# Patient Record
Sex: FEMALE | Race: BLACK OR AFRICAN AMERICAN | NOT HISPANIC OR LATINO | Employment: OTHER | URBAN - METROPOLITAN AREA
[De-identification: names, ages, dates, MRNs, and addresses within clinical notes are randomized per-mention and may not be internally consistent; named-entity substitution may affect disease eponyms.]

---

## 2017-02-13 DIAGNOSIS — N18.4 CKD (CHRONIC KIDNEY DISEASE), STAGE IV: Primary | ICD-10-CM

## 2017-02-14 ENCOUNTER — TELEPHONE (OUTPATIENT)
Dept: NEPHROLOGY | Facility: CLINIC | Age: 37
End: 2017-02-14

## 2017-02-14 ENCOUNTER — LAB VISIT (OUTPATIENT)
Dept: LAB | Facility: HOSPITAL | Age: 37
End: 2017-02-14
Attending: INTERNAL MEDICINE
Payer: MEDICAID

## 2017-02-14 DIAGNOSIS — N18.4 CKD (CHRONIC KIDNEY DISEASE), STAGE IV: ICD-10-CM

## 2017-02-14 LAB
ALBUMIN SERPL BCP-MCNC: 3.3 G/DL
ANION GAP SERPL CALC-SCNC: 7 MMOL/L
BASOPHILS # BLD AUTO: 0.02 K/UL
BASOPHILS NFR BLD: 0.3 %
BUN SERPL-MCNC: 34 MG/DL
CALCIUM SERPL-MCNC: 9.2 MG/DL
CHLORIDE SERPL-SCNC: 108 MMOL/L
CO2 SERPL-SCNC: 21 MMOL/L
CREAT SERPL-MCNC: 2.8 MG/DL
DIFFERENTIAL METHOD: ABNORMAL
EOSINOPHIL # BLD AUTO: 0.1 K/UL
EOSINOPHIL NFR BLD: 1.9 %
ERYTHROCYTE [DISTWIDTH] IN BLOOD BY AUTOMATED COUNT: 15.5 %
EST. GFR  (AFRICAN AMERICAN): 23.8 ML/MIN/1.73 M^2
EST. GFR  (NON AFRICAN AMERICAN): 20.6 ML/MIN/1.73 M^2
FERRITIN SERPL-MCNC: 21 NG/ML
GLUCOSE SERPL-MCNC: 145 MG/DL
HCT VFR BLD AUTO: 29.1 %
HGB BLD-MCNC: 9.1 G/DL
IRON SERPL-MCNC: 37 UG/DL
LYMPHOCYTES # BLD AUTO: 1.7 K/UL
LYMPHOCYTES NFR BLD: 27.5 %
MCH RBC QN AUTO: 25.8 PG
MCHC RBC AUTO-ENTMCNC: 31.3 %
MCV RBC AUTO: 82 FL
MONOCYTES # BLD AUTO: 0.4 K/UL
MONOCYTES NFR BLD: 5.7 %
NEUTROPHILS # BLD AUTO: 4.1 K/UL
NEUTROPHILS NFR BLD: 64.1 %
PHOSPHATE SERPL-MCNC: 3.9 MG/DL
PLATELET # BLD AUTO: 295 K/UL
PMV BLD AUTO: 11.1 FL
POTASSIUM SERPL-SCNC: 5.8 MMOL/L
PTH-INTACT SERPL-MCNC: 296 PG/ML
RBC # BLD AUTO: 3.53 M/UL
SATURATED IRON: 11 %
SODIUM SERPL-SCNC: 136 MMOL/L
TOTAL IRON BINDING CAPACITY: 336 UG/DL
TRANSFERRIN SERPL-MCNC: 227 MG/DL
WBC # BLD AUTO: 6.33 K/UL

## 2017-02-14 PROCEDURE — 83970 ASSAY OF PARATHORMONE: CPT

## 2017-02-14 PROCEDURE — 36415 COLL VENOUS BLD VENIPUNCTURE: CPT

## 2017-02-14 PROCEDURE — 80069 RENAL FUNCTION PANEL: CPT

## 2017-02-14 PROCEDURE — 83540 ASSAY OF IRON: CPT

## 2017-02-14 PROCEDURE — 85025 COMPLETE CBC W/AUTO DIFF WBC: CPT

## 2017-02-14 PROCEDURE — 82728 ASSAY OF FERRITIN: CPT

## 2017-02-14 NOTE — TELEPHONE ENCOUNTER
Severe hyperkalemia and kidney function has worsened. Please inform patient to go to the ER for prompt evaluation of this. Thanks.

## 2017-02-15 ENCOUNTER — HOSPITAL ENCOUNTER (EMERGENCY)
Facility: HOSPITAL | Age: 37
Discharge: HOME | End: 2017-02-15
Attending: FAMILY MEDICINE | Admitting: FAMILY MEDICINE
Payer: MEDICAID

## 2017-02-15 ENCOUNTER — OFFICE VISIT (OUTPATIENT)
Dept: NEPHROLOGY | Facility: CLINIC | Age: 37
End: 2017-02-15
Payer: MEDICAID

## 2017-02-15 VITALS
WEIGHT: 255 LBS | HEIGHT: 68 IN | RESPIRATION RATE: 18 BRPM | BODY MASS INDEX: 38.65 KG/M2 | HEART RATE: 92 BPM | SYSTOLIC BLOOD PRESSURE: 172 MMHG | OXYGEN SATURATION: 100 % | DIASTOLIC BLOOD PRESSURE: 96 MMHG | TEMPERATURE: 98 F

## 2017-02-15 VITALS
WEIGHT: 255.5 LBS | SYSTOLIC BLOOD PRESSURE: 166 MMHG | OXYGEN SATURATION: 98 % | HEART RATE: 86 BPM | BODY MASS INDEX: 38.72 KG/M2 | DIASTOLIC BLOOD PRESSURE: 110 MMHG | HEIGHT: 68 IN

## 2017-02-15 DIAGNOSIS — D63.1 ANEMIA OF RENAL DISEASE: Chronic | ICD-10-CM

## 2017-02-15 DIAGNOSIS — N18.4 CKD (CHRONIC KIDNEY DISEASE), STAGE IV: Primary | ICD-10-CM

## 2017-02-15 DIAGNOSIS — N25.81 SECONDARY HYPERPARATHYROIDISM: ICD-10-CM

## 2017-02-15 DIAGNOSIS — R80.9 PROTEINURIA, UNSPECIFIED TYPE: ICD-10-CM

## 2017-02-15 DIAGNOSIS — E87.5 HYPERKALEMIA: ICD-10-CM

## 2017-02-15 DIAGNOSIS — E87.5 HYPERKALEMIA: Primary | ICD-10-CM

## 2017-02-15 DIAGNOSIS — N18.9 ANEMIA OF RENAL DISEASE: Chronic | ICD-10-CM

## 2017-02-15 DIAGNOSIS — I10 ESSENTIAL HYPERTENSION: Chronic | ICD-10-CM

## 2017-02-15 LAB
ANION GAP SERPL CALC-SCNC: 5 MMOL/L
BASOPHILS # BLD AUTO: 0.02 K/UL
BASOPHILS NFR BLD: 0.3 %
BUN SERPL-MCNC: 38 MG/DL
CALCIUM SERPL-MCNC: 9.5 MG/DL
CHLORIDE SERPL-SCNC: 109 MMOL/L
CO2 SERPL-SCNC: 23 MMOL/L
CREAT SERPL-MCNC: 2.6 MG/DL
DIFFERENTIAL METHOD: ABNORMAL
EOSINOPHIL # BLD AUTO: 0.1 K/UL
EOSINOPHIL NFR BLD: 1.9 %
ERYTHROCYTE [DISTWIDTH] IN BLOOD BY AUTOMATED COUNT: 15.2 %
EST. GFR  (AFRICAN AMERICAN): 26 ML/MIN/1.73 M^2
EST. GFR  (NON AFRICAN AMERICAN): 22.6 ML/MIN/1.73 M^2
GLUCOSE SERPL-MCNC: 138 MG/DL
HCT VFR BLD AUTO: 29 %
HGB BLD-MCNC: 9.1 G/DL
LYMPHOCYTES # BLD AUTO: 1.8 K/UL
LYMPHOCYTES NFR BLD: 27.7 %
MCH RBC QN AUTO: 25.6 PG
MCHC RBC AUTO-ENTMCNC: 31.4 %
MCV RBC AUTO: 82 FL
MONOCYTES # BLD AUTO: 0.3 K/UL
MONOCYTES NFR BLD: 4.7 %
NEUTROPHILS # BLD AUTO: 4.1 K/UL
NEUTROPHILS NFR BLD: 65.1 %
PLATELET # BLD AUTO: 285 K/UL
PMV BLD AUTO: 11.1 FL
POTASSIUM SERPL-SCNC: 5.3 MMOL/L
RBC # BLD AUTO: 3.56 M/UL
SODIUM SERPL-SCNC: 137 MMOL/L
WBC # BLD AUTO: 6.36 K/UL

## 2017-02-15 PROCEDURE — 99284 EMERGENCY DEPT VISIT MOD MDM: CPT | Mod: 25,27

## 2017-02-15 PROCEDURE — 25000003 PHARM REV CODE 250: Performed by: PHYSICIAN ASSISTANT

## 2017-02-15 PROCEDURE — 80048 BASIC METABOLIC PNL TOTAL CA: CPT

## 2017-02-15 PROCEDURE — 99285 EMERGENCY DEPT VISIT HI MDM: CPT | Mod: ,,, | Performed by: PHYSICIAN ASSISTANT

## 2017-02-15 PROCEDURE — 93010 ELECTROCARDIOGRAM REPORT: CPT | Mod: ,,, | Performed by: INTERNAL MEDICINE

## 2017-02-15 PROCEDURE — 85025 COMPLETE CBC W/AUTO DIFF WBC: CPT

## 2017-02-15 PROCEDURE — 99215 OFFICE O/P EST HI 40 MIN: CPT | Mod: S$PBB,,, | Performed by: INTERNAL MEDICINE

## 2017-02-15 PROCEDURE — 93005 ELECTROCARDIOGRAM TRACING: CPT

## 2017-02-15 PROCEDURE — 99999 PR PBB SHADOW E&M-EST. PATIENT-LVL III: CPT | Mod: PBBFAC,,, | Performed by: INTERNAL MEDICINE

## 2017-02-15 RX ORDER — INSULIN ASPART 100 [IU]/ML
INJECTION, SOLUTION INTRAVENOUS; SUBCUTANEOUS
Refills: 5 | COMMUNITY
Start: 2016-11-10 | End: 2019-04-01

## 2017-02-15 RX ORDER — AMLODIPINE BESYLATE 10 MG/1
10 TABLET ORAL DAILY
Qty: 30 TABLET | Refills: 5 | Status: SHIPPED | OUTPATIENT
Start: 2017-02-15 | End: 2019-02-13

## 2017-02-15 RX ORDER — CLONAZEPAM 1 MG/1
TABLET ORAL
COMMUNITY
Start: 2016-12-08 | End: 2017-02-15

## 2017-02-15 RX ORDER — FERROUS SULFATE 325(65) MG
TABLET ORAL
Refills: 5 | COMMUNITY
Start: 2017-02-01 | End: 2019-04-01

## 2017-02-15 RX ORDER — HYDRALAZINE HYDROCHLORIDE 25 MG/1
25 TABLET, FILM COATED ORAL EVERY 8 HOURS
Qty: 90 TABLET | Refills: 5 | Status: SHIPPED | OUTPATIENT
Start: 2017-02-15 | End: 2019-04-01

## 2017-02-15 RX ORDER — ALPRAZOLAM 0.5 MG/1
0.5 TABLET ORAL 3 TIMES DAILY PRN
Refills: 2 | COMMUNITY
Start: 2017-01-23 | End: 2019-02-13

## 2017-02-15 RX ORDER — CILOSTAZOL 100 MG/1
TABLET ORAL
Refills: 5 | COMMUNITY
Start: 2017-02-01 | End: 2019-04-01

## 2017-02-15 RX ORDER — NAPROXEN SODIUM 220 MG
TABLET ORAL
Refills: 5 | COMMUNITY
Start: 2016-11-28 | End: 2019-04-01

## 2017-02-15 RX ORDER — SAXAGLIPTIN 5 MG/1
TABLET, FILM COATED ORAL
Refills: 0 | COMMUNITY
Start: 2016-11-10 | End: 2019-04-01

## 2017-02-15 RX ORDER — ZOLPIDEM TARTRATE 10 MG/1
10 TABLET ORAL NIGHTLY
Refills: 2 | COMMUNITY
Start: 2017-01-23 | End: 2019-02-13

## 2017-02-15 RX ORDER — GABAPENTIN 600 MG/1
600 TABLET ORAL 3 TIMES DAILY
Refills: 2 | COMMUNITY
Start: 2017-01-23 | End: 2019-04-01

## 2017-02-15 RX ORDER — SPIRONOLACTONE 50 MG/1
TABLET, FILM COATED ORAL
Refills: 5 | COMMUNITY
Start: 2017-02-01 | End: 2017-02-15

## 2017-02-15 RX ADMIN — SODIUM POLYSTYRENE SULFONATE 30 G: 15 SUSPENSION ORAL; RECTAL at 12:02

## 2017-02-15 NOTE — ED PROVIDER NOTES
Encounter Date: 2/15/2017       History     Chief Complaint   Patient presents with    Abnormal Lab     hyperkalemia 5.8 and kidney function elevated with labs drawn yesterday     Review of patient's allergies indicates:   Allergen Reactions    Penicillins Other (See Comments)    Vicodin [hydrocodone-acetaminophen] Hives    Codeine Hives     HPI Comments: 36-year-old -American female with past medical history of diabetes and hypertension presents to the ED complaining of an abnormal lab.  She had lab work drawn yesterday for an appointment with her nephrologist today.  She was called yesterday afternoon and told to present to the ED.  She does  not have any complaints.  She denies fever, chills, chest pain, shortness breath, palpitations, abdominal pain, nausea, vomiting, headache, dizziness, lightheadedness, numbness, weakness, seizures, lower extremities edema.  She smokes marijuana, smokes cigarettes, denies alcohol use.    The history is provided by the patient.     Past Medical History   Diagnosis Date    Diabetes mellitus     Hypertension      No past medical history pertinent negatives.  Past Surgical History   Procedure Laterality Date     section, classic      Tubal ligation       History reviewed. No pertinent family history.  Social History   Substance Use Topics    Smoking status: Current Some Day Smoker     Types: Cigarettes    Smokeless tobacco: None    Alcohol use No     Review of Systems   Constitutional: Negative for chills and fever.   HENT: Negative for congestion, rhinorrhea and sore throat.    Eyes: Negative for photophobia and visual disturbance.   Respiratory: Negative for cough and shortness of breath.    Cardiovascular: Negative for chest pain, palpitations and leg swelling.   Gastrointestinal: Negative for abdominal pain, constipation, diarrhea, nausea and vomiting.   Genitourinary: Negative for dysuria and hematuria.   Musculoskeletal: Positive for back pain (1/10  L lower mac). Negative for gait problem, neck pain and neck stiffness.   Skin: Negative for rash and wound.   Neurological: Negative for dizziness, tremors, seizures, syncope, facial asymmetry, speech difficulty, weakness, light-headedness, numbness and headaches.   Psychiatric/Behavioral: Negative for confusion.       Physical Exam   Initial Vitals   BP Pulse Resp Temp SpO2   02/15/17 1037 02/15/17 1037 02/15/17 1037 02/15/17 1037 02/15/17 1037   172/96 92 18 98 °F (36.7 °C) 100 %     Physical Exam    Nursing note and vitals reviewed.  Constitutional: She appears well-developed and well-nourished. She is not diaphoretic. No distress.   HENT:   Head: Normocephalic and atraumatic.   Eyes: EOM are normal. Pupils are equal, round, and reactive to light.   Neck: Normal range of motion. Neck supple.   Cardiovascular: Normal rate, regular rhythm and normal heart sounds. Exam reveals no gallop and no friction rub.    No murmur heard.  No LE edema   Pulmonary/Chest: Breath sounds normal. She has no wheezes. She has no rhonchi. She has no rales.   Abdominal: Soft. Bowel sounds are normal. There is no tenderness. There is no rebound and no guarding.   Musculoskeletal: Normal range of motion.   Neurological: She is alert and oriented to person, place, and time. She has normal strength. No cranial nerve deficit or sensory deficit. Gait normal. GCS eye subscore is 4. GCS verbal subscore is 5. GCS motor subscore is 6.   Skin: Skin is warm and dry. No rash noted. No erythema.   Psychiatric: She has a normal mood and affect.         ED Course   Procedures  Labs Reviewed   BASIC METABOLIC PANEL - Abnormal; Notable for the following:        Result Value    Potassium 5.3 (*)     Glucose 138 (*)     BUN, Bld 38 (*)     Creatinine 2.6 (*)     Anion Gap 5 (*)     eGFR if  26.0 (*)     eGFR if non  22.6 (*)     All other components within normal limits   CBC W/ AUTO DIFFERENTIAL - Abnormal; Notable for the  following:     RBC 3.56 (*)     Hemoglobin 9.1 (*)     Hematocrit 29.0 (*)     MCH 25.6 (*)     MCHC 31.4 (*)     RDW 15.2 (*)     All other components within normal limits             Medical Decision Making:   History:   Old Medical Records: I decided to obtain old medical records.  Old Records Summarized: records from clinic visits.       <> Summary of Records: Patient has lab work done yesterday.  Potassium elevated at 5.8.  Creatinine 2.8.  She was called from nephrology clinic and instructed present to the ED.  Clinical Tests:   Lab Tests: Reviewed and Ordered  Medical Tests: Reviewed and Ordered       APC / Resident Notes:   36-year-old -American female with past medical history of diabetes and hypertension presents to the ED complaining of an abnormal lab.  Vital signs stable.  Regular rate and rhythm without murmurs.  Lungs are clear to auscultation bilaterally without wheezes.  Abdomen is soft and nontender to palpation.  No lower extremity edema.  Neurologically intact without any focal neuro deficits.  Will repeat labs.    EKG shows NSR without STEMI or any signs of ischemia. No peaked T waves.    CBC with no leukocytosis with WBC 6.36.  CMP show hyperkalemia with potassium of 5.3 (improved from 5.8 yesterday). Creatinine 2.6.    She was given 30g kayexalate in the ED. I do not feel that she needs any further labs or imaging at this time. Stable for discharge.    She was discharged without any new prescriptions.  She will follow up with her PCP and nephrologist today as scheduled.  All of the patient's questions were answered.  I reviewed the patient's chart and labs and discussed the case with my supervising physician.                 ED Course     Clinical Impression:   The encounter diagnosis was Hyperkalemia.    Disposition:   Disposition: Discharged  Condition: Stable       Ramya Franklin PA-C  02/15/17 1228

## 2017-02-15 NOTE — PROGRESS NOTES
Subjective:       Patient ID: Xuan Ricardo is a 36 y.o. Black or  female who presents for follow up of Chronic Kidney Disease and Hyperkalemia    HPI     Ms. Ricardo was seen in follow up evaluation for CKD. She was seen in new patient evaluation on 5/24/16 and since then she was lost for follow up. She re-established care on 12/14/16.     She has been having difficult family situationsince her mother passed away in July of 2016. She reports in early January her  was murdered. She admits she is not watching her diet. She admits to eating very salty foods. She denies any NSAID use recently. She has stopped taking metformin. She admits she ran out of hydralazine for a few weeks.     Interim her pre clinic labs showed K of 5.8 along with further decline in renal function, hence she was sent to ER. She had a repeat lab from ER and her K is now 5.3. She has been prescribed kayexalate from ER today. She has been on losartan, HCTZ, aldactone based regimen.     She has uncontrolled diabetes for 20 years. She has hypertension for more than 4 years. She had prior occasional use of Aleve, naproxen. She was aware of possible CKD in 2012 at the time of her pregnancy. Prior labs reviewed and noted for eGFR of 57 from 06/12, then in 02/15 it was 47. Also it is noted that she has had 2 to 3+ proteinuria since 2012. Labs prior to that not available. Her sister has diabetes too. Her mother had h/o lupus. Her mother had CKD which was very advanced and was told of possible eGFR of 13. She did not go on dialysis prior to her death.     She had admission to [facility] in 4/16 when she was noted to have worsening of her renal function. She was admitted with uncontrolled hypertension and uncontrolled diabetes. Per notes from there she was found to have malignant hypertension with end organ damage. There was some concern about medication and diet non compliance per notes by hospitalist Dr. Elizabeth. She admits to prior  use of marijuana. During 4/16 hospitalization she was followed by community neph[facility] group.     She denies any symptoms to suggest uremia. However she reports she has leg swelling on off. She denies dysuria/ decreased urine output. She feels tired. She denies shortness of breath/ chest pain/ abdominal distention/ loss of appetite.    Labs from 2/14/17 showed Na 136, K 5.8, bicarbonate 21, BUN 34, creatinine 2.8, eGFR 23.8, Ca 9.2, phos 3.9, albumin 3.3, . Labs from ER from today noted for K 5.3, bicarbonate 23, Ca 9.5, creatinine 2.6. Hb is 9.1 on most recent labs. Urine PC ratio is 1.48.     Review of Systems   Constitutional: Positive for fatigue and unexpected weight change. Negative for activity change, appetite change, chills and fever.        Slowly losing weight   HENT: Negative for ear discharge and ear pain.    Eyes: Negative for discharge and redness.   Respiratory: Negative for cough, shortness of breath and wheezing.    Cardiovascular: Positive for leg swelling. Negative for chest pain.   Gastrointestinal: Negative for abdominal distention, abdominal pain, blood in stool, diarrhea, nausea and vomiting.   Endocrine: Negative for polyuria.   Genitourinary: Negative for decreased urine volume, difficulty urinating, dysuria, flank pain, frequency and hematuria.   Musculoskeletal: Negative for arthralgias and back pain.   Skin: Negative for pallor and rash.   Allergic/Immunologic: Negative for food allergies.   Neurological: Negative for dizziness, light-headedness and headaches.   Psychiatric/Behavioral: Negative for behavioral problems. The patient is not nervous/anxious.        Objective:      Physical Exam   Constitutional: She is oriented to person, place, and time. She appears well-developed and well-nourished. No distress.   HENT:   Head: Normocephalic and atraumatic.   Eyes: Right eye exhibits no discharge. Left eye exhibits no discharge.   Neck: Normal range of motion. Neck supple.    Cardiovascular: Normal rate, regular rhythm and normal heart sounds.    No murmur heard.  Pulmonary/Chest: Effort normal and breath sounds normal. No respiratory distress. She has no wheezes. She has no rales.   Abdominal: Soft.   Abdominal obesity   Musculoskeletal: She exhibits no edema.   Neurological: She is alert and oriented to person, place, and time.   Skin: Skin is warm and dry.   Psychiatric: She has a normal mood and affect. Her behavior is normal.   Nursing note and vitals reviewed.      Assessment:     1. CKD IV  2. Diabetic nephropathy  3. Uncontrolled hypertension  4. Proteinuria  5. Secondary hyperparathyroidism  6. Morbid obesity  7. Anemia, unspecified type  8. Hyperkalemia    Plan:     - kayexalate as has been prescribed for her, advised to take dose now, follow strict low K diet, pt counseled about low K diet, will repeat BMP next week  - nutritionist consult for low K, low salt, diabetic diet  - stop losartan, aldactone in light of advanced CKD and recurrent hyperkalemia  - will start amlodipine and hydralazine for BP control. If she develops any edema, would prefer to use lasix than HCTZ  - I stressed importance of following BP at home and to inform our office if SBP more than 160 or less than 100   - she will come for nurse visit next week for BP check   - she appears to have CKD III at baseline which has progressed to CKD IV due to uncontrolled diabetes and hypertension  - CKD staging and risk of progression to ESRD was explained to her in detail  - stressed importance of keeping MD visits and follow up labs, medications, following dietary restrictions   - she has had proteinuria for past 4 years at least  - avoid NSAID use ever   - continue calcitriol for sec hyperparathyroidism for now, follow PTH, phos, corrected Ca   - US read reviewed, changes c/w CKD noted   - I have advised her to discuss with her other physician to consider decreasing the dose of gabapentin given reduced renal  function  - strict glycemic control per her PCP   - iron studies noted, defer to PCP about anemia work up, continue oral iron for now       RTC 6 weeks    Plan, labs, recommendations were discussed with patient, her questions were answered to her satisfaction. She expressed understanding the gravity of her declining renal function and agreed to keep a close follow up.

## 2017-02-15 NOTE — DISCHARGE INSTRUCTIONS
Follow up with your nephrologist and PCP. Continue all at home medications. Return to the ED for any new or worsening symptoms, including those listed below.        Hyperkalemia  Hyperkalemia is too much potassium in the blood. This most often occurs in people who take certain medicines or people with kidney disease.  This condition often has no symptoms until levels of potassium become high. If symptoms do occur, they include muscle weakness and changes in the heartbeat. A blood test is done to diagnose the problem. An ECG (electrocardiogram) may also be done to test the heartbeat.  If hyperkalemia is caused by a medicine, the healthcare provider may lower the dose or switch to a different medicine. A low-potassium diet may also be prescribed.   Home care  · Be sure your healthcare provider knows about all of the medicines you take. Follow your healthcare provider's advice about making changes to your medicines.  · If a low-potassium diet has been prescribed, follow this closely. If you need help, ask to be referred to a dietitian for advice on how to follow this diet.  Follow-up care  Follow up with your healthcare provider. You may need a repeat blood test within the next 7 days. Schedule this as advised.  When to seek medical advice  Call your healthcare provider if any of the following occur:  · Weakness, dizziness, or lightheadedness  · Nausea, vomiting, or diarrhea  · Urinating only in small amounts or not urinating  · Symptoms don't go away or get worse  Call 911  Call 911 or emergency services right away if any of the following occur:  · Fast or irregular heartbeat  · Fainting  · Severe shortness of breath  · Chest, arm, shoulder, neck or upper back pain  · Trouble controlling your muscles  Date Last Reviewed: 6/26/2015  © 6824-8038 [employer] [address] [place] [address], [place] [address]. All rights reserved. This information is not intended as a substitute for professional medical care. Always  "follow your healthcare professional's instructions.          Discharge Instructions for Hyperkalemia  You have been diagnosed with hyperkalemia (a high level of potassium in the blood). Potassium is important to the function of the nerve and muscle cells, including the cells of the heart. But a high level of potassium in the blood can cause  serious problems such as abnormal heart rhythms and even heart attack.  Diet changes  · Eat less of these potassium-rich foods:  ¨ Bananas (avoid bananas completely)  ¨ Apricots, fresh or dried  ¨ Oranges and orange juice  ¨ Grapefruit juice  ¨ Tomatoes, tomato sauce, and tomato juice  ¨ Spinach  ¨ Green, leafy vegetables, including salad greens, kale, broccoli, chard, and collards  ¨ Melons (all kinds)  ¨ Peas  ¨ Beans  ¨ Potatoes  ¨ Sweet potatoes  ¨ Avocados and guacamole  ¨ Vegetable juice (homemade or store-bought) and vegetable juice cocktail  ¨ Fruit juices  ¨ Nuts, including pistachios, almonds, peanuts, hazelnuts, [place], cashew, mixed  ¨ "Lite" or reduced sodium salt  Other home care  · Tell your healthcare provider about all prescription and over-the-counter medicines you are taking. Certain medicines can increase potassium levels.  · Take all medicines exactly as directed.  · Have your potassium levels checked regularly.  · Keep all follow-up appointments. Your healthcare provider needs to monitor your condition closely.  · Learn to take your own pulse. If your pulse is less than 60 beats per minute or irregular, call your provider.  Follow-up  Make a follow-up appointment as directed by our staff.     When to Call Your healthcare provider  Call your provider right away if you have any of the following:  · Chest pain (call 911)  · Fainting (call 911)  · Shortness of breath (call 911 if severe)  · Slow, irregular heartbeat  · Fatigue  · Dizziness  · Lightheadedness  · Confusion   Date Last Reviewed: 6/19/2015  © [id number] [employer]. [address], [place] [address]. All rights reserved. This information is not intended as a substitute for professional medical care. Always follow your healthcare professional's instructions.

## 2017-02-15 NOTE — ED TRIAGE NOTES
Sent for abnormal lab values. Had lab work performed yesterday. Reports unknown exact lab values that are abnormal.

## 2017-02-15 NOTE — ED AVS SNAPSHOT
[facility]  [address]  [facility] [address]               Xuan Ricardo   2/15/2017 10:45 AM   ED    Description:  Female : 1980   Department:  [facility]           Your Care was Coordinated By:     Provider Role From To    Emerson Johnson MD Attending Provider 02/15/17 1053 --    Ramya Franklin PA-C Physician Assistant 02/15/17 1051 --      Reason for Visit     Abnormal Lab           Diagnoses this Visit        Comments    Hyperkalemia    -  Primary       ED Disposition     ED Disposition Condition Comment    Discharge             To Do List           Follow-up Information     Follow up with Estrellita Vyas MD.    Specialty:  Nephrology    Why:  as scheduled    Contact information:    [address]  [facility] [address]  [phone]          Schedule an appointment as soon as possible for a visit with Ramsey Segovia Jr, MD.    Specialty:  Family Medicine    Contact information:    [address] [facility] [address]  [phone]        [facility] [place] On [facility]     [facility] -  Assistance  Registered nurses in the [facility] provide clinical advisement, health education, appointment booking, and other advisory services.  Call for this free service at [phone].             Medications           Message regarding Medications     Verify the changes and/or additions to your medication regime listed below are the same as discussed with your clinician today.  If any of these changes or additions are incorrect, please notify your healthcare provider.        These medications were administered today        Dose Freq    sodium polystyrene 15 gram/60 mL suspension 30 g 30 g ED 1 Time    Sig: Take 120 mLs (30 g total) by mouth ED 1 Time.    Class: Normal    Route: Oral    Cosign for Ordering: Required by Emerson Johnson MD           Verify that the below list of medications is an  "accurate representation of the medications you are currently taking.  If none reported, the list may be blank. If incorrect, please contact your healthcare provider. Carry this list with you in case of emergency.           Current Medications     ACCU-CHEK FASTCLIX Misc USE UTD TID    amlodipine (NORVASC) 10 MG tablet TK 1 T PO QD    calcitRIOL (ROCALTROL) 0.25 MCG Cap Take 1 capsule (0.25 mcg total) by mouth once daily.    escitalopram oxalate (LEXAPRO) 10 MG tablet TK 1 T PO QD    glimepiride (AMARYL) 2 MG tablet TK 1 T PO  D    insulin syringe-needle U-100 1 mL 31 x 5/16" Syrg INJECT UTD TID    losartan-hydrochlorothiazide 100-25 mg (HYZAAR) 100-25 mg per tablet TK 1 T PO  D    metformin (GLUCOPHAGE) 1000 MG tablet     ONETOUCH VERIO Strp     ONETOUCH VERIO SYSTEM Misc USE AS  DIRECTED    carvedilol (COREG) 3.125 MG tablet Take 1 tablet (3.125 mg total) by mouth 2 (two) times daily.    hydrALAZINE (APRESOLINE) 25 MG tablet Take 1 tablet (25 mg total) by mouth every 8 (eight) hours.    insulin NPH (NOVOLIN N) 100 unit/mL injection Inject 12 Units into the skin 2 (two) times daily before meals.    losartan (COZAAR) 50 MG tablet Take 1 tablet (50 mg total) by mouth once daily.    sodium polystyrene 15 gram/60 mL suspension 30 g Take 120 mLs (30 g total) by mouth ED 1 Time.           Clinical Reference Information           Your Vitals Were     BP Pulse Temp Resp Height Weight    172/96 (BP Location: Right arm, Patient Position: Sitting) 92 98 °F (36.7 °C) (Oral) 18 5' 8" (1.727 m) 115.7 kg (255 lb)    Last Period SpO2 BMI          02/11/2017 (Exact Date) 100% 38.77 kg/m2        Allergies as of 2/15/2017        Reactions    Penicillins Other (See Comments)    Vicodin [Hydrocodone-acetaminophen] Hives    Codeine Hives      Immunizations Administered on Date of Encounter - 2/15/2017     None      ED Micro, Lab, POCT     Start Ordered       Status Ordering Provider    02/15/17 1101 02/15/17 1100  Basic metabolic panel  " STAT      Final result     02/15/17 1101 02/15/17 1100  CBC auto differential  STAT      Final result       ED Imaging Orders     None        Discharge Instructions       Follow up with your nephrologist and PCP. Continue all at home medications. Return to the ED for any new or worsening symptoms, including those listed below.        Hyperkalemia  Hyperkalemia is too much potassium in the blood. This most often occurs in people who take certain medicines or people with kidney disease.  This condition often has no symptoms until levels of potassium become high. If symptoms do occur, they include muscle weakness and changes in the heartbeat. A blood test is done to diagnose the problem. An ECG (electrocardiogram) may also be done to test the heartbeat.  If hyperkalemia is caused by a medicine, the healthcare provider may lower the dose or switch to a different medicine. A low-potassium diet may also be prescribed.   Home care  · Be sure your healthcare provider knows about all of the medicines you take. Follow your healthcare provider's advice about making changes to your medicines.  · If a low-potassium diet has been prescribed, follow this closely. If you need help, ask to be referred to a dietitian for advice on how to follow this diet.  Follow-up care  Follow up with your healthcare provider. You may need a repeat blood test within the next 7 days. Schedule this as advised.  When to seek medical advice  Call your healthcare provider if any of the following occur:  · Weakness, dizziness, or lightheadedness  · Nausea, vomiting, or diarrhea  · Urinating only in small amounts or not urinating  · Symptoms don't go away or get worse  Call 911  Call 911 or emergency services right away if any of the following occur:  · Fast or irregular heartbeat  · Fainting  · Severe shortness of breath  · Chest, arm, shoulder, neck or upper back pain  · Trouble controlling your muscles  Date Last Reviewed: 6/26/2015  © 4815-2690 The  "[employer]. [address] [place] [address], [place] [address]. All rights reserved. This information is not intended as a substitute for professional medical care. Always follow your healthcare professional's instructions.          Discharge Instructions for Hyperkalemia  You have been diagnosed with hyperkalemia (a high level of potassium in the blood). Potassium is important to the function of the nerve and muscle cells, including the cells of the heart. But a high level of potassium in the blood can cause  serious problems such as abnormal heart rhythms and even heart attack.  Diet changes  · Eat less of these potassium-rich foods:  ¨ Bananas (avoid bananas completely)  ¨ Apricots, fresh or dried  ¨ Oranges and orange juice  ¨ Grapefruit juice  ¨ Tomatoes, tomato sauce, and tomato juice  ¨ Spinach  ¨ Green, leafy vegetables, including salad greens, kale, broccoli, chard, and collards  ¨ Melons (all kinds)  ¨ Peas  ¨ Beans  ¨ Potatoes  ¨ Sweet potatoes  ¨ Avocados and guacamole  ¨ Vegetable juice (homemade or store-bought) and vegetable juice cocktail  ¨ Fruit juices  ¨ Nuts, including pistachios, almonds, peanuts, hazelnuts, [place], cashew, mixed  ¨ "Lite" or reduced sodium salt  Other home care  · Tell your healthcare provider about all prescription and over-the-counter medicines you are taking. Certain medicines can increase potassium levels.  · Take all medicines exactly as directed.  · Have your potassium levels checked regularly.  · Keep all follow-up appointments. Your healthcare provider needs to monitor your condition closely.  · Learn to take your own pulse. If your pulse is less than 60 beats per minute or irregular, call your provider.  Follow-up  Make a follow-up appointment as directed by our staff.     When to Call Your healthcare provider  Call your provider right away if you have any of the following:  · Chest pain (call 911)  · Fainting (call 911)  · Shortness of breath (call 911 if " severe)  · Slow, irregular heartbeat  · Fatigue  · Dizziness  · Lightheadedness  · Confusion   Date Last Reviewed: 6/19/2015  © 3651-8009 [employer]. [address] [place] [address], [place] [address]. All rights reserved. This information is not intended as a substitute for professional medical care. Always follow your healthcare professional's instructions.          Your Scheduled Appointments     Feb 15, 2017  4:00 PM CST   Established Patient Visit with MD Wilfredo Gross [facility]reen - Nephrology ([facility] )    [address]  [place] [address]   [phone]            Apr 05, 2017  8:00 AM CDT   Established Patient Visit with MD Wil[facility]o Gross - Nephrology ([facility] )    [address]  [place] [address]   [phone]              [facility] Sign-Up     Activating your [facility] account is as easy as 1-2-3!     1) Visit [email or web address], select Sign Up Now, enter this activation code and your date of birth, then select Next.  [email or web address]  Expires: 4/1/2017 12:11 PM      2) Create a username and password to use when you visit [facility] in the future and select a security question in case you lose your password and select Next.    3) Enter your e-mail address and click Sign Up!    Additional Information  If you have questions, please e-mail [email or web address] or call [phone] to talk to our [facility] staff. Remember, [facility] is NOT to be used for urgent needs. For medical emergencies, dial 911.         Smoking Cessation     If you would like to quit smoking:   You may be eligible for free services if you are a [place] resident and started smoking cigarettes before September 1, 1988.  Call the Smoking Cessation Trust (SCT) toll free at (666) 519-0662 or (809) 336-4348.   Call 9-949-QUIT-NOW if you do not meet the above criteria.             [facility] complies with applicable Federal civil rights laws and does not  discriminate on the basis of race, color, national origin, age, disability, or sex.        Language Assistance Services     ATTENTION: Language assistance services are available, free of charge. Please call [phone].      ATENCIÓN: Si mini dalton, tiene a lazo disposición servicios gratuitos de asistencia lingüística. Llame al [phone].     CHÚ Ý: N?u b?n nói Ti?ng Vi?t, có các d?ch v? h? tr? ngôn ng? mi?n phí dành cho b?n. G?i s? [phone].

## 2017-02-15 NOTE — PATIENT INSTRUCTIONS
Stop losartan, hydrochlorothiazide, aldactone.    Start taking amlodipine and hydralazine.    Monitor Blood pressure at home, call office if systolic or upper number is more than 160 or less than 100    See nutritionist to discuss your diet for low potassium, low salt and diabetic diet.    Come for nurse visit next week for blood pressure check.     Avoid NSAID like medicines like Ibuprofen, Advil, Motrin, Meloxicam/ Mobic, Naprosyn, Toradol/ Ketorolac, BC powder, Goody powder.    Tylenol upto 2 grams per day is ok for pain relief.

## 2017-02-15 NOTE — MR AVS SNAPSHOT
[facility]  [address]  [facility] [address]  Phone: [phone]  Fax: [phone]                  Xuan Ricardo   2/15/2017 4:00 PM   Office Visit    Description:  Female : 1980   Provider:  Estrellita Vyas MD   Department:  [facility]           Reason for Visit     Chronic Kidney Disease     Hyperkalemia           Diagnoses this Visit        Comments    CKD (chronic kidney disease), stage IV    -  Primary     Hyperkalemia         Proteinuria, unspecified type         Secondary hyperparathyroidism         Anemia of renal disease         Essential hypertension                To Do List           Future Appointments        Provider Department Dept Phone    2/15/2017 4:00 PM MD [facility] [phone]    2017 8:00 AM MD Wilfredo Gross [facility] [phone]      Goals (5 Years of Data)     None      Follow-Up and Disposition     Return in about 6 weeks (around 3/29/2017).       These Medications        Disp Refills Start End    hydrALAZINE (APRESOLINE) 25 MG tablet 90 tablet 5 2/15/2017 3/17/2017    Take 1 tablet (25 mg total) by mouth every 8 (eight) hours. - Oral    Pharmacy: [facility] [address] AT [facility] Ph #: [phone]       amlodipine (NORVASC) 10 MG tablet 30 tablet 5 2/15/2017     Take 1 tablet (10 mg total) by mouth once daily. - Oral    Pharmacy: [facility] [address] AT [facility] Ph #: [phone]         [facility]     [facility] Care [facility] -  Assistance  Registered nurses in the [facility] On Call Center provide clinical advisement, health education, appointment booking, and other advisory services.  Call for this free service at [phone].             Medications           Message regarding Medications     Verify the changes and/or additions to your medication  "regime listed below are the same as discussed with your clinician today.  If any of these changes or additions are incorrect, please notify your healthcare provider.        CHANGE how you are taking these medications     Start Taking Instead of    amlodipine (NORVASC) 10 MG tablet amlodipine (NORVASC) 10 MG tablet    Dosage:  Take 1 tablet (10 mg total) by mouth once daily. Dosage:  TK 1 T PO QD    Reason for Change:  Reorder       STOP taking these medications     clonazePAM (KLONOPIN) 1 MG tablet     losartan (COZAAR) 50 MG tablet Take 1 tablet (50 mg total) by mouth once daily.    losartan-hydrochlorothiazide 100-25 mg (HYZAAR) 100-25 mg per tablet TK 1 T PO  D    metformin (GLUCOPHAGE) 1000 MG tablet     carvedilol (COREG) 3.125 MG tablet Take 1 tablet (3.125 mg total) by mouth 2 (two) times daily.    spironolactone (ALDACTONE) 50 MG tablet TK 1 T PO BID           Verify that the below list of medications is an accurate representation of the medications you are currently taking.  If none reported, the list may be blank. If incorrect, please contact your healthcare provider. Carry this list with you in case of emergency.           Current Medications     ACCU-CHEK FASTCLIX Misc USE UTD TID    alprazolam (XANAX) 0.5 MG tablet Take 0.5 mg by mouth 3 (three) times daily as needed.    amlodipine (NORVASC) 10 MG tablet Take 1 tablet (10 mg total) by mouth once daily.    calcitRIOL (ROCALTROL) 0.25 MCG Cap Take 1 capsule (0.25 mcg total) by mouth once daily.    cilostazol (PLETAL) 100 MG Tab TK 1 T PO  BID    escitalopram oxalate (LEXAPRO) 10 MG tablet TK 1 T PO QD    ferrous sulfate 325 mg (65 mg iron) Tab tablet TK 1 T PO  D FOR 30 DAYS    gabapentin (NEURONTIN) 600 MG tablet Take 600 mg by mouth 3 (three) times daily.    glimepiride (AMARYL) 2 MG tablet TK 1 T PO  D    insulin syringe-needle U-100 0.5 mL 31 gauge x 5/16 Syrg USE AS DIRECTED    insulin syringe-needle U-100 1 mL 31 x 5/16" Syrg INJECT UTD TID    " "NOVOLOG 100 unit/mL injection INJECT 12 UNITS INTO THE SKIN BID WITH MEALS.    ONETOUCH VERIO Strp     ONETOUCH VERIO SYSTEM [facility] USE AS  DIRECTED    ONGLYZA 5 mg Tab tablet TK 1 T PO  ONCE D.    zolpidem (AMBIEN) 10 mg Tab Take 10 mg by mouth nightly.    hydrALAZINE (APRESOLINE) 25 MG tablet Take 1 tablet (25 mg total) by mouth every 8 (eight) hours.    insulin NPH (NOVOLIN N) 100 unit/mL injection Inject 12 Units into the skin 2 (two) times daily before meals.           Clinical Reference Information           Your Vitals Were     BP Pulse Height Weight Last Period SpO2    166/110 (BP Location: Right arm, Patient Position: Sitting) 86 5' 8" (1.727 m) 115.9 kg (255 lb 8.2 oz) 02/11/2017 (Exact Date) 98%    BMI                38.85 kg/m2          Blood Pressure          Most Recent Value    BP  (!)  166/110      Allergies as of 2/15/2017     Penicillins    Vicodin [Hydrocodone-acetaminophen]    Codeine      Immunizations Administered on Date of Encounter - 2/15/2017     None      Orders Placed During Today's Visit     Future Labs/Procedures Expected by Expires    Basic metabolic panel  2/15/2017 4/16/2018      [facility] Sign-Up     Activating your [facility] account is as easy as 1-2-3!     1) Visit [email or web address], select Sign Up Now, enter this activation code and your date of birth, then select Next.  [email or web address]  Expires: 4/1/2017 12:11 PM      2) Create a username and password to use when you visit [facility] in the future and select a security question in case you lose your password and select Next.    3) Enter your e-mail address and click Sign Up!    Additional Information  If you have questions, please e-mail [email or web address] or call [phone] to talk to our [facility] staff. Remember, [facility] is NOT to be used for urgent needs. For medical emergencies, dial 911.         Instructions    Stop losartan, hydrochlorothiazide, aldactone.    Start taking amlodipine and hydralazine.    Monitor Blood " pressure at home, call office if systolic or upper number is more than 160 or less than 100    See nutritionist to discuss your diet for low potassium, low salt and diabetic diet.    Come for nurse visit next week for blood pressure check.     Avoid NSAID like medicines like Ibuprofen, Advil, Motrin, Meloxicam/ Mobic, Naprosyn, Toradol/ Ketorolac, BC powder, Goody powder.    Tylenol upto 2 grams per day is ok for pain relief.                 Smoking Cessation     If you would like to quit smoking:   You may be eligible for free services if you are a [place] resident and started smoking cigarettes before September 1, 1988.  Call the Smoking Cessation Trust ([facility]) toll free at (011) 431-3309 or (002) 645-3339.   Call 1-800-QUIT-NOW if you do not meet the above criteria.            Language Assistance Services     ATTENTION: Language assistance services are available, free of charge. Please call [phone].      ATENCIÓN: Si habla español, tiene a lazo disposición servicios gratuitos de asistencia lingüística. Llame al [phone].     CHÚ Ý: N?u b?n nói Ti?ng Vi?t, có các d?ch v? h? tr? ngôn ng? mi?n phí dành cho b?n. G?i s? [phone].         [facility] complies with applicable Federal civil rights laws and does not discriminate on the basis of race, color, national origin, age, disability, or sex.

## 2017-02-15 NOTE — PROVIDER PROGRESS NOTES - EMERGENCY DEPT.
Encounter Date: 2/15/2017    ED Physician Progress Notes        Physician Note:   Normal EKG    EKG - STEMI Decision  Initial Reading: No STEMI present.  Response: No Action Needed.

## 2017-02-22 ENCOUNTER — LAB VISIT (OUTPATIENT)
Dept: LAB | Facility: HOSPITAL | Age: 37
End: 2017-02-22
Attending: INTERNAL MEDICINE
Payer: MEDICAID

## 2017-02-22 ENCOUNTER — TELEPHONE (OUTPATIENT)
Dept: NEPHROLOGY | Facility: CLINIC | Age: 37
End: 2017-02-22

## 2017-02-22 ENCOUNTER — CLINICAL SUPPORT (OUTPATIENT)
Dept: NEPHROLOGY | Facility: CLINIC | Age: 37
End: 2017-02-22
Payer: MEDICAID

## 2017-02-22 DIAGNOSIS — E87.5 HYPERKALEMIA: Primary | ICD-10-CM

## 2017-02-22 DIAGNOSIS — N18.4 CKD (CHRONIC KIDNEY DISEASE), STAGE IV: ICD-10-CM

## 2017-02-22 LAB
ANION GAP SERPL CALC-SCNC: 6 MMOL/L
BUN SERPL-MCNC: 34 MG/DL
CALCIUM SERPL-MCNC: 9.5 MG/DL
CHLORIDE SERPL-SCNC: 106 MMOL/L
CO2 SERPL-SCNC: 25 MMOL/L
CREAT SERPL-MCNC: 2.2 MG/DL
EST. GFR  (AFRICAN AMERICAN): 31.8 ML/MIN/1.73 M^2
EST. GFR  (NON AFRICAN AMERICAN): 27.6 ML/MIN/1.73 M^2
GLUCOSE SERPL-MCNC: 161 MG/DL
POTASSIUM SERPL-SCNC: 5.5 MMOL/L
SODIUM SERPL-SCNC: 137 MMOL/L

## 2017-03-27 ENCOUNTER — LAB VISIT (OUTPATIENT)
Dept: LAB | Facility: HOSPITAL | Age: 37
End: 2017-03-27
Attending: INTERNAL MEDICINE
Payer: MEDICAID

## 2017-03-27 DIAGNOSIS — N18.4 CKD (CHRONIC KIDNEY DISEASE), STAGE IV: ICD-10-CM

## 2017-03-27 LAB
25(OH)D3+25(OH)D2 SERPL-MCNC: 11 NG/ML
ALBUMIN SERPL BCP-MCNC: 3.2 G/DL
ANION GAP SERPL CALC-SCNC: 7 MMOL/L
BASOPHILS # BLD AUTO: 0.03 K/UL
BASOPHILS NFR BLD: 0.4 %
BUN SERPL-MCNC: 28 MG/DL
CALCIUM SERPL-MCNC: 9.2 MG/DL
CHLORIDE SERPL-SCNC: 109 MMOL/L
CO2 SERPL-SCNC: 23 MMOL/L
CREAT SERPL-MCNC: 2.4 MG/DL
DIFFERENTIAL METHOD: ABNORMAL
EOSINOPHIL # BLD AUTO: 0.1 K/UL
EOSINOPHIL NFR BLD: 1.5 %
ERYTHROCYTE [DISTWIDTH] IN BLOOD BY AUTOMATED COUNT: 15.5 %
EST. GFR  (AFRICAN AMERICAN): 28.7 ML/MIN/1.73 M^2
EST. GFR  (NON AFRICAN AMERICAN): 24.9 ML/MIN/1.73 M^2
FERRITIN SERPL-MCNC: 18 NG/ML
GLUCOSE SERPL-MCNC: 141 MG/DL
HCT VFR BLD AUTO: 28.5 %
HGB BLD-MCNC: 8.9 G/DL
IRON SERPL-MCNC: 22 UG/DL
LYMPHOCYTES # BLD AUTO: 1.9 K/UL
LYMPHOCYTES NFR BLD: 26 %
MCH RBC QN AUTO: 25.4 PG
MCHC RBC AUTO-ENTMCNC: 31.2 %
MCV RBC AUTO: 81 FL
MONOCYTES # BLD AUTO: 0.4 K/UL
MONOCYTES NFR BLD: 5.1 %
NEUTROPHILS # BLD AUTO: 4.8 K/UL
NEUTROPHILS NFR BLD: 66.7 %
PHOSPHATE SERPL-MCNC: 3.3 MG/DL
PLATELET # BLD AUTO: 259 K/UL
PMV BLD AUTO: 10.7 FL
POTASSIUM SERPL-SCNC: 5 MMOL/L
PTH-INTACT SERPL-MCNC: 317 PG/ML
RBC # BLD AUTO: 3.5 M/UL
SATURATED IRON: 6 %
SODIUM SERPL-SCNC: 139 MMOL/L
TOTAL IRON BINDING CAPACITY: 345 UG/DL
TRANSFERRIN SERPL-MCNC: 233 MG/DL
URATE SERPL-MCNC: 8.4 MG/DL
WBC # BLD AUTO: 7.22 K/UL

## 2017-03-27 PROCEDURE — 84165 PROTEIN E-PHORESIS SERUM: CPT

## 2017-03-27 PROCEDURE — 84165 PROTEIN E-PHORESIS SERUM: CPT | Mod: 26,,, | Performed by: PATHOLOGY

## 2017-03-27 PROCEDURE — 86334 IMMUNOFIX E-PHORESIS SERUM: CPT | Mod: 26,,, | Performed by: PATHOLOGY

## 2017-03-27 PROCEDURE — 86334 IMMUNOFIX E-PHORESIS SERUM: CPT

## 2017-03-27 PROCEDURE — 82306 VITAMIN D 25 HYDROXY: CPT

## 2017-03-27 PROCEDURE — 83970 ASSAY OF PARATHORMONE: CPT

## 2017-03-27 PROCEDURE — 84550 ASSAY OF BLOOD/URIC ACID: CPT

## 2017-03-27 PROCEDURE — 80069 RENAL FUNCTION PANEL: CPT

## 2017-03-27 PROCEDURE — 36415 COLL VENOUS BLD VENIPUNCTURE: CPT

## 2017-03-27 PROCEDURE — 85025 COMPLETE CBC W/AUTO DIFF WBC: CPT

## 2017-03-27 PROCEDURE — 83540 ASSAY OF IRON: CPT

## 2017-03-27 PROCEDURE — 82728 ASSAY OF FERRITIN: CPT

## 2017-03-28 LAB
ALBUMIN SERPL ELPH-MCNC: 3.64 G/DL
ALPHA1 GLOB SERPL ELPH-MCNC: 0.34 G/DL
ALPHA2 GLOB SERPL ELPH-MCNC: 0.79 G/DL
B-GLOBULIN SERPL ELPH-MCNC: 0.93 G/DL
GAMMA GLOB SERPL ELPH-MCNC: 1.3 G/DL
INTERPRETATION SERPL IFE-IMP: NORMAL
PATHOLOGIST INTERPRETATION IFE: NORMAL
PATHOLOGIST INTERPRETATION SPE: NORMAL
PROT SERPL-MCNC: 7 G/DL

## 2017-04-05 ENCOUNTER — OFFICE VISIT (OUTPATIENT)
Dept: NEPHROLOGY | Facility: CLINIC | Age: 37
End: 2017-04-05
Payer: MEDICAID

## 2017-04-05 ENCOUNTER — TELEPHONE (OUTPATIENT)
Dept: HEMATOLOGY/ONCOLOGY | Facility: CLINIC | Age: 37
End: 2017-04-05

## 2017-04-05 ENCOUNTER — INITIAL CONSULT (OUTPATIENT)
Dept: HEMATOLOGY/ONCOLOGY | Facility: CLINIC | Age: 37
End: 2017-04-05
Attending: INTERNAL MEDICINE
Payer: MEDICAID

## 2017-04-05 VITALS
TEMPERATURE: 98 F | DIASTOLIC BLOOD PRESSURE: 87 MMHG | HEART RATE: 86 BPM | OXYGEN SATURATION: 98 % | WEIGHT: 263.25 LBS | RESPIRATION RATE: 20 BRPM | SYSTOLIC BLOOD PRESSURE: 153 MMHG | HEIGHT: 68 IN | BODY MASS INDEX: 39.9 KG/M2

## 2017-04-05 VITALS
SYSTOLIC BLOOD PRESSURE: 140 MMHG | HEART RATE: 93 BPM | DIASTOLIC BLOOD PRESSURE: 80 MMHG | HEIGHT: 68 IN | WEIGHT: 251.31 LBS | OXYGEN SATURATION: 99 % | BODY MASS INDEX: 38.09 KG/M2

## 2017-04-05 DIAGNOSIS — I10 ESSENTIAL HYPERTENSION: Chronic | ICD-10-CM

## 2017-04-05 DIAGNOSIS — N25.81 SECONDARY HYPERPARATHYROIDISM: ICD-10-CM

## 2017-04-05 DIAGNOSIS — Z79.4 CONTROLLED TYPE 2 DIABETES MELLITUS WITH CHRONIC KIDNEY DISEASE, WITH LONG-TERM CURRENT USE OF INSULIN, UNSPECIFIED CKD STAGE: Chronic | ICD-10-CM

## 2017-04-05 DIAGNOSIS — D63.8 ANEMIA IN OTHER CHRONIC DISEASES CLASSIFIED ELSEWHERE: ICD-10-CM

## 2017-04-05 DIAGNOSIS — R80.9 PROTEINURIA, UNSPECIFIED TYPE: ICD-10-CM

## 2017-04-05 DIAGNOSIS — I12.9 HYPERTENSIVE CKD (CHRONIC KIDNEY DISEASE): ICD-10-CM

## 2017-04-05 DIAGNOSIS — N18.4 CONTROLLED TYPE 2 DIABETES MELLITUS WITH STAGE 4 CHRONIC KIDNEY DISEASE, WITHOUT LONG-TERM CURRENT USE OF INSULIN: Primary | Chronic | ICD-10-CM

## 2017-04-05 DIAGNOSIS — E11.22 CONTROLLED TYPE 2 DIABETES MELLITUS WITH STAGE 4 CHRONIC KIDNEY DISEASE, WITHOUT LONG-TERM CURRENT USE OF INSULIN: Primary | Chronic | ICD-10-CM

## 2017-04-05 DIAGNOSIS — D50.0 IRON DEFICIENCY ANEMIA DUE TO CHRONIC BLOOD LOSS: ICD-10-CM

## 2017-04-05 DIAGNOSIS — E11.22 CONTROLLED TYPE 2 DIABETES MELLITUS WITH CHRONIC KIDNEY DISEASE, WITH LONG-TERM CURRENT USE OF INSULIN, UNSPECIFIED CKD STAGE: Chronic | ICD-10-CM

## 2017-04-05 DIAGNOSIS — N18.4 CKD (CHRONIC KIDNEY DISEASE), STAGE IV: Primary | ICD-10-CM

## 2017-04-05 PROBLEM — D50.9 FE DEFICIENCY ANEMIA: Status: ACTIVE | Noted: 2017-04-05

## 2017-04-05 PROCEDURE — 99999 PR PBB SHADOW E&M-EST. PATIENT-LVL III: CPT | Mod: PBBFAC,,, | Performed by: INTERNAL MEDICINE

## 2017-04-05 PROCEDURE — 99214 OFFICE O/P EST MOD 30 MIN: CPT | Mod: S$PBB,,, | Performed by: INTERNAL MEDICINE

## 2017-04-05 PROCEDURE — 99205 OFFICE O/P NEW HI 60 MIN: CPT | Mod: S$PBB,,, | Performed by: INTERNAL MEDICINE

## 2017-04-05 PROCEDURE — 99213 OFFICE O/P EST LOW 20 MIN: CPT | Mod: PBBFAC,27 | Performed by: INTERNAL MEDICINE

## 2017-04-05 RX ORDER — FERROUS SULFATE 325(65) MG
TABLET ORAL
Qty: 90 TABLET | Refills: 2 | Status: SHIPPED | OUTPATIENT
Start: 2017-04-05 | End: 2018-05-17 | Stop reason: SDUPTHER

## 2017-04-05 NOTE — PROGRESS NOTES
Subjective:       Patient ID: Xuan Cousin is a 36 y.o. female.    Chief Complaint: No chief complaint on file.    HPI   REFERRING PHYSICIAN:  Estrellita Vyas M.D.    REASON FOR REFERRAL:  Iron deficiency anemia.    HISTORY OF PRESENT ILLNESS:  Mrs. Ricardo is a 36-year-old -American   female with several medical problems including hypertension and diabetes and   also CKD IV, who is referred for evaluation for iron deficiency anemia.    Specifically, her most recent CBC from last week shows a white count of 7.2   thousand per cubic millimeter, hemoglobin 8.9 g/dL, hematocrit 28.5%, MCV 81 and   platelets 289,000 per cubic millimeter.  His ferritin is 18 ng/mL.  Of note, on   02/14/2017, her ferritin was 21.  The patient has a history of diabetes for 20   years, insulin-dependent, and also history of hypertension.  She was noted to   have CKD several years ago and she is under the care of a neurologist.    Recent workup included protein electrophoresis that was normal, a PTH that was   very elevated, suggestive of secondary hyperparathyroidism and low vitamin D.    PAST MEDICAL HISTORY:  As above.  She also has a history of tubal ligation.    FAMILY HISTORY:  Negative for malignancy.    SOCIAL HISTORY:  She is on disability.  She is single and she has two young   children.  She admits to smoking marijuana for approximately 20 years.  She   denies excessive ETOH abuse.    Review of Systems      Overall she feels well.   When asked she admits to craving for ice.  She states that her periods usually last no more than 3 days per cycle, but she does see clots occasionally.  Her ECOG PS is 1.   She denies any anxiety, depression, easy bruising, fevers, chills, night  sweats, weight loss, nausea, vomiting, diarrhea, constipation, diplopia, blurred vision, headache, chest pain, palpitations, shortness of breath, breast pain, abdominal pain, extremity pain, or difficulty ambulating.  The remainder of the ten-point ROS,  including general, skin, lymph, H/N, cardiorespiratory, GI, , Neuro, Endocrine, and psychiatric is negative.     Objective:      Physical Exam      She is alert, oriented to time, place, person, pleasant, well      nourished, in no acute physical distress.                                    VITAL SIGNS:  Reviewed                                      HEENT:  Maxillary teeth are missing, while her mandibular dentition is rather poor.  There are no nasal, oral, lip, gingival, auricular, lid,    or conjunctival lesions.  Mucosae are moist and pink, and there is no        thrush.  Pupils are equal, reactive to light and accommodation.              Extraocular muscle movements are intact.  Dentition is good.  There is no frontal or maxillary tenderness.                                     NECK:  Supple without JVD, adenopathy, or thyromegaly.                       LUNGS:  Clear to auscultation without wheezing, rales, or rhonchi.           CARDIOVASCULAR:  Reveals an S1, S2, no murmurs, no rubs, no gallops.         ABDOMEN:  Obese, soft, nontender, without organomegaly.  Bowel sounds are present.                                                                     EXTREMITIES:  No cyanosis, clubbing, or edema.                               BREASTS:  Deferred                                       LYMPHATIC:  There is no cervical, axillary, or supraclavicular adenopathy.   SKIN:  Warm and moist, without petechiae, rashes, induration, or ecchymoses.  Multiple tattoos are noted.          NEUROLOGIC:  DTRs are 0-1+ bilaterally, symmetrical, motor function is 5/5,  and cranial nerves are  within normal limits.    Assessment:       1. Controlled type 2 diabetes mellitus with stage 4 chronic kidney disease, without long-term current use of insulin    2. Iron deficiency anemia due to chronic blood loss    3. Secondary hyperparathyroidism        Plan:       I ahd a long discussion with her; she certainly has Fe deficiency that  needs to be investigated and corrected.  I suspect that this is from her periods, however, I have asked her to submit three stool samples.  She will take Fe supplements TID one hour before breakfast, lunch, and dinner, and see me in a month.  If her ferritin has not inceased, it will be indicative of a malabsorption problem or noncompliance, and Fe will need to be supplemented intravenously.  She will also need EPO once we know that her nutritional deficiencies have been corrected.  I do not think that a bone marrow biopsy is indicated at this point.  Her questions were answered to her satisfaction.

## 2017-04-05 NOTE — TELEPHONE ENCOUNTER
----- Message from Morris Reyes sent at 4/5/2017  9:16 AM CDT -----  Contact: Pat- Nephrology  Would like to schedule appt for severe anemia. Will be a NP with Medicaid insurance. She is being referred by Dr. Vyas, with referral in system.       Call: [phone]

## 2017-04-05 NOTE — PROGRESS NOTES
Subjective:       Patient ID: Xuan Wrightsin is a 36 y.o. Black or  female who presents for follow up of Chronic Kidney Disease    HPI     Ms. Ricardo was seen in follow up evaluation for CKD. She was seen in new patient evaluation on 5/24/16 and since then she was lost for follow up. She re-established care on 12/14/16. She was last seen on 2/15/17.    She has been having difficult family situationsince her mother passed away in July of 2016. She reports in early January her  was murdered. She admits she was not watching her diet. She admits to eating very salty foods. She denies any NSAID use recently. She has stopped taking metformin.      Due to severe hyperkalemia she had to be taken off losartan and spironolactone and had to be treated in ER for severe recurrent hyperkalemia.    She has uncontrolled diabetes for 20 years. She has hypertension for more than 4 years. She had prior occasional use of Aleve, naproxen. She was aware of possible CKD in 2012 at the time of her pregnancy. Prior labs reviewed and noted for eGFR of 57 from 06/12, then in 02/15 it was 47. Also it is noted that she has had 2 to 3+ proteinuria since 2012. Labs prior to that not available. Her sister has diabetes too. Her mother had h/o lupus. Her mother had CKD which was very advanced and was told of possible eGFR of 13. She did not go on dialysis prior to her death.     She had admission to [facility] in 4/16 when she was noted to have worsening of her renal function. She was admitted with uncontrolled hypertension and uncontrolled diabetes. Per notes from there she was found to have malignant hypertension with end organ damage. There was some concern about medication and diet non compliance per notes by hospitalist Dr. Elizabeth. She admits to prior use of marijuana. During 4/16 hospitalization she was followed by community nephrology group.     She denies any symptoms to suggest uremia. She denies dysuria/ decreased urine  output. She feels tired. She denies shortness of breath/ chest pain/ abdominal distention/ loss of appetite. She reports she has diabetic foot ulcer and is being followed by foot specialist at [facility].    Labs from 3/27/17 showed Na 139, K 5, bicarbonate 23, BUN 28, creatinine 2.4, eGFR 28.7, Ca 9.2, phos 3.3, albumin 3.2, , she has chronic anemia and her Hb is now 8.9, she continues to have severe iron deficiency, Urine PC ratio is 2.97. Repeat SPEP, JEANCARLOS noted, no paraprotein. Previously she did have elevated kappa light chains. US kidneys from 12/16 showed 10.3 and 10.7 cm kidneys and simple cysts.    Review of Systems   Constitutional: Positive for fatigue and unexpected weight change. Negative for activity change, appetite change, chills and fever.        Slowly losing weight   HENT: Negative for ear discharge and ear pain.    Eyes: Negative for discharge and redness.   Respiratory: Negative for cough, shortness of breath and wheezing.    Cardiovascular: Positive for leg swelling. Negative for chest pain.   Gastrointestinal: Negative for abdominal distention, abdominal pain, blood in stool, diarrhea, nausea and vomiting.   Endocrine: Negative for polyuria.   Genitourinary: Negative for decreased urine volume, difficulty urinating, dysuria, flank pain, frequency and hematuria.   Musculoskeletal: Negative for arthralgias and back pain.   Skin: Negative for pallor and rash.   Allergic/Immunologic: Negative for food allergies.   Neurological: Negative for dizziness, light-headedness and headaches.   Psychiatric/Behavioral: Negative for behavioral problems. The patient is not nervous/anxious.        Objective:      Physical Exam   Constitutional: She is oriented to person, place, and time. She appears well-developed and well-nourished. No distress.   HENT:   Head: Normocephalic and atraumatic.   Eyes: Right eye exhibits no discharge. Left eye exhibits no discharge.   Neck: Normal range of motion. Neck supple.    Cardiovascular: Normal rate, regular rhythm and normal heart sounds.    No murmur heard.  Pulmonary/Chest: Effort normal and breath sounds normal. No respiratory distress. She has no wheezes. She has no rales.   Abdominal: Soft.   Abdominal obesity   Musculoskeletal: She exhibits no edema.   Neurological: She is alert and oriented to person, place, and time.   Skin: Skin is warm and dry.   Psychiatric: She has a normal mood and affect. Her behavior is normal.   Nursing note and vitals reviewed.      Assessment:     1. CKD IV  2. Diabetic nephropathy  3. Uncontrolled hypertension  4. Proteinuria  5. Secondary hyperparathyroidism  6. Morbid obesity  7. Anemia, unspecified type    Plan:   She has diabetic and hypertensive CKD IV and also NSAID induced kidney damage causing CKD. She had to be taken off ARB, spironolactone due to severe and recurrent hyperkalemia. Even without those medicines her most recent K is 5. She claims she followed her diet more closely now. She has severe anemia which is chronic and seems out of proportion for her eGFR. Plan to consult hematology for evaluation. She also has profound iron deficiency. She denies excessive menstrual blood loss.     - nutritionist consult for low K, low salt, diabetic diet  - continue to hold losartan, aldactone in light of advanced CKD and recurrent hyperkalemia  - continue start amlodipine and hydralazine for BP control. If she develops any edema, would prefer to use lasix than HCTZ  - I stressed importance of following BP at home and to inform our office if SBP more than 160 or less than 100   - she appears to have CKD III at baseline which has progressed to CKD IV due to uncontrolled diabetes and hypertension  - CKD staging and risk of progression to ESRD was explained to her in detail  - stressed importance of keeping MD visits and follow up labs, medications, following dietary restrictions   - she has had proteinuria for past 4 years at least  - avoid NSAID  use ever   - continue calcitriol for sec hyperparathyroidism for now, follow PTH, phos, corrected Ca   - heme referral for evaluation of severe anemia, prior elevated free kappa chains  - I have advised her to discuss with her other physician to consider decreasing the dose of gabapentin given reduced renal function  - strict glycemic control per her PCP, close follow up with podiatrist for diabetic foot ulcer     RTC 10 weeks    Plan, labs, recommendations were discussed with patient, her questions were answered to her satisfaction. She expressed understanding the gravity of her declining renal function and agreed to keep a close follow up.

## 2017-06-07 ENCOUNTER — TELEPHONE (OUTPATIENT)
Dept: HEMATOLOGY/ONCOLOGY | Facility: CLINIC | Age: 37
End: 2017-06-07

## 2017-06-07 ENCOUNTER — OFFICE VISIT (OUTPATIENT)
Dept: HEMATOLOGY/ONCOLOGY | Facility: CLINIC | Age: 37
End: 2017-06-07
Attending: INTERNAL MEDICINE
Payer: MEDICAID

## 2017-06-07 ENCOUNTER — LAB VISIT (OUTPATIENT)
Dept: LAB | Facility: OTHER | Age: 37
End: 2017-06-07
Attending: INTERNAL MEDICINE
Payer: MEDICAID

## 2017-06-07 DIAGNOSIS — D50.0 IRON DEFICIENCY ANEMIA DUE TO CHRONIC BLOOD LOSS: Primary | ICD-10-CM

## 2017-06-07 DIAGNOSIS — Z78.9: Primary | ICD-10-CM

## 2017-06-07 DIAGNOSIS — N18.30 CKD (CHRONIC KIDNEY DISEASE), STAGE III: Chronic | ICD-10-CM

## 2017-06-07 DIAGNOSIS — N25.81 SECONDARY HYPERPARATHYROIDISM: ICD-10-CM

## 2017-06-07 DIAGNOSIS — E11.22 CONTROLLED TYPE 2 DIABETES MELLITUS WITH STAGE 4 CHRONIC KIDNEY DISEASE, WITHOUT LONG-TERM CURRENT USE OF INSULIN: Chronic | ICD-10-CM

## 2017-06-07 DIAGNOSIS — D50.0 IRON DEFICIENCY ANEMIA DUE TO CHRONIC BLOOD LOSS: ICD-10-CM

## 2017-06-07 DIAGNOSIS — N18.4 CONTROLLED TYPE 2 DIABETES MELLITUS WITH STAGE 4 CHRONIC KIDNEY DISEASE, WITHOUT LONG-TERM CURRENT USE OF INSULIN: Chronic | ICD-10-CM

## 2017-06-07 LAB
ERYTHROCYTE [DISTWIDTH] IN BLOOD BY AUTOMATED COUNT: 15.6 %
FERRITIN SERPL-MCNC: 16 NG/ML
HCT VFR BLD AUTO: 29.3 %
HGB BLD-MCNC: 9.1 G/DL
MCH RBC QN AUTO: 25.6 PG
MCHC RBC AUTO-ENTMCNC: 31.1 %
MCV RBC AUTO: 83 FL
NEUTROPHILS # BLD AUTO: 4.4 K/UL
PLATELET # BLD AUTO: 234 K/UL
PMV BLD AUTO: 11.1 FL
RBC # BLD AUTO: 3.55 M/UL
WBC # BLD AUTO: 6.66 K/UL

## 2017-06-07 PROCEDURE — 82728 ASSAY OF FERRITIN: CPT

## 2017-06-07 PROCEDURE — 99999 PR PBB SHADOW E&M-EST. PATIENT-LVL I: CPT | Mod: PBBFAC,,, | Performed by: INTERNAL MEDICINE

## 2017-06-07 PROCEDURE — 99214 OFFICE O/P EST MOD 30 MIN: CPT | Mod: S$PBB,,, | Performed by: INTERNAL MEDICINE

## 2017-06-07 PROCEDURE — 36415 COLL VENOUS BLD VENIPUNCTURE: CPT

## 2017-06-07 PROCEDURE — 85027 COMPLETE CBC AUTOMATED: CPT

## 2017-06-07 NOTE — LETTER
June 7, 2017      Estrellita Vyas MD  [address]  [place] [address]           [facility]  [address], [address]  [place] [address]  Phone: [phone]          Patient: Xuan Ricardo   MR Number: [id number]   YOB: 1980   Date of Visit: 6/7/2017       Dear Dr. Estrellita Vyas:    Thank you for referring Xuan Ricardo to me for evaluation. Attached you will find relevant portions of my assessment and plan of care.    If you have questions, please do not hesitate to call me. I look forward to following Xuan Ricardo along with you.    Sincerely,    Xander Kim MD    Enclosure  CC:  No Recipients    If you would like to receive this communication electronically, please contact [email or web address] or [phone] to request more information on [facility] access.    For providers and/or their staff who would like to refer a patient to [facility], please contact us through our one-stop-shop provider referral line, [facility], at [phone].    If you feel you have received this communication in error or would no longer like to receive these types of communications, please e-mail [email or web address]

## 2017-06-07 NOTE — Clinical Note
Labs today.  See me in 4-54 weeks with same labs.  Needs to bring stool samples.  Please give her the cards.

## 2017-06-07 NOTE — PROGRESS NOTES
Subjective:       Patient ID: Xuan Cousin is a 36 y.o. female.    Chief Complaint: No chief complaint on file.    HPI    Coumarlon returns today for follow up.  She has been on oral Fe supplements and she states that she has been compliant with it.  Briefly, she is a 36-year-old -American female with several medical problems including hypertension and diabetes and   also CKD IV, who was referred for evaluation for iron deficiency anemia.    His most recent ferritin was 18 ng/mL.    Of note, she has Dm and CKD and is under the care of a nephrologist.    She was supposed to submit three stool samples today, however, she misplaced the stool cards so there is none to be submitted today.    Review of Systems      Overall she feels well.   When asked she admits to no longer craving for ice.  She states that her periods have not been heavy lately.  Her ECOG PS is 1.   She denies any anxiety, depression, easy bruising, fevers, chills, night  sweats, weight loss, nausea, vomiting, diarrhea, constipation, diplopia, blurred vision, headache, chest pain, palpitations, shortness of breath, breast pain, abdominal pain, extremity pain, or difficulty ambulating.  The remainder of the ten-point ROS, including general, skin, lymph, H/N, cardiorespiratory, GI, , Neuro, Endocrine, and psychiatric is negative.     Objective:      Physical Exam      She is alert, oriented to time, place, person, pleasant, well      nourished, in no acute physical distress.                                    VITAL SIGNS:  Reviewed                                      HEENT:  Maxillary teeth are missing, while her mandibular dentition is rather poor.  There are no nasal, oral, lip, gingival, auricular, lid,    or conjunctival lesions.  Mucosae are moist and pink, and there is no        thrush.  Pupils are equal, reactive to light and accommodation.              Extraocular muscle movements are intact.  Dentition is good.  There is no frontal or  maxillary tenderness.                                     NECK:  Supple without JVD, adenopathy, or thyromegaly.                       LUNGS:  Clear to auscultation without wheezing, rales, or rhonchi.           CARDIOVASCULAR:  Reveals an S1, S2, no murmurs, no rubs, no gallops.         ABDOMEN:  Obese, soft, nontender, without organomegaly.  Bowel sounds are present.                                                                     EXTREMITIES:  No cyanosis, clubbing, or edema.                               BREASTS:  Deferred                                       LYMPHATIC:  There is no cervical, axillary, or supraclavicular adenopathy.   SKIN:  Warm and moist, without petechiae, rashes, induration, or ecchymoses.  Multiple tattoos are noted.          NEUROLOGIC:  DTRs are 0-1+ bilaterally, symmetrical, motor function is 5/5,  and cranial nerves are  within normal limits.    Assessment:       1. Iron deficiency anemia due to chronic blood loss    2. CKD Stage III        Plan:       I have asked her to have a repeat CBC and ferritin today, and I will call her with the results.  If her H/h improves, she will remain on FE supplements and see me in a month with repeat labs.  She was also given three more stool cards and was asked to submit three samples at the time of her next visit.    Her questions were answered to her satisfaction.         7:05 a.m. 6/8/2017  ADDENDUM    Her Hg is 9/1 gr/dl while her ferritin is 16 ng/ml.  She will remain on fe supplementation therapy and see me in 4 weeks with repeat labs.

## 2017-06-08 ENCOUNTER — TELEPHONE (OUTPATIENT)
Dept: SURGERY | Facility: CLINIC | Age: 37
End: 2017-06-08

## 2017-10-19 RX ORDER — CALCITRIOL 0.25 UG/1
CAPSULE ORAL
Qty: 30 CAPSULE | Refills: 0 | Status: SHIPPED | OUTPATIENT
Start: 2017-10-19 | End: 2019-04-01

## 2018-05-17 DIAGNOSIS — D50.0 IRON DEFICIENCY ANEMIA DUE TO CHRONIC BLOOD LOSS: ICD-10-CM

## 2018-05-18 RX ORDER — FERROUS SULFATE 325(65) MG
TABLET ORAL
Qty: 90 TABLET | Refills: 0 | Status: ON HOLD | OUTPATIENT
Start: 2018-05-18 | End: 2019-05-05 | Stop reason: HOSPADM

## 2018-10-05 DIAGNOSIS — D50.0 IRON DEFICIENCY ANEMIA DUE TO CHRONIC BLOOD LOSS: ICD-10-CM

## 2018-10-05 RX ORDER — FERROUS SULFATE 325(65) MG
TABLET ORAL
Qty: 90 TABLET | Refills: 0 | OUTPATIENT
Start: 2018-10-05

## 2018-10-09 RX ORDER — CALCITRIOL 0.25 UG/1
CAPSULE ORAL
Qty: 30 CAPSULE | Refills: 0 | OUTPATIENT
Start: 2018-10-09

## 2018-10-10 DIAGNOSIS — N18.30 CKD (CHRONIC KIDNEY DISEASE), STAGE III: Primary | ICD-10-CM

## 2018-10-10 RX ORDER — CALCITRIOL 0.25 UG/1
CAPSULE ORAL
Qty: 30 CAPSULE | Refills: 0 | OUTPATIENT
Start: 2018-10-10

## 2019-02-13 ENCOUNTER — INITIAL PRENATAL (OUTPATIENT)
Dept: OBSTETRICS AND GYNECOLOGY | Facility: CLINIC | Age: 39
End: 2019-02-13
Payer: MEDICARE

## 2019-02-13 ENCOUNTER — LAB VISIT (OUTPATIENT)
Dept: LAB | Facility: HOSPITAL | Age: 39
End: 2019-02-13
Attending: OBSTETRICS & GYNECOLOGY
Payer: MEDICARE

## 2019-02-13 DIAGNOSIS — Z32.00 POSSIBLE PREGNANCY, NOT YET CONFIRMED: ICD-10-CM

## 2019-02-13 DIAGNOSIS — R10.2 PELVIC AND PERINEAL PAIN: ICD-10-CM

## 2019-02-13 DIAGNOSIS — Z32.00 POSSIBLE PREGNANCY, NOT YET CONFIRMED: Primary | ICD-10-CM

## 2019-02-13 LAB — HCG INTACT+B SERPL-ACNC: <1.2 MIU/ML

## 2019-02-13 PROCEDURE — 99213 OFFICE O/P EST LOW 20 MIN: CPT | Mod: PBBFAC | Performed by: OBSTETRICS & GYNECOLOGY

## 2019-02-13 PROCEDURE — 36415 COLL VENOUS BLD VENIPUNCTURE: CPT

## 2019-02-13 PROCEDURE — 99999 PR PBB SHADOW E&M-EST. PATIENT-LVL III: CPT | Mod: PBBFAC,,, | Performed by: OBSTETRICS & GYNECOLOGY

## 2019-02-13 PROCEDURE — 99203 PR OFFICE/OUTPT VISIT, NEW, LEVL III, 30-44 MIN: ICD-10-PCS | Mod: S$PBB,,, | Performed by: OBSTETRICS & GYNECOLOGY

## 2019-02-13 PROCEDURE — 84702 CHORIONIC GONADOTROPIN TEST: CPT

## 2019-02-13 PROCEDURE — 99203 OFFICE O/P NEW LOW 30 MIN: CPT | Mod: S$PBB,,, | Performed by: OBSTETRICS & GYNECOLOGY

## 2019-02-13 PROCEDURE — 99999 PR PBB SHADOW E&M-EST. PATIENT-LVL III: ICD-10-PCS | Mod: PBBFAC,,, | Performed by: OBSTETRICS & GYNECOLOGY

## 2019-02-13 RX ORDER — OFLOXACIN 3 MG/ML
SOLUTION/ DROPS OPHTHALMIC
Refills: 0 | COMMUNITY
Start: 2018-11-12 | End: 2019-04-01

## 2019-02-13 RX ORDER — NIFEDIPINE 60 MG/1
TABLET, EXTENDED RELEASE ORAL
Refills: 5 | Status: ON HOLD | COMMUNITY
Start: 2019-01-24 | End: 2019-05-05 | Stop reason: HOSPADM

## 2019-02-13 RX ORDER — ERGOCALCIFEROL 1.25 MG/1
CAPSULE ORAL
Refills: 4 | COMMUNITY
Start: 2018-11-08 | End: 2019-04-01

## 2019-02-13 RX ORDER — PREDNISOLONE ACETATE 10 MG/ML
SUSPENSION/ DROPS OPHTHALMIC
Refills: 0 | COMMUNITY
Start: 2018-11-12 | End: 2019-04-01

## 2019-02-13 RX ORDER — KETOROLAC TROMETHAMINE 5 MG/ML
SOLUTION OPHTHALMIC
Refills: 0 | COMMUNITY
Start: 2018-11-12 | End: 2019-04-01

## 2019-03-19 ENCOUNTER — TELEPHONE (OUTPATIENT)
Dept: OBSTETRICS AND GYNECOLOGY | Facility: CLINIC | Age: 39
End: 2019-03-19

## 2019-03-19 DIAGNOSIS — N92.6 MISSED PERIOD: Primary | ICD-10-CM

## 2019-03-19 NOTE — TELEPHONE ENCOUNTER
----- Message from Bianca Mendez sent at 3/19/2019  3:24 PM CDT -----  Contact: Pt  Can the clinic reply in [facility]:    Who Called:LESTER CHAVEZ [[id number]]    Date of Positive Preg Test:12/05/2018    1st day of Last Menstrual Cycle:11/2018    List Any Difficulties:No    What Number to Call Back:[phone]

## 2019-04-01 ENCOUNTER — LAB VISIT (OUTPATIENT)
Dept: LAB | Facility: OTHER | Age: 39
End: 2019-04-01
Payer: MEDICARE

## 2019-04-01 ENCOUNTER — TELEPHONE (OUTPATIENT)
Dept: OBSTETRICS AND GYNECOLOGY | Facility: CLINIC | Age: 39
End: 2019-04-01

## 2019-04-01 ENCOUNTER — OFFICE VISIT (OUTPATIENT)
Dept: OBSTETRICS AND GYNECOLOGY | Facility: CLINIC | Age: 39
End: 2019-04-01
Payer: MEDICARE

## 2019-04-01 VITALS
WEIGHT: 261.69 LBS | DIASTOLIC BLOOD PRESSURE: 76 MMHG | BODY MASS INDEX: 39.66 KG/M2 | SYSTOLIC BLOOD PRESSURE: 118 MMHG | HEIGHT: 68 IN

## 2019-04-01 DIAGNOSIS — Z98.51 HISTORY OF BILATERAL TUBAL LIGATION: ICD-10-CM

## 2019-04-01 DIAGNOSIS — Z32.02 URINE PREGNANCY TEST NEGATIVE: Primary | ICD-10-CM

## 2019-04-01 LAB
B-HCG UR QL: NEGATIVE
CTP QC/QA: YES
HCG INTACT+B SERPL-ACNC: <1.2 MIU/ML

## 2019-04-01 PROCEDURE — 84702 CHORIONIC GONADOTROPIN TEST: CPT

## 2019-04-01 PROCEDURE — 36415 COLL VENOUS BLD VENIPUNCTURE: CPT

## 2019-04-01 PROCEDURE — 3008F BODY MASS INDEX DOCD: CPT | Mod: CPTII,S$GLB,, | Performed by: NURSE PRACTITIONER

## 2019-04-01 PROCEDURE — 3074F SYST BP LT 130 MM HG: CPT | Mod: CPTII,S$GLB,, | Performed by: NURSE PRACTITIONER

## 2019-04-01 PROCEDURE — 3078F DIAST BP <80 MM HG: CPT | Mod: CPTII,S$GLB,, | Performed by: NURSE PRACTITIONER

## 2019-04-01 PROCEDURE — 81025 POCT URINE PREGNANCY: ICD-10-PCS | Mod: S$GLB,,, | Performed by: NURSE PRACTITIONER

## 2019-04-01 PROCEDURE — 99204 PR OFFICE/OUTPT VISIT, NEW, LEVL IV, 45-59 MIN: ICD-10-PCS | Mod: S$GLB,,, | Performed by: NURSE PRACTITIONER

## 2019-04-01 PROCEDURE — 81025 URINE PREGNANCY TEST: CPT | Mod: S$GLB,,, | Performed by: NURSE PRACTITIONER

## 2019-04-01 PROCEDURE — 3074F PR MOST RECENT SYSTOLIC BLOOD PRESSURE < 130 MM HG: ICD-10-PCS | Mod: CPTII,S$GLB,, | Performed by: NURSE PRACTITIONER

## 2019-04-01 PROCEDURE — 99204 OFFICE O/P NEW MOD 45 MIN: CPT | Mod: S$GLB,,, | Performed by: NURSE PRACTITIONER

## 2019-04-01 PROCEDURE — 3008F PR BODY MASS INDEX (BMI) DOCUMENTED: ICD-10-PCS | Mod: CPTII,S$GLB,, | Performed by: NURSE PRACTITIONER

## 2019-04-01 PROCEDURE — 3078F PR MOST RECENT DIASTOLIC BLOOD PRESSURE < 80 MM HG: ICD-10-PCS | Mod: CPTII,S$GLB,, | Performed by: NURSE PRACTITIONER

## 2019-04-01 PROCEDURE — 99999 PR PBB SHADOW E&M-EST. PATIENT-LVL III: CPT | Mod: PBBFAC,,, | Performed by: NURSE PRACTITIONER

## 2019-04-01 PROCEDURE — 99999 PR PBB SHADOW E&M-EST. PATIENT-LVL III: ICD-10-PCS | Mod: PBBFAC,,, | Performed by: NURSE PRACTITIONER

## 2019-04-01 RX ORDER — FERROUS GLUCONATE 324(38)MG
324 TABLET ORAL
COMMUNITY
Start: 2018-10-02 | End: 2019-05-01

## 2019-04-01 RX ORDER — FLUTICASONE PROPIONATE 50 MCG
2 SPRAY, SUSPENSION (ML) NASAL
Status: ON HOLD | COMMUNITY
Start: 2018-08-28 | End: 2019-05-05 | Stop reason: HOSPADM

## 2019-04-01 NOTE — PROGRESS NOTES
"  CC: Positive pregnancy test    HPI: Pt is a 38 y.o.  female who presents for a positive UPT at home. Pt states she was seen by Dr. Kowalski at [facility] and Dr[facility] Zaidi with [facility] in [place]. States she had the pregnancy confirmed with a home UPT and beta level in clinic with both providers (no positive UPT or beta level in Epic). She does not have her records from [facility] with her today. Reports never getting an ultrasound with either provider, nor given an PAMELA. States Dr. Watters "was procrastinating" and maybe embarrassed the BTL he performed did not work. She thinks her LMP was in the beginning of November. This would be her third pregnancy, two prior cesareans that were . Only taking insulin, PNV, and procardia. Currently scheduled for her dating u/s later today. Pt lives in the [facility] area and would like to transfer her care here. Denies any vaginal bleeding or cramping with this pregnancy, does report fetal movement.     ROS:  GENERAL: Feeling well overall. Denies fever or chills.   SKIN: Denies rash or lesions.   HEAD: Denies head injury or headache.   NODES: Denies enlarged lymph nodes.   CHEST: Denies chest pain or shortness of breath.   CARDIOVASCULAR: Denies palpitations or left sided chest pain.   ABDOMEN: No abdominal pain, constipation, diarrhea, nausea, vomiting or rectal bleeding.   URINARY: No dysuria, hematuria, or burning on urination.  REPRODUCTIVE: See HPI.   BREASTS: Denies pain, lumps, or nipple discharge.   HEMATOLOGIC: No easy bruisability or excessive bleeding.   MUSCULOSKELETAL: Denies joint pain or swelling.   NEUROLOGIC: Denies syncope or weakness.   PSYCHIATRIC: Denies depression, anxiety or mood swings.    PE:   APPEARANCE: Well nourished, well developed, Black or  female in no acute distress.  PELVIC: deferred, talk only      Diagnosis:  1. Urine pregnancy test negative    2. History of bilateral tubal ligation        Plan:     Orders Placed This Encounter    " hCG, quantitative    POCT urine pregnancy     1. UPT NEGATIVE in clinic-pt did not give a urine sample until the end of the visit. I obtained her medical hx and reviewed plan of care before having negative UPT.  2. Discussed findings with pt and partner in clinic. Offered beta level confirmation today. Reviewed beta results with Dr. Zaidi on 2/13/19 was also negative. Reviewed possible chemical pregnancy earlier in the year? Need to obtain records from Dr. Kowalski.    PT REPORT AND RECORDS FROM [facility] TODAY DO NOT MATCH UP. LAST SEEN BY DR DUNBAR IN DECEMBER 2018 FOR AUB AND ELEVATED PROLACTIN LEVEL, NO MENTION OF PREGNANCY IN NOTES. SEEN BY DR. ISRA KEENE IN FEB 2019, NEGATIVE BETA IN Epic BUT NO NOTES FROM VISIT ARE AVAILABLE.    Follow-up pending beta result  Cancel dating u/s today  Spent 30 minutes face to face with pt

## 2019-04-01 NOTE — TELEPHONE ENCOUNTER
----- Message from Modesto Delong MA sent at 4/1/2019 12:18 PM CDT -----  Patient needs to be seen with you guys and have an appointment with M. She is currently to far along to see dating ultrasound.

## 2019-04-01 NOTE — TELEPHONE ENCOUNTER
"Called pt to discuss negative beta level. Pt verbalized understanding. Pt adamant that she is feeling a baby move and does not feel "right". Encouraged pt to f/u with her PCP to rule out any GI issues. Pt wishes to keep her GYN care here at [facility]. Will get f/u appt with Dr. Barba for her annual exam NOT pregnancy visit. Pt agrees. All f/u OB visits to be cancelled.  "

## 2019-05-01 ENCOUNTER — OFFICE VISIT (OUTPATIENT)
Dept: OBSTETRICS AND GYNECOLOGY | Facility: CLINIC | Age: 39
DRG: 683 | End: 2019-05-01
Payer: MEDICARE

## 2019-05-01 VITALS
WEIGHT: 254.44 LBS | SYSTOLIC BLOOD PRESSURE: 150 MMHG | BODY MASS INDEX: 38.56 KG/M2 | HEIGHT: 68 IN | DIASTOLIC BLOOD PRESSURE: 100 MMHG

## 2019-05-01 DIAGNOSIS — D50.0 IRON DEFICIENCY ANEMIA DUE TO CHRONIC BLOOD LOSS: ICD-10-CM

## 2019-05-01 DIAGNOSIS — N64.3 GALACTORRHEA: ICD-10-CM

## 2019-05-01 DIAGNOSIS — I16.0 HYPERTENSIVE URGENCY, MALIGNANT: ICD-10-CM

## 2019-05-01 DIAGNOSIS — N92.6 IRREGULAR PERIODS: Primary | ICD-10-CM

## 2019-05-01 DIAGNOSIS — I10 ESSENTIAL HYPERTENSION: Chronic | ICD-10-CM

## 2019-05-01 DIAGNOSIS — R10.2 PELVIC PAIN IN FEMALE: ICD-10-CM

## 2019-05-01 DIAGNOSIS — N18.4 CKD (CHRONIC KIDNEY DISEASE), STAGE IV: ICD-10-CM

## 2019-05-01 LAB
B-HCG UR QL: NEGATIVE
CTP QC/QA: YES

## 2019-05-01 PROCEDURE — 3077F SYST BP >= 140 MM HG: CPT | Mod: CPTII,S$GLB,, | Performed by: OBSTETRICS & GYNECOLOGY

## 2019-05-01 PROCEDURE — 99499 UNLISTED E&M SERVICE: CPT | Mod: S$GLB,,, | Performed by: OBSTETRICS & GYNECOLOGY

## 2019-05-01 PROCEDURE — 99999 PR PBB SHADOW E&M-EST. PATIENT-LVL III: ICD-10-PCS | Mod: PBBFAC,,, | Performed by: OBSTETRICS & GYNECOLOGY

## 2019-05-01 PROCEDURE — 99213 PR OFFICE/OUTPT VISIT, EST, LEVL III, 20-29 MIN: ICD-10-PCS | Mod: S$GLB,,, | Performed by: OBSTETRICS & GYNECOLOGY

## 2019-05-01 PROCEDURE — 3080F DIAST BP >= 90 MM HG: CPT | Mod: CPTII,S$GLB,, | Performed by: OBSTETRICS & GYNECOLOGY

## 2019-05-01 PROCEDURE — 87510 GARDNER VAG DNA DIR PROBE: CPT

## 2019-05-01 PROCEDURE — 81025 POCT URINE PREGNANCY: ICD-10-PCS | Mod: S$GLB,,, | Performed by: OBSTETRICS & GYNECOLOGY

## 2019-05-01 PROCEDURE — 3077F PR MOST RECENT SYSTOLIC BLOOD PRESSURE >= 140 MM HG: ICD-10-PCS | Mod: CPTII,S$GLB,, | Performed by: OBSTETRICS & GYNECOLOGY

## 2019-05-01 PROCEDURE — 99213 OFFICE O/P EST LOW 20 MIN: CPT | Mod: S$GLB,,, | Performed by: OBSTETRICS & GYNECOLOGY

## 2019-05-01 PROCEDURE — 3008F BODY MASS INDEX DOCD: CPT | Mod: CPTII,S$GLB,, | Performed by: OBSTETRICS & GYNECOLOGY

## 2019-05-01 PROCEDURE — 3008F PR BODY MASS INDEX (BMI) DOCUMENTED: ICD-10-PCS | Mod: CPTII,S$GLB,, | Performed by: OBSTETRICS & GYNECOLOGY

## 2019-05-01 PROCEDURE — 99499 RISK ADDL DX/OHS AUDIT: ICD-10-PCS | Mod: S$GLB,,, | Performed by: OBSTETRICS & GYNECOLOGY

## 2019-05-01 PROCEDURE — 87480 CANDIDA DNA DIR PROBE: CPT

## 2019-05-01 PROCEDURE — 99999 PR PBB SHADOW E&M-EST. PATIENT-LVL III: CPT | Mod: PBBFAC,,, | Performed by: OBSTETRICS & GYNECOLOGY

## 2019-05-01 PROCEDURE — 3080F PR MOST RECENT DIASTOLIC BLOOD PRESSURE >= 90 MM HG: ICD-10-PCS | Mod: CPTII,S$GLB,, | Performed by: OBSTETRICS & GYNECOLOGY

## 2019-05-01 PROCEDURE — 87491 CHLMYD TRACH DNA AMP PROBE: CPT

## 2019-05-01 PROCEDURE — 81025 URINE PREGNANCY TEST: CPT | Mod: S$GLB,,, | Performed by: OBSTETRICS & GYNECOLOGY

## 2019-05-02 ENCOUNTER — HOSPITAL ENCOUNTER (OUTPATIENT)
Dept: RADIOLOGY | Facility: OTHER | Age: 39
Discharge: HOME | DRG: 683 | End: 2019-05-02
Attending: OBSTETRICS & GYNECOLOGY
Payer: MEDICARE

## 2019-05-02 DIAGNOSIS — N92.6 IRREGULAR PERIODS: ICD-10-CM

## 2019-05-02 DIAGNOSIS — R10.2 PELVIC PAIN IN FEMALE: ICD-10-CM

## 2019-05-02 LAB
BACTERIAL VAGINOSIS DNA: POSITIVE
C TRACH DNA SPEC QL NAA+PROBE: NOT DETECTED
CANDIDA GLABRATA DNA: NEGATIVE
CANDIDA KRUSEI DNA: NEGATIVE
CANDIDA RRNA VAG QL PROBE: NEGATIVE
N GONORRHOEA DNA SPEC QL NAA+PROBE: NOT DETECTED
T VAGINALIS RRNA GENITAL QL PROBE: NEGATIVE

## 2019-05-02 PROCEDURE — 76830 TRANSVAGINAL US NON-OB: CPT | Mod: TC

## 2019-05-02 PROCEDURE — 76856 US PELVIS COMP WITH TRANSVAG NON-OB (XPD): ICD-10-PCS | Mod: 26,,, | Performed by: RADIOLOGY

## 2019-05-02 PROCEDURE — 76856 US EXAM PELVIC COMPLETE: CPT | Mod: 26,,, | Performed by: RADIOLOGY

## 2019-05-02 PROCEDURE — 76830 US PELVIS COMP WITH TRANSVAG NON-OB (XPD): ICD-10-PCS | Mod: 26,,, | Performed by: RADIOLOGY

## 2019-05-02 PROCEDURE — 76830 TRANSVAGINAL US NON-OB: CPT | Mod: 26,,, | Performed by: RADIOLOGY

## 2019-05-03 ENCOUNTER — HOSPITAL ENCOUNTER (INPATIENT)
Facility: OTHER | Age: 39
LOS: 2 days | Discharge: HOME | DRG: 683 | End: 2019-05-05
Attending: EMERGENCY MEDICINE | Admitting: INTERNAL MEDICINE
Payer: MEDICARE

## 2019-05-03 ENCOUNTER — TELEPHONE (OUTPATIENT)
Dept: OBSTETRICS AND GYNECOLOGY | Facility: CLINIC | Age: 39
End: 2019-05-03

## 2019-05-03 DIAGNOSIS — I10 UNCONTROLLED HYPERTENSION: ICD-10-CM

## 2019-05-03 DIAGNOSIS — E22.1 HYPERPROLACTINEMIA: Primary | ICD-10-CM

## 2019-05-03 DIAGNOSIS — N17.9 ACUTE RENAL FAILURE SUPERIMPOSED ON CHRONIC KIDNEY DISEASE, UNSPECIFIED CKD STAGE, UNSPECIFIED ACUTE RENAL FAILURE TYPE: ICD-10-CM

## 2019-05-03 DIAGNOSIS — R94.6 ABNORMAL THYROID FUNCTION TEST: ICD-10-CM

## 2019-05-03 DIAGNOSIS — R79.89 INCREASED PROLACTIN LEVEL: Primary | ICD-10-CM

## 2019-05-03 DIAGNOSIS — D64.9 ANEMIA, UNSPECIFIED TYPE: ICD-10-CM

## 2019-05-03 DIAGNOSIS — N76.0 ACUTE VAGINITIS: Primary | ICD-10-CM

## 2019-05-03 DIAGNOSIS — N18.9 ACUTE RENAL FAILURE SUPERIMPOSED ON CHRONIC KIDNEY DISEASE, UNSPECIFIED CKD STAGE, UNSPECIFIED ACUTE RENAL FAILURE TYPE: ICD-10-CM

## 2019-05-03 PROBLEM — E87.20 METABOLIC ACIDOSIS: Status: ACTIVE | Noted: 2019-05-03

## 2019-05-03 PROBLEM — B96.89 BACTERIAL VAGINOSIS: Status: ACTIVE | Noted: 2019-05-03

## 2019-05-03 LAB
ALBUMIN SERPL BCP-MCNC: 3.7 G/DL (ref 3.5–5.2)
ALP SERPL-CCNC: 102 U/L (ref 55–135)
ALT SERPL W/O P-5'-P-CCNC: 13 U/L (ref 10–44)
ANION GAP SERPL CALC-SCNC: 11 MMOL/L (ref 8–16)
AST SERPL-CCNC: 16 U/L (ref 10–40)
B-HCG UR QL: NEGATIVE
BACTERIA #/AREA URNS HPF: ABNORMAL /HPF
BASOPHILS # BLD AUTO: 0.01 K/UL (ref 0–0.2)
BASOPHILS NFR BLD: 0.1 % (ref 0–1.9)
BILIRUB SERPL-MCNC: 0.2 MG/DL (ref 0.1–1)
BILIRUB UR QL STRIP: NEGATIVE
BUN SERPL-MCNC: 74 MG/DL (ref 6–20)
CALCIUM SERPL-MCNC: 8.9 MG/DL (ref 8.7–10.5)
CHLORIDE SERPL-SCNC: 109 MMOL/L (ref 95–110)
CLARITY UR: CLEAR
CO2 SERPL-SCNC: 19 MMOL/L (ref 23–29)
COLOR UR: YELLOW
CREAT SERPL-MCNC: 7 MG/DL (ref 0.5–1.4)
CREAT UR-MCNC: 36.6 MG/DL (ref 15–325)
CTP QC/QA: YES
DIFFERENTIAL METHOD: ABNORMAL
EOSINOPHIL # BLD AUTO: 0.1 K/UL (ref 0–0.5)
EOSINOPHIL NFR BLD: 1 % (ref 0–8)
ERYTHROCYTE [DISTWIDTH] IN BLOOD BY AUTOMATED COUNT: 15.3 % (ref 11.5–14.5)
EST. GFR  (AFRICAN AMERICAN): 8 ML/MIN/1.73 M^2
EST. GFR  (NON AFRICAN AMERICAN): 7 ML/MIN/1.73 M^2
GLUCOSE SERPL-MCNC: 130 MG/DL (ref 70–110)
GLUCOSE UR QL STRIP: ABNORMAL
HCT VFR BLD AUTO: 22.1 % (ref 37–48.5)
HGB BLD-MCNC: 7.1 G/DL (ref 12–16)
HGB UR QL STRIP: ABNORMAL
HYALINE CASTS #/AREA URNS LPF: 0 /LPF
KETONES UR QL STRIP: NEGATIVE
LEUKOCYTE ESTERASE UR QL STRIP: ABNORMAL
LYMPHOCYTES # BLD AUTO: 1.4 K/UL (ref 1–4.8)
LYMPHOCYTES NFR BLD: 19 % (ref 18–48)
MCH RBC QN AUTO: 26.3 PG (ref 27–31)
MCHC RBC AUTO-ENTMCNC: 32.1 G/DL (ref 32–36)
MCV RBC AUTO: 82 FL (ref 82–98)
MICROSCOPIC COMMENT: ABNORMAL
MONOCYTES # BLD AUTO: 0.4 K/UL (ref 0.3–1)
MONOCYTES NFR BLD: 5.1 % (ref 4–15)
NEUTROPHILS # BLD AUTO: 5.4 K/UL (ref 1.8–7.7)
NEUTROPHILS NFR BLD: 74.4 % (ref 38–73)
NITRITE UR QL STRIP: NEGATIVE
NON-SQ EPI CELLS #/AREA URNS HPF: 0 /HPF
PH UR STRIP: 6 [PH] (ref 5–8)
PLATELET # BLD AUTO: 210 K/UL (ref 150–350)
PMV BLD AUTO: 10.3 FL (ref 9.2–12.9)
POCT GLUCOSE: 238 MG/DL (ref 70–110)
POTASSIUM SERPL-SCNC: 4.5 MMOL/L (ref 3.5–5.1)
PROT SERPL-MCNC: 7.6 G/DL (ref 6–8.4)
PROT UR QL STRIP: ABNORMAL
PROT UR-MCNC: 163 MG/DL (ref 0–15)
PROT UR-MCNC: 163 MG/DL (ref 0–15)
PROT/CREAT UR: 4.45 MG/G{CREAT} (ref 0–0.2)
PROT/CREAT UR: 4.45 MG/G{CREAT} (ref 0–0.2)
PTH-INTACT SERPL-MCNC: 836.9 PG/ML (ref 9–77)
RBC # BLD AUTO: 2.7 M/UL (ref 4–5.4)
RBC #/AREA URNS HPF: 2 /HPF (ref 0–4)
SODIUM SERPL-SCNC: 139 MMOL/L (ref 136–145)
SODIUM UR-SCNC: 83 MMOL/L (ref 20–250)
SODIUM UR-SCNC: 83 MMOL/L (ref 20–250)
SP GR UR STRIP: 1.02 (ref 1–1.03)
SQUAMOUS #/AREA URNS HPF: 4 /HPF
URATE SERPL-MCNC: 8.3 MG/DL (ref 2.4–5.7)
URATE UR-MCNC: 9.8 MG/DL
URN SPEC COLLECT METH UR: ABNORMAL
UROBILINOGEN UR STRIP-ACNC: NEGATIVE EU/DL
WBC # BLD AUTO: 7.27 K/UL (ref 3.9–12.7)
WBC #/AREA URNS HPF: 10 /HPF (ref 0–5)
WBC CLUMPS URNS QL MICRO: ABNORMAL
YEAST URNS QL MICRO: ABNORMAL

## 2019-05-03 PROCEDURE — 82570 ASSAY OF URINE CREATININE: CPT

## 2019-05-03 PROCEDURE — 87205 SMEAR GRAM STAIN: CPT

## 2019-05-03 PROCEDURE — 83540 ASSAY OF IRON: CPT

## 2019-05-03 PROCEDURE — 83970 ASSAY OF PARATHORMONE: CPT

## 2019-05-03 PROCEDURE — 25000003 PHARM REV CODE 250: Performed by: INTERNAL MEDICINE

## 2019-05-03 PROCEDURE — 86060 ANTISTREPTOLYSIN O TITER: CPT

## 2019-05-03 PROCEDURE — 99223 1ST HOSP IP/OBS HIGH 75: CPT | Mod: AI,,, | Performed by: INTERNAL MEDICINE

## 2019-05-03 PROCEDURE — 86160 COMPLEMENT ANTIGEN: CPT | Mod: 59

## 2019-05-03 PROCEDURE — 11000001 HC ACUTE MED/SURG PRIVATE ROOM

## 2019-05-03 PROCEDURE — 85025 COMPLETE CBC W/AUTO DIFF WBC: CPT

## 2019-05-03 PROCEDURE — 82746 ASSAY OF FOLIC ACID SERUM: CPT

## 2019-05-03 PROCEDURE — 86255 FLUORESCENT ANTIBODY SCREEN: CPT | Mod: 91

## 2019-05-03 PROCEDURE — 36415 COLL VENOUS BLD VENIPUNCTURE: CPT

## 2019-05-03 PROCEDURE — 80053 COMPREHEN METABOLIC PANEL: CPT

## 2019-05-03 PROCEDURE — 63600175 PHARM REV CODE 636 W HCPCS: Performed by: EMERGENCY MEDICINE

## 2019-05-03 PROCEDURE — 86160 COMPLEMENT ANTIGEN: CPT

## 2019-05-03 PROCEDURE — 99285 EMERGENCY DEPT VISIT HI MDM: CPT | Mod: 25

## 2019-05-03 PROCEDURE — 96374 THER/PROPH/DIAG INJ IV PUSH: CPT

## 2019-05-03 PROCEDURE — 86592 SYPHILIS TEST NON-TREP QUAL: CPT

## 2019-05-03 PROCEDURE — 84300 ASSAY OF URINE SODIUM: CPT

## 2019-05-03 PROCEDURE — 84165 PATHOLOGIST INTERPRETATION SPE: ICD-10-PCS | Mod: 26,,, | Performed by: PATHOLOGY

## 2019-05-03 PROCEDURE — 96361 HYDRATE IV INFUSION ADD-ON: CPT

## 2019-05-03 PROCEDURE — 84165 PROTEIN E-PHORESIS SERUM: CPT | Mod: 26,,, | Performed by: PATHOLOGY

## 2019-05-03 PROCEDURE — 86703 HIV-1/HIV-2 1 RESULT ANTBDY: CPT

## 2019-05-03 PROCEDURE — 81025 URINE PREGNANCY TEST: CPT | Performed by: EMERGENCY MEDICINE

## 2019-05-03 PROCEDURE — 81000 URINALYSIS NONAUTO W/SCOPE: CPT

## 2019-05-03 PROCEDURE — 99223 PR INITIAL HOSPITAL CARE,LEVL III: ICD-10-PCS | Mod: AI,,, | Performed by: INTERNAL MEDICINE

## 2019-05-03 PROCEDURE — 84550 ASSAY OF BLOOD/URIC ACID: CPT

## 2019-05-03 PROCEDURE — 80074 ACUTE HEPATITIS PANEL: CPT

## 2019-05-03 PROCEDURE — 84560 ASSAY OF URINE/URIC ACID: CPT

## 2019-05-03 PROCEDURE — 82607 VITAMIN B-12: CPT

## 2019-05-03 PROCEDURE — 82306 VITAMIN D 25 HYDROXY: CPT

## 2019-05-03 PROCEDURE — 82728 ASSAY OF FERRITIN: CPT

## 2019-05-03 PROCEDURE — 63600175 PHARM REV CODE 636 W HCPCS: Performed by: PHYSICIAN ASSISTANT

## 2019-05-03 PROCEDURE — 63600175 PHARM REV CODE 636 W HCPCS: Performed by: INTERNAL MEDICINE

## 2019-05-03 PROCEDURE — 84165 PROTEIN E-PHORESIS SERUM: CPT

## 2019-05-03 PROCEDURE — 86038 ANTINUCLEAR ANTIBODIES: CPT

## 2019-05-03 RX ORDER — SODIUM BICARBONATE 650 MG/1
650 TABLET ORAL 2 TIMES DAILY
Status: DISCONTINUED | OUTPATIENT
Start: 2019-05-03 | End: 2019-05-05 | Stop reason: HOSPADM

## 2019-05-03 RX ORDER — NIFEDIPINE 30 MG/1
90 TABLET, EXTENDED RELEASE ORAL DAILY
Status: DISCONTINUED | OUTPATIENT
Start: 2019-05-04 | End: 2019-05-05 | Stop reason: HOSPADM

## 2019-05-03 RX ORDER — FERROUS SULFATE 325(65) MG
325 TABLET, DELAYED RELEASE (ENTERIC COATED) ORAL DAILY
Status: DISCONTINUED | OUTPATIENT
Start: 2019-05-04 | End: 2019-05-05 | Stop reason: HOSPADM

## 2019-05-03 RX ORDER — GLUCAGON 1 MG
1 KIT INJECTION
Status: DISCONTINUED | OUTPATIENT
Start: 2019-05-03 | End: 2019-05-05 | Stop reason: HOSPADM

## 2019-05-03 RX ORDER — SODIUM CHLORIDE, SODIUM LACTATE, POTASSIUM CHLORIDE, CALCIUM CHLORIDE 600; 310; 30; 20 MG/100ML; MG/100ML; MG/100ML; MG/100ML
INJECTION, SOLUTION INTRAVENOUS CONTINUOUS
Status: DISCONTINUED | OUTPATIENT
Start: 2019-05-03 | End: 2019-05-04

## 2019-05-03 RX ORDER — NIFEDIPINE 30 MG/1
60 TABLET, EXTENDED RELEASE ORAL DAILY
Status: DISCONTINUED | OUTPATIENT
Start: 2019-05-03 | End: 2019-05-03

## 2019-05-03 RX ORDER — IBUPROFEN 200 MG
24 TABLET ORAL
Status: DISCONTINUED | OUTPATIENT
Start: 2019-05-03 | End: 2019-05-05 | Stop reason: HOSPADM

## 2019-05-03 RX ORDER — IBUPROFEN 200 MG
16 TABLET ORAL
Status: DISCONTINUED | OUTPATIENT
Start: 2019-05-03 | End: 2019-05-05 | Stop reason: HOSPADM

## 2019-05-03 RX ORDER — METRONIDAZOLE 7.5 MG/G
1 GEL VAGINAL NIGHTLY
Status: DISCONTINUED | OUTPATIENT
Start: 2019-05-03 | End: 2019-05-05 | Stop reason: HOSPADM

## 2019-05-03 RX ORDER — METRONIDAZOLE 7.5 MG/G
1 GEL VAGINAL DAILY
Qty: 1 G | Refills: 0 | Status: SHIPPED | OUTPATIENT
Start: 2019-05-03 | End: 2019-05-10

## 2019-05-03 RX ORDER — HYDRALAZINE HYDROCHLORIDE 20 MG/ML
5 INJECTION INTRAMUSCULAR; INTRAVENOUS ONCE
Status: COMPLETED | OUTPATIENT
Start: 2019-05-03 | End: 2019-05-03

## 2019-05-03 RX ORDER — INSULIN ASPART 100 [IU]/ML
0-5 INJECTION, SOLUTION INTRAVENOUS; SUBCUTANEOUS
Status: DISCONTINUED | OUTPATIENT
Start: 2019-05-03 | End: 2019-05-05 | Stop reason: HOSPADM

## 2019-05-03 RX ORDER — HYDRALAZINE HYDROCHLORIDE 20 MG/ML
10 INJECTION INTRAMUSCULAR; INTRAVENOUS
Status: COMPLETED | OUTPATIENT
Start: 2019-05-03 | End: 2019-05-03

## 2019-05-03 RX ADMIN — SODIUM BICARBONATE 650 MG TABLET 650 MG: at 09:05

## 2019-05-03 RX ADMIN — METRONIDAZOLE 1 APPLICATOR: 7.5 GEL VAGINAL at 09:05

## 2019-05-03 RX ADMIN — NIFEDIPINE 60 MG: 30 TABLET, FILM COATED, EXTENDED RELEASE ORAL at 05:05

## 2019-05-03 RX ADMIN — INSULIN ASPART 1 UNITS: 100 INJECTION, SOLUTION INTRAVENOUS; SUBCUTANEOUS at 09:05

## 2019-05-03 RX ADMIN — HYDRALAZINE HYDROCHLORIDE 5 MG: 20 INJECTION INTRAMUSCULAR; INTRAVENOUS at 10:05

## 2019-05-03 RX ADMIN — SODIUM CHLORIDE, SODIUM LACTATE, POTASSIUM CHLORIDE, AND CALCIUM CHLORIDE: .6; .31; .03; .02 INJECTION, SOLUTION INTRAVENOUS at 05:05

## 2019-05-03 RX ADMIN — HYDRALAZINE HYDROCHLORIDE 10 MG: 20 INJECTION INTRAMUSCULAR; INTRAVENOUS at 04:05

## 2019-05-03 NOTE — ASSESSMENT & PLAN NOTE
High prolactin level with symptoms of galactorrhea  Check pitutary mri without contrast to r/o any pitutary lesion with h/o blurred vision  Could be high secondary to ckd also   tsh mildly elevated, t4 normal

## 2019-05-03 NOTE — ED TRIAGE NOTES
"Pt reports to ED instructed to come for further eval by dr. Barba (OBGYN) after US revealed uterine fibroid. Pt states "I feel like i'm pregnant because I feel movement". LMP 4/19. +nausea and fatigue    Patient identifiers for Xuan Cousin checked and correct.  LOC: Patient is awake, alert, and aware of environment with an appropriate affect. Patient is oriented x 3 and speaking appropriately.  APPEARANCE: Patient resting comfortably and in no acute distress. Patient is clean and well groomed, patient's clothing is properly fastened.  SKIN: The skin is warm and dry. Patient has normal skin turgor and moist mucus membrances. Skin is intact; no bruising or breakdown noted.  MUSKULOSKELETAL: Patient is moving all extremities well, no obvious swelling or deformities noted. Pulses intact.   RESPIRATORY: Airway is open and patent. Respirations are spontaneous, even and unlabored. Normal effort and rate noted.  CARDIAC: Patient has a normal rate and rhythm. No peripheral edema noted. Capillary refill < 3 seconds.  ABDOMEN: Obese abd noted.  NEUROLOGICAL: PERRL. Facial expression is symmetrical. Hand grasps are equal bilaterally. Normal sensation in all extremities when touched with finger. Following commands appropriately.   Allergies reported:   Review of patient's allergies indicates:   Allergen Reactions    Penicillins Other (See Comments)    Vicodin [hydrocodone-acetaminophen] Hives    Ace inhibitors Swelling    Codeine Hives     "

## 2019-05-03 NOTE — TELEPHONE ENCOUNTER
Called patient to discuss abnormal labs results and worsening kidny function.   Patient states she has been taken her BP meds, and denies elevated Bp at home.   Patient seen on 5/1, repeat BP was 150/100, patient was asymptomatic, no CP or SOB and strict precautions were given.   Patient denies any of these symptoms when I spoke with her today; she also denies N&V, weakenss or dizziness or fatigue.   Case also discussed with Internal Medicine and we recommend for patient to be evalauted in the ED today.   Above was explained to patient in detail and she voiced understanding. Patient lives close to [facility] and states that she will have someone  her kids form school and she will proceed to the ED now.

## 2019-05-03 NOTE — ASSESSMENT & PLAN NOTE
No active bleeding  On daily iron at home  Check iron profile, vitamin b12, folate  Monitor labs

## 2019-05-03 NOTE — H&P
[facility] Medicine  History & Physical    Patient Name: Xuan Ricardo  MRN: [id number]  Admission Date: 5/3/2019  Attending Physician: Maria Elena Peterson MD   Primary Care Provider: Ramsey Segovia Jr, MD         Patient information was obtained from patient, past medical records and ER records.     Subjective:     Principal Problem:Acute renal failure superimposed on chronic kidney disease    Chief Complaint:   Chief Complaint   Patient presents with    Abnormal Lab     Pt received a call from Dr. Barba to report to the ED for abdnormal lab work. Pt reports fatigue, blurred vision and breast tenderness since November.         HPI: 38 year old female with past medical history of HTN, Type 2 Dm, on insulin daily, ckd was called in due to abnormal labs.Patient used to follow a physician at [facility] and her last labs were in 10/18 according to her.She is in process to change physicians due to insurance.She was seen by ob gyn for regular appointment and labs showed bun/creatinine of 74/7.0.She has been urinating well, no shortness of breath, chest pain, bleeding, fatigue.She does c/o lactation since her last child who is 5 years old now and she stopped nursing when she was 2 months old.Recently more lactation than before , associated with breast tenderness.Has irregular menstual period without excessive bleeding.Does say her sugars and bp at home are in good range and bp elevated when she comes to hospital.Her last creatinine in 2017 was 2.4.She used to follow nephrologist at [facility] in 2017.    Past Medical History:   Diagnosis Date    Diabetes mellitus     Hypertension        Past Surgical History:   Procedure Laterality Date     SECTION, CLASSIC      TUBAL LIGATION         Review of patient's allergies indicates:   Allergen Reactions    Penicillins Other (See Comments)    Vicodin [hydrocodone-acetaminophen] Hives    Ace inhibitors Swelling    Codeine Hives       No current  facility-administered medications on file prior to encounter.      Current Outpatient Medications on File Prior to Encounter   Medication Sig    ferrous sulfate 325 mg (65 mg iron) Tab tablet TAKE 1 TABLET BY MOUTH THREE TIMES DAILY AT LEAST ONE HOUR BEFORE MEALS    fluticasone (FLONASE) 50 mcg/actuation nasal spray 2 sprays by Nasal route.    metroNIDAZOLE (METROGEL VAGINAL) 0.75 % vaginal gel Place 1 applicator vaginally once daily. Use at bedtime for 7 days    NIFEdipine (PROCARDIA-XL) 60 MG (OSM) 24 hr tablet TK 1 T PO QD     Family History     None        Tobacco Use    Smoking status: Former Smoker     Types: Cigarettes   Substance and Sexual Activity    Alcohol use: No    Drug use: Not Currently     Frequency: 2.0 times per week    Sexual activity: Not Currently     Review of Systems   Constitutional: Negative for chills and fever.   HENT: Negative for trouble swallowing.    Eyes: Positive for visual disturbance.   Respiratory: Negative for cough and shortness of breath.    Cardiovascular: Negative for chest pain and leg swelling.   Gastrointestinal: Negative for abdominal pain, blood in stool, constipation, diarrhea, nausea and vomiting.   Endocrine:        Galactorrhea   Genitourinary: Negative for dysuria and hematuria.   Musculoskeletal: Negative for gait problem.   Skin: Negative for rash.   Neurological: Positive for headaches. Negative for numbness.   Psychiatric/Behavioral: Negative for confusion.     Objective:     Vital Signs (Most Recent):  Temp: 99 °F (37.2 °C) (05/03/19 1458)  Pulse: 104 (05/03/19 1630)  Resp: 16 (05/03/19 1630)  BP: (!) 179/91 (05/03/19 1630)  SpO2: 100 % (05/03/19 1458) Vital Signs (24h Range):  Temp:  [98.2 °F (36.8 °C)-99 °F (37.2 °C)] 99 °F (37.2 °C)  Pulse:  [101-104] 104  Resp:  [16-20] 16  SpO2:  [99 %-100 %] 100 %  BP: (176-194)/() 179/91     Weight: 114.8 kg (253 lb)  Body mass index is 38.47 kg/m².    Physical Exam   Constitutional: She is oriented to  person, place, and time. No distress.   HENT:   Head: Normocephalic and atraumatic.   Eyes: Pupils are equal, round, and reactive to light. EOM are normal.   Neck: Normal range of motion. Neck supple.   Cardiovascular: Normal rate and regular rhythm.   Pulmonary/Chest: Effort normal and breath sounds normal. No respiratory distress.   Abdominal: Soft. Bowel sounds are normal. She exhibits no distension. There is no tenderness.   Musculoskeletal: Normal range of motion. She exhibits no edema.   Neurological: She is alert and oriented to person, place, and time. No cranial nerve deficit.   Skin: Skin is warm.   Psychiatric: She has a normal mood and affect.   Vitals reviewed.        CRANIAL NERVES     CN III, IV, VI   Pupils are equal, round, and reactive to light.  Extraocular motions are normal.        Significant Labs:   CBC:   Recent Labs   Lab 05/03/19  1432   WBC 7.27   HGB 7.1*   HCT 22.1*        CMP:   Recent Labs   Lab 05/03/19  1432      K 4.5      CO2 19*   *   BUN 74*   CREATININE 7.0*   CALCIUM 8.9   PROT 7.6   ALBUMIN 3.7   BILITOT 0.2   ALKPHOS 102   AST 16   ALT 13   ANIONGAP 11   EGFRNONAA 7*       Significant Imaging: I have reviewed all pertinent imaging results/findings within the past 24 hours.    Assessment/Plan:     * Acute renal failure superimposed on chronic kidney disease  Worsening creatinine with h/o ckd with proteinuria, likely advancement of ckd  Will start lr at 100 cc/hr and monitor labs  Check urine protein/creatinine ratio, renal us, nephrology recs  Monitor input/output      Increased prolactin level  High prolactin level with symptoms of galactorrhea  Check pitutary mri without contrast to r/o any pitutary lesion with h/o blurred vision  Could be high secondary to ckd also   tsh mildly elevated, t4 normal       Bacterial vaginosis  From recent on gyn visit  Metronidazole vaginal cream at bedtime for 7 days      Metabolic acidosis  Likely due to ckd  Start  sodium bicarb po      Hypertensive CKD (chronic kidney disease)  High on admit  S/p hydralazine  Restart home nifedipine and monitor      Anemia of renal disease  No active bleeding  On daily iron at home  Check iron profile, vitamin b12, folate  Monitor labs      Type 2 diabetes mellitus with renal manifestations, controlled  On humulin 15 units daily  Diabetic diet, iss  Check hemoglobin a1c         VTE Risk Mitigation (From admission, onward)    None             Maria Elena Peterson MD  Department of Hospital Medicine   [facility]

## 2019-05-03 NOTE — PROGRESS NOTES
"HISTORY OF PRESENT ILLNESS:    Xuan Cousin is a 38 y.o. female, , No LMP recorded.,  presents for a routine exam and has no complaints.  Menstrual cycle since BTL, 3 days using 1-2 pads per day, not heavy.   Patient also reports occasional pelvic pain and vaginal discomfort.   Patient also reports galactorrhea for months, with expression and spontaneous.       2019   Patient also seen by Dr. Newton:  HPI: Pt is a 38 y.o.  female who presents for a positive UPT at home. Pt states she was seen by Dr. Kowalski at [facility] and Dr. Zaidi with [facility] in [place]. States she had the pregnancy confirmed with a home UPT and beta level in clinic with both providers (no positive UPT or beta level in Epic). She does not have her records from [facility] with her today. Reports never getting an ultrasound with either provider, nor given an PAMELA. States Dr. Dunbar "was procrastinating" and maybe embarrassed the BTL he performed did not work. She thinks her LMP was in the beginning of November. This would be her third pregnancy, two prior cesareans that were . Only taking insulin, PNV, and procardia. Currently scheduled for her dating u/s later today. Pt lives in the [facility] area and would like to transfer her care here. Denies any vaginal bleeding or cramping with this pregnancy, does report fetal movement.   1. UPT NEGATIVE in clinic-pt did not give a urine sample until the end of the visit. I obtained her medical hx and reviewed plan of care before having negative UPT.  2. Discussed findings with pt and partner in clinic. Offered beta level confirmation today. Reviewed beta results with Dr. Zaidi on 19 was also negative. Reviewed possible chemical pregnancy earlier in the year? Need to obtain records from Dr. Kowalski.     PT REPORT AND RECORDS FROM [facility] IN [facility] TODAY DO NOT MATCH UP. LAST SEEN BY DR DUNBAR IN 2018 FOR AUB AND ELEVATED PROLACTIN LEVEL, NO MENTION OF PREGNANCY IN NOTES. SEEN BY DR." ISRA KEENE IN 2019, NEGATIVE BETA IN Epic BUT NO NOTES FROM VISIT ARE AVAILABLE.    .     Past Medical History:   Diagnosis Date    Diabetes mellitus     Hypertension        Past Surgical History:   Procedure Laterality Date     SECTION, CLASSIC      TUBAL LIGATION         MEDICATIONS AND ALLERGIES:      Current Outpatient Medications:     ferrous sulfate 325 mg (65 mg iron) Tab tablet, TAKE 1 TABLET BY MOUTH THREE TIMES DAILY AT LEAST ONE HOUR BEFORE MEALS, Disp: 90 tablet, Rfl: 0    fluticasone (FLONASE) 50 mcg/actuation nasal spray, 2 sprays by Nasal route., Disp: , Rfl:     NIFEdipine (PROCARDIA-XL) 60 MG (OSM) 24 hr tablet, TK 1 T PO QD, Disp: , Rfl: 5    metroNIDAZOLE (METROGEL VAGINAL) 0.75 % vaginal gel, Place 1 applicator vaginally once daily. Use at bedtime for 7 days, Disp: 1 g, Rfl: 0    Review of patient's allergies indicates:   Allergen Reactions    Penicillins Other (See Comments)    Vicodin [hydrocodone-acetaminophen] Hives    Ace inhibitors Swelling    Codeine Hives       Family History   Problem Relation Age of Onset    Breast cancer Neg Hx     Colon cancer Neg Hx     Ovarian cancer Neg Hx        Social History     Socioeconomic History    Marital status: Single     Spouse name: Not on file    Number of children: Not on file    Years of education: Not on file    Highest education level: Not on file   Occupational History    Not on file   Social Needs    Financial resource strain: Not on file    Food insecurity:     Worry: Not on file     Inability: Not on file    Transportation needs:     Medical: Not on file     Non-medical: Not on file   Tobacco Use    Smoking status: Former Smoker     Types: Cigarettes   Substance and Sexual Activity    Alcohol use: No    Drug use: Not Currently     Frequency: 2.0 times per week    Sexual activity: Not Currently   Lifestyle    Physical activity:     Days per week: Not on file     Minutes per session: Not on  "file    Stress: Not on file   Relationships    Social connections:     Talks on phone: Not on file     Gets together: Not on file     Attends [employer] service: Not on file     Active member of club or organization: Not on file     Attends meetings of clubs or organizations: Not on file     Relationship status: Not on file   Other Topics Concern    Not on file   Social History Narrative    Not on file       COMPREHENSIVE GYN HISTORY:  PAP History: Denies abnormal Paps.  Infection History: Denies STDs. Denies PID.  Benign History: Denies uterine fibroids. Denies ovarian cysts. Denies endometriosis. Denies other conditions.  Cancer History: Denies cervical cancer. Denies uterine cancer or hyperplasia. Denies ovarian cancer. Denies vulvar cancer or pre-cancer. Denies vaginal cancer or pre-cancer. Denies breast cancer. Denies colon cancer.  Sexual Activity History: Reports currently being sexually active  Menstrual History: Monthly. Mod then light flow.   Dysmenorrhea History: Reports mild dysmenorrhea.       ROS:  GENERAL: No weight changes. No swelling. No fatigue. No fever.  CARDIOVASCULAR: No chest pain. No shortness of breath. No leg cramps.   NEUROLOGICAL: No headaches. No vision changes.  BREASTS: No pain. No lumps. No discharge.  ABDOMEN: No pain. No nausea. No vomiting. No diarrhea. No constipation.  REPRODUCTIVE: No abnormal bleeding.   VULVA: No pain. No lesions. No itching.  VAGINA: No relaxation. No itching. No odor. No discharge. No lesions.  URINARY: No incontinence. No nocturia. No frequency. No dysuria.    BP (!) 150/100   Ht 5' 8" (1.727 m)   Wt 115.4 kg (254 lb 6.6 oz)   BMI 38.68 kg/m²     PE:  APPEARANCE: Well nourished, well developed, in no acute distress.  AFFECT: WNL, alert and oriented x 3.  SKIN: No acne or hirsutism.  NECK: Neck symmetric, without masses or thyromegaly.  NODES: No inguinal, cervical, axillary or femoral lymph node enlargement.  CHEST: Good respiratory effort. "   ABDOMEN: Soft. No tenderness or masses. No hepatosplenomegaly. No hernias.  BREASTS: Symmetrical, no skin changes, visible lesions, palpable masses or nipple discharge bilaterally. Galactorrhea on the left breast.   PELVIC: External female genitalia without lesions.  Female hair distribution. Adequate perineal body, Normal urethral meatus. Vagina moist and well rugated without lesions or discharge.  No significant cystocele or rectocele present. Cervix pink without lesions, discharge or tenderness. Uterus is 6-8 week size, regular, mobile and nontender. Adnexa without masses or tenderness.  EXTREMITIES: No edema    DIAGNOSIS:  1. Irregular periods    2. Pelvic pain in female    3. Hypertensive urgency, malignant    4. Essential hypertension    5. CKD (chronic kidney disease), stage IV    6. Iron deficiency anemia due to chronic blood loss    7. Uncontrolled type 2 diabetes mellitus with stage 3 chronic kidney disease, with long-term current use of insulin    8. Galactorrhea      PLAN:    Orders Placed This Encounter    Vaginosis Screen by DNA Probe    C. trachomatis/N. gonorrhoeae by AMP DNA    US Pelvis Comp with Transvag NON-OB (xpd    Prolactin    TSH    T4, free    CBC auto differential    Comprehensive metabolic panel    POCT urine pregnancy     COUNSELING:  The patient was counseled today on:  -A.C.S. Pap and pelvic exam guidelines (pap every 3 years), recomendations for yearly mammogram;  -to follow up with her PCP for other health maintenance.    FOLLOW-UP with me in 4 weeks

## 2019-05-03 NOTE — SUBJECTIVE & OBJECTIVE
Past Medical History:   Diagnosis Date    Diabetes mellitus     Hypertension        Past Surgical History:   Procedure Laterality Date     SECTION, CLASSIC      TUBAL LIGATION         Review of patient's allergies indicates:   Allergen Reactions    Penicillins Other (See Comments)    Vicodin [hydrocodone-acetaminophen] Hives    Ace inhibitors Swelling    Codeine Hives       No current facility-administered medications on file prior to encounter.      Current Outpatient Medications on File Prior to Encounter   Medication Sig    ferrous sulfate 325 mg (65 mg iron) Tab tablet TAKE 1 TABLET BY MOUTH THREE TIMES DAILY AT LEAST ONE HOUR BEFORE MEALS    fluticasone (FLONASE) 50 mcg/actuation nasal spray 2 sprays by Nasal route.    metroNIDAZOLE (METROGEL VAGINAL) 0.75 % vaginal gel Place 1 applicator vaginally once daily. Use at bedtime for 7 days    NIFEdipine (PROCARDIA-XL) 60 MG (OSM) 24 hr tablet TK 1 T PO QD     Family History     None        Tobacco Use    Smoking status: Former Smoker     Types: Cigarettes   Substance and Sexual Activity    Alcohol use: No    Drug use: Not Currently     Frequency: 2.0 times per week    Sexual activity: Not Currently     Review of Systems   Constitutional: Negative for chills and fever.   HENT: Negative for trouble swallowing.    Eyes: Positive for visual disturbance.   Respiratory: Negative for cough and shortness of breath.    Cardiovascular: Negative for chest pain and leg swelling.   Gastrointestinal: Negative for abdominal pain, blood in stool, constipation, diarrhea, nausea and vomiting.   Endocrine:        Galactorrhea   Genitourinary: Negative for dysuria and hematuria.   Musculoskeletal: Negative for gait problem.   Skin: Negative for rash.   Neurological: Positive for headaches. Negative for numbness.   Psychiatric/Behavioral: Negative for confusion.     Objective:     Vital Signs (Most Recent):  Temp: 99 °F (37.2 °C) (19 1458)  Pulse: 104  (05/03/19 1630)  Resp: 16 (05/03/19 1630)  BP: (!) 179/91 (05/03/19 1630)  SpO2: 100 % (05/03/19 1458) Vital Signs (24h Range):  Temp:  [98.2 °F (36.8 °C)-99 °F (37.2 °C)] 99 °F (37.2 °C)  Pulse:  [101-104] 104  Resp:  [16-20] 16  SpO2:  [99 %-100 %] 100 %  BP: (176-194)/() 179/91     Weight: 114.8 kg (253 lb)  Body mass index is 38.47 kg/m².    Physical Exam   Constitutional: She is oriented to person, place, and time. No distress.   HENT:   Head: Normocephalic and atraumatic.   Eyes: Pupils are equal, round, and reactive to light. EOM are normal.   Neck: Normal range of motion. Neck supple.   Cardiovascular: Normal rate and regular rhythm.   Pulmonary/Chest: Effort normal and breath sounds normal. No respiratory distress.   Abdominal: Soft. Bowel sounds are normal. She exhibits no distension. There is no tenderness.   Musculoskeletal: Normal range of motion. She exhibits no edema.   Neurological: She is alert and oriented to person, place, and time. No cranial nerve deficit.   Skin: Skin is warm.   Psychiatric: She has a normal mood and affect.   Vitals reviewed.        CRANIAL NERVES     CN III, IV, VI   Pupils are equal, round, and reactive to light.  Extraocular motions are normal.        Significant Labs:   CBC:   Recent Labs   Lab 05/03/19  1432   WBC 7.27   HGB 7.1*   HCT 22.1*        CMP:   Recent Labs   Lab 05/03/19  1432      K 4.5      CO2 19*   *   BUN 74*   CREATININE 7.0*   CALCIUM 8.9   PROT 7.6   ALBUMIN 3.7   BILITOT 0.2   ALKPHOS 102   AST 16   ALT 13   ANIONGAP 11   EGFRNONAA 7*       Significant Imaging: I have reviewed all pertinent imaging results/findings within the past 24 hours.

## 2019-05-03 NOTE — HPI
38 year old female with past medical history of HTN, Type 2 Dm, on insulin daily, ckd was called in due to abnormal labs.Patient used to follow a physician at [facility] and her last labs were in 10/18 according to her.She is in process to change physicians due to insurance.She was seen by ob gyn for regular appointment and labs showed bun/creatinine of 74/7.0.She has been urinating well, no shortness of breath, chest pain, bleeding, fatigue.She does c/o lactation since her last child who is 5 years old now and she stopped nursing when she was 2 months old.Recently more lactation than before , associated with breast tenderness.Has irregular menstual period without excessive bleeding.Does say her sugars and bp at home are in good range and bp elevated when she comes to hospital.Her last creatinine in 2017 was 2.4.She used to follow nephrologist at [facility] in 2017.

## 2019-05-03 NOTE — ASSESSMENT & PLAN NOTE
Worsening creatinine with h/o ckd with proteinuria, likely advancement of ckd  Will start lr at 100 cc/hr and monitor labs  Check urine protein/creatinine ratio, renal us, nephrology recs  Monitor input/output

## 2019-05-03 NOTE — CONSULTS
REASON FOR CONSULTATION: BRITNEY on CKD4/5     HPI:38 y.o. female with documented diabetic nephropathy and proteinuria presented to ER after her PCP sent her for abnormal labs.  She has last seen by Dr. Vyas 2 years ago but has been lost to follow up.  At that time she had a creatinine of 2.67.  By 2018 creatinine was 4.41 with 1.7 grams prot/creat ratio.  She has a history of very poorly controlled DM, on relatively low dose of NPH insulin, presumably due to her advanced renal failure.  She complains of chronic heavy vaginal bleeding for which she follows with GYN.  +fatigue, blurred vision, breast tenderness (being worked up by GYN).  /93 in ER, H/H 7.5/23.5, creatinine 7.0 with CO2 19, lytes WNL.  She is being admitted for complete workup of her BRITNEY on CKD 5 versus ESRD.      REVIEW OF SYSTEMS:  See HPI for all pertinent ROS.    Past Medical History:   Diagnosis Date    Diabetes mellitus     Hypertension        Past Surgical History:   Procedure Laterality Date     SECTION, CLASSIC      TUBAL LIGATION         Review of patient's allergies indicates:   Allergen Reactions    Penicillins Other (See Comments)    Vicodin [hydrocodone-acetaminophen] Hives    Ace inhibitors Swelling    Codeine Hives       Medications Prior to Admission   Medication Sig Dispense Refill Last Dose    ferrous sulfate 325 mg (65 mg iron) Tab tablet TAKE 1 TABLET BY MOUTH THREE TIMES DAILY AT LEAST ONE HOUR BEFORE MEALS 90 tablet 0 Taking    fluticasone (FLONASE) 50 mcg/actuation nasal spray 2 sprays by Nasal route.   Taking    metroNIDAZOLE (METROGEL VAGINAL) 0.75 % vaginal gel Place 1 applicator vaginally once daily. Use at bedtime for 7 days 1 g 0     NIFEdipine (PROCARDIA-XL) 60 MG (OSM) 24 hr tablet TK 1 T PO QD  5 Taking       Current Facility-Administered Medications   Medication Dose Route Frequency Provider Last Rate Last Dose    dextrose 50% injection 12.5 g  12.5 g Intravenous PRN Maria Elena Peterson MD         dextrose 50% injection 25 g  25 g Intravenous PRN Maria Elena Peterson MD        ferrous sulfate EC tablet 325 mg  325 mg Oral Daily Maria Elena Peterson MD        glucagon (human recombinant) injection 1 mg  1 mg Intramuscular PRN Maria Elena Peterson MD        glucose chewable tablet 16 g  16 g Oral PRN Maria Elena Peterson MD        glucose chewable tablet 24 g  24 g Oral PRONEIL Peterson MD        insulin aspart U-100 pen 0-5 Units  0-5 Units Subcutaneous QID (AC + HS) PRN Maria Elena Peterson MD   1 Units at 05/03/19 2150    lactated ringers infusion   Intravenous Continuous Maria Elena Peterson  mL/hr at 05/04/19 0615      metroNIDAZOLE 0.75 % vaginal gel 1 applicator  1 applicator Vaginal QHS Maria Elena Peterson MD   1 applicator at 05/03/19 2150    NIFEdipine 24 hr tablet 90 mg  90 mg Oral Daily April SHIVA Rivera MD        sodium bicarbonate tablet 650 mg  650 mg Oral BID Maria Elena Peterson MD   650 mg at 05/03/19 2150       Social History     Socioeconomic History    Marital status: Single     Spouse name: Not on file    Number of children: Not on file    Years of education: Not on file    Highest education level: Not on file   Occupational History    Not on file   Social Needs    Financial resource strain: Not on file    Food insecurity:     Worry: Not on file     Inability: Not on file    Transportation needs:     Medical: Not on file     Non-medical: Not on file   Tobacco Use    Smoking status: Former Smoker     Types: Cigarettes   Substance and Sexual Activity    Alcohol use: No    Drug use: Not Currently     Frequency: 2.0 times per week    Sexual activity: Not Currently   Lifestyle    Physical activity:     Days per week: Not on file     Minutes per session: Not on file    Stress: Not on file   Relationships    Social connections:     Talks on phone: Not on file     Gets together: Not on file     Attends Protestant service: Not on file     Active member of club or organization: Not on file     Attends meetings of  clubs or organizations: Not on file     Relationship status: Not on file   Other Topics Concern    Not on file   Social History Narrative    Not on file       Family History   Problem Relation Age of Onset    Breast cancer Neg Hx     Colon cancer Neg Hx     Ovarian cancer Neg Hx        Temp:  [96.1 °F (35.6 °C)-99 °F (37.2 °C)] 96.1 °F (35.6 °C)  Pulse:  [100-119] 101  Resp:  [16-20] 20  SpO2:  [95 %-100 %] 99 %  BP: (149-194)/() 149/70      PHYSICAL EXAM:    GEN:  Alert and Oriented x4, NAD, appropriate affect  HEENT:  NCAT, MAVIS, No conjunctivitis, normal sclera, O/P clear, no oropharyngial lesions, no thrush, neck supple, No LAD, Nml JVD, no carotid bruits +conj pallor  CV:  RRR no MRG  Lungs:  CTAb, no rhonchi, wheeze, crackles, normal excursion  Abdomen: Obese, soft, NTND, no fluid wave, no HSM, NABS  Ext:  No CCE, normal DP pulses bilat  Neuro:  Non-Focal, EOMI, gait not assessed  Skin:  No rash, excoriation, petchiae      Labs: Reviewed in chart      ASSESSMENT AND PLAN:    BRITNEY on CKD 4/5 versus ESRD  -Based on history suspect she has ESRD and HD should be initiated  -Last US 9/18 showed renal findings consistent with CKD  -Last documented prot/creat 1.7 grams  -This likely due to DM nephropathy but will do complete proteinuria workup  -Per record she is ACE/ARB intolerant because of hyperkalemia, but NO ALLERGY    Anemia of chronic blood loss (vaginal bleeding) and likely of CKD  -Approp workup sent  -Will check iron, B12 and folate studies  -Wll no doubt need Procrit    HTN  -Continue current meds for now  -Will be able to initiate ACE or ARB once she starts HD    Vit D Defic/Secondary HPT/Hyperphos  -Check these levels and start approp binders and supplementation     IDDM  -HgA1c last check was 6.2  -Defer to Primary team    Abnormal Uterine bleeding and gallactorrea  -MRI pending  -Has high prolactin level so ruling out pit lesion  -Defer uterine bleeding to GYN    Thanks for consult will  follow closely.

## 2019-05-03 NOTE — ED PROVIDER NOTES
Encounter Date: 5/3/2019    SCRIBE #1 NOTE: I, Edgar Ellison, am scribing for, and in the presence of, Dr. Villa .       History     Chief Complaint   Patient presents with    Abnormal Lab     Pt received a call from Dr. Barba to report to the ED for abdnormal lab work. Pt reports fatigue, blurred vision and breast tenderness since November.      Time seen by provider: 2:11 PM    This is a 38 y.o. female, with history of HTN and DM, who presents with concern for abnormal labs today. Patient states completed labs two days ago and she received a phone call from Dr. Barba (OB-GYN) about anemia and abnormal kidney function. She reports that she was advised to present to the ED for further evaluation. She currently denies fevers, chills, dizziness, headaches, blurry vision, bruising, SOB, chest pain, abdominal pain, nausea, vomiting, bloody stools, dysuria, or hematuria. Patient states she had a nephrologist at [facility], but she was recommended by Medicare to look for a nephrologist in the [facility] network. Her PCP is Dr. Marcus Richardson at [facility], but patient states she is looking for a new PCP. She admits use of marijuana, but denies use of tobacco or illicit drugs.       The history is provided by the patient.     Review of patient's allergies indicates:   Allergen Reactions    Penicillins Other (See Comments)    Vicodin [hydrocodone-acetaminophen] Hives    Ace inhibitors Swelling    Codeine Hives     Past Medical History:   Diagnosis Date    Diabetes mellitus     Hypertension      Past Surgical History:   Procedure Laterality Date     SECTION, CLASSIC      TUBAL LIGATION       Family History   Problem Relation Age of Onset    Breast cancer Neg Hx     Colon cancer Neg Hx     Ovarian cancer Neg Hx      Social History     Tobacco Use    Smoking status: Former Smoker     Types: Cigarettes   Substance Use Topics    Alcohol use: No    Drug use: Not Currently     Frequency: 2.0 times per week      Review of Systems   Constitutional: Negative for chills and fever.   Eyes: Negative for visual disturbance.   Respiratory: Negative for shortness of breath.    Cardiovascular: Negative for chest pain.   Gastrointestinal: Negative for abdominal pain, blood in stool, nausea and vomiting.   Genitourinary: Negative for dysuria and hematuria.   Skin: Negative for color change (bruising).   Neurological: Negative for dizziness and headaches.   Hematological: Does not bruise/bleed easily.       Physical Exam     Initial Vitals [05/03/19 1346]   BP Pulse Resp Temp SpO2   (!) 176/93 101 18 98.2 °F (36.8 °C) 99 %      MAP       --         Physical Exam    Nursing note and vitals reviewed.  Constitutional: She appears well-developed and well-nourished. No distress.   HENT:   Head: Normocephalic and atraumatic.   Mouth/Throat: Oropharynx is clear and moist.   Eyes: Conjunctivae and EOM are normal. Pupils are equal, round, and reactive to light.   Neck: Normal range of motion. Neck supple.   Cardiovascular: Normal rate, regular rhythm, normal heart sounds and intact distal pulses.   Pulmonary/Chest: Breath sounds normal. No respiratory distress. She has no wheezes. She has no rhonchi. She has no rales.   Musculoskeletal: Normal range of motion. She exhibits no edema.   Neurological: She is alert and oriented to person, place, and time. She has normal strength.   Skin: Skin is warm and dry.   Psychiatric: She has a normal mood and affect. Her behavior is normal. Judgment and thought content normal.         ED Course   Procedures  Labs Reviewed   URINALYSIS, REFLEX TO URINE CULTURE - Abnormal; Notable for the following components:       Result Value    Protein, UA 3+ (*)     Glucose, UA Trace (*)     Occult Blood UA 1+ (*)     Leukocytes, UA Trace (*)     All other components within normal limits    Narrative:     Preferred Collection Type->Urine, Clean Catch   URINALYSIS MICROSCOPIC - Abnormal; Notable for the following  components:    WBC, UA 10 (*)     All other components within normal limits    Narrative:     Preferred Collection Type->Urine, Clean Catch   CBC W/ AUTO DIFFERENTIAL - Abnormal; Notable for the following components:    RBC 2.70 (*)     Hemoglobin 7.1 (*)     Hematocrit 22.1 (*)     Mean Corpuscular Hemoglobin 26.3 (*)     RDW 15.3 (*)     Gran% 74.4 (*)     All other components within normal limits   COMPREHENSIVE METABOLIC PANEL - Abnormal; Notable for the following components:    CO2 19 (*)     Glucose 130 (*)     BUN, Bld 74 (*)     Creatinine 7.0 (*)     eGFR if  8 (*)     eGFR if non  7 (*)     All other components within normal limits   SODIUM, URINE, RANDOM   RENTERIA'S STAIN, URINE RANDOM   CREATININE, URINE, RANDOM   PROTEIN / CREATININE RATIO, URINE   URIC ACID, URINE RANDOM   URIC ACID   PTH, INTACT   VITAMIN D   IRON AND TIBC   FERRITIN   VITAMIN B12   FOLATE   URINALYSIS, REFLEX TO URINE CULTURE   HEPATITIS PANEL, ACUTE   LUIS PROFILE I (SCREEN)   C4 COMPLEMENT   C3 COMPLEMENT   HIV 1 / 2 ANTIBODY   PROTEIN ELECTROPHORESIS, TIMED URINE   PROTEIN ELECTROPHORESIS, SERUM   ANTI-NEUTROPHILIC CYTOPLASMIC ANTIBODY   RPR   STREPTOCOCCAL ANTIBODIES (ASO)   ANTI-NEUTROPHILIC CYTOPLASMIC ANTIBODY   POCT URINE PREGNANCY          Imaging Results    None          Medical Decision Making:   Clinical Tests:   Lab Tests: Ordered and Reviewed            Scribe Attestation:   Scribe #1: I performed the above scribed service and the documentation accurately describes the services I performed. I attest to the accuracy of the note.    Attending Attestation:           Physician Attestation for Scribe:  Physician Attestation Statement for Scribe #1: I, Dr. Villa, reviewed documentation, as scribed by Edgar Ellison  in my presence, and it is both accurate and complete.                 ED Course as of May 03 1942   Fri May 03, 2019   1552 Case discussed with Dr. Rivera (Nephrology). Recommends  admission. Will likely need ultrasound, biopsy, and further laboratory workup. Recommends initiate  cc an hour.     [CL]   8637 Case discussed with Dr. Peterson (Hospitalist). Will admit the patient to her service.     [CL]      ED Course User Index  [CL] Edgar Ellison       Patient with a history of hypertension, diabetes, reports that her insurance changed and they are switching her from [facility] to [facility], and is awaiting a new appointments with Internal Medicine, Nephrology.  She saw her gynecologist who did perform routine laboratory studies a few days ago contacted her today due to abnormalities.  These showed anemia as well as worsening renal function.  Laboratory studies were repeated and confirmed these results.  The show hemoglobin of 7.1, worsened from 9.1 in 2017, and creatinine of 7 worsened from 2.4 in 2017.  The patient is asymptomatic.  Well-appearing on exam and other than hypertension, vital signs are stable I discussed the case with Nephrology who agrees the patient needs to stay in for further workup.  Recommends initiation of IV fluid therapy, will evaluate later today for further recommendations on workup.  Case discussed with the hospitalist service to whom she will be admitted    Clinical Impression:     1. Acute renal failure superimposed on chronic kidney disease, unspecified CKD stage, unspecified acute renal failure type    2. Uncontrolled hypertension                                   Mendez Villa II, MD  05/03/19 1945

## 2019-05-03 NOTE — TELEPHONE ENCOUNTER
I spoke to the pt and asked her if she was checked into the hospital and to give her the results of her cultures. Pt was aware and will  the medicine once discharged from the hospital.

## 2019-05-04 PROBLEM — R53.82 CHRONIC FATIGUE: Status: ACTIVE | Noted: 2018-10-31

## 2019-05-04 PROBLEM — D25.9 UTERINE LEIOMYOMA: Status: ACTIVE | Noted: 2018-08-31

## 2019-05-04 PROBLEM — E83.42 HYPOMAGNESEMIA: Status: ACTIVE | Noted: 2019-05-04

## 2019-05-04 PROBLEM — N92.6 MISSED MENSES: Status: ACTIVE | Noted: 2018-11-30

## 2019-05-04 PROBLEM — N93.9 ABNORMAL UTERINE BLEEDING (AUB): Status: ACTIVE | Noted: 2018-08-31

## 2019-05-04 LAB
25(OH)D3+25(OH)D2 SERPL-MCNC: 16 NG/ML (ref 30–96)
ANION GAP SERPL CALC-SCNC: 10 MMOL/L (ref 8–16)
BASOPHILS # BLD AUTO: 0.01 K/UL (ref 0–0.2)
BASOPHILS NFR BLD: 0.1 % (ref 0–1.9)
BUN SERPL-MCNC: 64 MG/DL (ref 6–20)
C3 SERPL-MCNC: 123 MG/DL (ref 50–180)
C4 SERPL-MCNC: 40 MG/DL (ref 11–44)
CALCIUM SERPL-MCNC: 9 MG/DL (ref 8.7–10.5)
CHLORIDE SERPL-SCNC: 110 MMOL/L (ref 95–110)
CO2 SERPL-SCNC: 19 MMOL/L (ref 23–29)
CREAT SERPL-MCNC: 6.4 MG/DL (ref 0.5–1.4)
DIFFERENTIAL METHOD: ABNORMAL
EOSINOPHIL # BLD AUTO: 0.1 K/UL (ref 0–0.5)
EOSINOPHIL NFR BLD: 2 % (ref 0–8)
EOSINOPHIL URNS QL WRIGHT STN: NORMAL
ERYTHROCYTE [DISTWIDTH] IN BLOOD BY AUTOMATED COUNT: 15.2 % (ref 11.5–14.5)
EST. GFR  (AFRICAN AMERICAN): 9 ML/MIN/1.73 M^2
EST. GFR  (NON AFRICAN AMERICAN): 7 ML/MIN/1.73 M^2
ESTIMATED AVG GLUCOSE: 120 MG/DL (ref 68–131)
FERRITIN SERPL-MCNC: 41 NG/ML (ref 20–300)
FOLATE SERPL-MCNC: 16.5 NG/ML (ref 4–24)
GLUCOSE SERPL-MCNC: 118 MG/DL (ref 70–110)
HBA1C MFR BLD HPLC: 5.8 % (ref 4–5.6)
HCT VFR BLD AUTO: 23.1 % (ref 37–48.5)
HGB BLD-MCNC: 7.4 G/DL (ref 12–16)
IRON SERPL-MCNC: 19 UG/DL (ref 30–160)
LYMPHOCYTES # BLD AUTO: 1.6 K/UL (ref 1–4.8)
LYMPHOCYTES NFR BLD: 22.4 % (ref 18–48)
MAGNESIUM SERPL-MCNC: 1.4 MG/DL (ref 1.6–2.6)
MCH RBC QN AUTO: 26.2 PG (ref 27–31)
MCHC RBC AUTO-ENTMCNC: 32 G/DL (ref 32–36)
MCV RBC AUTO: 82 FL (ref 82–98)
MONOCYTES # BLD AUTO: 0.3 K/UL (ref 0.3–1)
MONOCYTES NFR BLD: 3.8 % (ref 4–15)
NEUTROPHILS # BLD AUTO: 5.1 K/UL (ref 1.8–7.7)
NEUTROPHILS NFR BLD: 71.6 % (ref 38–73)
PHOSPHATE SERPL-MCNC: 4.4 MG/DL (ref 2.7–4.5)
PLATELET # BLD AUTO: 196 K/UL (ref 150–350)
PMV BLD AUTO: 10.3 FL (ref 9.2–12.9)
POCT GLUCOSE: 101 MG/DL (ref 70–110)
POCT GLUCOSE: 116 MG/DL (ref 70–110)
POCT GLUCOSE: 140 MG/DL (ref 70–110)
POCT GLUCOSE: 156 MG/DL (ref 70–110)
POTASSIUM SERPL-SCNC: 4.7 MMOL/L (ref 3.5–5.1)
RBC # BLD AUTO: 2.82 M/UL (ref 4–5.4)
SATURATED IRON: 7 % (ref 20–50)
SODIUM SERPL-SCNC: 139 MMOL/L (ref 136–145)
TOTAL IRON BINDING CAPACITY: 265 UG/DL (ref 250–450)
TRANSFERRIN SERPL-MCNC: 179 MG/DL (ref 200–375)
VIT B12 SERPL-MCNC: 948 PG/ML (ref 210–950)
WBC # BLD AUTO: 7.09 K/UL (ref 3.9–12.7)

## 2019-05-04 PROCEDURE — 11000001 HC ACUTE MED/SURG PRIVATE ROOM

## 2019-05-04 PROCEDURE — 84156 ASSAY OF PROTEIN URINE: CPT

## 2019-05-04 PROCEDURE — 63600175 PHARM REV CODE 636 W HCPCS: Performed by: INTERNAL MEDICINE

## 2019-05-04 PROCEDURE — 25000003 PHARM REV CODE 250: Performed by: INTERNAL MEDICINE

## 2019-05-04 PROCEDURE — 80048 BASIC METABOLIC PNL TOTAL CA: CPT

## 2019-05-04 PROCEDURE — 84166 PROTEIN E-PHORESIS/URINE/CSF: CPT | Mod: 26,,, | Performed by: PATHOLOGY

## 2019-05-04 PROCEDURE — 83735 ASSAY OF MAGNESIUM: CPT

## 2019-05-04 PROCEDURE — 84100 ASSAY OF PHOSPHORUS: CPT

## 2019-05-04 PROCEDURE — 85025 COMPLETE CBC W/AUTO DIFF WBC: CPT

## 2019-05-04 PROCEDURE — 84166 PROTEIN E-PHORESIS/URINE/CSF: CPT

## 2019-05-04 PROCEDURE — 99233 SBSQ HOSP IP/OBS HIGH 50: CPT | Mod: ,,, | Performed by: INTERNAL MEDICINE

## 2019-05-04 PROCEDURE — 36415 COLL VENOUS BLD VENIPUNCTURE: CPT

## 2019-05-04 PROCEDURE — 84166 PATHOLOGIST INTERPRETATION UPE: ICD-10-PCS | Mod: 26,,, | Performed by: PATHOLOGY

## 2019-05-04 PROCEDURE — 99233 PR SUBSEQUENT HOSPITAL CARE,LEVL III: ICD-10-PCS | Mod: ,,, | Performed by: INTERNAL MEDICINE

## 2019-05-04 PROCEDURE — 83036 HEMOGLOBIN GLYCOSYLATED A1C: CPT

## 2019-05-04 RX ORDER — SODIUM CHLORIDE, SODIUM LACTATE, POTASSIUM CHLORIDE, CALCIUM CHLORIDE 600; 310; 30; 20 MG/100ML; MG/100ML; MG/100ML; MG/100ML
INJECTION, SOLUTION INTRAVENOUS CONTINUOUS
Status: DISCONTINUED | OUTPATIENT
Start: 2019-05-04 | End: 2019-05-04

## 2019-05-04 RX ORDER — LANOLIN ALCOHOL/MO/W.PET/CERES
400 CREAM (GRAM) TOPICAL 2 TIMES DAILY
Status: DISCONTINUED | OUTPATIENT
Start: 2019-05-04 | End: 2019-05-05 | Stop reason: HOSPADM

## 2019-05-04 RX ORDER — CALCITRIOL 0.25 UG/1
0.25 CAPSULE ORAL DAILY
Status: DISCONTINUED | OUTPATIENT
Start: 2019-05-04 | End: 2019-05-05 | Stop reason: HOSPADM

## 2019-05-04 RX ORDER — CHOLECALCIFEROL (VITAMIN D3) 25 MCG
1000 TABLET ORAL DAILY
Status: DISCONTINUED | OUTPATIENT
Start: 2019-05-04 | End: 2019-05-05

## 2019-05-04 RX ORDER — SEVELAMER CARBONATE 800 MG/1
800 TABLET, FILM COATED ORAL
Status: DISCONTINUED | OUTPATIENT
Start: 2019-05-04 | End: 2019-05-05

## 2019-05-04 RX ADMIN — METRONIDAZOLE 1 APPLICATOR: 7.5 GEL VAGINAL at 08:05

## 2019-05-04 RX ADMIN — SODIUM CHLORIDE, SODIUM LACTATE, POTASSIUM CHLORIDE, AND CALCIUM CHLORIDE: .6; .31; .03; .02 INJECTION, SOLUTION INTRAVENOUS at 06:05

## 2019-05-04 RX ADMIN — NIFEDIPINE 90 MG: 30 TABLET, FILM COATED, EXTENDED RELEASE ORAL at 09:05

## 2019-05-04 RX ADMIN — SODIUM BICARBONATE 650 MG TABLET 650 MG: at 08:05

## 2019-05-04 RX ADMIN — ERYTHROPOIETIN 10000 UNITS: 10000 INJECTION, SOLUTION INTRAVENOUS; SUBCUTANEOUS at 10:05

## 2019-05-04 RX ADMIN — HUMAN INSULIN 15 UNITS: 100 INJECTION, SUSPENSION SUBCUTANEOUS at 12:05

## 2019-05-04 RX ADMIN — SEVELAMER CARBONATE 800 MG: 800 TABLET, FILM COATED ORAL at 01:05

## 2019-05-04 RX ADMIN — SODIUM BICARBONATE 650 MG TABLET 650 MG: at 09:05

## 2019-05-04 RX ADMIN — Medication 400 MG: at 11:05

## 2019-05-04 RX ADMIN — CALCITRIOL 0.25 MCG: 0.25 CAPSULE ORAL at 10:05

## 2019-05-04 RX ADMIN — IRON SUCROSE 100 MG: 20 INJECTION, SOLUTION INTRAVENOUS at 10:05

## 2019-05-04 RX ADMIN — FERROUS SULFATE TAB EC 325 MG (65 MG FE EQUIVALENT) 325 MG: 325 (65 FE) TABLET DELAYED RESPONSE at 09:05

## 2019-05-04 RX ADMIN — VITAMIN D, TAB 1000IU (100/BT) 1000 UNITS: 25 TAB at 10:05

## 2019-05-04 RX ADMIN — SEVELAMER CARBONATE 800 MG: 800 TABLET, FILM COATED ORAL at 05:05

## 2019-05-04 RX ADMIN — Medication 400 MG: at 08:05

## 2019-05-04 NOTE — NURSING
"Pt refused am labs. Pt states "they just took 11 tubes from me and I cant have pain medication, so no".   "

## 2019-05-04 NOTE — PROGRESS NOTES
Renal Progress Note    Admit Date: 5/3/2019   LOS: 1 day     SUBJECTIVE:     Patient complaining about multiple blood draws.  Explained rationale.  Uneventful night.  Discussed impending need for HD or PD given her ESRD.  She has two young children that she insists she must be discharged to care for by Monday afternoon.  I assured her I would do my best to have all arrangements for treatment done by then.    Scheduled Meds:   ferrous sulfate  325 mg Oral Daily    metroNIDAZOLE  1 applicator Vaginal QHS    NIFEdipine  90 mg Oral Daily    sodium bicarbonate  650 mg Oral BID       OBJECTIVE:     Vital Signs Range (Last 24H):  Temp:  [96.1 °F (35.6 °C)-99 °F (37.2 °C)]   Pulse:  [100-119]   Resp:  [16-20]   BP: (131-194)/()   SpO2:  [95 %-100 %]     I & O (Last 24H):No intake or output data in the 24 hours ending 05/04/19 0928    Physical Exam:  GEN:  Alert and Oriented x4, NAD, appropriate affect, obese  HEENT:  NCAT, MAVIS, No conjunctivitis, normal sclera, O/P clear, no oropharyngial lesions, no thrush, neck supple, No LAD, Nml JVD, no carotid bruits +conj pallor  CV:  RRR no MRG  Lungs:  CTAb, no rhonchi, wheeze, crackles, normal excursion  Abdomen: Obese, soft, NTND, no fluid wave, no HSM, NABS  Ext:  No CCE, normal DP pulses bilat  Neuro:  Non-Focal, EOMI, gait not assessed  Skin:  No rash, excoriation, petchiae      Laboratory:  CBC:   Recent Labs   Lab 05/03/19  1432   WBC 7.27   RBC 2.70*   HGB 7.1*   HCT 22.1*      MCV 82   MCH 26.3*   MCHC 32.1     BMP:   Recent Labs   Lab 05/03/19  1432   *      K 4.5      CO2 19*   BUN 74*   CREATININE 7.0*   CALCIUM 8.9       ASSESSMENT/PLAN:     BRITNEY on CKD 4/5 versus ESRD  -Based on history suspect she has ESRD and HD should be initiated  -Have discussed PD as option, and certainly referral to abdominal transplant to [facility] can be initiated   -Needs Oscar catheter and initiate HD Monday after placement (Cannot initiate today given water  issues)  -SW consult for HD unit placement preferable [facility] or [facility] or [facility]  -Last US 9/18 showed renal findings consistent with CKD and US 5/3/19 same  -Last documented prot/creat 1.7 grams now 4.5 grams  -This likely due to DM nephropathy but have ordered full serology to r/o other  -Most serology pending still but C3C4 normal  -Hep Panel sent and remains pending  -Continue Sodium bicarb for now given her acidosis but will stop once RRT initiated  -Per record she is ACE/ARB intolerant because of hyperkalemia, but NO ALLERGY.  I have corrected EMR     Anemia of chronic blood loss (vaginal bleeding) and likely of CKD  -Hgb 7.1 and very iron deficient  - B12 and folate studies pending  - Start Procrit and IV iron load 5 doses     HTN  -Continue current meds for now  -I increased her Nifedipine 4/3/19 and BP's better  -Will be able to initiate ACE or ARB once she starts HD     Vit D Defic/Secondary HPT/Hyperphos  -iPTH 837 start Calcitriol 0.25 mcg daily  -Restarted her home dose of Vit D3  -She's refusing labs now but still need phos and Vit D levels     IDDM  -Diagnosed age 15 but not told if Type 1 or 2  -HgA1c last check was 6.2  -On NPH 15 units q am  -Needs to be on ACE or ARB and Statin   -Check a C-Peptide next routine blood draw  -If she's clearly Type 1 may be candidate for Pancrease/Kidney transplant    Hyperuricemia  -Level 8.3  -No history of gout and uric acid levels will normalize upon initiation of RRT    Abnormal Uterine bleeding and gallactorrea  -MRI pending  -Has high prolactin level so ruling out pit lesion  -Defer to Primary  -Defer uterine bleeding to GYN    Disposition:  Await rest of serology, consult SW for HD unit placement hopefully at [facility]. Dr. Blevins hopefully places [facility] on Monday morning, initiate HD after line placed and discharge once outpatient arrangements in place.

## 2019-05-04 NOTE — NURSING
Pt. Was admitted and admission assesment was completed. All questions was answered no further concerns from patient.

## 2019-05-04 NOTE — ASSESSMENT & PLAN NOTE
Worsening creatinine with h/o ckd with proteinuria, likely advancement of ckd  IVF DCD  Nephrology recs appreciated  Plan to start HD ON Monday

## 2019-05-04 NOTE — HOSPITAL COURSE
Patient seen by nephrology with likely worsening kidney disease, creatinine in 08/18 was around 4.Renal us showed medical renal disease, Simple appearing right-sided renal cysts and difficult visualization of prior left-sided renal cysts.Complement levels were normal, urine protein creatinine ratio 4.45.Initially planned  to have catheter placed and start HD on Monday.But on Sunday , patient changed her mind.She did not want to start HD.She felt she did not feel bad and was urinating and wants a second opinion.She agreed to follow with nephrology outpatient and discuss about it.She was discharged with outpatient referral for  nephrology and endocrinology.

## 2019-05-04 NOTE — SUBJECTIVE & OBJECTIVE
Interval History: urinating well, ivf dcd, no acute issues, tearful with plan for HD.    Review of Systems   Constitutional: Negative for chills and fever.   HENT: Negative for trouble swallowing.    Eyes: Positive for visual disturbance.   Respiratory: Negative for cough and shortness of breath.    Cardiovascular: Negative for chest pain and leg swelling.   Gastrointestinal: Negative for abdominal pain, blood in stool, constipation, diarrhea, nausea and vomiting.   Endocrine:        Galactorrhea   Genitourinary: Negative for dysuria and hematuria.   Musculoskeletal: Negative for gait problem.   Skin: Negative for rash.   Neurological: Positive for headaches. Negative for numbness.   Psychiatric/Behavioral: Negative for confusion.     Objective:     Vital Signs (Most Recent):  Temp: 96.1 °F (35.6 °C) (05/04/19 0354)  Pulse: 95 (05/04/19 0800)  Resp: 20 (05/04/19 0354)  BP: 131/65 (05/04/19 0907)  SpO2: 99 % (05/04/19 0354) Vital Signs (24h Range):  Temp:  [96.1 °F (35.6 °C)-99 °F (37.2 °C)] 96.1 °F (35.6 °C)  Pulse:  [] 95  Resp:  [16-20] 20  SpO2:  [95 %-100 %] 99 %  BP: (131-194)/() 131/65     Weight: 114 kg (251 lb 5.2 oz)  Body mass index is 38.21 kg/m².  No intake or output data in the 24 hours ending 05/04/19 1119   Physical Exam   Constitutional: She is oriented to person, place, and time. No distress.   OBESE   HENT:   Head: Normocephalic and atraumatic.   Eyes: Pupils are equal, round, and reactive to light. EOM are normal.   Neck: Normal range of motion. Neck supple.   Cardiovascular: Normal rate and regular rhythm.   Pulmonary/Chest: Effort normal and breath sounds normal. No respiratory distress.   Abdominal: Soft. Bowel sounds are normal. She exhibits no distension. There is no tenderness.   Musculoskeletal: Normal range of motion. She exhibits no edema.   Neurological: She is alert and oriented to person, place, and time. No cranial nerve deficit.   Skin: Skin is warm.   Psychiatric: She  has a normal mood and affect.   Vitals reviewed.      Significant Labs:   BMP:   Recent Labs   Lab 05/04/19  0957   *      K 4.7      CO2 19*   BUN 64*   CREATININE 6.4*   CALCIUM 9.0   MG 1.4*     CBC:   Recent Labs   Lab 05/03/19  1432 05/04/19  0957   WBC 7.27 7.09   HGB 7.1* 7.4*   HCT 22.1* 23.1*    196       Significant Imaging: I have reviewed all pertinent imaging results/findings within the past 24 hours.

## 2019-05-04 NOTE — ASSESSMENT & PLAN NOTE
No active bleeding  On daily iron at home  Iron profile consistent with iron deficiency  Started on iv iron daily and epogen  Monitor labs

## 2019-05-04 NOTE — NURSING
Pt needed no coverage. Ran between 100-118 entire day. No complaints. DR. Carlee Rivera spoke with patient today and explained care and procedures. Safety measures intact

## 2019-05-04 NOTE — PROGRESS NOTES
[facility] Medicine  Progress Note    Patient Name: Xuan Ricardo  MRN: [id number]  Patient Class: IP- Inpatient   Admission Date: 5/3/2019  Length of Stay: 1 days  Attending Physician: Maria Elena Peterson MD  Primary Care Provider: Ramsey Segovia Jr, MD        Subjective:     Principal Problem:Acute renal failure superimposed on chronic kidney disease    HPI:  38 year old female with past medical history of HTN, Type 2 Dm, on insulin daily, ckd was called in due to abnormal labs.Patient used to follow a physician at [facility] and her last labs were in 10/18 according to her.She is in process to change physicians due to insurance.She was seen by ob gyn for regular appointment and labs showed bun/creatinine of 74/7.0.She has been urinating well, no shortness of breath, chest pain, bleeding, fatigue.She does c/o lactation since her last child who is 5 years old now and she stopped nursing when she was 2 months old.Recently more lactation than before , associated with breast tenderness.Has irregular menstual period without excessive bleeding.Does say her sugars and bp at home are in good range and bp elevated when she comes to hospital.Her last creatinine in 2017 was 2.4.She used to follow nephrologist at [facility] in 2017.    Hospital Course:  Patient seen by nephrology with likely worsening kidney disease, creatinine in 08/18 was around 4.Renal us showed medical renal disease, Simple appearing right-sided renal cysts and difficult visualization of prior left-sided renal cysts.Complement levels were normal, urine protein creatinine ratio 4.45.Plan to have catheter placed and start HD on Monday.    Interval History: urinating well, ivf dcd, no acute issues, tearful with plan for HD.    Review of Systems   Constitutional: Negative for chills and fever.   HENT: Negative for trouble swallowing.    Eyes: Positive for visual disturbance.   Respiratory: Negative for cough and shortness of breath.     Cardiovascular: Negative for chest pain and leg swelling.   Gastrointestinal: Negative for abdominal pain, blood in stool, constipation, diarrhea, nausea and vomiting.   Endocrine:        Galactorrhea   Genitourinary: Negative for dysuria and hematuria.   Musculoskeletal: Negative for gait problem.   Skin: Negative for rash.   Neurological: Positive for headaches. Negative for numbness.   Psychiatric/Behavioral: Negative for confusion.     Objective:     Vital Signs (Most Recent):  Temp: 96.1 °F (35.6 °C) (05/04/19 0354)  Pulse: 95 (05/04/19 0800)  Resp: 20 (05/04/19 0354)  BP: 131/65 (05/04/19 0907)  SpO2: 99 % (05/04/19 0354) Vital Signs (24h Range):  Temp:  [96.1 °F (35.6 °C)-99 °F (37.2 °C)] 96.1 °F (35.6 °C)  Pulse:  [] 95  Resp:  [16-20] 20  SpO2:  [95 %-100 %] 99 %  BP: (131-194)/() 131/65     Weight: 114 kg (251 lb 5.2 oz)  Body mass index is 38.21 kg/m².  No intake or output data in the 24 hours ending 05/04/19 1119   Physical Exam   Constitutional: She is oriented to person, place, and time. No distress.   OBESE   HENT:   Head: Normocephalic and atraumatic.   Eyes: Pupils are equal, round, and reactive to light. EOM are normal.   Neck: Normal range of motion. Neck supple.   Cardiovascular: Normal rate and regular rhythm.   Pulmonary/Chest: Effort normal and breath sounds normal. No respiratory distress.   Abdominal: Soft. Bowel sounds are normal. She exhibits no distension. There is no tenderness.   Musculoskeletal: Normal range of motion. She exhibits no edema.   Neurological: She is alert and oriented to person, place, and time. No cranial nerve deficit.   Skin: Skin is warm.   Psychiatric: She has a normal mood and affect.   Vitals reviewed.      Significant Labs:   BMP:   Recent Labs   Lab 05/04/19  0957   *      K 4.7      CO2 19*   BUN 64*   CREATININE 6.4*   CALCIUM 9.0   MG 1.4*     CBC:   Recent Labs   Lab 05/03/19  1432 05/04/19  0957   WBC 7.27 7.09   HGB 7.1*  7.4*   HCT 22.1* 23.1*    196       Significant Imaging: I have reviewed all pertinent imaging results/findings within the past 24 hours.    Assessment/Plan:      * Acute renal failure superimposed on chronic kidney disease  Worsening creatinine with h/o ckd with proteinuria, likely advancement of ckd  IVF DCD  Nephrology recs appreciated  Plan to start HD ON Monday       Hypomagnesemia  Replace mag oxide bid      Obesity  Advised to loose weight , diet and exercise      Insulin dependent diabetes mellitus with renal manifestation  Follow hba1c  Restarted nph 15 units daily, iss  Follow c peptide, lipid panel      Vitamin D deficiency  Started on vitamin d replacement      Hyperphosphatemia  Started renvela      Secondary hyperparathyroidism of renal origin  Started on calcitriol      Increased prolactin level  High prolactin level with symptoms of galactorrhea  Follow  pitutary mri without contrast to r/o any pitutary lesion with h/o blurred vision  Could be high secondary to ckd also   tsh mildly elevated, t4 normal   Will need further hormonal work up depending on mri results and endocrine follow up      Bacterial vaginosis  From recent on gyn visit  Metronidazole vaginal cream at bedtime for 7 days      Metabolic acidosis  Likely due to ckd  Started sodium bicarb po bid      Nephrotic range proteinuria  Secondary to advancing ckd  Plan to start ace/arb after starting hd      Hypertensive CKD (chronic kidney disease)  High on admit  S/p hydralazine  Restarted home nifedipine , better this am      Anemia of renal disease  No active bleeding  On daily iron at home  Iron profile consistent with iron deficiency  Started on iv iron daily and epogen  Monitor labs        VTE Risk Mitigation (From admission, onward)        Ordered     IP VTE HIGH RISK PATIENT  Once      05/04/19 0515     Place sequential compression device  Until discontinued      05/04/19 0515              Maria Elena Peterson MD  Department of  [facility] Medicine   [facility]

## 2019-05-04 NOTE — ASSESSMENT & PLAN NOTE
High prolactin level with symptoms of galactorrhea  Follow  pitutary mri without contrast to r/o any pitutary lesion with h/o blurred vision  Could be high secondary to ckd also   tsh mildly elevated, t4 normal   Will need further hormonal work up depending on mri results and endocrine follow up

## 2019-05-05 VITALS
DIASTOLIC BLOOD PRESSURE: 89 MMHG | TEMPERATURE: 98 F | HEIGHT: 68 IN | OXYGEN SATURATION: 100 % | RESPIRATION RATE: 18 BRPM | HEART RATE: 90 BPM | BODY MASS INDEX: 38.09 KG/M2 | WEIGHT: 251.31 LBS | SYSTOLIC BLOOD PRESSURE: 169 MMHG

## 2019-05-05 LAB
ALBUMIN SERPL BCP-MCNC: 2.8 G/DL (ref 3.5–5.2)
ALP SERPL-CCNC: 81 U/L (ref 55–135)
ALT SERPL W/O P-5'-P-CCNC: 9 U/L (ref 10–44)
ANION GAP SERPL CALC-SCNC: 10 MMOL/L (ref 8–16)
AST SERPL-CCNC: 12 U/L (ref 10–40)
BILIRUB SERPL-MCNC: 0.2 MG/DL (ref 0.1–1)
BUN SERPL-MCNC: 63 MG/DL (ref 6–20)
C PEPTIDE SERPL-MCNC: 3.4 NG/ML (ref 0.78–5.19)
CALCIUM SERPL-MCNC: 8.7 MG/DL (ref 8.7–10.5)
CHLORIDE SERPL-SCNC: 108 MMOL/L (ref 95–110)
CHOLEST SERPL-MCNC: 142 MG/DL (ref 120–199)
CHOLEST/HDLC SERPL: 3.4 {RATIO} (ref 2–5)
CO2 SERPL-SCNC: 20 MMOL/L (ref 23–29)
CREAT SERPL-MCNC: 6.6 MG/DL (ref 0.5–1.4)
EST. GFR  (AFRICAN AMERICAN): 8 ML/MIN/1.73 M^2
EST. GFR  (NON AFRICAN AMERICAN): 7 ML/MIN/1.73 M^2
GLUCOSE SERPL-MCNC: 174 MG/DL (ref 70–110)
HDLC SERPL-MCNC: 42 MG/DL (ref 40–75)
HDLC SERPL: 29.6 % (ref 20–50)
INR PPP: 0.9 (ref 0.8–1.2)
LDLC SERPL CALC-MCNC: 81.8 MG/DL (ref 63–159)
NONHDLC SERPL-MCNC: 100 MG/DL
POCT GLUCOSE: 126 MG/DL (ref 70–110)
POCT GLUCOSE: 171 MG/DL (ref 70–110)
POTASSIUM SERPL-SCNC: 4.4 MMOL/L (ref 3.5–5.1)
PROT 24H UR-MRATE: 5440 MG/SPEC (ref 0–100)
PROT SERPL-MCNC: 6.3 G/DL (ref 6–8.4)
PROT UR-MCNC: 170 MG/DL (ref 0–15)
PROTHROMBIN TIME: 9.8 SEC (ref 9–12.5)
SODIUM SERPL-SCNC: 138 MMOL/L (ref 136–145)
TRIGL SERPL-MCNC: 91 MG/DL (ref 30–150)
URINE COLLECTION DURATION: 24 HR
URINE VOLUME: 3200 ML

## 2019-05-05 PROCEDURE — 85610 PROTHROMBIN TIME: CPT

## 2019-05-05 PROCEDURE — 99239 HOSP IP/OBS DSCHRG MGMT >30: CPT | Mod: ,,, | Performed by: INTERNAL MEDICINE

## 2019-05-05 PROCEDURE — 84681 ASSAY OF C-PEPTIDE: CPT

## 2019-05-05 PROCEDURE — 63600175 PHARM REV CODE 636 W HCPCS: Performed by: INTERNAL MEDICINE

## 2019-05-05 PROCEDURE — 80061 LIPID PANEL: CPT

## 2019-05-05 PROCEDURE — 25000003 PHARM REV CODE 250: Performed by: INTERNAL MEDICINE

## 2019-05-05 PROCEDURE — 80053 COMPREHEN METABOLIC PANEL: CPT

## 2019-05-05 PROCEDURE — 36415 COLL VENOUS BLD VENIPUNCTURE: CPT

## 2019-05-05 PROCEDURE — 99239 PR HOSPITAL DISCHARGE DAY,>30 MIN: ICD-10-PCS | Mod: ,,, | Performed by: INTERNAL MEDICINE

## 2019-05-05 RX ORDER — SODIUM BICARBONATE 650 MG/1
650 TABLET ORAL 2 TIMES DAILY
Qty: 60 TABLET | Refills: 11 | COMMUNITY
Start: 2019-05-05 | End: 2021-12-11

## 2019-05-05 RX ORDER — CALCITRIOL 0.25 UG/1
0.25 CAPSULE ORAL DAILY
Qty: 30 CAPSULE | Refills: 1 | Status: SHIPPED | OUTPATIENT
Start: 2019-05-06 | End: 2019-05-05 | Stop reason: SDUPTHER

## 2019-05-05 RX ORDER — NIFEDIPINE 90 MG/1
90 TABLET, EXTENDED RELEASE ORAL DAILY
Qty: 30 TABLET | Refills: 1 | Status: SHIPPED | OUTPATIENT
Start: 2019-05-06 | End: 2019-05-05 | Stop reason: SDUPTHER

## 2019-05-05 RX ORDER — LANOLIN ALCOHOL/MO/W.PET/CERES
400 CREAM (GRAM) TOPICAL 2 TIMES DAILY
Refills: 0 | COMMUNITY
Start: 2019-05-05 | End: 2019-05-09

## 2019-05-05 RX ORDER — NIFEDIPINE 90 MG/1
TABLET, EXTENDED RELEASE ORAL
Qty: 90 TABLET | Refills: 1 | Status: SHIPPED | OUTPATIENT
Start: 2019-05-05 | End: 2021-12-11 | Stop reason: CLARIF

## 2019-05-05 RX ORDER — CALCITRIOL 0.25 UG/1
CAPSULE ORAL
Qty: 90 CAPSULE | Refills: 1 | Status: SHIPPED | OUTPATIENT
Start: 2019-05-05 | End: 2020-05-28

## 2019-05-05 RX ORDER — CHOLECALCIFEROL (VITAMIN D3) 25 MCG
2000 TABLET ORAL DAILY
Status: DISCONTINUED | OUTPATIENT
Start: 2019-05-06 | End: 2019-05-05 | Stop reason: HOSPADM

## 2019-05-05 RX ORDER — FERROUS SULFATE 325(65) MG
325 TABLET, DELAYED RELEASE (ENTERIC COATED) ORAL DAILY
Refills: 0 | COMMUNITY
Start: 2019-05-06 | End: 2020-02-06 | Stop reason: ALTCHOICE

## 2019-05-05 RX ORDER — CHOLECALCIFEROL (VITAMIN D3) 50 MCG
2000 TABLET ORAL DAILY
COMMUNITY
Start: 2019-05-06 | End: 2019-05-15

## 2019-05-05 RX ORDER — ATORVASTATIN CALCIUM 10 MG/1
10 TABLET, FILM COATED ORAL DAILY
Qty: 90 TABLET | Refills: 0 | Status: SHIPPED | OUTPATIENT
Start: 2019-05-05 | End: 2020-05-04 | Stop reason: CLARIF

## 2019-05-05 RX ADMIN — HUMAN INSULIN 15 UNITS: 100 INJECTION, SUSPENSION SUBCUTANEOUS at 09:05

## 2019-05-05 RX ADMIN — VITAMIN D, TAB 1000IU (100/BT) 1000 UNITS: 25 TAB at 09:05

## 2019-05-05 RX ADMIN — IRON SUCROSE 100 MG: 20 INJECTION, SOLUTION INTRAVENOUS at 11:05

## 2019-05-05 RX ADMIN — NIFEDIPINE 90 MG: 30 TABLET, FILM COATED, EXTENDED RELEASE ORAL at 09:05

## 2019-05-05 RX ADMIN — SEVELAMER CARBONATE 800 MG: 800 TABLET, FILM COATED ORAL at 09:05

## 2019-05-05 RX ADMIN — Medication 400 MG: at 11:05

## 2019-05-05 RX ADMIN — CALCITRIOL 0.25 MCG: 0.25 CAPSULE ORAL at 09:05

## 2019-05-05 RX ADMIN — FERROUS SULFATE TAB EC 325 MG (65 MG FE EQUIVALENT) 325 MG: 325 (65 FE) TABLET DELAYED RESPONSE at 09:05

## 2019-05-05 RX ADMIN — SODIUM BICARBONATE 650 MG TABLET 650 MG: at 09:05

## 2019-05-05 NOTE — ASSESSMENT & PLAN NOTE
Worsening creatinine with h/o ckd with proteinuria, likely advancement of ckd  IVF DCD  Nephrology recs appreciated  Patient denied HD and plans to see nephrology outpatient.

## 2019-05-05 NOTE — PROGRESS NOTES
"Renal Progress Note    Admit Date: 5/3/2019   LOS: 2 days     SUBJECTIVE:          Patient complaining about multiple blood draws.  Explained rationale.  Uneventful night.  Discussed impending need for HD or PD given her ESRD.  She has two young children that she insists she must be discharged to care for by Monday afternoon.  Yesterday, I assured her I would do my best to have all arrangements for treatment done by then.  By this morning she became quite adamant about getting a "second opinion" about the state of her kidneys and is refusing access placement and outpatient HD.       I spoke with her for 30 mins with the  and explained that she has been heading toward dialysis for many years.  She was evaluated by Dr. Vyas, Dr. Saucedo, and myself; we all have documented advanced diabetic nephropathy and now nephrotic-range proteinuria.  I told her I was fine with her obtaining a fourth opinion, but I am VERY concerned given her advanced CKD5 (GFR 8), elevated BUN (63), severe anemia, and the high risk of developing life-threatening electrolyte abnormalities which could stop her heart.  Still, she is refusing further plan to place Oscar tomorrow and initiate HD.  I told her he could have the Oscar placed and the SW could arrange HD placement at [facility] or [facility] or [facility] tomorrow.  We also talked about the fact she would be a great candidate for PD and a stat referral [place] [facility]      Scheduled Meds:   calcitRIOL  0.25 mcg Oral Daily    ferrous sulfate  325 mg Oral Daily    insulin NPH  15 Units Subcutaneous Before breakfast    iron sucrose  100 mg Intravenous Daily    magnesium oxide  400 mg Oral BID    metroNIDAZOLE  1 applicator Vaginal QHS    NIFEdipine  90 mg Oral Daily    sevelamer carbonate  800 mg Oral TID WM    sodium bicarbonate  650 mg Oral BID    vitamin D  1,000 Units Oral Daily       OBJECTIVE:     Vital Signs Range (Last 24H):  Temp:  " [97.9 °F (36.6 °C)-98.3 °F (36.8 °C)]   Pulse:  []   Resp:  [18-20]   BP: (141-162)/(75-90)   SpO2:  [96 %-100 %]     I & O (Last 24H):    Intake/Output Summary (Last 24 hours) at 5/5/2019 1134  Last data filed at 5/5/2019 0500  Gross per 24 hour   Intake --   Output 150 ml   Net -150 ml       Physical Exam:  GEN:  Alert and Oriented x4, NAD, appropriate affect, obese  HEENT:  NCAT, MAVIS, No conjunctivitis, normal sclera, O/P clear, no oropharyngial lesions, no thrush, neck supple, No LAD, Nml JVD, no carotid bruits +conj pallor  CV:  RRR no MRG  Lungs:  CTAb, no rhonchi, wheeze, crackles, normal excursion  Abdomen: Obese, soft, NTND, no fluid wave, no HSM, NABS  Ext:  No CCE, normal DP pulses bilat  Neuro:  Non-Focal, EOMI, gait not assessed  Skin:  No rash, excoriation, petchiae      Laboratory:  CBC:   Recent Labs   Lab 05/04/19  0957   WBC 7.09   RBC 2.82*   HGB 7.4*   HCT 23.1*      MCV 82   MCH 26.2*   MCHC 32.0     BMP:   Recent Labs   Lab 05/04/19  0957 05/05/19  0020   * 174*    138   K 4.7 4.4    108   CO2 19* 20*   BUN 64* 63*   CREATININE 6.4* 6.6*   CALCIUM 9.0 8.7   MG 1.4*  --        ASSESSMENT/PLAN:     CKD 5 now with ESRD/GFR 8, Nephrotic-Range proteinuria  -Based on history sushe has ESRD and HD should be initiated  -Have discussed PD as option, and certainly referral to abdominal transplant [place] [facility] can be initiated   -Needs Oscar catheter and I wanted initiate HD Monday after placement o  -SW consulted for HD unit placement preferably [facility]  -Documentation and labs, Hep panel will be faxed to [facility] tomorrow  -Last US 9/18 showed renal findings consistent with CKD and US 5/3/19 same  -Last documented prot/creat 1.7 grams now 4.5 grams  -This likely due to DM nephropathy but have ordered full serology to r/o other  -Most serology pending still but C3C4 normal  -Hep Panel sent and remains pending  -Per record she is ACE/ARB intolerant because of hyperkalemia, but NO  ALLERGY.  I have corrected EMR  -She was on Valsartan for some time but self-DC'd this because of the recall  -I told her NOT to restart an ACE or ARB until after she starts HD  -She agrees to call my office tomorrow morning (number given) to make appointment to see me or Dr. Saucedo since I am out of the office this coming week.     Anemia of chronic blood loss (vaginal bleeding) and likely of CKD  -Hgb 7.1 and very iron deficient  - B12 and folate normal  -Gave Procrit 20,000 5/4/19 and started IV iron load 5 doses  -She has only received 2 doses of IV iron given she plans to leave hospital today despite our best efforts to keep her here.     HTN  -Continue current meds for now  -I increased her Nifedipine to 90 mg daily 4/3/19 and BP's better  -Will be able to initiate ACE or ARB once she starts HD     Vit D Defic/Secondary HPT  -iPTH 837 started Calcitriol 0.25 mcg daily  -Restarted her home dose of Vit D3 as level is 16  -Phos 4.4 no need for binder yet     IDDM  -Diagnosed age 15 but not told if Type 1 or 2  -HgA1c last check was 6.2  -On NPH 15 units q am  -Needs to be on ACE or ARB (after RRT started)  -C-Peptide drawn this morning and is pending  -Have also drawn Lipid panel which is pending to determine dose of statin needed  -If she's clearly Type 1 may be candidate for Pancrease/Kidney transplant    Hyperuricemia  -Level 8.3  -No history of gout and uric acid levels will normalize upon initiation of RRT    Abnormal Uterine bleeding and gallactorrea  -MRI pending  -Has high prolactin level so ruling out pit lesion  -Defer to Primary  -Defer uterine bleeding to GYN    Disposition:  -See discussion above in HPI  -I will discuss case with Dr. Saucedo

## 2019-05-05 NOTE — ASSESSMENT & PLAN NOTE
Lab Results   Component Value Date    HGBA1C 5.8 (H) 05/04/2019       Restarted nph 15 units daily, iss  Follow c peptide  dcd on statin

## 2019-05-05 NOTE — NURSING
IV removed, discharge instructions explained and emphasized importance of making appointment tomorrow on 5/6/2019

## 2019-05-05 NOTE — PROGRESS NOTES
Renal Progress Note    Admit Date: 5/3/2019   LOS: 2 days     SUBJECTIVE:     Discussed impending need for HD or PD given her ESRD.  She has two young children that she insists she must be discharged to care for by Monday afternoon.  I assured her I would do my best to have all arrangements for treatment done by then.     Scheduled Meds:   calcitRIOL  0.25 mcg Oral Daily    ferrous sulfate  325 mg Oral Daily    insulin NPH  15 Units Subcutaneous Before breakfast    iron sucrose  100 mg Intravenous Daily    magnesium oxide  400 mg Oral BID    metroNIDAZOLE  1 applicator Vaginal QHS    NIFEdipine  90 mg Oral Daily    sevelamer carbonate  800 mg Oral TID WM    sodium bicarbonate  650 mg Oral BID    vitamin D  1,000 Units Oral Daily       OBJECTIVE:     Vital Signs Range (Last 24H):  Temp:  [97.9 °F (36.6 °C)-98.3 °F (36.8 °C)]   Pulse:  []   Resp:  [18-20]   BP: (141-162)/(75-90)   SpO2:  [96 %-100 %]     I & O (Last 24H):    Intake/Output Summary (Last 24 hours) at 5/5/2019 1116  Last data filed at 5/5/2019 0500  Gross per 24 hour   Intake --   Output 150 ml   Net -150 ml       Physical Exam:  GEN:  Alert and Oriented x4, NAD, appropriate affect, obese  HEENT:  NCAT, MAVIS, No conjunctivitis, normal sclera, O/P clear, no oropharyngial lesions, no thrush, neck supple, No LAD, Nml JVD, no carotid bruits +conj pallor  CV:  RRR no MRG  Lungs:  CTAb, no rhonchi, wheeze, crackles, normal excursion  Abdomen: Obese, soft, NTND, no fluid wave, no HSM, NABS  Ext:  No CCE, normal DP pulses bilat  Neuro:  Non-Focal, EOMI, gait not assessed  Skin:  No rash, excoriation, petchiae      Laboratory:  CBC:   Recent Labs   Lab 05/04/19  0957   WBC 7.09   RBC 2.82*   HGB 7.4*   HCT 23.1*      MCV 82   MCH 26.2*   MCHC 32.0     BMP:   Recent Labs   Lab 05/04/19  0957 05/05/19  0020   * 174*    138   K 4.7 4.4    108   CO2 19* 20*   BUN 64* 63*   CREATININE 6.4* 6.6*   CALCIUM 9.0 8.7   MG 1.4*  --         ASSESSMENT/PLAN:     BRITNEY on CKD 4/5 versus ESRD  -Based on history suspect she has ESRD and HD should be initiated  -Have discussed PD as option, and certainly referral to abdominal transplant to [facility] can be initiated   -Needs Oscar catheter and initiate HD Monday after placement (Cannot initiate today given water issues)  - consult for HD unit placement preferable [facility] or [facility] or [facility]  -Last US 9/18 showed renal findings consistent with CKD and US 5/3/19 same  -Last documented prot/creat 1.7 grams now 4.5 grams  -This likely due to DM nephropathy but have ordered full serology to r/o other  -Most serology pending still but C3C4 normal  -Hep Panel sent and remains pending  -Continue Sodium bicarb for now given her acidosis but will stop once RRT initiated  -Per record she is ACE/ARB intolerant because of hyperkalemia, but NO ALLERGY.  I have corrected EMR     Anemia of chronic blood loss (vaginal bleeding) and likely of CKD  -Hgb 7.1 and very iron deficient  - B12 and folate studies pending  - Start Procrit and IV iron load 5 doses     HTN  -Continue current meds for now  -I increased her Nifedipine 4/3/19 and BP's better  -Will be able to initiate ACE or ARB once she starts HD     Vit D Defic/Secondary HPT/Hyperphos  -iPTH 837 start Calcitriol 0.25 mcg daily  -Restarted her home dose of Vit D3  -She's refusing labs now but still need phos and Vit D levels     IDDM  -Diagnosed age 15 but not told if Type 1 or 2  -HgA1c last check was 6.2  -On NPH 15 units q am  -Needs to be on ACE or ARB and Statin   -Check a C-Peptide next routine blood draw  -If she's clearly Type 1 may be candidate for Pancrease/Kidney transplant    Hyperuricemia  -Level 8.3  -No history of gout and uric acid levels will normalize upon initiation of RRT    Abnormal Uterine bleeding and gallactorrea  -MRI pending  -Has high prolactin level so ruling out pit lesion  -Defer to Primary  -Defer uterine bleeding to  GYN    Disposition:  Await rest of serology, consult SW for HD unit placement hopefully at [facility]. Dr. Blevins hopefully places Oscar cath on Monday morning, initiate HD after line placed and discharge once outpatient arrangements in place.

## 2019-05-05 NOTE — PLAN OF CARE
" DC PLANNING :  CONSULT [facility] HDIAL[facility] - [facility] HD PACKET RFAXED -      PT UNSURE IF SHE WANTS TO GO THROUGH WITH HDIALYSIS  -  REQUESTED INFO FOR "SECOND  OPINION RENAL MD @ [facility]" -- SHE WANTED HER HDIALYSIS  PACKET SENT  TO [facility], WHILE SHE WAS FOR APPT FOR SECOND OPINION . SHE STATED "IF THE SECOND FOR  RENAL MD TEAM... STATES SHE NEEDS HDIALYSIS SHE WILL CONTINUE WITH HER APPT WITH [facility] HD[facility] --  HOWEVER SHE  INSISTED SHE   WANTED TO SEE SECOND RENAL MD @ [facility]  REGARDING  HER BEING A NEW HD PATIENT.       CURRENTLY @ [facility] - WANTS SOMETHING CLOSER TO HER HOME  - [facility] PCP @ [facility] WILL BE ON AVS SCHEDULING OFFICE CLOSED ON WEEKEND - PT WILL NEED TO CALL ON MONDAY IS  SHE IS AWARE      PT ALSO WANTS SECOND OPINION [facility] NEPHROLOGY MD  APPT - INFO WILL BE ON HER AVS  FOR HER TO CALL ON MONDAY - SCHEDULING OFFICE CLOSED ON WEEKEND            05/05/19 1306   Final Note   Assessment Type Final Discharge Note   Anticipated Discharge Disposition Home   Hospital Follow Up  Appt(s) scheduled? Yes   Discharge plans and expectations educations in teach back method with documentation complete? Yes   Right Care Referral Info   Post Acute Recommendation No Care     "

## 2019-05-05 NOTE — NURSING
Pt in bed. No significant events this shift. No pain or acute distress noted. Call light in reach, bed in lowest position and locked. Toileting offered. Safety rounds completed. Will continue to monitor.

## 2019-05-05 NOTE — NURSING
Pt states that she does not wish to have the tom cath placed nor does she wish to participate in HD.

## 2019-05-05 NOTE — DISCHARGE SUMMARY
[facility] Hospital Medicine  Discharge Summary      Patient Name: Xuan Ricardo  MRN: [id number]  Admission Date: 5/3/2019  Hospital Length of Stay: 2 days  Discharge Date and Time: 5/5/2019  3:01 PM  Attending Physician: No att. providers found   Discharging Provider: Maria Elena Peterson MD  Primary Care Provider: Ramsey Segovia Jr, MD      HPI:   38 year old female with past medical history of HTN, Type 2 Dm, on insulin daily, ckd was called in due to abnormal labs.Patient used to follow a physician at [facility] and her last labs were in 10/18 according to her.She is in process to change physicians due to insurance.She was seen by ob gyn for regular appointment and labs showed bun/creatinine of 74/7.0.She has been urinating well, no shortness of breath, chest pain, bleeding, fatigue.She does c/o lactation since her last child who is 5 years old now and she stopped nursing when she was 2 months old.Recently more lactation than before , associated with breast tenderness.Has irregular menstual period without excessive bleeding.Does say her sugars and bp at home are in good range and bp elevated when she comes to hospital.Her last creatinine in 2017 was 2.4.She used to follow nephrologist at [facility] in 2017.    * No surgery found *      Hospital Course:   Patient seen by nephrology with likely worsening kidney disease, creatinine in 08/18 was around 4.Renal us showed medical renal disease, Simple appearing right-sided renal cysts and difficult visualization of prior left-sided renal cysts.Complement levels were normal, urine protein creatinine ratio 4.45.Initially planned  to have catheter placed and start HD on Monday.But on Sunday , patient changed her mind.She did not want to start HD.She felt she did not feel bad and was urinating and wants a second opinion.She agreed to follow with nephrology outpatient and discuss about it.She was discharged with outpatient referral for  nephrology and endocrinology.      Consults:   Consults (From admission, onward)        Status Ordering Provider     Inpatient consult to General Surgery  Once     Provider:  Yao Blevins Jr., MD    Acknowledged CARLEE WHARTON     Inpatient consult to Nephrology  Once     Provider:  Carlee Wharton MD    Acknowledged VERNON JAMES II     Inpatient consult to Nephrology  Once     Provider:  Carlee Wharton MD    Completed TRACY GERMAN     Inpatient consult to Social Work  Once     Provider:  (Not yet assigned)    Completed CARLEE WHARTON          * Acute renal failure superimposed on chronic kidney disease  Worsening creatinine with h/o ckd with proteinuria, likely advancement of ckd  IVF DCD  Nephrology recs appreciated  Patient denied HD and plans to see nephrology outpatient.      Hypomagnesemia  On replacement  mag oxide bid oral       Obesity  Advised to loose weight , diet and exercise      Insulin dependent diabetes mellitus with renal manifestation  Lab Results   Component Value Date    HGBA1C 5.8 (H) 05/04/2019       Restarted nph 15 units daily, iss  Follow c peptide  dcd on statin      Vitamin D deficiency  Started on vitamin d replacement      Secondary hyperparathyroidism of renal origin  Started on calcitriol      Bacterial vaginosis  From recent on gyn visit  Metronidazole vaginal cream at bedtime for 7 days      Metabolic acidosis  Likely due to ckd  Started sodium bicarb po bid      Nephrotic range proteinuria  Secondary to advancing ckd  Plan to start ace/arb after starting hd      Hypertensive CKD (chronic kidney disease)  High on admit  S/p hydralazine  Restarted home nifedipine , increased to 90 mg on dc      Anemia of renal disease  No active bleeding  On daily iron at home  Iron profile consistent with iron deficiency  Started on iv iron daily and epogen  Continue oral iron on dc      Hyperprolactinoma  With symptom of galactorrhea  Could be secondary to worsening ckd  MRI pitutary without contrast ok was ok  Needs  outpatient endocrine follow up    Final Active Diagnoses:    Diagnosis Date Noted POA    PRINCIPAL PROBLEM:  Acute renal failure superimposed on chronic kidney disease [N17.9, N18.9] 05/03/2019 Yes    Hypomagnesemia [E83.42] 05/04/2019 Yes    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 [E11.22, N18.5]  Yes    Secondary hyperparathyroidism of renal origin [N25.81]  Yes    Iron deficiency anemia [D50.9]  Yes    Anemia of chronic kidney failure, unspecified stage [N18.9, D63.1]  Yes    Vitamin D deficiency [E55.9]  Yes    Galactorrhea [N64.3]  Yes    Cataract [H26.9]  Yes    Obesity [E66.9]  Yes    Hypertension [I10]  Yes    Metabolic acidosis [E87.2] 05/03/2019 Yes    Bacterial vaginosis [N76.0, B96.89] 05/03/2019 Yes    Increased prolactin level [E22.9] 05/03/2019 Yes    Hypertensive CKD (chronic kidney disease) [I12.9] 12/14/2016 Yes    Nephrotic range proteinuria [R80.9] 12/14/2016 Yes    Anemia of renal disease [D63.1] 04/09/2016 Yes     Chronic    Insulin dependent diabetes mellitus with renal manifestation [E11.29, Z79.4] 01/01/1996 Not Applicable    Other specified diabetes mellitus with diabetic nephropathy [E13.21] 01/01/1996 Yes      Problems Resolved During this Admission:    Diagnosis Date Noted Date Resolved POA    Mild protein malnutrition [E44.1] 04/09/2016 05/03/2019 Yes     Chronic       Discharged Condition: fair    Disposition: Home or Self Care    Follow Up:  Follow-up Information     Jerry Saucedo MD In 1 week.    Specialty:  Nephrology  Contact information:  [facility] [facility]  [address]  [facility] NEPHROLOGY  [facility] [address] [phone]             [facility].    Why:  # [phone] - PLEASE CALL FOR NEW PCP ( PRIMARY CARE MD )  AND ALSO FOR NEPHROLOGY( KIDNEY)  MD APPT  ( PER YOUR REQUEST )   Contact information:  [address]  [facility] [address]               Patient Instructions:      Ambulatory referral to Endocrinology   Referral  Priority: Routine Referral Type: Consultation   Requested Specialty: Endocrinology   Number of Visits Requested: 1     Diet diabetic     Diet renal     Activity as tolerated       Significant Diagnostic Studies:   Lab Results   Component Value Date    WBC 7.09 05/04/2019    HGB 7.4 (L) 05/04/2019    HCT 23.1 (L) 05/04/2019    MCV 82 05/04/2019     05/04/2019     BMP  Lab Results   Component Value Date     05/05/2019    K 4.4 05/05/2019     05/05/2019    CO2 20 (L) 05/05/2019    BUN 63 (H) 05/05/2019    CREATININE 6.6 (H) 05/05/2019    CALCIUM 8.7 05/05/2019    ANIONGAP 10 05/05/2019    ESTGFRAFRICA 8 (A) 05/05/2019    EGFRNONAA 7 (A) 05/05/2019     MRI Pituitary Without Contrast  Narrative: EXAMINATION:  MRI PITUITARY WITHOUT CONTRAST    CLINICAL HISTORY:  Infertility, galactorrhea on exam;    TECHNIQUE:  Multiplanar multisequence MRI of the brain was performed without intravenous contrast.  The examination was attempted for pituitary protocol.  However, no contrast was administered given the patient's current low GFR.    COMPARISON:  CT scan of the head dated 04/09/2016.    FINDINGS:  The craniocervical junction is within normal limits.  The midline structures are unremarkable.  Signal within the pituitary gland is within normal limits.  There is no evidence of expansion of the sella.  The infundibulum appears midline.  The optic chiasm is within normal limits.    No diffusion-weighted signal abnormality is present.  There is no focal parenchymal signal abnormality.  The ventricles and sulci are within normal limits.  There are no extra-axial fluid collections.  The hippocampal formations are within normal limits.  There is no evidence of volume loss within the hippocampal formations.  There is no evidence of intracranial hemorrhage.  There is no evidence of mass effect.    The orbits and intraorbital contents are within normal limits.  There is minimal mucosal thickening in the bilateral  maxillary sinuses.  The mastoid air cells are clear.  Impression: Normal signal of the pituitary gland without evidence of expansion of the sella or displacement of the infundibulum.  Follow-up with dedicated MRI of the pituitary gland after the patient's renal function improves is suggested.    Otherwise, unremarkable MRI of the brain.    Electronically signed by: Vincent Loyola MD  Date:    05/04/2019  Time:    15:44        Pending Diagnostic Studies:     Procedure Component Value Units Date/Time    LUIS Screen w/Reflex [[id number]] Collected:  05/03/19 2217    Order Status:  Sent Lab Status:  In process Updated:  05/04/19 0910    Specimen:  Blood     Anti-neutrophilic cytoplasmic antibody [[id number]] Collected:  05/03/19 2217    Order Status:  Sent Lab Status:  In process Updated:  05/04/19 0858    Specimen:  Blood     Anti-neutrophilic cytoplasmic antibody [[id number]] Collected:  05/03/19 2217    Order Status:  Sent Lab Status:  In process Updated:  05/04/19 0858    Specimen:  Blood     Antistreptolysin O titer [[id number]] Collected:  05/03/19 2217    Order Status:  Sent Lab Status:  In process Updated:  05/04/19 0910    Specimen:  Blood     HIV 1/2 Ag/Ab (4th Gen) [[id number]] Collected:  05/03/19 2217    Order Status:  Sent Lab Status:  In process Updated:  05/04/19 0910    Specimen:  Blood     Hepatitis panel, acute [[id number]] Collected:  05/03/19 2217    Order Status:  Sent Lab Status:  In process Updated:  05/04/19 0910    Specimen:  Blood     Protein electrophoresis, timed urine [[id number]] Collected:  05/04/19 2308    Order Status:  Sent Lab Status:  In process Updated:  05/05/19 0829    Specimen:  Urine, Clean Catch     RPR [[id number]] Collected:  05/03/19 2217    Order Status:  Sent Lab Status:  In process Updated:  05/03/19 2231    Specimen:  Blood     US Retroperitoneal Complete (Kidney and [[id number]]     Order Status:  Sent Lab Status:  No result          Medications:  Reconciled Home Medications:       Medication List      START taking these medications    atorvastatin 10 MG tablet  Commonly known as:  LIPITOR  Take 1 tablet (10 mg total) by mouth once daily.     cholecalciferol (vitamin D3) 2,000 unit Tab  Commonly known as:  VITAMIN D3  Take 1 tablet (2,000 Units total) by mouth once daily.  Start taking on:  5/6/2019     ferrous sulfate 325 (65 FE) MG EC tablet  Take 1 tablet (325 mg total) by mouth once daily.  Start taking on:  5/6/2019  Replaces:  ferrous sulfate 325 mg (65 mg iron) Tab tablet     insulin  unit/mL injection  Commonly known as:  NovoLIN N  Inject 15 Units into the skin before breakfast.  Start taking on:  5/6/2019     magnesium oxide 400 mg (241.3 mg magnesium) tablet  Commonly known as:  MAG-OX  Take 1 tablet (400 mg total) by mouth 2 (two) times daily. for 4 days     sodium bicarbonate 650 MG tablet  Take 1 tablet (650 mg total) by mouth 2 (two) times daily.        CONTINUE taking these medications    metroNIDAZOLE 0.75 % vaginal gel  Commonly known as:  METROGEL VAGINAL  Place 1 applicator vaginally once daily. Use at bedtime for 7 days        STOP taking these medications    ferrous sulfate 325 mg (65 mg iron) Tab tablet  Commonly known as:  FEOSOL  Replaced by:  ferrous sulfate 325 (65 FE) MG EC tablet     fluticasone propionate 50 mcg/actuation nasal spray  Commonly known as:  FLONASE        ASK your doctor about these medications    calcitRIOL 0.25 MCG Cap  Commonly known as:  ROCALTROL  TAKE 1 CAPSULE(0.25 MCG) BY MOUTH EVERY DAY     NIFEdipine 90 MG (OSM) 24 hr tablet  Commonly known as:  PROCARDIA-XL  TAKE 1 TABLET(90 MG) BY MOUTH EVERY DAY            Indwelling Lines/Drains at time of discharge:   Lines/Drains/Airways          None          Time spent on the discharge of patient: 60  minutes  Patient was seen and examined on the date of discharge and determined to be suitable for discharge.         Maria Elena Peterson MD  Department of Hospital Medicine  [facility]

## 2019-05-05 NOTE — PLAN OF CARE
05/05/19 1224   Discharge Assessment   Assessment Type Discharge Planning Assessment   Confirmed/corrected address and phone number on facesheet? Yes   Assessment information obtained from? Patient   Prior to hospitilization cognitive status: Alert/Oriented   Prior to hospitalization functional status: Independent   Current cognitive status: Alert/Oriented   Current Functional Status: Independent   Lives With child(rowan), dependent   Is patient able to care for self after discharge? Yes   Patient's perception of discharge disposition admitted as an inpatient   Patient currently being followed by outpatient case management? Yes   Patient currently receives any other outside agency services? Yes   Is it the patient/care giver preference to resume care with the current outside agency? Yes   Equipment Currently Used at Home none   Do you have any problems affording any of your prescribed medications? TBD   Is the patient taking medications as prescribed? yes   Does the patient have transportation home? Yes   Discharge Plan A Home with family   Discharge Plan B Home with family   DME Needed Upon Discharge  none   Patient/Family in Agreement with Plan yes        RNCM met with patient at the bedside.      Patient is alert and oriented with no communication barriers.      Prior to admission patient was independent with ADLs. Patient denies the use of HH or DME .         Patients PCP REQUEST PCP @ [facility] @ [facility] - WANTS SOMETHING CLOSER TO HER HOME  - [facility] @ [facility] WILL BE ON AVS SCHEDULING OFFICE CLOSED ON WEEKEND - PT WILL NEED TO CALL ON MONDAY IS  SHE IS AWARE     PT ALSO WANTS SECOND OPINION [facility] MD  APPT - INFO WILL BE ON HER AVS  FOR HER TO CALL ON MONDAY - SCHEDULING OFFICE CLOSED ON WEEKEND    .   Patient choice pharmacy      Patient denies a history of mental illness.         Patients family will transport him home at discharge.         No CM needs identified at  this time.       CM team will continue to follow.      05/05/19 1224   Discharge Assessment   Assessment Type Discharge Planning Assessment   Confirmed/corrected address and phone number on facesheet? Yes   Assessment information obtained from? Patient   Prior to hospitilization cognitive status: Alert/Oriented   Prior to hospitalization functional status: Independent   Current cognitive status: Alert/Oriented   Current Functional Status: Independent   Lives With child(rowan), dependent   Is patient able to care for self after discharge? Yes   Patient's perception of discharge disposition admitted as an inpatient   Patient currently being followed by outpatient case management? Yes   Patient currently receives any other outside agency services? Yes   Is it the patient/care giver preference to resume care with the current outside agency? Yes   Equipment Currently Used at Home none   Do you have any problems affording any of your prescribed medications? TBD   Is the patient taking medications as prescribed? yes   Does the patient have transportation home? Yes   Discharge Plan A Home with family   Discharge Plan B Home with family   DME Needed Upon Discharge  none   Patient/Family in Agreement with Plan yes

## 2019-05-05 NOTE — PROGRESS NOTES
"History & Physical  Gynecology      SUBJECTIVE:     Chief Complaint: Possible Pregnancy       History of Present Illness:  Ms. Cousin is a 37 y/o female who presents to clinic reporting a possible pregnancy. She reports that she was seen by Dr. Watters with [facility] at which time he did an exam at which time patient reports that Dr. Watters said "Oh My" but never told her what he saw. She reports that her last period was in November and she knows that she is pregnancy although she s/p tubal ligation. She reports that she has called Dr. Watters's office several times for an appointment or ultrasound but with no response. Patient reports that she feels that she is geting the run around from his office.      Patient denies TVUS since her missed period. She reports positive pregnancy test at home. 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     Diabetes mellitus     Insulin Dependent    Galactorrhea     Hyperphosphatemia     Hypertension     Iron deficiency anemia     Obesity     Secondary hyperparathyroidism of renal origin     Vitamin D deficiency      Past Surgical History:   Procedure Laterality Date     SECTION, CLASSIC      TUBAL LIGATION       OB History        2    Para   2    Term   0       2    AB   0    Living   2       SAB        TAB        Ectopic        Multiple        Live Births   2               Family History   Problem Relation Age of Onset    Seizures Mother 64    Heart failure Father 58    Asthma Sister     Breast cancer Neg Hx     Colon cancer Neg Hx     Ovarian cancer Neg Hx      Social History     Tobacco " Use    Smoking status: Former Smoker     Types: Cigarettes    Smokeless tobacco: Never Used   Substance Use Topics    Alcohol use: No    Drug use: Yes     Types: Marijuana     Comment: Occassional Recreational Use only       No current facility-administered medications for this visit.      Current Outpatient Medications   Medication Sig    atorvastatin (LIPITOR) 10 MG tablet Take 1 tablet (10 mg total) by mouth once daily.    [START ON 5/6/2019] calcitRIOL (ROCALTROL) 0.25 MCG Cap Take 1 capsule (0.25 mcg total) by mouth once daily.    [START ON 5/6/2019] ferrous sulfate 325 (65 FE) MG EC tablet Take 1 tablet (325 mg total) by mouth once daily.    [START ON 5/6/2019] insulin NPH (NOVOLIN N) 100 unit/mL injection Inject 15 Units into the skin before breakfast.    magnesium oxide (MAG-OX) 400 mg (241.3 mg magnesium) tablet Take 1 tablet (400 mg total) by mouth 2 (two) times daily. for 4 days    [START ON 5/6/2019] NIFEdipine (PROCARDIA-XL) 90 MG (OSM) 24 hr tablet Take 1 tablet (90 mg total) by mouth once daily.    sodium bicarbonate 650 MG tablet Take 1 tablet (650 mg total) by mouth 2 (two) times daily.    [START ON 5/6/2019] vitamin D (VITAMIN D3) 2,000 unit Tab Take 1 tablet (2,000 Units total) by mouth once daily.     Facility-Administered Medications Ordered in Other Visits   Medication    calcitRIOL capsule 0.25 mcg    dextrose 50% injection 12.5 g    dextrose 50% injection 25 g    ferrous sulfate EC tablet 325 mg    glucagon (human recombinant) injection 1 mg    glucose chewable tablet 16 g    glucose chewable tablet 24 g    insulin aspart U-100 pen 0-5 Units    insulin NPH injection 15 Units    magnesium oxide tablet 400 mg    metroNIDAZOLE 0.75 % vaginal gel 1 applicator    NIFEdipine 24 hr tablet 90 mg    sodium bicarbonate tablet 650 mg    [START ON 5/6/2019] vitamin D 1000 units tablet 2,000 Units         Review of Systems:  Review of Systems   Constitutional: Negative for chills  and fatigue.   Respiratory: Negative for shortness of breath.    Gastrointestinal: Negative for abdominal distention, nausea and vomiting.   Genitourinary: Positive for menstrual problem. Negative for dysuria, vaginal bleeding and vaginal discharge.        OBJECTIVE:     Physical Exam:  Physical Exam   Constitutional: She is oriented to person, place, and time. She appears well-developed and well-nourished.   Cardiovascular: Normal rate.   Pulmonary/Chest: Effort normal.   Abdominal: Soft. She exhibits no distension.   Neurological: She is alert and oriented to person, place, and time.   Skin: Skin is warm and dry.   Psychiatric: She has a normal mood and affect.   Nursing note and vitals reviewed.    ASSESSMENT:       ICD-10-CM ICD-9-CM    1. Possible pregnancy, not yet confirmed Z32.00 V72.40 hCG, quantitative        Plan:      Xuan was seen today for possible pregnancy.    Diagnoses and all orders for this visit:    Possible pregnancy, not yet confirmed  -     hCG, quantitative; Future  - Discussed with patient that UPT in office is negative so will get BHCG to help confirm pregnancy. Will also request records from Dr. Watters to see what has been done and what was found on exam  - Will hold off on TVUS for now until BHCG is resulted   - Patient agrees with plan.        Orders Placed This Encounter   Procedures    hCG, quantitative       Follow up in about 1 month (around 3/13/2019) for s/p BHCG for initial OB if elevated.

## 2019-05-06 LAB
ALBUMIN SERPL ELPH-MCNC: 3.47 G/DL (ref 3.35–5.55)
ALPHA1 GLOB SERPL ELPH-MCNC: 0.33 G/DL (ref 0.17–0.41)
ALPHA2 GLOB SERPL ELPH-MCNC: 0.73 G/DL (ref 0.43–0.99)
ANA SER QL IF: NORMAL
B-GLOBULIN SERPL ELPH-MCNC: 0.8 G/DL (ref 0.5–1.1)
GAMMA GLOB SERPL ELPH-MCNC: 1.17 G/DL (ref 0.67–1.58)
HAV IGM SERPL QL IA: NEGATIVE
HBV CORE IGM SERPL QL IA: NEGATIVE
HBV SURFACE AG SERPL QL IA: NEGATIVE
HCV AB SERPL QL IA: NEGATIVE
HIV 1+2 AB+HIV1 P24 AG SERPL QL IA: NEGATIVE
PROT PATTERN UR ELPH-IMP: NORMAL
PROT SERPL-MCNC: 6.5 G/DL (ref 6–8.4)
RPR SER QL: NORMAL

## 2019-05-07 LAB
ANCA AB TITR SER IF: NORMAL TITER
ANCA AB TITR SER IF: NORMAL TITER
ASO AB SERPL-ACNC: 80 IU/ML
P-ANCA TITR SER IF: NORMAL TITER
P-ANCA TITR SER IF: NORMAL TITER
PATHOLOGIST INTERPRETATION SPE: NORMAL
PATHOLOGIST INTERPRETATION UPE: NORMAL

## 2019-05-15 ENCOUNTER — OFFICE VISIT (OUTPATIENT)
Dept: INTERNAL MEDICINE | Facility: CLINIC | Age: 39
End: 2019-05-15
Payer: MEDICARE

## 2019-05-15 VITALS
DIASTOLIC BLOOD PRESSURE: 62 MMHG | SYSTOLIC BLOOD PRESSURE: 162 MMHG | HEART RATE: 72 BPM | BODY MASS INDEX: 39.33 KG/M2 | HEIGHT: 68 IN | OXYGEN SATURATION: 99 % | WEIGHT: 259.5 LBS

## 2019-05-15 DIAGNOSIS — E11.22 TYPE 2 DIABETES MELLITUS WITH STAGE 5 CHRONIC KIDNEY DISEASE NOT ON CHRONIC DIALYSIS, WITH LONG-TERM CURRENT USE OF INSULIN: Primary | ICD-10-CM

## 2019-05-15 DIAGNOSIS — D63.1 ANEMIA OF RENAL DISEASE: Chronic | ICD-10-CM

## 2019-05-15 DIAGNOSIS — E66.01 CLASS 2 SEVERE OBESITY DUE TO EXCESS CALORIES WITH SERIOUS COMORBIDITY AND BODY MASS INDEX (BMI) OF 39.0 TO 39.9 IN ADULT: ICD-10-CM

## 2019-05-15 DIAGNOSIS — N18.9 ANEMIA OF RENAL DISEASE: Chronic | ICD-10-CM

## 2019-05-15 DIAGNOSIS — Z79.4 TYPE 2 DIABETES MELLITUS WITH STAGE 5 CHRONIC KIDNEY DISEASE NOT ON CHRONIC DIALYSIS, WITH LONG-TERM CURRENT USE OF INSULIN: Primary | ICD-10-CM

## 2019-05-15 DIAGNOSIS — I10 HYPERTENSION, UNSPECIFIED TYPE: ICD-10-CM

## 2019-05-15 DIAGNOSIS — N18.5 CKD (CHRONIC KIDNEY DISEASE) STAGE 5, GFR LESS THAN 15 ML/MIN: ICD-10-CM

## 2019-05-15 DIAGNOSIS — R79.89 INCREASED PROLACTIN LEVEL: ICD-10-CM

## 2019-05-15 DIAGNOSIS — N25.81 SECONDARY HYPERPARATHYROIDISM OF RENAL ORIGIN: ICD-10-CM

## 2019-05-15 DIAGNOSIS — N18.5 TYPE 2 DIABETES MELLITUS WITH STAGE 5 CHRONIC KIDNEY DISEASE NOT ON CHRONIC DIALYSIS, WITH LONG-TERM CURRENT USE OF INSULIN: Primary | ICD-10-CM

## 2019-05-15 PROCEDURE — 3008F BODY MASS INDEX DOCD: CPT | Mod: CPTII,S$GLB,, | Performed by: FAMILY MEDICINE

## 2019-05-15 PROCEDURE — 99205 OFFICE O/P NEW HI 60 MIN: CPT | Mod: S$GLB,,, | Performed by: FAMILY MEDICINE

## 2019-05-15 PROCEDURE — 99205 PR OFFICE/OUTPT VISIT, NEW, LEVL V, 60-74 MIN: ICD-10-PCS | Mod: S$GLB,,, | Performed by: FAMILY MEDICINE

## 2019-05-15 PROCEDURE — 99999 PR PBB SHADOW E&M-EST. PATIENT-LVL III: ICD-10-PCS | Mod: PBBFAC,,, | Performed by: FAMILY MEDICINE

## 2019-05-15 PROCEDURE — 99499 UNLISTED E&M SERVICE: CPT | Mod: S$GLB,,, | Performed by: FAMILY MEDICINE

## 2019-05-15 PROCEDURE — 3077F SYST BP >= 140 MM HG: CPT | Mod: CPTII,S$GLB,, | Performed by: FAMILY MEDICINE

## 2019-05-15 PROCEDURE — 3077F PR MOST RECENT SYSTOLIC BLOOD PRESSURE >= 140 MM HG: ICD-10-PCS | Mod: CPTII,S$GLB,, | Performed by: FAMILY MEDICINE

## 2019-05-15 PROCEDURE — 3078F DIAST BP <80 MM HG: CPT | Mod: CPTII,S$GLB,, | Performed by: FAMILY MEDICINE

## 2019-05-15 PROCEDURE — 99999 PR PBB SHADOW E&M-EST. PATIENT-LVL III: CPT | Mod: PBBFAC,,, | Performed by: FAMILY MEDICINE

## 2019-05-15 PROCEDURE — 3044F HG A1C LEVEL LT 7.0%: CPT | Mod: CPTII,S$GLB,, | Performed by: FAMILY MEDICINE

## 2019-05-15 PROCEDURE — 99499 RISK ADDL DX/OHS AUDIT: ICD-10-PCS | Mod: S$GLB,,, | Performed by: FAMILY MEDICINE

## 2019-05-15 PROCEDURE — 3078F PR MOST RECENT DIASTOLIC BLOOD PRESSURE < 80 MM HG: ICD-10-PCS | Mod: CPTII,S$GLB,, | Performed by: FAMILY MEDICINE

## 2019-05-15 PROCEDURE — 3008F PR BODY MASS INDEX (BMI) DOCUMENTED: ICD-10-PCS | Mod: CPTII,S$GLB,, | Performed by: FAMILY MEDICINE

## 2019-05-15 PROCEDURE — 3044F PR MOST RECENT HEMOGLOBIN A1C LEVEL <7.0%: ICD-10-PCS | Mod: CPTII,S$GLB,, | Performed by: FAMILY MEDICINE

## 2019-05-15 RX ORDER — PEN NEEDLE, DIABETIC 30 GX3/16"
NEEDLE, DISPOSABLE MISCELLANEOUS
Status: ON HOLD | COMMUNITY
Start: 2018-08-28 | End: 2022-01-28 | Stop reason: HOSPADM

## 2019-05-15 RX ORDER — BLOOD-GLUCOSE CONTROL, NORMAL
EACH MISCELLANEOUS
Status: ON HOLD | COMMUNITY
Start: 2018-08-28 | End: 2022-01-28 | Stop reason: HOSPADM

## 2019-05-15 NOTE — LETTER
May 19, 2019      Enid Barba MD  [address]  [address]  [facility] [address]           [facility] [address]  [facility] [address]  Phone: [phone]  Fax: [phone]          Patient: Xuan Ricardo   MR Number: [id number]   YOB: 1980   Date of Visit: 5/15/2019       Dear Dr. Enid Barba:    Thank you for referring Xuan Ricardo to me for evaluation. Attached you will find relevant portions of my assessment and plan of care.    If you have questions, please do not hesitate to call me. I look forward to following Xuan Ricardo along with you.    Sincerely,    Leigh Phan MD    Enclosure  CC:  No Recipients    If you would like to receive this communication electronically, please contact [email or web address] or [phone] to request more information on [facility] access.    For providers and/or their staff who would like to refer a patient to [facility], please contact us through our one-stop-shop provider referral line, [facility], at [phone].    If you feel you have received this communication in error or would no longer like to receive these types of communications, please e-mail [email or web address]

## 2019-05-15 NOTE — PROGRESS NOTES
Subjective:      Patient ID: Xuan Wrightsin is a 38 y.o. female.    Chief Complaint: establish care, Hospital Follow Up    HPI  This patient is new to me.   Xuan Wrightsin is a 38 y.o. year old female with CKD stage 5 (not on dialysis), HTN, DM type 2, vitamin D deficiency with secondary hyperparathyroidism, chronic metabolic acidosis, anemia of chronic disease, hyperuricemia who presents today to establish care and for hospital follow-up.     Patient had abnormal labs done by her OB GYN. She was advised to seek emergency care. Her creatinine was 7 and GFR 8, which was worse than baseline 2.4/28. She was admitted and HD was planned to start. However, patient refused dialysis.     She was found to have elevated prolactin and galactorrhea. MRI brain normal. Thought to be due to renal disease. She does have outpatient endo follow-up.     CKD stage 5 - follows with nephrology (Dr. Saucedo). Refuses dialysis at this time, although it has been recommended. Wants to try herbs such as elderberry to help with her kidney function. Says she is feeling fine.     Lab Results   Component Value Date     05/05/2019    K 4.4 05/05/2019     05/05/2019    CO2 20 (L) 05/05/2019    BUN 63 (H) 05/05/2019    CREATININE 6.6 (H) 05/05/2019    CALCIUM 8.7 05/05/2019    ANIONGAP 10 05/05/2019    ESTGFRAFRICA 8 (A) 05/05/2019    EGFRNONAA 7 (A) 05/05/2019     Vitamin D deficiency with secondary hyperparathyroidism - Vitamin D 16, iPTH 836.  - Tolerating Calcitriol 0.25mcg daily.    Chronic metabolic acidosis - tolerating sodium bicarb.     Anemia of renal disease - compliant with oral iron at home.     DM type 2 with CKD stage 5  Lab Results   Component Value Date    HGBA1C 5.8 (H) 05/04/2019     ~Current medications - NPH 15 units daily  ~Previously tried medications - metformin, Humalin N  ~Blood glucose monitoring: Fasting BG , Had last hypoglycemic episode 4 years ago  ~Diet -   ~Exercise -   ~ Seen diabetes education?  -    --- Complications:  ~Retinopathy - no per patient   Last optho visit - 1 year ago   ~Neuropathy - no  Sees podiatry? -  ~ Nephropathy - yes    BMP  Lab Results   Component Value Date     2019    K 4.4 2019     2019    CO2 20 (L) 2019    BUN 63 (H) 2019    CREATININE 6.6 (H) 2019    CALCIUM 8.7 2019    ANIONGAP 10 2019    ESTGFRAFRICA 8 (A) 2019    EGFRNONAA 7 (A) 2019       The 10-year ASCVD risk score (Vandana CONNELLY Jr., et al., 2013) is: 16.8%    Values used to calculate the score:      Age: 38 years      Sex: Female      Is Non- : Yes      Diabetic: Yes      Tobacco smoker: No      Systolic Blood Pressure: 162 mmHg      Is BP treated: Yes      HDL Cholesterol: 42 mg/dL      Total Cholesterol: 142 mg/dL    ~ Statin? - yes  ~ ASA? -   ~ PNA vaccine? -     HTN - compliant with nifedipine 90 mg daily. At home /70. Didn't take nifedipine yet today. Says her BP is higher with the increased nifedipine dose to 90 (increased in the hospital at recent admit)    OB/GYN History     LMP:   Sexually active:  Contraception:     Health Maintenance  Pap smear:   Mammogram:   Colon Cancer Screening: n/a  DEXA: n/a  Hepatitis C screening: n/a  Flu vaccine:   Tetanus vaccine:  PNA vaccine: n/a  Shingles vaccine: n/a    I personally reviewed Past Medical History, Past Surgical History, Social History, and Family History    Review of Systems   Constitutional: Negative for chills, fatigue, fever and unexpected weight change.   HENT: Negative for congestion, hearing loss, rhinorrhea and sore throat.    Eyes: Negative for visual disturbance.   Respiratory: Negative for cough, shortness of breath and wheezing.    Cardiovascular: Negative for chest pain, palpitations and leg swelling.   Gastrointestinal: Negative for abdominal pain, constipation, diarrhea, nausea and vomiting.   Genitourinary: Negative for dysuria, frequency, menstrual  "problem and urgency.   Musculoskeletal: Negative for arthralgias and myalgias.   Skin: Negative for rash.   Neurological: Negative for dizziness, syncope and headaches.   Psychiatric/Behavioral: Negative for dysphoric mood and sleep disturbance. The patient is not nervous/anxious.        Objective:      Vitals:    05/15/19 0920   BP: (!) 162/62   Pulse: 72   SpO2: 99%   Weight: 117.7 kg (259 lb 7.7 oz)   Height: 5' 8" (1.727 m)     Physical Exam   Constitutional: She is oriented to person, place, and time. She appears well-developed and well-nourished. No distress.   HENT:   Head: Normocephalic and atraumatic.   Right Ear: Hearing normal.   Left Ear: Hearing normal.   Nose: Nose normal.   Mouth/Throat: Oropharynx is clear and moist and mucous membranes are normal. No oropharyngeal exudate.   Eyes: Pupils are equal, round, and reactive to light. Conjunctivae and lids are normal.   Neck: Normal range of motion. No thyroid mass and no thyromegaly present.   Cardiovascular: Normal rate, regular rhythm, S1 normal, S2 normal and intact distal pulses.   No murmur heard.  No lower extremity edema.    Pulmonary/Chest: Effort normal and breath sounds normal. No respiratory distress.   Abdominal: Soft. Normal appearance and bowel sounds are normal. There is no tenderness.   Lymphadenopathy:     She has no cervical adenopathy.        Right: No supraclavicular adenopathy present.        Left: No supraclavicular adenopathy present.   Neurological: She is alert and oriented to person, place, and time.   Skin: Skin is warm and dry. No rash noted.   Psychiatric: She has a normal mood and affect. Her behavior is normal. Thought content normal.   Nursing note and vitals reviewed.      Assessment:       1. Type 2 diabetes mellitus with stage 5 chronic kidney disease not on chronic dialysis, with long-term current use of insulin    2. CKD (chronic kidney disease) stage 5, GFR less than 15 ml/min    3. Hypertension, unspecified type  "   4. Anemia of renal disease    5. Secondary hyperparathyroidism of renal origin    6. Increased prolactin level    7. Class 2 severe obesity due to excess calories with serious comorbidity and body mass index (BMI) of 39.0 to 39.9 in adult        Plan:   Diagnoses and all orders for this visit:    Type 2 diabetes mellitus with stage 5 chronic kidney disease not on chronic dialysis, with long-term current use of insulin  - A1c below goal. May need to cut back on insulin regimen. Patient denies hypoglycemic episodes. Continue monitoring BG at home. She does have endo follow-up.   -     Ambulatory Referral to Optometry  -     insulin NPH (NOVOLIN N) 100 unit/mL injection; Inject 15 Units into the skin before breakfast.  -     blood sugar diagnostic Strp; Check blood glucose 3 times daily    CKD stage 5, GFR less than 15 ml/min  - Discussed with patient that at her stage of renal disease, dialysis is strong recommended. Her nephrologist discussed this with her at a recent visit, but patient refused. Advised patient to follow closely with her nephrologist. If she begins to feel unwell or has new concerns, she was advised to seek emergency care.     Hypertension, unspecified type  - Not at goal, but patient reports not taking medication today. Continue nifedipine.     Anemia of renal disease  - Continue iron supplement.     Secondary hyperparathyroidism of renal origin  - Continue calcitriol    Increased prolactin level  - Had work-up in the hospital. Likely etiology is renal disease. Follow-up with endocrinology.     Class 2 severe obesity due to excess calories with serious comorbidity and body mass index (BMI) of 39.0 to 39.9 in adult  - Noted.

## 2019-05-17 DIAGNOSIS — Z12.39 BREAST CANCER SCREENING: ICD-10-CM

## 2019-06-14 ENCOUNTER — TELEPHONE (OUTPATIENT)
Dept: OBSTETRICS AND GYNECOLOGY | Facility: CLINIC | Age: 39
End: 2019-06-14

## 2019-06-14 NOTE — TELEPHONE ENCOUNTER
----- Message from Randy Koo sent at 6/14/2019  9:54 AM CDT -----  Contact: KATHYLESTER [[id number]]  Name of Who is Calling: LESTER CHAVEZ [[id number]]      What is the request in detail: Would like to speak with staff in regards to rescheduling her appointment. Next available is not until August. Patient wants to be seen sooner.       Can the clinic reply by [facility]: no      What Number to Call Back if not in [facility]: [phone]

## 2019-10-23 ENCOUNTER — TELEPHONE (OUTPATIENT)
Dept: OBSTETRICS AND GYNECOLOGY | Facility: CLINIC | Age: 39
End: 2019-10-23

## 2019-10-23 NOTE — TELEPHONE ENCOUNTER
----- Message from Karlene Dubon sent at 10/22/2019 11:22 AM CDT -----  Contact: [facility] (Autumn)   Autumn called on behalf of patient that is currently in their care regarding any possible restrictions due the patient being 8 months pregnant and she's having scaling done. The dentist can be reached at [phone].

## 2019-12-02 ENCOUNTER — TELEPHONE (OUTPATIENT)
Dept: INTERNAL MEDICINE | Facility: CLINIC | Age: 39
End: 2019-12-02

## 2019-12-02 DIAGNOSIS — E11.9 TYPE 2 DIABETES MELLITUS WITHOUT COMPLICATION: ICD-10-CM

## 2019-12-03 NOTE — TELEPHONE ENCOUNTER
Please contact patient and inform that she is due to return to the clinic to see me for a follow-up visit. Please assist her with making an appointment.    Thanks!

## 2020-01-18 ENCOUNTER — HOSPITAL ENCOUNTER (INPATIENT)
Facility: HOSPITAL | Age: 40
LOS: 2 days | Discharge: LEFT BEFORE DISCHARGE | DRG: 682 | End: 2020-01-20
Attending: EMERGENCY MEDICINE | Admitting: EMERGENCY MEDICINE
Payer: MEDICARE

## 2020-01-18 DIAGNOSIS — N18.5 STAGE 5 CHRONIC KIDNEY DISEASE NOT ON CHRONIC DIALYSIS: ICD-10-CM

## 2020-01-18 DIAGNOSIS — D64.9 ANEMIA, UNSPECIFIED TYPE: ICD-10-CM

## 2020-01-18 DIAGNOSIS — I10 HTN (HYPERTENSION): Primary | ICD-10-CM

## 2020-01-18 LAB
ABO + RH BLD: NORMAL
ALBUMIN SERPL BCP-MCNC: 3.6 G/DL (ref 3.5–5.2)
ALP SERPL-CCNC: 102 U/L (ref 55–135)
ALT SERPL W/O P-5'-P-CCNC: 9 U/L (ref 10–44)
ANION GAP SERPL CALC-SCNC: 14 MMOL/L (ref 8–16)
APTT BLDCRRT: 28.2 SEC (ref 21–32)
AST SERPL-CCNC: 12 U/L (ref 10–40)
B-HCG UR QL: NEGATIVE
BACTERIA #/AREA URNS HPF: NORMAL /HPF
BASOPHILS # BLD AUTO: 0.02 K/UL (ref 0–0.2)
BASOPHILS NFR BLD: 0.3 % (ref 0–1.9)
BILIRUB SERPL-MCNC: 0.3 MG/DL (ref 0.1–1)
BILIRUB UR QL STRIP: NEGATIVE
BLD GP AB SCN CELLS X3 SERPL QL: NORMAL
BUN SERPL-MCNC: 75 MG/DL (ref 6–20)
CALCIUM SERPL-MCNC: 8 MG/DL (ref 8.7–10.5)
CHLORIDE SERPL-SCNC: 108 MMOL/L (ref 95–110)
CLARITY UR: CLEAR
CO2 SERPL-SCNC: 15 MMOL/L (ref 23–29)
COLOR UR: ABNORMAL
CREAT SERPL-MCNC: 10 MG/DL (ref 0.5–1.4)
CTP QC/QA: YES
DIFFERENTIAL METHOD: ABNORMAL
EOSINOPHIL # BLD AUTO: 0.1 K/UL (ref 0–0.5)
EOSINOPHIL NFR BLD: 2.1 % (ref 0–8)
ERYTHROCYTE [DISTWIDTH] IN BLOOD BY AUTOMATED COUNT: 15.5 % (ref 11.5–14.5)
EST. GFR  (AFRICAN AMERICAN): 5 ML/MIN/1.73 M^2
EST. GFR  (NON AFRICAN AMERICAN): 4 ML/MIN/1.73 M^2
GLUCOSE SERPL-MCNC: 94 MG/DL (ref 70–110)
GLUCOSE UR QL STRIP: ABNORMAL
HCT VFR BLD AUTO: 19.4 % (ref 37–48.5)
HGB BLD-MCNC: 5.8 G/DL (ref 12–16)
HGB UR QL STRIP: ABNORMAL
HYALINE CASTS #/AREA URNS LPF: NORMAL /LPF
IMM GRANULOCYTES # BLD AUTO: 0.05 K/UL (ref 0–0.04)
IMM GRANULOCYTES NFR BLD AUTO: 0.7 % (ref 0–0.5)
INR PPP: 1 (ref 0.8–1.2)
KETONES UR QL STRIP: NEGATIVE
LEUKOCYTE ESTERASE UR QL STRIP: NEGATIVE
LYMPHOCYTES # BLD AUTO: 1.3 K/UL (ref 1–4.8)
LYMPHOCYTES NFR BLD: 19.3 % (ref 18–48)
MCH RBC QN AUTO: 26.1 PG (ref 27–31)
MCHC RBC AUTO-ENTMCNC: 29.9 G/DL (ref 32–36)
MCV RBC AUTO: 87 FL (ref 82–98)
MICROSCOPIC COMMENT: NORMAL
MONOCYTES # BLD AUTO: 0.5 K/UL (ref 0.3–1)
MONOCYTES NFR BLD: 6.7 % (ref 4–15)
NEUTROPHILS # BLD AUTO: 4.8 K/UL (ref 1.8–7.7)
NEUTROPHILS NFR BLD: 70.9 % (ref 38–73)
NITRITE UR QL STRIP: NEGATIVE
NRBC BLD-RTO: 0 /100 WBC
PH UR STRIP: 6 [PH] (ref 5–8)
PLATELET # BLD AUTO: 180 K/UL (ref 150–350)
PMV BLD AUTO: 11.1 FL (ref 9.2–12.9)
POCT GLUCOSE: 118 MG/DL (ref 70–110)
POTASSIUM SERPL-SCNC: 4.7 MMOL/L (ref 3.5–5.1)
PROT SERPL-MCNC: 7.1 G/DL (ref 6–8.4)
PROT UR QL STRIP: ABNORMAL
PROTHROMBIN TIME: 10.1 SEC (ref 9–12.5)
RBC # BLD AUTO: 2.22 M/UL (ref 4–5.4)
RBC #/AREA URNS HPF: 2 /HPF (ref 0–4)
SODIUM SERPL-SCNC: 137 MMOL/L (ref 136–145)
SP GR UR STRIP: 1.01 (ref 1–1.03)
SQUAMOUS #/AREA URNS HPF: NORMAL /HPF
URN SPEC COLLECT METH UR: ABNORMAL
UROBILINOGEN UR STRIP-ACNC: NEGATIVE EU/DL
WBC # BLD AUTO: 6.7 K/UL (ref 3.9–12.7)
WBC #/AREA URNS HPF: 2 /HPF (ref 0–5)

## 2020-01-18 PROCEDURE — 63600175 PHARM REV CODE 636 W HCPCS: Performed by: EMERGENCY MEDICINE

## 2020-01-18 PROCEDURE — P9021 RED BLOOD CELLS UNIT: HCPCS

## 2020-01-18 PROCEDURE — 85730 THROMBOPLASTIN TIME PARTIAL: CPT

## 2020-01-18 PROCEDURE — 93005 ELECTROCARDIOGRAM TRACING: CPT

## 2020-01-18 PROCEDURE — 82962 GLUCOSE BLOOD TEST: CPT

## 2020-01-18 PROCEDURE — 21400001 HC TELEMETRY ROOM

## 2020-01-18 PROCEDURE — 86850 RBC ANTIBODY SCREEN: CPT

## 2020-01-18 PROCEDURE — 93010 EKG 12-LEAD: ICD-10-PCS | Mod: ,,, | Performed by: INTERNAL MEDICINE

## 2020-01-18 PROCEDURE — 81000 URINALYSIS NONAUTO W/SCOPE: CPT

## 2020-01-18 PROCEDURE — 36415 COLL VENOUS BLD VENIPUNCTURE: CPT

## 2020-01-18 PROCEDURE — 83036 HEMOGLOBIN GLYCOSYLATED A1C: CPT

## 2020-01-18 PROCEDURE — 25000003 PHARM REV CODE 250: Performed by: EMERGENCY MEDICINE

## 2020-01-18 PROCEDURE — 86920 COMPATIBILITY TEST SPIN: CPT

## 2020-01-18 PROCEDURE — 81025 URINE PREGNANCY TEST: CPT | Performed by: EMERGENCY MEDICINE

## 2020-01-18 PROCEDURE — 85025 COMPLETE CBC W/AUTO DIFF WBC: CPT

## 2020-01-18 PROCEDURE — 85610 PROTHROMBIN TIME: CPT

## 2020-01-18 PROCEDURE — 99291 CRITICAL CARE FIRST HOUR: CPT | Mod: 25

## 2020-01-18 PROCEDURE — 93010 ELECTROCARDIOGRAM REPORT: CPT | Mod: ,,, | Performed by: INTERNAL MEDICINE

## 2020-01-18 PROCEDURE — 80053 COMPREHEN METABOLIC PANEL: CPT

## 2020-01-18 PROCEDURE — 36430 TRANSFUSION BLD/BLD COMPNT: CPT

## 2020-01-18 RX ORDER — HYDROCODONE BITARTRATE AND ACETAMINOPHEN 500; 5 MG/1; MG/1
TABLET ORAL
Status: DISCONTINUED | OUTPATIENT
Start: 2020-01-18 | End: 2020-01-20 | Stop reason: HOSPADM

## 2020-01-18 RX ORDER — HEPARIN SODIUM 5000 [USP'U]/ML
5000 INJECTION, SOLUTION INTRAVENOUS; SUBCUTANEOUS EVERY 8 HOURS
Status: DISCONTINUED | OUTPATIENT
Start: 2020-01-19 | End: 2020-01-20 | Stop reason: HOSPADM

## 2020-01-18 RX ORDER — INSULIN ASPART 100 [IU]/ML
0-5 INJECTION, SOLUTION INTRAVENOUS; SUBCUTANEOUS
Status: DISCONTINUED | OUTPATIENT
Start: 2020-01-18 | End: 2020-01-19

## 2020-01-18 RX ORDER — GLUCAGON 1 MG
1 KIT INJECTION
Status: DISCONTINUED | OUTPATIENT
Start: 2020-01-18 | End: 2020-01-19

## 2020-01-18 RX ORDER — HYDRALAZINE HYDROCHLORIDE 20 MG/ML
10 INJECTION INTRAMUSCULAR; INTRAVENOUS
Status: COMPLETED | OUTPATIENT
Start: 2020-01-18 | End: 2020-01-18

## 2020-01-18 RX ORDER — CALCITRIOL 0.25 UG/1
0.25 CAPSULE ORAL DAILY
COMMUNITY
Start: 2020-01-16 | End: 2020-01-24 | Stop reason: CLARIF

## 2020-01-18 RX ORDER — NIFEDIPINE 30 MG/1
90 TABLET, EXTENDED RELEASE ORAL
Status: COMPLETED | OUTPATIENT
Start: 2020-01-18 | End: 2020-01-18

## 2020-01-18 RX ORDER — SODIUM CHLORIDE 0.9 % (FLUSH) 0.9 %
10 SYRINGE (ML) INJECTION
Status: DISCONTINUED | OUTPATIENT
Start: 2020-01-18 | End: 2020-01-20 | Stop reason: HOSPADM

## 2020-01-18 RX ORDER — ATORVASTATIN CALCIUM 10 MG/1
10 TABLET, FILM COATED ORAL DAILY
Status: DISCONTINUED | OUTPATIENT
Start: 2020-01-19 | End: 2020-01-20 | Stop reason: HOSPADM

## 2020-01-18 RX ORDER — IBUPROFEN 200 MG
16 TABLET ORAL
Status: DISCONTINUED | OUTPATIENT
Start: 2020-01-18 | End: 2020-01-19

## 2020-01-18 RX ORDER — NIFEDIPINE 60 MG/1
60 TABLET, EXTENDED RELEASE ORAL DAILY
Status: ON HOLD | COMMUNITY
Start: 2020-01-16 | End: 2020-01-18 | Stop reason: CLARIF

## 2020-01-18 RX ORDER — IBUPROFEN 200 MG
24 TABLET ORAL
Status: DISCONTINUED | OUTPATIENT
Start: 2020-01-18 | End: 2020-01-19

## 2020-01-18 RX ADMIN — HYDRALAZINE HYDROCHLORIDE 10 MG: 20 INJECTION INTRAMUSCULAR; INTRAVENOUS at 10:01

## 2020-01-18 RX ADMIN — NIFEDIPINE 90 MG: 30 TABLET, FILM COATED, EXTENDED RELEASE ORAL at 08:01

## 2020-01-19 PROBLEM — D64.9 SYMPTOMATIC ANEMIA: Status: ACTIVE | Noted: 2020-01-19

## 2020-01-19 LAB
ALBUMIN SERPL BCP-MCNC: 3.2 G/DL (ref 3.5–5.2)
ALP SERPL-CCNC: 105 U/L (ref 55–135)
ALT SERPL W/O P-5'-P-CCNC: 9 U/L (ref 10–44)
ANION GAP SERPL CALC-SCNC: 12 MMOL/L (ref 8–16)
AST SERPL-CCNC: 11 U/L (ref 10–40)
BASOPHILS # BLD AUTO: 0.03 K/UL (ref 0–0.2)
BASOPHILS NFR BLD: 0.4 % (ref 0–1.9)
BILIRUB SERPL-MCNC: 0.4 MG/DL (ref 0.1–1)
BLD PROD TYP BPU: NORMAL
BLD PROD TYP BPU: NORMAL
BLOOD UNIT EXPIRATION DATE: NORMAL
BLOOD UNIT EXPIRATION DATE: NORMAL
BLOOD UNIT TYPE CODE: 5100
BLOOD UNIT TYPE CODE: 5100
BLOOD UNIT TYPE: NORMAL
BLOOD UNIT TYPE: NORMAL
BUN SERPL-MCNC: 77 MG/DL (ref 6–20)
CALCIUM SERPL-MCNC: 7.9 MG/DL (ref 8.7–10.5)
CHLORIDE SERPL-SCNC: 111 MMOL/L (ref 95–110)
CO2 SERPL-SCNC: 16 MMOL/L (ref 23–29)
CODING SYSTEM: NORMAL
CODING SYSTEM: NORMAL
CREAT SERPL-MCNC: 9.8 MG/DL (ref 0.5–1.4)
DIFFERENTIAL METHOD: ABNORMAL
DISPENSE STATUS: NORMAL
DISPENSE STATUS: NORMAL
EOSINOPHIL # BLD AUTO: 0.2 K/UL (ref 0–0.5)
EOSINOPHIL NFR BLD: 2 % (ref 0–8)
ERYTHROCYTE [DISTWIDTH] IN BLOOD BY AUTOMATED COUNT: 14.9 % (ref 11.5–14.5)
EST. GFR  (AFRICAN AMERICAN): 5 ML/MIN/1.73 M^2
EST. GFR  (NON AFRICAN AMERICAN): 4 ML/MIN/1.73 M^2
ESTIMATED AVG GLUCOSE: 108 MG/DL (ref 68–131)
GLUCOSE SERPL-MCNC: 120 MG/DL (ref 70–110)
HBA1C MFR BLD HPLC: 5.4 % (ref 4–5.6)
HCT VFR BLD AUTO: 26.4 % (ref 37–48.5)
HGB BLD-MCNC: 8.2 G/DL (ref 12–16)
IMM GRANULOCYTES # BLD AUTO: 0.04 K/UL (ref 0–0.04)
IMM GRANULOCYTES NFR BLD AUTO: 0.5 % (ref 0–0.5)
LYMPHOCYTES # BLD AUTO: 1.4 K/UL (ref 1–4.8)
LYMPHOCYTES NFR BLD: 18.3 % (ref 18–48)
MCH RBC QN AUTO: 27.4 PG (ref 27–31)
MCHC RBC AUTO-ENTMCNC: 31.1 G/DL (ref 32–36)
MCV RBC AUTO: 88 FL (ref 82–98)
MONOCYTES # BLD AUTO: 0.6 K/UL (ref 0.3–1)
MONOCYTES NFR BLD: 7.4 % (ref 4–15)
NEUTROPHILS # BLD AUTO: 5.6 K/UL (ref 1.8–7.7)
NEUTROPHILS NFR BLD: 71.4 % (ref 38–73)
NRBC BLD-RTO: 0 /100 WBC
PLATELET # BLD AUTO: 179 K/UL (ref 150–350)
PMV BLD AUTO: 10.7 FL (ref 9.2–12.9)
POCT GLUCOSE: 124 MG/DL (ref 70–110)
POCT GLUCOSE: 157 MG/DL (ref 70–110)
POTASSIUM SERPL-SCNC: 4.5 MMOL/L (ref 3.5–5.1)
PROT SERPL-MCNC: 6.6 G/DL (ref 6–8.4)
RBC # BLD AUTO: 2.99 M/UL (ref 4–5.4)
SODIUM SERPL-SCNC: 139 MMOL/L (ref 136–145)
TRANS ERYTHROCYTES VOL PATIENT: NORMAL ML
TRANS ERYTHROCYTES VOL PATIENT: NORMAL ML
WBC # BLD AUTO: 7.89 K/UL (ref 3.9–12.7)

## 2020-01-19 PROCEDURE — 63600175 PHARM REV CODE 636 W HCPCS: Performed by: HOSPITALIST

## 2020-01-19 PROCEDURE — P9021 RED BLOOD CELLS UNIT: HCPCS

## 2020-01-19 PROCEDURE — 36415 COLL VENOUS BLD VENIPUNCTURE: CPT

## 2020-01-19 PROCEDURE — 25000003 PHARM REV CODE 250: Performed by: HOSPITALIST

## 2020-01-19 PROCEDURE — 80053 COMPREHEN METABOLIC PANEL: CPT

## 2020-01-19 PROCEDURE — 21400001 HC TELEMETRY ROOM

## 2020-01-19 PROCEDURE — 63600175 PHARM REV CODE 636 W HCPCS: Performed by: EMERGENCY MEDICINE

## 2020-01-19 PROCEDURE — 80074 ACUTE HEPATITIS PANEL: CPT

## 2020-01-19 PROCEDURE — 63600175 PHARM REV CODE 636 W HCPCS: Performed by: INTERNAL MEDICINE

## 2020-01-19 PROCEDURE — 25000003 PHARM REV CODE 250: Performed by: INTERNAL MEDICINE

## 2020-01-19 PROCEDURE — 85025 COMPLETE CBC W/AUTO DIFF WBC: CPT

## 2020-01-19 RX ORDER — NIFEDIPINE 30 MG/1
90 TABLET, EXTENDED RELEASE ORAL DAILY
Status: DISCONTINUED | OUTPATIENT
Start: 2020-01-19 | End: 2020-01-20 | Stop reason: HOSPADM

## 2020-01-19 RX ORDER — SODIUM BICARBONATE 325 MG/1
650 TABLET ORAL 2 TIMES DAILY
Status: DISCONTINUED | OUTPATIENT
Start: 2020-01-19 | End: 2020-01-19

## 2020-01-19 RX ORDER — SODIUM BICARBONATE 325 MG/1
1300 TABLET ORAL 3 TIMES DAILY
Status: DISCONTINUED | OUTPATIENT
Start: 2020-01-19 | End: 2020-01-20 | Stop reason: HOSPADM

## 2020-01-19 RX ORDER — INSULIN ASPART 100 [IU]/ML
1-10 INJECTION, SOLUTION INTRAVENOUS; SUBCUTANEOUS
Status: DISCONTINUED | OUTPATIENT
Start: 2020-01-19 | End: 2020-01-20 | Stop reason: HOSPADM

## 2020-01-19 RX ADMIN — ATORVASTATIN CALCIUM 10 MG: 10 TABLET, FILM COATED ORAL at 09:01

## 2020-01-19 RX ADMIN — HEPARIN SODIUM 5000 UNITS: 5000 INJECTION, SOLUTION INTRAVENOUS; SUBCUTANEOUS at 05:01

## 2020-01-19 RX ADMIN — SODIUM BICARBONATE TAB 325 MG 650 MG: 325 TAB at 09:01

## 2020-01-19 RX ADMIN — NIFEDIPINE 90 MG: 30 TABLET, FILM COATED, EXTENDED RELEASE ORAL at 09:01

## 2020-01-19 RX ADMIN — SODIUM BICARBONATE 1300 MG: 325 TABLET ORAL at 09:01

## 2020-01-19 RX ADMIN — INSULIN ASPART 1 UNITS: 100 INJECTION, SOLUTION INTRAVENOUS; SUBCUTANEOUS at 09:01

## 2020-01-19 RX ADMIN — EPOETIN ALFA-EPBX 20000 UNITS: 10000 INJECTION, SOLUTION INTRAVENOUS; SUBCUTANEOUS at 03:01

## 2020-01-19 RX ADMIN — HEPARIN SODIUM 5000 UNITS: 5000 INJECTION, SOLUTION INTRAVENOUS; SUBCUTANEOUS at 02:01

## 2020-01-19 RX ADMIN — SODIUM BICARBONATE 1300 MG: 325 TABLET ORAL at 02:01

## 2020-01-19 RX ADMIN — HEPARIN SODIUM 5000 UNITS: 5000 INJECTION, SOLUTION INTRAVENOUS; SUBCUTANEOUS at 09:01

## 2020-01-19 NOTE — HPI
Xuan Cousin is a 39 y.o. 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  has a past surgical history that includes  section, classic and Tubal ligation. Presents to [facility] With report of anemia.  She was directed to come to the emergency department by her primary care physician who reviewed routine laboratory studies and found her to be severely anemic.  She complains of chronic fatigue but denies significant dyspnea with exertion, chest pain, dizziness, syncope, or palpitations.  No melena, hematochezia, hematuria, epistaxis, abnormal vaginal bleeding, or other obvious blood loss.  Her last menstrual period was 5 days ago.  She reports normal periods.  She has had worsening renal function over the last several years.  She was recommended to have dialysis beginning in May of last year but she did not show for the appointment and has not sought evaluation since that time.  However she is willing to start dialysis now as recommended by her primary care physician today.    In the Emergency department routine laboratory studies were obtained which confirmed the severe anemia.  2 units of packed red blood cells were ordered for transfusion.  Her creatinine was also 10.  This is higher than her baseline.  Nephrology was consulted.    Hospital medicine has been asked to admit to inpatient for further evaluation and treatment.

## 2020-01-19 NOTE — ED TRIAGE NOTES
Pt presents to ED for anemia dx by her pcp. Pt went to pcp on Wednesday & was told that her H&H was low yesterday & that she would need a blood transfusion. Pt a&ox4, pt reports c/o dizziness & sob with exertion but denies cp/n/v or any other symptoms at this time

## 2020-01-19 NOTE — PLAN OF CARE
01/19/20 1537   Discharge Assessment   Assessment Type Discharge Planning Assessment   Confirmed/corrected address and phone number on facesheet? Yes   Assessment information obtained from? Patient   Communicated expected length of stay with patient/caregiver no   Prior to hospitilization cognitive status: Alert/Oriented   Prior to hospitalization functional status: Independent   Current cognitive status: Alert/Oriented   Current Functional Status: Independent   Facility Arrived From: Home   Lives With child(rowan), dependent   Able to Return to Prior Arrangements yes   Is patient able to care for self after discharge? Yes   Who are your caregiver(s) and their phone number(s)? Perry Blas-relative: [phone]   Patient's perception of discharge disposition home or selfcare   Readmission Within the Last 30 Days no previous admission in last 30 days   Patient currently being followed by outpatient case management? No   Patient currently receives any other outside agency services? No   Equipment Currently Used at Home none   Do you have any problems affording any of your prescribed medications? No   Is the patient taking medications as prescribed? yes   Does the patient have transportation home? Yes   Transportation Anticipated car, drives self;family or friend will provide   Does the patient receive services at the Coumadin Clinic? No   Discharge Plan A Home with family  (Desires HH)   Discharge Plan B Other  (TBD)   DME Needed Upon Discharge  other (see comments)  (TBD)   Patient/Family in Agreement with Plan yes   SW Role explained to patient; two patient identifiers recognized; SW contact information placed on Communication board. Discussed patient managing health care at home; determined who would be helping patient at home with recovery: Shannon helps at home; usually independent; no DME; desires  at discharge    PCP: Katharine Franks MD  Prefers morning appointments    Extended Emergency Contact  Information  Primary Emergency Contact: Roopa Andrade   [facility]  Home Phone: [phone]  Relation: Relative     [id number] - [place], [place] - [address] AT [facility] [address]  [address] [id number]  Phone: [phone] Fax: [phone]    Payor: [employer] MANAGED MEDICARE / Plan: [employer] [facility] / Product Type: Medicare Advantage /

## 2020-01-19 NOTE — CONSULTS
[facility]  Nephrology  Consult Note    Patient Name: Xuan Ricardo  MRN: [id number]  Admission Date: 2020  Hospital Length of Stay: 1 days  Attending Provider: Harris Dc MD   Primary Care Physician: No primary care provider on file.  Principal Problem:Symptomatic anemia  Date of service 2020  Inpatient consult to Nephrology  Consult performed by: Belinda Dowd MD  Consult ordered by: Jay Zavala MD  Reason for consult: CKD        Subjective:     HPI: 40yo F with PMH CKD5 who presented to our facility with SOB that began 5 days ago, worse with exertion.  Also with intermittent diarrhea.  She has metallic taste but denies any difficulty urinating, dysuria, hematuria.  She has a good appetite and denies weight loss.  She was following Dr Saucedo but lost to f/u when they were trying to arrange for dialysis initiation last May.  She is concerned about the care of her children aged 6 and 7.  Seems to have poor insight into the severity of her illness.    Past Medical History:   Diagnosis Date    Cataract     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     Diabetes mellitus     Insulin Dependent    Galactorrhea     Hyperphosphatemia     Hypertension     Iron deficiency anemia     Obesity     Secondary hyperparathyroidism of renal origin     Vitamin D deficiency        Past Surgical History:   Procedure Laterality Date     SECTION, CLASSIC      TUBAL LIGATION         Review of patient's allergies indicates:   Allergen Reactions    Penicillins Other (See Comments)    Vicodin [hydrocodone-acetaminophen] Hives    Codeine Hives     Current Facility-Administered Medications   Medication Frequency    0.9%  NaCl infusion (for blood administration) Q24H PRN    atorvastatin tablet 10 mg Daily    heparin (porcine) injection 5,000 Units Q8H    insulin aspart U-100 pen 1-10 Units QID (AC + HS) PRN    NIFEdipine 24 hr tablet 90 mg Daily    sodium  bicarbonate tablet 650 mg BID    sodium chloride 0.9% flush 10 mL PRN     Family History     Problem Relation (Age of Onset)    Asthma Sister    Heart failure Father (58)    Seizures Mother (64)        Tobacco Use    Smoking status: Former Smoker     Types: Cigarettes    Smokeless tobacco: Never Used   Substance and Sexual Activity    Alcohol use: No    Drug use: Yes     Types: Marijuana     Comment: Occassional Recreational Use only    Sexual activity: Not Currently     Partners: Male     Review of Systems   All other systems reviewed and are negative.    Objective:     Vital Signs (Most Recent):  Temp: 97.8 °F (36.6 °C) (01/19/20 1129)  Pulse: 86 (01/19/20 1129)  Resp: 20 (01/19/20 1129)  BP: (!) 144/77 (01/19/20 1129)  SpO2: 100 % (01/19/20 1129)  O2 Device (Oxygen Therapy): room air (01/19/20 0745) Vital Signs (24h Range):  Temp:  [97.2 °F (36.2 °C)-98.6 °F (37 °C)] 97.8 °F (36.6 °C)  Pulse:  [] 86  Resp:  [17-20] 20  SpO2:  [99 %-100 %] 100 %  BP: (137-231)/() 144/77     Weight: 123.7 kg (272 lb 11.3 oz) (01/19/20 0023)  Body mass index is 41.47 kg/m².  Body surface area is 2.44 meters squared.    I/O last 3 completed shifts:  In: 905.1 [Blood:905.1]  Out: -     Physical Exam   Constitutional: She is oriented to person, place, and time. She appears well-developed and well-nourished. No distress.   HENT:   Head: Normocephalic and atraumatic.   Eyes: EOM are normal. No scleral icterus.   Cardiovascular: Normal rate and regular rhythm. Exam reveals no friction rub.   No murmur heard.  Pulmonary/Chest: Effort normal and breath sounds normal. No respiratory distress. She has no wheezes. She has no rales.   Abdominal: Soft. She exhibits no distension. There is no tenderness. There is no guarding.   Musculoskeletal: She exhibits no edema or tenderness.   Neurological: She is alert and oriented to person, place, and time.   No Asterixis   Skin: Skin is warm and dry. No rash noted. She is not  diaphoretic. No erythema.   Psychiatric: She has a normal mood and affect. Her behavior is normal. Thought content normal.   Nursing note and vitals reviewed.      Significant Labs:  CBC:   Recent Labs   Lab 01/19/20  0517   WBC 7.89   RBC 2.99*   HGB 8.2*   HCT 26.4*      MCV 88   MCH 27.4   MCHC 31.1*     CMP:   Recent Labs   Lab 01/19/20  0517   *   CALCIUM 7.9*   ALBUMIN 3.2*   PROT 6.6      K 4.5   CO2 16*   *   BUN 77*   CREATININE 9.8*   ALKPHOS 105   ALT 9*   AST 11   BILITOT 0.4     Recent Labs   Lab 01/18/20 1955   COLORU Straw   SPECGRAV 1.010   PHUR 6.0   PROTEINUA 2+*   BACTERIA None   NITRITE Negative   LEUKOCYTESUR Negative   UROBILINOGEN Negative   HYALINECASTS none     All labs within the past 24 hours have been reviewed.    Significant Imaging:  X-Ray: Reviewed    Assessment/Plan:     Active Diagnoses:    Diagnosis Date Noted POA    PRINCIPAL PROBLEM:  Symptomatic anemia [D64.9] 01/19/2020 Yes    Class 2 severe obesity due to excess calories with serious comorbidity and body mass index (BMI) of 39.0 to 39.9 in adult [E66.01, Z68.39]  Not Applicable    Hypertension [I10]  Yes    CKD (chronic kidney disease) stage 5, GFR less than 15 ml/min [N18.5]  Yes    Hypertensive CKD (chronic kidney disease) [I12.9] 12/14/2016 Yes    Anemia of renal disease [N18.9, D63.1] 04/09/2016 Yes     Chronic    Type 2 diabetes mellitus with stage 5 chronic kidney disease not on chronic dialysis, with long-term current use of insulin [E11.22, N18.5, Z79.4] 01/01/1996 Not Applicable      Problems Resolved During this Admission:       1. ESRD, new start - was following with Dr Saucedo outpatient, lost to f/u  - will consult surgery for PD catheter placement and consult CM for outpatient PD arrangements and training  - renally dose medications  - acidosis - increase PO bicarb to 1300 TID, monitor BMP  2. HTN - uncontrolled, continue current medications, can add bystolic or coreg if  needed, UF as tolerated on dialysis  3. Anemia of chronic kidney disease - Hgb below goal, s/p transfusion 1/18/20, start Epo 20,000 qweek, continue to monitor CBC, check iron studies  4. MBD/secondary hyperparathyroidism - check phos, Vit D, PTH    Thank you for allowing me to participate in the care of your patient.  Please call with any questions.    Date of service: 1/19/2020  Belinda Dowd MD   Nephrology  [facility]  Office [phone]   [facility]

## 2020-01-19 NOTE — H&P
[facility] Medicine  History & Physical    Patient Name: Xuan Ricardo  MRN: [id number]  Admission Date: 2020  Attending Physician: Jay Zavala MD, MPH      PCP:     Leigh Phan MD    CC:     Chief Complaint   Patient presents with    Anemia     Pt reports that she went for doctor appointment Dr Franks and was told to come and get a blood transfusion, pt blood press 231/109       HISTORY OF PRESENT ILLNESS:     Xuan Ricardo is a 39 y.o. 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  has a past surgical history that includes  section, classic and Tubal ligation. Presents to [facility] Emergency Department With report of anemia.  She was directed to come to the emergency department by her primary care physician who reviewed routine laboratory studies and found her to be severely anemic.  She complains of chronic fatigue but denies significant dyspnea with exertion, chest pain, dizziness, syncope, or palpitations.  No melena, hematochezia, hematuria, epistaxis, abnormal vaginal bleeding, or other obvious blood loss.  Her last menstrual period was 5 days ago.  She reports normal periods.  She has had worsening renal function over the last several years.  She was recommended to have dialysis beginning in May of last year but she did not show for the appointment and has not sought evaluation since that time.  However she is willing to start dialysis now as recommended by her primary care physician today.    In the Emergency department routine laboratory studies were obtained which confirmed the severe anemia.  2 units of packed red blood cells were ordered for transfusion.  Her creatinine was also 10.  This is  higher than her baseline.  Nephrology was consulted.    Hospital medicine has been asked to admit to inpatient for further evaluation and treatment.       REVIEW OF SYSTEMS:     -- Constitutional:  Generalized fatigue and weakness.  No fever or chills.  -- Eyes: No visual changes, diplopia, pain, tearing, blind spots, or discharge.   -- Ears, nose, mouth, throat, and face: No congestion, sore throat, epistaxis, d/c, bleeding gums, neck stiffness masses, or dental issues.  -- Respiratory:  Shortness of breath.  No cough, hemoptysis, stridor, wheezing, or night sweats.  -- Cardiovascular:  Shortness of breath and dyspnea with exertion.  Transient edema. No chest pain, syncope, PND, cyanosis, or palpitations.   -- Gastrointestinal: No vomiting, abdominal pain, hematemesis, melena, dyspepsia, or change in bowel habits.  -- Genitourinary: No hematuria, dysuria, frequency, urgency, nocturia, polyuria, stones, or incontinence.  -- Integument/breast: No rash, pruritis, pigmentation changes, dryness, or changes in hair  -- Hematologic/lymphatic: No easy bruising or lymphadenopathy.   -- Musculoskeletal: No acute arthralgias, acute myalgias, joint swelling, acute limitations of ROM, or acute muscular weakness.  -- Neurological: No seizures, headaches, incoordination, paraesthesias, ataxia, vertigo, or tremors.  -- Behavioral/Psych: No auditory or visual hallucinations, depression, or suicidal/homicidal ideations.  -- Endocrine: No heat or cold intolerance, polydipsia.  Slow unintentional weight gain  -- Allergy/Immunologic: No recurrent infections or adverse reaction to food, insects, or difficulty breathing.      PAST MEDICAL / SURGICAL HISTORY:     Past Medical History:   Diagnosis Date    Cataract     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     Diabetes mellitus 1996    Insulin Dependent    Galactorrhea     Hyperphosphatemia     Hypertension     Iron deficiency anemia     Obesity     Secondary  hyperparathyroidism of renal origin     Vitamin D deficiency      Past Surgical History:   Procedure Laterality Date     SECTION, CLASSIC      TUBAL LIGATION           FAMILY HISTORY:     Family History   Problem Relation Age of Onset    Seizures Mother 64    Heart failure Father 58    Asthma Sister     Breast cancer Neg Hx     Colon cancer Neg Hx     Ovarian cancer Neg Hx          SOCIAL HISTORY:     Social History     Socioeconomic History    Marital status: Single     Spouse name: Not on file    Number of children: 2    Years of education: Not on file    Highest education level: Not on file   Occupational History    Occupation: Unemployed   Social Needs    Financial resource strain: Hard    Food insecurity:     Worry: Not on file     Inability: Not on file    Transportation needs:     Medical: Not on file     Non-medical: Not on file   Tobacco Use    Smoking status: Former Smoker     Types: Cigarettes    Smokeless tobacco: Never Used   Substance and Sexual Activity    Alcohol use: No    Drug use: Yes     Types: Marijuana     Comment: Occassional Recreational Use only    Sexual activity: Not Currently     Partners: Male   Lifestyle    Physical activity:     Days per week: Patient refused     Minutes per session: Patient refused    Stress: Rather much   Relationships    Social connections:     Talks on phone: More than three times a week     Gets together: More than three times a week     Attends [employer] service: Patient refused     Active member of club or organization: Patient refused     Attends meetings of clubs or organizations: Patient refused     Relationship status: Never    Other Topics Concern    Not on file   Social History Narrative    Currently unemployed has two young children ages 5 and 7 and she is the sole caretaker         ALLERGIES:       Review of patient's allergies indicates:   Allergen Reactions    Penicillins Other (See Comments)    Vicodin  "[hydrocodone-acetaminophen] Hives    Codeine Hives         HEALTH SCREENING:     Influenza vaccine not up-to-date for this season.  Prevnar 13 pneumonia vaccine = no evidence of previous vaccination found in the medical record      HOME MEDICATIONS:     Prior to Admission medications    Medication Sig Start Date End Date Taking? Authorizing Provider   atorvastatin (LIPITOR) 10 MG tablet Take 1 tablet (10 mg total) by mouth once daily. 5/5/19  Yes Maria Elena Peterson MD   calcitRIOL (ROCALTROL) 0.25 MCG Cap TAKE 1 CAPSULE(0.25 MCG) BY MOUTH EVERY DAY 5/5/19  Yes Maria Elena Peterson MD   calcitRIOL (ROCALTROL) 0.25 MCG Cap Take 0.25 mcg by mouth once daily. 1/16/20  Yes Historical Provider, MD   ferrous sulfate 325 (65 FE) MG EC tablet Take 1 tablet (325 mg total) by mouth once daily. 5/6/19  Yes Maria Elena Peterson MD   insulin NPH (NOVOLIN N) 100 unit/mL injection Inject 15 Units into the skin before breakfast. 5/15/19  Yes Leigh Phan MD   NIFEdipine (PROCARDIA-XL) 90 MG (OSM) 24 hr tablet TAKE 1 TABLET(90 MG) BY MOUTH EVERY DAY 5/5/19  Yes Maria Elena Peterson MD   sodium bicarbonate 650 MG tablet Take 1 tablet (650 mg total) by mouth 2 (two) times daily. 5/5/19 5/4/20 Yes Maria Elena Peterson MD   blood sugar diagnostic Strp Check blood glucose 3 times daily 5/15/19   Leigh Phan MD   lancets (ONETOUCH DELICA LANCETS) 30 gauge Misc 3 (three) times daily 8/28/18   Historical Provider, MD   pen needle, diabetic (BD ULTRA-FINE SHORT PEN NEEDLE) 31 gauge x 5/16" Ndle daily 8/28/18   Historical Provider, MD          HOSPITAL MEDICATIONS:     Scheduled Meds:    atorvastatin  10 mg Oral Daily    heparin (porcine)  5,000 Units Subcutaneous Q8H     Continuous Infusions:   PRN Meds: sodium chloride, dextrose 50%, dextrose 50%, glucagon (human recombinant), glucose, glucose, insulin aspart U-100, sodium chloride 0.9%      PHYSICAL EXAM:     Wt Readings from Last 1 Encounters:   01/19/20 0023 123.7 kg (272 lb 11.3 oz)   01/18/20 " "2341 122.4 kg (269 lb 13.5 oz)   01/18/20 1926 118.8 kg (262 lb)     Body mass index is 41.47 kg/m².  Vitals:    01/18/20 2341 01/19/20 0023 01/19/20 0101 01/19/20 0117   BP: (!) 168/79 (!) 167/81 (!) 164/78 (!) 161/82   BP Location: Left arm Left arm     Patient Position: Lying Lying     Pulse: 103 101 97 92   Resp: 20 18 19 17   Temp: 98.6 °F (37 °C) 97.7 °F (36.5 °C) 97.9 °F (36.6 °C) 98.1 °F (36.7 °C)   TempSrc: Oral Oral Oral Oral   SpO2: 99% 100% 100% 99%   Weight: 122.4 kg (269 lb 13.5 oz) 123.7 kg (272 lb 11.3 oz)     Height: 5' 8" (1.727 m)             -- General appearance: well developed. appears stated age   -- Head: normocephalic, atraumatic   -- Eyes:  Pale conjunctiva. Extraocular muscles intact  -- Nose: Nares normal. Septum midline.   -- Mouth/Throat: lips, mucosa, and tongue normal. no throat erythema.   -- Neck: supple, symmetrical, trachea midline, no JVD and thyroid not grossly enlarged, appears symmetric  -- Lungs: clear to auscultation bilaterally. normal respiratory effort. No use of accessory muscles.   -- Chest wall: no tenderness. equal bilateral chest rise   -- Heart:  Rapid rate and regular rhythm. S1, S2 normal.  no click, rub or gallop   -- Abdomen:  Obese, soft, non-tender, non-distended, non-tympanic; bowel sounds normal; megaly exam limited by body habitus  -- Extremities: no cyanosis, clubbing.  Trace bilateral lower extremity pitting edema.  -- Pulses: 2+ and symmetric   -- Skin:  Turgor normal. Color normal. Texture normal. No rashes or lesions.   -- Neurologic: Normal strength and tone. No focal numbness or weakness. CNII-XII intact. Nita coma scale: eyes open spontaneously-4, oriented & converses-5, obeys commands-6.      LABORATORY STUDIES:     Recent Results (from the past 36 hour(s))   CBC auto differential    Collection Time: 01/18/20  7:54 PM   Result Value Ref Range    WBC 6.70 3.90 - 12.70 K/uL    RBC 2.22 (L) 4.00 - 5.40 M/uL    Hemoglobin 5.8 (LL) 12.0 - 16.0 g/dL "    Hematocrit 19.4 (LL) 37.0 - 48.5 %    Mean Corpuscular Volume 87 82 - 98 fL    Mean Corpuscular Hemoglobin 26.1 (L) 27.0 - 31.0 pg    Mean Corpuscular Hemoglobin Conc 29.9 (L) 32.0 - 36.0 g/dL    RDW 15.5 (H) 11.5 - 14.5 %    Platelets 180 150 - 350 K/uL    MPV 11.1 9.2 - 12.9 fL    Immature Granulocytes 0.7 (H) 0.0 - 0.5 %    Gran # (ANC) 4.8 1.8 - 7.7 K/uL    Immature Grans (Abs) 0.05 (H) 0.00 - 0.04 K/uL    Lymph # 1.3 1.0 - 4.8 K/uL    Mono # 0.5 0.3 - 1.0 K/uL    Eos # 0.1 0.0 - 0.5 K/uL    Baso # 0.02 0.00 - 0.20 K/uL    nRBC 0 0 /100 WBC    Gran% 70.9 38.0 - 73.0 %    Lymph% 19.3 18.0 - 48.0 %    Mono% 6.7 4.0 - 15.0 %    Eosinophil% 2.1 0.0 - 8.0 %    Basophil% 0.3 0.0 - 1.9 %    Differential Method Automated    Comprehensive metabolic panel    Collection Time: 01/18/20  7:54 PM   Result Value Ref Range    Sodium 137 136 - 145 mmol/L    Potassium 4.7 3.5 - 5.1 mmol/L    Chloride 108 95 - 110 mmol/L    CO2 15 (L) 23 - 29 mmol/L    Glucose 94 70 - 110 mg/dL    BUN, Bld 75 (H) 6 - 20 mg/dL    Creatinine 10.0 (H) 0.5 - 1.4 mg/dL    Calcium 8.0 (L) 8.7 - 10.5 mg/dL    Total Protein 7.1 6.0 - 8.4 g/dL    Albumin 3.6 3.5 - 5.2 g/dL    Total Bilirubin 0.3 0.1 - 1.0 mg/dL    Alkaline Phosphatase 102 55 - 135 U/L    AST 12 10 - 40 U/L    ALT 9 (L) 10 - 44 U/L    Anion Gap 14 8 - 16 mmol/L    eGFR if African American 5 (A) >60 mL/min/1.73 m^2    eGFR if non African American 4 (A) >60 mL/min/1.73 m^2   APTT    Collection Time: 01/18/20  7:54 PM   Result Value Ref Range    aPTT 28.2 21.0 - 32.0 sec   Protime-INR    Collection Time: 01/18/20  7:54 PM   Result Value Ref Range    Prothrombin Time 10.1 9.0 - 12.5 sec    INR 1.0 0.8 - 1.2   Urinalysis, Reflex to Urine Culture Urine, Clean Catch    Collection Time: 01/18/20  7:55 PM   Result Value Ref Range    Specimen UA Urine, Clean Catch     Color, UA Straw Yellow, Straw, Delphine    Appearance, UA Clear Clear    pH, UA 6.0 5.0 - 8.0    Specific Gravity, UA 1.010 1.005 -  1.030    Protein, UA 2+ (A) Negative    Glucose, UA 2+ (A) Negative    Ketones, UA Negative Negative    Bilirubin (UA) Negative Negative    Occult Blood UA 1+ (A) Negative    Nitrite, UA Negative Negative    Urobilinogen, UA Negative <2.0 EU/dL    Leukocytes, UA Negative Negative   Urinalysis Microscopic    Collection Time: 01/18/20  7:55 PM   Result Value Ref Range    RBC, UA 2 0 - 4 /hpf    WBC, UA 2 0 - 5 /hpf    Bacteria None None-Occ /hpf    Squam Epithel, UA rare /hpf    Hyaline Casts, UA none 0-1/lpf /lpf    Microscopic Comment SEE COMMENT    POCT glucose    Collection Time: 01/18/20  8:01 PM   Result Value Ref Range    POCT Glucose 118 (H) 70 - 110 mg/dL   Type & Screen    Collection Time: 01/18/20  8:18 PM   Result Value Ref Range    Group & Rh O POS     Indirect Kaylee NEG    Prepare RBC 2 Units; severe anemia    Collection Time: 01/18/20  8:18 PM   Result Value Ref Range    UNIT NUMBER [id number]     Product Code [id number]     DISPENSE STATUS TRANSFUSED     CODING SYSTEM YHUI013     Unit Blood Type Code 5100     Unit Blood Type O POS     Unit Expiration [id number]     UNIT NUMBER [id number]     Product Code [id number]     DISPENSE STATUS ISSUED     CODING SYSTEM DBOM198     Unit Blood Type Code 5100     Unit Blood Type O POS     Unit Expiration [id number]    POCT urine pregnancy    Collection Time: 01/18/20  8:36 PM   Result Value Ref Range    POC Preg Test, Ur Negative Negative     Acceptable Yes        Lab Results   Component Value Date    INR 1.0 01/18/2020    INR 0.9 05/05/2019     Lab Results   Component Value Date    HGBA1C 5.8 (H) 05/04/2019     Recent Labs     01/18/20 2001   POCTGLUCOSE 118*         IMAGING:     Imaging Results          X-Ray Chest AP Portable (Final result)  Result time 01/18/20 20:31:30    Final result by Shwetha Garrett MD (01/18/20 20:31:30)                 Impression:      No acute cardiopulmonary process identified.      Electronically signed  by: Shwetha Garrett MD  Date:    01/18/2020  Time:    20:31             Narrative:    EXAMINATION:  XR CHEST AP PORTABLE    CLINICAL HISTORY:  htn;    TECHNIQUE:  Single frontal view of the chest was performed.    COMPARISON:  None    FINDINGS:  Cardiac silhouette is prominent, noting magnification on this single AP portable view.  Lungs are symmetrically expanded.  No evidence of focal consolidative process, pneumothorax, or significant effusion.  No acute osseous abnormality identified.                                  CONSULTS:     IP CONSULT TO NEPHROLOGY       ASSESSMENT & PLAN:     Primary Diagnosis:  Symptomatic anemia    Active Hospital Problems    Diagnosis  POA    *Symptomatic anemia [D64.9]  Yes     Priority: 1 - High    Class 2 severe obesity due to excess calories with serious comorbidity and body mass index (BMI) of 39.0 to 39.9 in adult [E66.01, Z68.39]  Not Applicable    Hypertension [I10]  Yes    CKD (chronic kidney disease) stage 5, GFR less than 15 ml/min [N18.5]  Yes    Hypertensive CKD (chronic kidney disease) [I12.9]  Yes    Anemia of renal disease [N18.9, D63.1]  Yes     Chronic    Type 2 diabetes mellitus with stage 5 chronic kidney disease not on chronic dialysis, with long-term current use of insulin [E11.22, N18.5, Z79.4]  Not Applicable     Insulin Dependent        Resolved Hospital Problems   No resolved problems to display.         Anemia of chronic disease  · Likely related to ESRD stage 5  · The patient's H/H is significantly lower compared with previous laboratory measurements.  · The patient exhibits signs and symptoms of acute bleeding.  · There is indication for transfusion.  · Giving 2 units PRBCs  · Patient refused rectal exam.  Obtaining Hemoccult stool  · Vaginal exam negative for acute bleeding  · Will continue to monitor.      Acute on chronic stage 5 Chronic Kidney Disease  · Creatinine has significantly worsened compared to baseline.  Will consult Nephrology to  initiate dialysis.  Patient is now agreeable.  · Renal dose medications  · Avoid nephrotoxic agents  · Maintain euvolemic state    Diabetes mellitus type 2  · BG in acceptable range at this time  · Maintain w/ subcutaneous insulin management order set  · Hold oral diabetic meds  · ADA 1800 kcal diet  · BG goal while in patient is <180mg/dL  · HgA1c = Pending     Hypertension related to chronic kidney disease  · Goal while inpatient is a systolic blood pressure less than 160mmHg  · BP in acceptable range at this time  · Continue current home regimen with hold parameters  · PRN antihypertensives available      Morbid obesity  · Body mass index is 41.47 kg/m².  · Order a cardiac diet  · Counseling given  · Nutrition consult as an outpatient            VTE Risk Mitigation (From admission, onward)         Ordered     heparin (porcine) injection 5,000 Units  Every 8 hours      01/18/20 2340     IP VTE HIGH RISK PATIENT  Once      01/18/20 2340     Place sequential compression device  Until discontinued      01/18/20 2340     Place HENRI hose  Until discontinued      01/18/20 2340                  Adult PRN medications available   DVT prophylaxis given       DISPOSITION:     Will admit to the Hospital Medicine service for further evaluation and treatment.    Chart reviewed and updated where applicable.    High Risk Conditions:  Patient has a condition that poses threat to life and bodily function:  Symptomatic anemia and acute on chronic renal failure      ===============================================================    Jay Zavala MD, MPH  Department of Hospital Medicine   [facility]  [phone] pg  (7pm - 6am)          This note is dictated using Little1 voice recognition software.  There are word recognition mistakes that are occasionally missed on review.

## 2020-01-19 NOTE — CARE UPDATE
Reviewed H and P by my colleague and agree with A and P. Patient presenting with anemia -Hgb of 5.8 and transfused to 8.2.  Likely from ESRD. Patient now willing to undergo dialysis.  Nephrology consulted.  will be consulted for arrangement of out patient  Dialysis.

## 2020-01-19 NOTE — NURSING
Pt arrived from ED via stretcher with blood infusing and moved into bed with no assist. Tele box was applied and admit and assessment completed. Pt appears in NAD at this time, will continue with current plan of care.

## 2020-01-19 NOTE — ED PROVIDER NOTES
Encounter Date: 1/18/2020    SCRIBE #1 NOTE: I, Bianca Mix , am scribing for, and in the presence of,  Willian Bonner MD. I have scribed the following portions of the note - Other sections scribed: HPI, ROS, PE, EKG, ED Management.       History     Chief Complaint   Patient presents with    Anemia     Pt reports that she went for doctor appointment Dr Franks and was told to come and get a blood transfusion, pt blood press 231/109     CC: Abnormal lab    Patient is a 39 y.o female with a PMHx of Iron deficiency anemia, DM, CKD, and HTN who presents to the ED complaining of abnormal that began PTA. Patient states that she was instructed to visit the ED by her PCP for anemia and assessment of renal function following a clinic visit. She states that she was also recommended for dialysis at this visit. Patient reports that she was recommended for dialysis in May 2019. However, she did not follow up with a provider at that time. She reports of Hx of blood transfusion in 2012. Patient reports of bilateral leg swelling that began 3 days ago that has since improved. She denies rectal bleeding. Patient denies SOB, CP, and abdominal pain. Her LMP finished on 5 days ago. Patient states that she takes Insulin for management of DM. Patient takes Nifedipine 90 mg daily for HTN management. She did not take her HTN medication today.     The history is provided by the patient. No  was used.     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     Diabetes mellitus 1996    Insulin Dependent    Galactorrhea     Hyperphosphatemia     Hypertension     Iron deficiency anemia     Obesity     Secondary hyperparathyroidism of renal origin     Vitamin D deficiency      Past Surgical History:    Procedure Laterality Date     SECTION, CLASSIC      TUBAL LIGATION       Family History   Problem Relation Age of Onset    Seizures Mother 64    Heart failure Father 58    Asthma Sister     Breast cancer Neg Hx     Colon cancer Neg Hx     Ovarian cancer Neg Hx      Social History     Tobacco Use    Smoking status: Former Smoker     Types: Cigarettes    Smokeless tobacco: Never Used   Substance Use Topics    Alcohol use: No    Drug use: Yes     Types: Marijuana     Comment: Occassional Recreational Use only     Review of Systems   Constitutional: Negative for fever.   HENT: Negative for congestion.    Respiratory: Negative for cough and shortness of breath.    Cardiovascular: Positive for leg swelling (Bilateral ). Negative for chest pain.   Gastrointestinal: Negative for abdominal pain, anal bleeding, constipation, diarrhea, nausea and vomiting.   Genitourinary: Negative for difficulty urinating and dysuria.   Musculoskeletal: Negative for back pain.   Skin: Negative for rash and wound.   Neurological: Negative for headaches.   Psychiatric/Behavioral: Negative for confusion.       Physical Exam     Initial Vitals [20]   BP Pulse Resp Temp SpO2   (!) 231/109 90 20 98 °F (36.7 °C) 100 %      MAP       --         Physical Exam    Nursing note and vitals reviewed.  Constitutional: She appears well-developed and well-nourished. She is not diaphoretic. No distress.   HENT:   Head: Normocephalic and atraumatic.   Mouth/Throat: Oropharynx is clear and moist.   Eyes: Conjunctivae are normal. Right eye exhibits no discharge. Left eye exhibits no discharge.   Neck: Normal range of motion.   Cardiovascular: Normal rate, regular rhythm and normal heart sounds. Exam reveals no gallop and no friction rub.    No murmur heard.  Pulmonary/Chest: Breath sounds normal. No respiratory distress. She has no wheezes. She has no rhonchi. She has no rales. She exhibits no tenderness.   Abdominal: Soft. She  exhibits no distension and no mass. There is no tenderness. There is no rebound and no guarding.   Genitourinary:   Genitourinary Comments: Female nurse was present during pelvic exam. There was no bleeding.    Musculoskeletal: Normal range of motion. She exhibits edema. She exhibits no tenderness.   1+ pitting edema to bilateral lower extremities.    Neurological: She is alert and oriented to person, place, and time. She has normal strength. No cranial nerve deficit. GCS score is 15. GCS eye subscore is 4. GCS verbal subscore is 5. GCS motor subscore is 6.   Skin: Skin is warm and dry. No rash noted. No erythema.   Psychiatric: She has a normal mood and affect. Her behavior is normal. Judgment and thought content normal.         ED Course   Procedures  Labs Reviewed   CBC W/ AUTO DIFFERENTIAL - Abnormal; Notable for the following components:       Result Value    RBC 2.22 (*)     Hemoglobin 5.8 (*)     Hematocrit 19.4 (*)     Mean Corpuscular Hemoglobin 26.1 (*)     Mean Corpuscular Hemoglobin Conc 29.9 (*)     RDW 15.5 (*)     Immature Granulocytes 0.7 (*)     Immature Grans (Abs) 0.05 (*)     All other components within normal limits    Narrative:      HGB,HCT  critical result(s) called and verbal readback obtained from   Brenton Soto @3868 by KSL 01/18/2020 21:15   COMPREHENSIVE METABOLIC PANEL - Abnormal; Notable for the following components:    CO2 15 (*)     BUN, Bld 75 (*)     Creatinine 10.0 (*)     Calcium 8.0 (*)     ALT 9 (*)     eGFR if  5 (*)     eGFR if non  4 (*)     All other components within normal limits   URINALYSIS, REFLEX TO URINE CULTURE - Abnormal; Notable for the following components:    Protein, UA 2+ (*)     Glucose, UA 2+ (*)     Occult Blood UA 1+ (*)     All other components within normal limits    Narrative:     Preferred Collection Type->Urine, Clean Catch   POCT GLUCOSE - Abnormal; Notable for the following components:    POCT Glucose 118 (*)     All  other components within normal limits   APTT   PROTIME-INR   URINALYSIS MICROSCOPIC    Narrative:     Preferred Collection Type->Urine, Clean Catch   POCT URINE PREGNANCY   TYPE & SCREEN   PREPARE RBC SOFT     EKG Readings: (Independently Interpreted)   Initial Reading: No STEMI. Previous EKG: Compared with most recent EKG Previous EKG Date: 02/15/2017. Rhythm: Normal Sinus Rhythm. Heart Rate: 92 bpm. Ectopy: No Ectopy. Conduction: Normal. ST Segments: Normal ST Segments. T Waves: Normal. Clinical Impression: Normal Sinus Rhythm       Imaging Results          X-Ray Chest AP Portable (Final result)  Result time 01/18/20 20:31:30    Final result by Shwetha Garrett MD (01/18/20 20:31:30)                 Impression:      No acute cardiopulmonary process identified.      Electronically signed by: Shwetha Garrett MD  Date:    01/18/2020  Time:    20:31             Narrative:    EXAMINATION:  XR CHEST AP PORTABLE    CLINICAL HISTORY:  htn;    TECHNIQUE:  Single frontal view of the chest was performed.    COMPARISON:  None    FINDINGS:  Cardiac silhouette is prominent, noting magnification on this single AP portable view.  Lungs are symmetrically expanded.  No evidence of focal consolidative process, pneumothorax, or significant effusion.  No acute osseous abnormality identified.                                 Medical Decision Making:   Initial Assessment:   39-year-old female presenting secondary to being told to come in due to having abnormal labs.  Patient was found to be severely anemic at 5.8.  Patient is given 2 units of blood.  Patient also has worsening creatinine which is now 10.  This is acute on chronic renal failure.  Patient is now willing to get on dialysis.  Will have Nephrology see her.  Potassium is reassuring.  Her BUN is elevated but patient is not confused or altered.  Patient is hypertensive but has not taken her blood pressure medications.  Given her home blood pressure medications and p.r.n.  hydralazine as needed for blood pressure control.  Patient denies any bleeding.  I recommended that I do a rectal exam but she refused.  Pelvic exam showed no bleeding or discharge. Patient was admitted for further workup and management.  Agreeable to get blood and also to discuss getting on dialysis.    Please put in 35 minutes of critical care due to patient having a high risk of hematological/renal failure.   Separate from teaching and exclusive of procedure and ekg time  Includes:  Time at bedside  Time reviewing test results  Time discussing case with staff  Time documenting the medical record  Time spent with family members  Time spent with consults  Management    Independently Interpreted Test(s):   I have ordered and independently interpreted X-rays - see prior notes.  I have ordered and independently interpreted EKG Reading(s) - see prior notes  Clinical Tests:   Lab Tests: Ordered and Reviewed  Radiological Study: Ordered and Reviewed  Medical Tests: Ordered and Reviewed  ED Management:  2047: Performed pelvic exam. Patient did not consent to rectal exam.    2135: Obtained informed consent for blood transfusion.    2158: Discussed patient's case with Dr. Zavala, hospitalist.             Scribe Attestation:   Scribe #1: I performed the above scribed service and the documentation accurately describes the services I performed. I attest to the accuracy of the note.                          Clinical Impression:       ICD-10-CM ICD-9-CM   1. HTN (hypertension) I10 401.9   2. Anemia, unspecified type D64.9 285.9   3. Stage 5 chronic kidney disease not on chronic dialysis N18.5 585.5          I, Willian Bonner, personally performed the services described in this documentation. All medical record entries made by the scribe were at my direction and in my presence.  I have reviewed the chart and agree that the record reflects my personal performance and is accurate and complete.               Willian Bonner,  MD  01/18/20 0942

## 2020-01-20 ENCOUNTER — TELEPHONE (OUTPATIENT)
Dept: SURGERY | Facility: CLINIC | Age: 40
End: 2020-01-20

## 2020-01-20 VITALS
HEIGHT: 68 IN | WEIGHT: 280.44 LBS | DIASTOLIC BLOOD PRESSURE: 81 MMHG | SYSTOLIC BLOOD PRESSURE: 169 MMHG | RESPIRATION RATE: 18 BRPM | HEART RATE: 96 BPM | BODY MASS INDEX: 42.5 KG/M2 | OXYGEN SATURATION: 100 % | TEMPERATURE: 98 F

## 2020-01-20 LAB
25(OH)D3+25(OH)D2 SERPL-MCNC: 11 NG/ML (ref 30–96)
ALBUMIN SERPL BCP-MCNC: 3.2 G/DL (ref 3.5–5.2)
ALP SERPL-CCNC: 101 U/L (ref 55–135)
ALT SERPL W/O P-5'-P-CCNC: 9 U/L (ref 10–44)
ANION GAP SERPL CALC-SCNC: 12 MMOL/L (ref 8–16)
AST SERPL-CCNC: 10 U/L (ref 10–40)
BASOPHILS # BLD AUTO: 0.03 K/UL (ref 0–0.2)
BASOPHILS NFR BLD: 0.4 % (ref 0–1.9)
BILIRUB SERPL-MCNC: 0.4 MG/DL (ref 0.1–1)
BUN SERPL-MCNC: 79 MG/DL (ref 6–20)
CALCIUM SERPL-MCNC: 7.9 MG/DL (ref 8.7–10.5)
CHLORIDE SERPL-SCNC: 111 MMOL/L (ref 95–110)
CO2 SERPL-SCNC: 17 MMOL/L (ref 23–29)
CREAT SERPL-MCNC: 9.7 MG/DL (ref 0.5–1.4)
DIFFERENTIAL METHOD: ABNORMAL
EOSINOPHIL # BLD AUTO: 0.2 K/UL (ref 0–0.5)
EOSINOPHIL NFR BLD: 2.2 % (ref 0–8)
ERYTHROCYTE [DISTWIDTH] IN BLOOD BY AUTOMATED COUNT: 14.8 % (ref 11.5–14.5)
EST. GFR  (AFRICAN AMERICAN): 5 ML/MIN/1.73 M^2
EST. GFR  (NON AFRICAN AMERICAN): 4 ML/MIN/1.73 M^2
FERRITIN SERPL-MCNC: 63 NG/ML (ref 20–300)
GLUCOSE SERPL-MCNC: 101 MG/DL (ref 70–110)
HAV IGM SERPL QL IA: NEGATIVE
HBV CORE IGM SERPL QL IA: NEGATIVE
HBV SURFACE AG SERPL QL IA: NEGATIVE
HCT VFR BLD AUTO: 26.7 % (ref 37–48.5)
HCV AB SERPL QL IA: NEGATIVE
HGB BLD-MCNC: 8.1 G/DL (ref 12–16)
IMM GRANULOCYTES # BLD AUTO: 0.03 K/UL (ref 0–0.04)
IMM GRANULOCYTES NFR BLD AUTO: 0.4 % (ref 0–0.5)
IRON SERPL-MCNC: 33 UG/DL (ref 30–160)
LYMPHOCYTES # BLD AUTO: 0.7 K/UL (ref 1–4.8)
LYMPHOCYTES NFR BLD: 10.1 % (ref 18–48)
MAGNESIUM SERPL-MCNC: 1.5 MG/DL (ref 1.6–2.6)
MCH RBC QN AUTO: 26.6 PG (ref 27–31)
MCHC RBC AUTO-ENTMCNC: 30.3 G/DL (ref 32–36)
MCV RBC AUTO: 88 FL (ref 82–98)
MONOCYTES # BLD AUTO: 0.4 K/UL (ref 0.3–1)
MONOCYTES NFR BLD: 5.8 % (ref 4–15)
NEUTROPHILS # BLD AUTO: 5.6 K/UL (ref 1.8–7.7)
NEUTROPHILS NFR BLD: 81.1 % (ref 38–73)
NRBC BLD-RTO: 0 /100 WBC
PHOSPHATE SERPL-MCNC: 6.8 MG/DL (ref 2.7–4.5)
PLATELET # BLD AUTO: 175 K/UL (ref 150–350)
PMV BLD AUTO: 10.4 FL (ref 9.2–12.9)
POCT GLUCOSE: 117 MG/DL (ref 70–110)
POCT GLUCOSE: 128 MG/DL (ref 70–110)
POTASSIUM SERPL-SCNC: 4.6 MMOL/L (ref 3.5–5.1)
PROT SERPL-MCNC: 6.7 G/DL (ref 6–8.4)
PTH-INTACT SERPL-MCNC: 1086.7 PG/ML (ref 9–77)
RBC # BLD AUTO: 3.05 M/UL (ref 4–5.4)
SATURATED IRON: 12 % (ref 20–50)
SODIUM SERPL-SCNC: 140 MMOL/L (ref 136–145)
TOTAL IRON BINDING CAPACITY: 265 UG/DL (ref 250–450)
TRANSFERRIN SERPL-MCNC: 179 MG/DL (ref 200–375)
WBC # BLD AUTO: 6.86 K/UL (ref 3.9–12.7)

## 2020-01-20 PROCEDURE — 83540 ASSAY OF IRON: CPT

## 2020-01-20 PROCEDURE — 63600175 PHARM REV CODE 636 W HCPCS: Performed by: INTERNAL MEDICINE

## 2020-01-20 PROCEDURE — 86704 HEP B CORE ANTIBODY TOTAL: CPT

## 2020-01-20 PROCEDURE — 83735 ASSAY OF MAGNESIUM: CPT

## 2020-01-20 PROCEDURE — 25000003 PHARM REV CODE 250: Performed by: HOSPITALIST

## 2020-01-20 PROCEDURE — 86706 HEP B SURFACE ANTIBODY: CPT

## 2020-01-20 PROCEDURE — 84100 ASSAY OF PHOSPHORUS: CPT

## 2020-01-20 PROCEDURE — 36415 COLL VENOUS BLD VENIPUNCTURE: CPT

## 2020-01-20 PROCEDURE — 80053 COMPREHEN METABOLIC PANEL: CPT

## 2020-01-20 PROCEDURE — 85025 COMPLETE CBC W/AUTO DIFF WBC: CPT

## 2020-01-20 PROCEDURE — 63600175 PHARM REV CODE 636 W HCPCS: Performed by: HOSPITALIST

## 2020-01-20 PROCEDURE — 83970 ASSAY OF PARATHORMONE: CPT

## 2020-01-20 PROCEDURE — 25000003 PHARM REV CODE 250: Performed by: INTERNAL MEDICINE

## 2020-01-20 PROCEDURE — 82306 VITAMIN D 25 HYDROXY: CPT

## 2020-01-20 PROCEDURE — 82728 ASSAY OF FERRITIN: CPT

## 2020-01-20 RX ORDER — MAGNESIUM SULFATE HEPTAHYDRATE 40 MG/ML
2 INJECTION, SOLUTION INTRAVENOUS ONCE
Status: COMPLETED | OUTPATIENT
Start: 2020-01-20 | End: 2020-01-20

## 2020-01-20 RX ADMIN — SODIUM BICARBONATE 1300 MG: 325 TABLET ORAL at 09:01

## 2020-01-20 RX ADMIN — NIFEDIPINE 90 MG: 30 TABLET, FILM COATED, EXTENDED RELEASE ORAL at 09:01

## 2020-01-20 RX ADMIN — MAGNESIUM SULFATE 2 G: 2 INJECTION INTRAVENOUS at 01:01

## 2020-01-20 RX ADMIN — HEPARIN SODIUM 5000 UNITS: 5000 INJECTION, SOLUTION INTRAVENOUS; SUBCUTANEOUS at 05:01

## 2020-01-20 RX ADMIN — ATORVASTATIN CALCIUM 10 MG: 10 TABLET, FILM COATED ORAL at 09:01

## 2020-01-20 NOTE — PROGRESS NOTES
[facility] Hospital Medicine  Progress Note    Patient Name: Xuan Ricardo  MRN: [id number]  Patient Class: IP- Inpatient   Admission Date: 2020  Length of Stay: 2 days  Attending Physician: Harris Dc MD  Primary Care Provider: Katharine Franks MD        Subjective:     Principal Problem:Symptomatic anemia        HPI:  Xuan Ricardo is a 39 y.o. 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  has a past surgical history that includes  section, classic and Tubal ligation. Presents to [facility] Emergency Department With report of anemia.  She was directed to come to the emergency department by her primary care physician who reviewed routine laboratory studies and found her to be severely anemic.  She complains of chronic fatigue but denies significant dyspnea with exertion, chest pain, dizziness, syncope, or palpitations.  No melena, hematochezia, hematuria, epistaxis, abnormal vaginal bleeding, or other obvious blood loss.  Her last menstrual period was 5 days ago.  She reports normal periods.  She has had worsening renal function over the last several years.  She was recommended to have dialysis beginning in May of last year but she did not show for the appointment and has not sought evaluation since that time.  However she is willing to start dialysis now as recommended by her primary care physician today.    In the Emergency department routine laboratory studies were obtained which confirmed the severe anemia.  2 units of packed red blood cells were ordered for transfusion.  Her creatinine was also 10.  This is higher than her baseline.  Nephrology was consulted.    Hospital medicine has been asked to admit to  inpatient for further evaluation and treatment.       Overview/Hospital Course:  Xuan Cousin is a 39 y.o. 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  has a past surgical history that includes  section, classic and Tubal ligation. Presents to [facility] Emergency Department With report of anemia.  She was directed to come to the emergency department by her primary care physician who reviewed routine laboratory studies and found her to be severely anemic. In the Emergency department routine laboratory studies were obtained which confirmed the severe anemia.  2 units of packed red blood cells were ordered for transfusion.  Her creatinine was also 10.  This is higher than her baseline.  Nephrology was consulted. (patient has had a known history of ESRD but has not been compliant with follow up). Her Hgb was 5.8 on admission and she received blood.  Anemia likely secondary to ESRD.      Interval History: Patient not interested in talking.      Review of Systems   Constitutional: Negative for activity change.   HENT: Negative for congestion.    Respiratory: Negative for chest tightness, shortness of breath and stridor.    Cardiovascular: Negative for chest pain and palpitations.   Gastrointestinal: Negative for abdominal distention.   Genitourinary: Negative for difficulty urinating.   Musculoskeletal: Negative for arthralgias.   Neurological: Negative for dizziness.   Psychiatric/Behavioral: Negative for agitation and behavioral problems.     Objective:     Vital Signs (Most Recent):  Temp: 97.5 °F (36.4 °C) (20 1200)  Pulse: 96 (20 1200)  Resp: 18 (20 1200)  BP: (!) 169/81 (20 1200)  SpO2: 100 % (20 1200) Vital Signs (24h  Range):  Temp:  [97.5 °F (36.4 °C)-98 °F (36.7 °C)] 97.5 °F (36.4 °C)  Pulse:  [89-96] 96  Resp:  [18-20] 18  SpO2:  [98 %-100 %] 100 %  BP: (141-169)/(69-81) 169/81     Weight: 127.2 kg (280 lb 6.8 oz)  Body mass index is 42.64 kg/m².    Intake/Output Summary (Last 24 hours) at 1/20/2020 1242  Last data filed at 1/20/2020 0900  Gross per 24 hour   Intake 840 ml   Output --   Net 840 ml      Physical Exam   Constitutional: She is oriented to person, place, and time. She appears well-developed and well-nourished.   HENT:   Head: Normocephalic and atraumatic.   Neurological: She is alert and oriented to person, place, and time.   Skin: Skin is warm and dry.   Psychiatric: She has a normal mood and affect. Her behavior is normal.   Nursing note and vitals reviewed.      Significant Labs:   BMP:   Recent Labs   Lab 01/20/20  0453         K 4.6   *   CO2 17*   BUN 79*   CREATININE 9.7*   CALCIUM 7.9*   MG 1.5*     CBC:   Recent Labs   Lab 01/18/20  1954 01/19/20  0517 01/20/20  0453   WBC 6.70 7.89 6.86   HGB 5.8* 8.2* 8.1*   HCT 19.4* 26.4* 26.7*    179 175       Significant Imaging:      Assessment/Plan:      * Symptomatic anemia  Transfused blood.   No longer issue.  Likely from ESRD.  Anemia was chronic blood loss anemia       Hypertension  As above       Type 2 diabetes mellitus with stage 5 chronic kidney disease not on chronic dialysis, with long-term current use of insulin  Will check an A1c.         Class 2 severe obesity due to excess calories with serious comorbidity and body mass index (BMI) of 39.0 to 39.9 in adult  Body mass index is 42.64 kg/m².  Morbid.  Weight loss as out patient       CKD (chronic kidney disease) stage 5, GFR less than 15 ml/min  As above.       Hypertensive CKD (chronic kidney disease)  No acute issues     Anemia of renal disease  As noted .      hypomagnesemia will replace.    VTE Risk Mitigation (From admission, onward)         Ordered     heparin (porcine)  injection 5,000 Units  Every 8 hours      01/18/20 2340     IP VTE HIGH RISK PATIENT  Once      01/18/20 2340     Place sequential compression device  Until discontinued      01/18/20 2340     Place HENRI hose  Until discontinued      01/18/20 2340              Surgery said line Wed??? Too long. Patient may leave.          Harris Omalley MD  Department of Hospital Medicine   [facility]

## 2020-01-20 NOTE — PLAN OF CARE
Problem: Adult Inpatient Plan of Care  Goal: Plan of Care Review  Outcome: Ongoing, Progressing  Goal: Patient-Specific Goal (Individualization)  Outcome: Ongoing, Progressing  Goal: Absence of Hospital-Acquired Illness or Injury  Outcome: Ongoing, Progressing  Goal: Optimal Comfort and Wellbeing  Outcome: Ongoing, Progressing  Goal: Readiness for Transition of Care  Outcome: Ongoing, Progressing  Goal: Rounds/Family Conference  Outcome: Ongoing, Progressing     Problem: Fall Injury Risk  Goal: Absence of Fall and Fall-Related Injury  Outcome: Ongoing, Progressing     Problem: Diabetes Comorbidity  Goal: Blood Glucose Level Within Desired Range  Outcome: Ongoing, Progressing

## 2020-01-20 NOTE — NURSING
Spoke to Dr Vicente in regards to patient not willing to stay here until Wednesday to receive  dialysis catheter. He stated they can not do it until Wednesday. Said I should inform the hospitalist. Will secure chat Dr Dc.

## 2020-01-20 NOTE — CONSULTS
[facility]    General Surgery Consult Note      Patient Name: Xuan Ricardo  MRN: [id number]  Admission Date: 2020  Attending Physician: Harris Dc MD   Primary Care Provider: Katharine Franks MD         Patient information was obtained from patient and ER records.     Subjective:     Principal Problem:Symptomatic anemia    Chief Complaint:   Chief Complaint   Patient presents with    Anemia     Pt reports that she went for doctor appointment Dr Franks and was told to come and get a blood transfusion, pt blood press 231/109        HPI: 38yo F with PMH CKD5 who presented to our facility with SOB that began 5 days ago, worse with exertion.  General Surgery is consulted for PD catheter placement.      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      Current Outpatient Medications on File Prior to Encounter   Medication Sig    atorvastatin (LIPITOR) 10 MG tablet Take 1 tablet (10 mg total) by mouth once daily.    calcitRIOL (ROCALTROL) 0.25 MCG Cap TAKE 1 CAPSULE(0.25 MCG) BY MOUTH EVERY DAY    calcitRIOL (ROCALTROL) 0.25 MCG Cap Take 0.25 mcg by mouth once daily.    ferrous sulfate 325 (65 FE) MG EC tablet Take 1 tablet (325 mg total) by mouth once daily.    insulin NPH (NOVOLIN N) 100 unit/mL injection Inject 15 Units into the skin before breakfast.  "   NIFEdipine (PROCARDIA-XL) 90 MG (OSM) 24 hr tablet TAKE 1 TABLET(90 MG) BY MOUTH EVERY DAY    sodium bicarbonate 650 MG tablet Take 1 tablet (650 mg total) by mouth 2 (two) times daily.    blood sugar diagnostic Strp Check blood glucose 3 times daily    lancets (ONETOUCH DELICA LANCETS) 30 gauge Misc 3 (three) times daily    pen needle, diabetic (BD ULTRA-FINE SHORT PEN NEEDLE) 31 gauge x 5/16" Ndle daily     Family History     Problem Relation (Age of Onset)    Asthma Sister    Heart failure Father (58)    Seizures Mother (64)        Tobacco Use    Smoking status: Former Smoker     Types: Cigarettes    Smokeless tobacco: Never Used   Substance and Sexual Activity    Alcohol use: No    Drug use: Yes     Types: Marijuana     Comment: Occassional Recreational Use only    Sexual activity: Not Currently     Partners: Male     Review of Systems   Constitutional: Negative for fever.   HENT: Negative for congestion.    Respiratory: Negative for cough and shortness of breath.    Cardiovascular: Positive for leg swelling (Bilateral ). Negative for chest pain.   Gastrointestinal: Negative for abdominal pain, anal bleeding, constipation, diarrhea, nausea and vomiting.   Genitourinary: Negative for difficulty urinating and dysuria.   Musculoskeletal: Negative for back pain.   Skin: Negative for rash and wound.   Neurological: Negative for headaches.   Psychiatric/Behavioral: Negative for confusion.   Objective:     Vital Signs (Most Recent):  Temp: 97.5 °F (36.4 °C) (01/20/20 1200)  Pulse: 96 (01/20/20 1200)  Resp: 18 (01/20/20 1200)  BP: (!) 169/81 (01/20/20 1200)  SpO2: 100 % (01/20/20 1200) Vital Signs (24h Range):  Temp:  [97.5 °F (36.4 °C)-98 °F (36.7 °C)] 97.5 °F (36.4 °C)  Pulse:  [89-96] 96  Resp:  [18-20] 18  SpO2:  [98 %-100 %] 100 %  BP: (141-169)/(69-81) 169/81     Weight: 127.2 kg (280 lb 6.8 oz)  Body mass index is 42.64 kg/m².    Physical Exam     Constitutional: She is oriented to person, place, and " time. She appears well-developed and well-nourished. No distress.   HENT:   Head: Normocephalic and atraumatic.   Eyes: EOM are normal. No scleral icterus.   Cardiovascular: Normal rate and regular rhythm. Exam reveals no friction rub.   No murmur heard.  Pulmonary/Chest: Effort normal and breath sounds normal. No respiratory distress. She has no wheezes. She has no rales.   Abdominal: Soft. She exhibits no distension. There is no tenderness. There is no guarding. Lower midline pfannenstiel scar, abdomen is very obese.    Musculoskeletal: She exhibits no edema or tenderness.   Neurological: She is alert and oriented to person, place, and time.   No Asterixis   Skin: Skin is warm and dry. No rash noted. She is not diaphoretic. No erythema.   Psychiatric: She has a normal mood and affect. Her behavior is normal. Thought content normal.   Nursing note and vitals reviewed.         Assessment/Plan:     38yo F with ESRD pre-dialysis, general surgery is consulted for PD catheter.      -Plan for laparoscopic catheter placement on Wednesday  -NPO midnight tmrw      Eddi Vicente MD  [facility] Surgery Resident PGY VI  C: [phone]

## 2020-01-20 NOTE — NURSING
Bedside report given to NORMAN Aguirre. Pt resting in bed playing on smartphone. NADN. Respirations even and unlabored. Denies pain/discomfort. Call light in reach.

## 2020-01-20 NOTE — NURSING
Spoke with Dr Dowd and informed her of pt wanting to leave today. She said she would be going AMA and tell her to follow up with Dr Cole.  Secure chat message sent to Dr Bee

## 2020-01-20 NOTE — NURSING
Bedside report received. Pt resting in bed with eyes closed. Awakens easily to verbal stimulus.  Respirations even and unlabored. NADN. Bed alarm activated. Call light in reach.

## 2020-01-20 NOTE — NURSING
Reported off to oncoming nurse, patient resting in bed, aaox4. Patient can make needs known to staff, minimal assist required for adls and transfers, no acute distress noted, safety precautions maintained.     Chart check completed.

## 2020-01-20 NOTE — PLAN OF CARE
Problem: Adult Inpatient Plan of Care  Goal: Plan of Care Review  Flowsheets (Taken 1/19/2020 1803)  Plan of Care Reviewed With: patient     Problem: Fall Injury Risk  Goal: Absence of Fall and Fall-Related Injury  Intervention: Identify and Manage Contributors to Fall Injury Risk  Flowsheets (Taken 1/19/2020 1803)  Self-Care Promotion: independence encouraged  Medication Review/Management: medications reviewed  Intervention: Promote Injury-Free Environment  Flowsheets (Taken 1/19/2020 1803)  Safety Promotion/Fall Prevention: assistive device/personal item within reach; bed alarm set; nonskid shoes/socks when out of bed; side rails raised x 2; instructed to call staff for mobility     Problem: Diabetes Comorbidity  Goal: Blood Glucose Level Within Desired Range  Intervention: Maintain Glycemic Control  Flowsheets (Taken 1/19/2020 1803)  Glycemic Management: blood glucose monitoring

## 2020-01-20 NOTE — SUBJECTIVE & OBJECTIVE
Interval History: Patient not interested in talking.      Review of Systems   Constitutional: Negative for activity change.   HENT: Negative for congestion.    Respiratory: Negative for chest tightness, shortness of breath and stridor.    Cardiovascular: Negative for chest pain and palpitations.   Gastrointestinal: Negative for abdominal distention.   Genitourinary: Negative for difficulty urinating.   Musculoskeletal: Negative for arthralgias.   Neurological: Negative for dizziness.   Psychiatric/Behavioral: Negative for agitation and behavioral problems.     Objective:     Vital Signs (Most Recent):  Temp: 97.5 °F (36.4 °C) (01/20/20 1200)  Pulse: 96 (01/20/20 1200)  Resp: 18 (01/20/20 1200)  BP: (!) 169/81 (01/20/20 1200)  SpO2: 100 % (01/20/20 1200) Vital Signs (24h Range):  Temp:  [97.5 °F (36.4 °C)-98 °F (36.7 °C)] 97.5 °F (36.4 °C)  Pulse:  [89-96] 96  Resp:  [18-20] 18  SpO2:  [98 %-100 %] 100 %  BP: (141-169)/(69-81) 169/81     Weight: 127.2 kg (280 lb 6.8 oz)  Body mass index is 42.64 kg/m².    Intake/Output Summary (Last 24 hours) at 1/20/2020 1242  Last data filed at 1/20/2020 0900  Gross per 24 hour   Intake 840 ml   Output --   Net 840 ml      Physical Exam   Constitutional: She is oriented to person, place, and time. She appears well-developed and well-nourished.   HENT:   Head: Normocephalic and atraumatic.   Neurological: She is alert and oriented to person, place, and time.   Skin: Skin is warm and dry.   Psychiatric: She has a normal mood and affect. Her behavior is normal.   Nursing note and vitals reviewed.      Significant Labs:   BMP:   Recent Labs   Lab 01/20/20  0453         K 4.6   *   CO2 17*   BUN 79*   CREATININE 9.7*   CALCIUM 7.9*   MG 1.5*     CBC:   Recent Labs   Lab 01/18/20  1954 01/19/20  0517 01/20/20  0453   WBC 6.70 7.89 6.86   HGB 5.8* 8.2* 8.1*   HCT 19.4* 26.4* 26.7*    179 175       Significant Imaging:

## 2020-01-20 NOTE — DISCHARGE SUMMARY
[facility] Hospital Medicine  Discharge Summary      Patient Name: Xuan Ricardo  MRN: [id number]  Admission Date: 2020  Hospital Length of Stay: 2 days  Discharge Date and Time:  2020 1:59 PM  Attending Physician: Harris Dc MD   Discharging Provider: Harris Dc MD  Primary Care Provider: Katharine Franks MD      HPI:   Xuan Ricardo is a 39 y.o. 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  has a past surgical history that includes  section, classic and Tubal ligation. Presents to [facility] Emergency Department With report of anemia.  She was directed to come to the emergency department by her primary care physician who reviewed routine laboratory studies and found her to be severely anemic.  She complains of chronic fatigue but denies significant dyspnea with exertion, chest pain, dizziness, syncope, or palpitations.  No melena, hematochezia, hematuria, epistaxis, abnormal vaginal bleeding, or other obvious blood loss.  Her last menstrual period was 5 days ago.  She reports normal periods.  She has had worsening renal function over the last several years.  She was recommended to have dialysis beginning in May of last year but she did not show for the appointment and has not sought evaluation since that time.  However she is willing to start dialysis now as recommended by her primary care physician today.    In the Emergency department routine laboratory studies were obtained which confirmed the severe anemia.  2 units of packed red blood cells were ordered for transfusion.  Her creatinine was also 10.  This is higher than her baseline.  Nephrology was consulted.    Hospital medicine has been asked to  admit to inpatient for further evaluation and treatment.       Procedure(s) (LRB):  INSERTION, CATHETER, DIALYSIS, PERITONEAL, LAPAROSCOPIC (N/A)      Hospital Course:   Xuan Ricardo is a 39 y.o. 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  has a past surgical history that includes  section, classic and Tubal ligation. Presents to [facility] Emergency Department With report of anemia.  She was directed to come to the emergency department by her primary care physician who reviewed routine laboratory studies and found her to be severely anemic. In the Emergency department routine laboratory studies were obtained which confirmed the severe anemia.  2 units of packed red blood cells were ordered for transfusion.  Her creatinine was also 10.  This is higher than her baseline.  Nephrology was consulted. (patient has had a known history of ESRD but has not been compliant with follow up). Her Hgb was 5.8 on admission and she received blood.  Anemia likely secondary to ESRD.  Patient left AMA on 20     Consults:   Consults (From admission, onward)        Status Ordering Provider     Inpatient consult to General Surgery  Once     Provider:  Simón Blanco MD    Completed TG DOWD     Inpatient consult to Nephrology  Once     Provider:  Tg Dowd MD    Completed KAREN LEDEZMA     Inpatient consult to Social Work/Case Management  Once     Provider:  (Not yet assigned)    Completed TG DOWD     Inpatient consult to Social Work/Case Management  Once     Provider:  (Not yet assigned)    Acknowledged TG DOWD          No new Assessment & Plan notes have been filed under this hospital service since the last note  "was generated.  Service: Hospital Medicine    Final Active Diagnoses:    Diagnosis Date Noted POA    PRINCIPAL PROBLEM:  Symptomatic anemia [D64.9] 01/19/2020 Yes    Class 2 severe obesity due to excess calories with serious comorbidity and body mass index (BMI) of 39.0 to 39.9 in adult [E66.01, Z68.39]  Not Applicable    Hypertension [I10]  Yes    CKD (chronic kidney disease) stage 5, GFR less than 15 ml/min [N18.5]  Yes    Hypertensive CKD (chronic kidney disease) [I12.9] 12/14/2016 Yes    Anemia of renal disease [N18.9, D63.1] 04/09/2016 Yes     Chronic    Type 2 diabetes mellitus with stage 5 chronic kidney disease not on chronic dialysis, with long-term current use of insulin [E11.22, N18.5, Z79.4] 01/01/1996 Not Applicable      Problems Resolved During this Admission:       Discharged Condition: against medical advice    Disposition:     Follow Up:    Patient Instructions:   No discharge procedures on file.    Significant Diagnostic Studies:    Pending Diagnostic Studies:     Procedure Component Value Units Date/Time    Hepatitis B core antibody, total [[id number]] Collected:  01/20/20 0453    Order Status:  Sent Lab Status:  In process Updated:  01/20/20 1101    Specimen:  Blood     Hepatitis B surface antibody [[id number]] Collected:  01/20/20 0453    Order Status:  Sent Lab Status:  In process Updated:  01/20/20 1101    Specimen:  Blood          Medications:  Reconciled Home Medications:      Medication List      ASK your doctor about these medications    atorvastatin 10 MG tablet  Commonly known as:  LIPITOR  Take 1 tablet (10 mg total) by mouth once daily.     BD Ultra-Fine Short Pen Needle 31 gauge x 5/16" Ndle  Generic drug:  pen needle, diabetic  daily     blood sugar diagnostic Strp  Check blood glucose 3 times daily     * calcitRIOL 0.25 MCG Cap  Commonly known as:  ROCALTROL  TAKE 1 CAPSULE(0.25 MCG) BY MOUTH EVERY DAY     * calcitRIOL 0.25 MCG Cap  Commonly known as:  ROCALTROL  Take 0.25 mcg " by mouth once daily.     ferrous sulfate 325 (65 FE) MG EC tablet  Take 1 tablet (325 mg total) by mouth once daily.     insulin  unit/mL injection  Inject 15 Units into the skin before breakfast.     NIFEdipine 90 MG (OSM) 24 hr tablet  Commonly known as:  PROCARDIA-XL  TAKE 1 TABLET(90 MG) BY MOUTH EVERY DAY  Ask about: Which instructions should I use?     OneTouch Delica Lancets 30 gauge Misc  Generic drug:  lancets  3 (three) times daily     sodium bicarbonate 650 MG tablet  Take 1 tablet (650 mg total) by mouth 2 (two) times daily.         * This list has 2 medication(s) that are the same as other medications prescribed for you. Read the directions carefully, and ask your doctor or other care provider to review them with you.                Indwelling Lines/Drains at time of discharge:   Lines/Drains/Airways     None                 Time spent on the discharge of patient: > 30 minutes  Patient was seen and examined on the date of discharge and determined to be suitable for discharge.         Harris Omalley MD  Department of Hospital Medicine  [facility]

## 2020-01-20 NOTE — CONSULTS
CHUCKIE reached out to Shania [phone] at [facility]. Shania informed CHUCKIE that she received an email about patient. Shania instructed CHUCKIE to fax facesheet, bloodwork/labs, ESRD labs, Hep panel, Surface Antigen, H&P, and nephrology consult to [phone].

## 2020-01-20 NOTE — HOSPITAL COURSE
Xuan Cousin is a 39 y.o. 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  has a past surgical history that includes  section, classic and Tubal ligation. Presents to [facility] Emergency Department With report of anemia.  She was directed to come to the emergency department by her primary care physician who reviewed routine laboratory studies and found her to be severely anemic. In the Emergency department routine laboratory studies were obtained which confirmed the severe anemia.  2 units of packed red blood cells were ordered for transfusion.  Her creatinine was also 10.  This is higher than her baseline.  Nephrology was consulted. (patient has had a known history of ESRD but has not been compliant with follow up). Her Hgb was 5.8 on admission and she received blood.  Anemia likely secondary to ESRD.  Patient left AMA on 20

## 2020-01-20 NOTE — NURSING
Left message at [facility] Dr Belinda Dowd in regards to pt refusing to stay until Wednesday to get peritoneal dialysis catheter. Surgery unable to do until then.

## 2020-01-21 ENCOUNTER — TELEPHONE (OUTPATIENT)
Dept: SURGERY | Facility: CLINIC | Age: 40
End: 2020-01-21

## 2020-01-21 LAB
HBV CORE AB SERPL QL IA: NEGATIVE
HBV SURFACE AB SER-ACNC: NEGATIVE M[IU]/ML

## 2020-01-27 ENCOUNTER — HOSPITAL ENCOUNTER (OUTPATIENT)
Facility: HOSPITAL | Age: 40
Discharge: HOME | End: 2020-01-27
Attending: RADIOLOGY | Admitting: RADIOLOGY
Payer: MEDICARE

## 2020-01-27 VITALS
OXYGEN SATURATION: 100 % | TEMPERATURE: 98 F | RESPIRATION RATE: 16 BRPM | DIASTOLIC BLOOD PRESSURE: 80 MMHG | SYSTOLIC BLOOD PRESSURE: 165 MMHG | HEART RATE: 85 BPM

## 2020-01-27 DIAGNOSIS — Z01.818 PREOP TESTING: ICD-10-CM

## 2020-01-27 DIAGNOSIS — N18.6 END STAGE RENAL DISEASE: ICD-10-CM

## 2020-01-27 LAB — HCG INTACT+B SERPL-ACNC: <1.2 MIU/ML

## 2020-01-27 PROCEDURE — 63600175 PHARM REV CODE 636 W HCPCS: Performed by: RADIOLOGY

## 2020-01-27 PROCEDURE — 82962 GLUCOSE BLOOD TEST: CPT

## 2020-01-27 PROCEDURE — 36415 COLL VENOUS BLD VENIPUNCTURE: CPT

## 2020-01-27 PROCEDURE — 84702 CHORIONIC GONADOTROPIN TEST: CPT

## 2020-01-27 RX ORDER — HEPARIN SODIUM 5000 [USP'U]/ML
INJECTION, SOLUTION INTRAVENOUS; SUBCUTANEOUS CODE/TRAUMA/SEDATION MEDICATION
Status: COMPLETED | OUTPATIENT
Start: 2020-01-27 | End: 2020-01-27

## 2020-01-27 RX ORDER — FENTANYL CITRATE 50 UG/ML
INJECTION, SOLUTION INTRAMUSCULAR; INTRAVENOUS CODE/TRAUMA/SEDATION MEDICATION
Status: COMPLETED | OUTPATIENT
Start: 2020-01-27 | End: 2020-01-27

## 2020-01-27 RX ORDER — MIDAZOLAM HYDROCHLORIDE 1 MG/ML
INJECTION INTRAMUSCULAR; INTRAVENOUS CODE/TRAUMA/SEDATION MEDICATION
Status: COMPLETED | OUTPATIENT
Start: 2020-01-27 | End: 2020-01-27

## 2020-01-27 RX ORDER — CEFAZOLIN SODIUM 1 G/50ML
SOLUTION INTRAVENOUS
Status: COMPLETED | OUTPATIENT
Start: 2020-01-27 | End: 2020-01-27

## 2020-01-27 RX ADMIN — MIDAZOLAM HYDROCHLORIDE 0.5 MG: 1 INJECTION, SOLUTION INTRAMUSCULAR; INTRAVENOUS at 12:01

## 2020-01-27 RX ADMIN — FENTANYL CITRATE 25 MCG: 50 INJECTION INTRAMUSCULAR; INTRAVENOUS at 12:01

## 2020-01-27 RX ADMIN — FENTANYL CITRATE 50 MCG: 50 INJECTION INTRAMUSCULAR; INTRAVENOUS at 11:01

## 2020-01-27 RX ADMIN — HEPARIN SODIUM 10000 UNITS: 5000 INJECTION INTRAVENOUS; SUBCUTANEOUS at 12:01

## 2020-01-27 RX ADMIN — MIDAZOLAM HYDROCHLORIDE 1 MG: 1 INJECTION, SOLUTION INTRAMUSCULAR; INTRAVENOUS at 11:01

## 2020-01-27 RX ADMIN — CEFAZOLIN SODIUM 1 G: 1 SOLUTION INTRAVENOUS at 11:01

## 2020-01-27 NOTE — H&P
"Radiology History & Physical      SUBJECTIVE:     Chief Complaint: CKD, anemia, DM, HTN    History of Present Illness:  Xuan Cousin is a 39 y.o. female who presents for tunneled catheter placement for dialysis  Past Medical History:   Diagnosis Date    Cataract     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     Diabetes mellitus     Insulin Dependent    Galactorrhea     Hyperphosphatemia     Hypertension     Iron deficiency anemia     Obesity     Secondary hyperparathyroidism of renal origin     Vitamin D deficiency      Past Surgical History:   Procedure Laterality Date     SECTION, CLASSIC      TUBAL LIGATION         Home Meds:   Prior to Admission medications    Medication Sig Start Date End Date Taking? Authorizing Provider   calcitRIOL (ROCALTROL) 0.25 MCG Cap TAKE 1 CAPSULE(0.25 MCG) BY MOUTH EVERY DAY 19  Yes Maria Elena Peterson MD   insulin NPH (NOVOLIN N) 100 unit/mL injection Inject 15 Units into the skin before breakfast. 5/15/19  Yes Leigh Phan MD   NIFEdipine (PROCARDIA-XL) 90 MG (OSM) 24 hr tablet TAKE 1 TABLET(90 MG) BY MOUTH EVERY DAY 19  Yes Maria Elena Peterson MD   sodium bicarbonate 650 MG tablet Take 1 tablet (650 mg total) by mouth 2 (two) times daily. 19 Yes Maria Elena Peterson MD   atorvastatin (LIPITOR) 10 MG tablet Take 1 tablet (10 mg total) by mouth once daily. 19   Maria Elena Peterson MD   blood sugar diagnostic Strp Check blood glucose 3 times daily 5/15/19   Leigh Phan MD   ferrous sulfate 325 (65 FE) MG EC tablet Take 1 tablet (325 mg total) by mouth once daily. 19   Maria Elena Peterson MD   lancets (ONETOUCH DELICA LANCETS) 30 gauge Misc 3 (three) times daily 18   Historical Provider, MD   pen needle, diabetic (BD ULTRA-FINE SHORT PEN NEEDLE) 31 gauge x 5/16" Ndle daily 18   Historical Provider, MD     Anticoagulants/Antiplatelets: no anticoagulation    Allergies:   Review of patient's allergies indicates:   Allergen " Reactions    Vicodin [hydrocodone-acetaminophen] Hives    Codeine Hives     Sedation History:  no adverse reactions    Review of Systems:   Hematological: anemia, no known coagulopathies  Respiratory: no shortness of breath  Cardiovascular: no chest pain  Gastrointestinal: no abdominal pain  Genito-Urinary: no dysuria  Musculoskeletal: negative  Neurological: no TIA or stroke symptoms         OBJECTIVE:     Vital Signs (Most Recent)  Temp: 98 °F (36.7 °C) (01/27/20 1046)  Pulse: 86 (01/27/20 1046)  Resp: 16 (01/27/20 1046)  BP: (!) 175/87 (01/27/20 1046)  SpO2: 100 % (01/27/20 1046)    Physical Exam:  ASA: 2  Mallampati: 2    General: no acute distress  Mental Status: alert and oriented to person, place and time  HEENT: normocephalic, atraumatic  Chest: unlabored breathing  Heart: regular heart rate  Abdomen: nondistended  Extremity: moves all extremities    Laboratory  Lab Results   Component Value Date    INR 1.0 01/18/2020       Lab Results   Component Value Date    WBC 6.86 01/20/2020    HGB 8.1 (L) 01/20/2020    HCT 26.7 (L) 01/20/2020    MCV 88 01/20/2020     01/20/2020      Lab Results   Component Value Date     01/20/2020     01/20/2020    K 4.6 01/20/2020     (H) 01/20/2020    CO2 17 (L) 01/20/2020    BUN 79 (H) 01/20/2020    CREATININE 9.7 (H) 01/20/2020    CALCIUM 7.9 (L) 01/20/2020    MG 1.5 (L) 01/20/2020    ALT 9 (L) 01/20/2020    AST 10 01/20/2020    ALBUMIN 3.2 (L) 01/20/2020    BILITOT 0.4 01/20/2020       ASSESSMENT/PLAN:     Sedation Plan: moderate sedation  Patient will undergo tunneled catheter palcement.    Nacho Gutierrez MD  Staff Radiologist  Department of Radiology  Pager: [phone]

## 2020-01-27 NOTE — DISCHARGE SUMMARY
Radiology Discharge Summary      Hospital Course: No complications    Admit Date: 1/27/2020  Discharge Date: 01/27/2020     Instructions Given to Patient: Yes  Diet: Resume prior diet  Activity: activity as tolerated and no driving for today    Description of Condition on Discharge: Stable  Vital Signs (Most Recent): Temp: 97.6 °F (36.4 °C) (01/27/20 1228)  Pulse: 86 (01/27/20 1330)  Resp: 16 (01/27/20 1330)  BP: (!) 166/81 (01/27/20 1330)  SpO2: 100 % (01/27/20 1330)    Discharge Disposition: Home    Discharge Diagnosis: CKD s/p tunneled dialysis catheter placement     Follow-up: with primary MD Nacho Gutierrez MD  Staff Radiologist  Department of Radiology  Pager: [phone]

## 2020-01-27 NOTE — DISCHARGE INSTRUCTIONS
ACTIVITY LEVEL: If you have received sedation or an anesthetic, you may feel sleepy for several hours. Rest until you are more awake. Gradually resume your normal activities    Do not drive, drink alcohol, or sign legal documents for 24 hours, or if taking narcotic pain medication.      DIET: You may resume your home diet. If nausea is present, increase your diet gradually with fluids and bland foods.    Medications: Pain medication should be taken only if needed and as directed. If antibiotics are prescribed, the medication should be taken until completed. You will be given an updated list of you medications.  ? No driving, alcoholic beverages or signing legal documents for next 24 hours if you have had sedation, or while taking pain medication    CALL THE DOCTOR:   For any obvious bleeding (some dried blood over the incision is normal).     Redness, swelling, foul smell around incision or fever over 101.  Shortness of breath.  Persistent pain or nausea not relieved by medication.  Call  [phone]     to speak with an Interventional Radiologist    If any unusual problems or difficulties occur contact your doctor. If you cannot contact your doctor but feel your signs and symptoms warrant a physicians attention return to the emergency room.       ]      Fall Prevention  Millions of people fall every year and injure themselves. You may have had anesthesia or sedation which may increase your risk of falling. You may have health issues that put you at an increased risk of falling.     Here are ways to reduce your risk of falling.  ·   · Make your home safe by keeping walkways clear of objects you may trip over.  · Use non-slip pads under rugs. Do not use area rugs or small throw rugs.  · Use non-slip mats in bathtubs and showers.  · Install handrails and lights on staircases.  · Do not walk in poorly lit areas.  · Do not stand on chairs or wobbly ladders.  · Use caution when reaching overhead or looking upward. This  position can cause a loss of balance.  · Be sure your shoes fit properly, have non-slip bottoms and are in good condition.   · Wear shoes both inside and out. Avoid going barefoot or wearing slippers.  · Be cautious when going up and down stairs, curbs, and when walking on uneven sidewalks.  · If your balance is poor, consider using a cane or walker.  · If your fall was related to alcohol use, stop or limit alcohol intake.   · If your fall was related to use of sleeping medicines, talk to your doctor about this. You may need to reduce your dosage at bedtime if you awaken during the night to go to the bathroom.    · To reduce the need for nighttime bathroom trips:  ¨ Avoid drinking fluids for several hours before going to bed  ¨ Empty your bladder before going to bed  ¨ Men can keep a urinal at the bedside  · Stay as active as you can. Balance, flexibility, strength, and endurance all come from exercise. They all play a role in preventing falls. Ask your healthcare provider which types of activity are right for you.  · Get your vision checked on a regular basis.  · If you have pets, know where they are before you stand up or walk so you don't trip over them.  · Use night lights.    Dermabond / Prineotape    or a material like it was used on your incision.   It is like a liquid glue.   Do not peel or try to remove it it will start to fall off in 7-10 days on its' own.   It is OK to shower, pat dry, do not apply any creams or lotions.    No tub baths, swimming pools, hot tubs or submersion of the incision until your surgeon says it's ok.

## 2020-01-27 NOTE — SEDATION DOCUMENTATION
Report called to NORMAN Spangler in hospitals. Tunnel cath placed to R IJ. Tolerated well. Site with biopatch, gauze and tegaderm, CDI, no redness, swelling or hematoma noted. ALMAZ WOLF. Transported to hospitals per RN.

## 2020-01-27 NOTE — PROCEDURES
[facility]  Interventional Radiology  Procedure -     Date: 01/27/2020 Time: 12:15 PM    Pre-Op Diagnosis: CKD stage 5, DM, HTN, anemia    Post-Op Diagnosis: same    Procedure Performed by: Brenda    Assistant: N/A    Procedure: Tunneled dialysis catheter placement    Specimen/Tissue Removed: N/A    Estimated Blood Loss: Less than 50 mL    Procedure Note/Findings: 14.5 F 19 cm long glidepath dialysis catheter placed. Catheter tip in RA.  Ready to use.  Patient tolerated procedure well.        Please refer to dictated report for additional details.

## 2020-02-13 DIAGNOSIS — N18.6 ESRD (END STAGE RENAL DISEASE) ON DIALYSIS: ICD-10-CM

## 2020-02-13 DIAGNOSIS — Z01.818 PREOP TESTING: Primary | ICD-10-CM

## 2020-02-13 DIAGNOSIS — Z99.2 ESRD (END STAGE RENAL DISEASE) ON DIALYSIS: ICD-10-CM

## 2020-03-02 ENCOUNTER — OFFICE VISIT (OUTPATIENT)
Dept: VASCULAR SURGERY | Facility: CLINIC | Age: 40
End: 2020-03-02
Payer: MEDICARE

## 2020-03-02 ENCOUNTER — HOSPITAL ENCOUNTER (OUTPATIENT)
Dept: RADIOLOGY | Facility: HOSPITAL | Age: 40
Discharge: HOME | End: 2020-03-02
Attending: SURGERY
Payer: MEDICARE

## 2020-03-02 VITALS
HEIGHT: 66 IN | BODY MASS INDEX: 42.36 KG/M2 | DIASTOLIC BLOOD PRESSURE: 84 MMHG | WEIGHT: 263.56 LBS | SYSTOLIC BLOOD PRESSURE: 148 MMHG

## 2020-03-02 DIAGNOSIS — Z99.2 ENCOUNTER REGARDING VASCULAR ACCESS FOR DIALYSIS FOR END-STAGE RENAL DISEASE: Primary | ICD-10-CM

## 2020-03-02 DIAGNOSIS — Z01.818 PREOP TESTING: ICD-10-CM

## 2020-03-02 DIAGNOSIS — Z99.2 ESRD (END STAGE RENAL DISEASE) ON DIALYSIS: ICD-10-CM

## 2020-03-02 DIAGNOSIS — N18.6 ESRD (END STAGE RENAL DISEASE) ON DIALYSIS: ICD-10-CM

## 2020-03-02 DIAGNOSIS — N18.6 ENCOUNTER REGARDING VASCULAR ACCESS FOR DIALYSIS FOR END-STAGE RENAL DISEASE: Primary | ICD-10-CM

## 2020-03-02 PROCEDURE — 93985 US VEIN MAPPING: ICD-10-PCS | Mod: 26,,, | Performed by: RADIOLOGY

## 2020-03-02 PROCEDURE — 99204 OFFICE O/P NEW MOD 45 MIN: CPT | Mod: S$GLB,,, | Performed by: SURGERY

## 2020-03-02 PROCEDURE — 99204 PR OFFICE/OUTPT VISIT, NEW, LEVL IV, 45-59 MIN: ICD-10-PCS | Mod: S$GLB,,, | Performed by: SURGERY

## 2020-03-02 PROCEDURE — 99999 PR PBB SHADOW E&M-EST. PATIENT-LVL III: CPT | Mod: PBBFAC,,, | Performed by: SURGERY

## 2020-03-02 PROCEDURE — 93985 DUP-SCAN HEMO COMPL BI STD: CPT | Mod: 26,,, | Performed by: RADIOLOGY

## 2020-03-02 PROCEDURE — 3008F BODY MASS INDEX DOCD: CPT | Mod: CPTII,S$GLB,, | Performed by: SURGERY

## 2020-03-02 PROCEDURE — 3008F PR BODY MASS INDEX (BMI) DOCUMENTED: ICD-10-PCS | Mod: CPTII,S$GLB,, | Performed by: SURGERY

## 2020-03-02 PROCEDURE — 99999 PR PBB SHADOW E&M-EST. PATIENT-LVL III: ICD-10-PCS | Mod: PBBFAC,,, | Performed by: SURGERY

## 2020-03-02 NOTE — LETTER
March 8, 2020      Sherie Teixeira, FNP-C  [address] [facility]  [address]  [place] [address]           [facility]  [address]  [place] [address]  Phone: [phone]  Fax: [phone]          Patient: Xuan Ricardo   MR Number: [id number]   YOB: 1980   Date of Visit: 3/2/2020       Dear Sherie Teixeira:    Thank you for referring Xuan Ricardo to me for evaluation. Attached you will find relevant portions of my assessment and plan of care.    If you have questions, please do not hesitate to call me. I look forward to following Xuan Ricardo along with you.    Sincerely,    Rakesh Leon MD    Enclosure  CC:  No Recipients    If you would like to receive this communication electronically, please contact [email or web address] or [phone] to request more information on [employer] access.    For providers and/or their staff who would like to refer a patient to [facility], please contact us through our one-stop-shop provider referral line, [facility], at [phone].    If you feel you have received this communication in error or would no longer like to receive these types of communications, please e-mail [email or web address]

## 2020-03-02 NOTE — PATIENT INSTRUCTIONS
Creating a Hemodialysis Access    Before hemodialysis can be done, a way for blood to leave and return to your body (an access) is needed. During hemodialysis, needles placed into the access carry blood to and from the dialyzer. A hemodialysis access is usually created in your arm. The 2 main types of accesses are an arteriovenous fistula (AV fistula) and an arteriovenous graft (AV graft).  Creating your access  The hemodialysis access provides a large volume of rapidly flowing blood. It involves a surgical operation under anesthesia. You may be able to go home the same day. It is made during a short procedure using one of these methods:  · A fistula is made by connecting an artery to a nearby vein. The high pressure and blood flow in the artery are transferred into the vein and help it grow in size and thickness. This enlarged vein (fistula) eventually has high blood flow and is thick enough for needles to be placed safely several times each week during hemodialysis. It may need weeks or months to develop before it's ready to be used. A fistula is more efficient than the graft and has fewer long-term problems. Therefore, it is the preferred form of access.  · A graft (piece of synthetic tube) may be sewn between an artery and a vein if a fistula is not possible because of the small size of your veins. Blood flows rapidly through the graft from the artery to the vein. A graft is usually ready to use in a few weeks. Needles can be placed into the plastic tube to obtain blood during dialysis.  Both types of access may take weeks to months before they can be used. If dialysis is necessary immediately, a temporary venous catheter is used. A catheter that allows two way blood flow is placed into a large vein and the dialysis tubing is connected to the catheter. If both the AV fistula and graft are unsuccessful, a more permanent venous catheter is used.  The most common complications for hemodialysis access are  infection, clotting and decreased blood flow from clotting or other narrowing.  Date Last Reviewed: 1/1/2017  © 8827-5389 [employer], [employer]. [address] [place] [address], [place] [address]. All rights reserved. This information is not intended as a substitute for professional medical care. Always follow your healthcare professional's instructions.

## 2020-03-03 ENCOUNTER — TELEPHONE (OUTPATIENT)
Dept: VASCULAR SURGERY | Facility: CLINIC | Age: 40
End: 2020-03-03

## 2020-03-03 DIAGNOSIS — N18.6 ESRD (END STAGE RENAL DISEASE) ON DIALYSIS: Primary | ICD-10-CM

## 2020-03-03 DIAGNOSIS — Z99.2 ESRD (END STAGE RENAL DISEASE) ON DIALYSIS: Primary | ICD-10-CM

## 2020-03-03 NOTE — TELEPHONE ENCOUNTER
Called pt to give her, her pro op date. Also to give her the date of her surgery. Pt confirmed she understand and is okay with it.

## 2020-03-24 ENCOUNTER — TELEPHONE (OUTPATIENT)
Dept: VASCULAR SURGERY | Facility: CLINIC | Age: 40
End: 2020-03-24

## 2020-03-24 NOTE — TELEPHONE ENCOUNTER
Call to patient to explain that all surgeries that are non emergent are being moved out due to the COVID 19 virus. Explained to patient that when the hospital was doing surgery again she would be contacted with a new date. She stated understanding.

## 2020-04-22 ENCOUNTER — TELEPHONE (OUTPATIENT)
Dept: VASCULAR SURGERY | Facility: CLINIC | Age: 40
End: 2020-04-22

## 2020-04-22 NOTE — TELEPHONE ENCOUNTER
Spoke to patient about scheduling her surgery. Explained to patient that May 5th she could be scheduled. She stated understanding. Explained she will have a preop appointment and will need a COVID 19 test 48 hr prior to surgery. She asked if this test was needed and explained it was required for surgery. She stated understanding.

## 2020-04-23 ENCOUNTER — TELEPHONE (OUTPATIENT)
Dept: VASCULAR SURGERY | Facility: CLINIC | Age: 40
End: 2020-04-23

## 2020-04-23 DIAGNOSIS — N18.5 STAGE 5 CHRONIC KIDNEY DISEASE NOT ON CHRONIC DIALYSIS: Primary | ICD-10-CM

## 2020-04-23 NOTE — TELEPHONE ENCOUNTER
Call and spoke to patient about date and time of her preop appointment. She stated understanding. Explained she would be scheduled for the COVID by preop or someone would call her. She stated understanding.

## 2020-05-04 ENCOUNTER — HOSPITAL ENCOUNTER (OUTPATIENT)
Dept: PREADMISSION TESTING | Facility: HOSPITAL | Age: 40
Discharge: HOME | End: 2020-05-04
Attending: SURGERY
Payer: MEDICARE

## 2020-05-04 ENCOUNTER — ANESTHESIA EVENT (OUTPATIENT)
Dept: SURGERY | Facility: HOSPITAL | Age: 40
End: 2020-05-04
Payer: MEDICARE

## 2020-05-04 VITALS
HEART RATE: 88 BPM | OXYGEN SATURATION: 100 % | WEIGHT: 259.06 LBS | RESPIRATION RATE: 16 BRPM | SYSTOLIC BLOOD PRESSURE: 114 MMHG | HEIGHT: 66 IN | TEMPERATURE: 98 F | DIASTOLIC BLOOD PRESSURE: 75 MMHG | BODY MASS INDEX: 41.63 KG/M2

## 2020-05-04 DIAGNOSIS — N18.6 ESRD (END STAGE RENAL DISEASE) ON DIALYSIS: ICD-10-CM

## 2020-05-04 DIAGNOSIS — Z99.2 ESRD (END STAGE RENAL DISEASE) ON DIALYSIS: ICD-10-CM

## 2020-05-04 DIAGNOSIS — N18.6 END STAGE RENAL DISEASE: ICD-10-CM

## 2020-05-04 DIAGNOSIS — Z01.818 PREOP TESTING: Primary | ICD-10-CM

## 2020-05-04 LAB
ANION GAP SERPL CALC-SCNC: 14 MMOL/L (ref 8–16)
APTT BLDCRRT: 33.1 SEC (ref 21–32)
B-HCG UR QL: NEGATIVE
BASOPHILS # BLD AUTO: 0.04 K/UL (ref 0–0.2)
BASOPHILS NFR BLD: 0.5 % (ref 0–1.9)
BUN SERPL-MCNC: 34 MG/DL (ref 6–20)
CALCIUM SERPL-MCNC: 8.7 MG/DL (ref 8.7–10.5)
CHLORIDE SERPL-SCNC: 97 MMOL/L (ref 95–110)
CO2 SERPL-SCNC: 26 MMOL/L (ref 23–29)
CREAT SERPL-MCNC: 8.9 MG/DL (ref 0.5–1.4)
DIFFERENTIAL METHOD: ABNORMAL
EOSINOPHIL # BLD AUTO: 0.2 K/UL (ref 0–0.5)
EOSINOPHIL NFR BLD: 2.4 % (ref 0–8)
ERYTHROCYTE [DISTWIDTH] IN BLOOD BY AUTOMATED COUNT: 16.3 % (ref 11.5–14.5)
EST. GFR  (AFRICAN AMERICAN): 6 ML/MIN/1.73 M^2
EST. GFR  (NON AFRICAN AMERICAN): 5 ML/MIN/1.73 M^2
GLUCOSE SERPL-MCNC: 187 MG/DL (ref 70–110)
HCT VFR BLD AUTO: 36.2 % (ref 37–48.5)
HGB BLD-MCNC: 11.3 G/DL (ref 12–16)
IMM GRANULOCYTES # BLD AUTO: 0.03 K/UL (ref 0–0.04)
IMM GRANULOCYTES NFR BLD AUTO: 0.4 % (ref 0–0.5)
INR PPP: 0.9 (ref 0.8–1.2)
LYMPHOCYTES # BLD AUTO: 1.6 K/UL (ref 1–4.8)
LYMPHOCYTES NFR BLD: 21.5 % (ref 18–48)
MAGNESIUM SERPL-MCNC: 2.3 MG/DL (ref 1.6–2.6)
MCH RBC QN AUTO: 29.9 PG (ref 27–31)
MCHC RBC AUTO-ENTMCNC: 31.2 G/DL (ref 32–36)
MCV RBC AUTO: 96 FL (ref 82–98)
MONOCYTES # BLD AUTO: 0.5 K/UL (ref 0.3–1)
MONOCYTES NFR BLD: 6.3 % (ref 4–15)
NEUTROPHILS # BLD AUTO: 5.2 K/UL (ref 1.8–7.7)
NEUTROPHILS NFR BLD: 68.9 % (ref 38–73)
NRBC BLD-RTO: 0 /100 WBC
PHOSPHATE SERPL-MCNC: 5.1 MG/DL (ref 2.7–4.5)
PLATELET # BLD AUTO: 189 K/UL (ref 150–350)
PMV BLD AUTO: 10.3 FL (ref 9.2–12.9)
POTASSIUM SERPL-SCNC: 4.6 MMOL/L (ref 3.5–5.1)
PROTHROMBIN TIME: 10.2 SEC (ref 9–12.5)
RBC # BLD AUTO: 3.78 M/UL (ref 4–5.4)
SARS-COV-2 RDRP RESP QL NAA+PROBE: NEGATIVE
SODIUM SERPL-SCNC: 137 MMOL/L (ref 136–145)
WBC # BLD AUTO: 7.52 K/UL (ref 3.9–12.7)

## 2020-05-04 PROCEDURE — 85025 COMPLETE CBC W/AUTO DIFF WBC: CPT

## 2020-05-04 PROCEDURE — 83735 ASSAY OF MAGNESIUM: CPT

## 2020-05-04 PROCEDURE — 80048 BASIC METABOLIC PNL TOTAL CA: CPT

## 2020-05-04 PROCEDURE — 85610 PROTHROMBIN TIME: CPT

## 2020-05-04 PROCEDURE — 85730 THROMBOPLASTIN TIME PARTIAL: CPT

## 2020-05-04 PROCEDURE — 81025 URINE PREGNANCY TEST: CPT

## 2020-05-04 PROCEDURE — 36415 COLL VENOUS BLD VENIPUNCTURE: CPT

## 2020-05-04 PROCEDURE — U0002 COVID-19 LAB TEST NON-CDC: HCPCS

## 2020-05-04 PROCEDURE — 84100 ASSAY OF PHOSPHORUS: CPT

## 2020-05-04 NOTE — DISCHARGE INSTRUCTIONS
Your procedure  is scheduled for __Tuesday, May 5, 2020________.    Call [phone] between 2pm and 5pm on __Today, 5/4/20__________to find out your arrival time for the day of surgery.    Report to the Emergency Department at ______________________ the day of your surgery.  You will be escorted to the admitting unit.    Important instructions:   Do not eat or drink after 12 midnight, including water.  It is okay to brush your teeth.  Do not have gum, candy or mints.     Take only these medications with a small sip of water on the morning of your surgery: __Nifedipine .    DO NOT take any diabetic medication on the morning of surgery unless instructed to do so by your doctor or pre op nurse.    STOP taking Aspirin, Ibuprofen, Motrin, Mobic, Advil, Aleve, Fish oil, and Vitamin E for at least 7 days before your surgery. You may use Tylenol unless otherwise instructed by your doctor.        Prep instructions:  SHOWER   OTHER ____________________     Please shower the night before and the morning of your surgery.      Use Hibiclens soap as instructed by your pre op nurse (do not use on face or genital area).   Please place clean linens on your bed the night before surgery and wear fresh clean clothing after each shower.     DO NOT shave procedural area at least 5 days before surgery due to increased risk of skin irritation and/or possible infection.     You may be asked to take a third shower on arrival to Same Day Surgery - depending on the type of surgery you are having.     Female patients may be asked for a urine specimen on the morning of the surgery.  Please check with your nurse before using the restroom.     You may wear deodorant only. Do not wear make- up, mascara, powder, lotion, perfume, or cologne.     Do not wear any jewelry or have anything metal on your body (hairpins, clips, etc.).     You will be asked to remove any dentures or partials for the procedure.     Please bring any documents given  to you by your doctor.     If you are going home on the same day of surgery, you must arrange for a family member or a friend to drive you home.  Public transportation is prohibited.  You will not be able to drive home if you were given anesthesia or sedation.     Children under 18 years of age require a parent/guardian present the entire time they are here.     Wear loose fitting clothes allowing for bandages.     You may leave money and valuables home.  You may bring a cell phone.     Important: Call your surgeon/doctor if fever, chills, or illness should occur before your surgery.    Call us at the Pre-op Center at [phone]  if needed.

## 2020-05-04 NOTE — PRE ADMISSION SCREENING
Dr. Griffith rev pmh and chest pain from 2016--pt denies cp/sob/hollingsworth.  No further testing or clearance needed.    Letty in Carolyn's office informed pt is scheduled for HD tomorrow at 0715.  She and I informed pt that Letty will call the dialysis center to reschedule her HD since her sx is in the morning at same time. Pt has + understanding that dialysis center should be calling her today.

## 2020-05-05 ENCOUNTER — HOSPITAL ENCOUNTER (OUTPATIENT)
Facility: HOSPITAL | Age: 40
Discharge: HOME | End: 2020-05-05
Attending: SURGERY | Admitting: SURGERY
Payer: MEDICARE

## 2020-05-05 ENCOUNTER — ANESTHESIA (OUTPATIENT)
Dept: SURGERY | Facility: HOSPITAL | Age: 40
End: 2020-05-05
Payer: MEDICARE

## 2020-05-05 VITALS
RESPIRATION RATE: 16 BRPM | DIASTOLIC BLOOD PRESSURE: 77 MMHG | HEART RATE: 80 BPM | BODY MASS INDEX: 41.62 KG/M2 | HEIGHT: 66 IN | WEIGHT: 259 LBS | SYSTOLIC BLOOD PRESSURE: 138 MMHG | OXYGEN SATURATION: 100 % | TEMPERATURE: 97 F

## 2020-05-05 DIAGNOSIS — N18.6 ESRD (END STAGE RENAL DISEASE) ON DIALYSIS: Primary | ICD-10-CM

## 2020-05-05 DIAGNOSIS — N18.6 END STAGE RENAL DISEASE: ICD-10-CM

## 2020-05-05 DIAGNOSIS — Z99.2 ESRD (END STAGE RENAL DISEASE) ON DIALYSIS: Primary | ICD-10-CM

## 2020-05-05 LAB
POCT GLUCOSE: 117 MG/DL (ref 70–110)
POCT GLUCOSE: 157 MG/DL (ref 70–110)

## 2020-05-05 PROCEDURE — D9220A PRA ANESTHESIA: Mod: CRNA,,, | Performed by: NURSE ANESTHETIST, CERTIFIED REGISTERED

## 2020-05-05 PROCEDURE — 82962 GLUCOSE BLOOD TEST: CPT | Performed by: SURGERY

## 2020-05-05 PROCEDURE — 37000008 HC ANESTHESIA 1ST 15 MINUTES: Performed by: SURGERY

## 2020-05-05 PROCEDURE — 71000033 HC RECOVERY, INTIAL HOUR: Performed by: SURGERY

## 2020-05-05 PROCEDURE — D9220A PRA ANESTHESIA: ICD-10-PCS | Mod: ANES,,, | Performed by: ANESTHESIOLOGY

## 2020-05-05 PROCEDURE — 37000009 HC ANESTHESIA EA ADD 15 MINS: Performed by: SURGERY

## 2020-05-05 PROCEDURE — D9220A PRA ANESTHESIA: Mod: ANES,,, | Performed by: ANESTHESIOLOGY

## 2020-05-05 PROCEDURE — 63600175 PHARM REV CODE 636 W HCPCS: Performed by: SURGERY

## 2020-05-05 PROCEDURE — 36821 AV FUSION DIRECT ANY SITE: CPT | Mod: ,,, | Performed by: SURGERY

## 2020-05-05 PROCEDURE — 27201423 OPTIME MED/SURG SUP & DEVICES STERILE SUPPLY: Performed by: SURGERY

## 2020-05-05 PROCEDURE — 36000707: Performed by: SURGERY

## 2020-05-05 PROCEDURE — 36000706: Performed by: SURGERY

## 2020-05-05 PROCEDURE — 71000016 HC POSTOP RECOV ADDL HR: Performed by: SURGERY

## 2020-05-05 PROCEDURE — 36821 PR ANASTOMOSIS,AV,ANY SITE: ICD-10-PCS | Mod: ,,, | Performed by: SURGERY

## 2020-05-05 PROCEDURE — 25000003 PHARM REV CODE 250: Performed by: SURGERY

## 2020-05-05 PROCEDURE — 71000015 HC POSTOP RECOV 1ST HR: Performed by: SURGERY

## 2020-05-05 PROCEDURE — 25000003 PHARM REV CODE 250: Performed by: NURSE ANESTHETIST, CERTIFIED REGISTERED

## 2020-05-05 PROCEDURE — 27200750 HC INSULATED NEEDLE/ STIMUPLEX: Performed by: ANESTHESIOLOGY

## 2020-05-05 PROCEDURE — 63600175 PHARM REV CODE 636 W HCPCS: Performed by: ANESTHESIOLOGY

## 2020-05-05 PROCEDURE — 63600175 PHARM REV CODE 636 W HCPCS: Performed by: NURSE ANESTHETIST, CERTIFIED REGISTERED

## 2020-05-05 PROCEDURE — D9220A PRA ANESTHESIA: ICD-10-PCS | Mod: CRNA,,, | Performed by: NURSE ANESTHETIST, CERTIFIED REGISTERED

## 2020-05-05 RX ORDER — PROPOFOL 10 MG/ML
VIAL (ML) INTRAVENOUS CONTINUOUS PRN
Status: DISCONTINUED | OUTPATIENT
Start: 2020-05-05 | End: 2020-05-05

## 2020-05-05 RX ORDER — LIDOCAINE HYDROCHLORIDE 20 MG/ML
INJECTION INTRAVENOUS
Status: DISCONTINUED | OUTPATIENT
Start: 2020-05-05 | End: 2020-05-05

## 2020-05-05 RX ORDER — CEFAZOLIN SODIUM 2 G/50ML
2 SOLUTION INTRAVENOUS ONCE
Status: COMPLETED | OUTPATIENT
Start: 2020-05-05 | End: 2020-05-05

## 2020-05-05 RX ORDER — MIDAZOLAM HYDROCHLORIDE 1 MG/ML
INJECTION, SOLUTION INTRAMUSCULAR; INTRAVENOUS
Status: DISCONTINUED | OUTPATIENT
Start: 2020-05-05 | End: 2020-05-05

## 2020-05-05 RX ORDER — KETAMINE HYDROCHLORIDE 50 MG/ML
INJECTION, SOLUTION INTRAMUSCULAR; INTRAVENOUS
Status: DISCONTINUED | OUTPATIENT
Start: 2020-05-05 | End: 2020-05-05

## 2020-05-05 RX ORDER — PAPAVERINE HYDROCHLORIDE 30 MG/ML
INJECTION INTRAMUSCULAR; INTRAVENOUS
Status: DISCONTINUED | OUTPATIENT
Start: 2020-05-05 | End: 2020-05-05 | Stop reason: HOSPADM

## 2020-05-05 RX ORDER — PROPOFOL 10 MG/ML
VIAL (ML) INTRAVENOUS
Status: DISCONTINUED | OUTPATIENT
Start: 2020-05-05 | End: 2020-05-05

## 2020-05-05 RX ORDER — BACITRACIN 50000 [IU]/1
INJECTION, POWDER, FOR SOLUTION INTRAMUSCULAR
Status: DISCONTINUED | OUTPATIENT
Start: 2020-05-05 | End: 2020-05-05 | Stop reason: HOSPADM

## 2020-05-05 RX ORDER — FENTANYL CITRATE 50 UG/ML
INJECTION, SOLUTION INTRAMUSCULAR; INTRAVENOUS
Status: DISCONTINUED | OUTPATIENT
Start: 2020-05-05 | End: 2020-05-05

## 2020-05-05 RX ORDER — HEPARIN SODIUM 1000 [USP'U]/ML
INJECTION, SOLUTION INTRAVENOUS; SUBCUTANEOUS
Status: DISCONTINUED | OUTPATIENT
Start: 2020-05-05 | End: 2020-05-05 | Stop reason: HOSPADM

## 2020-05-05 RX ORDER — SODIUM CHLORIDE 9 MG/ML
INJECTION, SOLUTION INTRAVENOUS CONTINUOUS PRN
Status: DISCONTINUED | OUTPATIENT
Start: 2020-05-05 | End: 2020-05-05

## 2020-05-05 RX ORDER — ROPIVACAINE HYDROCHLORIDE 5 MG/ML
INJECTION, SOLUTION EPIDURAL; INFILTRATION; PERINEURAL
Status: DISCONTINUED | OUTPATIENT
Start: 2020-05-05 | End: 2020-05-05

## 2020-05-05 RX ORDER — OXYCODONE AND ACETAMINOPHEN 5; 325 MG/1; MG/1
1 TABLET ORAL EVERY 6 HOURS PRN
Qty: 20 TABLET | Refills: 0 | Status: SHIPPED | OUTPATIENT
Start: 2020-05-05 | End: 2020-05-19

## 2020-05-05 RX ADMIN — CEFAZOLIN SODIUM 2 G: 2 SOLUTION INTRAVENOUS at 08:05

## 2020-05-05 RX ADMIN — MIDAZOLAM HYDROCHLORIDE 2 MG: 1 INJECTION, SOLUTION INTRAMUSCULAR; INTRAVENOUS at 07:05

## 2020-05-05 RX ADMIN — FENTANYL CITRATE 25 MCG: 50 INJECTION INTRAMUSCULAR; INTRAVENOUS at 08:05

## 2020-05-05 RX ADMIN — ROPIVACAINE HYDROCHLORIDE 10 ML: 5 INJECTION, SOLUTION EPIDURAL; INFILTRATION; PERINEURAL at 07:05

## 2020-05-05 RX ADMIN — SODIUM CHLORIDE: 0.9 INJECTION, SOLUTION INTRAVENOUS at 07:05

## 2020-05-05 RX ADMIN — KETAMINE HYDROCHLORIDE 25 MG: 50 INJECTION, SOLUTION, CONCENTRATE INTRAMUSCULAR; INTRAVENOUS at 08:05

## 2020-05-05 RX ADMIN — HEPARIN SODIUM 3000 UNITS: 1000 INJECTION, SOLUTION INTRAVENOUS; SUBCUTANEOUS at 09:05

## 2020-05-05 RX ADMIN — FENTANYL CITRATE 25 MCG: 50 INJECTION INTRAMUSCULAR; INTRAVENOUS at 09:05

## 2020-05-05 RX ADMIN — Medication 60 MG: at 08:05

## 2020-05-05 RX ADMIN — FENTANYL CITRATE 50 MCG: 50 INJECTION INTRAMUSCULAR; INTRAVENOUS at 07:05

## 2020-05-05 RX ADMIN — PROPOFOL 30 MG: 10 INJECTION, EMULSION INTRAVENOUS at 08:05

## 2020-05-05 RX ADMIN — PROPOFOL 50 MCG/KG/MIN: 10 INJECTION, EMULSION INTRAVENOUS at 08:05

## 2020-05-05 NOTE — H&P
Rakesh Leon MD RPVI                       [facility]                         05/05/2020    HPI:  Xuan Ricardo is a 39 y.o. female with   Patient Active Problem List   Diagnosis    Anemia of renal disease    Hypertensive CKD (chronic kidney disease)    Nephrotic range proteinuria    Hyperkalemia    Acute renal failure superimposed on chronic kidney disease    Metabolic acidosis    Bacterial vaginosis    Increased prolactin level    CKD (chronic kidney disease) stage 5, GFR less than 15 ml/min    Secondary hyperparathyroidism of renal origin    Iron deficiency anemia    Vitamin D deficiency    Galactorrhea    Cataract    Class 2 severe obesity due to excess calories with serious comorbidity and body mass index (BMI) of 39.0 to 39.9 in adult    Abnormal uterine bleeding (AUB)    Chronic fatigue    Missed menses    Uterine leiomyoma    Type 2 diabetes mellitus with stage 5 chronic kidney disease not on chronic dialysis, with long-term current use of insulin    Hypertension    Hypomagnesemia    HTN (hypertension)    Symptomatic anemia    End stage renal disease    ESRD (end stage renal disease) on dialysis    being managed by PCP and specialists who is here today for evaluation of I have long-term dialysis access.  Patient is right handed.  Patient new to dialysis.  No issues with dialysis catheter.  No chest pain or shortness of breath.  Referred here for long-term dialysis access placement.  No upper extremity symptoms.    no MI  no Stroke  Tobacco use: denies    Interval history:  No new complaints or issues since last evaluation.    Past Medical History:   Diagnosis Date    Cataract     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     Diabetes mellitus 1996    Insulin Dependent    Galactorrhea     Hyperphosphatemia     Hypertension     Iron deficiency anemia     Obesity     Secondary hyperparathyroidism of renal origin     Vitamin D deficiency       Past Surgical History:   Procedure Laterality Date     SECTION, CLASSIC      INSERTION OF TUNNELED CENTRAL VENOUS CATHETER (CVC) WITH SUBCUTANEOUS PORT N/A 2020    Procedure: IR TUNNELED VATH INSERT WITHOUT PORT;  Surgeon: [facility] Diagnostic Provider;  Location: [facility] OR;  Service: Radiology;  Laterality: N/A;  1030AM START  RN PREOP 2020    TUBAL LIGATION       Family History   Problem Relation Age of Onset    Seizures Mother 64    Heart failure Father 58    Asthma Sister     Breast cancer Neg Hx     Colon cancer Neg Hx     Ovarian cancer Neg Hx      Social History     Socioeconomic History    Marital status: Single     Spouse name: Not on file    Number of children: 2    Years of education: Not on file    Highest education level: Not on file   Occupational History    Occupation: Unemployed   Social Needs    Financial resource strain: Hard    Food insecurity:     Worry: Not on file     Inability: Not on file    Transportation needs:     Medical: Not on file     Non-medical: Not on file   Tobacco Use    Smoking status: Former Smoker     Types: Cigarettes    Smokeless tobacco: Never Used   Substance and Sexual Activity    Alcohol use: No    Drug use: Yes     Types: Marijuana     Comment: Occassional Recreational Use only    Sexual activity: Not Currently     Partners: Male   Lifestyle    Physical activity:     Days per week: Patient refused     Minutes per session: Patient refused    Stress: Rather much   Relationships    Social connections:     Talks on phone: More than three times a week     Gets together: More than three times a week     Attends Christian service: Patient refused     Active member of club or organization: Patient refused     Attends meetings of clubs or organizations: Patient refused     Relationship status: Never    Other Topics Concern    Not on file   Social History Narrative    Currently unemployed has two young children ages 5 and 7 and she  is the sole caretaker       Current Facility-Administered Medications:     cefazolin (ANCEF) 2 gram in dextrose 5% 50 mL IVPB (premix), 2 g, Intravenous, Once, Rakesh Leon MD    REVIEW OF SYSTEMS:  General: No fevers or chills; ENT: No sore throat; Allergy and Immunology: no persistent infections; Hematological and Lymphatic: No history of bleeding or easy bruising; Endocrine: negative; Respiratory: no cough, shortness of breath, or wheezing; Cardiovascular: no chest pain or dyspnea on exertion; Gastrointestinal: no abdominal pain/back, change in bowel habits, or bloody stools; Genito-Urinary: no dysuria, trouble voiding, or hematuria; Musculoskeletal: negative; Neurological: no TIA or stroke symptoms; Psychiatric: no nervousness, anxiety or depression.    PHYSICAL EXAM:      Pulse: 89  Temp: 98.3 °F (36.8 °C)        General appearance:  Alert, well-appearing, and in no distress.  Oriented to person, place, and time                      Neurological: Normal speech, no focal findings noted; CN II - XII grossly intact.  All extremities with intact sensation to light touch            Musculoskeletal: Digits/nail without cyanosis/clubbing.  Strength 5/5 .                    Neck: Supple, no significant adenopathy, no carotid bruit can be auscultated                  Chest:  Clear to auscultation, no wheezes, rales or rhonchi, symmetric air entry. No use of accessory muscles               Cardiac: Normal rate and regular rhythm, S1 and S2 normal            Abdomen: Soft, nontender, nondistended, no masses or organomegaly, no hernia     No rebound tenderness noted; bowel sounds normal     Pulsatile aortic mass is non palpable.     No groin adenopathy      Extremities:   2+ radial bilateral, Negative luis manuel's test BUE     No extremity edema    Skin: No wounds    LAB RESULTS:  No results found for: CBC  Lab Results   Component Value Date    LABPROT 10.2 05/04/2020    INR 0.9 05/04/2020     Lab Results   Component  Value Date     05/04/2020    K 4.6 05/04/2020    CL 97 05/04/2020    CO2 26 05/04/2020     (H) 05/04/2020    BUN 34 (H) 05/04/2020    CREATININE 8.9 (H) 05/04/2020    CALCIUM 8.7 05/04/2020    ANIONGAP 14 05/04/2020    EGFRNONAA 5 (A) 05/04/2020     Lab Results   Component Value Date    WBC 7.52 05/04/2020    RBC 3.78 (L) 05/04/2020    HGB 11.3 (L) 05/04/2020    HCT 36.2 (L) 05/04/2020    MCV 96 05/04/2020    MCH 29.9 05/04/2020    MCHC 31.2 (L) 05/04/2020    RDW 16.3 (H) 05/04/2020     05/04/2020    MPV 10.3 05/04/2020    GRAN 5.2 05/04/2020    GRAN 68.9 05/04/2020    LYMPH 1.6 05/04/2020    LYMPH 21.5 05/04/2020    MONO 0.5 05/04/2020    MONO 6.3 05/04/2020    EOS 0.2 05/04/2020    BASO 0.04 05/04/2020    EOSINOPHIL 2.4 05/04/2020    BASOPHIL 0.5 05/04/2020    DIFFMETHOD Automated 05/04/2020     .  Lab Results   Component Value Date    HGBA1C 5.4 01/18/2020       IMAGING:  All pertinent imaging has been reviewed and interpreted independently.    Vein mapping reviewed: Adequate bilateral proximal and superior cephalic vein, superior basilic and axillary veins    IMP/PLAN:  39 y.o. female with   Patient Active Problem List   Diagnosis    Anemia of renal disease    Hypertensive CKD (chronic kidney disease)    Nephrotic range proteinuria    Hyperkalemia    Acute renal failure superimposed on chronic kidney disease    Metabolic acidosis    Bacterial vaginosis    Increased prolactin level    CKD (chronic kidney disease) stage 5, GFR less than 15 ml/min    Secondary hyperparathyroidism of renal origin    Iron deficiency anemia    Vitamin D deficiency    Galactorrhea    Cataract    Class 2 severe obesity due to excess calories with serious comorbidity and body mass index (BMI) of 39.0 to 39.9 in adult    Abnormal uterine bleeding (AUB)    Chronic fatigue    Missed menses    Uterine leiomyoma    Type 2 diabetes mellitus with stage 5 chronic kidney disease not on chronic dialysis,  with long-term current use of insulin    Hypertension    Hypomagnesemia    HTN (hypertension)    Symptomatic anemia    End stage renal disease    ESRD (end stage renal disease) on dialysis    being managed by PCP and specialists who is here today for evaluation of long-term dialysis access.    -recommend left upper extremity AV fistula versus AV graft - plan for 5/5/20    Rakesh Leon MD [facility] [place]  Vascular and Endovascular Surgery

## 2020-05-05 NOTE — PLAN OF CARE
PACU : [facility] 10/10 Pt is AAO x 4. Vitals are stable. Dressing is c/d/i. Pain controlled and pt denies nausea at this time. Pulse palpable. Safety precautions maintained.    Transfered to [facility] in stable condition.

## 2020-05-05 NOTE — OP NOTE
05/05/2020      Indications: Xuan Ricardo is a 39 y.o. female with chronic renal insufficiency and need for long-term dialysis access.    Pre-operative Diagnosis:  ESRD.    Post-operative Diagnosis: Same    Operation:   1. Left radiocephalic arteriovenous fistula      2. Intraoperative ultrasound     Surgeon: Rakesh Leon MD     Assistants:   1. Torrie Hampton  2. Carlos Pierson MS3    Anesthesia: IV + Regional    Findings:  Adequate artery and vein for fistula. Palpable radial a. after AVF.    Estimate Blood Loss:  Less than 10mL            Specimens: None               Complications:  None; patient tolerated the procedure well.           Disposition: PACU, then home.           Attending Attestation: I was present and scrubbed for the entire procedure.    Procedure Details   The patient was seen in the Holding Room. The risks, benefits, complications, treatment options, and expected outcomes were discussed with the patient. The patient concurred with the proposed plan, giving informed consent.  The site of surgery properly noted/marked. The patient was taken to the Operating Room, identified correctly and the procedure verified. A Time Out was held and the above information confirmed.     The arm was prepped and draped in a sterile fashion. A skin incision was made just above the left wrist after identifying the radial artery and cephalic vein with US and dissection carried down to the cephalic vein. The vein was dissected free as far distally as possible with ligation of side branches.  We then dissected through the subcutaneous tissues and identified the radial artery. The patient was given 3000 units IV heparin. The artery was controlled with vessel loops, opened and flushed with heparinized saline. The vein was also flushed. End to side anastomosis was completed with a 6-0 Prolene. After completion of the anastomosis, the vessel loops were released. Hemostasis was secured. Good thrill was noted in the  fistula and the incision was then closed in two layers, subcutaneous tissue with 2-0 Vicryl and skin with 4-0 Monocryl. Dermabond and a sterile dressing was applied.

## 2020-05-05 NOTE — ANESTHESIA PREPROCEDURE EVALUATION
05/05/2020  Xuan Ricardo is a 39 y.o., female.    Pre-operative evaluation for Procedure(s) (LRB):  CREATION, AV FISTULA, POSSIBLE AV GRAFT, LEFT UPPER EXTREMITY (Left)    Xuan Ricardo is a 39 y.o. female     Denies CP/SOB/GERD/MI/CVA/URI symptoms.  METS > 4  NPO > 8    Patient Active Problem List   Diagnosis    Anemia of renal disease    Hypertensive CKD (chronic kidney disease)    Nephrotic range proteinuria    Hyperkalemia    Acute renal failure superimposed on chronic kidney disease    Metabolic acidosis    Bacterial vaginosis    Increased prolactin level    CKD (chronic kidney disease) stage 5, GFR less than 15 ml/min    Secondary hyperparathyroidism of renal origin    Iron deficiency anemia    Vitamin D deficiency    Galactorrhea    Cataract    Class 2 severe obesity due to excess calories with serious comorbidity and body mass index (BMI) of 39.0 to 39.9 in adult    Abnormal uterine bleeding (AUB)    Chronic fatigue    Missed menses    Uterine leiomyoma    Type 2 diabetes mellitus with stage 5 chronic kidney disease not on chronic dialysis, with long-term current use of insulin    Hypertension    Hypomagnesemia    HTN (hypertension)    Symptomatic anemia    End stage renal disease    ESRD (end stage renal disease) on dialysis       Review of patient's allergies indicates:   Allergen Reactions    Vicodin [hydrocodone-acetaminophen] Hives    Codeine Hives       No current facility-administered medications on file prior to encounter.      Current Outpatient Medications on File Prior to Encounter   Medication Sig Dispense Refill    calcitRIOL (ROCALTROL) 0.25 MCG Cap TAKE 1 CAPSULE(0.25 MCG) BY MOUTH EVERY DAY 90 capsule 1    cinacalcet (SENSIPAR) 30 MG Tab Take 1 tablet (30 mg total) by mouth daily with breakfast. 30 tablet 11    ergocalciferol (ERGOCALCIFEROL) 50,000 unit  "Cap Take 1 capsule (50,000 Units total) by mouth every 7 days. 12 capsule 0    insulin NPH (NOVOLIN N) 100 unit/mL injection Inject 15 Units into the skin before breakfast. 10 mL 2    NIFEdipine (PROCARDIA-XL) 90 MG (OSM) 24 hr tablet TAKE 1 TABLET(90 MG) BY MOUTH EVERY DAY 90 tablet 1    sevelamer carbonate (RENVELA) 800 mg Tab Take 1 tablet (800 mg total) by mouth 3 (three) times daily with meals. 90 tablet 11    blood sugar diagnostic Strp Check blood glucose 3 times daily 100 each 5    lancets (ONETOUCH DELICA LANCETS) 30 gauge Misc 3 (three) times daily      pen needle, diabetic (BD ULTRA-FINE SHORT PEN NEEDLE) 31 gauge x 5/16" Ndle daily      sodium bicarbonate 650 MG tablet Take 1 tablet (650 mg total) by mouth 2 (two) times daily. (Patient not taking: Reported on 3/2/2020) 60 tablet 11       Past Surgical History:   Procedure Laterality Date     SECTION, CLASSIC      INSERTION OF TUNNELED CENTRAL VENOUS CATHETER (CVC) WITH SUBCUTANEOUS PORT N/A 2020    Procedure: IR TUNNELED VATH INSERT WITHOUT PORT;  Surgeon: [facility] Diagnostic Provider;  [facility];  Service: Radiology;  Laterality: N/A;  1030AM START  RN PREOP 2020    TUBAL LIGATION         Social History     Socioeconomic History    Marital status: Single     Spouse name: Not on file    Number of children: 2    Years of education: Not on file    Highest education level: Not on file   Occupational History    Occupation: Unemployed   Social Needs    Financial resource strain: Hard    Food insecurity:     Worry: Not on file     Inability: Not on file    Transportation needs:     Medical: Not on file     Non-medical: Not on file   Tobacco Use    Smoking status: Former Smoker     Types: Cigarettes    Smokeless tobacco: Never Used   Substance and Sexual Activity    Alcohol use: No    Drug use: Yes     Types: Marijuana     Comment: Occassional Recreational Use only    Sexual activity: Not Currently     Partners: Male "   Lifestyle    Physical activity:     Days per week: Patient refused     Minutes per session: Patient refused    Stress: Rather much   Relationships    Social connections:     Talks on phone: More than three times a week     Gets together: More than three times a week     Attends Bahai service: Patient refused     Active member of club or organization: Patient refused     Attends meetings of clubs or organizations: Patient refused     Relationship status: Never    Other Topics Concern    Not on file   Social History Narrative    Currently unemployed has two young children ages 5 and 7 and she is the sole caretaker         CBC:   Recent Labs     05/04/20  0950   WBC 7.52   RBC 3.78*   HGB 11.3*   HCT 36.2*      MCV 96   MCH 29.9   MCHC 31.2*       CMP:   Recent Labs     05/04/20  0950      K 4.6   CL 97   CO2 26   BUN 34*   CREATININE 8.9*   *   MG 2.3   PHOS 5.1*   CALCIUM 8.7       INR  Recent Labs     05/04/20  0950   INR 0.9   APTT 33.1*         Vitals:    05/05/20 0623   BP: 124/66   Pulse: 89   Resp: 20   Temp: 36.8 °C (98.3 °F)     See nursing charting for additional vital signs      Diagnostic Studies:  Results for HAYLEYSINLESTER (MRN [id number]) as of 5/5/2020 06:44   Ref. Range 5/4/2020 09:50   WBC Latest Ref Range: 3.90 - 12.70 K/uL 7.52   RBC Latest Ref Range: 4.00 - 5.40 M/uL 3.78 (L)   Hemoglobin Latest Ref Range: 12.0 - 16.0 g/dL 11.3 (L)   Hematocrit Latest Ref Range: 37.0 - 48.5 % 36.2 (L)   MCV Latest Ref Range: 82 - 98 fL 96   MCH Latest Ref Range: 27.0 - 31.0 pg 29.9   MCHC Latest Ref Range: 32.0 - 36.0 g/dL 31.2 (L)   RDW Latest Ref Range: 11.5 - 14.5 % 16.3 (H)   Platelets Latest Ref Range: 150 - 350 K/uL 189   MPV Latest Ref Range: 9.2 - 12.9 fL 10.3   Gran% Latest Ref Range: 38.0 - 73.0 % 68.9   Gran # (ANC) Latest Ref Range: 1.8 - 7.7 K/uL 5.2   Lymph% Latest Ref Range: 18.0 - 48.0 % 21.5   Lymph # Latest Ref Range: 1.0 - 4.8 K/uL 1.6   Mono% Latest Ref Range:  4.0 - 15.0 % 6.3   Mono # Latest Ref Range: 0.3 - 1.0 K/uL 0.5   Eosinophil% Latest Ref Range: 0.0 - 8.0 % 2.4   Eos # Latest Ref Range: 0.0 - 0.5 K/uL 0.2   Basophil% Latest Ref Range: 0.0 - 1.9 % 0.5   Baso # Latest Ref Range: 0.00 - 0.20 K/uL 0.04   nRBC Latest Ref Range: 0 /100 WBC 0   Differential Method Unknown Automated   Immature Grans (Abs) Latest Ref Range: 0.00 - 0.04 K/uL 0.03   Immature Granulocytes Latest Ref Range: 0.0 - 0.5 % 0.4   Protime Latest Ref Range: 9.0 - 12.5 sec 10.2   INR Latest Ref Range: 0.8 - 1.2  0.9   aPTT Latest Ref Range: 21.0 - 32.0 sec 33.1 (H)   Sodium Latest Ref Range: 136 - 145 mmol/L 137   Potassium Latest Ref Range: 3.5 - 5.1 mmol/L 4.6   Chloride Latest Ref Range: 95 - 110 mmol/L 97   CO2 Latest Ref Range: 23 - 29 mmol/L 26   Anion Gap Latest Ref Range: 8 - 16 mmol/L 14   BUN, Bld Latest Ref Range: 6 - 20 mg/dL 34 (H)   Creatinine Latest Ref Range: 0.5 - 1.4 mg/dL 8.9 (H)   eGFR if non African American Latest Ref Range: >60 mL/min/1.73 m^2 5 (A)   eGFR if African American Latest Ref Range: >60 mL/min/1.73 m^2 6 (A)   Glucose Latest Ref Range: 70 - 110 mg/dL 187 (H)   Calcium Latest Ref Range: 8.7 - 10.5 mg/dL 8.7   Phosphorus Latest Ref Range: 2.7 - 4.5 mg/dL 5.1 (H)   Magnesium Latest Ref Range: 1.6 - 2.6 mg/dL 2.3     Results for HAYLEYSINLESTER (MRN [id number]) as of 5/5/2020 06:53   Ref. Range 5/4/2020 09:51   SARS-CoV-2 RNA, Amplification, Qual Latest Ref Range: Negative  Negative     EKG (1/18/20):  Normal sinus rhythm  Normal ECG    Anesthesia Evaluation    I have reviewed the Patient Summary Reports.    I have reviewed the Nursing Notes.      Review of Systems  Anesthesia Hx:  No problems with previous Anesthesia   Denies Personal Hx of Anesthesia complications.   Social:  Former Smoker, No Alcohol Use Occasional MJ   Hematology/Oncology:         -- Anemia:   Cardiovascular:   Exercise tolerance: good Hypertension, well controlled ECG has been reviewed.     Pulmonary:  Pulmonary Normal    Renal/:   Chronic Renal Disease, CRI, ESRD HD T/R/Sa   OB/GYN/PEDS:  Uterine leiomyoma   Endocrine:   Diabetes, poorly controlled, type 2, using insulin        Physical Exam  General:  Morbid Obesity    Airway/Jaw/Neck:   MP2, TMD > 3FB, missing upper teeth and has lower middle teeth and denies any being loose     Chest/Lungs:  Chest/Lungs Clear    Heart/Vascular:  Heart Findings: Normal            Anesthesia Plan  Type of Anesthesia, risks & benefits discussed:  Anesthesia Type:  regional, general  Patient's Preference:   Intra-op Monitoring Plan: standard ASA monitors  Intra-op Monitoring Plan Comments:   Post Op Pain Control Plan: multimodal analgesia, IV/PO Opioids PRN and per primary service following discharge from PACU  Post Op Pain Control Plan Comments:   Induction:   IV  Beta Blocker:  Patient is not currently on a Beta-Blocker (No further documentation required).       Informed Consent: Patient understands risks and agrees with Anesthesia plan.  Questions answered. Anesthesia consent signed with patient.  ASA Score: 3     Day of Surgery Review of History & Physical:  There are no significant changes.          Ready For Surgery From Anesthesia Perspective.

## 2020-05-05 NOTE — BRIEF OP NOTE
[facility]  Brief Operative Note    Surgery Date: 5/5/2020     Surgeon(s) and Role:     * Rakesh Leon MD - Primary    Assisting Surgeon: None    Pre-op Diagnosis:  Stage 5 chronic kidney disease not on chronic dialysis [N18.5]    Post-op Diagnosis:  Post-Op Diagnosis Codes:     * Stage 5 chronic kidney disease not on chronic dialysis [N18.5]    Procedure:  1. Left upper extremity radiocephalic fistula  2. Intraoperative ultrasound    Anesthesia: Regional    Description of the findings of the procedure(s): adequate artery and vein for fistula    Estimated Blood Loss: <5 cc         Specimens:   Specimen (12h ago, onward)    None            Discharge Note    OUTCOME: Patient tolerated treatment/procedure well without complication and is now ready for discharge.    DISPOSITION: Home or Self Care    FINAL DIAGNOSIS:  <principal problem not specified>    FOLLOWUP: In clinic    DISCHARGE INSTRUCTIONS:  No discharge procedures on file.

## 2020-05-05 NOTE — DISCHARGE INSTRUCTIONS
DIET: You may resume your home diet. If nausea is present, increase your diet gradually with fluids and bland foods    ACTIVITY LEVEL: You have received sedation or an anesthetic, you may feel sleepy for several hours. Rest until you are more awake. Gradually resume your normal activities     Medications: Pain medication should be taken only if needed and as directed. If antibiotics are prescribed, the medication should be taken until completed. You will be given an updated list of you medications.  Dressing: Remove dressing in 48 hours ,may shower in 48 hours no tub bath      No driving, alcoholic beverages or signing legal documents for next 24 hours or while taking pain medication.       CALL THE DOCTOR:    For any obvious bleeding (some dried blood over the incision is normal).      Redness, swelling, foul smell around incision or fever over 101.   Shortness of breath, Coughing up Bloody sputum, Pains or Swelling in your Calves .   Persistent pain or nausea not relieved by medication.    If any unusual problems or difficulties occur contact your doctor. If you cannot contact your doctor but feel your signs and symptoms warrant a physicians attention return to the emergency room.    Fall Prevention  Millions of people fall every year and injure themselves. You may have had anesthesia or sedation which may increase your risk of falling. You may have health issues that put you at an increased risk of falling.     Here are ways to reduce your risk of falling.  ·   · Make your home safe by keeping walkways clear of objects you may trip over.  · Use non-slip pads under rugs. Do not use area rugs or small throw rugs.  · Use non-slip mats in bathtubs and showers.  · Install handrails and lights on staircases.  · Do not walk in poorly lit areas.  · Do not stand on chairs or wobbly ladders.  · Use caution when reaching overhead or looking upward. This position can cause a loss of balance.  · Be sure your shoes fit  properly, have non-slip bottoms and are in good condition.   · Wear shoes both inside and out. Avoid going barefoot or wearing slippers.  · Be cautious when going up and down stairs, curbs, and when walking on uneven sidewalks.  · If your balance is poor, consider using a cane or walker.  · If your fall was related to alcohol use, stop or limit alcohol intake.   · If your fall was related to use of sleeping medicines, talk to your doctor about this. You may need to reduce your dosage at bedtime if you awaken during the night to go to the bathroom.    · To reduce the need for nighttime bathroom trips:  ¨ Avoid drinking fluids for several hours before going to bed  ¨ Empty your bladder before going to bed  ¨ Men can keep a urinal at the bedside  · Stay as active as you can. Balance, flexibility, strength, and endurance all come from exercise. They all play a role in preventing falls. Ask your healthcare provider which types of activity are right for you.  · Get your vision checked on a regular basis.  · If you have pets, know where they are before you stand up or walk so you don't trip over them.  · Use night lights.

## 2020-05-05 NOTE — ANESTHESIA PROCEDURE NOTES
Peripheral Block    Patient location during procedure: holding area    Reason for block: primary anesthetic   Diagnosis: ESRD   Start time: 5/5/2020 7:11 AM  Timeout: 5/5/2020 7:10 AM   End time: 5/5/2020 7:21 AM    Staffing  Authorizing Provider: Corey Mercado MD  Performing Provider: Corey Mercado MD    Preanesthetic Checklist  Completed: patient identified, site marked, surgical consent, pre-op evaluation, timeout performed, IV checked, risks and benefits discussed and monitors and equipment checked  Peripheral Block  Patient position: supine  Prep: ChloraPrep  Patient monitoring: continuous pulse ox and frequent blood pressure checks  Block type: supraclavicular  Laterality: left  Injection technique: single shot  Needle  Needle type: Stimuplex   Needle gauge: 21 G  Needle length: 4 in  Needle localization: ultrasound guidance  Needle insertion depth: 8 cm     Assessment  Injection assessment: negative aspiration, negative parasthesia and local visualized surrounding nerve  Paresthesia pain: none  Heart rate change: no  Slow fractionated injection: yes

## 2020-05-05 NOTE — TRANSFER OF CARE
"Anesthesia Transfer of Care Note    Patient: Xuan Ricardo    Procedure(s) Performed: Procedure(s) (LRB):  CREATION, AV FISTULA, POSSIBLE AV GRAFT, LEFT UPPER EXTREMITY (Left)    Patient location: PACU    Anesthesia Type: general    Transport from OR: Transported from OR on 2-3 L/min O2 by NC with adequate spontaneous ventilation    Post pain: adequate analgesia    Post assessment: no apparent anesthetic complications and tolerated procedure well    Post vital signs: stable    Level of consciousness: awake and alert    Nausea/Vomiting: no nausea/vomiting    Complications: none    Transfer of care protocol was followedComments: Unable to move left arm post block      Last vitals:   Visit Vitals  /66 (BP Location: Right arm, Patient Position: Lying)   Pulse 89   Temp 36.8 °C (98.3 °F) (Oral)   Resp 20   Ht 5' 6" (1.676 m)   Wt 117.5 kg (259 lb)   LMP 05/01/2020 (Exact Date)   SpO2 100%   Breastfeeding? No   BMI 41.80 kg/m²     "

## 2020-05-06 ENCOUNTER — TELEPHONE (OUTPATIENT)
Dept: VASCULAR SURGERY | Facility: CLINIC | Age: 40
End: 2020-05-06

## 2020-05-07 ENCOUNTER — ANESTHESIA EVENT (OUTPATIENT)
Dept: SURGERY | Facility: HOSPITAL | Age: 40
End: 2020-05-07
Payer: MEDICARE

## 2020-05-07 ENCOUNTER — OFFICE VISIT (OUTPATIENT)
Dept: VASCULAR SURGERY | Facility: CLINIC | Age: 40
End: 2020-05-07
Payer: MEDICARE

## 2020-05-07 ENCOUNTER — HOSPITAL ENCOUNTER (OUTPATIENT)
Dept: CARDIOLOGY | Facility: HOSPITAL | Age: 40
Discharge: HOME | End: 2020-05-07
Attending: SURGERY
Payer: MEDICARE

## 2020-05-07 VITALS
WEIGHT: 259.06 LBS | DIASTOLIC BLOOD PRESSURE: 72 MMHG | BODY MASS INDEX: 41.63 KG/M2 | HEIGHT: 66 IN | SYSTOLIC BLOOD PRESSURE: 110 MMHG

## 2020-05-07 DIAGNOSIS — T82.898A INADEQUATE FLOW OF DIALYSIS ARTERIOVENOUS FISTULA, INITIAL ENCOUNTER: Primary | ICD-10-CM

## 2020-05-07 DIAGNOSIS — Z99.2 ESRD (END STAGE RENAL DISEASE) ON DIALYSIS: ICD-10-CM

## 2020-05-07 DIAGNOSIS — N18.6 ESRD (END STAGE RENAL DISEASE) ON DIALYSIS: ICD-10-CM

## 2020-05-07 PROCEDURE — 93990 DOPPLER FLOW TESTING: CPT | Mod: TC

## 2020-05-07 PROCEDURE — 99024 PR POST-OP FOLLOW-UP VISIT: ICD-10-PCS | Mod: S$GLB,,, | Performed by: SURGERY

## 2020-05-07 PROCEDURE — 99999 PR PBB SHADOW E&M-EST. PATIENT-LVL III: ICD-10-PCS | Mod: PBBFAC,,, | Performed by: SURGERY

## 2020-05-07 PROCEDURE — 99024 POSTOP FOLLOW-UP VISIT: CPT | Mod: S$GLB,,, | Performed by: SURGERY

## 2020-05-07 PROCEDURE — 93990 CV US HEMODIALYSIS ACCESS (CUPID ONLY): ICD-10-PCS | Mod: 26,,, | Performed by: SURGERY

## 2020-05-07 PROCEDURE — 99999 PR PBB SHADOW E&M-EST. PATIENT-LVL III: CPT | Mod: PBBFAC,,, | Performed by: SURGERY

## 2020-05-07 PROCEDURE — 93990 DOPPLER FLOW TESTING: CPT | Mod: 26,,, | Performed by: SURGERY

## 2020-05-07 NOTE — ANESTHESIA POSTPROCEDURE EVALUATION
Anesthesia Post Evaluation    Patient: Xuan Ricardo    Procedure(s) Performed: Procedure(s) (LRB):  CREATION, AV FISTULA, LEFT RADIOCEPHALIC FISTULA, LEFT UPPER EXTREMITY , INTRAOP ULTRASOUND, vEIN MAPPING.  (Left)    Final Anesthesia Type: regional    Patient location during evaluation: PACU  Patient participation: Yes- Able to Participate  Level of consciousness: awake and alert and oriented  Post-procedure vital signs: reviewed and stable  Pain management: adequate  Airway patency: patent    PONV status at discharge: No PONV  Anesthetic complications: no      Cardiovascular status: hemodynamically stable and blood pressure returned to baseline  Respiratory status: spontaneous ventilation, room air and unassisted  Hydration status: euvolemic  Follow-up not needed.          Vitals Value Taken Time   /72 5/7/2020 12:56 PM   Temp 36.1 °C (97 °F) 5/5/2020  1:45 PM   Pulse 80 5/5/2020  1:45 PM   Resp 16 5/5/2020  1:45 PM   SpO2 100 % 5/5/2020  1:45 PM         Event Time     Out of Recovery 12:15:00          Pain/Vineet Score: No data recorded

## 2020-05-07 NOTE — PATIENT INSTRUCTIONS
Caring for Your Hemodialysis Access  A problem such as an infection or a blood clot may make the access unusable. Typically, this happens more often with an arteriovenous graft than with an arteriovenous fistula. If this happens, youll need a new access. To help your access last, you will need to follow certain guidelines.  Watching for problems  Call your healthcare provider right away if you:  · Cant feel the blood flowing in the access (this sensation is called a thrill)  · Have pain or numbness in your hand or arm  · Have bleeding, redness, bluish discoloration, or warmth around your access  · Notice your access suddenly bulging out more than usual (a slight bulge is normal)  · Have a fever of 100.4°F (38°C) or higher, or as directed by your healthcare provider   Follow these and any other guidelines youre given  · Dont wear tight clothes or jewelry around your access.  · Dont let anyone take your blood pressure on or draw blood from the arm with the access. Also, dont let anyone put IV lines into it.  · Protect your access from being hit or cut.  · Wash your hands often and keep the area around the access clean.  · Do not carry anything heavy or do anything that would put pressure on the access.  Feeling for your thrill    If you put your fingers over your access, you should feel the blood rushing through it. This is called a thrill, and it feels like a vibration. Feel for the thrill as often as you're told, usually once or twice a day. If you can't feel it, tell your healthcare provider right away. Blood may not be flowing through your access the way it should.  Important numbers  Write the names and numbers of your healthcare providers below. That way you will know how to get in touch with them.  Doctor:  Name ___________________ Phone ___________________  Surgeon:  Name ___________________ Phone ___________________  Dialysis Center:  Name ___________________ Phone ___________________   Date Last  Reviewed: 1/1/2017  © 5912-8269 [employer]. [address] [place] [address], [place] [address]. All rights reserved. This information is not intended as a substitute for professional medical care. Always follow your healthcare professional's instructions.

## 2020-05-07 NOTE — PROGRESS NOTES
Rakesh Leon MD RPVI                       [facility]                         05/07/2020    HPI:  Xuan Ricardo is a 39 y.o. female with   Patient Active Problem List   Diagnosis    Anemia of renal disease    Hypertensive CKD (chronic kidney disease)    Nephrotic range proteinuria    Hyperkalemia    Acute renal failure superimposed on chronic kidney disease    Metabolic acidosis    Bacterial vaginosis    Increased prolactin level    CKD (chronic kidney disease) stage 5, GFR less than 15 ml/min    Secondary hyperparathyroidism of renal origin    Iron deficiency anemia    Vitamin D deficiency    Galactorrhea    Cataract    Class 2 severe obesity due to excess calories with serious comorbidity and body mass index (BMI) of 39.0 to 39.9 in adult    Abnormal uterine bleeding (AUB)    Chronic fatigue    Missed menses    Uterine leiomyoma    Type 2 diabetes mellitus with stage 5 chronic kidney disease not on chronic dialysis, with long-term current use of insulin    Hypertension    Hypomagnesemia    HTN (hypertension)    Symptomatic anemia    End stage renal disease    ESRD (end stage renal disease) on dialysis    being managed by PCP and specialists who is here today for evaluation of I have long-term dialysis access.  Patient is right handed.  Patient new to dialysis.  No issues with dialysis catheter.  No chest pain or shortness of breath.  Referred here for long-term dialysis access placement.  No upper extremity symptoms.    no MI  no Stroke  Tobacco use: denies    Interval history:  Some issues with R IJ catheter.  Tech with difficulty feeling LUE AVF thrill today.    Past Medical History:   Diagnosis Date    Cataract     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     Diabetes mellitus 1996    Insulin Dependent    Galactorrhea     Hyperphosphatemia     Hypertension     Iron deficiency anemia     Obesity     Secondary hyperparathyroidism of renal origin      Vitamin D deficiency      Past Surgical History:   Procedure Laterality Date    AV FISTULA PLACEMENT Left 2020    Procedure: CREATION, AV FISTULA, LEFT RADIOCEPHALIC FISTULA, LEFT UPPER EXTREMITY , INTRAOP ULTRASOUND, vEIN MAPPING. ;  Surgeon: Rakesh Leon MD;  Location: [facility] OR;  Service: Vascular;  Laterality: Left;  RN PREOP 20, UPT done, COVID NEGATRIVE 20  NEED H/P     SECTION, CLASSIC      INSERTION OF TUNNELED CENTRAL VENOUS CATHETER (CVC) WITH SUBCUTANEOUS PORT N/A 2020    Procedure: IR TUNNELED VATH INSERT WITHOUT PORT;  Surgeon: Prachi Diagnostic Provider;  Location: [facility] OR;  Service: Radiology;  Laterality: N/A;  1030AM START  RN PREOP 2020    TUBAL LIGATION       Family History   Problem Relation Age of Onset    Seizures Mother 64    Heart failure Father 58    Asthma Sister     Breast cancer Neg Hx     Colon cancer Neg Hx     Ovarian cancer Neg Hx      Social History     Socioeconomic History    Marital status: Single     Spouse name: Not on file    Number of children: 2    Years of education: Not on file    Highest education level: Not on file   Occupational History    Occupation: Unemployed   Social Needs    Financial resource strain: Hard    Food insecurity:     Worry: Not on file     Inability: Not on file    Transportation needs:     Medical: Not on file     Non-medical: Not on file   Tobacco Use    Smoking status: Former Smoker     Types: Cigarettes    Smokeless tobacco: Never Used   Substance and Sexual Activity    Alcohol use: No    Drug use: Yes     Types: Marijuana     Comment: Occassional Recreational Use only    Sexual activity: Not Currently     Partners: Male   Lifestyle    Physical activity:     Days per week: Patient refused     Minutes per session: Patient refused    Stress: Rather much   Relationships    Social connections:     Talks on phone: More than three times a week     Gets together: More than three times a week  "    Attends Zoroastrianism service: Patient refused     Active member of club or organization: Patient refused     Attends meetings of clubs or organizations: Patient refused     Relationship status: Never    Other Topics Concern    Not on file   Social History Narrative    Currently unemployed has two young children ages 5 and 7 and she is the sole caretaker       Current Outpatient Medications:     calcitRIOL (ROCALTROL) 0.25 MCG Cap, TAKE 1 CAPSULE(0.25 MCG) BY MOUTH EVERY DAY, Disp: 90 capsule, Rfl: 1    cinacalcet (SENSIPAR) 30 MG Tab, Take 1 tablet (30 mg total) by mouth daily with breakfast., Disp: 30 tablet, Rfl: 11    ergocalciferol (ERGOCALCIFEROL) 50,000 unit Cap, Take 1 capsule (50,000 Units total) by mouth every 7 days., Disp: 12 capsule, Rfl: 0    insulin NPH (NOVOLIN N) 100 unit/mL injection, Inject 15 Units into the skin before breakfast., Disp: 10 mL, Rfl: 2    NIFEdipine (PROCARDIA-XL) 90 MG (OSM) 24 hr tablet, TAKE 1 TABLET(90 MG) BY MOUTH EVERY DAY, Disp: 90 tablet, Rfl: 1    sevelamer carbonate (RENVELA) 800 mg Tab, Take 1 tablet (800 mg total) by mouth 3 (three) times daily with meals., Disp: 90 tablet, Rfl: 11    blood sugar diagnostic Strp, Check blood glucose 3 times daily (Patient not taking: Reported on 5/7/2020), Disp: 100 each, Rfl: 5    lancets (ONETOUCH DELICA LANCETS) 30 gauge Misc, 3 (three) times daily, Disp: , Rfl:     oxyCODONE-acetaminophen (PERCOCET) 5-325 mg per tablet, Take 1 tablet by mouth every 6 (six) hours as needed for Pain. (Patient not taking: Reported on 5/7/2020), Disp: 20 tablet, Rfl: 0    pen needle, diabetic (BD ULTRA-FINE SHORT PEN NEEDLE) 31 gauge x 5/16" Ndle, daily, Disp: , Rfl:     sodium bicarbonate 650 MG tablet, Take 1 tablet (650 mg total) by mouth 2 (two) times daily. (Patient not taking: Reported on 3/2/2020), Disp: 60 tablet, Rfl: 11    REVIEW OF SYSTEMS:  General: No fevers or chills; ENT: No sore throat; Allergy and Immunology: no " persistent infections; Hematological and Lymphatic: No history of bleeding or easy bruising; Endocrine: negative; Respiratory: no cough, shortness of breath, or wheezing; Cardiovascular: no chest pain or dyspnea on exertion; Gastrointestinal: no abdominal pain/back, change in bowel habits, or bloody stools; Genito-Urinary: no dysuria, trouble voiding, or hematuria; Musculoskeletal: negative; Neurological: no TIA or stroke symptoms; Psychiatric: no nervousness, anxiety or depression.    PHYSICAL EXAM:                General appearance:  Alert, well-appearing, and in no distress.  Oriented to person, place, and time                    Neurological: Normal speech, no focal findings noted; CN II - XII grossly intact.  All extremities with intact sensation to light touch            Musculoskeletal: Digits/nail without cyanosis/clubbing.  Strength 5/5 .                    Neck: Supple, no significant adenopathy, no carotid bruit can be auscultated                  Chest:  Clear to auscultation, no wheezes, rales or rhonchi, symmetric air entry. No use of accessory muscles               Cardiac: Normal rate and regular rhythm, S1 and S2 normal            Abdomen: Soft, nontender, nondistended, no masses or organomegaly, no hernia     No rebound tenderness noted; bowel sounds normal     Pulsatile aortic mass is non palpable.     No groin adenopathy      Extremities:   2+ radial bilateral, Negative luis manuel's test BUE; LUE AVF with poor thrill, inc healing well     No extremity edema    Skin: No wounds    LAB RESULTS:  No results found for: CBC  Lab Results   Component Value Date    LABPROT 10.2 05/04/2020    INR 0.9 05/04/2020     Lab Results   Component Value Date     05/04/2020    K 4.6 05/04/2020    CL 97 05/04/2020    CO2 26 05/04/2020     (H) 05/04/2020    BUN 34 (H) 05/04/2020    CREATININE 8.9 (H) 05/04/2020    CALCIUM 8.7 05/04/2020    ANIONGAP 14 05/04/2020    EGFRNONAA 5 (A) 05/04/2020     Lab Results    Component Value Date    WBC 7.52 05/04/2020    RBC 3.78 (L) 05/04/2020    HGB 11.3 (L) 05/04/2020    HCT 36.2 (L) 05/04/2020    MCV 96 05/04/2020    MCH 29.9 05/04/2020    MCHC 31.2 (L) 05/04/2020    RDW 16.3 (H) 05/04/2020     05/04/2020    MPV 10.3 05/04/2020    GRAN 5.2 05/04/2020    GRAN 68.9 05/04/2020    LYMPH 1.6 05/04/2020    LYMPH 21.5 05/04/2020    MONO 0.5 05/04/2020    MONO 6.3 05/04/2020    EOS 0.2 05/04/2020    BASO 0.04 05/04/2020    EOSINOPHIL 2.4 05/04/2020    BASOPHIL 0.5 05/04/2020    DIFFMETHOD Automated 05/04/2020     .  Lab Results   Component Value Date    HGBA1C 5.4 01/18/2020       IMAGING:  All pertinent imaging has been reviewed and interpreted independently.    Vein mapping reviewed: Adequate bilateral proximal and superior cephalic vein, superior basilic and axillary veins    IMP/PLAN:  39 y.o. female with   Patient Active Problem List   Diagnosis    Anemia of renal disease    Hypertensive CKD (chronic kidney disease)    Nephrotic range proteinuria    Hyperkalemia    Acute renal failure superimposed on chronic kidney disease    Metabolic acidosis    Bacterial vaginosis    Increased prolactin level    CKD (chronic kidney disease) stage 5, GFR less than 15 ml/min    Secondary hyperparathyroidism of renal origin    Iron deficiency anemia    Vitamin D deficiency    Galactorrhea    Cataract    Class 2 severe obesity due to excess calories with serious comorbidity and body mass index (BMI) of 39.0 to 39.9 in adult    Abnormal uterine bleeding (AUB)    Chronic fatigue    Missed menses    Uterine leiomyoma    Type 2 diabetes mellitus with stage 5 chronic kidney disease not on chronic dialysis, with long-term current use of insulin    Hypertension    Hypomagnesemia    HTN (hypertension)    Symptomatic anemia    End stage renal disease    ESRD (end stage renal disease) on dialysis    being managed by PCP and specialists who is here today for evaluation of  long-term dialysis access.    -s/p L RCF 5/5/20 with poor thrill today - obtain HD US   -continue renal diet, protect left upper extremity  -will call with POC    I spent 11 minutes evaluating this patient and greater than 50% of the time was spent counseling, coordinator care and discussing the plan of care.  All questions were answered and patient stated understanding with agreement with the above treatment plan.    Rakesh Leon MD [facility]  Vascular and Endovascular Surgery

## 2020-05-07 NOTE — H&P (VIEW-ONLY)
Rakesh Leon MD RPVI                       [facility]                         05/07/2020    HPI:  Xuan Ricardo is a 39 y.o. female with   Patient Active Problem List   Diagnosis    Anemia of renal disease    Hypertensive CKD (chronic kidney disease)    Nephrotic range proteinuria    Hyperkalemia    Acute renal failure superimposed on chronic kidney disease    Metabolic acidosis    Bacterial vaginosis    Increased prolactin level    CKD (chronic kidney disease) stage 5, GFR less than 15 ml/min    Secondary hyperparathyroidism of renal origin    Iron deficiency anemia    Vitamin D deficiency    Galactorrhea    Cataract    Class 2 severe obesity due to excess calories with serious comorbidity and body mass index (BMI) of 39.0 to 39.9 in adult    Abnormal uterine bleeding (AUB)    Chronic fatigue    Missed menses    Uterine leiomyoma    Type 2 diabetes mellitus with stage 5 chronic kidney disease not on chronic dialysis, with long-term current use of insulin    Hypertension    Hypomagnesemia    HTN (hypertension)    Symptomatic anemia    End stage renal disease    ESRD (end stage renal disease) on dialysis    being managed by PCP and specialists who is here today for evaluation of I have long-term dialysis access.  Patient is right handed.  Patient new to dialysis.  No issues with dialysis catheter.  No chest pain or shortness of breath.  Referred here for long-term dialysis access placement.  No upper extremity symptoms.    no MI  no Stroke  Tobacco use: denies    Interval history:  Some issues with R IJ catheter.  Tech with difficulty feeling LUE AVF thrill today.    Past Medical History:   Diagnosis Date    Cataract     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     Diabetes mellitus 1996    Insulin Dependent    Galactorrhea     Hyperphosphatemia     Hypertension     Iron deficiency anemia     Obesity     Secondary hyperparathyroidism of renal origin      Vitamin D deficiency      Past Surgical History:   Procedure Laterality Date    AV FISTULA PLACEMENT Left 2020    Procedure: CREATION, AV FISTULA, LEFT RADIOCEPHALIC FISTULA, LEFT UPPER EXTREMITY , INTRAOP ULTRASOUND, vEIN MAPPING. ;  Surgeon: Rakesh Leon MD;  Location: [facility] OR;  Service: Vascular;  Laterality: Left;  RN PREOP 20, UPT done, COVID NEGATRIVE 20  NEED H/P     SECTION, CLASSIC      INSERTION OF TUNNELED CENTRAL VENOUS CATHETER (CVC) WITH SUBCUTANEOUS PORT N/A 2020    Procedure: IR TUNNELED VATH INSERT WITHOUT PORT;  Surgeon: Prachi Diagnostic Provider;  Location: [facility] OR;  Service: Radiology;  Laterality: N/A;  1030AM START  RN PREOP 2020    TUBAL LIGATION       Family History   Problem Relation Age of Onset    Seizures Mother 64    Heart failure Father 58    Asthma Sister     Breast cancer Neg Hx     Colon cancer Neg Hx     Ovarian cancer Neg Hx      Social History     Socioeconomic History    Marital status: Single     Spouse name: Not on file    Number of children: 2    Years of education: Not on file    Highest education level: Not on file   Occupational History    Occupation: Unemployed   Social Needs    Financial resource strain: Hard    Food insecurity:     Worry: Not on file     Inability: Not on file    Transportation needs:     Medical: Not on file     Non-medical: Not on file   Tobacco Use    Smoking status: Former Smoker     Types: Cigarettes    Smokeless tobacco: Never Used   Substance and Sexual Activity    Alcohol use: No    Drug use: Yes     Types: Marijuana     Comment: Occassional Recreational Use only    Sexual activity: Not Currently     Partners: Male   Lifestyle    Physical activity:     Days per week: Patient refused     Minutes per session: Patient refused    Stress: Rather much   Relationships    Social connections:     Talks on phone: More than three times a week     Gets together: More than three times a week  "    Attends Anabaptism service: Patient refused     Active member of club or organization: Patient refused     Attends meetings of clubs or organizations: Patient refused     Relationship status: Never    Other Topics Concern    Not on file   Social History Narrative    Currently unemployed has two young children ages 5 and 7 and she is the sole caretaker       Current Outpatient Medications:     calcitRIOL (ROCALTROL) 0.25 MCG Cap, TAKE 1 CAPSULE(0.25 MCG) BY MOUTH EVERY DAY, Disp: 90 capsule, Rfl: 1    cinacalcet (SENSIPAR) 30 MG Tab, Take 1 tablet (30 mg total) by mouth daily with breakfast., Disp: 30 tablet, Rfl: 11    ergocalciferol (ERGOCALCIFEROL) 50,000 unit Cap, Take 1 capsule (50,000 Units total) by mouth every 7 days., Disp: 12 capsule, Rfl: 0    insulin NPH (NOVOLIN N) 100 unit/mL injection, Inject 15 Units into the skin before breakfast., Disp: 10 mL, Rfl: 2    NIFEdipine (PROCARDIA-XL) 90 MG (OSM) 24 hr tablet, TAKE 1 TABLET(90 MG) BY MOUTH EVERY DAY, Disp: 90 tablet, Rfl: 1    sevelamer carbonate (RENVELA) 800 mg Tab, Take 1 tablet (800 mg total) by mouth 3 (three) times daily with meals., Disp: 90 tablet, Rfl: 11    blood sugar diagnostic Strp, Check blood glucose 3 times daily (Patient not taking: Reported on 5/7/2020), Disp: 100 each, Rfl: 5    lancets (ONETOUCH DELICA LANCETS) 30 gauge Misc, 3 (three) times daily, Disp: , Rfl:     oxyCODONE-acetaminophen (PERCOCET) 5-325 mg per tablet, Take 1 tablet by mouth every 6 (six) hours as needed for Pain. (Patient not taking: Reported on 5/7/2020), Disp: 20 tablet, Rfl: 0    pen needle, diabetic (BD ULTRA-FINE SHORT PEN NEEDLE) 31 gauge x 5/16" Ndle, daily, Disp: , Rfl:     sodium bicarbonate 650 MG tablet, Take 1 tablet (650 mg total) by mouth 2 (two) times daily. (Patient not taking: Reported on 3/2/2020), Disp: 60 tablet, Rfl: 11    REVIEW OF SYSTEMS:  General: No fevers or chills; ENT: No sore throat; Allergy and Immunology: no " persistent infections; Hematological and Lymphatic: No history of bleeding or easy bruising; Endocrine: negative; Respiratory: no cough, shortness of breath, or wheezing; Cardiovascular: no chest pain or dyspnea on exertion; Gastrointestinal: no abdominal pain/back, change in bowel habits, or bloody stools; Genito-Urinary: no dysuria, trouble voiding, or hematuria; Musculoskeletal: negative; Neurological: no TIA or stroke symptoms; Psychiatric: no nervousness, anxiety or depression.    PHYSICAL EXAM:                General appearance:  Alert, well-appearing, and in no distress.  Oriented to person, place, and time                    Neurological: Normal speech, no focal findings noted; CN II - XII grossly intact.  All extremities with intact sensation to light touch            Musculoskeletal: Digits/nail without cyanosis/clubbing.  Strength 5/5 .                    Neck: Supple, no significant adenopathy, no carotid bruit can be auscultated                  Chest:  Clear to auscultation, no wheezes, rales or rhonchi, symmetric air entry. No use of accessory muscles               Cardiac: Normal rate and regular rhythm, S1 and S2 normal            Abdomen: Soft, nontender, nondistended, no masses or organomegaly, no hernia     No rebound tenderness noted; bowel sounds normal     Pulsatile aortic mass is non palpable.     No groin adenopathy      Extremities:   2+ radial bilateral, Negative luis manuel's test BUE; LUE AVF with poor thrill, inc healing well     No extremity edema    Skin: No wounds    LAB RESULTS:  No results found for: CBC  Lab Results   Component Value Date    LABPROT 10.2 05/04/2020    INR 0.9 05/04/2020     Lab Results   Component Value Date     05/04/2020    K 4.6 05/04/2020    CL 97 05/04/2020    CO2 26 05/04/2020     (H) 05/04/2020    BUN 34 (H) 05/04/2020    CREATININE 8.9 (H) 05/04/2020    CALCIUM 8.7 05/04/2020    ANIONGAP 14 05/04/2020    EGFRNONAA 5 (A) 05/04/2020     Lab Results    Component Value Date    WBC 7.52 05/04/2020    RBC 3.78 (L) 05/04/2020    HGB 11.3 (L) 05/04/2020    HCT 36.2 (L) 05/04/2020    MCV 96 05/04/2020    MCH 29.9 05/04/2020    MCHC 31.2 (L) 05/04/2020    RDW 16.3 (H) 05/04/2020     05/04/2020    MPV 10.3 05/04/2020    GRAN 5.2 05/04/2020    GRAN 68.9 05/04/2020    LYMPH 1.6 05/04/2020    LYMPH 21.5 05/04/2020    MONO 0.5 05/04/2020    MONO 6.3 05/04/2020    EOS 0.2 05/04/2020    BASO 0.04 05/04/2020    EOSINOPHIL 2.4 05/04/2020    BASOPHIL 0.5 05/04/2020    DIFFMETHOD Automated 05/04/2020     .  Lab Results   Component Value Date    HGBA1C 5.4 01/18/2020       IMAGING:  All pertinent imaging has been reviewed and interpreted independently.    Vein mapping reviewed: Adequate bilateral proximal and superior cephalic vein, superior basilic and axillary veins    IMP/PLAN:  39 y.o. female with   Patient Active Problem List   Diagnosis    Anemia of renal disease    Hypertensive CKD (chronic kidney disease)    Nephrotic range proteinuria    Hyperkalemia    Acute renal failure superimposed on chronic kidney disease    Metabolic acidosis    Bacterial vaginosis    Increased prolactin level    CKD (chronic kidney disease) stage 5, GFR less than 15 ml/min    Secondary hyperparathyroidism of renal origin    Iron deficiency anemia    Vitamin D deficiency    Galactorrhea    Cataract    Class 2 severe obesity due to excess calories with serious comorbidity and body mass index (BMI) of 39.0 to 39.9 in adult    Abnormal uterine bleeding (AUB)    Chronic fatigue    Missed menses    Uterine leiomyoma    Type 2 diabetes mellitus with stage 5 chronic kidney disease not on chronic dialysis, with long-term current use of insulin    Hypertension    Hypomagnesemia    HTN (hypertension)    Symptomatic anemia    End stage renal disease    ESRD (end stage renal disease) on dialysis    being managed by PCP and specialists who is here today for evaluation of  long-term dialysis access.    -s/p L RCF 5/5/20 with poor thrill today - obtain HD US   -continue renal diet, protect left upper extremity  -will call with POC    I spent 11 minutes evaluating this patient and greater than 50% of the time was spent counseling, coordinator care and discussing the plan of care.  All questions were answered and patient stated understanding with agreement with the above treatment plan.    Rakesh Leon MD [facility]  Vascular and Endovascular Surgery

## 2020-05-07 NOTE — LETTER
May 7, 2020        Katharine Franks MD  [address]  [place] [address]             [facility]  [address]  [place] [facility] [address]  Phone: [phone]  Fax: [phone]   Patient: Xuan Ricardo   MR Number: [id number]   YOB: 1980   Date of Visit: 5/7/2020       Dear Dr. Ti Franks:    Thank you for referring Xuan Ricardo to me for evaluation. Below are the relevant portions of my assessment and plan of care.            If you have questions, please do not hesitate to call me. I look forward to following Xuan along with you.    Sincerely,      Rakesh Leon MD           CC  No Recipients

## 2020-05-08 ENCOUNTER — HOSPITAL ENCOUNTER (OUTPATIENT)
Facility: HOSPITAL | Age: 40
Discharge: HOME | End: 2020-05-08
Attending: SURGERY | Admitting: SURGERY
Payer: MEDICARE

## 2020-05-08 ENCOUNTER — ANESTHESIA (OUTPATIENT)
Dept: SURGERY | Facility: HOSPITAL | Age: 40
End: 2020-05-08
Payer: MEDICARE

## 2020-05-08 ENCOUNTER — HOSPITAL ENCOUNTER (OUTPATIENT)
Dept: PREADMISSION TESTING | Facility: HOSPITAL | Age: 40
Discharge: HOME | End: 2020-05-08
Attending: SURGERY
Payer: MEDICARE

## 2020-05-08 VITALS
WEIGHT: 259.06 LBS | RESPIRATION RATE: 16 BRPM | DIASTOLIC BLOOD PRESSURE: 81 MMHG | HEART RATE: 84 BPM | SYSTOLIC BLOOD PRESSURE: 130 MMHG | OXYGEN SATURATION: 100 % | TEMPERATURE: 98 F | BODY MASS INDEX: 41.81 KG/M2

## 2020-05-08 DIAGNOSIS — N18.6 ENCOUNTER REGARDING VASCULAR ACCESS FOR DIALYSIS FOR END-STAGE RENAL DISEASE: Primary | ICD-10-CM

## 2020-05-08 DIAGNOSIS — Z99.2 ENCOUNTER REGARDING VASCULAR ACCESS FOR DIALYSIS FOR END-STAGE RENAL DISEASE: Primary | ICD-10-CM

## 2020-05-08 LAB
HD ANASTAMOSIS LOCATION: NORMAL
HD FISTULA OUTFLOW VEIN LOCATION: NORMAL
HD FISTULA OUTFLOW VEIN VESSEL: NORMAL
HD INFLOW ARTERY VESSEL: NORMAL
POCT GLUCOSE: 82 MG/DL (ref 70–110)
RIGHT INFLOW PSV: 61 CM/S
RIGHT MID GRAFT PSV: 12 CM/S
RIGHT OUTFLOW VEIN PSV: 7 CM/ SEC
RIGHT PROX ANA PSV: 0 CM/S
RIGHT PROX GRAFT PSV: 0 CM/S
RIGHT VOLUME FLOW DIA: 0.2 CM
RIGHT VOLUME FLOW PSV: 10 ML/MIN
SARS-COV-2 RDRP RESP QL NAA+PROBE: NEGATIVE

## 2020-05-08 PROCEDURE — 71000016 HC POSTOP RECOV ADDL HR: Performed by: SURGERY

## 2020-05-08 PROCEDURE — 76942 ECHO GUIDE FOR BIOPSY: CPT | Performed by: ANESTHESIOLOGY

## 2020-05-08 PROCEDURE — 63600175 PHARM REV CODE 636 W HCPCS: Performed by: ANESTHESIOLOGY

## 2020-05-08 PROCEDURE — D9220A PRA ANESTHESIA: Mod: CRNA,,, | Performed by: REGISTERED NURSE

## 2020-05-08 PROCEDURE — 25000003 PHARM REV CODE 250: Performed by: SURGERY

## 2020-05-08 PROCEDURE — 71000015 HC POSTOP RECOV 1ST HR: Performed by: SURGERY

## 2020-05-08 PROCEDURE — 36000706: Performed by: SURGERY

## 2020-05-08 PROCEDURE — 25000003 PHARM REV CODE 250: Performed by: ANESTHESIOLOGY

## 2020-05-08 PROCEDURE — 63600175 PHARM REV CODE 636 W HCPCS: Performed by: NURSE ANESTHETIST, CERTIFIED REGISTERED

## 2020-05-08 PROCEDURE — 71000033 HC RECOVERY, INTIAL HOUR: Performed by: SURGERY

## 2020-05-08 PROCEDURE — 37000009 HC ANESTHESIA EA ADD 15 MINS: Performed by: SURGERY

## 2020-05-08 PROCEDURE — D9220A PRA ANESTHESIA: ICD-10-PCS | Mod: ANES,,, | Performed by: ANESTHESIOLOGY

## 2020-05-08 PROCEDURE — 36833 PR AV FISTULA REVISION, OPEN, W/ THROMBECTOMY: ICD-10-PCS | Mod: 78,,, | Performed by: SURGERY

## 2020-05-08 PROCEDURE — 88304 PR  SURG PATH,LEVEL III: ICD-10-PCS | Mod: 26,,, | Performed by: PATHOLOGY

## 2020-05-08 PROCEDURE — 88304 TISSUE EXAM BY PATHOLOGIST: CPT | Mod: 26,,, | Performed by: PATHOLOGY

## 2020-05-08 PROCEDURE — 63600175 PHARM REV CODE 636 W HCPCS: Performed by: REGISTERED NURSE

## 2020-05-08 PROCEDURE — U0002 COVID-19 LAB TEST NON-CDC: HCPCS

## 2020-05-08 PROCEDURE — D9220A PRA ANESTHESIA: ICD-10-PCS | Mod: CRNA,,, | Performed by: REGISTERED NURSE

## 2020-05-08 PROCEDURE — 37000008 HC ANESTHESIA 1ST 15 MINUTES: Performed by: SURGERY

## 2020-05-08 PROCEDURE — 25000003 PHARM REV CODE 250

## 2020-05-08 PROCEDURE — 36833 AV FISTULA REVISION: CPT | Mod: 78,,, | Performed by: SURGERY

## 2020-05-08 PROCEDURE — 36000707: Performed by: SURGERY

## 2020-05-08 PROCEDURE — 88304 TISSUE EXAM BY PATHOLOGIST: CPT | Performed by: PATHOLOGY

## 2020-05-08 PROCEDURE — C1757 CATH, THROMBECTOMY/EMBOLECT: HCPCS | Performed by: SURGERY

## 2020-05-08 PROCEDURE — 82962 GLUCOSE BLOOD TEST: CPT | Performed by: SURGERY

## 2020-05-08 PROCEDURE — 63600175 PHARM REV CODE 636 W HCPCS: Performed by: SURGERY

## 2020-05-08 PROCEDURE — D9220A PRA ANESTHESIA: Mod: ANES,,, | Performed by: ANESTHESIOLOGY

## 2020-05-08 PROCEDURE — 25000003 PHARM REV CODE 250: Performed by: NURSE ANESTHETIST, CERTIFIED REGISTERED

## 2020-05-08 RX ORDER — HYDROMORPHONE HYDROCHLORIDE 2 MG/ML
0.2 INJECTION, SOLUTION INTRAMUSCULAR; INTRAVENOUS; SUBCUTANEOUS EVERY 5 MIN PRN
Status: DISCONTINUED | OUTPATIENT
Start: 2020-05-08 | End: 2020-05-08 | Stop reason: HOSPADM

## 2020-05-08 RX ORDER — LIDOCAINE HYDROCHLORIDE 20 MG/ML
INJECTION INTRAVENOUS
Status: DISCONTINUED | OUTPATIENT
Start: 2020-05-08 | End: 2020-05-08

## 2020-05-08 RX ORDER — PROPOFOL 10 MG/ML
VIAL (ML) INTRAVENOUS CONTINUOUS PRN
Status: DISCONTINUED | OUTPATIENT
Start: 2020-05-08 | End: 2020-05-08

## 2020-05-08 RX ORDER — LIDOCAINE HYDROCHLORIDE 10 MG/ML
1 INJECTION, SOLUTION EPIDURAL; INFILTRATION; INTRACAUDAL; PERINEURAL ONCE
Status: DISCONTINUED | OUTPATIENT
Start: 2020-05-08 | End: 2020-05-08 | Stop reason: HOSPADM

## 2020-05-08 RX ORDER — SODIUM CHLORIDE 0.9 % (FLUSH) 0.9 %
10 SYRINGE (ML) INJECTION
Status: DISCONTINUED | OUTPATIENT
Start: 2020-05-08 | End: 2020-05-08 | Stop reason: HOSPADM

## 2020-05-08 RX ORDER — PROPOFOL 10 MG/ML
VIAL (ML) INTRAVENOUS
Status: DISCONTINUED | OUTPATIENT
Start: 2020-05-08 | End: 2020-05-08

## 2020-05-08 RX ORDER — GLYCOPYRROLATE 0.2 MG/ML
INJECTION INTRAMUSCULAR; INTRAVENOUS
Status: DISCONTINUED | OUTPATIENT
Start: 2020-05-08 | End: 2020-05-08

## 2020-05-08 RX ORDER — ROPIVACAINE HYDROCHLORIDE 5 MG/ML
INJECTION, SOLUTION EPIDURAL; INFILTRATION; PERINEURAL
Status: DISCONTINUED | OUTPATIENT
Start: 2020-05-08 | End: 2020-05-08

## 2020-05-08 RX ORDER — HEPARIN SODIUM 1000 [USP'U]/ML
INJECTION INTRAVENOUS; SUBCUTANEOUS
Status: DISCONTINUED | OUTPATIENT
Start: 2020-05-08 | End: 2020-05-08

## 2020-05-08 RX ORDER — PHENYLEPHRINE HYDROCHLORIDE 10 MG/ML
INJECTION INTRAVENOUS
Status: DISCONTINUED | OUTPATIENT
Start: 2020-05-08 | End: 2020-05-08

## 2020-05-08 RX ORDER — FENTANYL CITRATE 50 UG/ML
INJECTION, SOLUTION INTRAMUSCULAR; INTRAVENOUS
Status: DISCONTINUED | OUTPATIENT
Start: 2020-05-08 | End: 2020-05-08

## 2020-05-08 RX ORDER — CEFAZOLIN SODIUM 2 G/50ML
2 SOLUTION INTRAVENOUS ONCE
Status: COMPLETED | OUTPATIENT
Start: 2020-05-08 | End: 2020-05-08

## 2020-05-08 RX ORDER — HEPARIN SODIUM 1000 [USP'U]/ML
INJECTION, SOLUTION INTRAVENOUS; SUBCUTANEOUS
Status: DISCONTINUED | OUTPATIENT
Start: 2020-05-08 | End: 2020-05-08 | Stop reason: HOSPADM

## 2020-05-08 RX ORDER — SODIUM CHLORIDE 9 MG/ML
INJECTION, SOLUTION INTRAVENOUS CONTINUOUS
Status: DISCONTINUED | OUTPATIENT
Start: 2020-05-08 | End: 2020-05-08 | Stop reason: HOSPADM

## 2020-05-08 RX ORDER — ONDANSETRON 2 MG/ML
4 INJECTION INTRAMUSCULAR; INTRAVENOUS DAILY PRN
Status: DISCONTINUED | OUTPATIENT
Start: 2020-05-08 | End: 2020-05-08 | Stop reason: HOSPADM

## 2020-05-08 RX ORDER — KETAMINE HCL IN 0.9 % NACL 50 MG/5 ML
SYRINGE (ML) INTRAVENOUS
Status: DISCONTINUED | OUTPATIENT
Start: 2020-05-08 | End: 2020-05-08

## 2020-05-08 RX ORDER — LIDOCAINE HYDROCHLORIDE 10 MG/ML
INJECTION, SOLUTION EPIDURAL; INFILTRATION; INTRACAUDAL; PERINEURAL
Status: DISCONTINUED | OUTPATIENT
Start: 2020-05-08 | End: 2020-05-08 | Stop reason: HOSPADM

## 2020-05-08 RX ORDER — MIDAZOLAM HYDROCHLORIDE 1 MG/ML
INJECTION, SOLUTION INTRAMUSCULAR; INTRAVENOUS
Status: DISCONTINUED | OUTPATIENT
Start: 2020-05-08 | End: 2020-05-08

## 2020-05-08 RX ORDER — BACITRACIN 50000 [IU]/1
INJECTION, POWDER, FOR SOLUTION INTRAMUSCULAR
Status: DISCONTINUED | OUTPATIENT
Start: 2020-05-08 | End: 2020-05-08 | Stop reason: HOSPADM

## 2020-05-08 RX ADMIN — PHENYLEPHRINE HYDROCHLORIDE 100 MCG: 10 INJECTION INTRAVENOUS at 03:05

## 2020-05-08 RX ADMIN — MIDAZOLAM HYDROCHLORIDE 1 MG: 1 INJECTION, SOLUTION INTRAMUSCULAR; INTRAVENOUS at 01:05

## 2020-05-08 RX ADMIN — SODIUM CHLORIDE: 0.9 INJECTION, SOLUTION INTRAVENOUS at 12:05

## 2020-05-08 RX ADMIN — PROPOFOL 100 MCG/KG/MIN: 10 INJECTION, EMULSION INTRAVENOUS at 01:05

## 2020-05-08 RX ADMIN — PHENYLEPHRINE HYDROCHLORIDE 50 MCG: 10 INJECTION INTRAVENOUS at 04:05

## 2020-05-08 RX ADMIN — PROPOFOL 30 MG: 10 INJECTION, EMULSION INTRAVENOUS at 01:05

## 2020-05-08 RX ADMIN — PROPOFOL 20 MG: 10 INJECTION, EMULSION INTRAVENOUS at 01:05

## 2020-05-08 RX ADMIN — PROPOFOL: 10 INJECTION, EMULSION INTRAVENOUS at 03:05

## 2020-05-08 RX ADMIN — Medication 60 MG: at 01:05

## 2020-05-08 RX ADMIN — FENTANYL CITRATE 50 MCG: 50 INJECTION INTRAMUSCULAR; INTRAVENOUS at 01:05

## 2020-05-08 RX ADMIN — Medication 20 MG: at 01:05

## 2020-05-08 RX ADMIN — HEPARIN SODIUM 3000 UNITS: 1000 INJECTION INTRAVENOUS; SUBCUTANEOUS at 02:05

## 2020-05-08 RX ADMIN — FENTANYL CITRATE 25 MCG: 50 INJECTION INTRAMUSCULAR; INTRAVENOUS at 02:05

## 2020-05-08 RX ADMIN — HEPARIN SODIUM 1000 UNITS: 1000 INJECTION INTRAVENOUS; SUBCUTANEOUS at 02:05

## 2020-05-08 RX ADMIN — Medication 10 MG: at 01:05

## 2020-05-08 RX ADMIN — CEFAZOLIN SODIUM 2 G: 2 SOLUTION INTRAVENOUS at 01:05

## 2020-05-08 RX ADMIN — PHENYLEPHRINE HYDROCHLORIDE 100 MCG: 10 INJECTION INTRAVENOUS at 04:05

## 2020-05-08 RX ADMIN — ROPIVACAINE HYDROCHLORIDE 30 ML: 5 INJECTION, SOLUTION EPIDURAL; INFILTRATION; PERINEURAL at 01:05

## 2020-05-08 RX ADMIN — GLYCOPYRROLATE 0.2 MG: 0.2 INJECTION, SOLUTION INTRAMUSCULAR; INTRAVENOUS at 01:05

## 2020-05-08 RX ADMIN — SODIUM CHLORIDE: 0.9 INJECTION, SOLUTION INTRAVENOUS at 01:05

## 2020-05-08 NOTE — BRIEF OP NOTE
[facility]  Brief Operative Note    Surgery Date: 5/8/2020     Surgeon(s) and Role:     * Rakesh Leon MD - Primary    Assisting Surgeon: Xander Pierson MD    Pre-op Diagnosis:  Inadequate flow of dialysis arteriovenous fistula, initial encounter [T82.898A]    Post-op Diagnosis:  Post-Op Diagnosis Codes:     * Inadequate flow of dialysis arteriovenous fistula, initial encounter [T82.898A]    Procedure(s) (LRB):  REVISION, AV FISTULA, THROMBECTOMY, LEFT UPPER EXTREMITY (Left)    Anesthesia: Regional    Description of the findings of the procedure(s): acute clot removed and thrill present at conclusion of case    Estimated Blood Loss: 50 mL         Specimens:   Specimen (12h ago, onward)    None            Discharge Note    OUTCOME: Patient tolerated treatment/procedure well without complication and is now ready for discharge.    DISPOSITION: Home or Self Care    FINAL DIAGNOSIS:  <principal problem not specified>    FOLLOWUP: In clinic    DISCHARGE INSTRUCTIONS:  No discharge procedures on file.

## 2020-05-08 NOTE — TRANSFER OF CARE
Anesthesia Transfer of Care Note    Patient: Xuan Ricardo    Procedure(s) Performed: Procedure(s) (LRB):  REVISION, AV FISTULA, THROMBECTOMY, LEFT UPPER EXTREMITY (Left)    Patient location: PACU    Anesthesia Type: regional and MAC    Transport from OR: Transported from OR on room air with adequate spontaneous ventilation    Post pain: adequate analgesia    Post assessment: no apparent anesthetic complications and tolerated procedure well    Post vital signs: stable    Level of consciousness: awake, alert and responds to stimulation    Nausea/Vomiting: no nausea/vomiting    Complications: none    Transfer of care protocol was followed      Last vitals:   Visit Vitals  /77   Pulse 87   Temp 36.7 °C (98.1 °F) (Oral)   Resp 18   Wt 117.5 kg (259 lb 0.6 oz)   LMP 05/01/2020 (Exact Date)   SpO2 100%   Breastfeeding? No   BMI 41.81 kg/m²

## 2020-05-08 NOTE — PLAN OF CARE
Pt arrived to PACU via gurney, pt is awake but confused to situation and time, CRNA stated no need for O2, pt O2 sat 100% on room air, VSS and WNL, Dressing to Left forearm, c/d/i island dressing. CBG=82, pt states no nausea able to eat ice chips without issue, pt states no pain and is currently 0/10 . report called and pt transfered to [facility]      Patient: Xuan Ricardo    Procedure(s) Performed: Procedure(s) (LRB):  REVISION, AV FISTULA, THROMBECTOMY, LEFT UPPER EXTREMITY (Left)    Patient location: PACU    Anesthesia Type: block, general      Post pain: Adequate analgesia    Post assessment: no apparent anesthetic complications, tolerated procedure well and no evidence of recall    Last vitals:   Visit Vitals  /74   Pulse 88   Temp 98.1 °F (36.7 °C) (Oral)   Resp 14   Wt 117.5 kg (259 lb 0.6 oz)   LMP 05/01/2020 (Exact Date)   SpO2 100%   Breastfeeding? No   BMI 41.81 kg/m²       Post vital signs: stable    Level of consciousness: awake and confused    Nausea/Vomiting: no nausea/no vomiting    Complications: none      Last vitals:   Visit Vitals  /74   Pulse 88   Temp 98.1 °F (36.7 °C) (Oral)   Resp 14   Wt 117.5 kg (259 lb 0.6 oz)   LMP 05/01/2020 (Exact Date)   SpO2 100%   Breastfeeding? No   BMI 41.81 kg/m²

## 2020-05-08 NOTE — INTERVAL H&P NOTE
The patient has been examined and the H&P has been reviewed:    I concur with the findings and no changes have occurred since H&P was written.    Anesthesia/Surgery risks, benefits and alternative options discussed and understood by patient/family.          Active Hospital Problems    Diagnosis  POA    Encounter regarding vascular access for dialysis for end-stage renal disease [N18.6, Z99.2]  Yes      Resolved Hospital Problems   No resolved problems to display.

## 2020-05-08 NOTE — ANESTHESIA PROCEDURE NOTES
Peripheral Block    Patient location during procedure: pre-op    Reason for block: primary anesthetic   Diagnosis: ESRD   Start time: 5/8/2020 1:14 PM  Timeout: 5/8/2020 1:13 PM   End time: 5/8/2020 1:25 PM    Staffing  Authorizing Provider: Luke Villarreal MD  Performing Provider: Luke Villarreal MD    Preanesthetic Checklist  Completed: patient identified, site marked, surgical consent, pre-op evaluation, timeout performed, IV checked, risks and benefits discussed and monitors and equipment checked  Peripheral Block  Patient position: supine  Prep: ChloraPrep  Patient monitoring: heart rate, cardiac monitor, continuous pulse ox, continuous capnometry and frequent blood pressure checks  Block type: supraclavicular  Laterality: left  Injection technique: single shot  Needle  Needle type: Stimuplex   Needle gauge: 21 G  Needle length: 4 in  Needle localization: anatomical landmarks and ultrasound guidance   -ultrasound image captured on disc.  Assessment  Injection assessment: negative aspiration, negative parasthesia and local visualized surrounding nerve  Paresthesia pain: none  Heart rate change: no  Slow fractionated injection: yes  Additional Notes  VSS.  [facility] RN monitoring vitals throughout procedure.  Patient tolerated procedure well.

## 2020-05-08 NOTE — DISCHARGE INSTRUCTIONS
"DIET: You may resume your home diet. If nausea is present, increase your diet gradually with fluids and bland foods    ACTIVITY LEVEL: You have received sedation or an anesthetic, you may feel sleepy for several hours. Rest until you are more awake.     Limit movement of wrist for the next 24 hours.  No lifting over 5 pounds for the next 24 hours.     If Oozing occurs at the injection site, lie down. Apply pressure with a clean wash cloth for 20-30 minutes. Call the doctor.     If severe bleeding occurs, lie down, apply pressure. Call 911      Medications: Pain medication should be taken only if needed and as directed. If antibiotics are prescribed, the medication should be taken until completed. You will be given an updated list of you medications.    No driving, alcoholic beverages or signing legal documents for next 24 hours or while taking pain medication.       CALL 911  · Chest pain   · Shortness of breath           CALL THE DOCTOR:    Fever of 100.4 degrees Fahrenheit or higher, or as directed by your healthcare provider.   Red, hot, or swollen area around the site.    Loss of pulsing feeling ("thrill") over the fistula   Pain, cold, or numbness in the hand          If any unusual problems or difficulties occur contact your doctor. If you cannot contact your doctor but feel your signs and symptoms warrant a physicians attention return to the emergency room.      You have received a type of anesthesia that leaves your arm with numbness and/or limited control.    Keep your arm in the sling while numbness and immobility is present (and longer if instructed by your doctor)    Always pay attention to the location of your arm.  It can slip from the sling and get hit against doorways or furniture.    Be cautious around the stove, hot liquids and cigarettes.  It can be burned and you may not be aware of it.    Do not lay on that arm or rest with it pressed against anything. Pressure injuries can occur.    Take " your pain medications as directed as soon as you start to regain feeling of your arm.  It may be harder to get the pain under control if you wait until full sensation has returned.    Fall Prevention  Millions of people fall every year and injure themselves. You may have had anesthesia or sedation which may increase your risk of falling. You may have health issues that put you at an increased risk of falling.     Here are ways to reduce your risk of falling.  ·   · Make your home safe by keeping walkways clear of objects you may trip over.  · Use non-slip pads under rugs. Do not use area rugs or small throw rugs.  · Use non-slip mats in bathtubs and showers.  · Install handrails and lights on staircases.  · Do not walk in poorly lit areas.  · Do not stand on chairs or wobbly ladders.  · Use caution when reaching overhead or looking upward. This position can cause a loss of balance.  · Be sure your shoes fit properly, have non-slip bottoms and are in good condition.   · Wear shoes both inside and out. Avoid going barefoot or wearing slippers.  · Be cautious when going up and down stairs, curbs, and when walking on uneven sidewalks.  · If your balance is poor, consider using a cane or walker.  · If your fall was related to alcohol use, stop or limit alcohol intake.   · If your fall was related to use of sleeping medicines, talk to your doctor about this. You may need to reduce your dosage at bedtime if you awaken during the night to go to the bathroom.    · To reduce the need for nighttime bathroom trips:  ¨ Avoid drinking fluids for several hours before going to bed  ¨ Empty your bladder before going to bed  ¨ Men can keep a urinal at the bedside  · Stay as active as you can. Balance, flexibility, strength, and endurance all come from exercise. They all play a role in preventing falls. Ask your healthcare provider which types of activity are right for you.  · Get your vision checked on a regular basis.  · If you have  pets, know where they are before you stand up or walk so you don't trip over them.  · Use night lights.

## 2020-05-08 NOTE — PLAN OF CARE
1743  Pt arrived in same day from PACU. Pt drowsy but arouses easily.  Pt eating leandro crackers and drank an apple juice.  Pt denies any pain.  Dsg to left lower arm c/d/i and arm is warm with good radial pulse.      1806-  Dr delgado at bedside and made aware that I am was unable to assess a bruit or thrill over fistula.  He assessed arm and no further orders given.    He stated sutures are dissolvable and no need to remove.  He stated he would contact pt for follow up ulrtasound next week.  Pt verbalized understanding.  No further orders at this time.

## 2020-05-08 NOTE — PLAN OF CARE
Pt ready and prepped for OR awaiting COVID results..    Dr Leon called and aware that pt needs results but still wants pt to be sent to holding.  Holding nurse made aware that results are still pending.

## 2020-05-08 NOTE — ANESTHESIA PREPROCEDURE EVALUATION
05/08/2020  Xuan Cousin is a 39 y.o., female.    Anesthesia Evaluation    I have reviewed the Patient Summary Reports.    I have reviewed the Nursing Notes.      Review of Systems  Anesthesia Hx:  No problems with previous Anesthesia  Denies Family Hx of Anesthesia complications.   Denies Personal Hx of Anesthesia complications.   Social:  Former Smoker, No Alcohol Use    Hematology/Oncology:         -- Anemia:   Cardiovascular:   Exercise tolerance: good Hypertension Denies MI.          Pulmonary:  Pulmonary Normal    Renal/:   Chronic Renal Disease, ESRD, Dialysis S/p LE AVF on 5/5/20 under regional   Hepatic/GI:  Hepatic/GI Normal    Neurological:  Neurology Normal    Endocrine:   Diabetes        Physical Exam  General:  Well nourished, Obesity    Airway/Jaw/Neck:  Airway Findings: Mouth Opening: Normal Tongue: Normal  Mallampati: II  TM Distance: 4 - 6 cm      Dental:  Dental Findings: No teeth on top; several gold caps on the bottom; none loose   Chest/Lungs:  Chest/Lungs Findings: Normal Respiratory Rate     Heart/Vascular:  Heart Findings: Rate: Normal        Mental Status:  Mental Status Findings:  Cooperative, Alert and Oriented       Wt Readings from Last 3 Encounters:   05/08/20 117.5 kg (259 lb 0.6 oz)   05/07/20 117.5 kg (259 lb 0.7 oz)   05/05/20 117.5 kg (259 lb)     Temp Readings from Last 3 Encounters:   05/08/20 37 °C (98.6 °F) (Oral)   05/05/20 36.1 °C (97 °F) (Oral)   05/04/20 36.4 °C (97.6 °F) (Oral)     BP Readings from Last 3 Encounters:   05/08/20 127/76   05/07/20 110/72   05/05/20 138/77     Pulse Readings from Last 3 Encounters:   05/08/20 83   05/05/20 80   05/04/20 88     Lab Results   Component Value Date    WBC 7.52 05/04/2020    HGB 11.3 (L) 05/04/2020    HCT 36.2 (L) 05/04/2020    MCV 96 05/04/2020     05/04/2020       CMP  Sodium   Date Value Ref Range Status    05/04/2020 137 136 - 145 mmol/L Final     Potassium   Date Value Ref Range Status   05/04/2020 4.6 3.5 - 5.1 mmol/L Final     Chloride   Date Value Ref Range Status   05/04/2020 97 95 - 110 mmol/L Final     CO2   Date Value Ref Range Status   05/04/2020 26 23 - 29 mmol/L Final     Glucose   Date Value Ref Range Status   05/04/2020 187 (H) 70 - 110 mg/dL Final     BUN, Bld   Date Value Ref Range Status   05/04/2020 34 (H) 6 - 20 mg/dL Final     Creatinine   Date Value Ref Range Status   05/04/2020 8.9 (H) 0.5 - 1.4 mg/dL Final     Calcium   Date Value Ref Range Status   05/04/2020 8.7 8.7 - 10.5 mg/dL Final     Total Protein   Date Value Ref Range Status   01/20/2020 6.7 6.0 - 8.4 g/dL Final     Albumin   Date Value Ref Range Status   01/20/2020 3.2 (L) 3.5 - 5.2 g/dL Final     Total Bilirubin   Date Value Ref Range Status   01/20/2020 0.4 0.1 - 1.0 mg/dL Final     Comment:     For infants and newborns, interpretation of results should be based  on gestational age, weight and in agreement with clinical  observations.  Premature Infant recommended reference ranges:  Up to 24 hours.............<8.0 mg/dL  Up to 48 hours............<12.0 mg/dL  3-5 days..................<15.0 mg/dL  6-29 days.................<15.0 mg/dL       Alkaline Phosphatase   Date Value Ref Range Status   01/20/2020 101 55 - 135 U/L Final     AST   Date Value Ref Range Status   01/20/2020 10 10 - 40 U/L Final     ALT   Date Value Ref Range Status   01/20/2020 9 (L) 10 - 44 U/L Final     Anion Gap   Date Value Ref Range Status   05/04/2020 14 8 - 16 mmol/L Final     eGFR if    Date Value Ref Range Status   05/04/2020 6 (A) >60 mL/min/1.73 m^2 Final     eGFR if non    Date Value Ref Range Status   05/04/2020 5 (A) >60 mL/min/1.73 m^2 Final     Comment:     Calculation used to obtain the estimated glomerular filtration  rate (eGFR) is the CKD-EPI equation.        5/4/2020 UPT negative; pt denies possibility of  getting pregnant since then  5/8/2020 COVID negative    Anesthesia Plan  Type of Anesthesia, risks & benefits discussed:  Anesthesia Type:  general, regional, MAC  Patient's Preference:   Intra-op Monitoring Plan: standard ASA monitors  Intra-op Monitoring Plan Comments:   Post Op Pain Control Plan: multimodal analgesia and per primary service following discharge from PACU  Post Op Pain Control Plan Comments:   Induction:   IV  Beta Blocker:  Patient is not currently on a Beta-Blocker (No further documentation required).       Informed Consent: Patient understands risks and agrees with Anesthesia plan.  Questions answered. Anesthesia consent signed with patient.  ASA Score: 3     Day of Surgery Review of History & Physical: I have interviewed and examined the patient. I have reviewed the patient's H&P dated:            Ready For Surgery From Anesthesia Perspective.

## 2020-05-09 NOTE — PLAN OF CARE
Pt verbalized readiness to go home.  Pt given verbal and written DC instructions regarding medications and follow up care.  Pt verbalized understanding and has no questions at this time.    Pt arm warm with good radial pulse.  dsg clean and dry.  Pt awaiting ride

## 2020-05-11 NOTE — OP NOTE
05/11/2020    Indications: Xuan Ricardo is a 39 y.o. female with chronic renal insufficiency and need for long-term dialysis access.      Pre-operative Diagnosis:  ESRD.    Post-operative Diagnosis: Same    Operation:  1. LUE AV fistula revision, open, with thrombectomy    Surgeon: Rakesh Leon MD    Assistants:     Anesthesia: IV + Regional    Findings:  Adequate artery and vein for fistula. Palpable radial a. after AVF.    Estimate Blood Loss:  Less than 25 mL            Specimens: None               Complications:  None; patient tolerated the procedure well.           Disposition: PACU, then home.           Attending Attestation: I was present and scrubbed for the entire procedure.    Procedure Details   The patient was seen in the Holding Room. The risks, benefits, complications, treatment options, and expected outcomes were discussed with the patient. The patient concurred with the proposed plan, giving informed consent.  The site of surgery properly noted/marked. The patient was taken to the Operating Room, identified correctly and the procedure verified. A Time Out was held and the above information confirmed.    The arm was prepped and draped in a sterile fashion.  Patient's previous incision was opened with scissors.  Retractors were placed and the fistula was examined.  There was noted to be dark in color with acute clot present proximally.  There was no kink appreciated and the tissue was fairly inflamed.  The wound was washed out and arterial control was obtained with vessel loops around the radial artery proximal and distal to the anastomosis.  Due to the tortuosity of the fistula and concern for kinking during closure, the fistula was transected with a pair of Metzenbaum scissors and acute clot was present.  The fistula was transected in the proximal aspect few cm past the anastomosis.  Next 2.  Georgie was advanced proximally and distally and acute clot was removed.  The Georgie was advanced  into the anastomosis and distal to the anastomosis with removal of acute clot and restoration of inflow.  The inflow was noted to be torrential.  Then the Georgie was passed distally and acute clot was removed and backbleeding was restored.  After an flow was restored a atraumatic clamp was placed on the proximal fistula.  Approximately 1 cm fistula was excised to provide better length approximation.  Prior to our and in anastomosis inflow and outflow was checked again.  It was noted that our inflow had occluded.  Further thrombectomy was performed with restoration of inflow.  Doppler signals confirmed triphasic radial signal proximal, at the anastomosis and distally.  A 2 dilator was also passed into the fistula, anastomosis and into the distal radial artery which increased flow in the fistula tremendously.  An end-to-end anastomosis was then completed after heparinized saline was flushed into the fistula proximally distally with a 6 0 Prolene BV 1 in continuous fashion.  Prior to our final not being secured, are clamps were released.  A weak thrill was noted into the fistula.  Next a transverse arteriotomy was performed overlying the anastomosis with significant bleeding noted.  A dilator was passed from the anastomosis proximally and distally without resistance noted.  Was also passed into the fistula with some resistance noted at the most proximal aspect.  Bleeding was noted to be very good into the fistula with a strong thrill.  The arteriotomy was repaired with a 7 0 Prolene.  A good thrill was noted of the fistula and good signals in the artery and fistula.  The patient had been systemically heparinized with a total of 5000 of heparin during the procedure.    Hemostasis was secured. Good thrill was noted in the fistula and the incision was then closed in two layers, subcutaneous tissue with 3-0 Vicryl and skin with 4-0 Monocryl. Dermabond and a sterile dressing was applied.

## 2020-05-12 ENCOUNTER — TELEPHONE (OUTPATIENT)
Dept: VASCULAR SURGERY | Facility: CLINIC | Age: 40
End: 2020-05-12

## 2020-05-12 LAB
FINAL PATHOLOGIC DIAGNOSIS: NORMAL
GROSS: NORMAL

## 2020-05-12 NOTE — TELEPHONE ENCOUNTER
Call and gave patient date and time of appointments. Explained that it will be drive thru check in during that time and where she needed to go. She stated understanding.

## 2020-05-15 NOTE — ANESTHESIA POSTPROCEDURE EVALUATION
Anesthesia Post Evaluation    Patient: Xuan Ricardo    Procedure(s) Performed: Procedure(s) (LRB):  REVISION, AV FISTULA, THROMBECTOMY, LEFT UPPER EXTREMITY (Left)    Final Anesthesia Type: regional    Patient location during evaluation: PACU  Patient participation: Yes- Able to Participate  Level of consciousness: awake and alert  Post-procedure vital signs: reviewed and stable  Pain management: adequate  Airway patency: patent    PONV status at discharge: No PONV  Anesthetic complications: no      Cardiovascular status: blood pressure returned to baseline and hemodynamically stable  Respiratory status: unassisted and spontaneous ventilation  Hydration status: euvolemic  Follow-up not needed.            Event Time     Out of Recovery 17:32:08          Pain/Vineet Score: No data recorded

## 2020-05-18 ENCOUNTER — NURSE TRIAGE (OUTPATIENT)
Dept: ADMINISTRATIVE | Facility: CLINIC | Age: 40
End: 2020-05-18

## 2020-05-21 ENCOUNTER — TELEPHONE (OUTPATIENT)
Dept: INTERVENTIONAL RADIOLOGY/VASCULAR | Facility: HOSPITAL | Age: 40
End: 2020-05-21

## 2020-05-22 ENCOUNTER — HOSPITAL ENCOUNTER (OUTPATIENT)
Dept: PREADMISSION TESTING | Facility: HOSPITAL | Age: 40
Discharge: HOME | End: 2020-05-22
Attending: SURGERY
Payer: MEDICARE

## 2020-05-22 ENCOUNTER — HOSPITAL ENCOUNTER (OUTPATIENT)
Facility: HOSPITAL | Age: 40
Discharge: HOME | End: 2020-05-22
Attending: RADIOLOGY | Admitting: RADIOLOGY
Payer: MEDICARE

## 2020-05-22 ENCOUNTER — HOSPITAL ENCOUNTER (OUTPATIENT)
Dept: CARDIOLOGY | Facility: HOSPITAL | Age: 40
Discharge: HOME | End: 2020-05-22
Attending: SURGERY
Payer: MEDICARE

## 2020-05-22 VITALS
RESPIRATION RATE: 18 BRPM | DIASTOLIC BLOOD PRESSURE: 72 MMHG | HEART RATE: 80 BPM | TEMPERATURE: 98 F | SYSTOLIC BLOOD PRESSURE: 125 MMHG | OXYGEN SATURATION: 100 %

## 2020-05-22 VITALS
WEIGHT: 252.88 LBS | DIASTOLIC BLOOD PRESSURE: 78 MMHG | BODY MASS INDEX: 39.69 KG/M2 | TEMPERATURE: 98 F | HEIGHT: 67 IN | HEART RATE: 89 BPM | SYSTOLIC BLOOD PRESSURE: 125 MMHG | OXYGEN SATURATION: 100 % | RESPIRATION RATE: 18 BRPM

## 2020-05-22 DIAGNOSIS — T82.41XA HEMODIALYSIS CATHETER DYSFUNCTION, INITIAL ENCOUNTER: Primary | ICD-10-CM

## 2020-05-22 DIAGNOSIS — N19 RENAL FAILURE, UNSPECIFIED CHRONICITY: ICD-10-CM

## 2020-05-22 DIAGNOSIS — Z99.2 ESRD (END STAGE RENAL DISEASE) ON DIALYSIS: ICD-10-CM

## 2020-05-22 DIAGNOSIS — N19 RENAL FAILURE: ICD-10-CM

## 2020-05-22 DIAGNOSIS — Z99.2 ESRD ON HEMODIALYSIS: ICD-10-CM

## 2020-05-22 DIAGNOSIS — N18.6 ESRD ON HEMODIALYSIS: ICD-10-CM

## 2020-05-22 DIAGNOSIS — N18.6 ESRD (END STAGE RENAL DISEASE) ON DIALYSIS: ICD-10-CM

## 2020-05-22 LAB — SARS-COV-2 RDRP RESP QL NAA+PROBE: NEGATIVE

## 2020-05-22 PROCEDURE — 93990 CV US HEMODIALYSIS ACCESS (CUPID ONLY): ICD-10-PCS | Mod: 26,,, | Performed by: SURGERY

## 2020-05-22 PROCEDURE — 93990 DOPPLER FLOW TESTING: CPT | Mod: 26,,, | Performed by: SURGERY

## 2020-05-22 PROCEDURE — 25500020 PHARM REV CODE 255: Performed by: RADIOLOGY

## 2020-05-22 PROCEDURE — U0002 COVID-19 LAB TEST NON-CDC: HCPCS

## 2020-05-22 PROCEDURE — 63600175 PHARM REV CODE 636 W HCPCS: Performed by: RADIOLOGY

## 2020-05-22 PROCEDURE — 93990 DOPPLER FLOW TESTING: CPT | Mod: TC

## 2020-05-22 RX ORDER — MIDAZOLAM HYDROCHLORIDE 1 MG/ML
INJECTION INTRAMUSCULAR; INTRAVENOUS CODE/TRAUMA/SEDATION MEDICATION
Status: COMPLETED | OUTPATIENT
Start: 2020-05-22 | End: 2020-05-22

## 2020-05-22 RX ORDER — FENTANYL CITRATE 50 UG/ML
INJECTION, SOLUTION INTRAMUSCULAR; INTRAVENOUS CODE/TRAUMA/SEDATION MEDICATION
Status: COMPLETED | OUTPATIENT
Start: 2020-05-22 | End: 2020-05-22

## 2020-05-22 RX ORDER — HEPARIN SODIUM 5000 [USP'U]/ML
10000 INJECTION, SOLUTION INTRAVENOUS; SUBCUTANEOUS ONCE
Status: COMPLETED | OUTPATIENT
Start: 2020-05-22 | End: 2020-05-22

## 2020-05-22 RX ADMIN — MIDAZOLAM HYDROCHLORIDE 1 MG: 1 INJECTION, SOLUTION INTRAMUSCULAR; INTRAVENOUS at 11:05

## 2020-05-22 RX ADMIN — HEPARIN SODIUM 10000 UNITS: 5000 INJECTION INTRAVENOUS; SUBCUTANEOUS at 11:05

## 2020-05-22 RX ADMIN — FENTANYL CITRATE 50 MCG: 50 INJECTION INTRAMUSCULAR; INTRAVENOUS at 11:05

## 2020-05-22 RX ADMIN — IOHEXOL 12 ML: 300 INJECTION, SOLUTION INTRAVENOUS at 11:05

## 2020-05-22 NOTE — SEDATION DOCUMENTATION
Report called to NORMAN Spangler in [facility]. Tunnel catheter exchanged and replaced in R IJ. and ballooning interventions completed per MD. Tolerated procedure well. Both ports heparin locked. Site with biopatch, gauze and tegaderm, CDI, no redness, swelling or hematoma noted. Pt to go to [facility] for next test before returning to [facility]. ALMAZ WOLF. Transported to [facility] med per RN.

## 2020-05-22 NOTE — OR NURSING
1455-  gave ok for pt to take cab home.  Pt AAOx4.  VSS. No distress noted. Dressing CDI. Ports clamped.     1502- Pt taken downstairs via wheelchair by surgery transporter.

## 2020-05-22 NOTE — DISCHARGE SUMMARY
Radiology Discharge Summary      Hospital Course: No complications    Admit Date: 5/22/2020  Discharge Date: 05/22/2020     Instructions Given to Patient: Yes    Diet: Resume prior diet     Activity: activity as tolerated and no driving for today    Description of Condition on Discharge: Stable    Vital Signs (Most Recent): Pulse: 78 (05/22/20 1140)  Resp: 16 (05/22/20 1140)  BP: 122/68 (05/22/20 1140)  SpO2: 100 % (05/22/20 1140)    Discharge Disposition: Home    Discharge Diagnosis:  39 y.o. female with malfunctioning RIJV-approach THDC 2/2 to diffuse narrowing of the right IJ, brachiocephalic vein and SVC s/p successful venoplasty with 10-mm and 14-mm balloons with no residual narrowing noted in right brachiocephalic vein or SVC and mild residual narrowing in RIJV s/p venoplasty. Pt also now s/p subsequent successful placement of a new RIJV-approach 19-cm THDC which is functioning optimally at end of exam and ready for use immediately.     Patient tolerated the procedure well. No immediate post-procedural complications noted.      Thank you for considering IR for the care of your patient.      Murphy Foy MD  Interventional Radiology

## 2020-05-22 NOTE — H&P
Radiology History & Physical      SUBJECTIVE:     Chief Complaint: Malfunctioning/non-functioning RIJV-approach THDC    History of Present Illness:  Xuan Cousin is a 39 y.o. female with PMHx of ESRD who underwent LUE radiocephalic AVF creation on 20 and subsequent revision on 20 and currently on HD via RIJV-approach THDC.     THDC has reportedly been malfunctioning with pt unable to obtain HD yesterday 2/2 poor/no flow requiring evaluation with potential exchange vs placement of a new THDC.     New outptatient IR consult placed for fluoroscopic-guided evaluation of and intervention on RIJV-approach THDC.     Past Medical History:   Diagnosis Date    Cataract     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     Diabetes mellitus     Insulin Dependent    Galactorrhea     Hyperphosphatemia     Hypertension     Iron deficiency anemia     Obesity     Secondary hyperparathyroidism of renal origin     Vitamin D deficiency      Past Surgical History:   Procedure Laterality Date    AV FISTULA PLACEMENT Left 2020    Procedure: CREATION, AV FISTULA, LEFT RADIOCEPHALIC FISTULA, LEFT UPPER EXTREMITY , INTRAOP ULTRASOUND, vEIN MAPPING. ;  Surgeon: Rakesh Leon MD;  Location: [facility] OR;  Service: Vascular;  Laterality: Left;  RN PREOP 20, UPT done, COVID NEGATRIVE 20  NEED H/P     SECTION, CLASSIC      INSERTION OF TUNNELED CENTRAL VENOUS CATHETER (CVC) WITH SUBCUTANEOUS PORT N/A 2020    Procedure: IR TUNNELED VATH INSERT WITHOUT PORT;  Surgeon: Prachi Diagnostic Provider;  Location: [facility] OR;  Service: Radiology;  Laterality: N/A;  1030AM START  RN PREOP 2020    REVISION OF ARTERIOVENOUS FISTULA Left 2020    Procedure: REVISION, AV FISTULA, THROMBECTOMY, LEFT UPPER EXTREMITY;  Surgeon: Rakesh Leon MD;  Location: [facility] OR;  Service: Vascular;  Laterality: Left;    TUBAL LIGATION       Home Meds:   Prior to Admission medications    Medication Sig Start Date  "End Date Taking? Authorizing Provider   blood sugar diagnostic Strp Check blood glucose 3 times daily 5/15/19  Yes Leigh Phan MD   calcitRIOL (ROCALTROL) 0.25 MCG Cap TAKE 1 CAPSULE(0.25 MCG) BY MOUTH EVERY DAY 5/5/19  Yes Maria Elena Peterson MD   cinacalcet (SENSIPAR) 30 MG Tab Take 1 tablet (30 mg total) by mouth daily with breakfast. 3/24/20 3/24/21 Yes Candis Castillo NP   ergocalciferol (ERGOCALCIFEROL) 50,000 unit Cap Take 1 capsule (50,000 Units total) by mouth every 7 days. 2/6/20  Yes LORE Bruno   insulin NPH (NOVOLIN N) 100 unit/mL injection Inject 15 Units into the skin before breakfast. 5/15/19  Yes Leigh Phan MD   lancets (ONETOUCH DELICA LANCETS) 30 gauge Misc 3 (three) times daily 8/28/18  Yes Historical Provider, MD   NIFEdipine (PROCARDIA-XL) 90 MG (OSM) 24 hr tablet TAKE 1 TABLET(90 MG) BY MOUTH EVERY DAY 5/5/19  Yes Maria Elena Peterson MD   pen needle, diabetic (BD ULTRA-FINE SHORT PEN NEEDLE) 31 gauge x 5/16" Ndle daily 8/28/18  Yes Historical Provider, MD   sevelamer carbonate (RENVELA) 800 mg Tab Take 1 tablet (800 mg total) by mouth 3 (three) times daily with meals. 3/27/20 3/27/21 Yes Belinda Dowd MD   sodium bicarbonate 650 MG tablet Take 1 tablet (650 mg total) by mouth 2 (two) times daily. 5/5/19 5/22/20 Yes Maria Elena Peterson MD     Anticoagulants/Antiplatelets: no anticoagulation    Allergies:   Review of patient's allergies indicates:   Allergen Reactions    Vicodin [hydrocodone-acetaminophen] Hives    Codeine Hives     Sedation History:  no adverse reactions    Review of Systems:   Hematological: no known coagulopathies  Respiratory: no cough, shortness of breath, or wheezing  Cardiovascular: no chest pain or dyspnea on exertion  Gastrointestinal: no abdominal pain, change in bowel habits, or black or bloody stools  Genito-Urinary: no dysuria, trouble voiding, or hematuria  Musculoskeletal: negative  Neurological: no TIA or stroke symptoms       OBJECTIVE: "     Vital Signs (Most Recent)     Physical Exam:  ASA: III  Mallampati: II    General: no acute distress  Mental Status: alert and oriented to person, place and time  HEENT: normocephalic, atraumatic  Chest: unlabored breathing  Heart: regular heart rate  Abdomen: nondistended  Extremity: moves all extremities    Laboratory  Lab Results   Component Value Date    INR 0.9 05/04/2020       Lab Results   Component Value Date    WBC 7.52 05/04/2020    HGB 11.3 (L) 05/04/2020    HCT 36.2 (L) 05/04/2020    MCV 96 05/04/2020     05/04/2020      Lab Results   Component Value Date     (H) 05/04/2020     05/04/2020    K 4.6 05/04/2020    CL 97 05/04/2020    CO2 26 05/04/2020    BUN 34 (H) 05/04/2020    CREATININE 8.9 (H) 05/04/2020    CALCIUM 8.7 05/04/2020    MG 2.3 05/04/2020    ALT 9 (L) 01/20/2020    AST 10 01/20/2020    ALBUMIN 3.2 (L) 01/20/2020    BILITOT 0.4 01/20/2020     ASSESSMENT/PLAN:     39 y.o. female with PMHx of ESRD who underwent LUE radiocephalic AVF creation and revision on 5/5 and 5/7 and, currently on HD via RIJV-approach THDC. THDC has reportedly been malfunctioning/non-functioning with pt unable to obtain HD yesterday 2/2 poor/no flow requiring evaluation with potential exchange vs placement of a new THDC.     1. Malfunctioning/non-functioning RIJV-approach THDC - Will attempt fluoroscopic-guided evaluation/contrast injection of and intervention on RIJV-approach THDC under moderate conscious sedation.    Risks (including, but not limited to, pain, bleeding, infection, damage to nearby structures, failure to obtain sufficient material for a diagnosis, the need for additional procedures, and death), benefits, and alternatives were discussed with the patient. All questions were answered to the best of my abilities. The patient wishes to proceed with the procedure. Written informed consent was obtained.    Thank you for considering IR for the care of your patient.     Murphy Foy  MD  Interventional Radiology

## 2020-05-22 NOTE — BRIEF OP NOTE
Radiology Post-Procedure Note    Pre Op Diagnosis: Malfunctioning/non-functioning RIJV-approach THDC  Post Op Diagnosis: RIJV, Rt brachiocephalic vein and SVC narrowing causing RIJV-approach THDC malfunction    Procedure: 1. Removal of RIJV-approach THDC  2. Right IJ, brachiocephalic vein ans SVC venograms  3. Venoplasty of RIJV, RtBrCV and SVC narrowing with 10-mm and 14-mm balloons  4. Placement of a new 19-cm RIJV-approach THDC    Procedure performed by: Murphy Foy MD    Written Informed Consent Obtained: Yes  Specimen Removed: NO  Estimated Blood Loss: Minimal    Findings:   Venogram of right IJ, brachiocephalic vein and SVC show diffuse narrowing of these vessels, most significant in the Rt IJ and brachiocephalic veins.     S/p successful venoplasty of these vessels with 10-mm and 14-mm balloons with no residual narrowing noted in right brachiocephalic vein or SVC and mild residual narrowing in RIJV s/p venoplasty, now with rapid antegrade flow of contrast from the central aspect of the RIJV, through the brachiocephalic vein and SVC into the right atrium.    S/p successful placement of a new RIJV-approach 19-cm THDC which is functioning optimally at end of exam and ready for use immediately.    Patient tolerated the procedure well. No immediate post-procedural complications noted.     Thank you for considering IR for the care of your patient.     Murphy Foy MD  Interventional Radiology

## 2020-05-22 NOTE — DISCHARGE INSTRUCTIONS
BATHING:  ? You may shower tomorrow.  DRESSING:  ? Remove dressing tomorrow.        ACTIVITY LEVEL: If you have received sedation or an anesthetic, you may feel sleepy for several hours. Rest until you are more awake. Gradually resume your normal activities    Do not drive, drink alcohol, or sign legal documents for 24 hours, or if taking narcotic pain medication.      DIET: You may resume your home diet. If nausea is present, increase your diet gradually with fluids and bland foods.    Medications: Pain medication should be taken only if needed and as directed. If antibiotics are prescribed, the medication should be taken until completed. You will be given an updated list of you medications.  ? No driving, alcoholic beverages or signing legal documents for next 24 hours if you have had sedation, or while taking pain medication    CALL THE DOCTOR:   For any obvious bleeding (some dried blood over the incision is normal).     Redness, swelling, foul smell around incision or fever over 100.4  Shortness of breath.  Persistent pain or nausea not relieved by medication.  Call  [phone]     to speak with an Interventional Radiologist    If any unusual problems or difficulties occur contact your doctor. If you cannot contact your doctor but feel your signs and symptoms warrant a physicians attention return to the emergency room.             Fall Prevention  Millions of people fall every year and injure themselves. You may have had anesthesia or sedation which may increase your risk of falling. You may have health issues that put you at an increased risk of falling.     Here are ways to reduce your risk of falling.  ·   · Make your home safe by keeping walkways clear of objects you may trip over.  · Use non-slip pads under rugs. Do not use area rugs or small throw rugs.  · Use non-slip mats in bathtubs and showers.  · Install handrails and lights on staircases.  · Do not walk in poorly lit areas.  · Do not stand on chairs  or wobbly ladders.  · Use caution when reaching overhead or looking upward. This position can cause a loss of balance.  · Be sure your shoes fit properly, have non-slip bottoms and are in good condition.   · Wear shoes both inside and out. Avoid going barefoot or wearing slippers.  · Be cautious when going up and down stairs, curbs, and when walking on uneven sidewalks.  · If your balance is poor, consider using a cane or walker.  · If your fall was related to alcohol use, stop or limit alcohol intake.   · If your fall was related to use of sleeping medicines, talk to your doctor about this. You may need to reduce your dosage at bedtime if you awaken during the night to go to the bathroom.    · To reduce the need for nighttime bathroom trips:  ¨ Avoid drinking fluids for several hours before going to bed  ¨ Empty your bladder before going to bed  ¨ Men can keep a urinal at the bedside  · Stay as active as you can. Balance, flexibility, strength, and endurance all come from exercise. They all play a role in preventing falls. Ask your healthcare provider which types of activity are right for you.  · Get your vision checked on a regular basis.  · If you have pets, know where they are before you stand up or walk so you don't trip over them.  · Use night lights.

## 2020-05-25 ENCOUNTER — OFFICE VISIT (OUTPATIENT)
Dept: VASCULAR SURGERY | Facility: CLINIC | Age: 40
End: 2020-05-25
Payer: MEDICARE

## 2020-05-25 VITALS
BODY MASS INDEX: 38.06 KG/M2 | WEIGHT: 242.5 LBS | SYSTOLIC BLOOD PRESSURE: 114 MMHG | HEART RATE: 91 BPM | HEIGHT: 67 IN | OXYGEN SATURATION: 99 % | DIASTOLIC BLOOD PRESSURE: 74 MMHG

## 2020-05-25 DIAGNOSIS — T82.868D AV FISTULA THROMBOSIS, SUBSEQUENT ENCOUNTER: ICD-10-CM

## 2020-05-25 DIAGNOSIS — Z99.2 ENCOUNTER REGARDING VASCULAR ACCESS FOR DIALYSIS FOR END-STAGE RENAL DISEASE: Primary | ICD-10-CM

## 2020-05-25 DIAGNOSIS — N18.6 ENCOUNTER REGARDING VASCULAR ACCESS FOR DIALYSIS FOR END-STAGE RENAL DISEASE: Primary | ICD-10-CM

## 2020-05-25 LAB
HD FISTULA OUTFLOW VEIN LOCATION: NORMAL
HD FISTULA OUTFLOW VEIN VESSEL: NORMAL
HD INFLOW ARTERY VESSEL: NORMAL
RIGHT DIS GRAFT PSV: 0 CM/ SEC
RIGHT INFLOW PSV: 71 CM/S
RIGHT MID GRAFT PSV: 0 CM/S
RIGHT OUTFLOW VEIN PSV: 21 CM/ SEC
RIGHT PROX ANA PSV: 0 CM/S
RIGHT PROX GRAFT PSV: 0 CM/S
RIGHT VOLUME FLOW PSV: 0 ML/MIN

## 2020-05-25 PROCEDURE — 99999 PR PBB SHADOW E&M-EST. PATIENT-LVL IV: ICD-10-PCS | Mod: PBBFAC,,, | Performed by: SURGERY

## 2020-05-25 PROCEDURE — 99024 POSTOP FOLLOW-UP VISIT: CPT | Mod: S$GLB,,, | Performed by: SURGERY

## 2020-05-25 PROCEDURE — 99024 PR POST-OP FOLLOW-UP VISIT: ICD-10-PCS | Mod: S$GLB,,, | Performed by: SURGERY

## 2020-05-25 PROCEDURE — 99999 PR PBB SHADOW E&M-EST. PATIENT-LVL IV: CPT | Mod: PBBFAC,,, | Performed by: SURGERY

## 2020-05-25 NOTE — PROGRESS NOTES
Rakesh Leon MD RPVI                       [facility]                         05/25/2020    HPI:  Xuan Ricardo is a 39 y.o. female with   Patient Active Problem List   Diagnosis    Anemia of renal disease    Hypertensive CKD (chronic kidney disease)    Nephrotic range proteinuria    Hyperkalemia    Acute renal failure superimposed on chronic kidney disease    Metabolic acidosis    Bacterial vaginosis    Increased prolactin level    CKD (chronic kidney disease) stage 5, GFR less than 15 ml/min    Secondary hyperparathyroidism of renal origin    Iron deficiency anemia    Vitamin D deficiency    Galactorrhea    Cataract    Class 2 severe obesity due to excess calories with serious comorbidity and body mass index (BMI) of 39.0 to 39.9 in adult    Abnormal uterine bleeding (AUB)    Chronic fatigue    Missed menses    Uterine leiomyoma    Type 2 diabetes mellitus with stage 5 chronic kidney disease not on chronic dialysis, with long-term current use of insulin    Hypertension    Hypomagnesemia    HTN (hypertension)    Symptomatic anemia    End stage renal disease    ESRD (end stage renal disease) on dialysis    Encounter regarding vascular access for dialysis for end-stage renal disease    Renal failure    ESRD on hemodialysis    being managed by PCP and specialists who is here today for evaluation of I have long-term dialysis access.  Patient is right handed.  Patient new to dialysis.  No issues with dialysis catheter.  No chest pain or shortness of breath.  Referred here for long-term dialysis access placement.  No upper extremity symptoms.    no MI  no Stroke  Tobacco use: denies    Interval history:  Some issues with R IJ catheter.  Tech with difficulty feeling LUE AVF thrill today.    Past Medical History:   Diagnosis Date    Cataract     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     Diabetes mellitus 1996    Insulin Dependent    Galactorrhea      Hyperphosphatemia     Hypertension     Iron deficiency anemia     Obesity     Secondary hyperparathyroidism of renal origin     Vitamin D deficiency      Past Surgical History:   Procedure Laterality Date    AV FISTULA PLACEMENT Left 2020    Procedure: CREATION, AV FISTULA, LEFT RADIOCEPHALIC FISTULA, LEFT UPPER EXTREMITY , INTRAOP ULTRASOUND, vEIN MAPPING. ;  Surgeon: Rakesh Leon MD;  Location: [facility] OR;  Service: Vascular;  Laterality: Left;  RN PREOP 20, UPT done, COVID NEGATRIVE 20  NEED H/P     SECTION, CLASSIC      INSERTION OF TUNNELED CENTRAL VENOUS CATHETER (CVC) WITH SUBCUTANEOUS PORT N/A 2020    Procedure: IR TUNNELED VATH INSERT WITHOUT PORT;  Surgeon: [facility] Diagnostic Provider;  Location: [facility] OR;  Service: Radiology;  Laterality: N/A;  1030AM START  RN PREOP 2020    REVISION OF ARTERIOVENOUS FISTULA Left 2020    Procedure: REVISION, AV FISTULA, THROMBECTOMY, LEFT UPPER EXTREMITY;  Surgeon: Rakesh Leon MD;  Location: [facility] OR;  Service: Vascular;  Laterality: Left;    TUBAL LIGATION       Family History   Problem Relation Age of Onset    Seizures Mother 64    Heart failure Father 58    Asthma Sister     Breast cancer Neg Hx     Colon cancer Neg Hx     Ovarian cancer Neg Hx      Social History     Socioeconomic History    Marital status: Single     Spouse name: Not on file    Number of children: 2    Years of education: Not on file    Highest education level: Not on file   Occupational History    Occupation: Unemployed   Social Needs    Financial resource strain: Hard    Food insecurity:     Worry: Not on file     Inability: Not on file    Transportation needs:     Medical: Not on file     Non-medical: Not on file   Tobacco Use    Smoking status: Former Smoker     Types: Cigarettes    Smokeless tobacco: Never Used   Substance and Sexual Activity    Alcohol use: No    Drug use: Yes     Types: Marijuana     Comment: Occassional  "Recreational Use only    Sexual activity: Not Currently     Partners: Male   Lifestyle    Physical activity:     Days per week: Patient refused     Minutes per session: Patient refused    Stress: Rather much   Relationships    Social connections:     Talks on phone: More than three times a week     Gets together: More than three times a week     Attends Confucianism service: Patient refused     Active member of club or organization: Patient refused     Attends meetings of clubs or organizations: Patient refused     Relationship status: Never    Other Topics Concern    Not on file   Social History Narrative    Currently unemployed has two young children ages 5 and 7 and she is the sole caretaker       Current Outpatient Medications:     blood sugar diagnostic Strp, Check blood glucose 3 times daily, Disp: 100 each, Rfl: 5    calcitRIOL (ROCALTROL) 0.25 MCG Cap, TAKE 1 CAPSULE(0.25 MCG) BY MOUTH EVERY DAY, Disp: 90 capsule, Rfl: 1    cinacalcet (SENSIPAR) 30 MG Tab, Take 1 tablet (30 mg total) by mouth daily with breakfast., Disp: 30 tablet, Rfl: 11    ergocalciferol (ERGOCALCIFEROL) 50,000 unit Cap, Take 1 capsule (50,000 Units total) by mouth every 7 days., Disp: 12 capsule, Rfl: 0    insulin NPH (NOVOLIN N) 100 unit/mL injection, Inject 15 Units into the skin before breakfast., Disp: 10 mL, Rfl: 2    lancets (ONETOUCH DELICA LANCETS) 30 gauge Misc, 3 (three) times daily, Disp: , Rfl:     NIFEdipine (PROCARDIA-XL) 90 MG (OSM) 24 hr tablet, TAKE 1 TABLET(90 MG) BY MOUTH EVERY DAY, Disp: 90 tablet, Rfl: 1    pen needle, diabetic (BD ULTRA-FINE SHORT PEN NEEDLE) 31 gauge x 5/16" Ndle, daily, Disp: , Rfl:     sevelamer carbonate (RENVELA) 800 mg Tab, Take 1 tablet (800 mg total) by mouth 3 (three) times daily with meals., Disp: 90 tablet, Rfl: 11    sodium bicarbonate 650 MG tablet, Take 1 tablet (650 mg total) by mouth 2 (two) times daily., Disp: 60 tablet, Rfl: 11    REVIEW OF SYSTEMS:  General: No " fevers or chills; ENT: No sore throat; Allergy and Immunology: no persistent infections; Hematological and Lymphatic: No history of bleeding or easy bruising; Endocrine: negative; Respiratory: no cough, shortness of breath, or wheezing; Cardiovascular: no chest pain or dyspnea on exertion; Gastrointestinal: no abdominal pain/back, change in bowel habits, or bloody stools; Genito-Urinary: no dysuria, trouble voiding, or hematuria; Musculoskeletal: negative; Neurological: no TIA or stroke symptoms; Psychiatric: no nervousness, anxiety or depression.    PHYSICAL EXAM:      Pulse: 91         General appearance:  Alert, well-appearing, and in no distress.  Oriented to person, place, and time                    Neurological: Normal speech, no focal findings noted; CN II - XII grossly intact.  All extremities with intact sensation to light touch            Musculoskeletal: Digits/nail without cyanosis/clubbing.  Strength 5/5 .                    Neck: Supple, no significant adenopathy, no carotid bruit can be auscultated                  Chest:  Clear to auscultation, no wheezes, rales or rhonchi, symmetric air entry. No use of accessory muscles               Cardiac: Normal rate and regular rhythm, S1 and S2 normal            Abdomen: Soft, nontender, nondistended, no masses or organomegaly, no hernia     No rebound tenderness noted; bowel sounds normal     Pulsatile aortic mass is non palpable.     No groin adenopathy      Extremities:   2+ radial bilateral, Negative luis manuel's test BUE; LUE AVF with poor thrill, inc healing well     No extremity edema    Skin: No wounds    LAB RESULTS:  No results found for: CBC  Lab Results   Component Value Date    LABPROT 10.2 05/04/2020    INR 0.9 05/04/2020     Lab Results   Component Value Date     05/04/2020    K 4.6 05/04/2020    CL 97 05/04/2020    CO2 26 05/04/2020     (H) 05/04/2020    BUN 34 (H) 05/04/2020    CREATININE 8.9 (H) 05/04/2020    CALCIUM 8.7 05/04/2020     ANIONGAP 14 05/04/2020    EGFRNONAA 5 (A) 05/04/2020     Lab Results   Component Value Date    WBC 7.52 05/04/2020    RBC 3.78 (L) 05/04/2020    HGB 11.3 (L) 05/04/2020    HCT 36.2 (L) 05/04/2020    MCV 96 05/04/2020    MCH 29.9 05/04/2020    MCHC 31.2 (L) 05/04/2020    RDW 16.3 (H) 05/04/2020     05/04/2020    MPV 10.3 05/04/2020    GRAN 5.2 05/04/2020    GRAN 68.9 05/04/2020    LYMPH 1.6 05/04/2020    LYMPH 21.5 05/04/2020    MONO 0.5 05/04/2020    MONO 6.3 05/04/2020    EOS 0.2 05/04/2020    BASO 0.04 05/04/2020    EOSINOPHIL 2.4 05/04/2020    BASOPHIL 0.5 05/04/2020    DIFFMETHOD Automated 05/04/2020     .  Lab Results   Component Value Date    HGBA1C 5.4 01/18/2020       IMAGING:  All pertinent imaging has been reviewed and interpreted independently.    Vein mapping reviewed: Adequate bilateral proximal and superior cephalic vein, superior basilic and axillary veins    IMP/PLAN:  39 y.o. female with   Patient Active Problem List   Diagnosis    Anemia of renal disease    Hypertensive CKD (chronic kidney disease)    Nephrotic range proteinuria    Hyperkalemia    Acute renal failure superimposed on chronic kidney disease    Metabolic acidosis    Bacterial vaginosis    Increased prolactin level    CKD (chronic kidney disease) stage 5, GFR less than 15 ml/min    Secondary hyperparathyroidism of renal origin    Iron deficiency anemia    Vitamin D deficiency    Galactorrhea    Cataract    Class 2 severe obesity due to excess calories with serious comorbidity and body mass index (BMI) of 39.0 to 39.9 in adult    Abnormal uterine bleeding (AUB)    Chronic fatigue    Missed menses    Uterine leiomyoma    Type 2 diabetes mellitus with stage 5 chronic kidney disease not on chronic dialysis, with long-term current use of insulin    Hypertension    Hypomagnesemia    HTN (hypertension)    Symptomatic anemia    End stage renal disease    ESRD (end stage renal disease) on dialysis     Encounter regarding vascular access for dialysis for end-stage renal disease    Renal failure    ESRD on hemodialysis    being managed by PCP and specialists who is here today for evaluation of long-term dialysis access.    -s/p L RCF 5/5/20 with thrombosis s/p open thrombectomy POD 3 with subsequent thrombosis likely due to poor cephalic vein distally - rec LUE AVG as soon as possible  -continue renal diet, protect left upper extremity  -scheduled for 6/24/20 - to preop    I spent 11 minutes evaluating this patient and greater than 50% of the time was spent counseling, coordinator care and discussing the plan of care.  All questions were answered and patient stated understanding with agreement with the above treatment plan.    Rakesh Leon MD [facility]  Vascular and Endovascular Surgery

## 2020-05-25 NOTE — PATIENT INSTRUCTIONS
Caring for Your Hemodialysis Access  A problem such as an infection or a blood clot may make the access unusable. Typically, this happens more often with an arteriovenous graft than with an arteriovenous fistula. If this happens, youll need a new access. To help your access last, you will need to follow certain guidelines.  Watching for problems  Call your healthcare provider right away if you:  · Cant feel the blood flowing in the access (this sensation is called a thrill)  · Have pain or numbness in your hand or arm  · Have bleeding, redness, bluish discoloration, or warmth around your access  · Notice your access suddenly bulging out more than usual (a slight bulge is normal)  · Have a fever of 100.4°F (38°C) or higher, or as directed by your healthcare provider   Follow these and any other guidelines youre given  · Dont wear tight clothes or jewelry around your access.  · Dont let anyone take your blood pressure on or draw blood from the arm with the access. Also, dont let anyone put IV lines into it.  · Protect your access from being hit or cut.  · Wash your hands often and keep the area around the access clean.  · Do not carry anything heavy or do anything that would put pressure on the access.  Feeling for your thrill    If you put your fingers over your access, you should feel the blood rushing through it. This is called a thrill, and it feels like a vibration. Feel for the thrill as often as you're told, usually once or twice a day. If you can't feel it, tell your healthcare provider right away. Blood may not be flowing through your access the way it should.  Important numbers  Write the names and numbers of your healthcare providers below. That way you will know how to get in touch with them.  Doctor:  Name ___________________ Phone ___________________  Surgeon:  Name ___________________ Phone ___________________  Dialysis Center:  Name ___________________ Phone ___________________   Date Last  Reviewed: 1/1/2017 © 2000-2017 [employer]. [address] [place] [address], [place] [address]. All rights reserved. This information is not intended as a substitute for professional medical care. Always follow your healthcare professional's instructions.          Creating a Hemodialysis Access    Before hemodialysis can be done, a way for blood to leave and return to your body (an access) is needed. During hemodialysis, needles placed into the access carry blood to and from the dialyzer. A hemodialysis access is usually created in your arm. The 2 main types of accesses are an arteriovenous fistula (AV fistula) and an arteriovenous graft (AV graft).  Creating your access  The hemodialysis access provides a large volume of rapidly flowing blood. It involves a surgical operation under anesthesia. You may be able to go home the same day. It is made during a short procedure using one of these methods:  · A fistula is made by connecting an artery to a nearby vein. The high pressure and blood flow in the artery are transferred into the vein and help it grow in size and thickness. This enlarged vein (fistula) eventually has high blood flow and is thick enough for needles to be placed safely several times each week during hemodialysis. It may need weeks or months to develop before it's ready to be used. A fistula is more efficient than the graft and has fewer long-term problems. Therefore, it is the preferred form of access.  · A graft (piece of synthetic tube) may be sewn between an artery and a vein if a fistula is not possible because of the small size of your veins. Blood flows rapidly through the graft from the artery to the vein. A graft is usually ready to use in a few weeks. Needles can be placed into the plastic tube to obtain blood during dialysis.  Both types of access may take weeks to months before they can be used. If dialysis is necessary immediately, a temporary venous catheter is used. A catheter that  allows two way blood flow is placed into a large vein and the dialysis tubing is connected to the catheter. If both the AV fistula and graft are unsuccessful, a more permanent venous catheter is used.  The most common complications for hemodialysis access are infection, clotting and decreased blood flow from clotting or other narrowing.  Date Last Reviewed: 1/1/2017  © 6245-8464 [employer]. [address] [place] [address]. All rights reserved. This information is not intended as a substitute for professional medical care. Always follow your healthcare professional's instructions.

## 2020-05-25 NOTE — LETTER
May 25, 2020        Katharine Franks MD  [address]  [facility] [address]             [facility]  [address]  [facility] [address]  Phone: [phone]  Fax: [phone]   Patient: Xuan Ricardo   MR Number: [id number]   YOB: 1980   Date of Visit: 5/25/2020       Dear Dr. Ti Franks:    Thank you for referring Xuan Ricardo to me for evaluation. Below are the relevant portions of my assessment and plan of care.            If you have questions, please do not hesitate to call me. I look forward to following Xuan along with you.    Sincerely,      Rakesh Leon MD           CC  No Recipients

## 2020-06-23 ENCOUNTER — TELEPHONE (OUTPATIENT)
Dept: VASCULAR SURGERY | Facility: CLINIC | Age: 40
End: 2020-06-23

## 2020-06-23 NOTE — TELEPHONE ENCOUNTER
Called pt to see why she missed her pre op appointment and to see did she still want to have surgery tomorrow pt didn't answer I left her a voicemail to give the office a call. I also sent Dr. Leon a message letting him know she missed her pre op appointment.

## 2020-07-16 NOTE — ASSESSMENT & PLAN NOTE
No active bleeding  On daily iron at home  Iron profile consistent with iron deficiency  Started on iv iron daily and epogen  Continue oral iron on dc     No

## 2020-07-21 ENCOUNTER — TELEPHONE (OUTPATIENT)
Dept: VASCULAR SURGERY | Facility: CLINIC | Age: 40
End: 2020-07-21

## 2020-07-21 NOTE — TELEPHONE ENCOUNTER
Call to patient to see if she needed surgery still or if she had found care else where. Patient stated that she was going to [facility] now and did not need to come back to our office.

## 2020-08-14 ENCOUNTER — LAB VISIT (OUTPATIENT)
Dept: LAB | Facility: HOSPITAL | Age: 40
End: 2020-08-14
Attending: NURSE PRACTITIONER
Payer: MEDICARE

## 2020-08-14 DIAGNOSIS — N18.6 ESRD (END STAGE RENAL DISEASE): Primary | ICD-10-CM

## 2020-08-14 DIAGNOSIS — N18.6 ESRD (END STAGE RENAL DISEASE): ICD-10-CM

## 2020-08-14 LAB
ALBUMIN SERPL BCP-MCNC: 3.7 G/DL (ref 3.5–5.2)
ALP SERPL-CCNC: 114 U/L (ref 55–135)
ALT SERPL W/O P-5'-P-CCNC: 5 U/L (ref 10–44)
ANION GAP SERPL CALC-SCNC: 12 MMOL/L (ref 8–16)
AST SERPL-CCNC: 9 U/L (ref 10–40)
BILIRUB SERPL-MCNC: 0.5 MG/DL (ref 0.1–1)
BUN SERPL-MCNC: 99 MG/DL (ref 6–20)
CALCIUM SERPL-MCNC: 8.5 MG/DL (ref 8.7–10.5)
CHLORIDE SERPL-SCNC: 106 MMOL/L (ref 95–110)
CO2 SERPL-SCNC: 18 MMOL/L (ref 23–29)
CREAT SERPL-MCNC: 13.1 MG/DL (ref 0.5–1.4)
ERYTHROCYTE [DISTWIDTH] IN BLOOD BY AUTOMATED COUNT: 16.6 % (ref 11.5–14.5)
EST. GFR  (AFRICAN AMERICAN): 4 ML/MIN/1.73 M^2
EST. GFR  (NON AFRICAN AMERICAN): 3 ML/MIN/1.73 M^2
GLUCOSE SERPL-MCNC: 141 MG/DL (ref 70–110)
HCT VFR BLD AUTO: 30.2 % (ref 37–48.5)
HGB BLD-MCNC: 9.2 G/DL (ref 12–16)
MCH RBC QN AUTO: 30 PG (ref 27–31)
MCHC RBC AUTO-ENTMCNC: 30.5 G/DL (ref 32–36)
MCV RBC AUTO: 98 FL (ref 82–98)
PLATELET # BLD AUTO: 160 K/UL (ref 150–350)
PMV BLD AUTO: 10 FL (ref 9.2–12.9)
POTASSIUM SERPL-SCNC: 6.3 MMOL/L (ref 3.5–5.1)
PROT SERPL-MCNC: 7.2 G/DL (ref 6–8.4)
RBC # BLD AUTO: 3.07 M/UL (ref 4–5.4)
SODIUM SERPL-SCNC: 136 MMOL/L (ref 136–145)
WBC # BLD AUTO: 5.63 K/UL (ref 3.9–12.7)

## 2020-08-14 PROCEDURE — 36415 COLL VENOUS BLD VENIPUNCTURE: CPT

## 2020-08-14 PROCEDURE — 85027 COMPLETE CBC AUTOMATED: CPT

## 2020-08-14 PROCEDURE — 80053 COMPREHEN METABOLIC PANEL: CPT

## 2020-08-15 ENCOUNTER — HOSPITAL ENCOUNTER (OUTPATIENT)
Facility: HOSPITAL | Age: 40
Discharge: HOME | End: 2020-08-15
Attending: EMERGENCY MEDICINE | Admitting: HOSPITALIST
Payer: MEDICARE

## 2020-08-15 VITALS
WEIGHT: 258.38 LBS | OXYGEN SATURATION: 100 % | RESPIRATION RATE: 18 BRPM | BODY MASS INDEX: 40.55 KG/M2 | HEIGHT: 67 IN | TEMPERATURE: 98 F | SYSTOLIC BLOOD PRESSURE: 155 MMHG | DIASTOLIC BLOOD PRESSURE: 82 MMHG | HEART RATE: 98 BPM

## 2020-08-15 DIAGNOSIS — Z99.2 END STAGE RENAL DISEASE ON DIALYSIS: Primary | ICD-10-CM

## 2020-08-15 DIAGNOSIS — E87.5 HYPERKALEMIA: ICD-10-CM

## 2020-08-15 DIAGNOSIS — N19 RENAL FAILURE, UNSPECIFIED CHRONICITY: ICD-10-CM

## 2020-08-15 DIAGNOSIS — Z91.199 H/O NONCOMPLIANCE WITH MEDICAL TREATMENT, PRESENTING HAZARDS TO HEALTH: ICD-10-CM

## 2020-08-15 DIAGNOSIS — N18.6 END STAGE RENAL DISEASE ON DIALYSIS: Primary | ICD-10-CM

## 2020-08-15 LAB
ALBUMIN SERPL BCP-MCNC: 3.7 G/DL (ref 3.5–5.2)
ALP SERPL-CCNC: 109 U/L (ref 55–135)
ALT SERPL W/O P-5'-P-CCNC: 6 U/L (ref 10–44)
ANION GAP SERPL CALC-SCNC: 14 MMOL/L (ref 8–16)
AST SERPL-CCNC: 9 U/L (ref 10–40)
BASOPHILS # BLD AUTO: 0.03 K/UL (ref 0–0.2)
BASOPHILS NFR BLD: 0.6 % (ref 0–1.9)
BILIRUB SERPL-MCNC: 0.5 MG/DL (ref 0.1–1)
BUN SERPL-MCNC: 92 MG/DL (ref 6–20)
CALCIUM SERPL-MCNC: 8.5 MG/DL (ref 8.7–10.5)
CHLORIDE SERPL-SCNC: 106 MMOL/L (ref 95–110)
CO2 SERPL-SCNC: 15 MMOL/L (ref 23–29)
CREAT SERPL-MCNC: 12.8 MG/DL (ref 0.5–1.4)
DIFFERENTIAL METHOD: ABNORMAL
EOSINOPHIL # BLD AUTO: 0.1 K/UL (ref 0–0.5)
EOSINOPHIL NFR BLD: 2.1 % (ref 0–8)
ERYTHROCYTE [DISTWIDTH] IN BLOOD BY AUTOMATED COUNT: 16.2 % (ref 11.5–14.5)
EST. GFR  (AFRICAN AMERICAN): 4 ML/MIN/1.73 M^2
EST. GFR  (NON AFRICAN AMERICAN): 3 ML/MIN/1.73 M^2
GLUCOSE SERPL-MCNC: 134 MG/DL (ref 70–110)
HCT VFR BLD AUTO: 31.2 % (ref 37–48.5)
HGB BLD-MCNC: 9.6 G/DL (ref 12–16)
IMM GRANULOCYTES # BLD AUTO: 0.02 K/UL (ref 0–0.04)
IMM GRANULOCYTES NFR BLD AUTO: 0.4 % (ref 0–0.5)
LIPASE SERPL-CCNC: 32 U/L (ref 4–60)
LYMPHOCYTES # BLD AUTO: 1.2 K/UL (ref 1–4.8)
LYMPHOCYTES NFR BLD: 25.8 % (ref 18–48)
MCH RBC QN AUTO: 30 PG (ref 27–31)
MCHC RBC AUTO-ENTMCNC: 30.8 G/DL (ref 32–36)
MCV RBC AUTO: 98 FL (ref 82–98)
MONOCYTES # BLD AUTO: 0.3 K/UL (ref 0.3–1)
MONOCYTES NFR BLD: 5.3 % (ref 4–15)
NEUTROPHILS # BLD AUTO: 3.1 K/UL (ref 1.8–7.7)
NEUTROPHILS NFR BLD: 65.8 % (ref 38–73)
NRBC BLD-RTO: 0 /100 WBC
PLATELET # BLD AUTO: 161 K/UL (ref 150–350)
PMV BLD AUTO: 10.7 FL (ref 9.2–12.9)
POTASSIUM SERPL-SCNC: 5.7 MMOL/L (ref 3.5–5.1)
PROT SERPL-MCNC: 7.1 G/DL (ref 6–8.4)
RBC # BLD AUTO: 3.2 M/UL (ref 4–5.4)
SARS-COV-2 RDRP RESP QL NAA+PROBE: NEGATIVE
SODIUM SERPL-SCNC: 135 MMOL/L (ref 136–145)
WBC # BLD AUTO: 4.76 K/UL (ref 3.9–12.7)

## 2020-08-15 PROCEDURE — 99285 EMERGENCY DEPT VISIT HI MDM: CPT | Mod: 25

## 2020-08-15 PROCEDURE — G0378 HOSPITAL OBSERVATION PER HR: HCPCS

## 2020-08-15 PROCEDURE — G0257 UNSCHED DIALYSIS ESRD PT HOS: HCPCS

## 2020-08-15 PROCEDURE — 83690 ASSAY OF LIPASE: CPT

## 2020-08-15 PROCEDURE — 63600175 PHARM REV CODE 636 W HCPCS: Performed by: INTERNAL MEDICINE

## 2020-08-15 PROCEDURE — U0002 COVID-19 LAB TEST NON-CDC: HCPCS

## 2020-08-15 PROCEDURE — 80053 COMPREHEN METABOLIC PANEL: CPT

## 2020-08-15 PROCEDURE — 85025 COMPLETE CBC W/AUTO DIFF WBC: CPT

## 2020-08-15 RX ORDER — MUPIROCIN 20 MG/G
OINTMENT TOPICAL 2 TIMES DAILY
Status: DISCONTINUED | OUTPATIENT
Start: 2020-08-15 | End: 2020-08-16 | Stop reason: HOSPADM

## 2020-08-15 RX ORDER — ACETAMINOPHEN 325 MG/1
650 TABLET ORAL EVERY 6 HOURS PRN
Status: DISCONTINUED | OUTPATIENT
Start: 2020-08-15 | End: 2020-08-16 | Stop reason: HOSPADM

## 2020-08-15 RX ORDER — HEPARIN SODIUM 5000 [USP'U]/ML
5000 INJECTION, SOLUTION INTRAVENOUS; SUBCUTANEOUS EVERY 8 HOURS
Status: DISCONTINUED | OUTPATIENT
Start: 2020-08-15 | End: 2020-08-16 | Stop reason: HOSPADM

## 2020-08-15 RX ORDER — HEPARIN SODIUM 1000 [USP'U]/ML
2000 INJECTION, SOLUTION INTRAVENOUS; SUBCUTANEOUS
Status: COMPLETED | OUTPATIENT
Start: 2020-08-15 | End: 2020-08-15

## 2020-08-15 RX ORDER — SODIUM CHLORIDE 0.9 % (FLUSH) 0.9 %
10 SYRINGE (ML) INJECTION
Status: DISCONTINUED | OUTPATIENT
Start: 2020-08-15 | End: 2020-08-16 | Stop reason: HOSPADM

## 2020-08-15 RX ORDER — NIFEDIPINE 30 MG/1
90 TABLET, EXTENDED RELEASE ORAL DAILY
Status: DISCONTINUED | OUTPATIENT
Start: 2020-08-16 | End: 2020-08-16 | Stop reason: HOSPADM

## 2020-08-15 RX ORDER — SODIUM CHLORIDE 9 MG/ML
INJECTION, SOLUTION INTRAVENOUS
Status: DISCONTINUED | OUTPATIENT
Start: 2020-08-15 | End: 2020-08-16 | Stop reason: HOSPADM

## 2020-08-15 RX ORDER — SODIUM CHLORIDE 9 MG/ML
INJECTION, SOLUTION INTRAVENOUS ONCE
Status: DISCONTINUED | OUTPATIENT
Start: 2020-08-15 | End: 2020-08-16 | Stop reason: HOSPADM

## 2020-08-15 RX ORDER — HEPARIN SODIUM 5000 [USP'U]/ML
5000 INJECTION, SOLUTION INTRAVENOUS; SUBCUTANEOUS
Status: COMPLETED | OUTPATIENT
Start: 2020-08-15 | End: 2020-08-15

## 2020-08-15 RX ADMIN — HEPARIN SODIUM 5000 UNITS: 5000 INJECTION INTRAVENOUS; SUBCUTANEOUS at 09:08

## 2020-08-15 RX ADMIN — HEPARIN SODIUM 2000 UNITS: 1000 INJECTION, SOLUTION INTRAVENOUS; SUBCUTANEOUS at 08:08

## 2020-08-15 RX ADMIN — HEPARIN SODIUM 2000 UNITS: 1000 INJECTION, SOLUTION INTRAVENOUS; SUBCUTANEOUS at 06:08

## 2020-08-15 NOTE — SUBJECTIVE & OBJECTIVE
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 ;  Surgeon: Rakesh Leon MD;  Location: [facility] OR;  Service: Vascular;  Laterality: Left;  RN PREOP 20, UPT done, COVID NEGATRIVE 20  NEED H/P     SECTION, CLASSIC      INSERTION OF TUNNELED CENTRAL VENOUS CATHETER (CVC) WITH SUBCUTANEOUS PORT N/A 2020    Procedure: IR TUNNELED VATH INSERT WITHOUT PORT;  Surgeon: [facility] Diagnostic Provider;  Location: [facility] OR;  Service: Radiology;  Laterality: N/A;  1030AM START  RN PREOP 2020    INSERTION OF TUNNELED CENTRAL VENOUS CATHETER (CVC) WITH SUBCUTANEOUS PORT N/A 2020    Procedure: TUNNELED CATH PLACEMENT;  Surgeon: [facility] Diagnostic Provider;  Location: [facility] OR;  Service: Radiology;  Laterality: N/A;  930AM START    REVISION OF ARTERIOVENOUS FISTULA Left 2020    Procedure: REVISION, AV FISTULA, THROMBECTOMY, LEFT UPPER EXTREMITY;  Surgeon: Rakesh Leon MD;  Location: [facility] OR;  Service: Vascular;  Laterality: Left;    TUBAL LIGATION         Review of patient's allergies indicates:   Allergen Reactions    Vicodin [hydrocodone-acetaminophen] Hives    Codeine Hives       No current facility-administered medications on file prior to encounter.      Current Outpatient Medications on File Prior to Encounter   Medication Sig    blood sugar diagnostic Strp Check blood glucose 3 times daily    calcitRIOL (ROCALTROL) 0.25 MCG Cap Take 2 capsules (0.5 mcg total) by mouth once daily.     "cinacalcet (SENSIPAR) 30 MG Tab Take 2 tablets (60 mg total) by mouth daily with breakfast.    ergocalciferol (ERGOCALCIFEROL) 50,000 unit Cap Take 1 capsule (50,000 Units total) by mouth every 7 days.    insulin NPH (NOVOLIN N) 100 unit/mL injection Inject 15 Units into the skin before breakfast.    lancets (ONETOUCH DELICA LANCETS) 30 gauge Misc 3 (three) times daily    NIFEdipine (PROCARDIA-XL) 90 MG (OSM) 24 hr tablet TAKE 1 TABLET(90 MG) BY MOUTH EVERY DAY    pen needle, diabetic (BD ULTRA-FINE SHORT PEN NEEDLE) 31 gauge x 5/16" Ndle daily    calcium acetate,phosphat bind, (PHOSLO) 667 mg tablet Take 2 tablets (1,334 mg total) by mouth 3 (three) times daily with meals.    sodium bicarbonate 650 MG tablet Take 1 tablet (650 mg total) by mouth 2 (two) times daily.     Family History     Problem Relation (Age of Onset)    Asthma Sister    Heart failure Father (58)    Seizures Mother (64)        Tobacco Use    Smoking status: Former Smoker     Types: Cigarettes    Smokeless tobacco: Never Used   Substance and Sexual Activity    Alcohol use: No    Drug use: Yes     Types: Marijuana     Comment: Occassional Recreational Use only    Sexual activity: Not Currently     Partners: Male     Review of Systems   Constitutional: Negative for chills, fatigue and fever.   Eyes: Negative for photophobia and visual disturbance.   Respiratory: Negative for cough and shortness of breath.    Cardiovascular: Negative for chest pain, palpitations and leg swelling.   Gastrointestinal: Negative for abdominal pain, diarrhea, nausea and vomiting.   Genitourinary: Negative for dysuria, frequency and urgency.   Skin: Negative for pallor, rash and wound.   Neurological: Negative for light-headedness and headaches.   Psychiatric/Behavioral: Negative for confusion and decreased concentration.     Objective:     Vital Signs (Most Recent):  Temp: 98.6 °F (37 °C) (08/15/20 1417)  Pulse: 99 (08/15/20 1417)  Resp: 18 (08/15/20 " 1417)  BP: 139/78 (08/15/20 1417)  SpO2: 96 % (08/15/20 1417) Vital Signs (24h Range):  Temp:  [98.6 °F (37 °C)] 98.6 °F (37 °C)  Pulse:  [99] 99  Resp:  [18] 18  SpO2:  [96 %] 96 %  BP: (139)/(78) 139/78     Weight: 109.8 kg (242 lb)  Body mass index is 37.9 kg/m².    Physical Exam  Vitals signs and nursing note reviewed.   Constitutional:       General: She is not in acute distress.     Appearance: She is well-developed.   HENT:      Head: Normocephalic and atraumatic.      Right Ear: External ear normal.      Left Ear: External ear normal.      Nose: Nose normal.   Eyes:      Conjunctiva/sclera: Conjunctivae normal.      Pupils: Pupils are equal, round, and reactive to light.   Neck:      Musculoskeletal: Normal range of motion and neck supple.   Cardiovascular:      Rate and Rhythm: Normal rate and regular rhythm.   Pulmonary:      Effort: Pulmonary effort is normal. No respiratory distress.      Breath sounds: Normal breath sounds. No wheezing.   Abdominal:      General: Bowel sounds are normal. There is no distension.      Palpations: Abdomen is soft.      Tenderness: There is no abdominal tenderness.      Comments: No palpable hepatomegaly or splenomegaly    Musculoskeletal: Normal range of motion.         General: No tenderness.   Skin:     General: Skin is warm and dry.   Neurological:      Mental Status: She is alert and oriented to person, place, and time.   Psychiatric:         Thought Content: Thought content normal.           CRANIAL NERVES     CN III, IV, VI   Pupils are equal, round, and reactive to light.       Significant Labs: All pertinent labs within the past 24 hours have been reviewed.    Significant Imaging: I have reviewed all pertinent imaging results/findings within the past 24 hours.

## 2020-08-15 NOTE — ASSESSMENT & PLAN NOTE
Nephrology consult, currently receiving HD.  Otherwise at baseline.  Discharge home after dialysis.

## 2020-08-15 NOTE — DISCHARGE SUMMARY
[facility] Hospital Medicine  Discharge Summary      Patient Name: Xuan Ricardo  MRN: [id number]  Admission Date: 8/15/2020  Hospital Length of Stay: 0 days  Discharge Date and Time:  08/15/2020 6:49 PM  Attending Physician: Svitlana Cleveland MD   Discharging Provider: Rogelio Camara Jr, NP  Primary Care Provider: Katharine Franks MD      HPI:   39 y.o. female with ESRD on HD, anemia of renal disease, hypertension, obesity, dm 2, hypertension, presents to the ED for hyperkalemia on outpatient lab work.  She has missed multiple dialysis sessions this past week due to her child being sick and difficulty finding childcare.  She feels and is without any symptom or complaint.  Denies fever, chills, cough, SOB, chest pain, or any other symptom.  In the ED, hyperkalemia noted on lab work, though improved from yesterday.  Otherwise labs consistent with her chronic renal disease and without evidence of acute abnormality.  Rapid COVID negative.  Nephrology consulted.  Plan for her HD this evening.  She requests discharge after dialysis.  Placed in observation.    * No surgery found *      Hospital Course:   Ms. Ricardo was placed in observation to receive hemodialysis after presenting with mild hyperkalemia and missing some dialysis sessions this week.  She was without any symptom or complaint.  Feels well and requests discharge after hemodialysis.  Nephrology was consulted and she received hemodialysis without complication.  All findings and plan discussed with patient, all questions answered, she verbalizes understanding and agreement.  Discharged home in stable condition to follow-up with her nephrologist and resume regular outpatient dialysis.     Consults:   Consults (From admission, onward)        Status Ordering Provider     Inpatient consult to Nephrology  Once     Provider:  Belinda Dowd MD    Acknowledged JUAN GLEZ          No new Assessment & Plan notes have been filed under this  "hospital service since the last note was generated.  Service: Hospital Medicine    Final Active Diagnoses:    Diagnosis Date Noted POA    PRINCIPAL PROBLEM:  End stage renal disease on dialysis [N18.6, Z99.2] 08/15/2020 Not Applicable      Problems Resolved During this Admission:       Discharged Condition: stable    Disposition: Home or Self Care    Follow Up:  Follow-up Information     Schedule an appointment as soon as possible for a visit with Nancy Dowd MD.    Specialty: Nephrology  Why: Call the office for appointment  Contact information:  [address]  [address] [place] [address] [phone]                 Patient Instructions:      Diet diabetic     Diet Cardiac     Diet renal     Activity as tolerated       Significant Diagnostic Studies: Labs:   CMP   Recent Labs   Lab 08/14/20  1506 08/15/20  1500    135*   K 6.3* 5.7*    106   CO2 18* 15*   * 134*   BUN 99* 92*   CREATININE 13.1* 12.8*   CALCIUM 8.5* 8.5*   PROT 7.2 7.1   ALBUMIN 3.7 3.7   BILITOT 0.5 0.5   ALKPHOS 114 109   AST 9* 9*   ALT 5* 6*   ANIONGAP 12 14   ESTGFRAFRICA 4* 4*   EGFRNONAA 3* 3*    and CBC   Recent Labs   Lab 08/14/20  1506 08/15/20  1500   WBC 5.63 4.76   HGB 9.2* 9.6*   HCT 30.2* 31.2*    161       Pending Diagnostic Studies:     None         Medications:  Reconciled Home Medications:      Medication List      CONTINUE taking these medications    BD ULTRA-FINE SHORT PEN NEEDLE 31 gauge x 5/16" Ndle  Generic drug: pen needle, diabetic  daily     blood sugar diagnostic Strp  Check blood glucose 3 times daily     calcitRIOL 0.25 MCG Cap  Commonly known as: ROCALTROL  Take 2 capsules (0.5 mcg total) by mouth once daily.     calcium acetate(phosphat bind) 667 mg tablet  Commonly known as: PHOSLO  Take 2 tablets (1,334 mg total) by mouth 3 (three) times daily with meals.     cinacalcet 30 MG Tab  Commonly known as: SENSIPAR  Take 2 tablets (60 mg total) by mouth daily with breakfast.   "   ergocalciferol 50,000 unit Cap  Commonly known as: ERGOCALCIFEROL  Take 1 capsule (50,000 Units total) by mouth every 7 days.     insulin  unit/mL injection  Inject 15 Units into the skin before breakfast.     NIFEdipine 90 MG (OSM) 24 hr tablet  Commonly known as: PROCARDIA-XL  TAKE 1 TABLET(90 MG) BY MOUTH EVERY DAY     ONETOUCH DELICA LANCETS 30 gauge Misc  Generic drug: lancets  3 (three) times daily     sodium bicarbonate 650 MG tablet  Take 1 tablet (650 mg total) by mouth 2 (two) times daily.            Indwelling Lines/Drains at time of discharge:   Lines/Drains/Airways     Central Venous Catheter Line                 Hemodialysis Catheter 05/22/20 1130 right internal jugular 85 days                Time spent on the discharge of patient:  Less than 30 minutes  Patient was seen and examined on the date of discharge and determined to be suitable for discharge.         Rogelio Camara Jr NP  Department of Hospital Medicine  [facility]

## 2020-08-15 NOTE — HPI
39 y.o. female with ESRD on HD, anemia of renal disease, hypertension, obesity, dm 2, hypertension, presents to the ED for hyperkalemia on outpatient lab work.  She has missed multiple dialysis sessions this past week due to her child being sick and difficulty finding childcare.  She feels and is without any symptom or complaint.  Denies fever, chills, cough, SOB, chest pain, or any other symptom.  In the ED, hyperkalemia noted on lab work, though improved from yesterday.  Otherwise labs consistent with her chronic renal disease and without evidence of acute abnormality.  Rapid COVID negative.  Nephrology consulted.  Plan for her HD this evening.  She requests discharge after dialysis.  Placed in observation.

## 2020-08-15 NOTE — ED NOTES
Patient was pulled off board and placed on 3 , unable to validate vitals, or print ticket for patient to go to dialysis. CM spoke to patient she wishes to leave to go hoe after dialysis due to not having a  at home for her children over night. House sup to replace patient on the boards. Patient off unit to Dialysis Medications sent with Transport from Pharmacy.

## 2020-08-15 NOTE — ED PROVIDER NOTES
Encounter Date: 8/15/2020    SCRIBE #1 NOTE: I, Darnell Jesus, am scribing for, and in the presence of,  Yao Mcclendon MD. I have scribed the following portions of the note - Other sections scribed: HPI,ROS.       History     Chief Complaint   Patient presents with    Abnormal Lab     states was here yesterday with high potassium and to return today for a recheck.   pt dialysis pt, last completed x 4 days ago.  shunt to right chest, dialysis days are , , Sat.     39-year-old female with past medical history of Diabetes Mellitus, Hypertension, Chronic Kidney Disease(stage 5) presenting to ED for elevated potassium since yesterday and states she would like to have additional blood work. Pt reports she has missed 3 days of dialysis because her child is sick. She has dialysis on Tuesday/Thursday/ and Saturday. She denies chest pain, shortness of breath, cough, nausea, vomiting, diarrhea, abdominal pain, headache, dysuria, hematuria, back pain, fever, chills, or any further complaints. Patient has codeine allergy.         Review of patient's allergies indicates:   Allergen Reactions    Vicodin [hydrocodone-acetaminophen] Hives    Codeine Hives     Past Medical History:   Diagnosis Date    Cataract     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     Diabetes mellitus     Insulin Dependent    Galactorrhea     Hyperphosphatemia     Hypertension     Iron deficiency anemia     Obesity     Secondary hyperparathyroidism of renal origin     Vitamin D deficiency      Past Surgical History:   Procedure Laterality Date    AV FISTULA PLACEMENT Left 2020    Procedure: CREATION, AV FISTULA, LEFT RADIOCEPHALIC FISTULA, LEFT UPPER EXTREMITY , INTRAOP ULTRASOUND, vEIN MAPPING. ;  Surgeon: Rakesh Leon MD;  Location: [facility] OR;  Service: Vascular;  Laterality: Left;  RN PREOP 20, UPT done, COVID NEGATRIVE 20  NEED H/P     SECTION, CLASSIC      INSERTION OF TUNNELED CENTRAL VENOUS CATHETER  (CVC) WITH SUBCUTANEOUS PORT N/A 1/27/2020    Procedure: IR TUNNELED VATH INSERT WITHOUT PORT;  Surgeon: Prachi Diagnostic Provider;  Location: [facility] OR;  Service: Radiology;  Laterality: N/A;  1030AM START  RN PREOP 1/24/2020    INSERTION OF TUNNELED CENTRAL VENOUS CATHETER (CVC) WITH SUBCUTANEOUS PORT N/A 5/22/2020    Procedure: TUNNELED CATH PLACEMENT;  Surgeon: Prachi Diagnostic Provider;  Location: [facility] OR;  Service: Radiology;  Laterality: N/A;  930AM START    REVISION OF ARTERIOVENOUS FISTULA Left 5/8/2020    Procedure: REVISION, AV FISTULA, THROMBECTOMY, LEFT UPPER EXTREMITY;  Surgeon: Rakesh Leon MD;  Location: [facility] OR;  Service: Vascular;  Laterality: Left;    TUBAL LIGATION       Family History   Problem Relation Age of Onset    Seizures Mother 64    Heart failure Father 58    Asthma Sister     Breast cancer Neg Hx     Colon cancer Neg Hx     Ovarian cancer Neg Hx      Social History     Tobacco Use    Smoking status: Former Smoker     Types: Cigarettes    Smokeless tobacco: Never Used   Substance Use Topics    Alcohol use: No    Drug use: Yes     Types: Marijuana     Comment: Occassional Recreational Use only     Review of Systems   Constitutional: Negative for chills, diaphoresis and fever.   HENT: Negative for ear pain and sore throat.    Eyes: Negative for pain.   Respiratory: Negative for cough and shortness of breath.    Cardiovascular: Negative for chest pain.   Gastrointestinal: Negative for abdominal pain, diarrhea, nausea and vomiting.   Genitourinary: Negative for dysuria.   Musculoskeletal: Negative for back pain.   Skin: Negative for rash.   Neurological: Negative for headaches.       Physical Exam     Initial Vitals [08/15/20 1417]   BP Pulse Resp Temp SpO2   139/78 99 18 98.6 °F (37 °C) 96 %      MAP       --         Physical Exam  The patient was examined specifically for the following:   General:No significant distress, Good color, Warm and dry. Head and neck:Scalp  atraumatic, Neck supple. Neurological:Appropriate conversation, Gross motor deficits. Eyes:Conjugate gaze, Clear corneas. ENT: No epistaxis. Cardiac: Regular rate and rhythm, Grossly normal heart tones. Pulmonary: Wheezing, Rales. Gastrointestinal: Abdominal tenderness, Abdominal distention. Musculoskeletal: Extremity deformity, Apparent pain with range of motion of the joints. Skin: Rash.   The findings on examination were normal except for the following:  The patient is morbidly obese.  There is no evidence of respiratory distress.   ED Course   Procedures  Labs Reviewed   CBC W/ AUTO DIFFERENTIAL - Abnormal; Notable for the following components:       Result Value    RBC 3.20 (*)     Hemoglobin 9.6 (*)     Hematocrit 31.2 (*)     Mean Corpuscular Hemoglobin Conc 30.8 (*)     RDW 16.2 (*)     All other components within normal limits   COMPREHENSIVE METABOLIC PANEL - Abnormal; Notable for the following components:    Sodium 135 (*)     Potassium 5.7 (*)     CO2 15 (*)     Glucose 134 (*)     BUN, Bld 92 (*)     Creatinine 12.8 (*)     Calcium 8.5 (*)     AST 9 (*)     ALT 6 (*)     eGFR if  4 (*)     eGFR if non  3 (*)     All other components within normal limits   LIPASE   SARS-COV-2 RNA AMPLIFICATION, QUAL          Imaging Results    None       Medical decision making:  Given the above this patient presents to the emergency room, not going to dialysis for the month.  She has a potassium of 5.7.  We will dialyze her because it is Saturday.  We are not certain she will make it to Monday without developing hyperkalemia.  I discussed this case with Dr. Dowd, who asked that I place the patient in observation to hospital medicine.  The patient is COVID negative.  She is not short of breath or lungs are clear.                Scribe Attestation:   Scribe #1: I performed the above scribed service and the documentation accurately describes the services I performed. I attest to the  accuracy of the note.     I personally performed the services described in this documentation.  All medical record  entries made by the scribe are at my direction and in my presence.  Signed, Dr. Mcclendon                      Clinical Impression:       ICD-10-CM ICD-9-CM   1. End stage renal disease on dialysis  N18.6 585.6    Z99.2 V45.11   2. Renal failure, unspecified chronicity  N19 586   3. H/O noncompliance with medical treatment, presenting hazards to health  Z91.19 V15.81   4. Hyperkalemia  E87.5 276.7             ED Disposition Condition    Observation                           Yao Mcclendon MD  08/15/20 1733       Yao Mcclendon MD  08/23/20 0638       Yao Mcclendon MD  08/23/20 0640

## 2020-08-15 NOTE — HOSPITAL COURSE
Ms. Cousin was placed in observation to receive hemodialysis after presenting with mild hyperkalemia and missing some dialysis sessions this week.  She was without any symptom or complaint.  Feels well and requests discharge after hemodialysis.  Nephrology was consulted and she received hemodialysis without complication.  All findings and plan discussed with patient, all questions answered, she verbalizes understanding and agreement.  Discharged home in stable condition to follow-up with her nephrologist and resume regular outpatient dialysis.

## 2020-08-15 NOTE — ED TRIAGE NOTES
Patient reports came in for additional blood work, K+ was elevated yesterday. Per her Nephro she may need Dialysis today pending her labs.

## 2020-08-16 NOTE — NURSING
Report received from dialysis nurse, NORMAN Zhong. Pt was in dialysis for 3 hours and had 3L of fluid removed. However, pt's BP: 182/98 and HR: 93. Awaiting pt's arrival to the unit.

## 2020-08-16 NOTE — PROGRESS NOTES
08/15/20 2210   Post-Hemodialysis Assessment   Blood Volume Processed (Liters) 88 L   Dialyzer Clearance Moderately streaked   Duration of Treatment (minutes) 240 minutes   Hemodialysis Intake (mL) 500 mL   Total UF (mL) 3500 mL   Net Fluid Removal 3000   Patient Response to Treatment toleraed well   Report given to bedside rnmine. Pt ready to go home to her children.

## 2020-10-14 ENCOUNTER — LAB VISIT (OUTPATIENT)
Dept: LAB | Facility: HOSPITAL | Age: 40
End: 2020-10-14
Attending: NURSE PRACTITIONER
Payer: MEDICARE

## 2020-10-14 DIAGNOSIS — N18.6 ESRD (END STAGE RENAL DISEASE): ICD-10-CM

## 2020-10-14 LAB
ALBUMIN SERPL BCP-MCNC: 3.6 G/DL (ref 3.5–5.2)
ANION GAP SERPL CALC-SCNC: 12 MMOL/L (ref 8–16)
BASOPHILS # BLD AUTO: 0.03 K/UL (ref 0–0.2)
BASOPHILS NFR BLD: 0.5 % (ref 0–1.9)
BUN SERPL-MCNC: 96 MG/DL (ref 6–20)
CALCIUM SERPL-MCNC: 8.5 MG/DL (ref 8.7–10.5)
CHLORIDE SERPL-SCNC: 107 MMOL/L (ref 95–110)
CO2 SERPL-SCNC: 18 MMOL/L (ref 23–29)
CREAT SERPL-MCNC: 11.7 MG/DL (ref 0.5–1.4)
DIFFERENTIAL METHOD: ABNORMAL
EOSINOPHIL # BLD AUTO: 0.1 K/UL (ref 0–0.5)
EOSINOPHIL NFR BLD: 1.3 % (ref 0–8)
ERYTHROCYTE [DISTWIDTH] IN BLOOD BY AUTOMATED COUNT: 15.1 % (ref 11.5–14.5)
EST. GFR  (AFRICAN AMERICAN): 4 ML/MIN/1.73 M^2
EST. GFR  (NON AFRICAN AMERICAN): 4 ML/MIN/1.73 M^2
GLUCOSE SERPL-MCNC: 175 MG/DL (ref 70–110)
HCT VFR BLD AUTO: 33.4 % (ref 37–48.5)
HGB BLD-MCNC: 10.3 G/DL (ref 12–16)
IMM GRANULOCYTES # BLD AUTO: 0.02 K/UL (ref 0–0.04)
IMM GRANULOCYTES NFR BLD AUTO: 0.4 % (ref 0–0.5)
LYMPHOCYTES # BLD AUTO: 1.3 K/UL (ref 1–4.8)
LYMPHOCYTES NFR BLD: 23.2 % (ref 18–48)
MCH RBC QN AUTO: 30.2 PG (ref 27–31)
MCHC RBC AUTO-ENTMCNC: 30.8 G/DL (ref 32–36)
MCV RBC AUTO: 98 FL (ref 82–98)
MONOCYTES # BLD AUTO: 0.3 K/UL (ref 0.3–1)
MONOCYTES NFR BLD: 5.3 % (ref 4–15)
NEUTROPHILS # BLD AUTO: 3.8 K/UL (ref 1.8–7.7)
NEUTROPHILS NFR BLD: 69.3 % (ref 38–73)
NRBC BLD-RTO: 0 /100 WBC
PHOSPHATE SERPL-MCNC: 7.4 MG/DL (ref 2.7–4.5)
PLATELET # BLD AUTO: 150 K/UL (ref 150–350)
PMV BLD AUTO: 10.8 FL (ref 9.2–12.9)
POTASSIUM SERPL-SCNC: 5.9 MMOL/L (ref 3.5–5.1)
RBC # BLD AUTO: 3.41 M/UL (ref 4–5.4)
SODIUM SERPL-SCNC: 137 MMOL/L (ref 136–145)
WBC # BLD AUTO: 5.47 K/UL (ref 3.9–12.7)

## 2020-10-14 PROCEDURE — 85025 COMPLETE CBC W/AUTO DIFF WBC: CPT

## 2020-10-14 PROCEDURE — 80069 RENAL FUNCTION PANEL: CPT

## 2020-10-14 PROCEDURE — 36415 COLL VENOUS BLD VENIPUNCTURE: CPT

## 2020-10-26 ENCOUNTER — HOSPITAL ENCOUNTER (EMERGENCY)
Facility: HOSPITAL | Age: 40
Discharge: HOME | End: 2020-10-26
Attending: EMERGENCY MEDICINE
Payer: MEDICARE

## 2020-10-26 VITALS
SYSTOLIC BLOOD PRESSURE: 163 MMHG | BODY MASS INDEX: 37.98 KG/M2 | RESPIRATION RATE: 20 BRPM | TEMPERATURE: 98 F | HEART RATE: 91 BPM | OXYGEN SATURATION: 100 % | DIASTOLIC BLOOD PRESSURE: 88 MMHG | WEIGHT: 242 LBS | HEIGHT: 67 IN

## 2020-10-26 DIAGNOSIS — N18.6 ESRD (END STAGE RENAL DISEASE): ICD-10-CM

## 2020-10-26 DIAGNOSIS — N19 UREMIA: ICD-10-CM

## 2020-10-26 DIAGNOSIS — E87.5 HYPERKALEMIA: Primary | ICD-10-CM

## 2020-10-26 LAB
ALBUMIN SERPL BCP-MCNC: 3.9 G/DL (ref 3.5–5.2)
ALP SERPL-CCNC: 107 U/L (ref 55–135)
ALT SERPL W/O P-5'-P-CCNC: 12 U/L (ref 10–44)
ANION GAP SERPL CALC-SCNC: 15 MMOL/L (ref 8–16)
AST SERPL-CCNC: 15 U/L (ref 10–40)
B-HCG UR QL: NEGATIVE
BACTERIA #/AREA URNS HPF: NORMAL /HPF
BASOPHILS # BLD AUTO: 0.04 K/UL (ref 0–0.2)
BASOPHILS NFR BLD: 0.7 % (ref 0–1.9)
BILIRUB SERPL-MCNC: 0.4 MG/DL (ref 0.1–1)
BILIRUB UR QL STRIP: NEGATIVE
BUN SERPL-MCNC: 100 MG/DL (ref 6–20)
CALCIUM SERPL-MCNC: 8.8 MG/DL (ref 8.7–10.5)
CHLORIDE SERPL-SCNC: 109 MMOL/L (ref 95–110)
CLARITY UR: CLEAR
CO2 SERPL-SCNC: 12 MMOL/L (ref 23–29)
COLOR UR: ABNORMAL
CREAT SERPL-MCNC: 11.5 MG/DL (ref 0.5–1.4)
CTP QC/QA: YES
CTP QC/QA: YES
DIFFERENTIAL METHOD: ABNORMAL
EOSINOPHIL # BLD AUTO: 0.3 K/UL (ref 0–0.5)
EOSINOPHIL NFR BLD: 5.3 % (ref 0–8)
ERYTHROCYTE [DISTWIDTH] IN BLOOD BY AUTOMATED COUNT: 15.1 % (ref 11.5–14.5)
EST. GFR  (AFRICAN AMERICAN): 4 ML/MIN/1.73 M^2
EST. GFR  (NON AFRICAN AMERICAN): 4 ML/MIN/1.73 M^2
GLUCOSE SERPL-MCNC: 138 MG/DL (ref 70–110)
GLUCOSE UR QL STRIP: ABNORMAL
HCT VFR BLD AUTO: 31.4 % (ref 37–48.5)
HGB BLD-MCNC: 10.2 G/DL (ref 12–16)
HGB UR QL STRIP: ABNORMAL
HYALINE CASTS #/AREA URNS LPF: 0 /LPF
IMM GRANULOCYTES # BLD AUTO: 0.01 K/UL (ref 0–0.04)
IMM GRANULOCYTES NFR BLD AUTO: 0.2 % (ref 0–0.5)
KETONES UR QL STRIP: NEGATIVE
LEUKOCYTE ESTERASE UR QL STRIP: NEGATIVE
LYMPHOCYTES # BLD AUTO: 1.3 K/UL (ref 1–4.8)
LYMPHOCYTES NFR BLD: 23.4 % (ref 18–48)
MCH RBC QN AUTO: 30.6 PG (ref 27–31)
MCHC RBC AUTO-ENTMCNC: 32.5 G/DL (ref 32–36)
MCV RBC AUTO: 94 FL (ref 82–98)
MICROSCOPIC COMMENT: NORMAL
MONOCYTES # BLD AUTO: 0.3 K/UL (ref 0.3–1)
MONOCYTES NFR BLD: 6.1 % (ref 4–15)
NEUTROPHILS # BLD AUTO: 3.6 K/UL (ref 1.8–7.7)
NEUTROPHILS NFR BLD: 64.3 % (ref 38–73)
NITRITE UR QL STRIP: NEGATIVE
NRBC BLD-RTO: 0 /100 WBC
PH UR STRIP: 6 [PH] (ref 5–8)
PLATELET # BLD AUTO: 134 K/UL (ref 150–350)
PMV BLD AUTO: 12.3 FL (ref 9.2–12.9)
POTASSIUM SERPL-SCNC: 6.2 MMOL/L (ref 3.5–5.1)
PROT SERPL-MCNC: 7.6 G/DL (ref 6–8.4)
PROT UR QL STRIP: ABNORMAL
RBC # BLD AUTO: 3.33 M/UL (ref 4–5.4)
RBC #/AREA URNS HPF: 2 /HPF (ref 0–4)
SARS-COV-2 RDRP RESP QL NAA+PROBE: NEGATIVE
SODIUM SERPL-SCNC: 136 MMOL/L (ref 136–145)
SP GR UR STRIP: 1.01 (ref 1–1.03)
SQUAMOUS #/AREA URNS HPF: 3 /HPF
URN SPEC COLLECT METH UR: ABNORMAL
UROBILINOGEN UR STRIP-ACNC: NEGATIVE EU/DL
WBC # BLD AUTO: 5.61 K/UL (ref 3.9–12.7)
WBC #/AREA URNS HPF: 3 /HPF (ref 0–5)

## 2020-10-26 PROCEDURE — 93010 EKG 12-LEAD: ICD-10-PCS | Mod: ,,, | Performed by: INTERNAL MEDICINE

## 2020-10-26 PROCEDURE — U0002 COVID-19 LAB TEST NON-CDC: HCPCS | Performed by: EMERGENCY MEDICINE

## 2020-10-26 PROCEDURE — 80053 COMPREHEN METABOLIC PANEL: CPT

## 2020-10-26 PROCEDURE — 93010 ELECTROCARDIOGRAM REPORT: CPT | Mod: ,,, | Performed by: INTERNAL MEDICINE

## 2020-10-26 PROCEDURE — 93005 ELECTROCARDIOGRAM TRACING: CPT

## 2020-10-26 PROCEDURE — 81000 URINALYSIS NONAUTO W/SCOPE: CPT

## 2020-10-26 PROCEDURE — 99284 EMERGENCY DEPT VISIT MOD MDM: CPT | Mod: 25

## 2020-10-26 PROCEDURE — 85025 COMPLETE CBC W/AUTO DIFF WBC: CPT

## 2020-10-26 PROCEDURE — 81025 URINE PREGNANCY TEST: CPT | Performed by: EMERGENCY MEDICINE

## 2020-10-26 NOTE — ED TRIAGE NOTES
Patient presents from home after missing 2 weeks of dialysis. States last session was on or around 10/5. Usually attends TThSat. Denies any symptoms including chest pain, SOB, n/v/d, or change in bowel or bladder. States she called dialysis clinic and they instructed her to come to the ED. States missed appointments due to stress. Placed on cardiac monitoring and continuous pulse ox. Hypertensive at 181/100. Other vitals WNL.

## 2020-10-26 NOTE — ED PROVIDER NOTES
EM PHYSICIAN NOTE    HPI  This patient presents with a complaint of   Chief Complaint   Patient presents with    needs dialysis     pt did not go to dialysis on Saturday    Vaginal Bleeding     started x 1 week ago        HPI: This is a 40 y.o. female with a PMHx of DM and CKD stage 5 who presents to the ED due to missing dialysis for 2 weeks. She reports that she missed her appointment due to some added stress in her life. She states that she called her dialysis clinic and was referred to come to the ED. She denies having any symptoms, but reports that her menstrual period has been abnormal lately. She notes that she is able to urinate and she has a central venous catheter. She adds that her blood sugar has been normal. Denies SOB, chest pain, leg swelling, nausea, and vomiting. No other associated symptoms.     REVIEW of PMH, SOC History and Family History:  Past Medical History:   Diagnosis Date    Cataract     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     Diabetes mellitus     Insulin Dependent    Galactorrhea     Hyperphosphatemia     Hypertension     Iron deficiency anemia     Obesity     Secondary hyperparathyroidism of renal origin     Vitamin D deficiency      Past Surgical History:   Procedure Laterality Date    AV FISTULA PLACEMENT Left 2020    Procedure: CREATION, AV FISTULA, LEFT RADIOCEPHALIC FISTULA, LEFT UPPER EXTREMITY , INTRAOP ULTRASOUND, vEIN MAPPING. ;  Surgeon: Rakesh Leon MD;  Location: [facility] OR;  Service: Vascular;  Laterality: Left;  RN PREOP 20, UPT done, COVID NEGATRIVE 20  NEED H/P     SECTION, CLASSIC      INSERTION OF TUNNELED CENTRAL VENOUS CATHETER (CVC) WITH SUBCUTANEOUS PORT N/A 2020    Procedure: IR TUNNELED VATH INSERT WITHOUT PORT;  Surgeon: Prachi Diagnostic Provider;  Location: [facility] OR;  Service: Radiology;  Laterality: N/A;  1030AM START  RN PREOP 2020    INSERTION OF TUNNELED CENTRAL VENOUS CATHETER (CVC) WITH  SUBCUTANEOUS PORT N/A 5/22/2020    Procedure: TUNNELED CATH PLACEMENT;  Surgeon: P[facility]i Diagnostic Provider;  Location: [facility] OR;  Service: Radiology;  Laterality: N/A;  930AM START    REVISION OF ARTERIOVENOUS FISTULA Left 5/8/2020    Procedure: REVISION, AV FISTULA, THROMBECTOMY, LEFT UPPER EXTREMITY;  Surgeon: Rakesh Leon MD;  Location: [facility] OR;  Service: Vascular;  Laterality: Left;    TUBAL LIGATION       Social History     Socioeconomic History    Marital status: Single     Spouse name: Not on file    Number of children: 2    Years of education: Not on file    Highest education level: Not on file   Occupational History    Occupation: Unemployed   Social Needs    Financial resource strain: Hard    Food insecurity     Worry: Not on file     Inability: Not on file    Transportation needs     Medical: Not on file     Non-medical: Not on file   Tobacco Use    Smoking status: Former Smoker     Types: Cigarettes    Smokeless tobacco: Never Used   Substance and Sexual Activity    Alcohol use: No    Drug use: Yes     Types: Marijuana     Comment: Occassional Recreational Use only    Sexual activity: Not Currently     Partners: Male   Lifestyle    Physical activity     Days per week: Patient refused     Minutes per session: Patient refused    Stress: Rather much   Relationships    Social connections     Talks on phone: More than three times a week     Gets together: More than three times a week     Attends Restoration[employer] service: Patient refused     Active member of club or organization: Patient refused     Attends meetings of clubs or organizations: Patient refused     Relationship status: Never    Other Topics Concern    Not on file   Social History Narrative    Currently unemployed has two young children ages 5 and 7 and she is the sole caretaker     Patient Active Problem List   Diagnosis    Anemia of renal disease    Hypertensive CKD (chronic kidney disease)    Nephrotic range  proteinuria    Hyperkalemia    Acute renal failure superimposed on chronic kidney disease    Metabolic acidosis    Bacterial vaginosis    Increased prolactin level    CKD (chronic kidney disease) stage 5, GFR less than 15 ml/min    Secondary hyperparathyroidism of renal origin    Iron deficiency anemia    Vitamin D deficiency    Galactorrhea    Cataract    Class 2 severe obesity due to excess calories with serious comorbidity and body mass index (BMI) of 39.0 to 39.9 in adult    Abnormal uterine bleeding (AUB)    Chronic fatigue    Missed menses    Uterine leiomyoma    Type 2 diabetes mellitus with stage 5 chronic kidney disease not on chronic dialysis, with long-term current use of insulin    Hypertension    Hypomagnesemia    HTN (hypertension)    Symptomatic anemia    End stage renal disease    ESRD (end stage renal disease) on dialysis    Encounter regarding vascular access for dialysis for end-stage renal disease    Renal failure    ESRD on hemodialysis    End stage renal disease on dialysis     No current facility-administered medications for this encounter.      Current Outpatient Medications   Medication Sig Dispense Refill    calcitRIOL (ROCALTROL) 0.25 MCG Cap Take 2 capsules (0.5 mcg total) by mouth once daily. 90 capsule 3    calcium acetate,phosphat bind, (PHOSLO) 667 mg tablet Take 2 tablets (1,334 mg total) by mouth 3 (three) times daily with meals. 540 tablet 3    cinacalcet (SENSIPAR) 30 MG Tab Take 2 tablets (60 mg total) by mouth daily with breakfast. 180 tablet 3    ergocalciferol (ERGOCALCIFEROL) 50,000 unit Cap Take 1 capsule (50,000 Units total) by mouth every 7 days. 12 capsule 0    insulin NPH (NOVOLIN N) 100 unit/mL injection Inject 15 Units into the skin before breakfast. 10 mL 2    NIFEdipine (PROCARDIA-XL) 90 MG (OSM) 24 hr tablet TAKE 1 TABLET(90 MG) BY MOUTH EVERY DAY 90 tablet 1    blood sugar diagnostic Strp Check blood glucose 3 times daily 100 each  "5    lancets (ONETOUCH DELICA LANCETS) 30 gauge Misc 3 (three) times daily      pen needle, diabetic (BD ULTRA-FINE SHORT PEN NEEDLE) 31 gauge x 5/16" Ndle daily      sodium bicarbonate 650 MG tablet Take 1 tablet (650 mg total) by mouth 2 (two) times daily. 60 tablet 11    sodium polystyrene (KAYEXALATE) 15 gram/60 mL Susp Take 60 mLs (15 g total) by mouth twice a week. for 8 doses 480 mL 11       Review of patient's allergies indicates:   Allergen Reactions    Vicodin [hydrocodone-acetaminophen] Hives    Codeine Hives     There is no immunization history for the selected administration types on file for this patient.    Family History   Problem Relation Age of Onset    Seizures Mother 64    Heart failure Father 58    Asthma Sister     Breast cancer Neg Hx     Colon cancer Neg Hx     Ovarian cancer Neg Hx        REVIEW of SYSTEMS  Source: patient   The nurse's notes and triage vital signs were reviewed.  GENERAL/CONSTITUTIONAL: SEE HPI  CARDIOVASCULAR: There is no report of chest pain   RESPIRATORY: There is no report of cough or SOB  GASTROINTESTINAL: There is no report of nausea, vomiting, diarrhea  GENITOURINARY: SEE HPI  MUSCULOSKELETAL: There is no report of joint or muscle pain. No muscle weakness or tenderness.  SKIN AND BREASTS: There is no report of easy bruising, skin redness, skin rash.  HEMATOLOGIC/LYMPHATIC: There is no report of anemia, bleeding or clotting defects. There is no report of anticoagulant use.  The remainder of the ROS is negative.    PHYSICAL EXAMINATION    ED Triage Vitals [10/26/20 0804]   Enc Vitals Group      BP (!) 189/103      Pulse 102      Resp 20      Temp 98.1 °F (36.7 °C)      Temp src Oral      SpO2 100 %      Weight 242 lb      Height 5' 7"      Head Circumference       Peak Flow       Pain Score       Pain Loc       Pain Edu?       Excl. in GC?      Vital signs and Pulse Ox reviewed in clinical context. Abnormalities noted: Hypertension noted and patient will be " referred to primary care physician for close follow-up  Pt's level of consciousness is alert, and the patient is in mild distress.  Skin: warm, pink and dry  Mucosa:moist  Head and Neck: WNL  Cardiac exam: RRR, intermittent gallop heard  Pulmonary exam: unlabored and clear, no crackles   Abd Exam: soft nontender  Musculoskeletal: no joint tenderness, deformity or swelling, no peripheral edema.   Neurologic: GCS 15. 5 over 5 strength, cranial nerves intact, neck supple     Medical decision making: Nursing notes reviewed and incorporated  Impression:  End-stage renal disease rule out hyperkalemia  Plan:  Labs, reassess  Charlie West MD, 8:46 AM 10/26/2020          ED Course as of Oct 26 1026   Mon Oct 26, 2020   0838 My independent interpretation of the EKG is sinus tachycardia at a rate of 98.  There are moderately peaked T-waves.  There is no evidence of ST segment elevation infarct and when compared to prior EKGs there are no major changes    [MH]   0933 CBC reviewed, there is mild anemia of 10 and 31.  Platelets are mildly decreased at 134,000     [MH]   0934 CMP reviewed.  The potassium is elevated at 6.2.  Creatinine is elevated at 11.5 consistent with uremia.    [MH]   1024 Patient is able to get into dialysis today as an outpatient    []      ED Course User Index  [MH] Charlie West MD       Diagnoses that have been ruled out:   None   Diagnoses that are still under consideration:   None   Final diagnoses:   ESRD (end stage renal disease)   Hyperkalemia   Uremia         This note was created using Dictation Software.  This program may occasionally misinterpret certain words and phrases.      I attest that I personally performed the services documented by the scribe and acknowledged and confirm the content of the note.   Nurses notes were reviewed.  Charlie West MD  10/26/20 1026

## 2020-12-02 ENCOUNTER — LAB VISIT (OUTPATIENT)
Dept: LAB | Facility: HOSPITAL | Age: 40
End: 2020-12-02
Attending: INTERNAL MEDICINE
Payer: MEDICARE

## 2020-12-02 DIAGNOSIS — N18.6 ESRD (END STAGE RENAL DISEASE): ICD-10-CM

## 2020-12-02 LAB
ALBUMIN SERPL BCP-MCNC: 3.9 G/DL (ref 3.5–5.2)
ANION GAP SERPL CALC-SCNC: 7 MMOL/L (ref 8–16)
BASOPHILS # BLD AUTO: 0.04 K/UL (ref 0–0.2)
BASOPHILS NFR BLD: 0.5 % (ref 0–1.9)
BUN SERPL-MCNC: 95 MG/DL (ref 6–20)
CALCIUM SERPL-MCNC: 9.1 MG/DL (ref 8.7–10.5)
CHLORIDE SERPL-SCNC: 110 MMOL/L (ref 95–110)
CO2 SERPL-SCNC: 19 MMOL/L (ref 23–29)
CREAT SERPL-MCNC: 12.3 MG/DL (ref 0.5–1.4)
DIFFERENTIAL METHOD: ABNORMAL
EOSINOPHIL # BLD AUTO: 0.2 K/UL (ref 0–0.5)
EOSINOPHIL NFR BLD: 2.4 % (ref 0–8)
ERYTHROCYTE [DISTWIDTH] IN BLOOD BY AUTOMATED COUNT: 15 % (ref 11.5–14.5)
EST. GFR  (AFRICAN AMERICAN): 4 ML/MIN/1.73 M^2
EST. GFR  (NON AFRICAN AMERICAN): 3 ML/MIN/1.73 M^2
GLUCOSE SERPL-MCNC: 150 MG/DL (ref 70–110)
HCT VFR BLD AUTO: 24.4 % (ref 37–48.5)
HGB BLD-MCNC: 7.8 G/DL (ref 12–16)
IMM GRANULOCYTES # BLD AUTO: 0.05 K/UL (ref 0–0.04)
IMM GRANULOCYTES NFR BLD AUTO: 0.6 % (ref 0–0.5)
LYMPHOCYTES # BLD AUTO: 1.6 K/UL (ref 1–4.8)
LYMPHOCYTES NFR BLD: 20.2 % (ref 18–48)
MCH RBC QN AUTO: 29.3 PG (ref 27–31)
MCHC RBC AUTO-ENTMCNC: 32 G/DL (ref 32–36)
MCV RBC AUTO: 92 FL (ref 82–98)
MONOCYTES # BLD AUTO: 0.4 K/UL (ref 0.3–1)
MONOCYTES NFR BLD: 5 % (ref 4–15)
NEUTROPHILS # BLD AUTO: 5.6 K/UL (ref 1.8–7.7)
NEUTROPHILS NFR BLD: 71.3 % (ref 38–73)
NRBC BLD-RTO: 0 /100 WBC
PHOSPHATE SERPL-MCNC: 5.2 MG/DL (ref 2.7–4.5)
PLATELET # BLD AUTO: 133 K/UL (ref 150–350)
PMV BLD AUTO: 10.9 FL (ref 9.2–12.9)
POTASSIUM SERPL-SCNC: 5.3 MMOL/L (ref 3.5–5.1)
RBC # BLD AUTO: 2.66 M/UL (ref 4–5.4)
SODIUM SERPL-SCNC: 136 MMOL/L (ref 136–145)
WBC # BLD AUTO: 7.86 K/UL (ref 3.9–12.7)

## 2020-12-02 PROCEDURE — 36415 COLL VENOUS BLD VENIPUNCTURE: CPT

## 2020-12-02 PROCEDURE — 85025 COMPLETE CBC W/AUTO DIFF WBC: CPT

## 2020-12-02 PROCEDURE — 80069 RENAL FUNCTION PANEL: CPT

## 2021-12-11 ENCOUNTER — HOSPITAL ENCOUNTER (INPATIENT)
Facility: HOSPITAL | Age: 41
LOS: 2 days | Discharge: HOME | DRG: 682 | End: 2021-12-13
Attending: EMERGENCY MEDICINE | Admitting: HOSPITALIST
Payer: MEDICARE

## 2021-12-11 DIAGNOSIS — D64.9 SEVERE ANEMIA: Primary | ICD-10-CM

## 2021-12-11 DIAGNOSIS — R19.7 DIARRHEA, UNSPECIFIED TYPE: ICD-10-CM

## 2021-12-11 DIAGNOSIS — R11.10 NON-INTRACTABLE VOMITING, PRESENCE OF NAUSEA NOT SPECIFIED, UNSPECIFIED VOMITING TYPE: ICD-10-CM

## 2021-12-11 DIAGNOSIS — R07.9 CHEST PAIN: ICD-10-CM

## 2021-12-11 DIAGNOSIS — R10.84 GENERALIZED ABDOMINAL PAIN: ICD-10-CM

## 2021-12-11 DIAGNOSIS — N18.6 END STAGE RENAL DISEASE: ICD-10-CM

## 2021-12-11 PROBLEM — R11.2 NAUSEA & VOMITING: Status: ACTIVE | Noted: 2021-12-11

## 2021-12-11 PROBLEM — Z99.2 ENCOUNTER REGARDING VASCULAR ACCESS FOR DIALYSIS FOR END-STAGE RENAL DISEASE: Status: RESOLVED | Noted: 2020-05-08 | Resolved: 2021-12-11

## 2021-12-11 PROBLEM — N17.9 ACUTE RENAL FAILURE SUPERIMPOSED ON CHRONIC KIDNEY DISEASE: Status: RESOLVED | Noted: 2019-05-03 | Resolved: 2021-12-11

## 2021-12-11 PROBLEM — Z71.89 GOALS OF CARE, COUNSELING/DISCUSSION: Status: ACTIVE | Noted: 2021-12-11

## 2021-12-11 PROBLEM — Z99.2 ESRD (END STAGE RENAL DISEASE) ON DIALYSIS: Status: RESOLVED | Noted: 2020-05-05 | Resolved: 2021-12-11

## 2021-12-11 PROBLEM — N18.5 CKD (CHRONIC KIDNEY DISEASE) STAGE 5, GFR LESS THAN 15 ML/MIN: Status: ACTIVE | Noted: 2020-05-22

## 2021-12-11 PROBLEM — N19 RENAL FAILURE: Status: RESOLVED | Noted: 2020-05-22 | Resolved: 2021-12-11

## 2021-12-11 PROBLEM — E83.42 HYPOMAGNESEMIA: Status: RESOLVED | Noted: 2019-05-04 | Resolved: 2021-12-11

## 2021-12-11 PROBLEM — N93.9 ABNORMAL UTERINE BLEEDING (AUB): Status: RESOLVED | Noted: 2018-08-31 | Resolved: 2021-12-11

## 2021-12-11 PROBLEM — B96.89 BACTERIAL VAGINOSIS: Status: RESOLVED | Noted: 2019-05-03 | Resolved: 2021-12-11

## 2021-12-11 PROBLEM — I16.0 HYPERTENSIVE URGENCY: Status: ACTIVE | Noted: 2020-01-18

## 2021-12-11 PROBLEM — N18.9 ACUTE RENAL FAILURE SUPERIMPOSED ON CHRONIC KIDNEY DISEASE: Status: RESOLVED | Noted: 2019-05-03 | Resolved: 2021-12-11

## 2021-12-11 PROBLEM — N76.0 BACTERIAL VAGINOSIS: Status: RESOLVED | Noted: 2019-05-03 | Resolved: 2021-12-11

## 2021-12-11 PROBLEM — E87.5 HYPERKALEMIA: Status: RESOLVED | Noted: 2017-02-15 | Resolved: 2021-12-11

## 2021-12-11 PROBLEM — D69.6 THROMBOCYTOPENIA: Status: ACTIVE | Noted: 2021-12-11

## 2021-12-11 PROBLEM — D62 ACUTE BLOOD LOSS ANEMIA: Status: ACTIVE | Noted: 2021-12-11

## 2021-12-11 PROBLEM — K92.0 HEMATEMESIS: Status: ACTIVE | Noted: 2021-12-11

## 2021-12-11 PROBLEM — Z99.2 END STAGE RENAL DISEASE ON DIALYSIS: Status: RESOLVED | Noted: 2020-08-15 | Resolved: 2021-12-11

## 2021-12-11 LAB
ABO + RH BLD: NORMAL
ALBUMIN SERPL BCP-MCNC: 3.4 G/DL (ref 3.5–5.2)
ALP SERPL-CCNC: 110 U/L (ref 55–135)
ALT SERPL W/O P-5'-P-CCNC: 10 U/L (ref 10–44)
ANION GAP SERPL CALC-SCNC: 23 MMOL/L (ref 8–16)
ANISOCYTOSIS BLD QL SMEAR: SLIGHT
AST SERPL-CCNC: 10 U/L (ref 10–40)
BASOPHILS # BLD AUTO: 0.02 K/UL (ref 0–0.2)
BASOPHILS NFR BLD: 0.2 % (ref 0–1.9)
BILIRUB SERPL-MCNC: 0.5 MG/DL (ref 0.1–1)
BLD GP AB SCN CELLS X3 SERPL QL: NORMAL
BLD PROD TYP BPU: NORMAL
BLD PROD TYP BPU: NORMAL
BLOOD UNIT EXPIRATION DATE: NORMAL
BLOOD UNIT EXPIRATION DATE: NORMAL
BLOOD UNIT TYPE CODE: 5100
BLOOD UNIT TYPE CODE: 5100
BLOOD UNIT TYPE: NORMAL
BLOOD UNIT TYPE: NORMAL
BUN SERPL-MCNC: 135 MG/DL (ref 6–20)
CALCIUM SERPL-MCNC: 9.7 MG/DL (ref 8.7–10.5)
CHLORIDE SERPL-SCNC: 101 MMOL/L (ref 95–110)
CO2 SERPL-SCNC: 18 MMOL/L (ref 23–29)
CODING SYSTEM: NORMAL
CODING SYSTEM: NORMAL
CREAT SERPL-MCNC: 28 MG/DL (ref 0.5–1.4)
CTP QC/QA: YES
DIFFERENTIAL METHOD: ABNORMAL
DISPENSE STATUS: NORMAL
DISPENSE STATUS: NORMAL
EOSINOPHIL # BLD AUTO: 0.1 K/UL (ref 0–0.5)
EOSINOPHIL NFR BLD: 0.8 % (ref 0–8)
ERYTHROCYTE [DISTWIDTH] IN BLOOD BY AUTOMATED COUNT: 14.1 % (ref 11.5–14.5)
EST. GFR  (AFRICAN AMERICAN): 1 ML/MIN/1.73 M^2
EST. GFR  (NON AFRICAN AMERICAN): 1 ML/MIN/1.73 M^2
GLUCOSE SERPL-MCNC: 126 MG/DL (ref 70–110)
HCT VFR BLD AUTO: 16.3 % (ref 37–48.5)
HGB BLD-MCNC: 5 G/DL (ref 12–16)
HYPOCHROMIA BLD QL SMEAR: ABNORMAL
IMM GRANULOCYTES # BLD AUTO: 0.05 K/UL (ref 0–0.04)
IMM GRANULOCYTES NFR BLD AUTO: 0.5 % (ref 0–0.5)
LIPASE SERPL-CCNC: 18 U/L (ref 4–60)
LYMPHOCYTES # BLD AUTO: 1.1 K/UL (ref 1–4.8)
LYMPHOCYTES NFR BLD: 12 % (ref 18–48)
MCH RBC QN AUTO: 26.7 PG (ref 27–31)
MCHC RBC AUTO-ENTMCNC: 30.7 G/DL (ref 32–36)
MCV RBC AUTO: 87 FL (ref 82–98)
MONOCYTES # BLD AUTO: 0.5 K/UL (ref 0.3–1)
MONOCYTES NFR BLD: 4.9 % (ref 4–15)
NEUTROPHILS # BLD AUTO: 7.6 K/UL (ref 1.8–7.7)
NEUTROPHILS NFR BLD: 81.6 % (ref 38–73)
NRBC BLD-RTO: 0 /100 WBC
NUM UNITS TRANS PACKED RBC: NORMAL
PLATELET # BLD AUTO: 138 K/UL (ref 150–450)
PLATELET BLD QL SMEAR: ABNORMAL
PMV BLD AUTO: 12.1 FL (ref 9.2–12.9)
POCT GLUCOSE: 134 MG/DL (ref 70–110)
POTASSIUM SERPL-SCNC: 5.1 MMOL/L (ref 3.5–5.1)
PROT SERPL-MCNC: 6.5 G/DL (ref 6–8.4)
RBC # BLD AUTO: 1.87 M/UL (ref 4–5.4)
SARS-COV-2 RDRP RESP QL NAA+PROBE: NEGATIVE
SODIUM SERPL-SCNC: 142 MMOL/L (ref 136–145)
TRANS ERYTHROCYTES VOL PATIENT: NORMAL ML
WBC # BLD AUTO: 9.33 K/UL (ref 3.9–12.7)

## 2021-12-11 PROCEDURE — 83690 ASSAY OF LIPASE: CPT | Performed by: EMERGENCY MEDICINE

## 2021-12-11 PROCEDURE — 25000003 PHARM REV CODE 250: Performed by: EMERGENCY MEDICINE

## 2021-12-11 PROCEDURE — 96376 TX/PRO/DX INJ SAME DRUG ADON: CPT

## 2021-12-11 PROCEDURE — 80053 COMPREHEN METABOLIC PANEL: CPT | Performed by: EMERGENCY MEDICINE

## 2021-12-11 PROCEDURE — P9021 RED BLOOD CELLS UNIT: HCPCS | Performed by: EMERGENCY MEDICINE

## 2021-12-11 PROCEDURE — 96375 TX/PRO/DX INJ NEW DRUG ADDON: CPT

## 2021-12-11 PROCEDURE — A4217 STERILE WATER/SALINE, 500 ML: HCPCS | Performed by: INTERNAL MEDICINE

## 2021-12-11 PROCEDURE — 11000001 HC ACUTE MED/SURG PRIVATE ROOM

## 2021-12-11 PROCEDURE — 96365 THER/PROPH/DIAG IV INF INIT: CPT

## 2021-12-11 PROCEDURE — C9113 INJ PANTOPRAZOLE SODIUM, VIA: HCPCS | Performed by: EMERGENCY MEDICINE

## 2021-12-11 PROCEDURE — 99291 CRITICAL CARE FIRST HOUR: CPT | Mod: 25

## 2021-12-11 PROCEDURE — C9113 INJ PANTOPRAZOLE SODIUM, VIA: HCPCS | Performed by: HOSPITALIST

## 2021-12-11 PROCEDURE — P9040 RBC LEUKOREDUCED IRRADIATED: HCPCS | Performed by: EMERGENCY MEDICINE

## 2021-12-11 PROCEDURE — 86920 COMPATIBILITY TEST SPIN: CPT | Performed by: EMERGENCY MEDICINE

## 2021-12-11 PROCEDURE — A4216 STERILE WATER/SALINE, 10 ML: HCPCS | Performed by: HOSPITALIST

## 2021-12-11 PROCEDURE — 63600175 PHARM REV CODE 636 W HCPCS: Performed by: HOSPITALIST

## 2021-12-11 PROCEDURE — 86900 BLOOD TYPING SEROLOGIC ABO: CPT | Performed by: EMERGENCY MEDICINE

## 2021-12-11 PROCEDURE — 96372 THER/PROPH/DIAG INJ SC/IM: CPT | Mod: 59

## 2021-12-11 PROCEDURE — U0002 COVID-19 LAB TEST NON-CDC: HCPCS | Performed by: EMERGENCY MEDICINE

## 2021-12-11 PROCEDURE — 63600175 PHARM REV CODE 636 W HCPCS: Performed by: EMERGENCY MEDICINE

## 2021-12-11 PROCEDURE — 85025 COMPLETE CBC W/AUTO DIFF WBC: CPT | Performed by: EMERGENCY MEDICINE

## 2021-12-11 PROCEDURE — 25000003 PHARM REV CODE 250: Performed by: HOSPITALIST

## 2021-12-11 PROCEDURE — 36430 TRANSFUSION BLD/BLD COMPNT: CPT

## 2021-12-11 PROCEDURE — 96374 THER/PROPH/DIAG INJ IV PUSH: CPT | Mod: 59

## 2021-12-11 PROCEDURE — 25000003 PHARM REV CODE 250: Performed by: INTERNAL MEDICINE

## 2021-12-11 RX ORDER — ACETAMINOPHEN 325 MG/1
650 TABLET ORAL EVERY 8 HOURS PRN
Status: DISCONTINUED | OUTPATIENT
Start: 2021-12-11 | End: 2021-12-13 | Stop reason: HOSPADM

## 2021-12-11 RX ORDER — PANTOPRAZOLE SODIUM 40 MG/10ML
40 INJECTION, POWDER, LYOPHILIZED, FOR SOLUTION INTRAVENOUS 2 TIMES DAILY
Status: DISCONTINUED | OUTPATIENT
Start: 2021-12-11 | End: 2021-12-12

## 2021-12-11 RX ORDER — HYDROCODONE BITARTRATE AND ACETAMINOPHEN 500; 5 MG/1; MG/1
TABLET ORAL
Status: DISCONTINUED | OUTPATIENT
Start: 2021-12-11 | End: 2021-12-12

## 2021-12-11 RX ORDER — CLONIDINE 0.3 MG/24H
1 PATCH, EXTENDED RELEASE TRANSDERMAL
Status: DISCONTINUED | OUTPATIENT
Start: 2021-12-11 | End: 2021-12-13 | Stop reason: HOSPADM

## 2021-12-11 RX ORDER — TALC
6 POWDER (GRAM) TOPICAL NIGHTLY PRN
Status: DISCONTINUED | OUTPATIENT
Start: 2021-12-11 | End: 2021-12-13 | Stop reason: HOSPADM

## 2021-12-11 RX ORDER — NIFEDIPINE 60 MG/1
1 TABLET, EXTENDED RELEASE ORAL
Status: ON HOLD | COMMUNITY
Start: 2020-01-30 | End: 2021-12-13 | Stop reason: SDUPTHER

## 2021-12-11 RX ORDER — HYDRALAZINE HYDROCHLORIDE 20 MG/ML
10 INJECTION INTRAMUSCULAR; INTRAVENOUS
Status: COMPLETED | OUTPATIENT
Start: 2021-12-11 | End: 2021-12-11

## 2021-12-11 RX ORDER — HYDRALAZINE HYDROCHLORIDE 20 MG/ML
10 INJECTION INTRAMUSCULAR; INTRAVENOUS EVERY 6 HOURS PRN
Status: DISCONTINUED | OUTPATIENT
Start: 2021-12-11 | End: 2021-12-13 | Stop reason: HOSPADM

## 2021-12-11 RX ORDER — HYDRALAZINE HYDROCHLORIDE 25 MG/1
25 TABLET, FILM COATED ORAL
Status: COMPLETED | OUTPATIENT
Start: 2021-12-11 | End: 2021-12-11

## 2021-12-11 RX ORDER — HYDRALAZINE HYDROCHLORIDE 25 MG/1
100 TABLET, FILM COATED ORAL
Status: DISCONTINUED | OUTPATIENT
Start: 2021-12-11 | End: 2021-12-11

## 2021-12-11 RX ORDER — GLUCAGON 1 MG
1 KIT INJECTION
Status: DISCONTINUED | OUTPATIENT
Start: 2021-12-11 | End: 2021-12-13 | Stop reason: HOSPADM

## 2021-12-11 RX ORDER — NIFEDIPINE 30 MG/1
90 TABLET, EXTENDED RELEASE ORAL DAILY
Status: DISCONTINUED | OUTPATIENT
Start: 2021-12-12 | End: 2021-12-13 | Stop reason: HOSPADM

## 2021-12-11 RX ORDER — HYDROMORPHONE HYDROCHLORIDE 2 MG/ML
1 INJECTION, SOLUTION INTRAMUSCULAR; INTRAVENOUS; SUBCUTANEOUS
Status: COMPLETED | OUTPATIENT
Start: 2021-12-11 | End: 2021-12-11

## 2021-12-11 RX ORDER — METOPROLOL TARTRATE 50 MG/1
50 TABLET ORAL
Status: COMPLETED | OUTPATIENT
Start: 2021-12-11 | End: 2021-12-11

## 2021-12-11 RX ORDER — METOPROLOL TARTRATE 1 MG/ML
5 INJECTION, SOLUTION INTRAVENOUS EVERY 5 MIN PRN
Status: DISCONTINUED | OUTPATIENT
Start: 2021-12-11 | End: 2021-12-11

## 2021-12-11 RX ORDER — INSULIN ASPART 100 [IU]/ML
0-5 INJECTION, SOLUTION INTRAVENOUS; SUBCUTANEOUS
Status: DISCONTINUED | OUTPATIENT
Start: 2021-12-11 | End: 2021-12-13 | Stop reason: HOSPADM

## 2021-12-11 RX ORDER — PROCHLORPERAZINE EDISYLATE 5 MG/ML
5 INJECTION INTRAMUSCULAR; INTRAVENOUS EVERY 6 HOURS PRN
Status: DISCONTINUED | OUTPATIENT
Start: 2021-12-11 | End: 2021-12-13 | Stop reason: HOSPADM

## 2021-12-11 RX ORDER — DICYCLOMINE HYDROCHLORIDE 10 MG/ML
20 INJECTION INTRAMUSCULAR
Status: COMPLETED | OUTPATIENT
Start: 2021-12-11 | End: 2021-12-11

## 2021-12-11 RX ORDER — HYDRALAZINE HYDROCHLORIDE 20 MG/ML
20 INJECTION INTRAMUSCULAR; INTRAVENOUS
Status: DISPENSED | OUTPATIENT
Start: 2021-12-11 | End: 2021-12-12

## 2021-12-11 RX ORDER — MAG HYDROX/ALUMINUM HYD/SIMETH 200-200-20
60 SUSPENSION, ORAL (FINAL DOSE FORM) ORAL
Status: COMPLETED | OUTPATIENT
Start: 2021-12-11 | End: 2021-12-11

## 2021-12-11 RX ORDER — IBUPROFEN 200 MG
24 TABLET ORAL
Status: DISCONTINUED | OUTPATIENT
Start: 2021-12-11 | End: 2021-12-13 | Stop reason: HOSPADM

## 2021-12-11 RX ORDER — ACETAMINOPHEN 325 MG/1
650 TABLET ORAL EVERY 4 HOURS PRN
Status: DISCONTINUED | OUTPATIENT
Start: 2021-12-11 | End: 2021-12-13 | Stop reason: HOSPADM

## 2021-12-11 RX ORDER — SODIUM CHLORIDE 0.9 % (FLUSH) 0.9 %
10 SYRINGE (ML) INJECTION EVERY 8 HOURS
Status: DISCONTINUED | OUTPATIENT
Start: 2021-12-11 | End: 2021-12-13 | Stop reason: HOSPADM

## 2021-12-11 RX ORDER — HEPARIN SODIUM 5000 [USP'U]/ML
5000 INJECTION, SOLUTION INTRAVENOUS; SUBCUTANEOUS EVERY 8 HOURS
Status: DISCONTINUED | OUTPATIENT
Start: 2021-12-11 | End: 2021-12-11

## 2021-12-11 RX ORDER — LABETALOL HYDROCHLORIDE 5 MG/ML
10 INJECTION, SOLUTION INTRAVENOUS EVERY 6 HOURS PRN
Status: DISCONTINUED | OUTPATIENT
Start: 2021-12-11 | End: 2021-12-13 | Stop reason: HOSPADM

## 2021-12-11 RX ORDER — ONDANSETRON 2 MG/ML
4 INJECTION INTRAMUSCULAR; INTRAVENOUS EVERY 8 HOURS PRN
Status: DISCONTINUED | OUTPATIENT
Start: 2021-12-11 | End: 2021-12-13 | Stop reason: HOSPADM

## 2021-12-11 RX ORDER — ONDANSETRON 2 MG/ML
8 INJECTION INTRAMUSCULAR; INTRAVENOUS
Status: COMPLETED | OUTPATIENT
Start: 2021-12-11 | End: 2021-12-11

## 2021-12-11 RX ORDER — PANTOPRAZOLE SODIUM 40 MG/10ML
80 INJECTION, POWDER, LYOPHILIZED, FOR SOLUTION INTRAVENOUS
Status: COMPLETED | OUTPATIENT
Start: 2021-12-11 | End: 2021-12-11

## 2021-12-11 RX ORDER — HYDRALAZINE HYDROCHLORIDE 25 MG/1
50 TABLET, FILM COATED ORAL
Status: COMPLETED | OUTPATIENT
Start: 2021-12-11 | End: 2021-12-11

## 2021-12-11 RX ORDER — HYDRALAZINE HYDROCHLORIDE 20 MG/ML
20 INJECTION INTRAMUSCULAR; INTRAVENOUS
Status: COMPLETED | OUTPATIENT
Start: 2021-12-11 | End: 2021-12-11

## 2021-12-11 RX ORDER — INSULIN HUMAN 100 [IU]/ML
INJECTION, SUSPENSION SUBCUTANEOUS
COMMUNITY
Start: 2020-01-30 | End: 2021-12-11 | Stop reason: CLARIF

## 2021-12-11 RX ORDER — DIPHENHYDRAMINE HYDROCHLORIDE 50 MG/ML
25 INJECTION INTRAMUSCULAR; INTRAVENOUS
Status: COMPLETED | OUTPATIENT
Start: 2021-12-11 | End: 2021-12-11

## 2021-12-11 RX ORDER — CLONIDINE HYDROCHLORIDE 0.1 MG/1
0.1 TABLET ORAL
Status: COMPLETED | OUTPATIENT
Start: 2021-12-11 | End: 2021-12-11

## 2021-12-11 RX ORDER — IBUPROFEN 200 MG
16 TABLET ORAL
Status: DISCONTINUED | OUTPATIENT
Start: 2021-12-11 | End: 2021-12-13 | Stop reason: HOSPADM

## 2021-12-11 RX ADMIN — HYDRALAZINE HYDROCHLORIDE 20 MG: 20 INJECTION, SOLUTION INTRAMUSCULAR; INTRAVENOUS at 01:12

## 2021-12-11 RX ADMIN — HYDRALAZINE HYDROCHLORIDE 25 MG: 25 TABLET ORAL at 03:12

## 2021-12-11 RX ADMIN — DICYCLOMINE HYDROCHLORIDE 20 MG: 20 INJECTION, SOLUTION INTRAMUSCULAR at 12:12

## 2021-12-11 RX ADMIN — ONDANSETRON 8 MG: 2 INJECTION INTRAMUSCULAR; INTRAVENOUS at 12:12

## 2021-12-11 RX ADMIN — MAGNESIUM HYDROXIDE/ALUMINUM HYDROXICE/SIMETHICONE 60 ML: 120; 1200; 1200 SUSPENSION ORAL at 12:12

## 2021-12-11 RX ADMIN — HYDRALAZINE HYDROCHLORIDE 50 MG: 25 TABLET ORAL at 01:12

## 2021-12-11 RX ADMIN — HYDRALAZINE HYDROCHLORIDE 10 MG: 20 INJECTION, SOLUTION INTRAMUSCULAR; INTRAVENOUS at 12:12

## 2021-12-11 RX ADMIN — Medication 10 ML: at 09:12

## 2021-12-11 RX ADMIN — METOPROLOL TARTRATE 50 MG: 50 TABLET, FILM COATED ORAL at 01:12

## 2021-12-11 RX ADMIN — CLONIDINE 1 PATCH: 0.3 PATCH TRANSDERMAL at 01:12

## 2021-12-11 RX ADMIN — PROMETHAZINE HYDROCHLORIDE 12.5 MG: 25 INJECTION INTRAMUSCULAR; INTRAVENOUS at 12:12

## 2021-12-11 RX ADMIN — SODIUM BICARBONATE: 84 INJECTION, SOLUTION INTRAVENOUS at 05:12

## 2021-12-11 RX ADMIN — HYDROMORPHONE HYDROCHLORIDE 1 MG: 2 INJECTION, SOLUTION INTRAMUSCULAR; INTRAVENOUS; SUBCUTANEOUS at 12:12

## 2021-12-11 RX ADMIN — DIPHENHYDRAMINE HYDROCHLORIDE 25 MG: 50 INJECTION INTRAMUSCULAR; INTRAVENOUS at 12:12

## 2021-12-11 RX ADMIN — CLONIDINE HYDROCHLORIDE 0.1 MG: 0.1 TABLET ORAL at 01:12

## 2021-12-11 RX ADMIN — PANTOPRAZOLE SODIUM 80 MG: 40 INJECTION, POWDER, FOR SOLUTION INTRAVENOUS at 12:12

## 2021-12-11 RX ADMIN — HYDRALAZINE HYDROCHLORIDE 10 MG: 20 INJECTION, SOLUTION INTRAMUSCULAR; INTRAVENOUS at 08:12

## 2021-12-11 RX ADMIN — PANTOPRAZOLE SODIUM 40 MG: 40 INJECTION, POWDER, FOR SOLUTION INTRAVENOUS at 09:12

## 2021-12-12 LAB
ALBUMIN SERPL BCP-MCNC: 3.2 G/DL (ref 3.5–5.2)
ALP SERPL-CCNC: 99 U/L (ref 55–135)
ALT SERPL W/O P-5'-P-CCNC: 10 U/L (ref 10–44)
ANION GAP SERPL CALC-SCNC: 21 MMOL/L (ref 8–16)
AST SERPL-CCNC: 10 U/L (ref 10–40)
BASOPHILS # BLD AUTO: 0.06 K/UL (ref 0–0.2)
BASOPHILS NFR BLD: 0.5 % (ref 0–1.9)
BILIRUB SERPL-MCNC: 0.9 MG/DL (ref 0.1–1)
BUN SERPL-MCNC: 132 MG/DL (ref 6–20)
CALCIUM SERPL-MCNC: 9.9 MG/DL (ref 8.7–10.5)
CHLORIDE SERPL-SCNC: 100 MMOL/L (ref 95–110)
CO2 SERPL-SCNC: 19 MMOL/L (ref 23–29)
CREAT SERPL-MCNC: 26.9 MG/DL (ref 0.5–1.4)
DIFFERENTIAL METHOD: ABNORMAL
EOSINOPHIL # BLD AUTO: 0.2 K/UL (ref 0–0.5)
EOSINOPHIL NFR BLD: 1.8 % (ref 0–8)
ERYTHROCYTE [DISTWIDTH] IN BLOOD BY AUTOMATED COUNT: 15.7 % (ref 11.5–14.5)
EST. GFR  (AFRICAN AMERICAN): 1 ML/MIN/1.73 M^2
EST. GFR  (NON AFRICAN AMERICAN): 1 ML/MIN/1.73 M^2
ESTIMATED AVG GLUCOSE: 97 MG/DL (ref 68–131)
GLUCOSE SERPL-MCNC: 109 MG/DL (ref 70–110)
HBA1C MFR BLD: 5 % (ref 4–5.6)
HCT VFR BLD AUTO: 26.5 % (ref 37–48.5)
HGB BLD-MCNC: 8.3 G/DL (ref 12–16)
IMM GRANULOCYTES # BLD AUTO: 0.11 K/UL (ref 0–0.04)
IMM GRANULOCYTES NFR BLD AUTO: 0.8 % (ref 0–0.5)
LYMPHOCYTES # BLD AUTO: 1.7 K/UL (ref 1–4.8)
LYMPHOCYTES NFR BLD: 12.4 % (ref 18–48)
MCH RBC QN AUTO: 27.2 PG (ref 27–31)
MCHC RBC AUTO-ENTMCNC: 31.3 G/DL (ref 32–36)
MCV RBC AUTO: 87 FL (ref 82–98)
MONOCYTES # BLD AUTO: 0.7 K/UL (ref 0.3–1)
MONOCYTES NFR BLD: 5 % (ref 4–15)
NEUTROPHILS # BLD AUTO: 10.6 K/UL (ref 1.8–7.7)
NEUTROPHILS NFR BLD: 79.5 % (ref 38–73)
NRBC BLD-RTO: 0 /100 WBC
PHOSPHATE SERPL-MCNC: 10.9 MG/DL (ref 2.7–4.5)
PLATELET # BLD AUTO: 146 K/UL (ref 150–450)
PMV BLD AUTO: 11.7 FL (ref 9.2–12.9)
POCT GLUCOSE: 126 MG/DL (ref 70–110)
POCT GLUCOSE: 135 MG/DL (ref 70–110)
POCT GLUCOSE: 205 MG/DL (ref 70–110)
POTASSIUM SERPL-SCNC: 4.7 MMOL/L (ref 3.5–5.1)
PROT SERPL-MCNC: 6.7 G/DL (ref 6–8.4)
RBC # BLD AUTO: 3.05 M/UL (ref 4–5.4)
SODIUM SERPL-SCNC: 140 MMOL/L (ref 136–145)
WBC # BLD AUTO: 13.31 K/UL (ref 3.9–12.7)

## 2021-12-12 PROCEDURE — C9113 INJ PANTOPRAZOLE SODIUM, VIA: HCPCS | Performed by: HOSPITALIST

## 2021-12-12 PROCEDURE — 83036 HEMOGLOBIN GLYCOSYLATED A1C: CPT | Performed by: HOSPITALIST

## 2021-12-12 PROCEDURE — 25000003 PHARM REV CODE 250: Performed by: INTERNAL MEDICINE

## 2021-12-12 PROCEDURE — 84100 ASSAY OF PHOSPHORUS: CPT | Performed by: INTERNAL MEDICINE

## 2021-12-12 PROCEDURE — 63600175 PHARM REV CODE 636 W HCPCS: Performed by: HOSPITALIST

## 2021-12-12 PROCEDURE — A4217 STERILE WATER/SALINE, 500 ML: HCPCS | Performed by: INTERNAL MEDICINE

## 2021-12-12 PROCEDURE — 25000003 PHARM REV CODE 250: Performed by: HOSPITALIST

## 2021-12-12 PROCEDURE — 36415 COLL VENOUS BLD VENIPUNCTURE: CPT | Performed by: HOSPITALIST

## 2021-12-12 PROCEDURE — 11000001 HC ACUTE MED/SURG PRIVATE ROOM

## 2021-12-12 PROCEDURE — A4216 STERILE WATER/SALINE, 10 ML: HCPCS | Performed by: HOSPITALIST

## 2021-12-12 PROCEDURE — 80053 COMPREHEN METABOLIC PANEL: CPT | Performed by: INTERNAL MEDICINE

## 2021-12-12 PROCEDURE — 85025 COMPLETE CBC W/AUTO DIFF WBC: CPT | Performed by: HOSPITALIST

## 2021-12-12 PROCEDURE — 25000003 PHARM REV CODE 250: Performed by: PHYSICIAN ASSISTANT

## 2021-12-12 RX ORDER — SEVELAMER CARBONATE 800 MG/1
800 TABLET, FILM COATED ORAL
Status: DISCONTINUED | OUTPATIENT
Start: 2021-12-12 | End: 2021-12-13 | Stop reason: HOSPADM

## 2021-12-12 RX ORDER — PANTOPRAZOLE SODIUM 40 MG/1
40 TABLET, DELAYED RELEASE ORAL DAILY
Status: DISCONTINUED | OUTPATIENT
Start: 2021-12-13 | End: 2021-12-13 | Stop reason: HOSPADM

## 2021-12-12 RX ORDER — BISACODYL 10 MG
10 SUPPOSITORY, RECTAL RECTAL DAILY PRN
Status: DISCONTINUED | OUTPATIENT
Start: 2021-12-12 | End: 2021-12-13 | Stop reason: HOSPADM

## 2021-12-12 RX ADMIN — Medication 10 ML: at 06:12

## 2021-12-12 RX ADMIN — BISACODYL 10 MG: 10 SUPPOSITORY RECTAL at 10:12

## 2021-12-12 RX ADMIN — SODIUM BICARBONATE: 84 INJECTION, SOLUTION INTRAVENOUS at 02:12

## 2021-12-12 RX ADMIN — LABETALOL HYDROCHLORIDE 10 MG: 5 INJECTION INTRAVENOUS at 02:12

## 2021-12-12 RX ADMIN — ONDANSETRON 4 MG: 2 INJECTION INTRAMUSCULAR; INTRAVENOUS at 09:12

## 2021-12-12 RX ADMIN — Medication 10 ML: at 10:12

## 2021-12-12 RX ADMIN — PANTOPRAZOLE SODIUM 40 MG: 40 INJECTION, POWDER, FOR SOLUTION INTRAVENOUS at 09:12

## 2021-12-12 RX ADMIN — NIFEDIPINE 90 MG: 30 TABLET, FILM COATED, EXTENDED RELEASE ORAL at 09:12

## 2021-12-12 RX ADMIN — HYDRALAZINE HYDROCHLORIDE 10 MG: 20 INJECTION, SOLUTION INTRAMUSCULAR; INTRAVENOUS at 10:12

## 2021-12-13 VITALS
TEMPERATURE: 98 F | DIASTOLIC BLOOD PRESSURE: 69 MMHG | RESPIRATION RATE: 17 BRPM | OXYGEN SATURATION: 99 % | HEART RATE: 90 BPM | WEIGHT: 200 LBS | SYSTOLIC BLOOD PRESSURE: 131 MMHG | HEIGHT: 66 IN | BODY MASS INDEX: 32.14 KG/M2

## 2021-12-13 LAB
ANION GAP SERPL CALC-SCNC: 21 MMOL/L (ref 8–16)
BUN SERPL-MCNC: 127 MG/DL (ref 6–20)
CALCIUM SERPL-MCNC: 8.8 MG/DL (ref 8.7–10.5)
CHLORIDE SERPL-SCNC: 92 MMOL/L (ref 95–110)
CO2 SERPL-SCNC: 22 MMOL/L (ref 23–29)
CREAT SERPL-MCNC: 25.1 MG/DL (ref 0.5–1.4)
EST. GFR  (AFRICAN AMERICAN): 2 ML/MIN/1.73 M^2
EST. GFR  (NON AFRICAN AMERICAN): 1 ML/MIN/1.73 M^2
GLUCOSE SERPL-MCNC: 163 MG/DL (ref 70–110)
POCT GLUCOSE: 172 MG/DL (ref 70–110)
POCT GLUCOSE: 237 MG/DL (ref 70–110)
POTASSIUM SERPL-SCNC: 4.1 MMOL/L (ref 3.5–5.1)
SODIUM SERPL-SCNC: 135 MMOL/L (ref 136–145)

## 2021-12-13 PROCEDURE — 99223 1ST HOSP IP/OBS HIGH 75: CPT | Mod: ,,, | Performed by: INTERNAL MEDICINE

## 2021-12-13 PROCEDURE — A4216 STERILE WATER/SALINE, 10 ML: HCPCS | Performed by: HOSPITALIST

## 2021-12-13 PROCEDURE — 99497 PR ADVNCD CARE PLAN 30 MIN: ICD-10-PCS | Mod: 25,,, | Performed by: INTERNAL MEDICINE

## 2021-12-13 PROCEDURE — 25000003 PHARM REV CODE 250: Performed by: HOSPITALIST

## 2021-12-13 PROCEDURE — 36415 COLL VENOUS BLD VENIPUNCTURE: CPT | Performed by: HOSPITALIST

## 2021-12-13 PROCEDURE — A4217 STERILE WATER/SALINE, 500 ML: HCPCS | Performed by: INTERNAL MEDICINE

## 2021-12-13 PROCEDURE — 80048 BASIC METABOLIC PNL TOTAL CA: CPT | Performed by: HOSPITALIST

## 2021-12-13 PROCEDURE — 99497 ADVNCD CARE PLAN 30 MIN: CPT | Mod: 25,,, | Performed by: INTERNAL MEDICINE

## 2021-12-13 PROCEDURE — 99223 PR INITIAL HOSPITAL CARE,LEVL III: ICD-10-PCS | Mod: ,,, | Performed by: INTERNAL MEDICINE

## 2021-12-13 PROCEDURE — 25000003 PHARM REV CODE 250: Performed by: INTERNAL MEDICINE

## 2021-12-13 RX ORDER — SEVELAMER CARBONATE 800 MG/1
800 TABLET, FILM COATED ORAL
Qty: 90 TABLET | Refills: 2 | Status: ON HOLD | OUTPATIENT
Start: 2021-12-13 | End: 2022-01-19 | Stop reason: HOSPADM

## 2021-12-13 RX ORDER — NIFEDIPINE 90 MG/1
90 TABLET, EXTENDED RELEASE ORAL DAILY
Qty: 30 TABLET | Refills: 2 | Status: ON HOLD | OUTPATIENT
Start: 2021-12-13 | End: 2022-01-28 | Stop reason: HOSPADM

## 2021-12-13 RX ORDER — CLONIDINE 0.3 MG/24H
1 PATCH, EXTENDED RELEASE TRANSDERMAL
Qty: 4 PATCH | Refills: 2 | Status: ON HOLD | OUTPATIENT
Start: 2021-12-18 | End: 2022-01-28 | Stop reason: HOSPADM

## 2021-12-13 RX ADMIN — SODIUM BICARBONATE: 84 INJECTION, SOLUTION INTRAVENOUS at 05:12

## 2021-12-13 RX ADMIN — PANTOPRAZOLE SODIUM 40 MG: 40 TABLET, DELAYED RELEASE ORAL at 09:12

## 2021-12-13 RX ADMIN — NIFEDIPINE 90 MG: 30 TABLET, FILM COATED, EXTENDED RELEASE ORAL at 09:12

## 2021-12-13 RX ADMIN — SEVELAMER CARBONATE 800 MG: 800 TABLET, FILM COATED ORAL at 09:12

## 2021-12-13 RX ADMIN — Medication 10 ML: at 05:12

## 2021-12-14 ENCOUNTER — PATIENT OUTREACH (OUTPATIENT)
Dept: ADMINISTRATIVE | Facility: CLINIC | Age: 41
End: 2021-12-14
Payer: MEDICARE

## 2021-12-29 ENCOUNTER — HOSPITAL ENCOUNTER (INPATIENT)
Facility: HOSPITAL | Age: 41
LOS: 29 days | Discharge: HOME HEALTH | DRG: 640 | End: 2022-01-28
Attending: EMERGENCY MEDICINE | Admitting: EMERGENCY MEDICINE
Payer: MEDICARE

## 2021-12-29 DIAGNOSIS — N18.6 ESRD ON HEMODIALYSIS: ICD-10-CM

## 2021-12-29 DIAGNOSIS — Z71.89 GOALS OF CARE, COUNSELING/DISCUSSION: ICD-10-CM

## 2021-12-29 DIAGNOSIS — M79.604 BILATERAL LOWER EXTREMITY PAIN: ICD-10-CM

## 2021-12-29 DIAGNOSIS — R94.31 ABNORMAL EKG: ICD-10-CM

## 2021-12-29 DIAGNOSIS — Z99.2 ESRD ON HEMODIALYSIS: ICD-10-CM

## 2021-12-29 DIAGNOSIS — N18.6 ESRD (END STAGE RENAL DISEASE): ICD-10-CM

## 2021-12-29 DIAGNOSIS — N18.6 END STAGE RENAL DISEASE: ICD-10-CM

## 2021-12-29 DIAGNOSIS — R53.81 DEBILITY: ICD-10-CM

## 2021-12-29 DIAGNOSIS — R94.31 PROLONGED Q-T INTERVAL ON ECG: ICD-10-CM

## 2021-12-29 DIAGNOSIS — K92.0 COFFEE GROUND EMESIS: ICD-10-CM

## 2021-12-29 DIAGNOSIS — K92.2 ACUTE GI BLEEDING: ICD-10-CM

## 2021-12-29 DIAGNOSIS — U07.1 COVID-19 VIRUS INFECTION: ICD-10-CM

## 2021-12-29 DIAGNOSIS — R00.0 TACHYCARDIA: ICD-10-CM

## 2021-12-29 DIAGNOSIS — U07.1 COVID-19: ICD-10-CM

## 2021-12-29 DIAGNOSIS — M79.605 BILATERAL LOWER EXTREMITY PAIN: ICD-10-CM

## 2021-12-29 DIAGNOSIS — E83.59 CALCIPHYLAXIS: Primary | ICD-10-CM

## 2021-12-29 DIAGNOSIS — R10.9 ABDOMINAL PAIN, UNSPECIFIED ABDOMINAL LOCATION: ICD-10-CM

## 2021-12-29 LAB
ALLENS TEST: ABNORMAL
BASOPHILS # BLD AUTO: 0.01 K/UL (ref 0–0.2)
BASOPHILS NFR BLD: 0.2 % (ref 0–1.9)
DELSYS: ABNORMAL
DIFFERENTIAL METHOD: ABNORMAL
EOSINOPHIL # BLD AUTO: 0.1 K/UL (ref 0–0.5)
EOSINOPHIL NFR BLD: 0.8 % (ref 0–8)
ERYTHROCYTE [DISTWIDTH] IN BLOOD BY AUTOMATED COUNT: 15.6 % (ref 11.5–14.5)
FIO2: 21
HCO3 UR-SCNC: 27.1 MMOL/L (ref 24–28)
HCT VFR BLD AUTO: 31.2 % (ref 37–48.5)
HGB BLD-MCNC: 9.6 G/DL (ref 12–16)
IMM GRANULOCYTES # BLD AUTO: 0.24 K/UL (ref 0–0.04)
IMM GRANULOCYTES NFR BLD AUTO: 4 % (ref 0–0.5)
LYMPHOCYTES # BLD AUTO: 0.7 K/UL (ref 1–4.8)
LYMPHOCYTES NFR BLD: 12 % (ref 18–48)
MCH RBC QN AUTO: 27.8 PG (ref 27–31)
MCHC RBC AUTO-ENTMCNC: 30.8 G/DL (ref 32–36)
MCV RBC AUTO: 90 FL (ref 82–98)
MODE: ABNORMAL
MONOCYTES # BLD AUTO: 0.3 K/UL (ref 0.3–1)
MONOCYTES NFR BLD: 5.7 % (ref 4–15)
NEUTROPHILS # BLD AUTO: 4.6 K/UL (ref 1.8–7.7)
NEUTROPHILS NFR BLD: 77.3 % (ref 38–73)
NRBC BLD-RTO: 0 /100 WBC
PCO2 BLDA: 46.9 MMHG (ref 35–45)
PH SMN: 7.37 [PH] (ref 7.35–7.45)
PLATELET # BLD AUTO: 148 K/UL (ref 150–450)
PMV BLD AUTO: 10.5 FL (ref 9.2–12.9)
PO2 BLDA: 21 MMHG (ref 40–60)
POC BE: 1 MMOL/L
POC SATURATED O2: 32 % (ref 95–100)
POC TCO2: 28 MMOL/L (ref 24–29)
RBC # BLD AUTO: 3.45 M/UL (ref 4–5.4)
SAMPLE: ABNORMAL
SITE: ABNORMAL
WBC # BLD AUTO: 5.99 K/UL (ref 3.9–12.7)

## 2021-12-29 PROCEDURE — 93010 EKG 12-LEAD: ICD-10-PCS | Mod: ,,, | Performed by: INTERNAL MEDICINE

## 2021-12-29 PROCEDURE — 84484 ASSAY OF TROPONIN QUANT: CPT | Performed by: EMERGENCY MEDICINE

## 2021-12-29 PROCEDURE — 84702 CHORIONIC GONADOTROPIN TEST: CPT | Performed by: EMERGENCY MEDICINE

## 2021-12-29 PROCEDURE — 82550 ASSAY OF CK (CPK): CPT | Performed by: EMERGENCY MEDICINE

## 2021-12-29 PROCEDURE — 93010 ELECTROCARDIOGRAM REPORT: CPT | Mod: ,,, | Performed by: INTERNAL MEDICINE

## 2021-12-29 PROCEDURE — 83735 ASSAY OF MAGNESIUM: CPT | Performed by: EMERGENCY MEDICINE

## 2021-12-29 PROCEDURE — 85025 COMPLETE CBC W/AUTO DIFF WBC: CPT | Performed by: EMERGENCY MEDICINE

## 2021-12-29 PROCEDURE — 84100 ASSAY OF PHOSPHORUS: CPT | Performed by: EMERGENCY MEDICINE

## 2021-12-29 PROCEDURE — U0002 COVID-19 LAB TEST NON-CDC: HCPCS | Performed by: EMERGENCY MEDICINE

## 2021-12-29 PROCEDURE — 82803 BLOOD GASES ANY COMBINATION: CPT

## 2021-12-29 PROCEDURE — 99900035 HC TECH TIME PER 15 MIN (STAT)

## 2021-12-29 PROCEDURE — 83880 ASSAY OF NATRIURETIC PEPTIDE: CPT | Performed by: EMERGENCY MEDICINE

## 2021-12-29 PROCEDURE — 93005 ELECTROCARDIOGRAM TRACING: CPT

## 2021-12-29 PROCEDURE — 84443 ASSAY THYROID STIM HORMONE: CPT | Performed by: EMERGENCY MEDICINE

## 2021-12-29 PROCEDURE — 84439 ASSAY OF FREE THYROXINE: CPT | Performed by: EMERGENCY MEDICINE

## 2021-12-29 PROCEDURE — 80053 COMPREHEN METABOLIC PANEL: CPT | Performed by: EMERGENCY MEDICINE

## 2021-12-30 PROBLEM — E83.59 CALCIPHYLAXIS: Status: ACTIVE | Noted: 2021-12-30

## 2021-12-30 PROBLEM — U07.1 COVID-19: Status: ACTIVE | Noted: 2021-12-30

## 2021-12-30 PROBLEM — E11.9 TYPE 2 DIABETES MELLITUS: Status: ACTIVE | Noted: 2021-12-30

## 2021-12-30 PROBLEM — R11.0 NAUSEA: Status: ACTIVE | Noted: 2021-12-30

## 2021-12-30 PROBLEM — I15.0 RENOVASCULAR HYPERTENSION: Status: ACTIVE | Noted: 2021-12-30

## 2021-12-30 LAB
ALBUMIN SERPL BCP-MCNC: 2.4 G/DL (ref 3.5–5.2)
ALP SERPL-CCNC: 113 U/L (ref 55–135)
ALT SERPL W/O P-5'-P-CCNC: 20 U/L (ref 10–44)
ANION GAP SERPL CALC-SCNC: 19 MMOL/L (ref 8–16)
ANION GAP SERPL CALC-SCNC: 20 MMOL/L (ref 8–16)
APTT BLDCRRT: 26.4 SEC (ref 21–32)
AST SERPL-CCNC: 20 U/L (ref 10–40)
BILIRUB SERPL-MCNC: 0.4 MG/DL (ref 0.1–1)
BNP SERPL-MCNC: 55 PG/ML (ref 0–99)
BUN SERPL-MCNC: 31 MG/DL (ref 6–20)
BUN SERPL-MCNC: 32 MG/DL (ref 6–20)
CALCIUM SERPL-MCNC: 9.3 MG/DL (ref 8.7–10.5)
CALCIUM SERPL-MCNC: 9.8 MG/DL (ref 8.7–10.5)
CHLORIDE SERPL-SCNC: 97 MMOL/L (ref 95–110)
CHLORIDE SERPL-SCNC: 98 MMOL/L (ref 95–110)
CK SERPL-CCNC: 34 U/L (ref 20–180)
CO2 SERPL-SCNC: 22 MMOL/L (ref 23–29)
CO2 SERPL-SCNC: 23 MMOL/L (ref 23–29)
CREAT SERPL-MCNC: 7.7 MG/DL (ref 0.5–1.4)
CREAT SERPL-MCNC: 8 MG/DL (ref 0.5–1.4)
CTP QC/QA: YES
D DIMER PPP IA.FEU-MCNC: 7.37 MG/L FEU
ERYTHROCYTE [SEDIMENTATION RATE] IN BLOOD BY WESTERGREN METHOD: 127 MM/HR (ref 0–20)
EST. GFR  (AFRICAN AMERICAN): 6 ML/MIN/1.73 M^2
EST. GFR  (AFRICAN AMERICAN): 7 ML/MIN/1.73 M^2
EST. GFR  (NON AFRICAN AMERICAN): 6 ML/MIN/1.73 M^2
EST. GFR  (NON AFRICAN AMERICAN): 6 ML/MIN/1.73 M^2
ESTIMATED AVG GLUCOSE: 103 MG/DL (ref 68–131)
FERRITIN SERPL-MCNC: 1616 NG/ML (ref 20–300)
GLUCOSE SERPL-MCNC: 102 MG/DL (ref 70–110)
GLUCOSE SERPL-MCNC: 89 MG/DL (ref 70–110)
HBA1C MFR BLD: 5.2 % (ref 4–5.6)
HCG INTACT+B SERPL-ACNC: <1.2 MIU/ML
INR PPP: 1 (ref 0.8–1.2)
LACTATE SERPL-SCNC: 0.8 MMOL/L (ref 0.5–2.2)
LDH SERPL L TO P-CCNC: 149 U/L (ref 110–260)
MAGNESIUM SERPL-MCNC: 2 MG/DL (ref 1.6–2.6)
PHOSPHATE SERPL-MCNC: 5 MG/DL (ref 2.7–4.5)
PHOSPHATE SERPL-MCNC: 6.1 MG/DL (ref 2.7–4.5)
POCT GLUCOSE: 172 MG/DL (ref 70–110)
POCT GLUCOSE: 179 MG/DL (ref 70–110)
POCT GLUCOSE: 352 MG/DL (ref 70–110)
POTASSIUM SERPL-SCNC: 4.2 MMOL/L (ref 3.5–5.1)
POTASSIUM SERPL-SCNC: 4.3 MMOL/L (ref 3.5–5.1)
PROT SERPL-MCNC: 7.4 G/DL (ref 6–8.4)
PROTHROMBIN TIME: 10.9 SEC (ref 9–12.5)
SARS-COV-2 RDRP RESP QL NAA+PROBE: POSITIVE
SODIUM SERPL-SCNC: 139 MMOL/L (ref 136–145)
SODIUM SERPL-SCNC: 140 MMOL/L (ref 136–145)
T4 FREE SERPL-MCNC: 1.2 NG/DL (ref 0.71–1.51)
TROPONIN I SERPL DL<=0.01 NG/ML-MCNC: 0.09 NG/ML (ref 0–0.03)
TROPONIN I SERPL DL<=0.01 NG/ML-MCNC: 0.09 NG/ML (ref 0–0.03)
TSH SERPL DL<=0.005 MIU/L-ACNC: 4.35 UIU/ML (ref 0.4–4)

## 2021-12-30 PROCEDURE — 25000003 PHARM REV CODE 250: Performed by: EMERGENCY MEDICINE

## 2021-12-30 PROCEDURE — 85379 FIBRIN DEGRADATION QUANT: CPT | Performed by: EMERGENCY MEDICINE

## 2021-12-30 PROCEDURE — 94640 AIRWAY INHALATION TREATMENT: CPT

## 2021-12-30 PROCEDURE — C9399 UNCLASSIFIED DRUGS OR BIOLOG: HCPCS | Performed by: EMERGENCY MEDICINE

## 2021-12-30 PROCEDURE — 99291 CRITICAL CARE FIRST HOUR: CPT | Mod: 25

## 2021-12-30 PROCEDURE — 96375 TX/PRO/DX INJ NEW DRUG ADDON: CPT

## 2021-12-30 PROCEDURE — 63600175 PHARM REV CODE 636 W HCPCS: Performed by: EMERGENCY MEDICINE

## 2021-12-30 PROCEDURE — 96376 TX/PRO/DX INJ SAME DRUG ADON: CPT

## 2021-12-30 PROCEDURE — 99900035 HC TECH TIME PER 15 MIN (STAT)

## 2021-12-30 PROCEDURE — 25000003 PHARM REV CODE 250: Performed by: STUDENT IN AN ORGANIZED HEALTH CARE EDUCATION/TRAINING PROGRAM

## 2021-12-30 PROCEDURE — 96365 THER/PROPH/DIAG IV INF INIT: CPT

## 2021-12-30 PROCEDURE — 27000207 HC ISOLATION

## 2021-12-30 PROCEDURE — 82728 ASSAY OF FERRITIN: CPT | Performed by: EMERGENCY MEDICINE

## 2021-12-30 PROCEDURE — 83036 HEMOGLOBIN GLYCOSYLATED A1C: CPT | Performed by: EMERGENCY MEDICINE

## 2021-12-30 PROCEDURE — C9113 INJ PANTOPRAZOLE SODIUM, VIA: HCPCS | Performed by: EMERGENCY MEDICINE

## 2021-12-30 PROCEDURE — 84100 ASSAY OF PHOSPHORUS: CPT | Performed by: EMERGENCY MEDICINE

## 2021-12-30 PROCEDURE — 25000242 PHARM REV CODE 250 ALT 637 W/ HCPCS: Performed by: STUDENT IN AN ORGANIZED HEALTH CARE EDUCATION/TRAINING PROGRAM

## 2021-12-30 PROCEDURE — 85730 THROMBOPLASTIN TIME PARTIAL: CPT | Performed by: EMERGENCY MEDICINE

## 2021-12-30 PROCEDURE — 25000242 PHARM REV CODE 250 ALT 637 W/ HCPCS: Performed by: EMERGENCY MEDICINE

## 2021-12-30 PROCEDURE — 85610 PROTHROMBIN TIME: CPT | Performed by: EMERGENCY MEDICINE

## 2021-12-30 PROCEDURE — 85652 RBC SED RATE AUTOMATED: CPT | Performed by: EMERGENCY MEDICINE

## 2021-12-30 PROCEDURE — 83605 ASSAY OF LACTIC ACID: CPT | Performed by: EMERGENCY MEDICINE

## 2021-12-30 PROCEDURE — 36415 COLL VENOUS BLD VENIPUNCTURE: CPT | Performed by: EMERGENCY MEDICINE

## 2021-12-30 PROCEDURE — 94761 N-INVAS EAR/PLS OXIMETRY MLT: CPT

## 2021-12-30 PROCEDURE — 21400001 HC TELEMETRY ROOM

## 2021-12-30 PROCEDURE — 83615 LACTATE (LD) (LDH) ENZYME: CPT | Performed by: EMERGENCY MEDICINE

## 2021-12-30 PROCEDURE — 84484 ASSAY OF TROPONIN QUANT: CPT | Performed by: EMERGENCY MEDICINE

## 2021-12-30 PROCEDURE — 80048 BASIC METABOLIC PNL TOTAL CA: CPT | Performed by: EMERGENCY MEDICINE

## 2021-12-30 RX ORDER — PANTOPRAZOLE SODIUM 40 MG/10ML
80 INJECTION, POWDER, LYOPHILIZED, FOR SOLUTION INTRAVENOUS
Status: COMPLETED | OUTPATIENT
Start: 2021-12-30 | End: 2021-12-30

## 2021-12-30 RX ORDER — HYDRALAZINE HYDROCHLORIDE 20 MG/ML
20 INJECTION INTRAMUSCULAR; INTRAVENOUS
Status: COMPLETED | OUTPATIENT
Start: 2021-12-30 | End: 2021-12-30

## 2021-12-30 RX ORDER — ENOXAPARIN SODIUM 100 MG/ML
1 INJECTION SUBCUTANEOUS
Status: DISCONTINUED | OUTPATIENT
Start: 2021-12-30 | End: 2022-01-17

## 2021-12-30 RX ORDER — OXYCODONE AND ACETAMINOPHEN 5; 325 MG/1; MG/1
1 TABLET ORAL EVERY 4 HOURS PRN
Status: DISCONTINUED | OUTPATIENT
Start: 2021-12-30 | End: 2022-01-07

## 2021-12-30 RX ORDER — ACETAMINOPHEN 325 MG/1
650 TABLET ORAL EVERY 6 HOURS PRN
Status: DISCONTINUED | OUTPATIENT
Start: 2021-12-30 | End: 2022-01-04

## 2021-12-30 RX ORDER — CLONIDINE 0.3 MG/24H
1 PATCH, EXTENDED RELEASE TRANSDERMAL
Status: DISCONTINUED | OUTPATIENT
Start: 2021-12-30 | End: 2022-01-24

## 2021-12-30 RX ORDER — MORPHINE SULFATE 4 MG/ML
4 INJECTION, SOLUTION INTRAMUSCULAR; INTRAVENOUS EVERY 4 HOURS PRN
Status: DISCONTINUED | OUTPATIENT
Start: 2021-12-30 | End: 2021-12-30

## 2021-12-30 RX ORDER — ACETAMINOPHEN 325 MG/1
650 TABLET ORAL EVERY 6 HOURS PRN
Status: DISCONTINUED | OUTPATIENT
Start: 2021-12-30 | End: 2022-01-28 | Stop reason: HOSPADM

## 2021-12-30 RX ORDER — DROPERIDOL 2.5 MG/ML
1.25 INJECTION, SOLUTION INTRAMUSCULAR; INTRAVENOUS
Status: COMPLETED | OUTPATIENT
Start: 2021-12-30 | End: 2021-12-30

## 2021-12-30 RX ORDER — ALBUTEROL SULFATE 90 UG/1
2 AEROSOL, METERED RESPIRATORY (INHALATION) EVERY 6 HOURS
Status: DISCONTINUED | OUTPATIENT
Start: 2021-12-30 | End: 2021-12-30

## 2021-12-30 RX ORDER — SODIUM CHLORIDE 0.9 % (FLUSH) 0.9 %
10 SYRINGE (ML) INJECTION
Status: DISCONTINUED | OUTPATIENT
Start: 2021-12-30 | End: 2022-01-28 | Stop reason: HOSPADM

## 2021-12-30 RX ORDER — ONDANSETRON 2 MG/ML
4 INJECTION INTRAMUSCULAR; INTRAVENOUS EVERY 4 HOURS PRN
Status: DISCONTINUED | OUTPATIENT
Start: 2021-12-30 | End: 2022-01-28 | Stop reason: HOSPADM

## 2021-12-30 RX ORDER — IBUPROFEN 200 MG
24 TABLET ORAL
Status: DISCONTINUED | OUTPATIENT
Start: 2021-12-30 | End: 2022-01-28 | Stop reason: HOSPADM

## 2021-12-30 RX ORDER — HYDRALAZINE HYDROCHLORIDE 20 MG/ML
10 INJECTION INTRAMUSCULAR; INTRAVENOUS
Status: DISCONTINUED | OUTPATIENT
Start: 2021-12-30 | End: 2022-01-28 | Stop reason: HOSPADM

## 2021-12-30 RX ORDER — PROCHLORPERAZINE EDISYLATE 5 MG/ML
10 INJECTION INTRAMUSCULAR; INTRAVENOUS EVERY 6 HOURS PRN
Status: DISCONTINUED | OUTPATIENT
Start: 2021-12-30 | End: 2022-01-28 | Stop reason: HOSPADM

## 2021-12-30 RX ORDER — MORPHINE SULFATE 4 MG/ML
8 INJECTION, SOLUTION INTRAMUSCULAR; INTRAVENOUS EVERY 4 HOURS PRN
Status: DISCONTINUED | OUTPATIENT
Start: 2021-12-30 | End: 2021-12-30

## 2021-12-30 RX ORDER — INSULIN ASPART 100 [IU]/ML
0-5 INJECTION, SOLUTION INTRAVENOUS; SUBCUTANEOUS
Status: DISCONTINUED | OUTPATIENT
Start: 2021-12-30 | End: 2022-01-28 | Stop reason: HOSPADM

## 2021-12-30 RX ORDER — POLYETHYLENE GLYCOL 3350 17 G/17G
17 POWDER, FOR SOLUTION ORAL DAILY
Status: DISCONTINUED | OUTPATIENT
Start: 2021-12-30 | End: 2021-12-31

## 2021-12-30 RX ORDER — DROPERIDOL 2.5 MG/ML
1.25 INJECTION, SOLUTION INTRAMUSCULAR; INTRAVENOUS EVERY 4 HOURS PRN
Status: DISCONTINUED | OUTPATIENT
Start: 2021-12-30 | End: 2022-01-01

## 2021-12-30 RX ORDER — LABETALOL HYDROCHLORIDE 5 MG/ML
10 INJECTION, SOLUTION INTRAVENOUS
Status: DISCONTINUED | OUTPATIENT
Start: 2021-12-30 | End: 2022-01-28 | Stop reason: HOSPADM

## 2021-12-30 RX ORDER — ALBUTEROL SULFATE 90 UG/1
2 AEROSOL, METERED RESPIRATORY (INHALATION) 2 TIMES DAILY
Status: DISCONTINUED | OUTPATIENT
Start: 2021-12-30 | End: 2022-01-12

## 2021-12-30 RX ORDER — LABETALOL HYDROCHLORIDE 5 MG/ML
20 INJECTION, SOLUTION INTRAVENOUS
Status: COMPLETED | OUTPATIENT
Start: 2021-12-30 | End: 2021-12-30

## 2021-12-30 RX ORDER — MORPHINE SULFATE 4 MG/ML
6 INJECTION, SOLUTION INTRAMUSCULAR; INTRAVENOUS EVERY 4 HOURS PRN
Status: DISCONTINUED | OUTPATIENT
Start: 2021-12-30 | End: 2022-01-08

## 2021-12-30 RX ORDER — NIFEDIPINE 30 MG/1
90 TABLET, EXTENDED RELEASE ORAL DAILY
Status: DISCONTINUED | OUTPATIENT
Start: 2021-12-30 | End: 2022-01-24

## 2021-12-30 RX ORDER — GLUCAGON 1 MG
1 KIT INJECTION
Status: DISCONTINUED | OUTPATIENT
Start: 2021-12-30 | End: 2022-01-28 | Stop reason: HOSPADM

## 2021-12-30 RX ORDER — ONDANSETRON 2 MG/ML
4 INJECTION INTRAMUSCULAR; INTRAVENOUS
Status: COMPLETED | OUTPATIENT
Start: 2021-12-30 | End: 2021-12-30

## 2021-12-30 RX ORDER — LABETALOL HYDROCHLORIDE 5 MG/ML
10 INJECTION, SOLUTION INTRAVENOUS
Status: COMPLETED | OUTPATIENT
Start: 2021-12-30 | End: 2021-12-30

## 2021-12-30 RX ORDER — FAMOTIDINE 20 MG/1
20 TABLET, FILM COATED ORAL DAILY
Status: DISCONTINUED | OUTPATIENT
Start: 2021-12-30 | End: 2022-01-24

## 2021-12-30 RX ORDER — HYDRALAZINE HYDROCHLORIDE 20 MG/ML
10 INJECTION INTRAMUSCULAR; INTRAVENOUS
Status: DISCONTINUED | OUTPATIENT
Start: 2021-12-30 | End: 2021-12-30

## 2021-12-30 RX ORDER — TALC
6 POWDER (GRAM) TOPICAL NIGHTLY PRN
Status: DISCONTINUED | OUTPATIENT
Start: 2021-12-30 | End: 2022-01-28 | Stop reason: HOSPADM

## 2021-12-30 RX ORDER — LABETALOL HYDROCHLORIDE 5 MG/ML
10 INJECTION, SOLUTION INTRAVENOUS
Status: DISCONTINUED | OUTPATIENT
Start: 2021-12-30 | End: 2021-12-30

## 2021-12-30 RX ORDER — ASCORBIC ACID 500 MG
500 TABLET ORAL 2 TIMES DAILY
Status: DISCONTINUED | OUTPATIENT
Start: 2021-12-30 | End: 2022-01-26

## 2021-12-30 RX ORDER — ALBUTEROL SULFATE 2.5 MG/.5ML
10 SOLUTION RESPIRATORY (INHALATION)
Status: COMPLETED | OUTPATIENT
Start: 2021-12-30 | End: 2021-12-30

## 2021-12-30 RX ORDER — IBUPROFEN 200 MG
16 TABLET ORAL
Status: DISCONTINUED | OUTPATIENT
Start: 2021-12-30 | End: 2022-01-28 | Stop reason: HOSPADM

## 2021-12-30 RX ORDER — SEVELAMER CARBONATE 800 MG/1
800 TABLET, FILM COATED ORAL
Status: DISCONTINUED | OUTPATIENT
Start: 2021-12-30 | End: 2021-12-31

## 2021-12-30 RX ADMIN — SEVELAMER CARBONATE 800 MG: 800 TABLET, FILM COATED ORAL at 09:12

## 2021-12-30 RX ADMIN — PANTOPRAZOLE SODIUM 8 MG/HR: 40 INJECTION, POWDER, FOR SOLUTION INTRAVENOUS at 03:12

## 2021-12-30 RX ADMIN — ONDANSETRON 4 MG: 2 INJECTION INTRAMUSCULAR; INTRAVENOUS at 01:12

## 2021-12-30 RX ADMIN — DROPERIDOL 1.25 MG: 2.5 INJECTION, SOLUTION INTRAMUSCULAR; INTRAVENOUS at 01:12

## 2021-12-30 RX ADMIN — ENOXAPARIN SODIUM 90 MG: 100 INJECTION SUBCUTANEOUS at 05:12

## 2021-12-30 RX ADMIN — CLONIDINE 1 PATCH: 0.3 PATCH TRANSDERMAL at 09:12

## 2021-12-30 RX ADMIN — LABETALOL HYDROCHLORIDE 20 MG: 5 INJECTION, SOLUTION INTRAVENOUS at 02:12

## 2021-12-30 RX ADMIN — SEVELAMER CARBONATE 800 MG: 800 TABLET, FILM COATED ORAL at 12:12

## 2021-12-30 RX ADMIN — LABETALOL HYDROCHLORIDE 10 MG: 5 INJECTION, SOLUTION INTRAVENOUS at 01:12

## 2021-12-30 RX ADMIN — FAMOTIDINE 20 MG: 20 TABLET ORAL at 09:12

## 2021-12-30 RX ADMIN — ALBUTEROL SULFATE 10 MG: 2.5 SOLUTION RESPIRATORY (INHALATION) at 12:12

## 2021-12-30 RX ADMIN — REMDESIVIR 200 MG: 100 INJECTION, POWDER, LYOPHILIZED, FOR SOLUTION INTRAVENOUS at 09:12

## 2021-12-30 RX ADMIN — MORPHINE SULFATE 4 MG: 4 INJECTION, SOLUTION INTRAMUSCULAR; INTRAVENOUS at 01:12

## 2021-12-30 RX ADMIN — PANTOPRAZOLE SODIUM 80 MG: 40 INJECTION, POWDER, FOR SOLUTION INTRAVENOUS at 01:12

## 2021-12-30 RX ADMIN — ALBUTEROL SULFATE 2 PUFF: 108 INHALANT RESPIRATORY (INHALATION) at 08:12

## 2021-12-30 RX ADMIN — NIFEDIPINE 90 MG: 30 TABLET, FILM COATED, EXTENDED RELEASE ORAL at 09:12

## 2021-12-30 RX ADMIN — OXYCODONE AND ACETAMINOPHEN 1 TABLET: 5; 325 TABLET ORAL at 09:12

## 2021-12-30 RX ADMIN — CALCIUM GLUCONATE 1 G: 98 INJECTION, SOLUTION INTRAVENOUS at 12:12

## 2021-12-30 RX ADMIN — DEXAMETHASONE 6 MG: 2 TABLET ORAL at 09:12

## 2021-12-30 RX ADMIN — HYDRALAZINE HYDROCHLORIDE 20 MG: 20 INJECTION, SOLUTION INTRAMUSCULAR; INTRAVENOUS at 02:12

## 2021-12-30 RX ADMIN — THERA TABS 1 TABLET: TAB at 09:12

## 2021-12-30 RX ADMIN — OXYCODONE HYDROCHLORIDE AND ACETAMINOPHEN 500 MG: 500 TABLET ORAL at 09:12

## 2021-12-30 RX ADMIN — SEVELAMER CARBONATE 800 MG: 800 TABLET, FILM COATED ORAL at 05:12

## 2021-12-30 NOTE — ASSESSMENT & PLAN NOTE
Asymptomatic covid-19 infection  - will attempt to arrange infusion inpatient  - isolation precautions  - no hypoxia, will dc dexamethasone/remdesivir

## 2021-12-30 NOTE — NURSING
Pt arrived from ED via stretcher and moved into bed with no assist. Tele box applied and admit and assessment completed. Pt appears in NAD at this time, will continue with current plan of care.

## 2021-12-30 NOTE — SUBJECTIVE & OBJECTIVE
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 ;  Surgeon: Rakesh Leon MD;  Location: [facility] OR;  Service: Vascular;  Laterality: Left;  RN PREOP 20, UPT done, COVID NEGATRIVE 20  NEED H/P     SECTION, CLASSIC      INSERTION OF TUNNELED CENTRAL VENOUS CATHETER (CVC) WITH SUBCUTANEOUS PORT N/A 2020    Procedure: IR TUNNELED VATH INSERT WITHOUT PORT;  Surgeon: [facility] Diagnostic Provider;  Location: [facility] OR;  Service: Radiology;  Laterality: N/A;  1030AM START  RN PREOP 2020    INSERTION OF TUNNELED CENTRAL VENOUS CATHETER (CVC) WITH SUBCUTANEOUS PORT N/A 2020    Procedure: TUNNELED CATH PLACEMENT;  Surgeon: [facility] Diagnostic Provider;  Location: [facility] OR;  Service: Radiology;  Laterality: N/A;  930AM START    REVISION OF ARTERIOVENOUS FISTULA Left 2020    Procedure: REVISION, AV FISTULA, THROMBECTOMY, LEFT UPPER EXTREMITY;  Surgeon: Rakesh Leon MD;  Location: [facility] OR;  Service: Vascular;  Laterality: Left;    TUBAL LIGATION         Review of patient's allergies indicates:   Allergen Reactions    Vicodin [hydrocodone-acetaminophen] Hives    Codeine Hives       No current facility-administered medications on file prior to encounter.     Current Outpatient Medications on File Prior to Encounter   Medication Sig    insulin NPH (NOVOLIN N) 100 unit/mL injection Inject 15 Units into the skin before breakfast.    NIFEdipine (PROCARDIA-XL) 90 MG (OSM) 24 hr tablet Take 1 tablet (90 mg  "total) by mouth once daily.    blood sugar diagnostic Strp Check blood glucose 3 times daily    cloNIDine 0.3 mg/24 hr td ptwk (CATAPRES) 0.3 mg/24 hr Place 1 patch onto the skin every 7 days.    ergocalciferol (ERGOCALCIFEROL) 50,000 unit Cap Take 1 capsule (50,000 Units total) by mouth every 7 days.    lancets 30 gauge Misc 3 (three) times daily    pen needle, diabetic 31 gauge x 5/16" Ndle daily    sevelamer carbonate (RENVELA) 800 mg Tab Take 1 tablet (800 mg total) by mouth 3 (three) times daily with meals.     Family History     Problem Relation (Age of Onset)    Asthma Sister    Heart failure Father (58)    Seizures Mother (64)        Tobacco Use    Smoking status: Former Smoker     Types: Cigarettes    Smokeless tobacco: Never Used   Substance and Sexual Activity    Alcohol use: No    Drug use: Yes     Types: Marijuana     Comment: Occassional Recreational Use only    Sexual activity: Not Currently     Partners: Male     Review of Systems   Constitutional: Negative for chills and fever.   HENT: Negative for sinus pressure and sinus pain.    Respiratory: Negative for cough and shortness of breath.    Cardiovascular: Negative for chest pain and leg swelling.   Gastrointestinal: Positive for nausea and vomiting. Negative for abdominal distention, abdominal pain, blood in stool, constipation and diarrhea.   Endocrine: Negative for polyphagia and polyuria.   Genitourinary: Negative for dysuria and flank pain.   Musculoskeletal: Negative for arthralgias and back pain.   Skin:        Extreme tenderness to touch on bilateral thighs   Allergic/Immunologic: Negative for food allergies and immunocompromised state.   Neurological: Negative for tremors and weakness.   Hematological: Negative for adenopathy. Does not bruise/bleed easily.   Psychiatric/Behavioral: Negative for agitation and confusion.     Objective:     Vital Signs (Most Recent):  Temp: 98.6 °F (37 °C) (12/30/21 1220)  Pulse: (!) 112 (12/30/21 " 1220)  Resp: 16 (12/30/21 1314)  BP: (!) 168/95 (12/30/21 1220)  SpO2: 98 % (12/30/21 1220) Vital Signs (24h Range):  Temp:  [98.6 °F (37 °C)-99.3 °F (37.4 °C)] 98.6 °F (37 °C)  Pulse:  [108-132] 112  Resp:  [10-20] 16  SpO2:  [98 %-100 %] 98 %  BP: (139-217)/() 168/95     Weight: 94.8 kg (208 lb 14.4 oz)  Body mass index is 33.72 kg/m².    Physical Exam  Constitutional:       General: She is not in acute distress.     Appearance: She is ill-appearing. She is not toxic-appearing or diaphoretic.   HENT:      Head: Normocephalic and atraumatic.   Eyes:      Extraocular Movements: Extraocular movements intact.      Conjunctiva/sclera: Conjunctivae normal.      Pupils: Pupils are equal, round, and reactive to light.   Cardiovascular:      Rate and Rhythm: Normal rate and regular rhythm.      Heart sounds: No murmur heard.  No gallop.    Pulmonary:      Breath sounds: No wheezing or rales.   Abdominal:      General: Bowel sounds are normal. There is no distension.      Tenderness: There is no abdominal tenderness.   Musculoskeletal:         General: No swelling.      Cervical back: No rigidity. No muscular tenderness.      Right lower leg: No edema.      Left lower leg: No edema.   Lymphadenopathy:      Cervical: No cervical adenopathy.   Skin:     General: Skin is warm and dry.   Neurological:      General: No focal deficit present.      Mental Status: She is oriented to person, place, and time.      Motor: No weakness.           CRANIAL NERVES     CN III, IV, VI   Pupils are equal, round, and reactive to light.       Significant Labs: All pertinent labs within the past 24 hours have been reviewed.    Significant Imaging: I have reviewed all pertinent imaging results/findings within the past 24 hours.

## 2021-12-30 NOTE — ED TRIAGE NOTES
"Pt presents to ED with complaints of bilateral leg pain and nausea. Pt explains that the leg pain started approximately 2 weeks ago and goes throughout her whole leg. Pt states that the nausea has also been going on for 2 weeks. Pt explains that she was just discharged from [facility] today after being admitted for n/v, and came here since she was still not feeling well. Pt states she was at [facility] for the entire 2 weeks. Pt states "2 weeks and aint nothing changed." Pt has a history of renal failure and is currently being dialyzed. Pt last received dialysis today but pt states she only did it for an hour. Pt denies all other symptoms. Pt appears stable but is hypertensive and tachycardic. Otherwise vitals stable.   "

## 2021-12-30 NOTE — ASSESSMENT & PLAN NOTE
Rash on bilateral thighs, extremely tender to touch. 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   - records request sent for biopsy  - nephrology consulted  - wound care  - trial thiosulfate after receipt of [facility] records  - PTH, phos pending

## 2021-12-30 NOTE — CONSULTS
Consult received.  Patient withdrew dialysis herself from the [facility] dialysis unit last year 12/2020, was sent a certified letter of dismissal from my practice 11/2020.  She has been seen by Dr Meyers earlier this month, will re-consult their group.

## 2021-12-30 NOTE — PLAN OF CARE
Initial Discharge Planning Assessment:  Patient admitted on: 12/29/2021     Chart reviewed, Care plan discussed with treatment team,  attending Dr. Reyes     PCP updated in Epic: Dr. Franks  Pharmacy, updated in [facility]: [facility] on [address]     DME at home: none      Current dispo: home with family      Transportation: sister will drive home     Power of  or Living Will: none     Anticipated DC needs from CM perspective:  Pt recently had dialysis at another  hospital and left AMA prior to having outpatient HD set up.  Pt likely will need new dialysis center established prior to discharge.  Pt states she would prefer [facility] in [place] on MWF if possible.  MD consulted CM today to have dialysis chair time confirmed prior to hospital DC.     Per [facility] pt would have to be in a COVID positive dialysis center from the date of positive test (12/30/2021) for 10 days. Only COVID positive facility is on [facility] [facility] in [place].     [facility] ([phone]), pt was not established with this clinic prior to this hospitalization.    Spoke with Raven at [facility] Ad[facility] [phone], referral was previously sent to [facility] ([address], [place]  [phone]). This location is not accepting COVID positive patients, pt would have to go to [facility] on [address].     Updated clinicals (HP, flowsheets, and COVID test) faxed to [facility] today [phone]. Awaiting their response.        12/30/21 1037   Discharge Assessment   Assessment Type Discharge Planning Assessment   Confirmed/corrected address, phone number and insurance Yes   Confirmed Demographics Correct on Facesheet   Source of Information patient   Communicated PAMELA with patient/caregiver Yes   Lives With child(rowan), dependent   Do you expect to return to your current living situation? Yes   Do you have help at home or someone to help you manage your care at home? Yes   Who are your caregiver(s) and  their phone number(s)? sister:  Tressa Aguirre [phone]   Prior to hospitilization cognitive status: Alert/Oriented   Current cognitive status: Alert/Oriented   Walking or Climbing Stairs Difficulty none   Dressing/Bathing Difficulty none   Equipment Currently Used at Home none   Readmission within 30 days? Yes   Patient currently being followed by outpatient case management? No   Do you currently have service(s) that help you manage your care at home? No   Do you take prescription medications? Yes   Do you have prescription coverage? Yes   Do you have any problems affording any of your prescribed medications? No   Is the patient taking medications as prescribed? yes   Who is going to help you get home at discharge? sister   How do you get to doctors appointments? family or friend will provide   Are you on dialysis?   (pt likely to need new HD set up)   Do you take coumadin? No   Discharge Plan A Home with family   Discharge Plan B Home   DME Needed Upon Discharge  none   Discharge Plan discussed with: Patient   Discharge Barriers Identified None

## 2021-12-30 NOTE — ED PROVIDER NOTES
Encounter Date: 12/29/2021       History     Chief Complaint   Patient presents with    Leg Pain     Bilateral leg pain for 2 weeks. HD MWF, last HD on Monday. Pt was IP at [facility] for renal failure, states that the came here because they were not treating her right.      41-year-old female with end-stage renal disease, previously refusing dialysis but very recently initiated on dialysis, presents via personal transportation [facility] now with bilateral lower extremity pain and nausea/vomiting.  Patient states she has been vomiting for the past few weeks.  She denies ruth blood in emesis but has been vomiting coffee grounds.  She has pain to her bilateral inner thighs as well as to her left lateral thigh where she recently had a biopsy.  No chest pain or shortness of breath.  Last dialyzed Wednesday 12/29/21.      Patient was also admitted here 12/11/21-12/13/21 for vomiting.  She was found to be uremic and started on a bicarb drip.  She refused HD at that time.  After discharge from here, patient was admitted to [facility], where she consented to HD and had a R chest wall HD catheter placed.     Patient does not have outpatient HD arranged.          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 ;  Surgeon: Rakesh Leon MD;  Location: [facility] OR;  Service:  Vascular;  Laterality: Left;  RN PREOP 20, UPT done, COVID NEGATRIVE 20  NEED H/P     SECTION, CLASSIC      INSERTION OF TUNNELED CENTRAL VENOUS CATHETER (CVC) WITH SUBCUTANEOUS PORT N/A 2020    Procedure: IR TUNNELED VATH INSERT WITHOUT PORT;  Surgeon: Prachi Diagnostic Provider;  Location: [facility] OR;  Service: Radiology;  Laterality: N/A;  1030AM START  RN PREOP 2020    INSERTION OF TUNNELED CENTRAL VENOUS CATHETER (CVC) WITH SUBCUTANEOUS PORT N/A 2020    Procedure: TUNNELED CATH PLACEMENT;  Surgeon: Prachi Diagnostic Provider;  Location: [facility] OR;  Service: Radiology;  Laterality: N/A;  930AM START    REVISION OF ARTERIOVENOUS FISTULA Left 2020    Procedure: REVISION, AV FISTULA, THROMBECTOMY, LEFT UPPER EXTREMITY;  Surgeon: Rakesh Leon MD;  Location: [facility] OR;  Service: Vascular;  Laterality: Left;    TUBAL LIGATION       Family History   Problem Relation Age of Onset    Seizures Mother 64    Heart failure Father 58    Asthma Sister     Breast cancer Neg Hx     Colon cancer Neg Hx     Ovarian cancer Neg Hx      Social History     Tobacco Use    Smoking status: Former Smoker     Types: Cigarettes    Smokeless tobacco: Never Used   Substance Use Topics    Alcohol use: No    Drug use: Yes     Types: Marijuana     Comment: Occassional Recreational Use only     Review of Systems   Constitutional: Negative for fever.   HENT: Negative for sore throat.    Eyes: Negative for photophobia.   Respiratory: Negative for shortness of breath.    Cardiovascular: Negative for chest pain.   Gastrointestinal: Positive for abdominal pain, nausea and vomiting.   Genitourinary: Positive for difficulty urinating (makes some urine).   Musculoskeletal: Positive for myalgias (legs).   Skin: Positive for rash (R inner thigh).   Neurological: Negative for syncope.       Physical Exam     Initial Vitals [21]   BP Pulse Resp Temp SpO2   (!) 139/96 (!) 132 20 99.3 °F (37.4 °C) 99 %       MAP       --         Physical Exam    Nursing note and vitals reviewed.  Constitutional: She appears well-developed and well-nourished. She is not diaphoretic.   Awake, alert adult female. Coffee ground emesis all over floor.    HENT:   Head: Normocephalic and atraumatic.   Mouth/Throat: Oropharynx is clear and moist.   Eyes: Conjunctivae and EOM are normal. Pupils are equal, round, and reactive to light.   Neck: Neck supple.   Normal range of motion.  Cardiovascular: Regular rhythm, normal heart sounds and intact distal pulses.   No murmur heard.  Tachycardic, regular.   Pulmonary/Chest: No respiratory distress. She has no wheezes. She has no rhonchi. She has no rales.   Abdominal: Abdomen is soft. There is abdominal tenderness (periumbilical). There is no rebound and no guarding.   Musculoskeletal:         General: No tenderness or edema. Normal range of motion.      Cervical back: Normal range of motion and neck supple.     Neurological: She is alert and oriented to person, place, and time. She has normal strength.   Moving all extremities.   Skin: Skin is warm and dry. No erythema. There is pallor.   Bruising to [id number] area of R inner upper thigh, which was covered with lidocaine patch. Very TTP. Not erythematous. Also L lateral thigh wound with bandage.    Psychiatric: Her behavior is normal.         ED Course   Critical Care    Date/Time: 12/30/2021 6:53 AM  Performed by: Roopa Salazar MD  Authorized by: Roopa Salazar MD   Direct patient critical care time: 20 minutes  Additional history critical care time: 10 minutes  Ordering / reviewing critical care time: 10 minutes  Documentation critical care time: 8 minutes  Consulting other physicians critical care time: 5 minutes  Total critical care time (exclusive of procedural time) : 53 minutes        Labs Reviewed   COMPREHENSIVE METABOLIC PANEL - Abnormal; Notable for the following components:       Result Value    BUN 31 (*)     Creatinine 7.7  (*)     Albumin 2.4 (*)     Anion Gap 19 (*)     eGFR if  7 (*)     eGFR if non  6 (*)     All other components within normal limits    Narrative:     Release to patient->Immediate   CBC W/ AUTO DIFFERENTIAL - Abnormal; Notable for the following components:    RBC 3.45 (*)     Hemoglobin 9.6 (*)     Hematocrit 31.2 (*)     MCHC 30.8 (*)     RDW 15.6 (*)     Platelets 148 (*)     Immature Granulocytes 4.0 (*)     Immature Grans (Abs) 0.24 (*)     Lymph # 0.7 (*)     Gran % 77.3 (*)     Lymph % 12.0 (*)     All other components within normal limits    Narrative:     Release to patient->Immediate   TROPONIN I - Abnormal; Notable for the following components:    Troponin I 0.090 (*)     All other components within normal limits    Narrative:     Release to patient->Immediate   TSH - Abnormal; Notable for the following components:    TSH 4.347 (*)     All other components within normal limits    Narrative:     Release to patient->Immediate   PHOSPHORUS - Abnormal; Notable for the following components:    Phosphorus 5.0 (*)     All other components within normal limits    Narrative:     Release to patient->Immediate   SARS-COV-2 RDRP GENE - Abnormal; Notable for the following components:    POC Rapid COVID Positive (*)     All other components within normal limits   ISTAT PROCEDURE - Abnormal; Notable for the following components:    POC PCO2 46.9 (*)     POC PO2 21 (*)     POC SATURATED O2 32 (*)     All other components within normal limits   B-TYPE NATRIURETIC PEPTIDE    Narrative:     Release to patient->Immediate   MAGNESIUM    Narrative:     Release to patient->Immediate   CK    Narrative:     Release to patient->Immediate   HCG, QUANTITATIVE    Narrative:     Release to patient->Immediate   T4, FREE    Narrative:     Release to patient->Immediate   POCT URINE PREGNANCY   POCT GLUCOSE MONITORING CONTINUOUS     EKG Readings: (Independently Interpreted)   23:55: Sinus tach, .  Normal axis. TWI in I, aVL, II. Peaked T waves. LVH. No STEMI.        Imaging Results          X-Ray Chest AP Portable (Final result)  Result time 12/30/21 01:34:47    Final result by Chaparro Mendez DO (12/30/21 01:34:47)                 Impression:      No acute cardiopulmonary abnormality.      Electronically signed by: Chaparro Mendez  Date:    12/30/2021  Time:    01:34             Narrative:    EXAMINATION:  XR CHEST AP PORTABLE    CLINICAL HISTORY:  Tachycardia, unspecified    TECHNIQUE:  Single frontal view of the chest was performed.    COMPARISON:  Chest radiograph from 01/18/2021.    FINDINGS:  There is a right internal jugular central venous catheter terminating in the mid SVC.    The lungs are well expanded and clear. No focal opacities are seen. The pleural spaces are clear.    The cardiac silhouette is unremarkable.    The visualized osseous structures are unremarkable.                              X-Rays:   Independently Interpreted Readings:   Other Readings:  CXR +R tunneled IJ catheter, no acute process    Medications   cloNIDine 0.3 mg/24 hr td ptwk 1 patch (has no administration in time range)   NIFEdipine 24 hr tablet 90 mg (has no administration in time range)   sevelamer carbonate tablet 800 mg (has no administration in time range)   sodium chloride 0.9% flush 10 mL (has no administration in time range)   melatonin tablet 6 mg (has no administration in time range)   acetaminophen tablet 650 mg (has no administration in time range)   morphine injection 4 mg (has no administration in time range)   morphine injection 8 mg (has no administration in time range)   polyethylene glycol packet 17 g (has no administration in time range)   famotidine tablet 20 mg (has no administration in time range)   ondansetron injection 4 mg (has no administration in time range)   prochlorperazine injection Soln 10 mg (has no administration in time range)   acetaminophen tablet 650 mg (has no administration in time  range)   droperidoL injection 1.25 mg (has no administration in time range)   hydrALAZINE injection 10 mg (has no administration in time range)   labetaloL injection 10 mg (has no administration in time range)   glucose chewable tablet 16 g (has no administration in time range)   glucose chewable tablet 24 g (has no administration in time range)   dextrose 50% injection 12.5 g (has no administration in time range)   dextrose 50% injection 25 g (has no administration in time range)   glucagon (human recombinant) injection 1 mg (has no administration in time range)   insulin aspart U-100 pen 0-5 Units (has no administration in time range)   remdesivir 200 mg in sodium chloride 0.9% 250 mL infusion (has no administration in time range)   remdesivir 100 mg in sodium chloride 0.9% 250 mL infusion (has no administration in time range)   ascorbic acid (vitamin C) tablet 500 mg (has no administration in time range)   multivitamin tablet (has no administration in time range)   dexAMETHasone tablet 6 mg (has no administration in time range)   enoxaparin injection 90 mg (90 mg Subcutaneous Given 12/30/21 0551)   albuterol inhaler 2 puff (has no administration in time range)   albuterol sulfate nebulizer solution 10 mg (10 mg Nebulization Given 12/30/21 0010)   calcium gluconate 1g in dextrose 5% 100mL (ready to mix system) (0 g Intravenous Stopped 12/30/21 0131)   labetaloL injection 10 mg (10 mg Intravenous Given 12/30/21 0102)   labetaloL injection 10 mg (10 mg Intravenous Given 12/30/21 0128)   droperidoL injection 1.25 mg (1.25 mg Intravenous Given 12/30/21 0137)   ondansetron injection 4 mg (4 mg Intravenous Given 12/30/21 0136)   pantoprazole injection 80 mg (80 mg Intravenous Given 12/30/21 0132)   labetaloL injection 20 mg (20 mg Intravenous Given 12/30/21 0245)   hydrALAZINE injection 20 mg (20 mg Intravenous Given 12/30/21 0239)   pantoprazole 40 mg in dextrose 5 % 100 mL infusion (ready to mix system) (8 mg/hr  Intravenous New Bag 12/30/21 0304)     Medical Decision Making:   History:   Old Medical Records: I decided to obtain old medical records.  Old Records Summarized: records from previous admission(s).  Initial Assessment:   41 y.o. female with ESRD just started on HD here with nausea/vomiting and bilateral lower extremity pain.  Differential Diagnosis:   Ddx includes hyperkalemia, anemia secondary to GI bleed, GERD, gastritis, PUD, intractable vomiting, other.  Independently Interpreted Test(s):   I have ordered and independently interpreted X-rays - see prior notes.  I have ordered and independently interpreted EKG Reading(s) - see prior notes  Clinical Tests:   Lab Tests: Reviewed and Ordered  Radiological Study: Ordered and Reviewed  Medical Tests: Ordered and Reviewed  ED Management:  EKG sinus tach, no STEMI.     CXR NAD.    COVID-19 positive.      Labs: Not acidotic. Hgb 9.6, baseline. CMP with reassuring lytes. Renal function c/w ESRD. Troponin 0.090, likely related to ESRD. BNP 55.     Patient initially tx'ed as hyperkalemia due to peaked T waves on EKG with calcium gluconate 1g IV and albuterol 10mg nebulized. She had protonix 80mg IV for coffee ground emesis. She had labetalol 10mg IV x 2, labetalol 20mg IV x 1, hydralazine 20mg IV x 1 for BP control.  She had droperidol 1.25mg IV for vomiting.    Patient is high risk for complications from COVID-19 with ESRD on HD. She is persistently tachycardic which may be suggestive of sepsis. Sats reassuring.    I have admitted patient for COVID-19, for further management of ESRD on HD (needs outpatient HD set up), and further management of hypertensive urgency and coffee ground emesis.    I discussed patient for admission with [facility] for [facility] medicine Dr. gS Sharp.  I have written courtesy orders.    Other:   I have discussed this case with another health care provider.                      Clinical Impression:   Final diagnoses:  [R00.0]  Tachycardia  [N18.6, Z99.2] ESRD on hemodialysis (Primary)  [U07.1] COVID-19 virus infection  [R10.9] Abdominal pain, unspecified abdominal location  [K92.0] Coffee ground emesis  [M79.604, M79.605] Bilateral lower extremity pain          ED Disposition Condition    Admit               Roopa Salazar MD  12/30/21 0653

## 2021-12-30 NOTE — PLAN OF CARE
12/30/21 1241   Readmission   Why were you hospitalized in the last 30 days? leg pain   Why were you readmitted? Alarmed about signs/symptoms   When you left the hospital where did you go? Home with Family   Did patient/caregiver refused recommended DC plan? Yes   Tell me about what happened between when you left the hospital and the day you returned. patient left from previous hospital AMA   Did you try to manage your symptoms your self? No   Did you call anyone? No   Did you try to see or did see a doctor or nurse before you came? No   Did you have  a follow-up appointment on discharge? No   Was this a planned readmission? No

## 2021-12-30 NOTE — ASSESSMENT & PLAN NOTE
Persistent nausea/vomiting. Last admission resolved w/ bicarb drip, suggesting ESRD component.   - monitor as pt gets dialyzed

## 2021-12-30 NOTE — H&P
"[facility] Medicine  History & Physical    Patient Name: Xuan Ricardo  MRN: [id number]  Patient Class: IP- Inpatient  Admission Date: 12/29/2021  Attending Physician: Adebayo Reyes MD   Primary Care Provider: Katharine Franks MD         Patient information was obtained from patient, past medical records and ER records.     Subjective:     Principal Problem:<principal problem not specified>    Chief Complaint:   Chief Complaint   Patient presents with    Leg Pain     Bilateral leg pain for 2 weeks. HD MWF, last HD on Monday. Pt was IP at [facility] for renal failure, states that the came here because they were not treating her right.         HPI: Ms. Ricardo is a 41y F w/ ESRD, recently dialyzed at [facility], presents after leaving AMA from [facility] with nausea/vomiting and bilateral thigh pain.     She says that the nausea and vomiting began a few weeks ago. She mostly reports vomiting up food content, denies overt blood. Says that the vomit is sometimes darker colored. She has been told she has "coffee-ground" emesis but does not describe the contents of her vomit as such. She denies abdominal pain, denies delayed vomiting after eating. Feels nauseas all the time, regardless of food intake.    She has also developed severe bilateral thigh lesions. She describes them as swelling w/ extreme sensitivity to touch. They started approximately two weeks ago and have persisted, with minimal improvement in pain. One thigh was biopsied at [facility] at her most recent admission, and she reports that the site continues to be tender.      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 ;  Surgeon: Rakesh Leon MD;  Location: [facility] OR;  Service: Vascular;  Laterality: Left;  RN PREOP 20, UPT done, COVID NEGATRIVE 20  NEED H/P     SECTION, CLASSIC      INSERTION OF TUNNELED CENTRAL VENOUS CATHETER (CVC) WITH SUBCUTANEOUS PORT N/A 2020    Procedure: IR TUNNELED VATH INSERT WITHOUT PORT;  Surgeon: Prachi Diagnostic Provider;  Location: [facility] OR;  Service: Radiology;  Laterality: N/A;  1030AM START  RN PREOP 2020    INSERTION OF TUNNELED CENTRAL VENOUS CATHETER (CVC) WITH SUBCUTANEOUS PORT N/A 2020    Procedure: TUNNELED CATH PLACEMENT;  Surgeon: Prachi Diagnostic Provider;  Location: [facility] OR;  Service: Radiology;  Laterality: N/A;  930AM START    REVISION OF ARTERIOVENOUS FISTULA Left 2020    Procedure: REVISION, AV FISTULA, THROMBECTOMY, LEFT UPPER EXTREMITY;  Surgeon: Rakesh Leon MD;  Location: [facility] OR;  Service: Vascular;  Laterality: Left;    TUBAL LIGATION         Review of patient's allergies indicates:   Allergen Reactions    Vicodin [hydrocodone-acetaminophen] Hives    Codeine Hives       No current facility-administered medications on file prior to encounter.     Current Outpatient Medications on File Prior to Encounter   Medication Sig    insulin NPH (NOVOLIN N) 100 unit/mL injection Inject 15 Units into the skin before breakfast.    NIFEdipine (PROCARDIA-XL) 90 MG (OSM) 24 hr tablet Take 1 tablet (90 mg total) by mouth once daily.    blood sugar diagnostic Strp Check blood glucose 3 times daily    cloNIDine 0.3 mg/24 hr td ptwk (CATAPRES) 0.3 mg/24 hr Place 1 patch onto the skin every 7 days.    ergocalciferol (ERGOCALCIFEROL) 50,000 unit Cap Take 1 capsule (50,000 Units total) by mouth every 7 days.    lancets 30 gauge Misc 3 (three) times daily    pen needle, diabetic 31 gauge x  "5/16" Ndle daily    sevelamer carbonate (RENVELA) 800 mg Tab Take 1 tablet (800 mg total) by mouth 3 (three) times daily with meals.     Family History     Problem Relation (Age of Onset)    Asthma Sister    Heart failure Father (58)    Seizures Mother (64)        Tobacco Use    Smoking status: Former Smoker     Types: Cigarettes    Smokeless tobacco: Never Used   Substance and Sexual Activity    Alcohol use: No    Drug use: Yes     Types: Marijuana     Comment: Occassional Recreational Use only    Sexual activity: Not Currently     Partners: Male     Review of Systems   Constitutional: Negative for chills and fever.   HENT: Negative for sinus pressure and sinus pain.    Respiratory: Negative for cough and shortness of breath.    Cardiovascular: Negative for chest pain and leg swelling.   Gastrointestinal: Positive for nausea and vomiting. Negative for abdominal distention, abdominal pain, blood in stool, constipation and diarrhea.   Endocrine: Negative for polyphagia and polyuria.   Genitourinary: Negative for dysuria and flank pain.   Musculoskeletal: Negative for arthralgias and back pain.   Skin:        Extreme tenderness to touch on bilateral thighs   Allergic/Immunologic: Negative for food allergies and immunocompromised state.   Neurological: Negative for tremors and weakness.   Hematological: Negative for adenopathy. Does not bruise/bleed easily.   Psychiatric/Behavioral: Negative for agitation and confusion.     Objective:     Vital Signs (Most Recent):  Temp: 98.6 °F (37 °C) (12/30/21 1220)  Pulse: (!) 112 (12/30/21 1220)  Resp: 16 (12/30/21 1314)  BP: (!) 168/95 (12/30/21 1220)  SpO2: 98 % (12/30/21 1220) Vital Signs (24h Range):  Temp:  [98.6 °F (37 °C)-99.3 °F (37.4 °C)] 98.6 °F (37 °C)  Pulse:  [108-132] 112  Resp:  [10-20] 16  SpO2:  [98 %-100 %] 98 %  BP: (139-217)/() 168/95     Weight: 94.8 kg (208 lb 14.4 oz)  Body mass index is 33.72 kg/m².    Physical Exam  Constitutional:       " General: She is not in acute distress.     Appearance: She is ill-appearing. She is not toxic-appearing or diaphoretic.   HENT:      Head: Normocephalic and atraumatic.   Eyes:      Extraocular Movements: Extraocular movements intact.      Conjunctiva/sclera: Conjunctivae normal.      Pupils: Pupils are equal, round, and reactive to light.   Cardiovascular:      Rate and Rhythm: Normal rate and regular rhythm.      Heart sounds: No murmur heard.  No gallop.    Pulmonary:      Breath sounds: No wheezing or rales.   Abdominal:      General: Bowel sounds are normal. There is no distension.      Tenderness: There is no abdominal tenderness.   Musculoskeletal:         General: No swelling.      Cervical back: No rigidity. No muscular tenderness.      Right lower leg: No edema.      Left lower leg: No edema.   Lymphadenopathy:      Cervical: No cervical adenopathy.   Skin:     General: Skin is warm and dry.   Neurological:      General: No focal deficit present.      Mental Status: She is oriented to person, place, and time.      Motor: No weakness.           CRANIAL NERVES     CN III, IV, VI   Pupils are equal, round, and reactive to light.       Significant Labs: All pertinent labs within the past 24 hours have been reviewed.    Significant Imaging: I have reviewed all pertinent imaging results/findings within the past 24 hours.    Assessment/Plan:     * Calciphylaxis  Rash on bilateral thighs, extremely tender to touch. [facility] physician reports biopsy c/w calciphaxis (discussed w/ patient permission).   - records request sent for biopsy  - nephrology consulted  - wound care  - trial thiosulfate after receipt of [facility] records  - PTH, phos pending      End stage renal disease  Pt w/ ESRD. Refused dialysis on last admission, now s/p multiple sessions during [facility] hospitalization  - nephrology consulted  - last session yesterday  - access via [facility]  - pth, phos pending      Renovascular hypertension  Continue home meds;  titrate to effect      COVID-19  Asymptomatic covid-19 infection  - will attempt to arrange infusion inpatient  - isolation precautions  - no hypoxia, will dc dexamethasone/remdesivir      Type 2 diabetes mellitus  Reports taking 10U daily  - accuchecks/SSI      Nausea  Persistent nausea/vomiting. Last admission resolved w/ bicarb drip, suggesting ESRD component.   - monitor as pt gets dialyzed      Anemia of renal disease  Anemia stable  - continue to monitor  - defer ESAs to nephrology    VTE Risk Mitigation (From admission, onward)         Ordered     enoxaparin injection 90 mg  Every 24 hours (non-standard times)         12/30/21 0539     IP VTE HIGH RISK PATIENT  Once         12/30/21 0339     Place sequential compression device  Until discontinued         12/30/21 0339     Place HENRI hose  Until discontinued         12/30/21 0339     Reason for No Pharmacological VTE Prophylaxis  Once        Question:  Reasons:  Answer:  Active Bleeding    12/30/21 0339                   Adebayo Reyes MD  Department of Hospital Medicine   [facility] [place] - Telemetry

## 2021-12-30 NOTE — HPI
"Ms. Cousin is a 41y F w/ ESRD, recently dialyzed at [facility], presents after leaving AMA from [facility] with nausea/vomiting and bilateral thigh pain.     She says that the nausea and vomiting began a few weeks ago. She mostly reports vomiting up food content, denies overt blood. Says that the vomit is sometimes darker colored. She has been told she has "coffee-ground" emesis but does not describe the contents of her vomit as such. She denies abdominal pain, denies delayed vomiting after eating. Feels nauseas all the time, regardless of food intake.    She has also developed severe bilateral thigh lesions. She describes them as swelling w/ extreme sensitivity to touch. They started approximately two weeks ago and have persisted, with minimal improvement in pain. One thigh was biopsied at [facility] at her most recent admission, and she reports that the site continues to be tender.    - Adebayo Reyes MD  "

## 2021-12-30 NOTE — ASSESSMENT & PLAN NOTE
Pt w/ ESRD. Refused dialysis on last admission, now s/p multiple sessions during [facility]  - nephrology consulted  - last session yesterday  - access via [facility]  - pth, phos pending

## 2021-12-31 PROBLEM — E83.39 HYPERPHOSPHATEMIA: Status: ACTIVE | Noted: 2021-12-31

## 2021-12-31 LAB
ANION GAP SERPL CALC-SCNC: 14 MMOL/L (ref 8–16)
B-HCG UR QL: NEGATIVE
BASOPHILS # BLD AUTO: ABNORMAL K/UL (ref 0–0.2)
BASOPHILS NFR BLD: 0 % (ref 0–1.9)
BUN SERPL-MCNC: 46 MG/DL (ref 6–20)
CALCIUM SERPL-MCNC: 9.4 MG/DL (ref 8.7–10.5)
CHLORIDE SERPL-SCNC: 97 MMOL/L (ref 95–110)
CO2 SERPL-SCNC: 22 MMOL/L (ref 23–29)
CREAT SERPL-MCNC: 10.1 MG/DL (ref 0.5–1.4)
DIFFERENTIAL METHOD: ABNORMAL
EOSINOPHIL # BLD AUTO: ABNORMAL K/UL (ref 0–0.5)
EOSINOPHIL NFR BLD: 0 % (ref 0–8)
ERYTHROCYTE [DISTWIDTH] IN BLOOD BY AUTOMATED COUNT: 15 % (ref 11.5–14.5)
EST. GFR  (AFRICAN AMERICAN): 5 ML/MIN/1.73 M^2
EST. GFR  (NON AFRICAN AMERICAN): 4 ML/MIN/1.73 M^2
GLUCOSE SERPL-MCNC: 190 MG/DL (ref 70–110)
HCT VFR BLD AUTO: 21.9 % (ref 37–48.5)
HGB BLD-MCNC: 7 G/DL (ref 12–16)
IMM GRANULOCYTES # BLD AUTO: ABNORMAL K/UL (ref 0–0.04)
IMM GRANULOCYTES NFR BLD AUTO: ABNORMAL % (ref 0–0.5)
LYMPHOCYTES # BLD AUTO: ABNORMAL K/UL (ref 1–4.8)
LYMPHOCYTES NFR BLD: 11 % (ref 18–48)
MCH RBC QN AUTO: 28.1 PG (ref 27–31)
MCHC RBC AUTO-ENTMCNC: 32 G/DL (ref 32–36)
MCV RBC AUTO: 88 FL (ref 82–98)
MONOCYTES # BLD AUTO: ABNORMAL K/UL (ref 0.3–1)
MONOCYTES NFR BLD: 1 % (ref 4–15)
NEUTROPHILS NFR BLD: 86 % (ref 38–73)
NEUTS BAND NFR BLD MANUAL: 2 %
NRBC BLD-RTO: 0 /100 WBC
PHOSPHATE SERPL-MCNC: 6.8 MG/DL (ref 2.7–4.5)
PLATELET # BLD AUTO: 147 K/UL (ref 150–450)
PMV BLD AUTO: 11.8 FL (ref 9.2–12.9)
POCT GLUCOSE: 198 MG/DL (ref 70–110)
POCT GLUCOSE: 216 MG/DL (ref 70–110)
POCT GLUCOSE: 275 MG/DL (ref 70–110)
POCT GLUCOSE: 291 MG/DL (ref 70–110)
POTASSIUM SERPL-SCNC: 4.9 MMOL/L (ref 3.5–5.1)
PTH-INTACT SERPL-MCNC: 901.8 PG/ML (ref 9–77)
RBC # BLD AUTO: 2.49 M/UL (ref 4–5.4)
SODIUM SERPL-SCNC: 133 MMOL/L (ref 136–145)
WBC # BLD AUTO: 6.9 K/UL (ref 3.9–12.7)

## 2021-12-31 PROCEDURE — 21400001 HC TELEMETRY ROOM

## 2021-12-31 PROCEDURE — 85027 COMPLETE CBC AUTOMATED: CPT | Performed by: EMERGENCY MEDICINE

## 2021-12-31 PROCEDURE — 25000003 PHARM REV CODE 250: Performed by: EMERGENCY MEDICINE

## 2021-12-31 PROCEDURE — 85007 BL SMEAR W/DIFF WBC COUNT: CPT | Performed by: EMERGENCY MEDICINE

## 2021-12-31 PROCEDURE — 80074 ACUTE HEPATITIS PANEL: CPT | Performed by: STUDENT IN AN ORGANIZED HEALTH CARE EDUCATION/TRAINING PROGRAM

## 2021-12-31 PROCEDURE — 25000003 PHARM REV CODE 250: Performed by: INTERNAL MEDICINE

## 2021-12-31 PROCEDURE — 83970 ASSAY OF PARATHORMONE: CPT | Performed by: STUDENT IN AN ORGANIZED HEALTH CARE EDUCATION/TRAINING PROGRAM

## 2021-12-31 PROCEDURE — 94761 N-INVAS EAR/PLS OXIMETRY MLT: CPT

## 2021-12-31 PROCEDURE — 87340 HEPATITIS B SURFACE AG IA: CPT | Performed by: INTERNAL MEDICINE

## 2021-12-31 PROCEDURE — 63600175 PHARM REV CODE 636 W HCPCS: Performed by: INTERNAL MEDICINE

## 2021-12-31 PROCEDURE — 27000207 HC ISOLATION

## 2021-12-31 PROCEDURE — 81025 URINE PREGNANCY TEST: CPT | Performed by: STUDENT IN AN ORGANIZED HEALTH CARE EDUCATION/TRAINING PROGRAM

## 2021-12-31 PROCEDURE — 84100 ASSAY OF PHOSPHORUS: CPT | Performed by: STUDENT IN AN ORGANIZED HEALTH CARE EDUCATION/TRAINING PROGRAM

## 2021-12-31 PROCEDURE — 63600175 PHARM REV CODE 636 W HCPCS: Performed by: STUDENT IN AN ORGANIZED HEALTH CARE EDUCATION/TRAINING PROGRAM

## 2021-12-31 PROCEDURE — 63600175 PHARM REV CODE 636 W HCPCS: Performed by: EMERGENCY MEDICINE

## 2021-12-31 PROCEDURE — 86706 HEP B SURFACE ANTIBODY: CPT | Performed by: INTERNAL MEDICINE

## 2021-12-31 PROCEDURE — 25000003 PHARM REV CODE 250: Performed by: STUDENT IN AN ORGANIZED HEALTH CARE EDUCATION/TRAINING PROGRAM

## 2021-12-31 PROCEDURE — 36415 COLL VENOUS BLD VENIPUNCTURE: CPT | Performed by: INTERNAL MEDICINE

## 2021-12-31 PROCEDURE — 83970 ASSAY OF PARATHORMONE: CPT | Mod: 91 | Performed by: INTERNAL MEDICINE

## 2021-12-31 PROCEDURE — 80048 BASIC METABOLIC PNL TOTAL CA: CPT | Performed by: EMERGENCY MEDICINE

## 2021-12-31 PROCEDURE — 99900035 HC TECH TIME PER 15 MIN (STAT)

## 2021-12-31 PROCEDURE — 36415 COLL VENOUS BLD VENIPUNCTURE: CPT | Performed by: STUDENT IN AN ORGANIZED HEALTH CARE EDUCATION/TRAINING PROGRAM

## 2021-12-31 PROCEDURE — 90935 HEMODIALYSIS ONE EVALUATION: CPT

## 2021-12-31 RX ORDER — MUPIROCIN 20 MG/G
OINTMENT TOPICAL 2 TIMES DAILY
Status: DISPENSED | OUTPATIENT
Start: 2021-12-31 | End: 2022-01-05

## 2021-12-31 RX ORDER — SODIUM CHLORIDE 9 MG/ML
INJECTION, SOLUTION INTRAVENOUS ONCE
Status: DISCONTINUED | OUTPATIENT
Start: 2021-12-31 | End: 2022-01-10

## 2021-12-31 RX ORDER — SEVELAMER CARBONATE 800 MG/1
1600 TABLET, FILM COATED ORAL
Status: DISCONTINUED | OUTPATIENT
Start: 2021-12-31 | End: 2021-12-31

## 2021-12-31 RX ORDER — HEPARIN SODIUM 5000 [USP'U]/ML
3200 INJECTION, SOLUTION INTRAVENOUS; SUBCUTANEOUS
Status: DISCONTINUED | OUTPATIENT
Start: 2021-12-31 | End: 2022-01-28 | Stop reason: HOSPADM

## 2021-12-31 RX ORDER — SEVELAMER CARBONATE 800 MG/1
2400 TABLET, FILM COATED ORAL
Status: DISCONTINUED | OUTPATIENT
Start: 2021-12-31 | End: 2022-01-28 | Stop reason: HOSPADM

## 2021-12-31 RX ORDER — CINACALCET 30 MG/1
30 TABLET, FILM COATED ORAL
Status: DISCONTINUED | OUTPATIENT
Start: 2021-12-31 | End: 2022-01-28 | Stop reason: HOSPADM

## 2021-12-31 RX ADMIN — CINACALCET 30 MG: 30 TABLET, FILM COATED ORAL at 10:12

## 2021-12-31 RX ADMIN — OXYCODONE AND ACETAMINOPHEN 1 TABLET: 5; 325 TABLET ORAL at 03:12

## 2021-12-31 RX ADMIN — THERA TABS 1 TABLET: TAB at 08:12

## 2021-12-31 RX ADMIN — SEVELAMER CARBONATE 800 MG: 800 TABLET, FILM COATED ORAL at 08:12

## 2021-12-31 RX ADMIN — SEVELAMER CARBONATE 2400 MG: 800 TABLET, FILM COATED ORAL at 04:12

## 2021-12-31 RX ADMIN — ENOXAPARIN SODIUM 90 MG: 100 INJECTION SUBCUTANEOUS at 06:12

## 2021-12-31 RX ADMIN — POLYETHYLENE GLYCOL 3350 17 G: 17 POWDER, FOR SOLUTION ORAL at 08:12

## 2021-12-31 RX ADMIN — INSULIN ASPART 1 UNITS: 100 INJECTION, SOLUTION INTRAVENOUS; SUBCUTANEOUS at 09:12

## 2021-12-31 RX ADMIN — MUPIROCIN: 20 OINTMENT TOPICAL at 08:12

## 2021-12-31 RX ADMIN — FAMOTIDINE 20 MG: 20 TABLET ORAL at 08:12

## 2021-12-31 RX ADMIN — HEPARIN SODIUM 3200 UNITS: 5000 INJECTION INTRAVENOUS; SUBCUTANEOUS at 01:12

## 2021-12-31 RX ADMIN — OXYCODONE HYDROCHLORIDE AND ACETAMINOPHEN 500 MG: 500 TABLET ORAL at 08:12

## 2021-12-31 RX ADMIN — NIFEDIPINE 90 MG: 30 TABLET, FILM COATED, EXTENDED RELEASE ORAL at 08:12

## 2021-12-31 NOTE — NURSING
Pt. refused morning labs, discussed ,why we are monitoring labs and electrolytes pt agreed to butterfly needle and fingerstick

## 2021-12-31 NOTE — PROGRESS NOTES
"[facility] Medicine  Progress Note    Patient Name: Xuan Ricardo  MRN: [id number]  Patient Class: IP- Inpatient   Admission Date: 12/29/2021  Length of Stay: 1 days  Attending Physician: Adebayo Reyes MD  Primary Care Provider: Katharine Franks MD        Subjective:     Principal Problem:Calciphylaxis        HPI:  Ms. Ricardo is a 41y F w/ ESRD, recently dialyzed at [facility], presents after leaving AMA from [facility] with nausea/vomiting and bilateral thigh pain.     She says that the nausea and vomiting began a few weeks ago. She mostly reports vomiting up food content, denies overt blood. Says that the vomit is sometimes darker colored. She has been told she has "coffee-ground" emesis but does not describe the contents of her vomit as such. She denies abdominal pain, denies delayed vomiting after eating. Feels nauseas all the time, regardless of food intake.    She has also developed severe bilateral thigh lesions. She describes them as swelling w/ extreme sensitivity to touch. They started approximately two weeks ago and have persisted, with minimal improvement in pain. One thigh was biopsied at [facility] at her most recent admission, and she reports that the site continues to be tender.      Overview/Hospital Course:  Admitted to hospital medicine. Nephrology consulted. Wound care consulted for thigh wounds.      Interval History: No events overnight. Pt denies complaints this AM    Review of Systems   Constitutional: Negative for chills and fever.   Respiratory: Negative for cough and shortness of breath.    Cardiovascular: Negative for chest pain and leg swelling.   Gastrointestinal: Negative for abdominal distention and abdominal pain.     Objective:     Vital Signs (Most Recent):  Temp: 98.5 °F (36.9 °C) (12/31/21 0723)  Pulse: 100 (12/31/21 0723)  Resp: 18 (12/31/21 0723)  BP: (!) 146/84 (12/31/21 0723)  SpO2: 100 % (12/31/21 0723) Vital Signs (24h Range):  Temp:  [98 °F (36.7 °C)-99.2 °F (37.3 °C)] " 98.5 °F (36.9 °C)  Pulse:  [100-124] 100  Resp:  [14-18] 18  SpO2:  [94 %-100 %] 100 %  BP: (146-178)/(79-95) 146/84     Weight: 94.8 kg (208 lb 14.4 oz)  Body mass index is 33.72 kg/m².    Intake/Output Summary (Last 24 hours) at 12/31/2021 0913  Last data filed at 12/31/2021 0100  Gross per 24 hour   Intake 480 ml   Output 350 ml   Net 130 ml      Physical Exam  Constitutional:       General: She is not in acute distress.     Appearance: She is ill-appearing. She is not toxic-appearing or diaphoretic.   Cardiovascular:      Rate and Rhythm: Regular rhythm. Tachycardia present.      Heart sounds: No murmur heard.  No gallop.    Pulmonary:      Effort: Pulmonary effort is normal. No respiratory distress.      Breath sounds: Normal breath sounds. No wheezing.   Abdominal:      General: Bowel sounds are normal. There is no distension.      Palpations: Abdomen is soft.      Tenderness: There is no abdominal tenderness.         Significant Labs: All pertinent labs within the past 24 hours have been reviewed.    Significant Imaging: I have reviewed all pertinent imaging results/findings within the past 24 hours.      Assessment/Plan:      * Calciphylaxis  Rash on bilateral thighs, extremely tender to touch. [facility] physician reports biopsy c/w calciphaxis (discussed w/ patient permission).   - records request sent for biopsy  - nephrology consulted  - wound care  - prn analgesics  - trial thiosulfate after receipt of [facility] records  - treatment of hyperphos/hyperPTH as noted elsewhere  - will likely need outpatient chronic pain follow up      End stage renal disease  Pt w/ ESRD. Refused dialysis on last admission, now s/p multiple sessions during [facility] hospitalization  - nephrology consulted  - last session 12/30  - access via [facility]  - MBD/anemia therapy as noted elsewhere      Hyperphosphatemia  Phos lowering important given calciphylaxis.  - Increase sevalemer pending nephrology input.  - on cincalcet for hyperpth   -  HD per nephrology      Renovascular hypertension  Continue home meds; titrate to effect      COVID-19  Asymptomatic covid-19 infection  - will attempt to arrange infusion inpatient  - isolation precautions  - no hypoxia, no indication for dex/rem      Type 2 diabetes mellitus  Reports taking 10U daily  - accuchecks/SSI      Nausea  Persistent nausea/vomiting reported at admission. No vomiting while inpatient. Last admission nausea resolved w/ bicarb drip, suggesting ESRD component.   - monitor as pt gets dialyzed      Secondary hyperparathyroidism  PTH severely elevated. Particularly concerning given potential role of PTH in the pathogenesis of calciphylaxis.   - start cincalcet pending renal input      Anemia of renal disease  Drop in hemoglobin concerning  - continue to monitor  - defer ESAs to nephrology  - transfuse to hgb 7      VTE Risk Mitigation (From admission, onward)         Ordered     enoxaparin injection 90 mg  Every 24 hours (non-standard times)         12/30/21 0539     IP VTE HIGH RISK PATIENT  Once         12/30/21 0339     Place sequential compression device  Until discontinued         12/30/21 0339     Place HENRI hose  Until discontinued         12/30/21 0339     Reason for No Pharmacological VTE Prophylaxis  Once        Question:  Reasons:  Answer:  Active Bleeding    12/30/21 0339                Discharge Planning   PAMELA:      Code Status: Full Code   Is the patient medically ready for discharge?:     Reason for patient still in hospital (select all that apply): Patient trending condition, Laboratory test, Treatment, Consult recommendations and Pending disposition  Discharge Plan A: Home with family                  Adebayo Reyes MD  Department of Hospital Medicine   [facility] [place] [facility]

## 2021-12-31 NOTE — ASSESSMENT & PLAN NOTE
PTH severely elevated. Particularly concerning given potential role of PTH in the pathogenesis of calciphylaxis.   - start cincalcet pending renal input

## 2021-12-31 NOTE — NURSING
Patient currently on room air, o2 sat 96%. Denies an sob, only c/o pain to left thigh wound site when touched, Will continue with plan of care

## 2021-12-31 NOTE — ASSESSMENT & PLAN NOTE
Pt w/ ESRD. Refused dialysis on last admission, now s/p multiple sessions during [facility]  - nephrology consulted  - last session 12/30  - access via [facility]  - MBD/anemia therapy as noted elsewhere

## 2021-12-31 NOTE — PROGRESS NOTES
Hemodialysis done x 3 hours/pt reinfused. uf at minimal during tx d/t cramping and later improved. Flushed right chest wall cvc without difficulty noted, heparin locked, caps applied. Report given via MELVA Koo RN via secure chat. Pt returned to room 311 via bed per transporter.     net fluid removed 2299 ml as tolerated

## 2021-12-31 NOTE — PROGRESS NOTES
Xuan Cousin is a 41 y.o. female patient.    Follow for ESRD, dialysis    Patient seen while on dialysis  No new c/o, comfortable      Scheduled Meds:   sodium chloride 0.9%   Intravenous Once    albuterol  2 puff Inhalation BID    ascorbic acid (vitamin C)  500 mg Oral BID    cinacalcet  30 mg Oral Daily with breakfast    cloNIDine 0.3 mg/24 hr td ptwk  1 patch Transdermal Q7 Days    enoxaparin  1 mg/kg Subcutaneous Q24H    famotidine  20 mg Oral Daily    multivitamin  1 tablet Oral Daily    mupirocin   Nasal BID    NIFEdipine  90 mg Oral Daily    sevelamer carbonate  2,400 mg Oral TID WM       Review of patient's allergies indicates:   Allergen Reactions    Vicodin [hydrocodone-acetaminophen] Hives    Codeine Hives         Vital Signs Range (Last 24H):  Temp:  [98 °F (36.7 °C)-99.2 °F (37.3 °C)]   Pulse:  [100-124]   Resp:  [14-18]   BP: (129-178)/(79-98)   SpO2:  [94 %-100 %]     I & O (Last 24H):    Intake/Output Summary (Last 24 hours) at 12/31/2021 1317  Last data filed at 12/31/2021 0100  Gross per 24 hour   Intake 240 ml   Output 350 ml   Net -110 ml           Physical Exam:  General appearance: well developed, well nourished, no distress  Lungs:  clear to auscultation bilaterally and normal respiratory effort  Heart: regular rate and rhythm  Abdomen: soft, non-tender non-distented; bowel sounds normal; no masses,  no organomegaly  Extremities: no cyanosis or edema, or clubbing    Laboratory:  I have reviewed all pertinent lab results within the past 24 hours.  CBC:   Recent Labs   Lab 12/31/21  0445   WBC 6.90   RBC 2.49*   HGB 7.0*   HCT 21.9*   *   MCV 88   MCH 28.1   MCHC 32.0     CMP:   Recent Labs   Lab 12/29/21  2345 12/30/21  0431 12/31/21  0445   GLU 89   < > 190*   CALCIUM 9.8   < > 9.4   ALBUMIN 2.4*  --   --    PROT 7.4  --   --       < > 133*   K 4.3   < > 4.9   CO2 23   < > 22*   CL 97   < > 97   BUN 31*   < > 46*   CREATININE 7.7*   < > 10.1*   ALKPHOS 113  --    --    ALT 20  --   --    AST 20  --   --    BILITOT 0.4  --   --     < > = values in this interval not displayed.       Imp/Plan    ESRD - on dialysis, stable, tolerated well  Calciphylaxis  COVID-19 in fection  HTN  DM type 2  Anemia of CKD    Continue present Rx  HD q MWF  We'll follow for dialysis        Isaura Meyers  12/31/2021

## 2021-12-31 NOTE — PLAN OF CARE
Problem: Diabetes Comorbidity  Goal: Blood Glucose Level Within Targeted Range  Intervention: Monitor and Manage Glycemia  Flowsheets (Taken 12/31/2021 1012)  Glycemic Management: blood glucose monitored

## 2021-12-31 NOTE — NURSING
Report received from off going nurse, MARQUES Reynaga. Patient AAO. No signs of distress noted. Call light in reach. Bed low and locked. Will continue plan of care.

## 2021-12-31 NOTE — CONSULTS
Reason for consultation:  ESRD, dialysis    HPI:  40 yo AA lady with h/o HTN, DM type 2, ESRD on dialysis who left AMA from [facility] presented to the hospital with c/o (B) thigh pain and N/V.  She is also tested (+) for COVID-19.  She is admitted and nephrology is consulted for ESRD, dialysis.  Her biopsy from the thigh wound at [facility] shows calciphylaxis.    PMH:  As above.    Scheduled Meds:   sodium chloride 0.9%   Intravenous Once    albuterol  2 puff Inhalation BID    ascorbic acid (vitamin C)  500 mg Oral BID    cinacalcet  30 mg Oral Daily with breakfast    cloNIDine 0.3 mg/24 hr td ptwk  1 patch Transdermal Q7 Days    enoxaparin  1 mg/kg Subcutaneous Q24H    famotidine  20 mg Oral Daily    multivitamin  1 tablet Oral Daily    mupirocin   Nasal BID    NIFEdipine  90 mg Oral Daily    sevelamer carbonate  1,600 mg Oral TID WM       Review of patient's allergies indicates:   Allergen Reactions    Vicodin [hydrocodone-acetaminophen] Hives    Codeine Hives     Family history:  Non contributory    Social history:  Former smoker, (+) marijuana    Vital Signs Range (Last 24H):  Temp:  [98 °F (36.7 °C)-99.2 °F (37.3 °C)]   Pulse:  [100-124]   Resp:  [14-18]   BP: (146-178)/(79-95)   SpO2:  [94 %-100 %]     I & O (Last 24H):    Intake/Output Summary (Last 24 hours) at 12/31/2021 1053  Last data filed at 12/31/2021 0100  Gross per 24 hour   Intake 480 ml   Output 350 ml   Net 130 ml           Physical Exam:  General appearance: well developed, well nourished, no distress  Lungs:  clear to auscultation bilaterally and normal respiratory effort  Heart: regular rate and rhythm  Abdomen: soft, non-tender non-distented; bowel sounds normal; no masses,  no organomegaly  Extremities: no cyanosis or edema, or clubbing    Laboratory:  I have reviewed all pertinent lab results within the past 24 hours.  CBC:   Recent Labs   Lab 12/31/21  0445   WBC 6.90   RBC 2.49*   HGB 7.0*   HCT 21.9*   *   MCV 88   MCH 28.1    MCHC 32.0     CMP:   Recent Labs   Lab 12/29/21  2345 12/30/21  0431 12/31/21  0445   GLU 89   < > 190*   CALCIUM 9.8   < > 9.4   ALBUMIN 2.4*  --   --    PROT 7.4  --   --       < > 133*   K 4.3   < > 4.9   CO2 23   < > 22*   CL 97   < > 97   BUN 31*   < > 46*   CREATININE 7.7*   < > 10.1*   ALKPHOS 113  --   --    ALT 20  --   --    AST 20  --   --    BILITOT 0.4  --   --     < > = values in this interval not displayed.       Impressions:    ESRD  Calciphylaxis  HTN  DM type 2  Anemia of CKD    Discussion/Recommnedations:    HD today  Continue present Rx  Check iPTH and phosphorus  We'll follow for dialysis    Thank you for the courtesy of the consultation      Isaura Meyers  12/31/2021

## 2021-12-31 NOTE — HOSPITAL COURSE
ESRD patient with calciphylaxis. Admitted to hospital medicine. Nephrology consulted. Wound care consulted for thigh wounds. Pt gave verbal and written consent for records release from [facility]. At [facility] pt underwent biopsy of R thigh which was c/w calciphylaxis, and underwent EGD which showed candidal esophagitis. At  she was restarted on HD, given sodium thiosulfate. MBD therapy restarted. Restarted on fluconazole. Pain present but controlled on pain meds. COVID positive but stable on RA. had febrile episodes last week which found to have a UTI and treated and abx stopped. Seen by ID, who has now signed off. Febrile episodes had resolved but pt spiked another fever on 1/19. Cultures ordered. Has issues with sinus tach and all work up was negative. Maybe some volume down. Started on metoprolol and doing well. COVID HD chair confirmed prior to DC. Outpatient wound care and neprho f/u for calciphylaxsis. Developed worsening anemia and concern coffee ground emesis. GI consulted. Initiated on pantoprazole. Transfused and stable. Underwent EGD on 1/27. Showed esophagitis, suspected to be due to reflux. Completed course of fluconazole. HOB elevated and continue pantoprazole BID. Is very deconditioned and in pain from calciphylaxis. Patient's outpatient HD was ultimately set up and her family wished to take her home with home health.    On the day of d/c , patient as noted to have transient fever of 100.8 F that resolved. HDS with stable BP for this patient. WBC was noted 15 from 12-13 prior. Patient seemed quiet on the day of d/c but family noted that is her baseline.  She was d/c with below meds

## 2021-12-31 NOTE — ASSESSMENT & PLAN NOTE
Drop in hemoglobin concerning  - continue to monitor  - defer ESAs to nephrology  - transfuse to hgb 7

## 2021-12-31 NOTE — ASSESSMENT & PLAN NOTE
Asymptomatic covid-19 infection  - will attempt to arrange infusion inpatient  - isolation precautions  - no hypoxia, no indication for dex/rem

## 2021-12-31 NOTE — SUBJECTIVE & OBJECTIVE
Interval History: No events overnight. Pt denies complaints this AM    Review of Systems   Constitutional: Negative for chills and fever.   Respiratory: Negative for cough and shortness of breath.    Cardiovascular: Negative for chest pain and leg swelling.   Gastrointestinal: Negative for abdominal distention and abdominal pain.     Objective:     Vital Signs (Most Recent):  Temp: 98.5 °F (36.9 °C) (12/31/21 0723)  Pulse: 100 (12/31/21 0723)  Resp: 18 (12/31/21 0723)  BP: (!) 146/84 (12/31/21 0723)  SpO2: 100 % (12/31/21 0723) Vital Signs (24h Range):  Temp:  [98 °F (36.7 °C)-99.2 °F (37.3 °C)] 98.5 °F (36.9 °C)  Pulse:  [100-124] 100  Resp:  [14-18] 18  SpO2:  [94 %-100 %] 100 %  BP: (146-178)/(79-95) 146/84     Weight: 94.8 kg (208 lb 14.4 oz)  Body mass index is 33.72 kg/m².    Intake/Output Summary (Last 24 hours) at 12/31/2021 0913  Last data filed at 12/31/2021 0100  Gross per 24 hour   Intake 480 ml   Output 350 ml   Net 130 ml      Physical Exam  Constitutional:       General: She is not in acute distress.     Appearance: She is ill-appearing. She is not toxic-appearing or diaphoretic.   Cardiovascular:      Rate and Rhythm: Regular rhythm. Tachycardia present.      Heart sounds: No murmur heard.  No gallop.    Pulmonary:      Effort: Pulmonary effort is normal. No respiratory distress.      Breath sounds: Normal breath sounds. No wheezing.   Abdominal:      General: Bowel sounds are normal. There is no distension.      Palpations: Abdomen is soft.      Tenderness: There is no abdominal tenderness.         Significant Labs: All pertinent labs within the past 24 hours have been reviewed.    Significant Imaging: I have reviewed all pertinent imaging results/findings within the past 24 hours.

## 2021-12-31 NOTE — ASSESSMENT & PLAN NOTE
Persistent nausea/vomiting reported at admission. No vomiting while inpatient. Last admission nausea resolved w/ bicarb drip, suggesting ESRD component.   - monitor as pt gets dialyzed

## 2021-12-31 NOTE — ASSESSMENT & PLAN NOTE
Phos lowering important given calciphylaxis.  - Increase sevalemer pending nephrology input.  - on cincalcet for hyperpth   - HD per nephrology

## 2021-12-31 NOTE — ASSESSMENT & PLAN NOTE
Rash on bilateral thighs, extremely tender to touch. [facility] physician reports biopsy c/w calciphaxis (discussed w/ patient permission).   - records request sent for biopsy  - nephrology consulted  - wound care  - prn analgesics  - trial thiosulfate after receipt of [facility] records  - treatment of hyperphos/hyperPTH as noted elsewhere  - will likely need outpatient chronic pain follow up

## 2021-12-31 NOTE — PROGRESS NOTES
Pt arrived to [facility] via bed per transport. Right chest wall perm cvc aspirated/flushed without difficulty noted. Hemodialysis started x 3 hours. Plan is to remove 3 L as tolerated.tolerating tx well at this time.

## 2022-01-01 LAB
ABO + RH BLD: NORMAL
ANION GAP SERPL CALC-SCNC: 11 MMOL/L (ref 8–16)
BASOPHILS # BLD AUTO: ABNORMAL K/UL (ref 0–0.2)
BASOPHILS NFR BLD: 0 % (ref 0–1.9)
BLD GP AB SCN CELLS X3 SERPL QL: NORMAL
BLD PROD TYP BPU: NORMAL
BLOOD UNIT EXPIRATION DATE: NORMAL
BLOOD UNIT TYPE CODE: 5100
BLOOD UNIT TYPE: NORMAL
BUN SERPL-MCNC: 30 MG/DL (ref 6–20)
CALCIUM SERPL-MCNC: 8.3 MG/DL (ref 8.7–10.5)
CHLORIDE SERPL-SCNC: 97 MMOL/L (ref 95–110)
CO2 SERPL-SCNC: 27 MMOL/L (ref 23–29)
CODING SYSTEM: NORMAL
CREAT SERPL-MCNC: 6.6 MG/DL (ref 0.5–1.4)
D DIMER PPP IA.FEU-MCNC: 3.92 MG/L FEU
DIFFERENTIAL METHOD: ABNORMAL
DISPENSE STATUS: NORMAL
EOSINOPHIL # BLD AUTO: ABNORMAL K/UL (ref 0–0.5)
EOSINOPHIL NFR BLD: 0 % (ref 0–8)
ERYTHROCYTE [DISTWIDTH] IN BLOOD BY AUTOMATED COUNT: 15.3 % (ref 11.5–14.5)
EST. GFR  (AFRICAN AMERICAN): 8 ML/MIN/1.73 M^2
EST. GFR  (NON AFRICAN AMERICAN): 7 ML/MIN/1.73 M^2
FERRITIN SERPL-MCNC: 1517 NG/ML (ref 20–300)
GLUCOSE SERPL-MCNC: 207 MG/DL (ref 70–110)
HCT VFR BLD AUTO: 21.1 % (ref 37–48.5)
HGB BLD-MCNC: 6.4 G/DL (ref 12–16)
IMM GRANULOCYTES # BLD AUTO: ABNORMAL K/UL (ref 0–0.04)
IMM GRANULOCYTES NFR BLD AUTO: ABNORMAL % (ref 0–0.5)
LDH SERPL L TO P-CCNC: 111 U/L (ref 110–260)
LYMPHOCYTES # BLD AUTO: ABNORMAL K/UL (ref 1–4.8)
LYMPHOCYTES NFR BLD: 24 % (ref 18–48)
MCH RBC QN AUTO: 28.1 PG (ref 27–31)
MCHC RBC AUTO-ENTMCNC: 30.3 G/DL (ref 32–36)
MCV RBC AUTO: 93 FL (ref 82–98)
MONOCYTES # BLD AUTO: ABNORMAL K/UL (ref 0.3–1)
MONOCYTES NFR BLD: 3 % (ref 4–15)
NEUTROPHILS NFR BLD: 73 % (ref 38–73)
NRBC BLD-RTO: 0 /100 WBC
PHOSPHATE SERPL-MCNC: 3.8 MG/DL (ref 2.7–4.5)
PLATELET # BLD AUTO: 158 K/UL (ref 150–450)
PMV BLD AUTO: 12 FL (ref 9.2–12.9)
POCT GLUCOSE: 162 MG/DL (ref 70–110)
POCT GLUCOSE: 182 MG/DL (ref 70–110)
POCT GLUCOSE: 236 MG/DL (ref 70–110)
POTASSIUM SERPL-SCNC: 4.3 MMOL/L (ref 3.5–5.1)
RBC # BLD AUTO: 2.28 M/UL (ref 4–5.4)
SODIUM SERPL-SCNC: 135 MMOL/L (ref 136–145)
TRANS ERYTHROCYTES VOL PATIENT: NORMAL ML
WBC # BLD AUTO: 6.12 K/UL (ref 3.9–12.7)

## 2022-01-01 PROCEDURE — 84100 ASSAY OF PHOSPHORUS: CPT | Performed by: STUDENT IN AN ORGANIZED HEALTH CARE EDUCATION/TRAINING PROGRAM

## 2022-01-01 PROCEDURE — 21400001 HC TELEMETRY ROOM

## 2022-01-01 PROCEDURE — 25000003 PHARM REV CODE 250: Performed by: EMERGENCY MEDICINE

## 2022-01-01 PROCEDURE — 83615 LACTATE (LD) (LDH) ENZYME: CPT | Performed by: EMERGENCY MEDICINE

## 2022-01-01 PROCEDURE — 82728 ASSAY OF FERRITIN: CPT | Performed by: EMERGENCY MEDICINE

## 2022-01-01 PROCEDURE — 85027 COMPLETE CBC AUTOMATED: CPT | Performed by: EMERGENCY MEDICINE

## 2022-01-01 PROCEDURE — 99900035 HC TECH TIME PER 15 MIN (STAT)

## 2022-01-01 PROCEDURE — 63600175 PHARM REV CODE 636 W HCPCS: Performed by: STUDENT IN AN ORGANIZED HEALTH CARE EDUCATION/TRAINING PROGRAM

## 2022-01-01 PROCEDURE — 25000003 PHARM REV CODE 250: Performed by: INTERNAL MEDICINE

## 2022-01-01 PROCEDURE — 25000003 PHARM REV CODE 250: Performed by: STUDENT IN AN ORGANIZED HEALTH CARE EDUCATION/TRAINING PROGRAM

## 2022-01-01 PROCEDURE — 27000207 HC ISOLATION

## 2022-01-01 PROCEDURE — P9021 RED BLOOD CELLS UNIT: HCPCS | Performed by: STUDENT IN AN ORGANIZED HEALTH CARE EDUCATION/TRAINING PROGRAM

## 2022-01-01 PROCEDURE — 80048 BASIC METABOLIC PNL TOTAL CA: CPT | Performed by: STUDENT IN AN ORGANIZED HEALTH CARE EDUCATION/TRAINING PROGRAM

## 2022-01-01 PROCEDURE — 85007 BL SMEAR W/DIFF WBC COUNT: CPT | Performed by: EMERGENCY MEDICINE

## 2022-01-01 PROCEDURE — 36430 TRANSFUSION BLD/BLD COMPNT: CPT

## 2022-01-01 PROCEDURE — 85379 FIBRIN DEGRADATION QUANT: CPT | Performed by: EMERGENCY MEDICINE

## 2022-01-01 PROCEDURE — 63700000 PHARM REV CODE 250 ALT 637 W/O HCPCS: Performed by: STUDENT IN AN ORGANIZED HEALTH CARE EDUCATION/TRAINING PROGRAM

## 2022-01-01 PROCEDURE — 86920 COMPATIBILITY TEST SPIN: CPT | Performed by: STUDENT IN AN ORGANIZED HEALTH CARE EDUCATION/TRAINING PROGRAM

## 2022-01-01 PROCEDURE — 36415 COLL VENOUS BLD VENIPUNCTURE: CPT | Performed by: STUDENT IN AN ORGANIZED HEALTH CARE EDUCATION/TRAINING PROGRAM

## 2022-01-01 PROCEDURE — 63600175 PHARM REV CODE 636 W HCPCS: Performed by: EMERGENCY MEDICINE

## 2022-01-01 PROCEDURE — 86850 RBC ANTIBODY SCREEN: CPT | Performed by: STUDENT IN AN ORGANIZED HEALTH CARE EDUCATION/TRAINING PROGRAM

## 2022-01-01 PROCEDURE — 94761 N-INVAS EAR/PLS OXIMETRY MLT: CPT

## 2022-01-01 RX ORDER — HYDROCODONE BITARTRATE AND ACETAMINOPHEN 500; 5 MG/1; MG/1
TABLET ORAL
Status: DISCONTINUED | OUTPATIENT
Start: 2022-01-01 | End: 2022-01-28 | Stop reason: HOSPADM

## 2022-01-01 RX ORDER — SODIUM THIOSULFATE 250 MG/ML
25 INJECTION, SOLUTION INTRAVENOUS
Status: DISCONTINUED | OUTPATIENT
Start: 2022-01-03 | End: 2022-01-08

## 2022-01-01 RX ADMIN — FAMOTIDINE 20 MG: 20 TABLET ORAL at 10:01

## 2022-01-01 RX ADMIN — OXYCODONE AND ACETAMINOPHEN 1 TABLET: 5; 325 TABLET ORAL at 08:01

## 2022-01-01 RX ADMIN — ENOXAPARIN SODIUM 90 MG: 100 INJECTION SUBCUTANEOUS at 06:01

## 2022-01-01 RX ADMIN — MUPIROCIN: 20 OINTMENT TOPICAL at 10:01

## 2022-01-01 RX ADMIN — NIFEDIPINE 90 MG: 30 TABLET, FILM COATED, EXTENDED RELEASE ORAL at 10:01

## 2022-01-01 RX ADMIN — SEVELAMER CARBONATE 2400 MG: 800 TABLET, FILM COATED ORAL at 12:01

## 2022-01-01 RX ADMIN — OXYCODONE AND ACETAMINOPHEN 1 TABLET: 5; 325 TABLET ORAL at 01:01

## 2022-01-01 RX ADMIN — CINACALCET 30 MG: 30 TABLET, FILM COATED ORAL at 10:01

## 2022-01-01 RX ADMIN — MUPIROCIN: 20 OINTMENT TOPICAL at 08:01

## 2022-01-01 RX ADMIN — FLUCONAZOLE 400 MG: 100 TABLET ORAL at 03:01

## 2022-01-01 RX ADMIN — OXYCODONE HYDROCHLORIDE AND ACETAMINOPHEN 500 MG: 500 TABLET ORAL at 10:01

## 2022-01-01 RX ADMIN — INSULIN ASPART 2 UNITS: 100 INJECTION, SOLUTION INTRAVENOUS; SUBCUTANEOUS at 12:01

## 2022-01-01 RX ADMIN — THERA TABS 1 TABLET: TAB at 10:01

## 2022-01-01 RX ADMIN — OXYCODONE HYDROCHLORIDE AND ACETAMINOPHEN 500 MG: 500 TABLET ORAL at 08:01

## 2022-01-01 RX ADMIN — SEVELAMER CARBONATE 2400 MG: 800 TABLET, FILM COATED ORAL at 06:01

## 2022-01-01 NOTE — NURSING
Patient awake, alert, oriented resting comfortably. No signs of distress observed. bed low and locked. Call light in reach. Report given to oncoming nurse. NORMAN Reynaga. 12hour chart check complete. Will continue plan of care.

## 2022-01-01 NOTE — SUBJECTIVE & OBJECTIVE
Interval History: No events overnight. N/V improved.    Review of Systems   Constitutional: Negative for chills and fever.   Respiratory: Negative for cough and shortness of breath.    Cardiovascular: Negative for chest pain and leg swelling.   Gastrointestinal: Negative for abdominal distention and abdominal pain.     Objective:     Vital Signs (Most Recent):  Temp: 98.1 °F (36.7 °C) (01/01/22 1140)  Pulse: 101 (01/01/22 1140)  Resp: 18 (01/01/22 1140)  BP: (!) 108/55 (01/01/22 1140)  SpO2: (!) 94 % (01/01/22 1140) Vital Signs (24h Range):  Temp:  [97.7 °F (36.5 °C)-98.8 °F (37.1 °C)] 98.1 °F (36.7 °C)  Pulse:  [] 101  Resp:  [14-18] 18  SpO2:  [94 %-100 %] 94 %  BP: ()/(55-78) 108/55     Weight: 94.8 kg (208 lb 14.4 oz)  Body mass index is 33.72 kg/m².    Intake/Output Summary (Last 24 hours) at 1/1/2022 1352  Last data filed at 12/31/2021 1733  Gross per 24 hour   Intake 120 ml   Output --   Net 120 ml      Physical Exam  Constitutional:       General: She is not in acute distress.     Appearance: She is ill-appearing. She is not toxic-appearing or diaphoretic.   Cardiovascular:      Rate and Rhythm: Normal rate and regular rhythm.      Heart sounds: No murmur heard.  No gallop.    Pulmonary:      Effort: Pulmonary effort is normal. No respiratory distress.      Breath sounds: Normal breath sounds. No wheezing.   Abdominal:      General: Bowel sounds are normal. There is no distension.      Palpations: Abdomen is soft.      Tenderness: There is no abdominal tenderness.         Significant Labs: All pertinent labs within the past 24 hours have been reviewed.    Significant Imaging: I have reviewed all pertinent imaging results/findings within the past 24 hours.

## 2022-01-01 NOTE — PLAN OF CARE
01/01/22 1252   Medicare Message   Important Message from Medicare regarding Discharge Appeal Rights Given to patient/caregiver;Explained to patient/caregiver;Other (comments)   Date IMM was signed 01/01/22   Time IMM was signed 1252   IMM notice reviewed with patient's sister, Tressa, who verbally expressed understanding of IMM notice and Medicare rights. Copy will be mailed to patient's home.

## 2022-01-01 NOTE — ASSESSMENT & PLAN NOTE
Pt w/ ESRD. Refused dialysis on last admission, now s/p multiple sessions during [facility]  - nephrology consulted  - last session 12/30  - access via [facility]  - MBD/anemia therapy as noted elsewhere

## 2022-01-01 NOTE — ASSESSMENT & PLAN NOTE
Rash on bilateral thighs, extremely tender to touch. [facility] biopsy c/w calciphaxis, per report  - records request sent for biopsy  - nephrology consulted  - wound care  - prn analgesics  - sodium thiosulfate  - treatment of hyperphos/hyperPTH as noted elsewhere  - will likely need outpatient chronic pain follow up

## 2022-01-01 NOTE — ASSESSMENT & PLAN NOTE
Drop in hemoglobin concerning for bleeding process, although clinically pt w/o bleed.  - continue to monitor  - defer ESAs to nephrology  - transfuse to hgb 7

## 2022-01-01 NOTE — ASSESSMENT & PLAN NOTE
Persistent nausea/vomiting reported at admission. No vomiting while inpatient. Last admission nausea resolved w/ bicarb drip, suggesting ESRD component.   - monitor as pt gets dialyzed  - see esophageal candidiasis

## 2022-01-01 NOTE — ASSESSMENT & PLAN NOTE
PTH severely elevated. Particularly concerning given calciphylaxis  - continue cincalcet  - iPTH pending

## 2022-01-01 NOTE — ASSESSMENT & PLAN NOTE
Phos lowering important given calciphylaxis.  - continue sevalamer  - on cincalcet for hyperpth   - HD per nephrology

## 2022-01-01 NOTE — PROGRESS NOTES
"[facility] Medicine  Progress Note    Patient Name: Xuan Ricardo  MRN: [id number]  Patient Class: IP- Inpatient   Admission Date: 12/29/2021  Length of Stay: 2 days  Attending Physician: Adebayo Reyes MD  Primary Care Provider: Katharine Franks MD        Subjective:     Principal Problem:Calciphylaxis        HPI:  Ms. Ricardo is a 41y F w/ ESRD, recently dialyzed at [facility], presents after leaving AMA from [facility] with nausea/vomiting and bilateral thigh pain.     She says that the nausea and vomiting began a few weeks ago. She mostly reports vomiting up food content, denies overt blood. Says that the vomit is sometimes darker colored. She has been told she has "coffee-ground" emesis but does not describe the contents of her vomit as such. She denies abdominal pain, denies delayed vomiting after eating. Feels nauseas all the time, regardless of food intake.    She has also developed severe bilateral thigh lesions. She describes them as swelling w/ extreme sensitivity to touch. They started approximately two weeks ago and have persisted, with minimal improvement in pain. One thigh was biopsied at [facility] at her most recent admission, and she reports that the site continues to be tender.      Overview/Hospital Course:  Admitted to hospital medicine. Nephrology consulted. Wound care consulted for thigh wounds. Pt gave verbal and written consent for records release from [facility]. At [facility] pt underwent biopsy of R thigh which was c/w calciphylaxis. She also underwent EGD which showed candidal esophagitis. She was restarted on HD, given sodium thiosulfate.      Interval History: No events overnight. N/V improved.    Review of Systems   Constitutional: Negative for chills and fever.   Respiratory: Negative for cough and shortness of breath.    Cardiovascular: Negative for chest pain and leg swelling.   Gastrointestinal: Negative for abdominal distention and abdominal pain.     Objective:     Vital " Signs (Most Recent):  Temp: 98.1 °F (36.7 °C) (01/01/22 1140)  Pulse: 101 (01/01/22 1140)  Resp: 18 (01/01/22 1140)  BP: (!) 108/55 (01/01/22 1140)  SpO2: (!) 94 % (01/01/22 1140) Vital Signs (24h Range):  Temp:  [97.7 °F (36.5 °C)-98.8 °F (37.1 °C)] 98.1 °F (36.7 °C)  Pulse:  [] 101  Resp:  [14-18] 18  SpO2:  [94 %-100 %] 94 %  BP: ()/(55-78) 108/55     Weight: 94.8 kg (208 lb 14.4 oz)  Body mass index is 33.72 kg/m².    Intake/Output Summary (Last 24 hours) at 1/1/2022 1352  Last data filed at 12/31/2021 1733  Gross per 24 hour   Intake 120 ml   Output --   Net 120 ml      Physical Exam  Constitutional:       General: She is not in acute distress.     Appearance: She is ill-appearing. She is not toxic-appearing or diaphoretic.   Cardiovascular:      Rate and Rhythm: Normal rate and regular rhythm.      Heart sounds: No murmur heard.  No gallop.    Pulmonary:      Effort: Pulmonary effort is normal. No respiratory distress.      Breath sounds: Normal breath sounds. No wheezing.   Abdominal:      General: Bowel sounds are normal. There is no distension.      Palpations: Abdomen is soft.      Tenderness: There is no abdominal tenderness.         Significant Labs: All pertinent labs within the past 24 hours have been reviewed.    Significant Imaging: I have reviewed all pertinent imaging results/findings within the past 24 hours.      Assessment/Plan:      * Calciphylaxis  Rash on bilateral thighs, extremely tender to touch. [facility] biopsy c/w calciphaxis, per report  - records request sent for biopsy  - nephrology consulted  - wound care  - prn analgesics  - sodium thiosulfate  - treatment of hyperphos/hyperPTH as noted elsewhere  - will likely need outpatient chronic pain follow up      End stage renal disease  Pt w/ ESRD. Refused dialysis on last admission, now s/p multiple sessions during [facility] hospitalization  - nephrology consulted  - last session 12/30  - access via [facility]  - MBD/anemia therapy as  noted elsewhere      Hyperphosphatemia  Phos lowering important given calciphylaxis.  - continue sevalamer  - on cincalcet for hyperpth   - HD per nephrology      Renovascular hypertension  Continue home meds; titrate to effect      COVID-19  Asymptomatic covid-19 infection  - will attempt to arrange infusion inpatient  - isolation precautions  - no hypoxia, no indication for dex/rem      Type 2 diabetes mellitus  Reports taking 10U daily  - accuchecks/SSI      Nausea  Persistent nausea/vomiting reported at admission. No vomiting while inpatient. Last admission nausea resolved w/ bicarb drip, suggesting ESRD component.   - monitor as pt gets dialyzed  - see esophageal candidiasis      Secondary hyperparathyroidism  PTH severely elevated. Particularly concerning given calciphylaxis  - continue cincalcet  - iPTH pending    Anemia of renal disease  Drop in hemoglobin concerning for bleeding process, although clinically pt w/o bleed.  - continue to monitor  - defer ESAs to nephrology  - transfuse to hgb 7      VTE Risk Mitigation (From admission, onward)         Ordered     heparin (porcine) injection 3,200 Units  As needed (PRN)         12/31/21 1335     enoxaparin injection 90 mg  Every 24 hours (non-standard times)         12/30/21 0539     IP VTE HIGH RISK PATIENT  Once         12/30/21 0339     Place sequential compression device  Until discontinued         12/30/21 0339     Place HENRI hose  Until discontinued         12/30/21 0339     Reason for No Pharmacological VTE Prophylaxis  Once        Question:  Reasons:  Answer:  Active Bleeding    12/30/21 0339                Discharge Planning   PAMELA:      Code Status: Full Code   Is the patient medically ready for discharge?:     Reason for patient still in hospital (select all that apply): Patient trending condition, Laboratory test, Treatment, Consult recommendations and Pending disposition  Discharge Plan A: Home with family                  Adebayo Reyes,  MD  Department of Hospital Medicine   [facility] - Telemetry

## 2022-01-02 PROBLEM — Z00.8 ENCOUNTER FOR ASSESSMENT OF DECISION-MAKING CAPACITY: Status: ACTIVE | Noted: 2022-01-02

## 2022-01-02 LAB
ANION GAP SERPL CALC-SCNC: 12 MMOL/L (ref 8–16)
BASOPHILS # BLD AUTO: ABNORMAL K/UL (ref 0–0.2)
BASOPHILS NFR BLD: 0 % (ref 0–1.9)
BUN SERPL-MCNC: 41 MG/DL (ref 6–20)
CALCIUM SERPL-MCNC: 7.8 MG/DL (ref 8.7–10.5)
CHLORIDE SERPL-SCNC: 97 MMOL/L (ref 95–110)
CO2 SERPL-SCNC: 27 MMOL/L (ref 23–29)
CREAT SERPL-MCNC: 8.8 MG/DL (ref 0.5–1.4)
DIFFERENTIAL METHOD: ABNORMAL
EOSINOPHIL # BLD AUTO: ABNORMAL K/UL (ref 0–0.5)
EOSINOPHIL NFR BLD: 1 % (ref 0–8)
ERYTHROCYTE [DISTWIDTH] IN BLOOD BY AUTOMATED COUNT: 14.8 % (ref 11.5–14.5)
EST. GFR  (AFRICAN AMERICAN): 6 ML/MIN/1.73 M^2
EST. GFR  (NON AFRICAN AMERICAN): 5 ML/MIN/1.73 M^2
GLUCOSE SERPL-MCNC: 158 MG/DL (ref 70–110)
HCT VFR BLD AUTO: 26 % (ref 37–48.5)
HGB BLD-MCNC: 8 G/DL (ref 12–16)
IMM GRANULOCYTES # BLD AUTO: ABNORMAL K/UL (ref 0–0.04)
IMM GRANULOCYTES NFR BLD AUTO: ABNORMAL % (ref 0–0.5)
LYMPHOCYTES # BLD AUTO: ABNORMAL K/UL (ref 1–4.8)
LYMPHOCYTES NFR BLD: 22 % (ref 18–48)
MCH RBC QN AUTO: 28.9 PG (ref 27–31)
MCHC RBC AUTO-ENTMCNC: 30.8 G/DL (ref 32–36)
MCV RBC AUTO: 94 FL (ref 82–98)
METAMYELOCYTES NFR BLD MANUAL: 1 %
MONOCYTES # BLD AUTO: ABNORMAL K/UL (ref 0.3–1)
MONOCYTES NFR BLD: 3 % (ref 4–15)
NEUTROPHILS # BLD AUTO: ABNORMAL K/UL (ref 1.8–7.7)
NEUTROPHILS NFR BLD: 72 % (ref 38–73)
NEUTS BAND NFR BLD MANUAL: 1 %
NRBC BLD-RTO: 0 /100 WBC
PHOSPHATE SERPL-MCNC: 3.7 MG/DL (ref 2.7–4.5)
PLATELET # BLD AUTO: 180 K/UL (ref 150–450)
PMV BLD AUTO: 11.7 FL (ref 9.2–12.9)
POCT GLUCOSE: 107 MG/DL (ref 70–110)
POCT GLUCOSE: 172 MG/DL (ref 70–110)
POCT GLUCOSE: 174 MG/DL (ref 70–110)
POCT GLUCOSE: 209 MG/DL (ref 70–110)
POCT GLUCOSE: 298 MG/DL (ref 70–110)
POTASSIUM SERPL-SCNC: 4.7 MMOL/L (ref 3.5–5.1)
RBC # BLD AUTO: 2.77 M/UL (ref 4–5.4)
SODIUM SERPL-SCNC: 136 MMOL/L (ref 136–145)
WBC # BLD AUTO: 8.63 K/UL (ref 3.9–12.7)

## 2022-01-02 PROCEDURE — 36415 COLL VENOUS BLD VENIPUNCTURE: CPT | Performed by: STUDENT IN AN ORGANIZED HEALTH CARE EDUCATION/TRAINING PROGRAM

## 2022-01-02 PROCEDURE — 85007 BL SMEAR W/DIFF WBC COUNT: CPT | Performed by: EMERGENCY MEDICINE

## 2022-01-02 PROCEDURE — 94760 N-INVAS EAR/PLS OXIMETRY 1: CPT

## 2022-01-02 PROCEDURE — 25000003 PHARM REV CODE 250: Performed by: EMERGENCY MEDICINE

## 2022-01-02 PROCEDURE — 25000003 PHARM REV CODE 250: Performed by: STUDENT IN AN ORGANIZED HEALTH CARE EDUCATION/TRAINING PROGRAM

## 2022-01-02 PROCEDURE — 94640 AIRWAY INHALATION TREATMENT: CPT

## 2022-01-02 PROCEDURE — 27000207 HC ISOLATION

## 2022-01-02 PROCEDURE — 63600175 PHARM REV CODE 636 W HCPCS: Performed by: EMERGENCY MEDICINE

## 2022-01-02 PROCEDURE — 63600175 PHARM REV CODE 636 W HCPCS: Performed by: STUDENT IN AN ORGANIZED HEALTH CARE EDUCATION/TRAINING PROGRAM

## 2022-01-02 PROCEDURE — 84100 ASSAY OF PHOSPHORUS: CPT | Performed by: STUDENT IN AN ORGANIZED HEALTH CARE EDUCATION/TRAINING PROGRAM

## 2022-01-02 PROCEDURE — 85027 COMPLETE CBC AUTOMATED: CPT | Performed by: EMERGENCY MEDICINE

## 2022-01-02 PROCEDURE — 63700000 PHARM REV CODE 250 ALT 637 W/O HCPCS: Performed by: STUDENT IN AN ORGANIZED HEALTH CARE EDUCATION/TRAINING PROGRAM

## 2022-01-02 PROCEDURE — 80048 BASIC METABOLIC PNL TOTAL CA: CPT | Performed by: STUDENT IN AN ORGANIZED HEALTH CARE EDUCATION/TRAINING PROGRAM

## 2022-01-02 PROCEDURE — 25000003 PHARM REV CODE 250: Performed by: INTERNAL MEDICINE

## 2022-01-02 PROCEDURE — 99900035 HC TECH TIME PER 15 MIN (STAT)

## 2022-01-02 PROCEDURE — 21400001 HC TELEMETRY ROOM

## 2022-01-02 RX ADMIN — OXYCODONE HYDROCHLORIDE AND ACETAMINOPHEN 500 MG: 500 TABLET ORAL at 08:01

## 2022-01-02 RX ADMIN — MUPIROCIN: 20 OINTMENT TOPICAL at 09:01

## 2022-01-02 RX ADMIN — CINACALCET 30 MG: 30 TABLET, FILM COATED ORAL at 09:01

## 2022-01-02 RX ADMIN — FAMOTIDINE 20 MG: 20 TABLET ORAL at 09:01

## 2022-01-02 RX ADMIN — THERA TABS 1 TABLET: TAB at 09:01

## 2022-01-02 RX ADMIN — OXYCODONE HYDROCHLORIDE AND ACETAMINOPHEN 500 MG: 500 TABLET ORAL at 09:01

## 2022-01-02 RX ADMIN — SEVELAMER CARBONATE 2400 MG: 800 TABLET, FILM COATED ORAL at 09:01

## 2022-01-02 RX ADMIN — SEVELAMER CARBONATE 2400 MG: 800 TABLET, FILM COATED ORAL at 04:01

## 2022-01-02 RX ADMIN — ENOXAPARIN SODIUM 90 MG: 100 INJECTION SUBCUTANEOUS at 06:01

## 2022-01-02 RX ADMIN — SEVELAMER CARBONATE 2400 MG: 800 TABLET, FILM COATED ORAL at 11:01

## 2022-01-02 RX ADMIN — INSULIN ASPART 2 UNITS: 100 INJECTION, SOLUTION INTRAVENOUS; SUBCUTANEOUS at 04:01

## 2022-01-02 RX ADMIN — OXYCODONE AND ACETAMINOPHEN 1 TABLET: 5; 325 TABLET ORAL at 09:01

## 2022-01-02 RX ADMIN — MUPIROCIN: 20 OINTMENT TOPICAL at 08:01

## 2022-01-02 RX ADMIN — FLUCONAZOLE 400 MG: 100 TABLET ORAL at 09:01

## 2022-01-02 NOTE — PROGRESS NOTES
"[facility] Medicine  Progress Note    Patient Name: Xuan Ricardo  MRN: [id number]  Patient Class: IP- Inpatient   Admission Date: 12/29/2021  Length of Stay: 3 days  Attending Physician: Adebayo Reyes MD  Primary Care Provider: Katharine Franks MD        Subjective:     Principal Problem:Calciphylaxis        HPI:  Ms. Ricardo is a 41y F w/ ESRD, recently dialyzed at [facility], presents after leaving AMA from [facility] with nausea/vomiting and bilateral thigh pain.     She says that the nausea and vomiting began a few weeks ago. She mostly reports vomiting up food content, denies overt blood. Says that the vomit is sometimes darker colored. She has been told she has "coffee-ground" emesis but does not describe the contents of her vomit as such. She denies abdominal pain, denies delayed vomiting after eating. Feels nauseas all the time, regardless of food intake.    She has also developed severe bilateral thigh lesions. She describes them as swelling w/ extreme sensitivity to touch. They started approximately two weeks ago and have persisted, with minimal improvement in pain. One thigh was biopsied at [facility] at her most recent admission, and she reports that the site continues to be tender.      Overview/Hospital Course:  Admitted to hospital medicine. Nephrology consulted. Wound care consulted for thigh wounds. Pt gave verbal and written consent for records release from [facility]. At [facility] pt underwent biopsy of R thigh which was c/w calciphylaxis. She also underwent EGD which showed candidal esophagitis. She was restarted on HD, given sodium thiosulfate.      Interval History: No events overnight. Pt expressing wish to leave.    Review of Systems   Constitutional: Negative for chills and fever.   Respiratory: Negative for cough and shortness of breath.    Cardiovascular: Negative for chest pain and leg swelling.   Gastrointestinal: Negative for abdominal distention, abdominal pain, nausea and " vomiting.   Musculoskeletal: Positive for myalgias.     Objective:     Vital Signs (Most Recent):  Temp: 98.4 °F (36.9 °C) (01/02/22 0746)  Pulse: 92 (01/02/22 0746)  Resp: 18 (01/02/22 0746)  BP: 110/65 (01/02/22 0746)  SpO2: 95 % (01/02/22 0746) Vital Signs (24h Range):  Temp:  [97.7 °F (36.5 °C)-99.1 °F (37.3 °C)] 98.4 °F (36.9 °C)  Pulse:  [] 92  Resp:  [16-18] 18  SpO2:  [85 %-98 %] 95 %  BP: ()/(55-78) 110/65     Weight: 94.8 kg (208 lb 14.4 oz)  Body mass index is 33.72 kg/m².    Intake/Output Summary (Last 24 hours) at 1/2/2022 0810  Last data filed at 1/1/2022 2200  Gross per 24 hour   Intake 245 ml   Output --   Net 245 ml      Physical Exam  Constitutional:       General: She is not in acute distress.     Appearance: She is ill-appearing. She is not toxic-appearing or diaphoretic.   Cardiovascular:      Rate and Rhythm: Normal rate and regular rhythm.      Heart sounds: No murmur heard.  No gallop.    Pulmonary:      Effort: Pulmonary effort is normal. No respiratory distress.      Breath sounds: Normal breath sounds. No wheezing.   Abdominal:      General: Bowel sounds are normal. There is no distension.      Palpations: Abdomen is soft.      Tenderness: There is no abdominal tenderness.         Significant Labs: All pertinent labs within the past 24 hours have been reviewed.    Significant Imaging: I have reviewed all pertinent imaging results/findings within the past 24 hours.      Assessment/Plan:      * Calciphylaxis  Rash on bilateral thighs, extremely tender to touch. [facility] biopsy c/w calciphaxis, per report  - records request sent for biopsy  - nephrology consulted  - wound care  - prn analgesics  - sodium thiosulfate  - treatment of hyperphos/hyperPTH as noted elsewhere  - will likely need outpatient chronic pain follow up      End stage renal disease  Pt w/ ESRD. Refused dialysis on last admission, now s/p multiple sessions during [facility] hospitalization  - nephrology consulted  -  last session 12/30  - access via [facility]  - MBD/anemia therapy as noted elsewhere      Hyperphosphatemia  Phos lowering important given calciphylaxis.  - continue sevalamer  - on cincalcet for hyperpth   - HD per nephrology      Renovascular hypertension  Continue home meds; titrate to effect      COVID-19  Asymptomatic covid-19 infection  - will attempt to arrange infusion inpatient  - isolation precautions  - no hypoxia, no indication for dex/rem      Type 2 diabetes mellitus  Reports taking 10U daily  - accuchecks/SSI      Nausea  Persistent nausea/vomiting reported at admission. No vomiting while inpatient. Last admission nausea resolved w/ bicarb drip, suggesting ESRD component.   - monitor as pt gets dialyzed  - see esophageal candidiasis      Secondary hyperparathyroidism  PTH severely elevated. Particularly concerning given calciphylaxis  - continue cincalcet  - iPTH pending    Anemia of renal disease  Drop in hemoglobin 1/1 without clinical bleeding, appropriate response to transfusion  - continue to monitor  - defer ESAs to nephrology  - transfuse to hgb 7      VTE Risk Mitigation (From admission, onward)         Ordered     heparin (porcine) injection 3,200 Units  As needed (PRN)         12/31/21 1335     enoxaparin injection 90 mg  Every 24 hours (non-standard times)         12/30/21 0539     IP VTE HIGH RISK PATIENT  Once         12/30/21 0339     Place sequential compression device  Until discontinued         12/30/21 0339     Place HENRI hose  Until discontinued         12/30/21 0339     Reason for No Pharmacological VTE Prophylaxis  Once        Question:  Reasons:  Answer:  Active Bleeding    12/30/21 0339                Discharge Planning   PAMELA:      Code Status: Full Code   Is the patient medically ready for discharge?:     Reason for patient still in hospital (select all that apply): Patient trending condition, Laboratory test, Treatment, Consult recommendations and Pending disposition  Discharge Plan  A: Home with family                  Adebayo Reyes MD  Department of [facility] Medicine   [facility] - Telemetry

## 2022-01-02 NOTE — SUBJECTIVE & OBJECTIVE
Interval History: No events overnight. Pt expressing wish to leave.    Review of Systems   Constitutional: Negative for chills and fever.   Respiratory: Negative for cough and shortness of breath.    Cardiovascular: Negative for chest pain and leg swelling.   Gastrointestinal: Negative for abdominal distention, abdominal pain, nausea and vomiting.   Musculoskeletal: Positive for myalgias.     Objective:     Vital Signs (Most Recent):  Temp: 98.4 °F (36.9 °C) (01/02/22 0746)  Pulse: 92 (01/02/22 0746)  Resp: 18 (01/02/22 0746)  BP: 110/65 (01/02/22 0746)  SpO2: 95 % (01/02/22 0746) Vital Signs (24h Range):  Temp:  [97.7 °F (36.5 °C)-99.1 °F (37.3 °C)] 98.4 °F (36.9 °C)  Pulse:  [] 92  Resp:  [16-18] 18  SpO2:  [85 %-98 %] 95 %  BP: ()/(55-78) 110/65     Weight: 94.8 kg (208 lb 14.4 oz)  Body mass index is 33.72 kg/m².    Intake/Output Summary (Last 24 hours) at 1/2/2022 0810  Last data filed at 1/1/2022 2200  Gross per 24 hour   Intake 245 ml   Output --   Net 245 ml      Physical Exam  Constitutional:       General: She is not in acute distress.     Appearance: She is ill-appearing. She is not toxic-appearing or diaphoretic.   Cardiovascular:      Rate and Rhythm: Normal rate and regular rhythm.      Heart sounds: No murmur heard.  No gallop.    Pulmonary:      Effort: Pulmonary effort is normal. No respiratory distress.      Breath sounds: Normal breath sounds. No wheezing.   Abdominal:      General: Bowel sounds are normal. There is no distension.      Palpations: Abdomen is soft.      Tenderness: There is no abdominal tenderness.         Significant Labs: All pertinent labs within the past 24 hours have been reviewed.    Significant Imaging: I have reviewed all pertinent imaging results/findings within the past 24 hours.

## 2022-01-02 NOTE — NURSING
Report received from night nurse NORMAN Feldman. Visualized patient and assessed patient's overall condition and appearance. No acute distress noted. Will continue to monitor

## 2022-01-02 NOTE — ASSESSMENT & PLAN NOTE
Drop in hemoglobin 1/1 without clinical bleeding, appropriate response to transfusion  - continue to monitor  - defer ESAs to nephrology  - transfuse to hgb 7

## 2022-01-02 NOTE — ASSESSMENT & PLAN NOTE
Pt w/ ESRD. Refused dialysis on last admission, now s/p multiple sessions during [facility]  - nephrology consulted  - last session 12/30  - access via [facility]  - MBD/anemia therapy as noted elsewhere

## 2022-01-02 NOTE — ASSESSMENT & PLAN NOTE
Pt asking to leave the hospital today. Strongly advised her to remain until outpatient HD could be set up. Asked if there was anything we could do to help her remain, she refused. She is unsure whether she will stay, but is clearly able to communicate her choice, she understands and can reason with the relevant information, and she appreciates the situation and the consequences of leaving without having HD set up.    Encouraged patient to remain; she said she will think about it.

## 2022-01-02 NOTE — PLAN OF CARE
Problem: Adult Inpatient Plan of Care  Goal: Plan of Care Review  Outcome: Ongoing, Progressing     Problem: Diabetes Comorbidity  Goal: Blood Glucose Level Within Targeted Range  Outcome: Ongoing, Progressing     Problem: Infection  Goal: Absence of Infection Signs and Symptoms  Outcome: Ongoing, Progressing

## 2022-01-03 PROBLEM — B37.81 CANDIDA ESOPHAGITIS: Status: ACTIVE | Noted: 2022-01-03

## 2022-01-03 LAB
ANION GAP SERPL CALC-SCNC: 11 MMOL/L (ref 8–16)
BUN SERPL-MCNC: 54 MG/DL (ref 6–20)
CALCIUM SERPL-MCNC: 9 MG/DL (ref 8.7–10.5)
CHLORIDE SERPL-SCNC: 94 MMOL/L (ref 95–110)
CO2 SERPL-SCNC: 28 MMOL/L (ref 23–29)
CREAT SERPL-MCNC: 10.4 MG/DL (ref 0.5–1.4)
D DIMER PPP IA.FEU-MCNC: 2.98 MG/L FEU
EST. GFR  (AFRICAN AMERICAN): 5 ML/MIN/1.73 M^2
EST. GFR  (NON AFRICAN AMERICAN): 4 ML/MIN/1.73 M^2
FERRITIN SERPL-MCNC: 1929 NG/ML (ref 20–300)
GLUCOSE SERPL-MCNC: 188 MG/DL (ref 70–110)
HAV IGM SERPL QL IA: NEGATIVE
HBV CORE IGM SERPL QL IA: NEGATIVE
HBV SURFACE AB SER-ACNC: NORMAL M[IU]/ML
HBV SURFACE AG SERPL QL IA: NEGATIVE
HBV SURFACE AG SERPL QL IA: NEGATIVE
HCV AB SERPL QL IA: NEGATIVE
LDH SERPL L TO P-CCNC: 145 U/L (ref 110–260)
PHOSPHATE SERPL-MCNC: 4.6 MG/DL (ref 2.7–4.5)
POCT GLUCOSE: 179 MG/DL (ref 70–110)
POCT GLUCOSE: 214 MG/DL (ref 70–110)
POTASSIUM SERPL-SCNC: 5.2 MMOL/L (ref 3.5–5.1)
PTH-INTACT SERPL-MCNC: 1498.8 PG/ML (ref 9–77)
SODIUM SERPL-SCNC: 133 MMOL/L (ref 136–145)

## 2022-01-03 PROCEDURE — 25000003 PHARM REV CODE 250: Performed by: EMERGENCY MEDICINE

## 2022-01-03 PROCEDURE — 25000003 PHARM REV CODE 250: Performed by: STUDENT IN AN ORGANIZED HEALTH CARE EDUCATION/TRAINING PROGRAM

## 2022-01-03 PROCEDURE — 94761 N-INVAS EAR/PLS OXIMETRY MLT: CPT

## 2022-01-03 PROCEDURE — 63700000 PHARM REV CODE 250 ALT 637 W/O HCPCS: Performed by: STUDENT IN AN ORGANIZED HEALTH CARE EDUCATION/TRAINING PROGRAM

## 2022-01-03 PROCEDURE — 85379 FIBRIN DEGRADATION QUANT: CPT | Performed by: EMERGENCY MEDICINE

## 2022-01-03 PROCEDURE — 25000003 PHARM REV CODE 250: Performed by: INTERNAL MEDICINE

## 2022-01-03 PROCEDURE — 63600175 PHARM REV CODE 636 W HCPCS: Performed by: EMERGENCY MEDICINE

## 2022-01-03 PROCEDURE — 63600175 PHARM REV CODE 636 W HCPCS: Performed by: STUDENT IN AN ORGANIZED HEALTH CARE EDUCATION/TRAINING PROGRAM

## 2022-01-03 PROCEDURE — 63600175 PHARM REV CODE 636 W HCPCS: Performed by: INTERNAL MEDICINE

## 2022-01-03 PROCEDURE — 80048 BASIC METABOLIC PNL TOTAL CA: CPT | Performed by: STUDENT IN AN ORGANIZED HEALTH CARE EDUCATION/TRAINING PROGRAM

## 2022-01-03 PROCEDURE — 86704 HEP B CORE ANTIBODY TOTAL: CPT | Performed by: STUDENT IN AN ORGANIZED HEALTH CARE EDUCATION/TRAINING PROGRAM

## 2022-01-03 PROCEDURE — 83615 LACTATE (LD) (LDH) ENZYME: CPT | Performed by: EMERGENCY MEDICINE

## 2022-01-03 PROCEDURE — 80100016 HC MAINTENANCE HEMODIALYSIS

## 2022-01-03 PROCEDURE — 82728 ASSAY OF FERRITIN: CPT | Performed by: EMERGENCY MEDICINE

## 2022-01-03 PROCEDURE — 84100 ASSAY OF PHOSPHORUS: CPT | Performed by: STUDENT IN AN ORGANIZED HEALTH CARE EDUCATION/TRAINING PROGRAM

## 2022-01-03 PROCEDURE — 21400001 HC TELEMETRY ROOM

## 2022-01-03 PROCEDURE — 63600175 PHARM REV CODE 636 W HCPCS: Mod: JG | Performed by: INTERNAL MEDICINE

## 2022-01-03 PROCEDURE — 36415 COLL VENOUS BLD VENIPUNCTURE: CPT | Performed by: STUDENT IN AN ORGANIZED HEALTH CARE EDUCATION/TRAINING PROGRAM

## 2022-01-03 PROCEDURE — 27000207 HC ISOLATION

## 2022-01-03 RX ADMIN — CINACALCET 30 MG: 30 TABLET, FILM COATED ORAL at 09:01

## 2022-01-03 RX ADMIN — MUPIROCIN: 20 OINTMENT TOPICAL at 08:01

## 2022-01-03 RX ADMIN — HEPARIN SODIUM 3200 UNITS: 5000 INJECTION INTRAVENOUS; SUBCUTANEOUS at 01:01

## 2022-01-03 RX ADMIN — OXYCODONE HYDROCHLORIDE AND ACETAMINOPHEN 500 MG: 500 TABLET ORAL at 08:01

## 2022-01-03 RX ADMIN — FLUCONAZOLE 400 MG: 100 TABLET ORAL at 09:01

## 2022-01-03 RX ADMIN — OXYCODONE AND ACETAMINOPHEN 1 TABLET: 5; 325 TABLET ORAL at 01:01

## 2022-01-03 RX ADMIN — OXYCODONE HYDROCHLORIDE AND ACETAMINOPHEN 500 MG: 500 TABLET ORAL at 09:01

## 2022-01-03 RX ADMIN — SEVELAMER CARBONATE 2400 MG: 800 TABLET, FILM COATED ORAL at 09:01

## 2022-01-03 RX ADMIN — SEVELAMER CARBONATE 2400 MG: 800 TABLET, FILM COATED ORAL at 04:01

## 2022-01-03 RX ADMIN — INSULIN ASPART 1 UNITS: 100 INJECTION, SOLUTION INTRAVENOUS; SUBCUTANEOUS at 09:01

## 2022-01-03 RX ADMIN — ENOXAPARIN SODIUM 90 MG: 100 INJECTION SUBCUTANEOUS at 05:01

## 2022-01-03 RX ADMIN — FAMOTIDINE 20 MG: 20 TABLET ORAL at 09:01

## 2022-01-03 RX ADMIN — OXYCODONE AND ACETAMINOPHEN 1 TABLET: 5; 325 TABLET ORAL at 12:01

## 2022-01-03 RX ADMIN — EPOETIN ALFA-EPBX 10000 UNITS: 10000 INJECTION, SOLUTION INTRAVENOUS; SUBCUTANEOUS at 12:01

## 2022-01-03 RX ADMIN — THERA TABS 1 TABLET: TAB at 09:01

## 2022-01-03 RX ADMIN — SODIUM THIOSULFATE 25 G: 250 INJECTION, SOLUTION INTRAVENOUS at 01:01

## 2022-01-03 NOTE — NURSING
Bedside Report given to night nurse NORMAN Feldman. Walking rounds completed. Visualized and assessed patient NAD noted. Safety precautions maintained and call light within reach.     Chart check completed.

## 2022-01-03 NOTE — PLAN OF CARE
Problem: Diabetes Comorbidity  Goal: Blood Glucose Level Within Targeted Range  Outcome: Ongoing, Progressing     Problem: Device-Related Complication Risk (Hemodialysis)  Goal: Safe, Effective Therapy Delivery  Outcome: Ongoing, Progressing

## 2022-01-03 NOTE — CONSULTS
[facility]  [facility]    Patient Name:  Xuan Ricardo   MRN:  [id number]  Date: 1/3/2022  Diagnosis: Calciphylaxis    History:     Past Medical History:   Diagnosis Date    Cataract     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     Diabetes mellitus 1996    Insulin Dependent    Galactorrhea     Hyperphosphatemia     Hypertension     Iron deficiency anemia     Obesity     Secondary hyperparathyroidism of renal origin     Vitamin D deficiency        Social History     Socioeconomic History    Marital status: Single    Number of children: 2   Occupational History    Occupation: Unemployed   Tobacco Use    Smoking status: Former Smoker     Types: Cigarettes    Smokeless tobacco: Never Used   Substance and Sexual Activity    Alcohol use: No    Drug use: Yes     Types: Marijuana     Comment: Occassional Recreational Use only    Sexual activity: Not Currently     Partners: Male   Social History Narrative    Currently unemployed has two young children ages 5 and 7 and she is the sole caretaker       Precautions:     Allergies as of 12/29/2021 - Reviewed 12/29/2021   Allergen Reaction Noted    Vicodin [hydrocodone-acetaminophen] Hives 05/04/2013    Codeine Hives 02/25/2015       WOC Assessment Details/Treatment   Consulted for calciphylaxis bilateral thighs  A 41 year old female admitted  12/29/21 from home with calciphylaxis; ESRD; renovascular hypertension; COVID 19; DM II; nausea; anemia of renal disease  1/2 WBC 8.63 Hgb 8.0 Hct 26.0   12/29 Alb 2.4  12/30 A1C 5.2  On Isoflex mattress; Florian score 19  Biopsy at [facility] showed calciphylaxis  Treatment with sodium thiosulfate IV every MWF with dialysis  Assessment:  Photodocumentation    Left lateral thigh- Darkened area with biopsy site. No drainage. Painful to touch.     Right medial thigh- Darkened painful areas with epidermis intact.   Denies painful areas on buttocks or anywhere else at this time.   Treatment:  Continue covering left  thigh biopsy site with Mepilex foam for protection.   No local wound care to sites of calciphylaxis at this time since epidermis intact and not draining.   Discussed treatment plan with patient.   Orders placed.     01/03/2022

## 2022-01-03 NOTE — ASSESSMENT & PLAN NOTE
Pt w/ ESRD. Refused dialysis on last admission, now s/p multiple sessions during [facility]  - nephrology consulted  - last session 12/30  - access via [facility]  - MBD/anemia therapy as noted elsewhere  - outpatient HD chair pending

## 2022-01-03 NOTE — PROGRESS NOTES
"[facility]  Progress Note    Patient Name: Xuan Ricardo  MRN: [id number]  Patient Class: IP- Inpatient   Admission Date: 12/29/2021  Length of Stay: 4 days  Attending Physician: Adebayo Reyes MD  Primary Care Provider: Katharine Franks MD        Subjective:     Principal Problem:Calciphylaxis        HPI:  Ms. Ricardo is a 41y F w/ ESRD, [place] [facility] [facility] with nausea/vomiting and bilateral thigh pain.     She says that the nausea and vomiting began a few weeks ago. She mostly reports vomiting up food content, denies overt blood. Says that the vomit is sometimes darker colored. She has been told she has "coffee-ground" emesis but does not describe the contents of her vomit as such. She denies abdominal pain, denies delayed vomiting after eating. Feels nauseas all the time, regardless of food intake.    She has also developed severe bilateral thigh lesions. She describes them as swelling w/ extreme sensitivity to touch. They started approximately two weeks ago and have persisted, with minimal improvement in pain. One thigh was biops[place] [facility] [place] most recent admission, and she reports that the site continues to be tender.      Overview/Hospital Course:  Admitted to hospital medicine. Nephrology consulted. Wound care consulted for thigh wounds. Pt gave verbal and written consent for records release from [facility]. At [facility] pt underwent biopsy of R thigh which was c/w calciphylaxis. She also underwent EGD which showed candidal esophagitis. She was restarted on HD, given sodium thiosulfate.      Interval History: No events overnight. Significant pain with dialysis.    Review of Systems   Constitutional: Negative for chills and fever.   Respiratory: Negative for cough and shortness of breath.    Cardiovascular: Negative for chest pain and leg swelling.   Gastrointestinal: Negative for abdominal distention and abdominal pain. "     Objective:     Vital Signs (Most Recent):  Temp: 98.7 °F (37.1 °C) (01/03/22 1501)  Pulse: 108 (01/03/22 1501)  Resp: 18 (01/03/22 1501)  BP: 124/76 (01/03/22 1501)  SpO2: 97 % (01/03/22 1501) Vital Signs (24h Range):  Temp:  [98 °F (36.7 °C)-98.7 °F (37.1 °C)] 98.7 °F (37.1 °C)  Pulse:  [] 108  Resp:  [17-20] 18  SpO2:  [95 %-100 %] 97 %  BP: (103-133)/(61-98) 124/76     Weight: 94.8 kg (208 lb 14.4 oz)  Body mass index is 33.72 kg/m².    Intake/Output Summary (Last 24 hours) at 1/3/2022 1552  Last data filed at 1/3/2022 1319  Gross per 24 hour   Intake 700 ml   Output 3000 ml   Net -2300 ml      Physical Exam  Constitutional:       General: She is not in acute distress.     Appearance: She is ill-appearing. She is not toxic-appearing or diaphoretic.   Cardiovascular:      Rate and Rhythm: Normal rate and regular rhythm.      Heart sounds: No murmur heard.  No gallop.    Pulmonary:      Effort: Pulmonary effort is normal. No respiratory distress.      Breath sounds: Normal breath sounds. No wheezing.   Abdominal:      General: Bowel sounds are normal. There is no distension.      Palpations: Abdomen is soft.      Tenderness: There is no abdominal tenderness.         Significant Labs: All pertinent labs within the past 24 hours have been reviewed.    Significant Imaging: I have reviewed all pertinent imaging results/findings within the past 24 hours.      Assessment/Plan:      * Calciphylaxis  Rash on bilateral thighs, extremely tender to touch. [facility] biopsy c/w calciphaxis, per report  - records request sent for biopsy  - nephrology consulted  - wound care  - prn analgesics  - sodium thiosulfate  - treatment of hyperphos/hyperPTH as noted elsewhere  - will likely need outpatient chronic pain follow up      End stage renal disease  Pt w/ ESRD. Refused dialysis on last admission, now s/p multiple sessions during [facility] hospitalization  - nephrology consulted  - last session 12/30  - access via [facility]  -  MBD/anemia therapy as noted elsewhere  - outpatient HD chair pending      Candida esophagitis  Prior [place] [facility] w/ [place]GD showing candidal esophagitis  - fluconazole      Hyperphosphatemia  Phos lowering important given calciphylaxis.  - continue sevalamer  - on cincalcet for hyperpth   - HD per nephrology      Renovascular hypertension  Continue home meds; titrate to effect      COVID-19  Asymptomatic covid-19 infection  - will attempt to arrange infusion inpatient  - isolation precautions  - no hypoxia, no indication for dex/rem      Type 2 diabetes mellitus  Reports taking 10U daily  - accuchecks/SSI      Nausea  Persistent nausea/vomiting reported at admission. No vomiting while inpatient. Last admission nausea resolved w/ bicarb drip, suggesting ESRD component.   - monitor as pt gets dialyzed  - see esophageal candidiasis      Secondary hyperparathyroidism  PTH severely elevated. Particularly concerning given calciphylaxis  - continue cincalcet  - iPTH pending    Anemia of renal disease  Drop in hemoglobin 1/1 without clinical bleeding, appropriate response to transfusion  - continue to monitor  - defer ESAs to nephrology  - transfuse to hgb 7    VTE Risk Mitigation (From admission, onward)         Ordered     heparin (porcine) injection 3,200 Units  As needed (PRN)         12/31/21 1335     enoxaparin injection 90 mg  Every 24 hours (non-standard times)         12/30/21 0539     IP VTE HIGH RISK PATIENT  Once         12/30/21 0339     Place sequential compression device  Until discontinued         12/30/21 0339     Place HENRI hose  Until discontinued         12/30/21 0339     Reason for No Pharmacological VTE Prophylaxis  Once        Question:  Reasons:  Answer:  Active Bleeding    12/30/21 0339                Discharge Planning   PAMELA:      Code Status: Full Code   Is the patient medically ready for discharge?:     Reason for patient still in hospital (select all that apply): Patient trending condition,  Laboratory test, Treatment, Consult recommendations and Pending disposition  Discharge Plan A: Home with family   Discharge Delays: (!) Dialysis Set-up              Adebayo Reyes MD  Department of Hospital Medicine   [facility]

## 2022-01-03 NOTE — PROGRESS NOTES
Pt arrived to [facility] via bed per transport. Right chest wall perm cvc aspirated/flushed without difficulty noted. Maintenance hemodialysis started x 3.5 hours. Plan is to remove 3 L as tolerated. Tolerating tx well at this time.

## 2022-01-03 NOTE — PROGRESS NOTES
Maintenance hemodialysis done x 3.5 hours as ordered/pt reinfused. Flushed right chest wall cvc without difficulty noted, heparin locked, caps applied. Report given  To DANIEL Mak LPN/returned to room 311 via bed per transporter.    Net fluid removed 2300 ml as tolerated

## 2022-01-03 NOTE — PLAN OF CARE
CHUCKIE sent referral to [facility]  via portal and selected [facility] [place]. Patient went there in the past. CHUCKIE notified Dr. Reyes that Hep B Surface Hep B Surface Antigen were ordered but not Total Core via secure chat. Total Core is needed for dialysis setup. Dr. Reyes informed CHUCKIE that he will order Hep B Total Core.     CHUCKIE noted in referral that panel was needed.       01/03/22 1356   Post-Acute Status   Post-Acute Authorization Dialysis   Diaylsis Status Referrals Sent  (Pending Hep B Panel)   Discharge Delays (!) Dialysis Set-up   Discharge Plan   Discharge Plan A Home with family   Discharge Plan B Home

## 2022-01-03 NOTE — SUBJECTIVE & OBJECTIVE
Interval History: No events overnight. Significant pain with dialysis.    Review of Systems   Constitutional: Negative for chills and fever.   Respiratory: Negative for cough and shortness of breath.    Cardiovascular: Negative for chest pain and leg swelling.   Gastrointestinal: Negative for abdominal distention and abdominal pain.     Objective:     Vital Signs (Most Recent):  Temp: 98.7 °F (37.1 °C) (01/03/22 1501)  Pulse: 108 (01/03/22 1501)  Resp: 18 (01/03/22 1501)  BP: 124/76 (01/03/22 1501)  SpO2: 97 % (01/03/22 1501) Vital Signs (24h Range):  Temp:  [98 °F (36.7 °C)-98.7 °F (37.1 °C)] 98.7 °F (37.1 °C)  Pulse:  [] 108  Resp:  [17-20] 18  SpO2:  [95 %-100 %] 97 %  BP: (103-133)/(61-98) 124/76     Weight: 94.8 kg (208 lb 14.4 oz)  Body mass index is 33.72 kg/m².    Intake/Output Summary (Last 24 hours) at 1/3/2022 1552  Last data filed at 1/3/2022 1319  Gross per 24 hour   Intake 700 ml   Output 3000 ml   Net -2300 ml      Physical Exam  Constitutional:       General: She is not in acute distress.     Appearance: She is ill-appearing. She is not toxic-appearing or diaphoretic.   Cardiovascular:      Rate and Rhythm: Normal rate and regular rhythm.      Heart sounds: No murmur heard.  No gallop.    Pulmonary:      Effort: Pulmonary effort is normal. No respiratory distress.      Breath sounds: Normal breath sounds. No wheezing.   Abdominal:      General: Bowel sounds are normal. There is no distension.      Palpations: Abdomen is soft.      Tenderness: There is no abdominal tenderness.         Significant Labs: All pertinent labs within the past 24 hours have been reviewed.    Significant Imaging: I have reviewed all pertinent imaging results/findings within the past 24 hours.

## 2022-01-03 NOTE — ASSESSMENT & PLAN NOTE
Prior admission to [facility] w/ EGD showing candidal esophagitis  - fluconazole

## 2022-01-04 LAB
ANION GAP SERPL CALC-SCNC: 13 MMOL/L (ref 8–16)
BUN SERPL-MCNC: 25 MG/DL (ref 6–20)
CALCIUM SERPL-MCNC: 9 MG/DL (ref 8.7–10.5)
CHLORIDE SERPL-SCNC: 95 MMOL/L (ref 95–110)
CO2 SERPL-SCNC: 27 MMOL/L (ref 23–29)
CREAT SERPL-MCNC: 6.3 MG/DL (ref 0.5–1.4)
EST. GFR  (AFRICAN AMERICAN): 9 ML/MIN/1.73 M^2
EST. GFR  (NON AFRICAN AMERICAN): 7 ML/MIN/1.73 M^2
GLUCOSE SERPL-MCNC: 210 MG/DL (ref 70–110)
HBV CORE AB SERPL QL IA: NEGATIVE
PHOSPHATE SERPL-MCNC: 2.8 MG/DL (ref 2.7–4.5)
POCT GLUCOSE: 175 MG/DL (ref 70–110)
POCT GLUCOSE: 183 MG/DL (ref 70–110)
POCT GLUCOSE: 223 MG/DL (ref 70–110)
POCT GLUCOSE: 259 MG/DL (ref 70–110)
POTASSIUM SERPL-SCNC: 4.3 MMOL/L (ref 3.5–5.1)
SODIUM SERPL-SCNC: 135 MMOL/L (ref 136–145)

## 2022-01-04 PROCEDURE — 63700000 PHARM REV CODE 250 ALT 637 W/O HCPCS: Performed by: STUDENT IN AN ORGANIZED HEALTH CARE EDUCATION/TRAINING PROGRAM

## 2022-01-04 PROCEDURE — 80048 BASIC METABOLIC PNL TOTAL CA: CPT | Performed by: STUDENT IN AN ORGANIZED HEALTH CARE EDUCATION/TRAINING PROGRAM

## 2022-01-04 PROCEDURE — 21400001 HC TELEMETRY ROOM

## 2022-01-04 PROCEDURE — 84100 ASSAY OF PHOSPHORUS: CPT | Performed by: STUDENT IN AN ORGANIZED HEALTH CARE EDUCATION/TRAINING PROGRAM

## 2022-01-04 PROCEDURE — 99900035 HC TECH TIME PER 15 MIN (STAT)

## 2022-01-04 PROCEDURE — 36415 COLL VENOUS BLD VENIPUNCTURE: CPT | Performed by: STUDENT IN AN ORGANIZED HEALTH CARE EDUCATION/TRAINING PROGRAM

## 2022-01-04 PROCEDURE — 25000003 PHARM REV CODE 250: Performed by: INTERNAL MEDICINE

## 2022-01-04 PROCEDURE — 63600175 PHARM REV CODE 636 W HCPCS: Performed by: STUDENT IN AN ORGANIZED HEALTH CARE EDUCATION/TRAINING PROGRAM

## 2022-01-04 PROCEDURE — 25000003 PHARM REV CODE 250: Performed by: EMERGENCY MEDICINE

## 2022-01-04 PROCEDURE — 25000003 PHARM REV CODE 250: Performed by: HOSPITALIST

## 2022-01-04 PROCEDURE — 27000207 HC ISOLATION

## 2022-01-04 PROCEDURE — 63600175 PHARM REV CODE 636 W HCPCS: Performed by: EMERGENCY MEDICINE

## 2022-01-04 PROCEDURE — 25000003 PHARM REV CODE 250: Performed by: STUDENT IN AN ORGANIZED HEALTH CARE EDUCATION/TRAINING PROGRAM

## 2022-01-04 RX ORDER — AMOXICILLIN 250 MG
1 CAPSULE ORAL DAILY PRN
Status: DISCONTINUED | OUTPATIENT
Start: 2022-01-04 | End: 2022-01-14

## 2022-01-04 RX ORDER — POLYETHYLENE GLYCOL 3350 17 G/17G
17 POWDER, FOR SOLUTION ORAL DAILY
Status: DISCONTINUED | OUTPATIENT
Start: 2022-01-04 | End: 2022-01-14

## 2022-01-04 RX ADMIN — CINACALCET 30 MG: 30 TABLET, FILM COATED ORAL at 08:01

## 2022-01-04 RX ADMIN — SEVELAMER CARBONATE 2400 MG: 800 TABLET, FILM COATED ORAL at 08:01

## 2022-01-04 RX ADMIN — INSULIN ASPART 1 UNITS: 100 INJECTION, SOLUTION INTRAVENOUS; SUBCUTANEOUS at 09:01

## 2022-01-04 RX ADMIN — MUPIROCIN: 20 OINTMENT TOPICAL at 08:01

## 2022-01-04 RX ADMIN — MUPIROCIN: 20 OINTMENT TOPICAL at 09:01

## 2022-01-04 RX ADMIN — OXYCODONE HYDROCHLORIDE AND ACETAMINOPHEN 500 MG: 500 TABLET ORAL at 09:01

## 2022-01-04 RX ADMIN — OXYCODONE HYDROCHLORIDE AND ACETAMINOPHEN 500 MG: 500 TABLET ORAL at 08:01

## 2022-01-04 RX ADMIN — SEVELAMER CARBONATE 2400 MG: 800 TABLET, FILM COATED ORAL at 12:01

## 2022-01-04 RX ADMIN — FLUCONAZOLE 400 MG: 100 TABLET ORAL at 08:01

## 2022-01-04 RX ADMIN — FAMOTIDINE 20 MG: 20 TABLET ORAL at 08:01

## 2022-01-04 RX ADMIN — NIFEDIPINE 90 MG: 30 TABLET, FILM COATED, EXTENDED RELEASE ORAL at 08:01

## 2022-01-04 RX ADMIN — INSULIN ASPART 2 UNITS: 100 INJECTION, SOLUTION INTRAVENOUS; SUBCUTANEOUS at 08:01

## 2022-01-04 RX ADMIN — OXYCODONE AND ACETAMINOPHEN 1 TABLET: 5; 325 TABLET ORAL at 10:01

## 2022-01-04 RX ADMIN — ENOXAPARIN SODIUM 90 MG: 100 INJECTION SUBCUTANEOUS at 05:01

## 2022-01-04 RX ADMIN — SEVELAMER CARBONATE 2400 MG: 800 TABLET, FILM COATED ORAL at 06:01

## 2022-01-04 RX ADMIN — POLYETHYLENE GLYCOL 3350 17 G: 17 POWDER, FOR SOLUTION ORAL at 12:01

## 2022-01-04 RX ADMIN — THERA TABS 1 TABLET: TAB at 08:01

## 2022-01-04 NOTE — NURSING
Patient refused 6min walk test at this time     Solaraze Counseling:  I discussed with the patient the risks of Solaraze including but not limited to erythema, scaling, itching, weeping, crusting, and pain.

## 2022-01-04 NOTE — SUBJECTIVE & OBJECTIVE
Interval History:  Patient doing better today.  Reports her pain is still present but overall improved.  Pain medications are helping.  Stable from a respiratory standpoint.  Denies any shortness of breath, cough, sputum production.  6 minute walk test ordered.  Medically stable to MS once dialysis chair confirmed in a COVID cohort.    Review of Systems   Constitutional: Negative for fever.   Respiratory: Negative for shortness of breath.    Cardiovascular: Negative for chest pain.   Gastrointestinal: Negative for abdominal pain.   Neurological: Negative for dizziness. Headaches:      All other systems reviewed and are negative.    Objective:     Vital Signs (Most Recent):  Temp: 98.8 °F (37.1 °C) (01/04/22 1154)  Pulse: 84 (01/04/22 1154)  Resp: 18 (01/04/22 1154)  BP: 124/72 (01/04/22 1154)  SpO2: 96 % (01/04/22 1154) Vital Signs (24h Range):  Temp:  [98.3 °F (36.8 °C)-99.2 °F (37.3 °C)] 98.8 °F (37.1 °C)  Pulse:  [] 84  Resp:  [16-19] 18  SpO2:  [96 %-100 %] 96 %  BP: (119-133)/(72-82) 124/72     Weight: 94.8 kg (208 lb 14.4 oz)  Body mass index is 33.72 kg/m².    Intake/Output Summary (Last 24 hours) at 1/4/2022 1221  Last data filed at 1/3/2022 1319  Gross per 24 hour   Intake 700 ml   Output 3000 ml   Net -2300 ml      Physical Exam  Vitals and nursing note reviewed.   Constitutional:       Appearance: Normal appearance.   HENT:      Head: Normocephalic and atraumatic.   Eyes:      Extraocular Movements: Extraocular movements intact.      Pupils: Pupils are equal, round, and reactive to light.   Cardiovascular:      Rate and Rhythm: Normal rate and regular rhythm.   Pulmonary:      Effort: Pulmonary effort is normal. No respiratory distress.   Abdominal:      General: There is no distension.   Musculoskeletal:         General: Normal range of motion.      Cervical back: Normal range of motion.   Neurological:      General: No focal deficit present.      Mental Status: She is alert and oriented to person,  place, and time.   Psychiatric:         Mood and Affect: Mood normal.         Behavior: Behavior normal.         Significant Labs:   All pertinent labs within the past 24 hours have been reviewed.  CBC: No results for input(s): WBC, HGB, HCT, PLT in the last 48 hours.  CMP:   Recent Labs   Lab 01/03/22  0418 01/04/22  0657   * 135*   K 5.2* 4.3   CL 94* 95   CO2 28 27   * 210*   BUN 54* 25*   CREATININE 10.4* 6.3*   CALCIUM 9.0 9.0   ANIONGAP 11 13   EGFRNONAA 4* 7*       Significant Imaging: I have reviewed all pertinent imaging results/findings within the past 24 hours.

## 2022-01-04 NOTE — PLAN OF CARE
01/04/22 1051   Medicare Message   Important Message from Medicare regarding Discharge Appeal Rights Given to patient/caregiver;Explained to patient/caregiver  (SW explained IMM to patient via telephone. Patient verbally expressed understanding. Copy of IMM mailed via certified mail to address on file. #[phone])   Date IMM was signed 01/04/22   Time IMM was signed 1053

## 2022-01-04 NOTE — NURSING
Bedside Report given to night nurse NORMAN Gilliam. Walking rounds completed. Visualized and assessed patient NAD noted. Safety precautions maintained and call light within reach.     Chart check completed.

## 2022-01-04 NOTE — PLAN OF CARE
CHUCKIE changed clinic location as instructed Dr. Cruz. Dr. Cruz informed CHUCKIE that [facility] does not want patient back. CHUCKIE changed location [place] [facility] [place].     Hep B Total Core Antibody is still processing. CHUCKIE will upload it to [facility] portal when complete.     [facility]UCKIE [facility] Hep B Total Core Antibody [facility].

## 2022-01-04 NOTE — PROGRESS NOTES
"      [facility] Medicine  Telemedicine Progress Note    Patient Name: Xuan Ricardo  MRN: [id number]  Patient Class: IP- Inpatient   Admission Date: 12/29/2021  Length of Stay: 5 days  Attending Physician: Marcelina Griffin MD  Primary Care Provider: Katharine Franks MD          Subjective:     Principal Problem:Calciphylaxis        HPI:  Ms. Ricardo is a 41y F w/ ESRD, recently dialyzed at [facility], presents after leaving AMA from [facility] with nausea/vomiting and bilateral thigh pain.     She says that the nausea and vomiting began a few weeks ago. She mostly reports vomiting up food content, denies overt blood. Says that the vomit is sometimes darker colored. She has been told she has "coffee-ground" emesis but does not describe the contents of her vomit as such. She denies abdominal pain, denies delayed vomiting after eating. Feels nauseas all the time, regardless of food intake.    She has also developed severe bilateral thigh lesions. She describes them as swelling w/ extreme sensitivity to touch. They started approximately two weeks ago and have persisted, with minimal improvement in pain. One thigh was biopsied at [facility] at her most recent admission, and she reports that the site continues to be tender.      Overview/Hospital Course:  Admitted to hospital medicine. Nephrology consulted. Wound care consulted for thigh wounds. Pt gave verbal and written consent for records release from [facility]. 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     At  she was restarted on HD, given sodium thiosulfate. MBD therapy restarted. Restarted on fluconazole.    Dispo pending arrangement of outpatient HD chair      Interval History:  Patient doing better today.  Reports her pain is still present but overall improved.  Pain medications are helping.  Stable from a respiratory standpoint.  Denies any shortness of breath, cough, " sputum production.  6 minute walk test ordered.  Medically stable to MS once dialysis chair confirmed in a COVID cohort.    Review of Systems   Constitutional: Negative for fever.   Respiratory: Negative for shortness of breath.    Cardiovascular: Negative for chest pain.   Gastrointestinal: Negative for abdominal pain.   Neurological: Negative for dizziness. Headaches:      All other systems reviewed and are negative.    Objective:     Vital Signs (Most Recent):  Temp: 98.8 °F (37.1 °C) (01/04/22 1154)  Pulse: 84 (01/04/22 1154)  Resp: 18 (01/04/22 1154)  BP: 124/72 (01/04/22 1154)  SpO2: 96 % (01/04/22 1154) Vital Signs (24h Range):  Temp:  [98.3 °F (36.8 °C)-99.2 °F (37.3 °C)] 98.8 °F (37.1 °C)  Pulse:  [] 84  Resp:  [16-19] 18  SpO2:  [96 %-100 %] 96 %  BP: (119-133)/(72-82) 124/72     Weight: 94.8 kg (208 lb 14.4 oz)  Body mass index is 33.72 kg/m².    Intake/Output Summary (Last 24 hours) at 1/4/2022 1221  Last data filed at 1/3/2022 1319  Gross per 24 hour   Intake 700 ml   Output 3000 ml   Net -2300 ml      Physical Exam  Vitals and nursing note reviewed.   Constitutional:       Appearance: Normal appearance.   HENT:      Head: Normocephalic and atraumatic.   Eyes:      Extraocular Movements: Extraocular movements intact.      Pupils: Pupils are equal, round, and reactive to light.   Cardiovascular:      Rate and Rhythm: Normal rate and regular rhythm.   Pulmonary:      Effort: Pulmonary effort is normal. No respiratory distress.   Abdominal:      General: There is no distension.   Musculoskeletal:         General: Normal range of motion.      Cervical back: Normal range of motion.   Neurological:      General: No focal deficit present.      Mental Status: She is alert and oriented to person, place, and time.   Psychiatric:         Mood and Affect: Mood normal.         Behavior: Behavior normal.         Significant Labs:   All pertinent labs within the past 24 hours have been reviewed.  CBC: No results  for input(s): WBC, HGB, HCT, PLT in the last 48 hours.  CMP:   Recent Labs   Lab 01/03/22  0418 01/04/22  0657   * 135*   K 5.2* 4.3   CL 94* 95   CO2 28 27   * 210*   BUN 54* 25*   CREATININE 10.4* 6.3*   CALCIUM 9.0 9.0   ANIONGAP 11 13   EGFRNONAA 4* 7*       Significant Imaging: I have reviewed all pertinent imaging results/findings within the past 24 hours.      Assessment/Plan:      * Calciphylaxis  Rash on bilateral thighs, extremely tender to touch. [facility] biopsy c/w calciphaxis, per report  - records request sent for biopsy  - nephrology consulted  - wound care  - prn analgesics  - sodium thiosulfate  - treatment of hyperphos/hyperPTH as noted elsewhere  - will likely need outpatient chronic pain follow up      Candida esophagitis  Prior admission to [facility] w/ EGD showing candidal esophagitis  - fluconazole      Hyperphosphatemia  Phos lowering important given calciphylaxis.  - continue sevalamer  - on cincalcet for hyperpth   - HD per nephrology      Renovascular hypertension  Continue home meds; titrate to effect      COVID-19  Asymptomatic covid-19 infection  - will attempt to arrange infusion inpatient  - isolation precautions  - no hypoxia, no indication for dex/rem      Type 2 diabetes mellitus  Reports taking 10U daily  - accuchecks/SSI      Nausea  Persistent nausea/vomiting reported at admission. No vomiting while inpatient. Last admission nausea resolved w/ bicarb drip, suggesting ESRD component.   - monitor as pt gets dialyzed  - see esophageal candidiasis      End stage renal disease  Pt w/ ESRD. Refused dialysis on last admission, now s/p multiple sessions during [facility] hospitalization  - nephrology consulted  - last session 12/30  - access via [facility]  - MBD/anemia therapy as noted elsewhere  - outpatient HD chair pending      Secondary hyperparathyroidism  PTH severely elevated. Particularly concerning given calciphylaxis  - continue cincalcet  - iPTH pending    Anemia of renal  disease  Drop in hemoglobin 1/1 without clinical bleeding, appropriate response to transfusion  - continue to monitor  - defer ESAs to nephrology  - transfuse to hgb 7      VTE Risk Mitigation (From admission, onward)         Ordered     heparin (porcine) injection 3,200 Units  As needed (PRN)         12/31/21 1335     enoxaparin injection 90 mg  Every 24 hours (non-standard times)         12/30/21 0539     IP VTE HIGH RISK PATIENT  Once         12/30/21 0339     Place sequential compression device  Until discontinued         12/30/21 0339     Place HENRI hose  Until discontinued         12/30/21 0339     Reason for No Pharmacological VTE Prophylaxis  Once        Question:  Reasons:  Answer:  Active Bleeding    12/30/21 0339                      I have assessed these finding virtually using telemed platform and with assistance of bedside nurse                 The attending portion of this evaluation, treatment, and documentation was performed per Marcelina Griffin MD via Telemedicine AudioVisual using the secure [employer] software platform with 2 way audio/video. The provider was located off-site and the patient is located in the hospital. The aforementioned video software was utilized to document the relevant history and physical exam    Marcelina Griffin MD  Department of Hospital Medicine   [facility] - Telemetry

## 2022-01-04 NOTE — CONSULTS
[facility] Hospital Medicine  Telemedicine Consult Note       Xuan Ricardo has been accepted for transfer to [facility] and will be followed through telemedicine services beginning 01/04/22 at 7 AM.        Marcelina Griffin MD  [facility] Medicine Staff

## 2022-01-05 LAB
ANION GAP SERPL CALC-SCNC: 14 MMOL/L (ref 8–16)
BUN SERPL-MCNC: 33 MG/DL (ref 6–20)
CALCIUM SERPL-MCNC: 9 MG/DL (ref 8.7–10.5)
CHLORIDE SERPL-SCNC: 94 MMOL/L (ref 95–110)
CO2 SERPL-SCNC: 25 MMOL/L (ref 23–29)
CREAT SERPL-MCNC: 8.1 MG/DL (ref 0.5–1.4)
D DIMER PPP IA.FEU-MCNC: 2.59 MG/L FEU
EST. GFR  (AFRICAN AMERICAN): 6 ML/MIN/1.73 M^2
EST. GFR  (NON AFRICAN AMERICAN): 6 ML/MIN/1.73 M^2
FERRITIN SERPL-MCNC: 2042 NG/ML (ref 20–300)
GLUCOSE SERPL-MCNC: 129 MG/DL (ref 70–110)
LDH SERPL L TO P-CCNC: 227 U/L (ref 110–260)
PHOSPHATE SERPL-MCNC: 3 MG/DL (ref 2.7–4.5)
POCT GLUCOSE: 160 MG/DL (ref 70–110)
POCT GLUCOSE: 180 MG/DL (ref 70–110)
POCT GLUCOSE: 221 MG/DL (ref 70–110)
POCT GLUCOSE: 229 MG/DL (ref 70–110)
POCT GLUCOSE: 246 MG/DL (ref 70–110)
POTASSIUM SERPL-SCNC: 4.6 MMOL/L (ref 3.5–5.1)
SARS-COV-2 RDRP RESP QL NAA+PROBE: POSITIVE
SODIUM SERPL-SCNC: 133 MMOL/L (ref 136–145)

## 2022-01-05 PROCEDURE — 63600175 PHARM REV CODE 636 W HCPCS: Performed by: STUDENT IN AN ORGANIZED HEALTH CARE EDUCATION/TRAINING PROGRAM

## 2022-01-05 PROCEDURE — 80048 BASIC METABOLIC PNL TOTAL CA: CPT | Performed by: STUDENT IN AN ORGANIZED HEALTH CARE EDUCATION/TRAINING PROGRAM

## 2022-01-05 PROCEDURE — U0002 COVID-19 LAB TEST NON-CDC: HCPCS | Performed by: INTERNAL MEDICINE

## 2022-01-05 PROCEDURE — 63700000 PHARM REV CODE 250 ALT 637 W/O HCPCS: Performed by: STUDENT IN AN ORGANIZED HEALTH CARE EDUCATION/TRAINING PROGRAM

## 2022-01-05 PROCEDURE — 25000003 PHARM REV CODE 250: Performed by: EMERGENCY MEDICINE

## 2022-01-05 PROCEDURE — 36415 COLL VENOUS BLD VENIPUNCTURE: CPT | Performed by: STUDENT IN AN ORGANIZED HEALTH CARE EDUCATION/TRAINING PROGRAM

## 2022-01-05 PROCEDURE — 21400001 HC TELEMETRY ROOM

## 2022-01-05 PROCEDURE — 82728 ASSAY OF FERRITIN: CPT | Performed by: EMERGENCY MEDICINE

## 2022-01-05 PROCEDURE — 25000003 PHARM REV CODE 250: Performed by: INTERNAL MEDICINE

## 2022-01-05 PROCEDURE — 83615 LACTATE (LD) (LDH) ENZYME: CPT | Performed by: EMERGENCY MEDICINE

## 2022-01-05 PROCEDURE — 25000003 PHARM REV CODE 250: Performed by: HOSPITALIST

## 2022-01-05 PROCEDURE — 25000003 PHARM REV CODE 250: Performed by: STUDENT IN AN ORGANIZED HEALTH CARE EDUCATION/TRAINING PROGRAM

## 2022-01-05 PROCEDURE — 85379 FIBRIN DEGRADATION QUANT: CPT | Performed by: EMERGENCY MEDICINE

## 2022-01-05 PROCEDURE — 27000207 HC ISOLATION

## 2022-01-05 PROCEDURE — 84100 ASSAY OF PHOSPHORUS: CPT | Performed by: STUDENT IN AN ORGANIZED HEALTH CARE EDUCATION/TRAINING PROGRAM

## 2022-01-05 RX ADMIN — SEVELAMER CARBONATE 2400 MG: 800 TABLET, FILM COATED ORAL at 12:01

## 2022-01-05 RX ADMIN — CINACALCET 30 MG: 30 TABLET, FILM COATED ORAL at 09:01

## 2022-01-05 RX ADMIN — INSULIN ASPART 1 UNITS: 100 INJECTION, SOLUTION INTRAVENOUS; SUBCUTANEOUS at 09:01

## 2022-01-05 RX ADMIN — OXYCODONE AND ACETAMINOPHEN 1 TABLET: 5; 325 TABLET ORAL at 12:01

## 2022-01-05 RX ADMIN — SEVELAMER CARBONATE 2400 MG: 800 TABLET, FILM COATED ORAL at 09:01

## 2022-01-05 RX ADMIN — POLYETHYLENE GLYCOL 3350 17 G: 17 POWDER, FOR SOLUTION ORAL at 09:01

## 2022-01-05 RX ADMIN — FLUCONAZOLE 400 MG: 100 TABLET ORAL at 09:01

## 2022-01-05 RX ADMIN — OXYCODONE HYDROCHLORIDE AND ACETAMINOPHEN 500 MG: 500 TABLET ORAL at 09:01

## 2022-01-05 RX ADMIN — THERA TABS 1 TABLET: TAB at 09:01

## 2022-01-05 RX ADMIN — FAMOTIDINE 20 MG: 20 TABLET ORAL at 09:01

## 2022-01-05 RX ADMIN — NIFEDIPINE 90 MG: 30 TABLET, FILM COATED, EXTENDED RELEASE ORAL at 05:01

## 2022-01-05 NOTE — SUBJECTIVE & OBJECTIVE
Interval History:  Reports her pain is still present but pain medications are helping.  Refused 6 min walk test.  Denies any shortness of breath, cough, sputum production. Medically stable to HI once dialysis chair confirmed in a COVID cohort.    Review of Systems   Constitutional: Negative for fever.   Respiratory: Negative for shortness of breath.    Cardiovascular: Negative for chest pain.   Gastrointestinal: Negative for abdominal pain.   Neurological: Negative for dizziness. Headaches:      All other systems reviewed and are negative.    Objective:     Vital Signs (Most Recent):  Temp: 97.9 °F (36.6 °C) (01/05/22 1222)  Pulse: 98 (01/05/22 1222)  Resp: 18 (01/05/22 1222)  BP: 139/86 (01/05/22 1222)  SpO2: 95 % (01/05/22 1222) Vital Signs (24h Range):  Temp:  [97.5 °F (36.4 °C)-99.5 °F (37.5 °C)] 97.9 °F (36.6 °C)  Pulse:  [81-99] 98  Resp:  [14-18] 18  SpO2:  [95 %-100 %] 95 %  BP: (132-140)/(78-86) 139/86     Weight: 94.8 kg (208 lb 14.4 oz)  Body mass index is 33.72 kg/m².  No intake or output data in the 24 hours ending 01/05/22 1433   Physical Exam  Vitals and nursing note reviewed.   Constitutional:       Appearance: Normal appearance.   HENT:      Head: Normocephalic and atraumatic.   Eyes:      Extraocular Movements: Extraocular movements intact.      Pupils: Pupils are equal, round, and reactive to light.   Cardiovascular:      Rate and Rhythm: Normal rate and regular rhythm.   Pulmonary:      Effort: Pulmonary effort is normal. No respiratory distress.   Abdominal:      General: There is no distension.   Musculoskeletal:         General: Normal range of motion.      Cervical back: Normal range of motion.   Neurological:      General: No focal deficit present.      Mental Status: She is alert and oriented to person, place, and time.   Psychiatric:         Mood and Affect: Mood normal.         Behavior: Behavior normal.         Significant Labs:   All pertinent labs within the past 24 hours have been  reviewed.  CBC: No results for input(s): WBC, HGB, HCT, PLT in the last 48 hours.  CMP:   Recent Labs   Lab 01/04/22  0657 01/05/22  0542   * 133*   K 4.3 4.6   CL 95 94*   CO2 27 25   * 129*   BUN 25* 33*   CREATININE 6.3* 8.1*   CALCIUM 9.0 9.0   ANIONGAP 13 14   EGFRNONAA 7* 6*       Significant Imaging: I have reviewed all pertinent imaging results/findings within the past 24 hours.

## 2022-01-05 NOTE — ASSESSMENT & PLAN NOTE
Prior admission to [facility] w/ EGD showing candidal esophagitis  - fluconazole

## 2022-01-05 NOTE — ASSESSMENT & PLAN NOTE
Pt w/ ESRD. Refused dialysis on last admission, now s/p multiple sessions during [facility]  - nephrology consulted  - last session 12/30  - access via [facility]  - MBD/anemia therapy as noted elsewhere  - outpatient HD chair pending

## 2022-01-05 NOTE — PROGRESS NOTES
"      [facility] Medicine  Telemedicine Progress Note    Patient Name: Xuan Ricardo  MRN: [id number]  Patient Class: IP- Inpatient   Admission Date: 12/29/2021  Length of Stay: 6 days  Attending Physician: Marcelina Griffin MD  Primary Care Provider: Katharine Franks MD          Subjective:     Principal Problem:Calciphylaxis        HPI:  Ms. Ricardo is a 41y F w/ ESRD, recently dialyzed at [facility], presents after leaving AMA from [facility] with nausea/vomiting and bilateral thigh pain.     She says that the nausea and vomiting began a few weeks ago. She mostly reports vomiting up food content, denies overt blood. Says that the vomit is sometimes darker colored. She has been told she has "coffee-ground" emesis but does not describe the contents of her vomit as such. She denies abdominal pain, denies delayed vomiting after eating. Feels nauseas all the time, regardless of food intake.    She has also developed severe bilateral thigh lesions. She describes them as swelling w/ extreme sensitivity to touch. They started approximately two weeks ago and have persisted, with minimal improvement in pain. One thigh was biopsied at [facility] at her most recent admission, and she reports that the site continues to be tender.      Overview/Hospital Course:  Admitted to hospital medicine. Nephrology consulted. Wound care consulted for thigh wounds. Pt gave verbal and written consent for records release from [facility]. At [facility] pt underwent biopsy of R thigh which was c/w calciphylaxis, and underwent EGD which showed candidal esophagitis.     At  she was restarted on HD, given sodium thiosulfate. MBD therapy restarted. Restarted on fluconazole.    Dispo pending arrangement of outpatient HD chair      Interval History:  Reports her pain is still present but pain medications are helping.  Refused 6 min walk test.  Denies any shortness of breath, cough, sputum production. Medically stable to AL once dialysis chair " confirmed in a COVID cohort.    Review of Systems   Constitutional: Negative for fever.   Respiratory: Negative for shortness of breath.    Cardiovascular: Negative for chest pain.   Gastrointestinal: Negative for abdominal pain.   Neurological: Negative for dizziness. Headaches:      All other systems reviewed and are negative.    Objective:     Vital Signs (Most Recent):  Temp: 97.9 °F (36.6 °C) (01/05/22 1222)  Pulse: 98 (01/05/22 1222)  Resp: 18 (01/05/22 1222)  BP: 139/86 (01/05/22 1222)  SpO2: 95 % (01/05/22 1222) Vital Signs (24h Range):  Temp:  [97.5 °F (36.4 °C)-99.5 °F (37.5 °C)] 97.9 °F (36.6 °C)  Pulse:  [81-99] 98  Resp:  [14-18] 18  SpO2:  [95 %-100 %] 95 %  BP: (132-140)/(78-86) 139/86     Weight: 94.8 kg (208 lb 14.4 oz)  Body mass index is 33.72 kg/m².  No intake or output data in the 24 hours ending 01/05/22 1433   Physical Exam  Vitals and nursing note reviewed.   Constitutional:       Appearance: Normal appearance.   HENT:      Head: Normocephalic and atraumatic.   Eyes:      Extraocular Movements: Extraocular movements intact.      Pupils: Pupils are equal, round, and reactive to light.   Cardiovascular:      Rate and Rhythm: Normal rate and regular rhythm.   Pulmonary:      Effort: Pulmonary effort is normal. No respiratory distress.   Abdominal:      General: There is no distension.   Musculoskeletal:         General: Normal range of motion.      Cervical back: Normal range of motion.   Neurological:      General: No focal deficit present.      Mental Status: She is alert and oriented to person, place, and time.   Psychiatric:         Mood and Affect: Mood normal.         Behavior: Behavior normal.         Significant Labs:   All pertinent labs within the past 24 hours have been reviewed.  CBC: No results for input(s): WBC, HGB, HCT, PLT in the last 48 hours.  CMP:   Recent Labs   Lab 01/04/22  0657 01/05/22  0542   * 133*   K 4.3 4.6   CL 95 94*   CO2 27 25   * 129*   BUN 25* 33*    CREATININE 6.3* 8.1*   CALCIUM 9.0 9.0   ANIONGAP 13 14   EGFRNONAA 7* 6*       Significant Imaging: I have reviewed all pertinent imaging results/findings within the past 24 hours.      Assessment/Plan:      * Calciphylaxis  Rash on bilateral thighs, extremely tender to touch. [facility] biopsy c/w calciphaxis, per report  - records request sent for biopsy  - nephrology consulted  - wound care  - prn analgesics  - sodium thiosulfate  - treatment of hyperphos/hyperPTH as noted elsewhere  - will likely need outpatient chronic pain follow up      Candida esophagitis  Prior admission to [facility] [place]/ EGD showing candidal esophagitis  - fluconazole      Hyperphosphatemia  Phos lowering important given calciphylaxis.  - continue sevalamer  - on cincalcet for hyperpth   - HD per nephrology      Renovascular hypertension  Continue home meds; titrate to effect      COVID-19  Asymptomatic covid-19 infection  - will attempt to arrange infusion inpatient  - isolation precautions  - no hypoxia, no indication for dex/rem      Type 2 diabetes mellitus  Reports taking 10U daily  - accuchecks/SSI      Nausea  Persistent nausea/vomiting reported at admission. No vomiting while inpatient. Last admission nausea resolved w/ bicarb drip, suggesting ESRD component.   - monitor as pt gets dialyzed  - see esophageal candidiasis      End stage renal disease  Pt w/ ESRD. Refused dialysis on last admission, now s/p multiple sessions during [facility] hospitalization  - nephrology consulted  - last session 12/30  - access via [facility]  - MBD/anemia therapy as noted elsewhere  - outpatient HD chair pending      Secondary hyperparathyroidism  PTH severely elevated. Particularly concerning given calciphylaxis  - continue cincalcet  - iPTH pending    Anemia of renal disease  Drop in hemoglobin 1/1 without clinical bleeding, appropriate response to transfusion  - continue to monitor  - defer ESAs to nephrology  - transfuse to hgb 7      VTE Risk Mitigation (From  admission, onward)         Ordered     heparin (porcine) injection 3,200 Units  As needed (PRN)         12/31/21 1335     enoxaparin injection 90 mg  Every 24 hours (non-standard times)         12/30/21 0539     IP VTE HIGH RISK PATIENT  Once         12/30/21 0339     Place sequential compression device  Until discontinued         12/30/21 0339     Place HENRI hose  Until discontinued         12/30/21 0339     Reason for No Pharmacological VTE Prophylaxis  Once        Question:  Reasons:  Answer:  Active Bleeding    12/30/21 0339                      I have assessed these finding virtually using telemed platform and with assistance of bedside nurse                 The attending portion of this evaluation, treatment, and documentation was performed per Marcelina Griffin MD via Telemedicine AudioVisual using the secure [employer] software platform with 2 way audio/video. The provider was located off-site and the patient is located in the hospital. The aforementioned video software was utilized to document the relevant history and physical exam    Marcelina Griffin MD  Department of Hospital Medicine   [facility]

## 2022-01-05 NOTE — PROGRESS NOTES
Date of Admission:12/29/2021    SUBJECTIVE: resting comfortably in bed but notes legs hurt    Current Facility-Administered Medications   Medication    0.9%  NaCl infusion (for blood administration)    0.9%  NaCl infusion    acetaminophen tablet 650 mg    albuterol inhaler 2 puff    ascorbic acid (vitamin C) tablet 500 mg    cinacalcet tablet 30 mg    cloNIDine 0.3 mg/24 hr td ptwk 1 patch    dextrose 50% injection 12.5 g    dextrose 50% injection 25 g    enoxaparin injection 90 mg    epoetin kelsey-epbx injection 10,000 Units    famotidine tablet 20 mg    fluconazole tablet 400 mg    glucagon (human recombinant) injection 1 mg    glucose chewable tablet 16 g    glucose chewable tablet 24 g    heparin (porcine) injection 3,200 Units    hydrALAZINE injection 10 mg    influenza (QUADRIVALENT PF) vaccine 0.5 mL    insulin aspart U-100 pen 0-5 Units    labetaloL injection 10 mg    melatonin tablet 6 mg    morphine injection 6 mg    multivitamin tablet    NIFEdipine 24 hr tablet 90 mg    ondansetron injection 4 mg    oxyCODONE-acetaminophen 5-325 mg per tablet 1 tablet    polyethylene glycol packet 17 g    prochlorperazine injection Soln 10 mg    sars-cov-2 (covid-19) (Pfizer COVID-19) 30 mcg/0.3 ml injection 0.3 mL    senna-docusate 8.6-50 mg per tablet 1 tablet    sevelamer carbonate tablet 2,400 mg    sodium chloride 0.9% bolus 250 mL    sodium chloride 0.9% flush 10 mL    sodium thiosulfate 12.5 gram/50 mL (250 mg/mL) injection 25 g       Wt Readings from Last 3 Encounters:   12/30/21 94.8 kg (208 lb 14.4 oz)   12/11/21 90.7 kg (200 lb)   10/26/20 109.8 kg (242 lb)     Temp Readings from Last 3 Encounters:   01/05/22 98.9 °F (37.2 °C)   12/13/21 98 °F (36.7 °C) (Oral)   10/26/20 98 °F (36.7 °C) (Oral)     BP Readings from Last 3 Encounters:   01/05/22 135/83   12/13/21 131/69   10/26/20 (!) 163/88     Pulse Readings from Last 3 Encounters:   01/05/22 99   12/13/21 90   10/26/20 91        Intake/Output Summary (Last 24 hours) at 1/5/2022 1141  Last data filed at 1/4/2022 1336  Gross per 24 hour   Intake 200 ml   Output --   Net 200 ml       PE:  GEN:wd female in nad  HEENT:ncat, eomi,mmm  CVS:s1s2 regular  PULM:ctab  ABD:bs  EXT:no leg edema  NEURO:awake  pc of chest    Recent Labs   Lab 01/05/22  0542   *   *   K 4.6   CL 94*   CO2 25   BUN 33*   CREATININE 8.1*   CALCIUM 9.0       Lab Results   Component Value Date    PTH 1,498.8 (H) 12/31/2021    CALCIUM 9.0 01/05/2022    PHOS 3.0 01/05/2022       No results for input(s): WBC, RBC, HGB, HCT, PLT, MCV, MCH, MCHC in the last 24 hours.        A/P:  1.esrd.  Plan hd in am.   2.2nd hyperpara. Cont tx.  3.htn. uf with hd.  4.covid 19 infection. Needs isolation x 5 and negative test. Still Positive. Cannot go to regular unit .Will need cohort unit. MOve tts for now.  5.dm2. Following sugars.  6.calciphylaxis. thiosulfate with hd.  7.anemia 2nd to esrd.cont epo and vitamins.

## 2022-01-06 LAB
ANION GAP SERPL CALC-SCNC: 16 MMOL/L (ref 8–16)
BUN SERPL-MCNC: 40 MG/DL (ref 6–20)
CALCIUM SERPL-MCNC: 8.4 MG/DL (ref 8.7–10.5)
CHLORIDE SERPL-SCNC: 93 MMOL/L (ref 95–110)
CO2 SERPL-SCNC: 25 MMOL/L (ref 23–29)
CREAT SERPL-MCNC: 9.9 MG/DL (ref 0.5–1.4)
EST. GFR  (AFRICAN AMERICAN): 5 ML/MIN/1.73 M^2
EST. GFR  (NON AFRICAN AMERICAN): 4 ML/MIN/1.73 M^2
GLUCOSE SERPL-MCNC: 136 MG/DL (ref 70–110)
PHOSPHATE SERPL-MCNC: 3.5 MG/DL (ref 2.7–4.5)
POCT GLUCOSE: 162 MG/DL (ref 70–110)
POTASSIUM SERPL-SCNC: 5.3 MMOL/L (ref 3.5–5.1)
SODIUM SERPL-SCNC: 134 MMOL/L (ref 136–145)

## 2022-01-06 PROCEDURE — 21400001 HC TELEMETRY ROOM

## 2022-01-06 PROCEDURE — 63600175 PHARM REV CODE 636 W HCPCS: Mod: JG | Performed by: INTERNAL MEDICINE

## 2022-01-06 PROCEDURE — 63600175 PHARM REV CODE 636 W HCPCS: Performed by: INTERNAL MEDICINE

## 2022-01-06 PROCEDURE — 63600175 PHARM REV CODE 636 W HCPCS: Performed by: STUDENT IN AN ORGANIZED HEALTH CARE EDUCATION/TRAINING PROGRAM

## 2022-01-06 PROCEDURE — 25000003 PHARM REV CODE 250: Performed by: EMERGENCY MEDICINE

## 2022-01-06 PROCEDURE — 63600175 PHARM REV CODE 636 W HCPCS: Performed by: EMERGENCY MEDICINE

## 2022-01-06 PROCEDURE — 80100016 HC MAINTENANCE HEMODIALYSIS

## 2022-01-06 PROCEDURE — 63700000 PHARM REV CODE 250 ALT 637 W/O HCPCS: Performed by: STUDENT IN AN ORGANIZED HEALTH CARE EDUCATION/TRAINING PROGRAM

## 2022-01-06 PROCEDURE — 25000003 PHARM REV CODE 250: Performed by: HOSPITALIST

## 2022-01-06 PROCEDURE — 27000207 HC ISOLATION

## 2022-01-06 PROCEDURE — 25000003 PHARM REV CODE 250: Performed by: INTERNAL MEDICINE

## 2022-01-06 PROCEDURE — 80048 BASIC METABOLIC PNL TOTAL CA: CPT | Performed by: STUDENT IN AN ORGANIZED HEALTH CARE EDUCATION/TRAINING PROGRAM

## 2022-01-06 PROCEDURE — 25000003 PHARM REV CODE 250: Performed by: STUDENT IN AN ORGANIZED HEALTH CARE EDUCATION/TRAINING PROGRAM

## 2022-01-06 PROCEDURE — 84100 ASSAY OF PHOSPHORUS: CPT | Performed by: STUDENT IN AN ORGANIZED HEALTH CARE EDUCATION/TRAINING PROGRAM

## 2022-01-06 PROCEDURE — 99900035 HC TECH TIME PER 15 MIN (STAT)

## 2022-01-06 PROCEDURE — 36415 COLL VENOUS BLD VENIPUNCTURE: CPT | Performed by: STUDENT IN AN ORGANIZED HEALTH CARE EDUCATION/TRAINING PROGRAM

## 2022-01-06 RX ORDER — CYCLOBENZAPRINE HCL 10 MG
10 TABLET ORAL 3 TIMES DAILY
Status: DISCONTINUED | OUTPATIENT
Start: 2022-01-06 | End: 2022-01-08

## 2022-01-06 RX ADMIN — CYCLOBENZAPRINE 10 MG: 10 TABLET, FILM COATED ORAL at 08:01

## 2022-01-06 RX ADMIN — POLYETHYLENE GLYCOL 3350 17 G: 17 POWDER, FOR SOLUTION ORAL at 09:01

## 2022-01-06 RX ADMIN — CYCLOBENZAPRINE 10 MG: 10 TABLET, FILM COATED ORAL at 02:01

## 2022-01-06 RX ADMIN — THERA TABS 1 TABLET: TAB at 09:01

## 2022-01-06 RX ADMIN — OXYCODONE AND ACETAMINOPHEN 1 TABLET: 5; 325 TABLET ORAL at 10:01

## 2022-01-06 RX ADMIN — FAMOTIDINE 20 MG: 20 TABLET ORAL at 09:01

## 2022-01-06 RX ADMIN — ENOXAPARIN SODIUM 90 MG: 100 INJECTION SUBCUTANEOUS at 06:01

## 2022-01-06 RX ADMIN — CINACALCET 30 MG: 30 TABLET, FILM COATED ORAL at 09:01

## 2022-01-06 RX ADMIN — HEPARIN SODIUM 3200 UNITS: 5000 INJECTION INTRAVENOUS; SUBCUTANEOUS at 01:01

## 2022-01-06 RX ADMIN — EPOETIN ALFA-EPBX 10000 UNITS: 10000 INJECTION, SOLUTION INTRAVENOUS; SUBCUTANEOUS at 01:01

## 2022-01-06 RX ADMIN — OXYCODONE HYDROCHLORIDE AND ACETAMINOPHEN 500 MG: 500 TABLET ORAL at 08:01

## 2022-01-06 RX ADMIN — OXYCODONE AND ACETAMINOPHEN 1 TABLET: 5; 325 TABLET ORAL at 02:01

## 2022-01-06 RX ADMIN — CYCLOBENZAPRINE 10 MG: 10 TABLET, FILM COATED ORAL at 11:01

## 2022-01-06 RX ADMIN — OXYCODONE AND ACETAMINOPHEN 1 TABLET: 5; 325 TABLET ORAL at 01:01

## 2022-01-06 RX ADMIN — SEVELAMER CARBONATE 2400 MG: 800 TABLET, FILM COATED ORAL at 05:01

## 2022-01-06 RX ADMIN — INSULIN ASPART 1 UNITS: 100 INJECTION, SOLUTION INTRAVENOUS; SUBCUTANEOUS at 09:01

## 2022-01-06 RX ADMIN — CLONIDINE 1 PATCH: 0.3 PATCH TRANSDERMAL at 09:01

## 2022-01-06 RX ADMIN — SEVELAMER CARBONATE 2400 MG: 800 TABLET, FILM COATED ORAL at 09:01

## 2022-01-06 RX ADMIN — FLUCONAZOLE 400 MG: 100 TABLET ORAL at 09:01

## 2022-01-06 RX ADMIN — OXYCODONE HYDROCHLORIDE AND ACETAMINOPHEN 500 MG: 500 TABLET ORAL at 09:01

## 2022-01-06 RX ADMIN — OXYCODONE AND ACETAMINOPHEN 1 TABLET: 5; 325 TABLET ORAL at 08:01

## 2022-01-06 RX ADMIN — SEVELAMER CARBONATE 2400 MG: 800 TABLET, FILM COATED ORAL at 11:01

## 2022-01-06 NOTE — PLAN OF CARE
Problem: Diabetes Comorbidity  Goal: Blood Glucose Level Within Targeted Range  Outcome: Ongoing, Progressing  Intervention: Monitor and Manage Glycemia  Flowsheets (Taken 1/5/2022 1907)  Glycemic Management: blood glucose monitored

## 2022-01-06 NOTE — PROGRESS NOTES
Date of Admission:12/29/2021    SUBJECTIVE: resting comfortably in bed but notes leg hurt during hd, stopped early by her    Current Facility-Administered Medications   Medication    0.9%  NaCl infusion (for blood administration)    0.9%  NaCl infusion    acetaminophen tablet 650 mg    albuterol inhaler 2 puff    ascorbic acid (vitamin C) tablet 500 mg    cinacalcet tablet 30 mg    cloNIDine 0.3 mg/24 hr td ptwk 1 patch    cyclobenzaprine tablet 10 mg    dextrose 50% injection 12.5 g    dextrose 50% injection 25 g    enoxaparin injection 90 mg    epoetin kelsey-epbx injection 10,000 Units    famotidine tablet 20 mg    fluconazole tablet 400 mg    glucagon (human recombinant) injection 1 mg    glucose chewable tablet 16 g    glucose chewable tablet 24 g    heparin (porcine) injection 3,200 Units    hydrALAZINE injection 10 mg    influenza (QUADRIVALENT PF) vaccine 0.5 mL    insulin aspart U-100 pen 0-5 Units    labetaloL injection 10 mg    melatonin tablet 6 mg    morphine injection 6 mg    multivitamin tablet    NIFEdipine 24 hr tablet 90 mg    ondansetron injection 4 mg    oxyCODONE-acetaminophen 5-325 mg per tablet 1 tablet    polyethylene glycol packet 17 g    prochlorperazine injection Soln 10 mg    sars-cov-2 (covid-19) (Pfizer COVID-19) 30 mcg/0.3 ml injection 0.3 mL    senna-docusate 8.6-50 mg per tablet 1 tablet    sevelamer carbonate tablet 2,400 mg    sodium chloride 0.9% bolus 250 mL    sodium chloride 0.9% flush 10 mL    sodium thiosulfate 12.5 gram/50 mL (250 mg/mL) injection 25 g       Wt Readings from Last 3 Encounters:   12/30/21 94.8 kg (208 lb 14.4 oz)   12/11/21 90.7 kg (200 lb)   10/26/20 109.8 kg (242 lb)     Temp Readings from Last 3 Encounters:   01/06/22 98.4 °F (36.9 °C) (Oral)   12/13/21 98 °F (36.7 °C) (Oral)   10/26/20 98 °F (36.7 °C) (Oral)     BP Readings from Last 3 Encounters:   01/06/22 110/60   12/13/21 131/69   10/26/20 (!) 163/88     Pulse  Readings from Last 3 Encounters:   01/06/22 87   12/13/21 90   10/26/20 91       Intake/Output Summary (Last 24 hours) at 1/6/2022 1424  Last data filed at 1/5/2022 1700  Gross per 24 hour   Intake 240 ml   Output --   Net 240 ml       PE:  GEN:wd female in nad  HEENT:ncat, eomi,mmm  CVS:s1s2 regular  PULM:ctab  ABD:bs  EXT:no leg edema  NEURO:awakens  pc of chest    Recent Labs   Lab 01/06/22  0434   *   *   K 5.3*   CL 93*   CO2 25   BUN 40*   CREATININE 9.9*   CALCIUM 8.4*       Lab Results   Component Value Date    PTH 1,498.8 (H) 12/31/2021    CALCIUM 8.4 (L) 01/06/2022    PHOS 3.5 01/06/2022       No results for input(s): WBC, RBC, HGB, HCT, PLT, MCV, MCH, MCHC in the last 24 hours.        A/P:  1.esrd. cont hd TTS. Pt will need covid unit for hd. Test still +. Hd today.  2.2nd hyperpara. Cont tx.  3.htn. uf with hd.  4.covid 19 infection. Needs isolation x 5 and negative test. Still Positive. Cannot go to regular unit .Will need cohort unit. MOve tts for now.   5.dm2. Following sugars.  6.calciphylaxis. thiosulfate with hd.  7.anemia 2nd to esrd.cont epo and vitamins.

## 2022-01-06 NOTE — NURSING
End of shift bedside report given to NORMAN Gilliam. Patient in no apparent distress.     12 hour chart check complete.

## 2022-01-07 PROBLEM — N03.9 ANEMIA DUE TO PRE-ESRD TREATED WITH ERYTHROPOIETIN: Status: ACTIVE | Noted: 2022-01-07

## 2022-01-07 PROBLEM — D63.1 ANEMIA DUE TO PRE-ESRD TREATED WITH ERYTHROPOIETIN: Status: ACTIVE | Noted: 2022-01-07

## 2022-01-07 LAB
ANION GAP SERPL CALC-SCNC: 11 MMOL/L (ref 8–16)
BUN SERPL-MCNC: 23 MG/DL (ref 6–20)
CALCIUM SERPL-MCNC: 8.9 MG/DL (ref 8.7–10.5)
CHLORIDE SERPL-SCNC: 97 MMOL/L (ref 95–110)
CO2 SERPL-SCNC: 25 MMOL/L (ref 23–29)
CREAT SERPL-MCNC: 7 MG/DL (ref 0.5–1.4)
D DIMER PPP IA.FEU-MCNC: 1.96 MG/L FEU
EST. GFR  (AFRICAN AMERICAN): 8 ML/MIN/1.73 M^2
EST. GFR  (NON AFRICAN AMERICAN): 7 ML/MIN/1.73 M^2
FERRITIN SERPL-MCNC: 3170 NG/ML (ref 20–300)
GLUCOSE SERPL-MCNC: 191 MG/DL (ref 70–110)
LDH SERPL L TO P-CCNC: 156 U/L (ref 110–260)
PHOSPHATE SERPL-MCNC: 3.3 MG/DL (ref 2.7–4.5)
POCT GLUCOSE: 146 MG/DL (ref 70–110)
POCT GLUCOSE: 209 MG/DL (ref 70–110)
POCT GLUCOSE: 220 MG/DL (ref 70–110)
POCT GLUCOSE: 317 MG/DL (ref 70–110)
POTASSIUM SERPL-SCNC: 5.3 MMOL/L (ref 3.5–5.1)
SODIUM SERPL-SCNC: 133 MMOL/L (ref 136–145)

## 2022-01-07 PROCEDURE — 25000003 PHARM REV CODE 250: Performed by: EMERGENCY MEDICINE

## 2022-01-07 PROCEDURE — 99900035 HC TECH TIME PER 15 MIN (STAT)

## 2022-01-07 PROCEDURE — 63700000 PHARM REV CODE 250 ALT 637 W/O HCPCS: Performed by: STUDENT IN AN ORGANIZED HEALTH CARE EDUCATION/TRAINING PROGRAM

## 2022-01-07 PROCEDURE — 21400001 HC TELEMETRY ROOM

## 2022-01-07 PROCEDURE — 36415 COLL VENOUS BLD VENIPUNCTURE: CPT | Performed by: STUDENT IN AN ORGANIZED HEALTH CARE EDUCATION/TRAINING PROGRAM

## 2022-01-07 PROCEDURE — 80048 BASIC METABOLIC PNL TOTAL CA: CPT | Performed by: STUDENT IN AN ORGANIZED HEALTH CARE EDUCATION/TRAINING PROGRAM

## 2022-01-07 PROCEDURE — 25000003 PHARM REV CODE 250: Performed by: INTERNAL MEDICINE

## 2022-01-07 PROCEDURE — 25000003 PHARM REV CODE 250: Performed by: HOSPITALIST

## 2022-01-07 PROCEDURE — 85379 FIBRIN DEGRADATION QUANT: CPT | Performed by: EMERGENCY MEDICINE

## 2022-01-07 PROCEDURE — 82728 ASSAY OF FERRITIN: CPT | Performed by: EMERGENCY MEDICINE

## 2022-01-07 PROCEDURE — 63600175 PHARM REV CODE 636 W HCPCS: Performed by: EMERGENCY MEDICINE

## 2022-01-07 PROCEDURE — 83615 LACTATE (LD) (LDH) ENZYME: CPT | Performed by: EMERGENCY MEDICINE

## 2022-01-07 PROCEDURE — 27000207 HC ISOLATION

## 2022-01-07 PROCEDURE — 84100 ASSAY OF PHOSPHORUS: CPT | Performed by: STUDENT IN AN ORGANIZED HEALTH CARE EDUCATION/TRAINING PROGRAM

## 2022-01-07 RX ORDER — OXYCODONE HYDROCHLORIDE 5 MG/1
10 TABLET ORAL EVERY 4 HOURS PRN
Status: DISCONTINUED | OUTPATIENT
Start: 2022-01-07 | End: 2022-01-14

## 2022-01-07 RX ADMIN — FLUCONAZOLE 400 MG: 100 TABLET ORAL at 11:01

## 2022-01-07 RX ADMIN — OXYCODONE 10 MG: 5 TABLET ORAL at 11:01

## 2022-01-07 RX ADMIN — THERA TABS 1 TABLET: TAB at 11:01

## 2022-01-07 RX ADMIN — ENOXAPARIN SODIUM 90 MG: 100 INJECTION SUBCUTANEOUS at 05:01

## 2022-01-07 RX ADMIN — POLYETHYLENE GLYCOL 3350 17 G: 17 POWDER, FOR SOLUTION ORAL at 11:01

## 2022-01-07 RX ADMIN — OXYCODONE HYDROCHLORIDE AND ACETAMINOPHEN 500 MG: 500 TABLET ORAL at 08:01

## 2022-01-07 RX ADMIN — FAMOTIDINE 20 MG: 20 TABLET ORAL at 11:01

## 2022-01-07 RX ADMIN — CYCLOBENZAPRINE 10 MG: 10 TABLET, FILM COATED ORAL at 04:01

## 2022-01-07 RX ADMIN — SEVELAMER CARBONATE 2400 MG: 800 TABLET, FILM COATED ORAL at 06:01

## 2022-01-07 RX ADMIN — OXYCODONE HYDROCHLORIDE AND ACETAMINOPHEN 500 MG: 500 TABLET ORAL at 11:01

## 2022-01-07 RX ADMIN — CYCLOBENZAPRINE 10 MG: 10 TABLET, FILM COATED ORAL at 08:01

## 2022-01-07 RX ADMIN — INSULIN ASPART 4 UNITS: 100 INJECTION, SOLUTION INTRAVENOUS; SUBCUTANEOUS at 11:01

## 2022-01-07 RX ADMIN — CYCLOBENZAPRINE 10 MG: 10 TABLET, FILM COATED ORAL at 11:01

## 2022-01-07 RX ADMIN — SEVELAMER CARBONATE 2400 MG: 800 TABLET, FILM COATED ORAL at 11:01

## 2022-01-07 NOTE — PROGRESS NOTES
Date of Admission:12/29/2021    SUBJECTIVE: concern of uf with hd and leg pain    Current Facility-Administered Medications   Medication    0.9%  NaCl infusion (for blood administration)    0.9%  NaCl infusion    acetaminophen tablet 650 mg    albuterol inhaler 2 puff    ascorbic acid (vitamin C) tablet 500 mg    cinacalcet tablet 30 mg    cloNIDine 0.3 mg/24 hr td ptwk 1 patch    cyclobenzaprine tablet 10 mg    dextrose 50% injection 12.5 g    dextrose 50% injection 25 g    enoxaparin injection 90 mg    epoetin kelsey-epbx injection 10,000 Units    famotidine tablet 20 mg    fluconazole tablet 400 mg    glucagon (human recombinant) injection 1 mg    glucose chewable tablet 16 g    glucose chewable tablet 24 g    heparin (porcine) injection 3,200 Units    hydrALAZINE injection 10 mg    influenza (QUADRIVALENT PF) vaccine 0.5 mL    insulin aspart U-100 pen 0-5 Units    labetaloL injection 10 mg    melatonin tablet 6 mg    morphine injection 6 mg    multivitamin tablet    NIFEdipine 24 hr tablet 90 mg    ondansetron injection 4 mg    oxyCODONE immediate release tablet 10 mg    polyethylene glycol packet 17 g    prochlorperazine injection Soln 10 mg    sars-cov-2 (covid-19) (Pfizer COVID-19) 30 mcg/0.3 ml injection 0.3 mL    senna-docusate 8.6-50 mg per tablet 1 tablet    sevelamer carbonate tablet 2,400 mg    sodium chloride 0.9% bolus 250 mL    sodium chloride 0.9% flush 10 mL    sodium thiosulfate 12.5 gram/50 mL (250 mg/mL) injection 25 g       Wt Readings from Last 3 Encounters:   12/30/21 94.8 kg (208 lb 14.4 oz)   12/11/21 90.7 kg (200 lb)   10/26/20 109.8 kg (242 lb)     Temp Readings from Last 3 Encounters:   01/07/22 98.8 °F (37.1 °C) (Oral)   12/13/21 98 °F (36.7 °C) (Oral)   10/26/20 98 °F (36.7 °C) (Oral)     BP Readings from Last 3 Encounters:   01/07/22 (!) 110/57   12/13/21 131/69   10/26/20 (!) 163/88     Pulse Readings from Last 3 Encounters:   01/07/22 87   12/13/21  90   10/26/20 91       Intake/Output Summary (Last 24 hours) at 1/7/2022 1155  Last data filed at 1/6/2022 1800  Gross per 24 hour   Intake 1140 ml   Output 1700 ml   Net -560 ml       PE:  GEN:wd female in nad  HEENT:ncat, eomi,mmm  CVS:s1s2 regular  PULM:ctab  ABD:bs  EXT:no leg edema  NEURO:awake, alert  pc of chest    Recent Labs   Lab 01/07/22  0439   *   *   K 5.3*   CL 97   CO2 25   BUN 23*   CREATININE 7.0*   CALCIUM 8.9       Lab Results   Component Value Date    PTH 1,498.8 (H) 12/31/2021    CALCIUM 8.9 01/07/2022    PHOS 3.3 01/07/2022       No results for input(s): WBC, RBC, HGB, HCT, PLT, MCV, MCH, MCHC in the last 24 hours.        A/P:  1.esrd. cont hd TTS. Pt will need covid unit for hd. Awaiting on chairtime.  2.2nd hyperpara. Cont tx.  3.htn. decrease uf goal.  4.covid 19 infection. Needs isolation x 5 and negative test. Still Positive. Cannot go to regular unit .Will need cohort unit. Cont tts for now.  5.dm2. Following sugars.  6.calciphylaxis. thiosulfate with hd.  7.anemia 2nd to esrd.cont epo and vitamins.

## 2022-01-07 NOTE — SUBJECTIVE & OBJECTIVE
Interval History: patient doing well today; currently getting HD; is complaining of severe leg cramps, starting flexiril 10 mg PO TID  - patient is awaiting outpatient covid HD chair set up, likely by tomorrow; patient is on RA;     Review of Systems   Constitutional: Negative for activity change and fever.   Respiratory: Negative for cough and shortness of breath.    Gastrointestinal: Negative for abdominal pain and nausea.     Objective:     Vital Signs (Most Recent):  Temp: 98.8 °F (37.1 °C) (01/07/22 0007)  Pulse: 89 (01/07/22 0007)  Resp: 19 (01/07/22 0007)  BP: 120/63 (01/07/22 0007)  SpO2: 97 % (01/07/22 0007) Vital Signs (24h Range):  Temp:  [98.3 °F (36.8 °C)-99.2 °F (37.3 °C)] 98.8 °F (37.1 °C)  Pulse:  [] 89  Resp:  [18-19] 19  SpO2:  [94 %-98 %] 97 %  BP: ()/(38-68) 120/63     Weight: 94.8 kg (208 lb 14.4 oz)  Body mass index is 33.72 kg/m².    Intake/Output Summary (Last 24 hours) at 1/7/2022 0124  Last data filed at 1/6/2022 1800  Gross per 24 hour   Intake 1380 ml   Output 1700 ml   Net -320 ml      Physical Exam  Vitals and nursing note reviewed.   Constitutional:       General: She is not in acute distress.  Pulmonary:      Effort: Pulmonary effort is normal.      Breath sounds: No wheezing.   Abdominal:      General: Abdomen is flat. There is no distension.   Musculoskeletal:         General: No swelling.      Left lower leg: No edema.   Skin:     Coloration: Skin is not jaundiced.      Findings: No rash.   Neurological:      General: No focal deficit present.      Mental Status: She is alert and oriented to person, place, and time.   Psychiatric:         Mood and Affect: Mood normal.         Behavior: Behavior normal.         Significant Labs: All pertinent labs within the past 24 hours have been reviewed.    Significant Imaging: I have reviewed all pertinent imaging results/findings within the past 24 hours.

## 2022-01-07 NOTE — PLAN OF CARE
Recommendations  1) Continue Renal diet   2) Add Suplena with Carb Steady 1.8 oral nutrition supplement BID    Goals: Pt to meet > 75% EEN by RD follow up  Nutrition Goal Status: new  Communication of RD Recs: reviewed with physician    Assessment and Plan  Nutrition Problem  Inadequate oral intake    Related to (etiology):   Appetite, food preferences    Signs and Symptoms (as evidenced by):   Pt report of fair appetite, eating 50% meals    Interventions(treatment strategy):  Collaboration with other providers  Renal diet    Nutrition Diagnosis Status:   New

## 2022-01-07 NOTE — NURSING
End of shift bedside report given to Tawana.NORMAN. Patient in no apparent distress.     12 hour chart check complete.

## 2022-01-07 NOTE — PROGRESS NOTES
"[facility]  [facility]  Progress Note    SUMMARY     Recommendations  1) Continue Renal diet   2) Add Suplena with Carb Steady 1.8 oral nutrition supplement BID    Goals: Pt to meet > 75% EEN by RD follow up  Nutrition Goal Status: new  Communication of RD Recs: reviewed with physician    Assessment and Plan  Nutrition Problem  Inadequate oral intake    Related to (etiology):   Appetite, food preferences    Signs and Symptoms (as evidenced by):   Pt report of fair appetite, eating 50% meals    Interventions(treatment strategy):  Collaboration with other providers  Renal diet    Nutrition Diagnosis Status:   New    Reason for Assessment  Reason For Assessment: length of stay  Diagnosis: other (see comments) (COVID, ESRD on HD)  Relevant Medical History: ESRD on HD, DM2  Interdisciplinary Rounds: did not attend  General Information Comments: Remote assessment for coverage, unable to complete NFPE. Spoke with pt on room phone, reports fair appetite, eating 50% meals, c/o temperature of meals. RD made menu suggestions to improve intake. Pt agreeable to try oral nutrition supplement. Denies N/V/D/C, LBM 2 days ago per pt. UBW ~220 lbs. Noted with about 50 lbs wt loss over the last 1.5 years per chart. Unable to determine malnutrition status at this time.  Nutrition Discharge Planning: Discharge on renal diet    Nutrition Risk Screen  Nutrition Risk Screen: no indicators present    Anthropometrics  Temp: 97.4 °F (36.3 °C)  Height Method: Stated  Height: 5' 6" (167.6 cm)  Height (inches): 66 in  Weight Method: Bed Scale  Weight: 94.8 kg (208 lb 14.4 oz)  Weight (lb): 208.9 lb  Ideal Body Weight (IBW), Female: 130 lb  % Ideal Body Weight, Female (lb): 160.69 %  BMI (Calculated): 33.7     Lab/Procedures/Meds  Pertinent Labs Reviewed: reviewed  Pertinent Labs Comments: Na 133, K 5.3, BUN 23, Cr 7, GFR 8, Glu 191, POC -227, Hgb A1c 5.2 on 12/30/21  Pertinent Medications Reviewed: reviewed  Pertinent " Medications Comments: NaCl, Vit C, epoetin kelsey-epbx, famotidine, MVI, polyethylene glycol, sevelamer carbonate    Estimated/Assessed Needs  Weight Used For Calorie Calculations: 94.3 kg (208 lb)  Energy Calorie Requirements (kcal): 8457-8496 kcal/day based on 25-30 g/kg  Energy Need Method: Kcal/kg  Protein Requirements: 113-123 g/day based on 1.2-1.3 g/kg for ESRD on HD  Weight Used For Protein Calculations: 94.3 kg (208 lb)  Fluid Requirements (mL): 1000 mL + UOP for ESRD on dialysis  Estimated Fluid Requirement Method: other (see comments)  RDA Method (mL): 2359  CHO Requirement: 295 g/day based on 50% kcal from CHO    Nutrition Prescription Ordered  Current Diet Order: renal    Evaluation of Received Nutrient/Fluid Intake  Energy Calories Required: not meeting needs  Protein Required: not meeting needs  Fluid Required: meeting needs  Comments: LBM 1/4 per chart, 1/5 per pt  Tolerance: tolerating  % Intake of Estimated Energy Needs: 25 - 50 %  % Meal Intake: 25 - 50 %    Nutrition Risk  Level of Risk/Frequency of Follow-up: low (follow up weekly)     Monitor and Evaluation  Food and Nutrient Intake: energy intake,food and beverage intake  Food and Nutrient Adminstration: diet order  Physical Activity and Function: nutrition-related ADLs and IADLs  Anthropometric Measurements: weight,weight change  Biochemical Data, Medical Tests and Procedures: gastrointestinal profile,glucose/endocrine profile,inflammatory profile,electrolyte and renal panel,lipid profile  Nutrition-Focused Physical Findings: skin,overall appearance     Nutrition Follow-Up  RD Follow-up?: Yes

## 2022-01-07 NOTE — PROGRESS NOTES
"      [facility] Medicine  Telemedicine Progress Note    Patient Name: Xuan Ricardo  MRN: [id number]  Patient Class: IP- Inpatient   Admission Date: 12/29/2021  Length of Stay: 8 days  Attending Physician: Chase Chahal MD  Primary Care Provider: Katharine Franks MD          Subjective:     Principal Problem:Calciphylaxis        HPI:  Ms. Ricardo is a 41y F w/ ESRD, [place] [facility] presents after [place] [facility] with nausea/vomiting and bilateral thigh pain.     She says that the nausea and vomiting began a few weeks ago. She mostly reports vomiting up food content, denies overt blood. Says that the vomit is sometimes darker colored. She has been told she has "coffee-ground" emesis but does not describe the contents of her vomit as such. She denies abdominal pain, denies delayed vomiting after eating. Feels nauseas all the time, regardless of food intake.    She has also developed severe bilateral thigh lesions. She describes them as swelling w/ extreme sensitivity to touch. They started approximately two weeks ago and have persisted, with minimal improvement in pain. One thigh was biopsied at [facility] at her most recent admission, and she reports that the site continues to be tender.      Overview/Hospital Course:  Admitted to hospital medicine. Nephrology consulted. Wound care consulted for thigh wounds. Pt gave verbal and written consent for records release from [facility]. At [facility] pt underwent biopsy of R thigh which was c/w calciphylaxis, and underwent EGD which showed candidal esophagitis.     At  she was restarted on HD, given sodium thiosulfate. MBD therapy restarted. Restarted on fluconazole.    Dispo pending arrangement of outpatient HD chair      Interval History: patient doing well today; currently getting HD; is complaining of severe leg cramps, starting flexiril 10 mg PO TID  - patient is awaiting outpatient covid HD chair set up, likely by tomorrow; " patient is on RA;     Review of Systems   Constitutional: Negative for activity change and fever.   Respiratory: Negative for cough and shortness of breath.    Gastrointestinal: Negative for abdominal pain and nausea.     Objective:     Vital Signs (Most Recent):  Temp: 98.8 °F (37.1 °C) (01/07/22 0007)  Pulse: 89 (01/07/22 0007)  Resp: 19 (01/07/22 0007)  BP: 120/63 (01/07/22 0007)  SpO2: 97 % (01/07/22 0007) Vital Signs (24h Range):  Temp:  [98.3 °F (36.8 °C)-99.2 °F (37.3 °C)] 98.8 °F (37.1 °C)  Pulse:  [] 89  Resp:  [18-19] 19  SpO2:  [94 %-98 %] 97 %  BP: ()/(38-68) 120/63     Weight: 94.8 kg (208 lb 14.4 oz)  Body mass index is 33.72 kg/m².    Intake/Output Summary (Last 24 hours) at 1/7/2022 0124  Last data filed at 1/6/2022 1800  Gross per 24 hour   Intake 1380 ml   Output 1700 ml   Net -320 ml      Physical Exam  Vitals and nursing note reviewed.   Constitutional:       General: She is not in acute distress.  Pulmonary:      Effort: Pulmonary effort is normal.      Breath sounds: No wheezing.   Abdominal:      General: Abdomen is flat. There is no distension.   Musculoskeletal:         General: No swelling.      Left lower leg: No edema.   Skin:     Coloration: Skin is not jaundiced.      Findings: No rash.   Neurological:      General: No focal deficit present.      Mental Status: She is alert and oriented to person, place, and time.   Psychiatric:         Mood and Affect: Mood normal.         Behavior: Behavior normal.         Significant Labs: All pertinent labs within the past 24 hours have been reviewed.    Significant Imaging: I have reviewed all pertinent imaging results/findings within the past 24 hours.      Assessment/Plan:      * Calciphylaxis  Rash on bilateral thighs, extremely tender to touch. [facility] biopsy c/w calciphaxis, per report  - records request sent for biopsy  - nephrology consulted  - wound care  - prn analgesics  - sodium thiosulfate  - treatment of hyperphos/hyperPTH  as noted elsewhere  - will likely need outpatient chronic pain follow up      Candida esophagitis  Prior [place] [facility] w/ EGD showing candidal esophagitis  - fluconazole      Hyperphosphatemia  Phos lowering important given calciphylaxis.  - continue sevalamer  - on cincalcet for hyperpth   - HD per nephrology      Renovascular hypertension  Continue home meds; titrate to effect      COVID-19  Asymptomatic covid-19 infection  - will attempt to arrange infusion inpatient  - isolation precautions  - no hypoxia, no indication for dex/rem      Type 2 diabetes mellitus  Reports taking 10U daily  - accuchecks/SSI      Nausea  Persistent nausea/vomiting reported at admission. No vomiting while inpatient. Last admission nausea resolved w/ bicarb drip, suggesting ESRD component.   - monitor as pt gets dialyzed  - see esophageal candidiasis      End stage renal disease  Pt w/ ESRD. Refused dialysis on last admission, now s/p multiple sessions during [facility]  - nephrology consulted  - last session 12/30  - access via [facility]  - MBD/anemia therapy as noted elsewhere  - outpatient HD chair pending      Secondary hyperparathyroidism  PTH severely elevated. Particularly concerning given calciphylaxis  - continue cincalcet  - iPTH pending    Anemia of renal disease  Drop in hemoglobin 1/1 without clinical bleeding, appropriate response to transfusion  - continue to monitor  - defer ESAs to nephrology  - transfuse to hgb 7      VTE Risk Mitigation (From admission, onward)         Ordered     heparin (porcine) injection 3,200 Units  As needed (PRN)         12/31/21 1335     enoxaparin injection 90 mg  Every 24 hours (non-standard times)         12/30/21 0539     IP VTE HIGH RISK PATIENT  Once         12/30/21 0339     Place sequential compression device  Until discontinued         12/30/21 0339     Place HENRI hose  Until discontinued         12/30/21 0339     Reason for No Pharmacological VTE Prophylaxis  Once         Question:  Reasons:  Answer:  Active Bleeding    12/30/21 0339                      I have assessed these finding virtually using telemed platform and with assistance of bedside nurse                 The attending portion of this evaluation, treatment, and documentation was performed per Chase Chahal MD via Telemedicine AudioVisual using the secure [employer] software platform with 2 way audio/video. The provider was located off-site and the patient is located in the hospital. The aforementioned video software was utilized to document the relevant history and physical exam    Chase Chahal MD  Department of Hospital Medicine   [facility]

## 2022-01-08 PROBLEM — R50.9 FEVER: Status: ACTIVE | Noted: 2022-01-08

## 2022-01-08 LAB
ANION GAP SERPL CALC-SCNC: 13 MMOL/L (ref 8–16)
BASOPHILS # BLD AUTO: 0.01 K/UL (ref 0–0.2)
BASOPHILS NFR BLD: 0.2 % (ref 0–1.9)
BUN SERPL-MCNC: 33 MG/DL (ref 6–20)
CALCIUM SERPL-MCNC: 8.5 MG/DL (ref 8.7–10.5)
CHLORIDE SERPL-SCNC: 97 MMOL/L (ref 95–110)
CO2 SERPL-SCNC: 23 MMOL/L (ref 23–29)
CREAT SERPL-MCNC: 8.9 MG/DL (ref 0.5–1.4)
DIFFERENTIAL METHOD: ABNORMAL
EOSINOPHIL # BLD AUTO: 0 K/UL (ref 0–0.5)
EOSINOPHIL NFR BLD: 0.5 % (ref 0–8)
ERYTHROCYTE [DISTWIDTH] IN BLOOD BY AUTOMATED COUNT: 15.9 % (ref 11.5–14.5)
EST. GFR  (AFRICAN AMERICAN): 6 ML/MIN/1.73 M^2
EST. GFR  (NON AFRICAN AMERICAN): 5 ML/MIN/1.73 M^2
GLUCOSE SERPL-MCNC: 137 MG/DL (ref 70–110)
HCT VFR BLD AUTO: 25.1 % (ref 37–48.5)
HGB BLD-MCNC: 7.5 G/DL (ref 12–16)
IMM GRANULOCYTES # BLD AUTO: 0.15 K/UL (ref 0–0.04)
IMM GRANULOCYTES NFR BLD AUTO: 2.6 % (ref 0–0.5)
LACTATE SERPL-SCNC: 0.5 MMOL/L (ref 0.5–2.2)
LYMPHOCYTES # BLD AUTO: 0.9 K/UL (ref 1–4.8)
LYMPHOCYTES NFR BLD: 14.9 % (ref 18–48)
MCH RBC QN AUTO: 29 PG (ref 27–31)
MCHC RBC AUTO-ENTMCNC: 29.9 G/DL (ref 32–36)
MCV RBC AUTO: 97 FL (ref 82–98)
MONOCYTES # BLD AUTO: 0.4 K/UL (ref 0.3–1)
MONOCYTES NFR BLD: 6.7 % (ref 4–15)
NEUTROPHILS # BLD AUTO: 4.4 K/UL (ref 1.8–7.7)
NEUTROPHILS NFR BLD: 75.1 % (ref 38–73)
NRBC BLD-RTO: 0 /100 WBC
PHOSPHATE SERPL-MCNC: 3.4 MG/DL (ref 2.7–4.5)
PLATELET # BLD AUTO: 232 K/UL (ref 150–450)
PMV BLD AUTO: 10.5 FL (ref 9.2–12.9)
POCT GLUCOSE: 146 MG/DL (ref 70–110)
POCT GLUCOSE: 161 MG/DL (ref 70–110)
POCT GLUCOSE: 188 MG/DL (ref 70–110)
POCT GLUCOSE: 196 MG/DL (ref 70–110)
POCT GLUCOSE: 217 MG/DL (ref 70–110)
POTASSIUM SERPL-SCNC: 6.1 MMOL/L (ref 3.5–5.1)
RBC # BLD AUTO: 2.59 M/UL (ref 4–5.4)
SODIUM SERPL-SCNC: 133 MMOL/L (ref 136–145)
WBC # BLD AUTO: 5.85 K/UL (ref 3.9–12.7)

## 2022-01-08 PROCEDURE — 25000003 PHARM REV CODE 250: Performed by: EMERGENCY MEDICINE

## 2022-01-08 PROCEDURE — 80048 BASIC METABOLIC PNL TOTAL CA: CPT | Performed by: STUDENT IN AN ORGANIZED HEALTH CARE EDUCATION/TRAINING PROGRAM

## 2022-01-08 PROCEDURE — 99900035 HC TECH TIME PER 15 MIN (STAT)

## 2022-01-08 PROCEDURE — 83605 ASSAY OF LACTIC ACID: CPT | Performed by: HOSPITALIST

## 2022-01-08 PROCEDURE — 84100 ASSAY OF PHOSPHORUS: CPT | Performed by: STUDENT IN AN ORGANIZED HEALTH CARE EDUCATION/TRAINING PROGRAM

## 2022-01-08 PROCEDURE — 36415 COLL VENOUS BLD VENIPUNCTURE: CPT | Performed by: STUDENT IN AN ORGANIZED HEALTH CARE EDUCATION/TRAINING PROGRAM

## 2022-01-08 PROCEDURE — 36415 COLL VENOUS BLD VENIPUNCTURE: CPT | Performed by: HOSPITALIST

## 2022-01-08 PROCEDURE — 63600175 PHARM REV CODE 636 W HCPCS: Performed by: STUDENT IN AN ORGANIZED HEALTH CARE EDUCATION/TRAINING PROGRAM

## 2022-01-08 PROCEDURE — 87040 BLOOD CULTURE FOR BACTERIA: CPT | Performed by: HOSPITALIST

## 2022-01-08 PROCEDURE — 63600175 PHARM REV CODE 636 W HCPCS: Performed by: EMERGENCY MEDICINE

## 2022-01-08 PROCEDURE — 63700000 PHARM REV CODE 250 ALT 637 W/O HCPCS: Performed by: STUDENT IN AN ORGANIZED HEALTH CARE EDUCATION/TRAINING PROGRAM

## 2022-01-08 PROCEDURE — 80100016 HC MAINTENANCE HEMODIALYSIS

## 2022-01-08 PROCEDURE — 63600175 PHARM REV CODE 636 W HCPCS: Mod: JG | Performed by: INTERNAL MEDICINE

## 2022-01-08 PROCEDURE — 27000207 HC ISOLATION

## 2022-01-08 PROCEDURE — 94761 N-INVAS EAR/PLS OXIMETRY MLT: CPT

## 2022-01-08 PROCEDURE — 25000003 PHARM REV CODE 250: Performed by: HOSPITALIST

## 2022-01-08 PROCEDURE — 21400001 HC TELEMETRY ROOM

## 2022-01-08 PROCEDURE — 25000003 PHARM REV CODE 250: Performed by: INTERNAL MEDICINE

## 2022-01-08 PROCEDURE — 85025 COMPLETE CBC W/AUTO DIFF WBC: CPT | Performed by: HOSPITALIST

## 2022-01-08 RX ORDER — CYCLOBENZAPRINE HCL 5 MG
5 TABLET ORAL 3 TIMES DAILY PRN
Status: DISCONTINUED | OUTPATIENT
Start: 2022-01-08 | End: 2022-01-28 | Stop reason: HOSPADM

## 2022-01-08 RX ORDER — SODIUM THIOSULFATE 250 MG/ML
25 INJECTION, SOLUTION INTRAVENOUS
Status: DISCONTINUED | OUTPATIENT
Start: 2022-01-08 | End: 2022-01-28 | Stop reason: HOSPADM

## 2022-01-08 RX ORDER — OXYCODONE HYDROCHLORIDE 5 MG/1
5 TABLET ORAL EVERY 4 HOURS PRN
Status: DISCONTINUED | OUTPATIENT
Start: 2022-01-08 | End: 2022-01-14

## 2022-01-08 RX ADMIN — OXYCODONE HYDROCHLORIDE AND ACETAMINOPHEN 500 MG: 500 TABLET ORAL at 08:01

## 2022-01-08 RX ADMIN — SEVELAMER CARBONATE 2400 MG: 800 TABLET, FILM COATED ORAL at 07:01

## 2022-01-08 RX ADMIN — SEVELAMER CARBONATE 2400 MG: 800 TABLET, FILM COATED ORAL at 05:01

## 2022-01-08 RX ADMIN — OXYCODONE 10 MG: 5 TABLET ORAL at 09:01

## 2022-01-08 RX ADMIN — OXYCODONE 10 MG: 5 TABLET ORAL at 11:01

## 2022-01-08 RX ADMIN — OXYCODONE 10 MG: 5 TABLET ORAL at 04:01

## 2022-01-08 RX ADMIN — HEPARIN SODIUM 3200 UNITS: 5000 INJECTION INTRAVENOUS; SUBCUTANEOUS at 04:01

## 2022-01-08 RX ADMIN — ACETAMINOPHEN 650 MG: 325 TABLET ORAL at 05:01

## 2022-01-08 RX ADMIN — SODIUM THIOSULFATE 25 G: 250 INJECTION, SOLUTION INTRAVENOUS at 04:01

## 2022-01-08 RX ADMIN — THERA TABS 1 TABLET: TAB at 08:01

## 2022-01-08 RX ADMIN — EPOETIN ALFA-EPBX 10000 UNITS: 10000 INJECTION, SOLUTION INTRAVENOUS; SUBCUTANEOUS at 04:01

## 2022-01-08 NOTE — NURSING
"Transport to pt room to bring pt to dialysis. Called to pt room, pt states she just found out her nephew . Pt states "I just need a minute." transport to dialysis delayed.  "

## 2022-01-08 NOTE — PROGRESS NOTES
Received report per SHIVA Peterson LPN via secure chat/pt arrived to [facility] via bed per transporter. Right chest wall perm cvc aspirated/flushed without difficulty noted. Maintenance hemodialysis started x 3.5 hours. Plan is to remove 2 L as tolerated. Tolerating tx well at this time.

## 2022-01-08 NOTE — NURSING
Pt refused all morning medication except for her vitamins and binders.   Pt was educated on the importance of taking each medication, Pt continue to refuse.

## 2022-01-08 NOTE — PROGRESS NOTES
"      [facility] Medicine  Telemedicine Progress Note    Patient Name: Xuan Ricardo  MRN: [id number]  Patient Class: IP- Inpatient   Admission Date: 12/29/2021  Length of Stay: 9 days  Attending Physician: Chase Chahal MD  Primary Care Provider: Katharine Franks MD          Subjective:     Principal Problem:COVID-19        HPI:  Ms. Ricardo is a 41y F w/ ESRD, recently dialyzed at [facility], presents after leaving AMA from [facility] with nausea/vomiting and bilateral thigh pain.     She says that the nausea and vomiting began a few weeks ago. She mostly reports vomiting up food content, denies overt blood. Says that the vomit is sometimes darker colored. She has been told she has "coffee-ground" emesis but does not describe the contents of her vomit as such. She denies abdominal pain, denies delayed vomiting after eating. Feels nauseas all the time, regardless of food intake.    She has also developed severe bilateral thigh lesions. She describes them as swelling w/ extreme sensitivity to touch. They started approximately two weeks ago and have persisted, with minimal improvement in pain. One thigh was biopsied at [facility] at her most recent admission, and she reports that the site continues to be tender.      Overview/Hospital Course:  Admitted to hospital medicine. Nephrology consulted. Wound care consulted for thigh wounds. Pt gave verbal and written consent for records release from [facility]. At [facility] pt underwent biopsy of R thigh which was c/w calciphylaxis, and underwent EGD which showed candidal esophagitis.     At  she was restarted on HD, given sodium thiosulfate. MBD therapy restarted. Restarted on fluconazole.    Dispo pending arrangement of outpatient HD chair      Interval History: patient spiked a fever of 101.4 this morning; denies any sob or cough; denies any pain; doing infectious work up with CXR, blood cultures;   - patient also has been complaining of LE pain and swelling, " getting a LE U/S to rule out DVT; states having it prior to admission;     - patient states having some hallucinations as well, could be due to sepsis versus pain medication, going down on oxy to 5 mg q4 prn     - still awaiting outpatient COVID HD chair    Review of Systems   Constitutional: Positive for fever. Negative for activity change.   Respiratory: Negative for cough and shortness of breath.    Gastrointestinal: Negative for abdominal pain and nausea.     Objective:     Vital Signs (Most Recent):  Temp: 98.8 °F (37.1 °C) (01/08/22 1355)  Pulse: 67 (01/08/22 1630)  Resp: 20 (01/08/22 1615)  BP: (!) 134/51 (01/08/22 1630)  SpO2: 99 % (01/08/22 1230) Vital Signs (24h Range):  Temp:  [98.6 °F (37 °C)-101.1 °F (38.4 °C)] 98.8 °F (37.1 °C)  Pulse:  [] 67  Resp:  [18-20] 20  SpO2:  [98 %-100 %] 99 %  BP: (118-150)/(51-93) 134/51     Weight: 94.8 kg (208 lb 14.4 oz)  Body mass index is 33.72 kg/m².    Intake/Output Summary (Last 24 hours) at 1/8/2022 1701  Last data filed at 1/7/2022 1900  Gross per 24 hour   Intake 240 ml   Output --   Net 240 ml      Physical Exam  Vitals and nursing note reviewed.   Constitutional:       General: She is not in acute distress.  Pulmonary:      Effort: Pulmonary effort is normal.      Breath sounds: No wheezing.   Abdominal:      General: Abdomen is flat. There is no distension.   Musculoskeletal:         General: No swelling.      Left lower leg: No edema.   Skin:     Coloration: Skin is not jaundiced.      Findings: No rash.   Neurological:      General: No focal deficit present.      Mental Status: She is alert and oriented to person, place, and time.   Psychiatric:         Mood and Affect: Mood normal.         Behavior: Behavior normal.         Significant Labs: All pertinent labs within the past 24 hours have been reviewed.    Significant Imaging: I have reviewed all pertinent imaging results/findings within the past 24 hours.      Assessment/Plan:      *  COVID-19  Asymptomatic covid-19 infection  - will attempt to arrange infusion inpatient  - isolation precautions  - no hypoxia, no indication for dex/rem      Fever  - spiked a fever of 101.4 today  - getting blood cultures, CXR and U/A as patient does still make urine and LA  - also getting LE U/S B/L to rule out DVT as cause of fever       Renovascular hypertension  Continue home meds; titrate to effect      End stage renal disease  Pt w/ ESRD. Refused dialysis on last admission, now s/p multiple sessions during [facility] hospitalization  - nephrology consulted  - last session 12/30  - access via [facility]  - MBD/anemia therapy as noted elsewhere  - outpatient HD chair pending      Anemia due to pre-ESRD treated with erythropoietin  Getting epo with HD  - CBC daily      Candida esophagitis  Prior admission to [facility] w/ EGD showing candidal esophagitis  - fluconazole      Hyperphosphatemia  Phos lowering important given calciphylaxis.  - continue sevalamer  - on cincalcet for hyperpth   - HD per nephrology      Type 2 diabetes mellitus  Reports taking 10U daily  - accuchecks/SSI      Nausea  Persistent nausea/vomiting reported at admission. No vomiting while inpatient. Last admission nausea resolved w/ bicarb drip, suggesting ESRD component.   - monitor as pt gets dialyzed  - see esophageal candidiasis      Calciphylaxis  Rash on bilateral thighs, extremely tender to touch. [facility] biopsy c/w calciphaxis, per report  - records request sent for biopsy  - nephrology consulted  - wound care  - prn analgesics  - sodium thiosulfate  - treatment of hyperphos/hyperPTH as noted elsewhere  - will likely need outpatient chronic pain follow up      Secondary hyperparathyroidism  PTH severely elevated. Particularly concerning given calciphylaxis  - continue cincalcet  - iPTH pending    Metabolic acidosis  See ESRD      Anemia of renal disease  Drop in hemoglobin 1/1 without clinical bleeding, appropriate response to transfusion  -  continue to monitor  - defer ESAs to nephrology  - transfuse to hgb 7      VTE Risk Mitigation (From admission, onward)         Ordered     heparin (porcine) injection 3,200 Units  As needed (PRN)         12/31/21 1335     enoxaparin injection 90 mg  Every 24 hours (non-standard times)         12/30/21 0539     IP VTE HIGH RISK PATIENT  Once         12/30/21 0339     Place sequential compression device  Until discontinued         12/30/21 0339     Place HENRI hose  Until discontinued         12/30/21 0339     Reason for No Pharmacological VTE Prophylaxis  Once        Question:  Reasons:  Answer:  Active Bleeding    12/30/21 0339                      I have assessed these finding virtually using telemed platform and with assistance of bedside nurse                 The attending portion of this evaluation, treatment, and documentation was performed per Chase Chahal MD via Telemedicine AudioVisual using the secure [employer] software platform with 2 way audio/video. The provider was located off-site and the patient is located in the hospital. The aforementioned video software was utilized to document the relevant history and physical exam    Chase Chahal MD  Department of Hospital Medicine   [facility]

## 2022-01-08 NOTE — PROGRESS NOTES
"      [facility] Medicine  Telemedicine Progress Note    Patient Name: Xuan Ricardo  MRN: [id number]  Patient Class: IP- Inpatient   Admission Date: 12/29/2021  Length of Stay: 8 days  Attending Physician: Chase Chahal MD  Primary Care Provider: Katharine Franks MD          Subjective:     Principal Problem:COVID-19        HPI:  Ms. Ricardo is a 41y F w/ ESRD, recently dial[place] [facility], presents after leaving [place]MA from [facility] with nausea/vomiting and bilateral thigh pain.     She says that the nausea and vomiting began a few weeks ago. She mostly reports vomiting up food content, denies overt blood. Says that the vomit is sometimes darker colored. She has been told she has "coffee-ground" emesis but does not describe the contents of her vomit as such. She denies abdominal pain, denies delayed vomiting after eating. Feels nauseas all the time, regardless of food intake.    She has also developed severe bilateral thigh lesions. She describes them as swelling w/ extreme sensitivity to touch. They started approximately two weeks ago and have persisted, with minimal improvement in pain. One thigh was biopsied at [facility] at her most recent admission, and she reports that the site continues to be tender.      Overview/Hospital Course:  Admitted to hospital medicine. Nephrology consulted. Wound care consulted for thigh wounds. Pt gave verbal and written consent for records release from [facility]. At [facility] pt underwent biopsy of R thigh which was c/w calciphylaxis, and underwent EGD which showed candidal esophagitis.     At  she was restarted on HD, given sodium thiosulfate. MBD therapy restarted. Restarted on fluconazole.    Dispo pending arrangement of outpatient HD chair      Interval History: patient still having pain in her LE, increasing oxy to 10 mg q4 prn   - patient feels that she is having too much fluid pulled off with HD and spoke with nephrology about that today; patient still " urinates as well   - patient is on RA    - awaiting outpatient HD COVID chair set up, can be discharged home once that occurs;     Review of Systems   Constitutional: Negative for activity change and fever.   Respiratory: Negative for cough and shortness of breath.    Gastrointestinal: Negative for abdominal pain and nausea.     Objective:     Vital Signs (Most Recent):  Temp: 99.1 °F (37.3 °C) (01/07/22 1959)  Pulse: 91 (01/07/22 1959)  Resp: 18 (01/07/22 1959)  BP: 133/75 (01/07/22 1959)  SpO2: 100 % (01/07/22 1959) Vital Signs (24h Range):  Temp:  [97.4 °F (36.3 °C)-99.1 °F (37.3 °C)] 99.1 °F (37.3 °C)  Pulse:  [87-92] 91  Resp:  [16-19] 18  SpO2:  [96 %-100 %] 100 %  BP: (108-133)/(57-75) 133/75     Weight: 94.8 kg (208 lb 14.4 oz)  Body mass index is 33.72 kg/m².    Intake/Output Summary (Last 24 hours) at 1/7/2022 2057  Last data filed at 1/7/2022 1900  Gross per 24 hour   Intake 720 ml   Output --   Net 720 ml      Physical Exam  Vitals and nursing note reviewed.   Constitutional:       General: She is not in acute distress.  Pulmonary:      Effort: Pulmonary effort is normal.      Breath sounds: No wheezing.   Abdominal:      General: Abdomen is flat. There is no distension.   Musculoskeletal:         General: No swelling.      Left lower leg: No edema.   Skin:     Coloration: Skin is not jaundiced.      Findings: No rash.   Neurological:      General: No focal deficit present.      Mental Status: She is alert and oriented to person, place, and time.   Psychiatric:         Mood and Affect: Mood normal.         Behavior: Behavior normal.         Significant Labs: All pertinent labs within the past 24 hours have been reviewed.    Significant Imaging: I have reviewed all pertinent imaging results/findings within the past 24 hours.      Assessment/Plan:      * COVID-19  Asymptomatic covid-19 infection  - will attempt to arrange infusion inpatient  - isolation precautions  - no hypoxia, no indication for  dex/rem      Renovascular hypertension  Continue home meds; titrate to effect      End stage renal disease  Pt w/ ESRD. Refused dialysis on last admission, now s/p multiple sessions during [facility] hospitalization  - nephrology consulted  - last session 12/30  - access via [facility]  - MBD/anemia therapy as noted elsewhere  - outpatient HD chair pending      Anemia due to pre-ESRD treated with erythropoietin  Getting epo with HD  - CBC daily      Candida esophagitis  Prior admission to [facility] w/ EGD showing candidal esophagitis  - fluconazole      Hyperphosphatemia  Phos lowering important given calciphylaxis.  - continue sevalamer  - on cincalcet for hyperpth   - HD per nephrology      Type 2 diabetes mellitus  Reports taking 10U daily  - accuchecks/SSI      Nausea  Persistent nausea/vomiting reported at admission. No vomiting while inpatient. Last admission nausea resolved w/ bicarb drip, suggesting ESRD component.   - monitor as pt gets dialyzed  - see esophageal candidiasis      Calciphylaxis  Rash on bilateral thighs, extremely tender to touch. [facility] biopsy c/w calciphaxis, per report  - records request sent for biopsy  - nephrology consulted  - wound care  - prn analgesics  - sodium thiosulfate  - treatment of hyperphos/hyperPTH as noted elsewhere  - will likely need outpatient chronic pain follow up      Secondary hyperparathyroidism  PTH severely elevated. Particularly concerning given calciphylaxis  - continue cincalcet  - iPTH pending    Metabolic acidosis  See ESRD      Anemia of renal disease  Drop in hemoglobin 1/1 without clinical bleeding, appropriate response to transfusion  - continue to monitor  - defer ESAs to nephrology  - transfuse to hgb 7      VTE Risk Mitigation (From admission, onward)         Ordered     heparin (porcine) injection 3,200 Units  As needed (PRN)         12/31/21 1335     enoxaparin injection 90 mg  Every 24 hours (non-standard times)         12/30/21 0539     IP VTE HIGH RISK PATIENT   Once         12/30/21 0339     Place sequential compression device  Until discontinued         12/30/21 0339     Place HENRI hose  Until discontinued         12/30/21 0339     Reason for No Pharmacological VTE Prophylaxis  Once        Question:  Reasons:  Answer:  Active Bleeding    12/30/21 0339                      I have assessed these finding virtually using telemed platform and with assistance of bedside nurse                 The attending portion of this evaluation, treatment, and documentation was performed per Chase Chahal MD via Telemedicine AudioVisual using the secure [employer] software platform with 2 way audio/video. The provider was located off-site and the patient is located in the hospital. The aforementioned video software was utilized to document the relevant history and physical exam    Chase Chahal MD  Department of [facility] Medicine   [facility]

## 2022-01-08 NOTE — NURSING
"Pt refused medication stating it make her feel worst.  Pt was educated on the importance of the medication, Pt continue to refuse. Pt refuse to eat lunch tray stating "she's tried of eating the same food over and over again."  "

## 2022-01-08 NOTE — ASSESSMENT & PLAN NOTE
Pt w/ ESRD. Refused dialysis on last admission, now s/p multiple sessions during [facility]  - nephrology consulted  - last session 12/30  - access via [facility]  - MBD/anemia therapy as noted elsewhere  - outpatient HD chair pending

## 2022-01-08 NOTE — ASSESSMENT & PLAN NOTE
- spiked a fever of 101.4 today  - getting blood cultures, CXR and U/A as patient does still make urine and LA  - also getting LE U/S B/L to rule out DVT as cause of fever

## 2022-01-08 NOTE — PROGRESS NOTES
Xuan Cousin is a 41 y.o. female patient.    Follow for ESRD, dialysis    Patient seen while on dialysis  No new c/o, comfortable    Scheduled Meds:   sodium chloride 0.9%   Intravenous Once    albuterol  2 puff Inhalation BID    ascorbic acid (vitamin C)  500 mg Oral BID    cinacalcet  30 mg Oral Daily with breakfast    cloNIDine 0.3 mg/24 hr td ptwk  1 patch Transdermal Q7 Days    enoxaparin  1 mg/kg Subcutaneous Q24H    epoetin kelsey-epbx  10,000 Units Intravenous Every Tues, Thurs, Sat    famotidine  20 mg Oral Daily    fluconazole  400 mg Oral Daily    multivitamin  1 tablet Oral Daily    NIFEdipine  90 mg Oral Daily    polyethylene glycol  17 g Oral Daily    sevelamer carbonate  2,400 mg Oral TID WM    sodium thiosulfate  25 g Intravenous Every Tues, Thurs, Sat    sodium zirconium cyclosilicate  10 g Oral TID       Review of patient's allergies indicates:   Allergen Reactions    Vicodin [hydrocodone-acetaminophen] Hives    Codeine Hives         Vital Signs Range (Last 24H):  Temp:  [98 °F (36.7 °C)-101.1 °F (38.4 °C)]   Pulse:  []   Resp:  [17-20]   BP: (118-141)/(70-80)   SpO2:  [98 %-100 %]     I & O (Last 24H):    Intake/Output Summary (Last 24 hours) at 1/8/2022 1600  Last data filed at 1/7/2022 1900  Gross per 24 hour   Intake 240 ml   Output --   Net 240 ml           Physical Exam:  General appearance: well developed, well nourished, no distress  Lungs:  clear to auscultation bilaterally and normal respiratory effort  Heart: regular rate and rhythm  Abdomen: soft, non-tender non-distented; bowel sounds normal; no masses,  no organomegaly  Extremities: no cyanosis or edema, or clubbing    Laboratory:  I have reviewed all pertinent lab results within the past 24 hours.  CBC:   Recent Labs   Lab 01/08/22  1128   WBC 5.85   RBC 2.59*   HGB 7.5*   HCT 25.1*      MCV 97   MCH 29.0   MCHC 29.9*     CMP:   Recent Labs   Lab 01/08/22  0447   *   CALCIUM 8.5*   *    K 6.1*   CO2 23   CL 97   BUN 33*   CREATININE 8.9*       Imp/Plan    ESRD - on dialysis, tolerated well, stable  Hyperkalemia - being dialyzed  COVID-19 infection  Calciphylaxis  DM type 2  HTN  Anemia of CKD    Continue present Rx  HD q TTS for now  We'll follow for dialysis      Isaura Meyers  1/8/2022

## 2022-01-08 NOTE — NURSING
End of shift bedside report given to NORMAN Cuevas. Patient in no apparent distress.     12 hour chart check complete.

## 2022-01-08 NOTE — SUBJECTIVE & OBJECTIVE
Interval History: patient spiked a fever of 101.4 this morning; denies any sob or cough; denies any pain; doing infectious work up with CXR, blood cultures;   - patient also has been complaining of LE pain and swelling, getting a LE U/S to rule out DVT; states having it prior to admission;     - patient states having some hallucinations as well, could be due to sepsis versus pain medication, going down on oxy to 5 mg q4 prn     - still awaiting outpatient COVID HD chair    Review of Systems   Constitutional: Positive for fever. Negative for activity change.   Respiratory: Negative for cough and shortness of breath.    Gastrointestinal: Negative for abdominal pain and nausea.     Objective:     Vital Signs (Most Recent):  Temp: 98.8 °F (37.1 °C) (01/08/22 1355)  Pulse: 67 (01/08/22 1630)  Resp: 20 (01/08/22 1615)  BP: (!) 134/51 (01/08/22 1630)  SpO2: 99 % (01/08/22 1230) Vital Signs (24h Range):  Temp:  [98.6 °F (37 °C)-101.1 °F (38.4 °C)] 98.8 °F (37.1 °C)  Pulse:  [] 67  Resp:  [18-20] 20  SpO2:  [98 %-100 %] 99 %  BP: (118-150)/(51-93) 134/51     Weight: 94.8 kg (208 lb 14.4 oz)  Body mass index is 33.72 kg/m².    Intake/Output Summary (Last 24 hours) at 1/8/2022 1701  Last data filed at 1/7/2022 1900  Gross per 24 hour   Intake 240 ml   Output --   Net 240 ml      Physical Exam  Vitals and nursing note reviewed.   Constitutional:       General: She is not in acute distress.  Pulmonary:      Effort: Pulmonary effort is normal.      Breath sounds: No wheezing.   Abdominal:      General: Abdomen is flat. There is no distension.   Musculoskeletal:         General: No swelling.      Left lower leg: No edema.   Skin:     Coloration: Skin is not jaundiced.      Findings: No rash.   Neurological:      General: No focal deficit present.      Mental Status: She is alert and oriented to person, place, and time.   Psychiatric:         Mood and Affect: Mood normal.         Behavior: Behavior normal.         Significant  Labs: All pertinent labs within the past 24 hours have been reviewed.    Significant Imaging: I have reviewed all pertinent imaging results/findings within the past 24 hours.

## 2022-01-08 NOTE — PROGRESS NOTES
Maintenance hemodialysis done x 3 hours/pt reinfused. Flushed right chest wall cvc without difficulty noted, heparin locked, caps applied. Report given to SHIVA Peterson LPN via secure chat. Pt returned to room 311 via bed per transporter.    Net fluid removed 1000 ml as tolertated

## 2022-01-08 NOTE — SUBJECTIVE & OBJECTIVE
Interval History: patient still having pain in her LE, increasing oxy to 10 mg q4 prn   - patient feels that she is having too much fluid pulled off with HD and spoke with nephrology about that today; patient still urinates as well   - patient is on RA    - awaiting outpatient HD COVID chair set up, can be discharged home once that occurs;     Review of Systems   Constitutional: Negative for activity change and fever.   Respiratory: Negative for cough and shortness of breath.    Gastrointestinal: Negative for abdominal pain and nausea.     Objective:     Vital Signs (Most Recent):  Temp: 99.1 °F (37.3 °C) (01/07/22 1959)  Pulse: 91 (01/07/22 1959)  Resp: 18 (01/07/22 1959)  BP: 133/75 (01/07/22 1959)  SpO2: 100 % (01/07/22 1959) Vital Signs (24h Range):  Temp:  [97.4 °F (36.3 °C)-99.1 °F (37.3 °C)] 99.1 °F (37.3 °C)  Pulse:  [87-92] 91  Resp:  [16-19] 18  SpO2:  [96 %-100 %] 100 %  BP: (108-133)/(57-75) 133/75     Weight: 94.8 kg (208 lb 14.4 oz)  Body mass index is 33.72 kg/m².    Intake/Output Summary (Last 24 hours) at 1/7/2022 2057  Last data filed at 1/7/2022 1900  Gross per 24 hour   Intake 720 ml   Output --   Net 720 ml      Physical Exam  Vitals and nursing note reviewed.   Constitutional:       General: She is not in acute distress.  Pulmonary:      Effort: Pulmonary effort is normal.      Breath sounds: No wheezing.   Abdominal:      General: Abdomen is flat. There is no distension.   Musculoskeletal:         General: No swelling.      Left lower leg: No edema.   Skin:     Coloration: Skin is not jaundiced.      Findings: No rash.   Neurological:      General: No focal deficit present.      Mental Status: She is alert and oriented to person, place, and time.   Psychiatric:         Mood and Affect: Mood normal.         Behavior: Behavior normal.         Significant Labs: All pertinent labs within the past 24 hours have been reviewed.    Significant Imaging: I have reviewed all pertinent imaging  results/findings within the past 24 hours.

## 2022-01-09 PROBLEM — A41.50 SEPSIS DUE TO GRAM-NEGATIVE UTI: Status: ACTIVE | Noted: 2022-01-09

## 2022-01-09 PROBLEM — N39.0 SEPSIS DUE TO GRAM-NEGATIVE UTI: Status: ACTIVE | Noted: 2022-01-09

## 2022-01-09 LAB
ANION GAP SERPL CALC-SCNC: 14 MMOL/L (ref 8–16)
BACTERIA #/AREA URNS HPF: ABNORMAL /HPF
BILIRUB UR QL STRIP: NEGATIVE
BUN SERPL-MCNC: 18 MG/DL (ref 6–20)
CALCIUM SERPL-MCNC: 8.7 MG/DL (ref 8.7–10.5)
CHLORIDE SERPL-SCNC: 96 MMOL/L (ref 95–110)
CLARITY UR: ABNORMAL
CO2 SERPL-SCNC: 22 MMOL/L (ref 23–29)
COLOR UR: ABNORMAL
CREAT SERPL-MCNC: 5.7 MG/DL (ref 0.5–1.4)
D DIMER PPP IA.FEU-MCNC: 3.9 MG/L FEU
EST. GFR  (AFRICAN AMERICAN): 10 ML/MIN/1.73 M^2
EST. GFR  (NON AFRICAN AMERICAN): 8 ML/MIN/1.73 M^2
FERRITIN SERPL-MCNC: 3135 NG/ML (ref 20–300)
GLUCOSE SERPL-MCNC: 207 MG/DL (ref 70–110)
GLUCOSE UR QL STRIP: NEGATIVE
HGB UR QL STRIP: ABNORMAL
HYALINE CASTS #/AREA URNS LPF: 0 /LPF
KETONES UR QL STRIP: NEGATIVE
LDH SERPL L TO P-CCNC: 137 U/L (ref 110–260)
LEUKOCYTE ESTERASE UR QL STRIP: ABNORMAL
MICROSCOPIC COMMENT: ABNORMAL
NITRITE UR QL STRIP: NEGATIVE
PH UR STRIP: 8 [PH] (ref 5–8)
PHOSPHATE SERPL-MCNC: 2.2 MG/DL (ref 2.7–4.5)
POCT GLUCOSE: 196 MG/DL (ref 70–110)
POCT GLUCOSE: 202 MG/DL (ref 70–110)
POCT GLUCOSE: 214 MG/DL (ref 70–110)
POCT GLUCOSE: 233 MG/DL (ref 70–110)
POTASSIUM SERPL-SCNC: 4.6 MMOL/L (ref 3.5–5.1)
PROT UR QL STRIP: ABNORMAL
RBC #/AREA URNS HPF: 4 /HPF (ref 0–4)
SODIUM SERPL-SCNC: 132 MMOL/L (ref 136–145)
SP GR UR STRIP: 1.01 (ref 1–1.03)
SQUAMOUS #/AREA URNS HPF: 1 /HPF
URN SPEC COLLECT METH UR: ABNORMAL
UROBILINOGEN UR STRIP-ACNC: NEGATIVE EU/DL
WBC #/AREA URNS HPF: >100 /HPF (ref 0–5)
WBC CLUMPS URNS QL MICRO: ABNORMAL

## 2022-01-09 PROCEDURE — 25000003 PHARM REV CODE 250: Performed by: HOSPITALIST

## 2022-01-09 PROCEDURE — 63600175 PHARM REV CODE 636 W HCPCS: Performed by: EMERGENCY MEDICINE

## 2022-01-09 PROCEDURE — 25000003 PHARM REV CODE 250: Performed by: EMERGENCY MEDICINE

## 2022-01-09 PROCEDURE — 99900035 HC TECH TIME PER 15 MIN (STAT)

## 2022-01-09 PROCEDURE — 84100 ASSAY OF PHOSPHORUS: CPT | Performed by: STUDENT IN AN ORGANIZED HEALTH CARE EDUCATION/TRAINING PROGRAM

## 2022-01-09 PROCEDURE — 63600175 PHARM REV CODE 636 W HCPCS: Performed by: HOSPITALIST

## 2022-01-09 PROCEDURE — 25000003 PHARM REV CODE 250: Performed by: INTERNAL MEDICINE

## 2022-01-09 PROCEDURE — 87086 URINE CULTURE/COLONY COUNT: CPT | Performed by: HOSPITALIST

## 2022-01-09 PROCEDURE — 94760 N-INVAS EAR/PLS OXIMETRY 1: CPT

## 2022-01-09 PROCEDURE — 81000 URINALYSIS NONAUTO W/SCOPE: CPT | Performed by: HOSPITALIST

## 2022-01-09 PROCEDURE — 85379 FIBRIN DEGRADATION QUANT: CPT | Performed by: EMERGENCY MEDICINE

## 2022-01-09 PROCEDURE — 83615 LACTATE (LD) (LDH) ENZYME: CPT | Performed by: EMERGENCY MEDICINE

## 2022-01-09 PROCEDURE — 27000207 HC ISOLATION

## 2022-01-09 PROCEDURE — 36415 COLL VENOUS BLD VENIPUNCTURE: CPT | Performed by: EMERGENCY MEDICINE

## 2022-01-09 PROCEDURE — 80048 BASIC METABOLIC PNL TOTAL CA: CPT | Performed by: STUDENT IN AN ORGANIZED HEALTH CARE EDUCATION/TRAINING PROGRAM

## 2022-01-09 PROCEDURE — 82728 ASSAY OF FERRITIN: CPT | Performed by: EMERGENCY MEDICINE

## 2022-01-09 PROCEDURE — 21400001 HC TELEMETRY ROOM

## 2022-01-09 RX ADMIN — INSULIN ASPART 1 UNITS: 100 INJECTION, SOLUTION INTRAVENOUS; SUBCUTANEOUS at 10:01

## 2022-01-09 RX ADMIN — SEVELAMER CARBONATE 2400 MG: 800 TABLET, FILM COATED ORAL at 05:01

## 2022-01-09 RX ADMIN — OXYCODONE HYDROCHLORIDE AND ACETAMINOPHEN 500 MG: 500 TABLET ORAL at 09:01

## 2022-01-09 RX ADMIN — INSULIN ASPART 2 UNITS: 100 INJECTION, SOLUTION INTRAVENOUS; SUBCUTANEOUS at 05:01

## 2022-01-09 RX ADMIN — SODIUM ZIRCONIUM CYCLOSILICATE 10 G: 10 POWDER, FOR SUSPENSION ORAL at 10:01

## 2022-01-09 RX ADMIN — OXYCODONE 10 MG: 5 TABLET ORAL at 01:01

## 2022-01-09 RX ADMIN — CEFTRIAXONE 1 G: 1 INJECTION, SOLUTION INTRAVENOUS at 01:01

## 2022-01-09 RX ADMIN — OXYCODONE 10 MG: 5 TABLET ORAL at 10:01

## 2022-01-09 NOTE — NURSING
Informed pt of need for urine sample and that we need to either do an in and out catheter or a clean catch. Pt states she will not do the cath but will let me know when she urinates. Pt was provided instruction and a clean hat for urine collection.

## 2022-01-09 NOTE — PLAN OF CARE
Problem: Adult Inpatient Plan of Care  Goal: Plan of Care Review  Outcome: Ongoing, Progressing  Goal: Patient-Specific Goal (Individualized)  Outcome: Ongoing, Progressing  Goal: Absence of Hospital-Acquired Illness or Injury  Outcome: Ongoing, Progressing  Goal: Optimal Comfort and Wellbeing  Outcome: Ongoing, Progressing  Goal: Readiness for Transition of Care  Outcome: Ongoing, Progressing     Problem: Diabetes Comorbidity  Goal: Blood Glucose Level Within Targeted Range  Outcome: Ongoing, Progressing     Problem: Infection  Goal: Absence of Infection Signs and Symptoms  Outcome: Ongoing, Progressing     Problem: Device-Related Complication Risk (Hemodialysis)  Goal: Safe, Effective Therapy Delivery  Outcome: Ongoing, Progressing     Problem: Hemodynamic Instability (Hemodialysis)  Goal: Effective Tissue Perfusion  Outcome: Ongoing, Progressing     Problem: Infection (Hemodialysis)  Goal: Absence of Infection Signs and Symptoms  Outcome: Ongoing, Progressing     Problem: Impaired Wound Healing  Goal: Optimal Wound Healing  Outcome: Ongoing, Progressing     Problem: Skin Injury Risk Increased  Goal: Skin Health and Integrity  Outcome: Ongoing, Progressing

## 2022-01-10 LAB
ANION GAP SERPL CALC-SCNC: 16 MMOL/L (ref 8–16)
BASOPHILS # BLD AUTO: 0.01 K/UL (ref 0–0.2)
BASOPHILS NFR BLD: 0.1 % (ref 0–1.9)
BUN SERPL-MCNC: 27 MG/DL (ref 6–20)
CALCIUM SERPL-MCNC: 9.9 MG/DL (ref 8.7–10.5)
CHLORIDE SERPL-SCNC: 94 MMOL/L (ref 95–110)
CO2 SERPL-SCNC: 22 MMOL/L (ref 23–29)
CREAT SERPL-MCNC: 7.7 MG/DL (ref 0.5–1.4)
DIFFERENTIAL METHOD: ABNORMAL
EOSINOPHIL # BLD AUTO: 0 K/UL (ref 0–0.5)
EOSINOPHIL NFR BLD: 0.4 % (ref 0–8)
ERYTHROCYTE [DISTWIDTH] IN BLOOD BY AUTOMATED COUNT: 16.3 % (ref 11.5–14.5)
EST. GFR  (AFRICAN AMERICAN): 7 ML/MIN/1.73 M^2
EST. GFR  (NON AFRICAN AMERICAN): 6 ML/MIN/1.73 M^2
GLUCOSE SERPL-MCNC: 145 MG/DL (ref 70–110)
HCT VFR BLD AUTO: 23.4 % (ref 37–48.5)
HGB BLD-MCNC: 7.2 G/DL (ref 12–16)
IMM GRANULOCYTES # BLD AUTO: 0.2 K/UL (ref 0–0.04)
IMM GRANULOCYTES NFR BLD AUTO: 2.5 % (ref 0–0.5)
LACTATE SERPL-SCNC: 0.7 MMOL/L (ref 0.5–2.2)
LYMPHOCYTES # BLD AUTO: 0.8 K/UL (ref 1–4.8)
LYMPHOCYTES NFR BLD: 9.5 % (ref 18–48)
MCH RBC QN AUTO: 29 PG (ref 27–31)
MCHC RBC AUTO-ENTMCNC: 30.8 G/DL (ref 32–36)
MCV RBC AUTO: 94 FL (ref 82–98)
MONOCYTES # BLD AUTO: 0.4 K/UL (ref 0.3–1)
MONOCYTES NFR BLD: 5.3 % (ref 4–15)
NEUTROPHILS # BLD AUTO: 6.5 K/UL (ref 1.8–7.7)
NEUTROPHILS NFR BLD: 82.2 % (ref 38–73)
NRBC BLD-RTO: 0 /100 WBC
PHOSPHATE SERPL-MCNC: 2.9 MG/DL (ref 2.7–4.5)
PLATELET # BLD AUTO: 249 K/UL (ref 150–450)
PMV BLD AUTO: 10.6 FL (ref 9.2–12.9)
POCT GLUCOSE: 195 MG/DL (ref 70–110)
POCT GLUCOSE: 199 MG/DL (ref 70–110)
POCT GLUCOSE: 206 MG/DL (ref 70–110)
POCT GLUCOSE: 217 MG/DL (ref 70–110)
POTASSIUM SERPL-SCNC: 5.5 MMOL/L (ref 3.5–5.1)
RBC # BLD AUTO: 2.48 M/UL (ref 4–5.4)
SODIUM SERPL-SCNC: 132 MMOL/L (ref 136–145)
WBC # BLD AUTO: 7.86 K/UL (ref 3.9–12.7)

## 2022-01-10 PROCEDURE — 63600175 PHARM REV CODE 636 W HCPCS: Performed by: STUDENT IN AN ORGANIZED HEALTH CARE EDUCATION/TRAINING PROGRAM

## 2022-01-10 PROCEDURE — 25000003 PHARM REV CODE 250: Performed by: HOSPITALIST

## 2022-01-10 PROCEDURE — 25000003 PHARM REV CODE 250: Performed by: EMERGENCY MEDICINE

## 2022-01-10 PROCEDURE — 63600175 PHARM REV CODE 636 W HCPCS: Performed by: EMERGENCY MEDICINE

## 2022-01-10 PROCEDURE — 36415 COLL VENOUS BLD VENIPUNCTURE: CPT | Performed by: STUDENT IN AN ORGANIZED HEALTH CARE EDUCATION/TRAINING PROGRAM

## 2022-01-10 PROCEDURE — 94761 N-INVAS EAR/PLS OXIMETRY MLT: CPT

## 2022-01-10 PROCEDURE — 63700000 PHARM REV CODE 250 ALT 637 W/O HCPCS: Performed by: STUDENT IN AN ORGANIZED HEALTH CARE EDUCATION/TRAINING PROGRAM

## 2022-01-10 PROCEDURE — 99900035 HC TECH TIME PER 15 MIN (STAT)

## 2022-01-10 PROCEDURE — 27000207 HC ISOLATION

## 2022-01-10 PROCEDURE — 80048 BASIC METABOLIC PNL TOTAL CA: CPT | Performed by: STUDENT IN AN ORGANIZED HEALTH CARE EDUCATION/TRAINING PROGRAM

## 2022-01-10 PROCEDURE — 84100 ASSAY OF PHOSPHORUS: CPT | Performed by: STUDENT IN AN ORGANIZED HEALTH CARE EDUCATION/TRAINING PROGRAM

## 2022-01-10 PROCEDURE — 21400001 HC TELEMETRY ROOM

## 2022-01-10 PROCEDURE — 63600175 PHARM REV CODE 636 W HCPCS: Performed by: HOSPITALIST

## 2022-01-10 PROCEDURE — 85025 COMPLETE CBC W/AUTO DIFF WBC: CPT | Performed by: HOSPITALIST

## 2022-01-10 PROCEDURE — 36415 COLL VENOUS BLD VENIPUNCTURE: CPT | Performed by: HOSPITALIST

## 2022-01-10 PROCEDURE — 83605 ASSAY OF LACTIC ACID: CPT | Performed by: HOSPITALIST

## 2022-01-10 PROCEDURE — 25000003 PHARM REV CODE 250: Performed by: INTERNAL MEDICINE

## 2022-01-10 PROCEDURE — 87040 BLOOD CULTURE FOR BACTERIA: CPT | Performed by: HOSPITALIST

## 2022-01-10 RX ADMIN — THERA TABS 1 TABLET: TAB at 10:01

## 2022-01-10 RX ADMIN — INSULIN ASPART 1 UNITS: 100 INJECTION, SOLUTION INTRAVENOUS; SUBCUTANEOUS at 08:01

## 2022-01-10 RX ADMIN — FLUCONAZOLE 400 MG: 100 TABLET ORAL at 10:01

## 2022-01-10 RX ADMIN — OXYCODONE 10 MG: 5 TABLET ORAL at 12:01

## 2022-01-10 RX ADMIN — SEVELAMER CARBONATE 2400 MG: 800 TABLET, FILM COATED ORAL at 12:01

## 2022-01-10 RX ADMIN — SEVELAMER CARBONATE 2400 MG: 800 TABLET, FILM COATED ORAL at 05:01

## 2022-01-10 RX ADMIN — ENOXAPARIN SODIUM 90 MG: 100 INJECTION SUBCUTANEOUS at 06:01

## 2022-01-10 RX ADMIN — NIFEDIPINE 90 MG: 30 TABLET, FILM COATED, EXTENDED RELEASE ORAL at 10:01

## 2022-01-10 RX ADMIN — SODIUM ZIRCONIUM CYCLOSILICATE 10 G: 10 POWDER, FOR SUSPENSION ORAL at 10:01

## 2022-01-10 RX ADMIN — POLYETHYLENE GLYCOL 3350 17 G: 17 POWDER, FOR SOLUTION ORAL at 10:01

## 2022-01-10 RX ADMIN — FAMOTIDINE 20 MG: 20 TABLET ORAL at 10:01

## 2022-01-10 RX ADMIN — CINACALCET 30 MG: 30 TABLET, FILM COATED ORAL at 08:01

## 2022-01-10 RX ADMIN — SEVELAMER CARBONATE 2400 MG: 800 TABLET, FILM COATED ORAL at 08:01

## 2022-01-10 RX ADMIN — INSULIN ASPART 2 UNITS: 100 INJECTION, SOLUTION INTRAVENOUS; SUBCUTANEOUS at 09:01

## 2022-01-10 RX ADMIN — OXYCODONE HYDROCHLORIDE AND ACETAMINOPHEN 500 MG: 500 TABLET ORAL at 08:01

## 2022-01-10 RX ADMIN — PIPERACILLIN AND TAZOBACTAM 4.5 G: 4; .5 INJECTION, POWDER, LYOPHILIZED, FOR SOLUTION INTRAVENOUS; PARENTERAL at 01:01

## 2022-01-10 RX ADMIN — OXYCODONE HYDROCHLORIDE AND ACETAMINOPHEN 500 MG: 500 TABLET ORAL at 10:01

## 2022-01-10 RX ADMIN — SENNOSIDES AND DOCUSATE SODIUM 1 TABLET: 50; 8.6 TABLET ORAL at 08:01

## 2022-01-10 RX ADMIN — Medication 6 MG: at 08:01

## 2022-01-10 RX ADMIN — ACETAMINOPHEN 650 MG: 325 TABLET ORAL at 03:01

## 2022-01-10 NOTE — PLAN OF CARE
Problem: Adult Inpatient Plan of Care  Goal: Plan of Care Review  Outcome: Ongoing, Progressing  Goal: Patient-Specific Goal (Individualized)  Outcome: Ongoing, Progressing  Goal: Absence of Hospital-Acquired Illness or Injury  Outcome: Ongoing, Progressing  Goal: Optimal Comfort and Wellbeing  Outcome: Ongoing, Progressing  Goal: Readiness for Transition of Care  Outcome: Ongoing, Progressing     Problem: Pain Acute  Goal: Acceptable Pain Control and Functional Ability  Outcome: Ongoing, Progressing  Intervention: Develop Pain Management Plan  Flowsheets (Taken 1/10/2022 0501)  Pain Management Interventions:   breathing exercises utilized   medication offered but refused   pain management plan reviewed with patient/caregiver   pillow support provided   quiet environment facilitated   relaxation techniques promoted   biofeedback utilized   care clustered  Intervention: Prevent or Manage Pain  Flowsheets (Taken 1/10/2022 0501)  Sensory Stimulation Regulation:   television on   care clustered   lighting decreased  Medication Review/Management: high-risk medications identified  Intervention: Optimize Psychosocial Wellbeing  Flowsheets (Taken 1/10/2022 0501)  Supportive Measures:   active listening utilized   self-reflection promoted   self-responsibility promoted   relaxation techniques promoted   verbalization of feelings encouraged  Diversional Activities: smartphone     Problem: Pain Acute  Goal: Acceptable Pain Control and Functional Ability  Outcome: Ongoing, Progressing  Intervention: Develop Pain Management Plan  Flowsheets (Taken 1/10/2022 0501)  Pain Management Interventions:   breathing exercises utilized   medication offered but refused   pain management plan reviewed with patient/caregiver   pillow support provided   quiet environment facilitated   relaxation techniques promoted   biofeedback utilized   care clustered  Intervention: Prevent or Manage Pain  Flowsheets (Taken 1/10/2022 0501)  Sensory  Stimulation Regulation:   television on   care clustered   lighting decreased  Medication Review/Management: high-risk medications identified  Intervention: Optimize Psychosocial Wellbeing  Flowsheets (Taken 1/10/2022 1481)  Supportive Measures:   active listening utilized   self-reflection promoted   self-responsibility promoted   relaxation techniques promoted   verbalization of feelings encouraged  Diversional Activities: smartphone

## 2022-01-10 NOTE — NURSING
Received report from NORMAN Jimenez. Patient lying in bed resting , NAD noted. Safety Precautions maintained, Will Monitor. Airborne/Contact/Droplet in progress.

## 2022-01-10 NOTE — PROGRESS NOTES
TN called [facility] main line Cyndy at [phone] and message left requesting call back.

## 2022-01-10 NOTE — SUBJECTIVE & OBJECTIVE
Interval History: patients fevers resolved; found to have a UTI and started on rocephin, follow up cultures;       - still awaiting outpatient COVID HD chair    Review of Systems   Constitutional: Negative for activity change and fever.   Respiratory: Negative for cough and shortness of breath.    Gastrointestinal: Negative for abdominal pain and nausea.     Objective:     Vital Signs (Most Recent):  Temp: 98.4 °F (36.9 °C) (01/09/22 2024)  Pulse: 102 (01/09/22 2024)  Resp: 18 (01/09/22 2024)  BP: 134/76 (01/09/22 2024)  SpO2: 99 % (Simultaneous filing. User may not have seen previous data.) (01/09/22 2024) Vital Signs (24h Range):  Temp:  [98.1 °F (36.7 °C)-99 °F (37.2 °C)] 98.4 °F (36.9 °C)  Pulse:  [] 102  Resp:  [17-20] 18  SpO2:  [95 %-100 %] 99 %  BP: (111-134)/(56-76) 134/76     Weight: 94.8 kg (208 lb 14.4 oz)  Body mass index is 33.72 kg/m².  No intake or output data in the 24 hours ending 01/09/22 2104   Physical Exam  Vitals and nursing note reviewed.   Constitutional:       General: She is not in acute distress.  Pulmonary:      Effort: Pulmonary effort is normal.      Breath sounds: No wheezing.   Abdominal:      General: Abdomen is flat. There is no distension.   Musculoskeletal:         General: No swelling.      Left lower leg: No edema.   Skin:     Coloration: Skin is not jaundiced.      Findings: No rash.   Neurological:      General: No focal deficit present.      Mental Status: She is alert and oriented to person, place, and time.   Psychiatric:         Mood and Affect: Mood normal.         Behavior: Behavior normal.         Significant Labs: All pertinent labs within the past 24 hours have been reviewed.    Significant Imaging: I have reviewed all pertinent imaging results/findings within the past 24 hours.

## 2022-01-10 NOTE — NURSING
Report received from MARQUES Nguyen. Patient resting comfortably in bed, awake and alert.  No acute distress noted. Plan of care reviewed with patient. Instructed patient to call for assistance before ambulating, side rails up x2, bed alarm set, call light in reach, non skid socks in use.  Peripheral IV site, no redness or swelling noted.  Patient verbalized understanding of instructions. Will continue to monitor.

## 2022-01-10 NOTE — PLAN OF CARE
01/10/22 0924   Discharge Reassessment   Assessment Type Discharge Planning Reassessment   Per MDT's patient medically stable for DC.  Still waiting on [facility] HD chair.  TN called [facility] @ [phone] and left detailed message for the SW requesting call back.

## 2022-01-10 NOTE — PROGRESS NOTES
Renal Progress Note    Date of Admission:  12/29/2021 10:55 PM    Length of Stay: 11  Days    Subjective: n/a    Objective:    Current Facility-Administered Medications   Medication    0.9%  NaCl infusion (for blood administration)    0.9%  NaCl infusion    acetaminophen tablet 650 mg    albuterol inhaler 2 puff    ascorbic acid (vitamin C) tablet 500 mg    cefTRIAXone (ROCEPHIN) 1 g/50 mL D5W IVPB    cinacalcet tablet 30 mg    cloNIDine 0.3 mg/24 hr td ptwk 1 patch    cyclobenzaprine tablet 5 mg    dextrose 50% injection 12.5 g    dextrose 50% injection 25 g    enoxaparin injection 90 mg    epoetin kelsey-epbx injection 10,000 Units    famotidine tablet 20 mg    fluconazole tablet 400 mg    glucagon (human recombinant) injection 1 mg    glucose chewable tablet 16 g    glucose chewable tablet 24 g    heparin (porcine) injection 3,200 Units    hydrALAZINE injection 10 mg    influenza (QUADRIVALENT PF) vaccine 0.5 mL    insulin aspart U-100 pen 0-5 Units    labetaloL injection 10 mg    melatonin tablet 6 mg    multivitamin tablet    NIFEdipine 24 hr tablet 90 mg    ondansetron injection 4 mg    oxyCODONE immediate release tablet 10 mg    oxyCODONE immediate release tablet 5 mg    polyethylene glycol packet 17 g    prochlorperazine injection Soln 10 mg    sars-cov-2 (covid-19) (Pfizer COVID-19) 30 mcg/0.3 ml injection 0.3 mL    senna-docusate 8.6-50 mg per tablet 1 tablet    sevelamer carbonate tablet 2,400 mg    sodium chloride 0.9% bolus 250 mL    sodium chloride 0.9% flush 10 mL    sodium thiosulfate 12.5 gram/50 mL (250 mg/mL) injection 25 g    sodium zirconium cyclosilicate packet 10 g       Vitals:    01/10/22 0547 01/10/22 0613 01/10/22 0752 01/10/22 0800   BP: 138/83  126/75    BP Location: Right arm      Patient Position: Lying      Pulse: (!) 123  (!) 115 110   Resp: 17  18 18   Temp: (!) 101 °F (38.3 °C) (!) 100.5 °F (38.1 °C) 98.7 °F (37.1 °C)     TempSrc: Oral      SpO2: 100%  99% 99%   Weight:       Height:           I/O last 3 completed shifts:  In: 180 [P.O.:180]  Out: -   No intake/output data recorded.      Physical Exam: Deferred due to Covid-19       Laboratories:    Recent Labs   Lab 01/10/22  0633   WBC 7.86   RBC 2.48*   HGB 7.2*   HCT 23.4*      MCV 94   MCH 29.0   MCHC 30.8*       Recent Labs   Lab 01/10/22  0633   CALCIUM 9.9   *   K 5.5*   CO2 22*   CL 94*   BUN 27*   CREATININE 7.7*       No results for input(s): COLORU, CLARITYU, SPECGRAV, PHUR, PROTEINUA, GLUCOSEU, BLOODU, WBCU, RBCU, BACTERIA, MUCUS in the last 24 hours.    Invalid input(s):  BILIRUBINCON    Microbiology Results (last 7 days)     Procedure Component Value Units Date/Time    Blood culture [[id number]] Collected: 01/08/22 1120    Order Status: Completed Specimen: Blood from Antecubital, Right Arm Updated: 01/09/22 1503     Blood Culture, Routine No Growth to date      No Growth to date    Blood culture [[id number]] Collected: 01/08/22 1115    Order Status: Completed Specimen: Blood from Antecubital, Right Hand Updated: 01/09/22 1503     Blood Culture, Routine No Growth to date      No Growth to date    Urine culture [[id number]] Collected: 01/09/22 0211    Order Status: No result Specimen: Urine Updated: 01/09/22 0310            Diagnostic Tests: n/a        Assessment:    40 y/o female with ESRD on HD admitted with:    Covid-19 infection  Hyperkalemia   Calciphylaxis  DM type 2  HTN  Anemia of CKD  Chronic non-compliance with treatment            Plan:     - Dialysis 3 x week  - Low K+ dialyzate  - Na+ thiosulfate IVPB on HD days  - Epogen 3 x week  - Transfuse during HD 1/11/22  - Renal low K+ diet  - Discharge planning and other problems per admitting    Will f/u on Dialysis days only.

## 2022-01-10 NOTE — PROGRESS NOTES
"      [facility] Medicine  Telemedicine Progress Note    Patient Name: Xuan Ricardo  MRN: [id number]  Patient Class: IP- Inpatient   Admission Date: 12/29/2021  Length of Stay: 10 days  Attending Physician: Chase Chahal MD  Primary Care Provider: Katharine Franks MD          Subjective:     Principal Problem:COVID-19        HPI:  Ms. Ricardo is a 41y F w/ ESRD, recently dialyzed at [facility], presents after leaving AMA from [facility] with nausea/vomiting and bilateral thigh pain.     She says that the nausea and vomiting began a few weeks ago. She mostly reports vomiting up food content, denies overt blood. Says that the vomit is sometimes darker colored. She has been told she has "coffee-ground" emesis but does not describe the contents of her vomit as such. She denies abdominal pain, denies delayed vomiting after eating. Feels nauseas all the time, regardless of food intake.    She has also developed severe bilateral thigh lesions. She describes them as swelling w/ extreme sensitivity to touch. They started approximately two weeks ago and have persisted, with minimal improvement in pain. One thigh was biopsied at [facility] at her most recent admission, and she reports that the site continues to be tender.      Overview/Hospital Course:  Admitted to hospital medicine. Nephrology consulted. Wound care consulted for thigh wounds. Pt gave verbal and written consent for records release from [facility]. At 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     At  she was restarted on HD, given sodium thiosulfate. MBD therapy restarted. Restarted on fluconazole.    Dispo pending arrangement of outpatient HD chair      Interval History: patients fevers resolved; found to have a UTI and started on rocephin, follow up cultures;       - still awaiting outpatient COVID HD chair    Review of Systems   Constitutional: Negative for activity " change and fever.   Respiratory: Negative for cough and shortness of breath.    Gastrointestinal: Negative for abdominal pain and nausea.     Objective:     Vital Signs (Most Recent):  Temp: 98.4 °F (36.9 °C) (01/09/22 2024)  Pulse: 102 (01/09/22 2024)  Resp: 18 (01/09/22 2024)  BP: 134/76 (01/09/22 2024)  SpO2: 99 % (Simultaneous filing. User may not have seen previous data.) (01/09/22 2024) Vital Signs (24h Range):  Temp:  [98.1 °F (36.7 °C)-99 °F (37.2 °C)] 98.4 °F (36.9 °C)  Pulse:  [] 102  Resp:  [17-20] 18  SpO2:  [95 %-100 %] 99 %  BP: (111-134)/(56-76) 134/76     Weight: 94.8 kg (208 lb 14.4 oz)  Body mass index is 33.72 kg/m².  No intake or output data in the 24 hours ending 01/09/22 2104   Physical Exam  Vitals and nursing note reviewed.   Constitutional:       General: She is not in acute distress.  Pulmonary:      Effort: Pulmonary effort is normal.      Breath sounds: No wheezing.   Abdominal:      General: Abdomen is flat. There is no distension.   Musculoskeletal:         General: No swelling.      Left lower leg: No edema.   Skin:     Coloration: Skin is not jaundiced.      Findings: No rash.   Neurological:      General: No focal deficit present.      Mental Status: She is alert and oriented to person, place, and time.   Psychiatric:         Mood and Affect: Mood normal.         Behavior: Behavior normal.         Significant Labs: All pertinent labs within the past 24 hours have been reviewed.    Significant Imaging: I have reviewed all pertinent imaging results/findings within the past 24 hours.      Assessment/Plan:      * COVID-19  Asymptomatic covid-19 infection  - will attempt to arrange infusion inpatient  - isolation precautions  - no hypoxia, no indication for dex/rem      Sepsis due to gram-negative UTI  - started on rocephin 1 g daily  - follow up urine and blood cultures      Fever  - spiked a fever of 101.4 today  - getting blood cultures, CXR and U/A as patient does still make  urine and LA  - also getting LE U/S B/L to rule out DVT as cause of fever       Renovascular hypertension  Continue home meds; titrate to effect      End stage renal disease  Pt w/ ESRD. 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  - continue sevalamer  - on cincalcet for hyperpth   - HD per nephrology      Type 2 diabetes mellitus  Reports taking 10U daily  - accuchecks/SSI      Nausea  Persistent nausea/vomiting reported at admission. No vomiting while inpatient. Last admission nausea resolved w/ bicarb drip, suggesting ESRD component.   - monitor as pt gets dialyzed  - see esophageal candidiasis      Calciphylaxis  Rash on bilateral thighs, extremely tender to touch. [facility] biopsy c/w calciphaxis, per report  - records request sent for biopsy  - nephrology consulted  - wound care  - prn analgesics  - sodium thiosulfate  - treatment of hyperphos/hyperPTH as noted elsewhere  - will likely need outpatient chronic pain follow up      Secondary hyperparathyroidism  PTH severely elevated. Particularly concerning given calciphylaxis  - continue cincalcet  - iPTH pending    Metabolic acidosis  See ESRD      Anemia of renal disease  Drop in hemoglobin 1/1 without clinical bleeding, appropriate response to transfusion  - continue to monitor  - defer ESAs to nephrology  - transfuse to hgb 7    VTE Risk Mitigation (From admission, onward)         Ordered     heparin (porcine) injection 3,200 Units  As needed (PRN)         12/31/21 1335     enoxaparin injection 90 mg  Every 24 hours  (non-standard times)         12/30/21 0539     IP VTE HIGH RISK PATIENT  Once         12/30/21 0339     Place sequential compression device  Until discontinued         12/30/21 0339     Place HENRI hose  Until discontinued         12/30/21 0339     Reason for No Pharmacological VTE Prophylaxis  Once        Question:  Reasons:  Answer:  Active Bleeding    12/30/21 0339                      I have assessed these finding virtually using telemed platform and with assistance of bedside nurse                 The attending portion of this evaluation, treatment, and documentation was performed per Chase Chahal MD via Telemedicine AudioVisual using the secure [employer] software platform with 2 way audio/video. The provider was located off-site and the patient is located in the hospital. The aforementioned video software was utilized to document the relevant history and physical exam    Chase Chahal MD  Department of Hospital Medicine   [facility]

## 2022-01-10 NOTE — NURSING
Patient has refused Lab works this morning. Indication and importance of labs explained, patient agreed for blood collection at this time. Phlebotomist notified.

## 2022-01-11 LAB
ABO + RH BLD: NORMAL
ALBUMIN SERPL BCP-MCNC: 1.5 G/DL (ref 3.5–5.2)
ANION GAP SERPL CALC-SCNC: 17 MMOL/L (ref 8–16)
ANION GAP SERPL CALC-SCNC: 17 MMOL/L (ref 8–16)
BACTERIA UR CULT: NORMAL
BASOPHILS # BLD AUTO: 0.01 K/UL (ref 0–0.2)
BASOPHILS NFR BLD: 0.1 % (ref 0–1.9)
BLD GP AB SCN CELLS X3 SERPL QL: NORMAL
BLD PROD TYP BPU: NORMAL
BLOOD UNIT EXPIRATION DATE: NORMAL
BLOOD UNIT TYPE CODE: 5100
BLOOD UNIT TYPE: NORMAL
BNP SERPL-MCNC: 74 PG/ML (ref 0–99)
BUN SERPL-MCNC: 35 MG/DL (ref 6–20)
BUN SERPL-MCNC: 35 MG/DL (ref 6–20)
CALCIUM SERPL-MCNC: 9.7 MG/DL (ref 8.7–10.5)
CALCIUM SERPL-MCNC: 9.7 MG/DL (ref 8.7–10.5)
CHLORIDE SERPL-SCNC: 92 MMOL/L (ref 95–110)
CHLORIDE SERPL-SCNC: 92 MMOL/L (ref 95–110)
CO2 SERPL-SCNC: 20 MMOL/L (ref 23–29)
CO2 SERPL-SCNC: 20 MMOL/L (ref 23–29)
CODING SYSTEM: NORMAL
CREAT SERPL-MCNC: 9.2 MG/DL (ref 0.5–1.4)
CREAT SERPL-MCNC: 9.2 MG/DL (ref 0.5–1.4)
D DIMER PPP IA.FEU-MCNC: 3.8 MG/L FEU
DIFFERENTIAL METHOD: ABNORMAL
DISPENSE STATUS: NORMAL
EOSINOPHIL # BLD AUTO: 0.1 K/UL (ref 0–0.5)
EOSINOPHIL NFR BLD: 0.8 % (ref 0–8)
ERYTHROCYTE [DISTWIDTH] IN BLOOD BY AUTOMATED COUNT: 16.6 % (ref 11.5–14.5)
EST. GFR  (AFRICAN AMERICAN): 5 ML/MIN/1.73 M^2
EST. GFR  (AFRICAN AMERICAN): 5 ML/MIN/1.73 M^2
EST. GFR  (NON AFRICAN AMERICAN): 5 ML/MIN/1.73 M^2
EST. GFR  (NON AFRICAN AMERICAN): 5 ML/MIN/1.73 M^2
FERRITIN SERPL-MCNC: 2729 NG/ML (ref 20–300)
FIBRINOGEN PPP-MCNC: >800 MG/DL (ref 182–400)
FOLATE SERPL-MCNC: 19.1 NG/ML (ref 4–24)
GLUCOSE SERPL-MCNC: 156 MG/DL (ref 70–110)
GLUCOSE SERPL-MCNC: 156 MG/DL (ref 70–110)
HAPTOGLOB SERPL-MCNC: 394 MG/DL (ref 30–250)
HCT VFR BLD AUTO: 24.1 % (ref 37–48.5)
HGB BLD-MCNC: 7.3 G/DL (ref 12–16)
IMM GRANULOCYTES # BLD AUTO: 0.28 K/UL (ref 0–0.04)
IMM GRANULOCYTES NFR BLD AUTO: 3.9 % (ref 0–0.5)
IRON SERPL-MCNC: 27 UG/DL (ref 30–160)
LYMPHOCYTES # BLD AUTO: 0.9 K/UL (ref 1–4.8)
LYMPHOCYTES NFR BLD: 12.7 % (ref 18–48)
MCH RBC QN AUTO: 28.9 PG (ref 27–31)
MCHC RBC AUTO-ENTMCNC: 30.3 G/DL (ref 32–36)
MCV RBC AUTO: 95 FL (ref 82–98)
MONOCYTES # BLD AUTO: 0.3 K/UL (ref 0.3–1)
MONOCYTES NFR BLD: 3.5 % (ref 4–15)
NEUTROPHILS # BLD AUTO: 5.6 K/UL (ref 1.8–7.7)
NEUTROPHILS NFR BLD: 79 % (ref 38–73)
NRBC BLD-RTO: 0 /100 WBC
NUM UNITS TRANS PACKED RBC: NORMAL
PHOSPHATE SERPL-MCNC: 3.7 MG/DL (ref 2.7–4.5)
PHOSPHATE SERPL-MCNC: 3.7 MG/DL (ref 2.7–4.5)
PLATELET # BLD AUTO: 247 K/UL (ref 150–450)
PMV BLD AUTO: 10 FL (ref 9.2–12.9)
POCT GLUCOSE: 169 MG/DL (ref 70–110)
POCT GLUCOSE: 176 MG/DL (ref 70–110)
POCT GLUCOSE: 190 MG/DL (ref 70–110)
POCT GLUCOSE: 222 MG/DL (ref 70–110)
POTASSIUM SERPL-SCNC: 5.3 MMOL/L (ref 3.5–5.1)
POTASSIUM SERPL-SCNC: 5.3 MMOL/L (ref 3.5–5.1)
RBC # BLD AUTO: 2.53 M/UL (ref 4–5.4)
SATURATED IRON: 24 % (ref 20–50)
SODIUM SERPL-SCNC: 129 MMOL/L (ref 136–145)
SODIUM SERPL-SCNC: 129 MMOL/L (ref 136–145)
TOTAL IRON BINDING CAPACITY: 111 UG/DL (ref 250–450)
TRANSFERRIN SERPL-MCNC: 75 MG/DL (ref 200–375)
TROPONIN I SERPL DL<=0.01 NG/ML-MCNC: 0.02 NG/ML (ref 0–0.03)
VIT B12 SERPL-MCNC: 1956 PG/ML (ref 210–950)
WBC # BLD AUTO: 7.15 K/UL (ref 3.9–12.7)

## 2022-01-11 PROCEDURE — 25000003 PHARM REV CODE 250: Performed by: EMERGENCY MEDICINE

## 2022-01-11 PROCEDURE — 25000003 PHARM REV CODE 250: Performed by: INTERNAL MEDICINE

## 2022-01-11 PROCEDURE — 63600175 PHARM REV CODE 636 W HCPCS: Performed by: HOSPITALIST

## 2022-01-11 PROCEDURE — 94640 AIRWAY INHALATION TREATMENT: CPT

## 2022-01-11 PROCEDURE — 25000003 PHARM REV CODE 250: Performed by: HOSPITALIST

## 2022-01-11 PROCEDURE — 85379 FIBRIN DEGRADATION QUANT: CPT | Performed by: EMERGENCY MEDICINE

## 2022-01-11 PROCEDURE — 80069 RENAL FUNCTION PANEL: CPT | Performed by: HOSPITALIST

## 2022-01-11 PROCEDURE — 27000207 HC ISOLATION

## 2022-01-11 PROCEDURE — 85025 COMPLETE CBC W/AUTO DIFF WBC: CPT | Performed by: HOSPITALIST

## 2022-01-11 PROCEDURE — 82728 ASSAY OF FERRITIN: CPT | Performed by: EMERGENCY MEDICINE

## 2022-01-11 PROCEDURE — 84484 ASSAY OF TROPONIN QUANT: CPT | Performed by: HOSPITALIST

## 2022-01-11 PROCEDURE — 84466 ASSAY OF TRANSFERRIN: CPT | Performed by: HOSPITALIST

## 2022-01-11 PROCEDURE — 63600175 PHARM REV CODE 636 W HCPCS: Mod: JG | Performed by: INTERNAL MEDICINE

## 2022-01-11 PROCEDURE — 36415 COLL VENOUS BLD VENIPUNCTURE: CPT | Performed by: HOSPITALIST

## 2022-01-11 PROCEDURE — 86901 BLOOD TYPING SEROLOGIC RH(D): CPT | Performed by: HOSPITALIST

## 2022-01-11 PROCEDURE — 63700000 PHARM REV CODE 250 ALT 637 W/O HCPCS: Performed by: STUDENT IN AN ORGANIZED HEALTH CARE EDUCATION/TRAINING PROGRAM

## 2022-01-11 PROCEDURE — 82607 VITAMIN B-12: CPT | Performed by: HOSPITALIST

## 2022-01-11 PROCEDURE — 80100014 HC HEMODIALYSIS 1:1

## 2022-01-11 PROCEDURE — 82746 ASSAY OF FOLIC ACID SERUM: CPT | Performed by: HOSPITALIST

## 2022-01-11 PROCEDURE — 85384 FIBRINOGEN ACTIVITY: CPT | Performed by: HOSPITALIST

## 2022-01-11 PROCEDURE — 83010 ASSAY OF HAPTOGLOBIN QUANT: CPT | Performed by: HOSPITALIST

## 2022-01-11 PROCEDURE — P9016 RBC LEUKOCYTES REDUCED: HCPCS | Performed by: INTERNAL MEDICINE

## 2022-01-11 PROCEDURE — 63600175 PHARM REV CODE 636 W HCPCS: Performed by: STUDENT IN AN ORGANIZED HEALTH CARE EDUCATION/TRAINING PROGRAM

## 2022-01-11 PROCEDURE — 86920 COMPATIBILITY TEST SPIN: CPT | Performed by: INTERNAL MEDICINE

## 2022-01-11 PROCEDURE — 21400001 HC TELEMETRY ROOM

## 2022-01-11 PROCEDURE — 87040 BLOOD CULTURE FOR BACTERIA: CPT | Performed by: HOSPITALIST

## 2022-01-11 PROCEDURE — 83880 ASSAY OF NATRIURETIC PEPTIDE: CPT | Performed by: HOSPITALIST

## 2022-01-11 PROCEDURE — 99900035 HC TECH TIME PER 15 MIN (STAT)

## 2022-01-11 PROCEDURE — 94761 N-INVAS EAR/PLS OXIMETRY MLT: CPT

## 2022-01-11 RX ORDER — DEXTROMETHORPHAN POLISTIREX 30 MG/5 ML
1 SUSPENSION, EXTENDED RELEASE 12 HR ORAL ONCE
Status: COMPLETED | OUTPATIENT
Start: 2022-01-11 | End: 2022-01-11

## 2022-01-11 RX ORDER — METOPROLOL TARTRATE 25 MG/1
25 TABLET, FILM COATED ORAL ONCE
Status: COMPLETED | OUTPATIENT
Start: 2022-01-11 | End: 2022-01-11

## 2022-01-11 RX ORDER — AMOXICILLIN AND CLAVULANATE POTASSIUM 500; 125 MG/1; MG/1
1 TABLET, FILM COATED ORAL DAILY
Status: DISCONTINUED | OUTPATIENT
Start: 2022-01-11 | End: 2022-01-12

## 2022-01-11 RX ADMIN — OXYCODONE 10 MG: 5 TABLET ORAL at 11:01

## 2022-01-11 RX ADMIN — SODIUM ZIRCONIUM CYCLOSILICATE 10 G: 10 POWDER, FOR SUSPENSION ORAL at 09:01

## 2022-01-11 RX ADMIN — SEVELAMER CARBONATE 2400 MG: 800 TABLET, FILM COATED ORAL at 04:01

## 2022-01-11 RX ADMIN — OXYCODONE 10 MG: 5 TABLET ORAL at 09:01

## 2022-01-11 RX ADMIN — AMOXICILLIN AND CLAVULANATE POTASSIUM 500 MG: 500; 125 TABLET, FILM COATED ORAL at 03:01

## 2022-01-11 RX ADMIN — OXYCODONE HYDROCHLORIDE AND ACETAMINOPHEN 500 MG: 500 TABLET ORAL at 09:01

## 2022-01-11 RX ADMIN — METOPROLOL TARTRATE 25 MG: 25 TABLET, FILM COATED ORAL at 07:01

## 2022-01-11 RX ADMIN — MINERAL OIL 1 ENEMA: 100 ENEMA RECTAL at 05:01

## 2022-01-11 RX ADMIN — EPOETIN ALFA-EPBX 10000 UNITS: 10000 INJECTION, SOLUTION INTRAVENOUS; SUBCUTANEOUS at 01:01

## 2022-01-11 RX ADMIN — NIFEDIPINE 90 MG: 30 TABLET, FILM COATED, EXTENDED RELEASE ORAL at 09:01

## 2022-01-11 RX ADMIN — THERA TABS 1 TABLET: TAB at 09:01

## 2022-01-11 RX ADMIN — INSULIN ASPART 1 UNITS: 100 INJECTION, SOLUTION INTRAVENOUS; SUBCUTANEOUS at 09:01

## 2022-01-11 RX ADMIN — CINACALCET 30 MG: 30 TABLET, FILM COATED ORAL at 09:01

## 2022-01-11 RX ADMIN — PIPERACILLIN AND TAZOBACTAM 4.5 G: 4; .5 INJECTION, POWDER, LYOPHILIZED, FOR SOLUTION INTRAVENOUS; PARENTERAL at 01:01

## 2022-01-11 RX ADMIN — POLYETHYLENE GLYCOL 3350 17 G: 17 POWDER, FOR SOLUTION ORAL at 09:01

## 2022-01-11 RX ADMIN — IRON SUCROSE 200 MG: 20 INJECTION, SOLUTION INTRAVENOUS at 03:01

## 2022-01-11 RX ADMIN — OXYCODONE 10 MG: 5 TABLET ORAL at 04:01

## 2022-01-11 RX ADMIN — ALBUTEROL SULFATE 2 PUFF: 108 INHALANT RESPIRATORY (INHALATION) at 09:01

## 2022-01-11 RX ADMIN — FLUCONAZOLE 400 MG: 100 TABLET ORAL at 09:01

## 2022-01-11 RX ADMIN — SODIUM THIOSULFATE 25 G: 250 INJECTION, SOLUTION INTRAVENOUS at 01:01

## 2022-01-11 RX ADMIN — FAMOTIDINE 20 MG: 20 TABLET ORAL at 09:01

## 2022-01-11 RX ADMIN — OXYCODONE 10 MG: 5 TABLET ORAL at 01:01

## 2022-01-11 NOTE — PROGRESS NOTES
"      [facility] Medicine  Telemedicine Progress Note    Patient Name: Xuan Ricardo  MRN: [id number]  Patient Class: IP- Inpatient   Admission Date: 12/29/2021  Length of Stay: 11 days  Attending Physician: Chase Chahal MD  Primary Care Provider: Katharine Franks MD          Subjective:     Principal Problem:COVID-19        HPI:  Ms. Ricardo is a 41y F w/ ESRD, recently dialyzed at [facility], presents after leaving AMA from [facility] with nausea/vomiting and bilateral thigh pain.     She says that the nausea and vomiting began a few weeks ago. She mostly reports vomiting up food content, denies overt blood. Says that the vomit is sometimes darker colored. She has been told she has "coffee-ground" emesis but does not describe the contents of her vomit as such. She denies abdominal pain, denies delayed vomiting after eating. Feels nauseas all the time, regardless of food intake.    She has also developed severe bilateral thigh lesions. She describes them as swelling w/ extreme sensitivity to touch. They started approximately two weeks ago and have persisted, with minimal improvement in pain. One thigh was biopsied at [facility] at her most recent admission, and she reports that the site continues to be tender.      Overview/Hospital Course:  Admitted to hospital medicine. Nephrology consulted. Wound care consulted for thigh wounds. Pt gave verbal and written consent for records release from [facility]. 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     At  she was restarted on HD, given sodium thiosulfate. MBD therapy restarted. Restarted on fluconazole.    Dispo pending arrangement of outpatient HD chair      Interval History: patient spiked another fever today of 101, getting blood cultures; changing rocephin to zosyn as patient was being treated for a UTI, follow up urine cultures     - patient is also having a drop in " hemoglobin, denies any melena, checking fobt, and iron studies, folate, b12; already s/p 1 unit of PRBC this admit; getting epo with HD      - still awaiting outpatient COVID HD chair    Review of Systems   Constitutional: Positive for fever. Negative for activity change.   Respiratory: Negative for cough and shortness of breath.    Gastrointestinal: Negative for abdominal pain and nausea.     Objective:     Vital Signs (Most Recent):  Temp: 98.9 °F (37.2 °C) (01/10/22 1609)  Pulse: 105 (01/10/22 1609)  Resp: 19 (01/10/22 1609)  BP: 123/69 (01/10/22 1609)  SpO2: 98 % (01/10/22 1609) Vital Signs (24h Range):  Temp:  [98.4 °F (36.9 °C)-101 °F (38.3 °C)] 98.9 °F (37.2 °C)  Pulse:  [102-123] 105  Resp:  [16-20] 19  SpO2:  [95 %-100 %] 98 %  BP: (123-148)/(69-83) 123/69     Weight: 94.8 kg (208 lb 14.4 oz)  Body mass index is 33.72 kg/m².    Intake/Output Summary (Last 24 hours) at 1/10/2022 1854  Last data filed at 1/10/2022 0600  Gross per 24 hour   Intake 180 ml   Output --   Net 180 ml      Physical Exam  Vitals and nursing note reviewed.   Constitutional:       General: She is not in acute distress.  Pulmonary:      Effort: Pulmonary effort is normal.      Breath sounds: No wheezing.   Abdominal:      General: Abdomen is flat. There is no distension.   Musculoskeletal:         General: No swelling.      Left lower leg: No edema.   Skin:     Coloration: Skin is not jaundiced.      Findings: No rash.   Neurological:      General: No focal deficit present.      Mental Status: She is alert and oriented to person, place, and time.   Psychiatric:         Mood and Affect: Mood normal.         Behavior: Behavior normal.         Significant Labs: All pertinent labs within the past 24 hours have been reviewed.    Significant Imaging: I have reviewed all pertinent imaging results/findings within the past 24 hours.      Assessment/Plan:      * COVID-19  Asymptomatic covid-19 infection  - will attempt to arrange infusion  inpatient  - isolation precautions  - no hypoxia, no indication for dex/rem      Sepsis due to gram-negative UTI  - started on rocephin 1 g daily  - follow up urine and blood cultures      1/10- patient spiked another fever today of 101, getting blood cultures and LA; switching rocephin to zosyn and follow up urine cultures     Fever  - spiked a fever of 101.4 today  - getting blood cultures, CXR and U/A as patient does still make urine and LA  - also getting LE U/S B/L to rule out DVT as cause of fever       Renovascular hypertension  Continue home meds; titrate to effect      End stage renal disease  Pt w/ ESRD. Refused dialysis on last admission, now s/p multiple sessions during [facility] hospitalization  - nephrology consulted  - last session 12/30  - access via [facility]  - MBD/anemia therapy as noted elsewhere  - outpatient HD chair pending      Anemia due to pre-ESRD treated with erythropoietin  Getting epo with HD  - CBC daily    1//10 patient's hemoglobin has been trending down, denies any melena or BRBPR; getting epo with HD; s/p 1 unit of PRBC this admission; ordering fobt    Candida esophagitis  Prior admission to [facility] w/ EGD showing candidal esophagitis  - fluconazole      Hyperphosphatemia  Phos lowering important given calciphylaxis.  - continue sevalamer  - on cincalcet for hyperpth   - HD per nephrology      Type 2 diabetes mellitus  Reports taking 10U daily  - accuchecks/SSI      Nausea  Persistent nausea/vomiting reported at admission. No vomiting while inpatient. Last admission nausea resolved w/ bicarb drip, suggesting ESRD component.   - monitor as pt gets dialyzed  - see esophageal candidiasis      Calciphylaxis  Rash on bilateral thighs, extremely tender to touch. [facility] biopsy c/w calciphaxis, per report  - records request sent for biopsy  - nephrology consulted  - wound care  - prn analgesics  - sodium thiosulfate  - treatment of hyperphos/hyperPTH as noted elsewhere  - will likely need outpatient  chronic pain follow up      Secondary hyperparathyroidism  PTH severely elevated. Particularly concerning given calciphylaxis  - continue cincalcet  - iPTH pending    Metabolic acidosis  See ESRD      Anemia of renal disease  Drop in hemoglobin 1/1 without clinical bleeding, appropriate response to transfusion  - continue to monitor  - defer ESAs to nephrology  - transfuse to hgb 7      VTE Risk Mitigation (From admission, onward)         Ordered     heparin (porcine) injection 3,200 Units  As needed (PRN)         12/31/21 1335     enoxaparin injection 90 mg  Every 24 hours (non-standard times)         12/30/21 0539     IP VTE HIGH RISK PATIENT  Once         12/30/21 0339     Place sequential compression device  Until discontinued         12/30/21 0339     Place HENRI hose  Until discontinued         12/30/21 0339     Reason for No Pharmacological VTE Prophylaxis  Once        Question:  Reasons:  Answer:  Active Bleeding    12/30/21 0339                      I have assessed these finding virtually using telemed platform and with assistance of bedside nurse                 The attending portion of this evaluation, treatment, and documentation was performed per Chase Chahal MD via Telemedicine AudioVisual using the secure [employer] software platform with 2 way audio/video. The provider was located off-site and the patient is located in the hospital. The aforementioned video software was utilized to document the relevant history and physical exam    Chase Chahal MD  Department of Hospital Medicine   [facility]

## 2022-01-11 NOTE — SUBJECTIVE & OBJECTIVE
Interval History: patient spiked another fever today of 101, getting blood cultures; changing rocephin to zosyn as patient was being treated for a UTI, follow up urine cultures     - patient is also having a drop in hemoglobin, denies any melena, checking fobt, and iron studies, folate, b12; already s/p 1 unit of PRBC this admit; getting epo with HD      - still awaiting outpatient COVID HD chair    Review of Systems   Constitutional: Positive for fever. Negative for activity change.   Respiratory: Negative for cough and shortness of breath.    Gastrointestinal: Negative for abdominal pain and nausea.     Objective:     Vital Signs (Most Recent):  Temp: 98.9 °F (37.2 °C) (01/10/22 1609)  Pulse: 105 (01/10/22 1609)  Resp: 19 (01/10/22 1609)  BP: 123/69 (01/10/22 1609)  SpO2: 98 % (01/10/22 1609) Vital Signs (24h Range):  Temp:  [98.4 °F (36.9 °C)-101 °F (38.3 °C)] 98.9 °F (37.2 °C)  Pulse:  [102-123] 105  Resp:  [16-20] 19  SpO2:  [95 %-100 %] 98 %  BP: (123-148)/(69-83) 123/69     Weight: 94.8 kg (208 lb 14.4 oz)  Body mass index is 33.72 kg/m².    Intake/Output Summary (Last 24 hours) at 1/10/2022 1854  Last data filed at 1/10/2022 0600  Gross per 24 hour   Intake 180 ml   Output --   Net 180 ml      Physical Exam  Vitals and nursing note reviewed.   Constitutional:       General: She is not in acute distress.  Pulmonary:      Effort: Pulmonary effort is normal.      Breath sounds: No wheezing.   Abdominal:      General: Abdomen is flat. There is no distension.   Musculoskeletal:         General: No swelling.      Left lower leg: No edema.   Skin:     Coloration: Skin is not jaundiced.      Findings: No rash.   Neurological:      General: No focal deficit present.      Mental Status: She is alert and oriented to person, place, and time.   Psychiatric:         Mood and Affect: Mood normal.         Behavior: Behavior normal.         Significant Labs: All pertinent labs within the past 24 hours have been  reviewed.    Significant Imaging: I have reviewed all pertinent imaging results/findings within the past 24 hours.

## 2022-01-11 NOTE — PLAN OF CARE
Problem: Adult Inpatient Plan of Care  Goal: Plan of Care Review  Outcome: Ongoing, Progressing  Goal: Patient-Specific Goal (Individualized)  Outcome: Ongoing, Progressing  Goal: Absence of Hospital-Acquired Illness or Injury  Outcome: Ongoing, Progressing  Goal: Optimal Comfort and Wellbeing  Outcome: Ongoing, Progressing  Goal: Readiness for Transition of Care  Outcome: Ongoing, Progressing     Problem: Diabetes Comorbidity  Goal: Blood Glucose Level Within Targeted Range  Outcome: Ongoing, Progressing     Problem: Infection  Goal: Absence of Infection Signs and Symptoms  Outcome: Ongoing, Progressing     Problem: Device-Related Complication Risk (Hemodialysis)  Goal: Safe, Effective Therapy Delivery  Outcome: Ongoing, Progressing     Problem: Skin Injury Risk Increased  Goal: Skin Health and Integrity  Outcome: Ongoing, Progressing     Problem: Pain Acute  Goal: Acceptable Pain Control and Functional Ability  Outcome: Ongoing, Progressing

## 2022-01-11 NOTE — ASSESSMENT & PLAN NOTE
- started on rocephin 1 g daily  - follow up urine and blood cultures      1/10- patient spiked another fever today of 101, getting blood cultures and LA; switching rocephin to zosyn and follow up urine cultures

## 2022-01-11 NOTE — PLAN OF CARE
Problem: Diabetes Comorbidity  Goal: Blood Glucose Level Within Targeted Range  Outcome: Ongoing, Progressing  Intervention: Monitor and Manage Glycemia  Flowsheets (Taken 1/11/2022 7746)  Glycemic Management:   blood glucose monitored   oral hydration promoted   supplemental insulin given

## 2022-01-11 NOTE — PLAN OF CARE
Problem: Adult Inpatient Plan of Care  Goal: Plan of Care Review  Outcome: Ongoing, Progressing  Goal: Patient-Specific Goal (Individualized)  Outcome: Ongoing, Progressing  Goal: Absence of Hospital-Acquired Illness or Injury  Outcome: Ongoing, Progressing  Goal: Optimal Comfort and Wellbeing  Outcome: Ongoing, Progressing  Goal: Readiness for Transition of Care  Outcome: Ongoing, Progressing     Problem: Infection  Goal: Absence of Infection Signs and Symptoms  Outcome: Ongoing, Progressing  Intervention: Prevent or Manage Infection  Flowsheets (Taken 1/11/2022 0502)  Fever Reduction/Comfort Measures:   lightweight bedding   lightweight clothing  Infection Management: aseptic technique maintained  Isolation Precautions:   airborne   contact   droplet   protective     Problem: Pain Acute  Goal: Acceptable Pain Control and Functional Ability  Outcome: Ongoing, Progressing  Intervention: Develop Pain Management Plan  Flowsheets (Taken 1/11/2022 0502)  Pain Management Interventions:   around-the-clock dosing utilized   care clustered   biofeedback utilized   medication offered but refused   pain management plan reviewed with patient/caregiver   quiet environment facilitated   relaxation techniques promoted   pillow support provided     Problem: Infection  Goal: Absence of Infection Signs and Symptoms  Outcome: Ongoing, Progressing  Intervention: Prevent or Manage Infection  Flowsheets (Taken 1/11/2022 0502)  Fever Reduction/Comfort Measures:   lightweight bedding   lightweight clothing  Infection Management: aseptic technique maintained  Isolation Precautions:   airborne   contact   droplet   protective

## 2022-01-11 NOTE — PROGRESS NOTES
Renal Progress Note    Date of Admission:  12/29/2021 10:55 PM    Length of Stay: 12  Days    Subjective: n/a    Objective:    Current Facility-Administered Medications   Medication    0.9%  NaCl infusion (for blood administration)    acetaminophen tablet 650 mg    albuterol inhaler 2 puff    ascorbic acid (vitamin C) tablet 500 mg    cinacalcet tablet 30 mg    cloNIDine 0.3 mg/24 hr td ptwk 1 patch    cyclobenzaprine tablet 5 mg    dextrose 50% injection 12.5 g    dextrose 50% injection 25 g    enoxaparin injection 90 mg    epoetin kelsey-epbx injection 10,000 Units    famotidine tablet 20 mg    fluconazole tablet 400 mg    glucagon (human recombinant) injection 1 mg    glucose chewable tablet 16 g    glucose chewable tablet 24 g    heparin (porcine) injection 3,200 Units    hydrALAZINE injection 10 mg    influenza (QUADRIVALENT PF) vaccine 0.5 mL    insulin aspart U-100 pen 0-5 Units    labetaloL injection 10 mg    melatonin tablet 6 mg    multivitamin tablet    NIFEdipine 24 hr tablet 90 mg    ondansetron injection 4 mg    oxyCODONE immediate release tablet 10 mg    oxyCODONE immediate release tablet 5 mg    piperacillin-tazobactam 4.5 g in dextrose 5 % 100 mL IVPB (ready to mix system)    polyethylene glycol packet 17 g    prochlorperazine injection Soln 10 mg    sars-cov-2 (covid-19) (Pfizer COVID-19) 30 mcg/0.3 ml injection 0.3 mL    senna-docusate 8.6-50 mg per tablet 1 tablet    sevelamer carbonate tablet 2,400 mg    sodium chloride 0.9% bolus 250 mL    sodium chloride 0.9% flush 10 mL    sodium thiosulfate 12.5 gram/50 mL (250 mg/mL) injection 25 g    sodium zirconium cyclosilicate packet 10 g       Vitals:    01/10/22 2023 01/11/22 0106 01/11/22 0113 01/11/22 0750   BP:  (!) 141/86  120/84   BP Location:  Right arm  Left arm   Patient Position:    Lying   Pulse: 96 (!) 127  104   Resp:  18 20 18   Temp:  98.3 °F (36.8 °C)  97.9 °F (36.6 °C)    TempSrc:  Oral  Oral   SpO2: 99% 100%  98%   Weight:       Height:           I/O last 3 completed shifts:  In: 520 [P.O.:420; IV Piggyback:100]  Out: -   No intake/output data recorded.      Physical Exam: Deferred due to Covid-19       Laboratories:    Recent Labs   Lab 01/11/22 0539   WBC 7.15   RBC 2.53*   HGB 7.3*   HCT 24.1*      MCV 95   MCH 28.9   MCHC 30.3*       Recent Labs   Lab 01/11/22 0538   CALCIUM 9.7  9.7   *  129*   K 5.3*  5.3*   CO2 20*  20*   CL 92*  92*   BUN 35*  35*   CREATININE 9.2*  9.2*       No results for input(s): COLORU, CLARITYU, SPECGRAV, PHUR, PROTEINUA, GLUCOSEU, BLOODU, WBCU, RBCU, BACTERIA, MUCUS in the last 24 hours.    Invalid input(s):  BILIRUBINCON    Microbiology Results (last 7 days)     Procedure Component Value Units Date/Time    Urine culture [[id number]] Collected: 01/09/22 0211    Order Status: Completed Specimen: Urine Updated: 01/11/22 0832     Urine Culture, Routine No significant growth    Narrative:      Need to get U/A to rule out UTI as cause of fever, if she can  do it as a clean catch that's fine, otherwise need an in an  out cath please  Specimen Source->Urine    Blood culture [[id number]] Collected: 01/11/22 0539    Order Status: Sent Specimen: Blood Updated: 01/11/22 0559    Narrative:      Collection has been rescheduled by SB1 at 01/10/2022 13:20 Reason:   patient is refusing 2nd set of blood cultures   Collection has been rescheduled by SB1 at 01/10/2022 13:20 Reason:   patient is refusing 2nd set of blood cultures     Blood culture [[id number]] Collected: 01/10/22 1311    Order Status: Completed Specimen: Blood Updated: 01/10/22 2112     Blood Culture, Routine No Growth to date    Narrative:      Collection has been rescheduled by SB1 at 01/10/2022 13:20 Reason:   patient is refusing 2nd set of blood cultures   Collection has been rescheduled by SB1 at 01/10/2022 13:20 Reason:   patient is refusing 2nd set of blood cultures     Blood  culture [[id number]] Collected: 01/08/22 1115    Order Status: Completed Specimen: Blood from Antecubital, Right Hand Updated: 01/10/22 1503     Blood Culture, Routine No Growth to date      No Growth to date      No Growth to date    Blood culture [[id number]] Collected: 01/08/22 1120    Order Status: Completed Specimen: Blood from Antecubital, Right Arm Updated: 01/10/22 1503     Blood Culture, Routine No Growth to date      No Growth to date      No Growth to date            Diagnostic Tests: n/a        Assessment:    40 y/o female with ESRD on HD admitted with:    Covid-19 infection  Hyperkalemia   Calciphylaxis  DM type 2  HTN  Anemia of CKD  Chronic non-compliance with treatment            Plan:     - Dialysis Q TTS  - Low K+ dialyzate  - Na+ thiosulfate IVPB on HD days  - Epogen 3 x week  - Transfuse during HD 1/11/22 and f/u H&H  - Renal low K+ diet  - Discharge planning and other problems per admitting    Will f/u on Dialysis days only.

## 2022-01-11 NOTE — ASSESSMENT & PLAN NOTE
Getting epo with HD  - CBC daily    1//10 patient's hemoglobin has been trending down, denies any melena or BRBPR; getting epo with HD; s/p 1 unit of PRBC this admission; ordering fobt

## 2022-01-11 NOTE — NURSING
Patient's IV  6 days ago. Unsuccessful with new IV, patient has poor venous access. Dr. Chahal notified, and okay to have no IV.

## 2022-01-11 NOTE — NURSING
Reported off to oncoming nurse, patient resting in bed, aaox3. Patient can make needs known to staff, max assist required for adls and transfers, no acute distress noted, safety precautions maintained. Airborne/Contact/Droplet Precautions ongoing.     Chart check completed.

## 2022-01-11 NOTE — NURSING
Report received from NORMAN Brito. Patient resting comfortably in bed, awake and alert.reviewed with patient. Instructed patient to call for assistance before ambulating, side rails up x2, bed alarm set, call light in reach, non skid socks in use.Peripheral IV site, no redness or swelling noted.  Patient verbalized understanding of instructions. Will continue to monitor.    Patient has been refusing to be cleaned and turned. Education provided. Will continue to encouranged patient..     1 am Half bath given, linen changed. Patient refused wound dressing at this time.

## 2022-01-11 NOTE — PLAN OF CARE
01/11/22 0832   Post-Acute Status   Post-Acute Authorization Dialysis   [place] [facility] at [phone], on hold x 10 minutes.  [place] called [facility] Jaqueline at [phone] to discuss if covid chair is needed.  No answer, detailed message left requesting a call back.

## 2022-01-12 LAB
BACTERIA BLD CULT: NORMAL
BACTERIA BLD CULT: NORMAL
POCT GLUCOSE: 178 MG/DL (ref 70–110)
POCT GLUCOSE: 204 MG/DL (ref 70–110)
POCT GLUCOSE: 224 MG/DL (ref 70–110)
POCT GLUCOSE: 225 MG/DL (ref 70–110)

## 2022-01-12 PROCEDURE — 99900035 HC TECH TIME PER 15 MIN (STAT)

## 2022-01-12 PROCEDURE — 25000003 PHARM REV CODE 250: Performed by: HOSPITALIST

## 2022-01-12 PROCEDURE — 93005 ELECTROCARDIOGRAM TRACING: CPT

## 2022-01-12 PROCEDURE — 25000003 PHARM REV CODE 250: Performed by: INTERNAL MEDICINE

## 2022-01-12 PROCEDURE — 94640 AIRWAY INHALATION TREATMENT: CPT

## 2022-01-12 PROCEDURE — 27000207 HC ISOLATION

## 2022-01-12 PROCEDURE — 25000003 PHARM REV CODE 250: Performed by: EMERGENCY MEDICINE

## 2022-01-12 PROCEDURE — 63600175 PHARM REV CODE 636 W HCPCS: Performed by: STUDENT IN AN ORGANIZED HEALTH CARE EDUCATION/TRAINING PROGRAM

## 2022-01-12 PROCEDURE — 94760 N-INVAS EAR/PLS OXIMETRY 1: CPT

## 2022-01-12 PROCEDURE — 63600175 PHARM REV CODE 636 W HCPCS: Performed by: EMERGENCY MEDICINE

## 2022-01-12 PROCEDURE — 63700000 PHARM REV CODE 250 ALT 637 W/O HCPCS: Performed by: STUDENT IN AN ORGANIZED HEALTH CARE EDUCATION/TRAINING PROGRAM

## 2022-01-12 PROCEDURE — 36410 VNPNXR 3YR/> PHY/QHP DX/THER: CPT

## 2022-01-12 PROCEDURE — 21400001 HC TELEMETRY ROOM

## 2022-01-12 RX ADMIN — NIFEDIPINE 90 MG: 30 TABLET, FILM COATED, EXTENDED RELEASE ORAL at 09:01

## 2022-01-12 RX ADMIN — INSULIN ASPART 1 UNITS: 100 INJECTION, SOLUTION INTRAVENOUS; SUBCUTANEOUS at 11:01

## 2022-01-12 RX ADMIN — OXYCODONE HYDROCHLORIDE AND ACETAMINOPHEN 500 MG: 500 TABLET ORAL at 09:01

## 2022-01-12 RX ADMIN — ALBUTEROL SULFATE 2 PUFF: 108 INHALANT RESPIRATORY (INHALATION) at 08:01

## 2022-01-12 RX ADMIN — OXYCODONE HYDROCHLORIDE AND ACETAMINOPHEN 500 MG: 500 TABLET ORAL at 08:01

## 2022-01-12 RX ADMIN — OXYCODONE 10 MG: 5 TABLET ORAL at 05:01

## 2022-01-12 RX ADMIN — SODIUM ZIRCONIUM CYCLOSILICATE 10 G: 10 POWDER, FOR SUSPENSION ORAL at 09:01

## 2022-01-12 RX ADMIN — THERA TABS 1 TABLET: TAB at 09:01

## 2022-01-12 RX ADMIN — OXYCODONE 10 MG: 5 TABLET ORAL at 10:01

## 2022-01-12 RX ADMIN — SEVELAMER CARBONATE 2400 MG: 800 TABLET, FILM COATED ORAL at 04:01

## 2022-01-12 RX ADMIN — SENNOSIDES AND DOCUSATE SODIUM 1 TABLET: 50; 8.6 TABLET ORAL at 11:01

## 2022-01-12 RX ADMIN — FAMOTIDINE 20 MG: 20 TABLET ORAL at 09:01

## 2022-01-12 RX ADMIN — SEVELAMER CARBONATE 2400 MG: 800 TABLET, FILM COATED ORAL at 08:01

## 2022-01-12 RX ADMIN — FLUCONAZOLE 400 MG: 100 TABLET ORAL at 09:01

## 2022-01-12 RX ADMIN — CINACALCET 30 MG: 30 TABLET, FILM COATED ORAL at 08:01

## 2022-01-12 RX ADMIN — POLYETHYLENE GLYCOL 3350 17 G: 17 POWDER, FOR SOLUTION ORAL at 09:01

## 2022-01-12 RX ADMIN — AMOXICILLIN AND CLAVULANATE POTASSIUM 500 MG: 500; 125 TABLET, FILM COATED ORAL at 09:01

## 2022-01-12 RX ADMIN — SEVELAMER CARBONATE 2400 MG: 800 TABLET, FILM COATED ORAL at 12:01

## 2022-01-12 NOTE — PROGRESS NOTES
"      [facility] Medicine  Telemedicine Progress Note    Patient Name: Xuan Ricardo  MRN: [id number]  Patient Class: IP- Inpatient   Admission Date: 12/29/2021  Length of Stay: 13 days  Attending Physician: Chase Chahal MD  Primary Care Provider: Katharine Franks MD          Subjective:     Principal Problem:COVID-19        HPI:  Ms. Ricardo is a 41y F w/ ESRD, recently dialyzed at [facility], presents after leaving AMA from [facility] with nausea/vomiting and bilateral thigh pain.     She says that the nausea and vomiting began a few weeks ago. She mostly reports vomiting up food content, denies overt blood. Says that the vomit is sometimes darker colored. She has been told she has "coffee-ground" emesis but does not describe the contents of her vomit as such. She denies abdominal pain, denies delayed vomiting after eating. Feels nauseas all the time, regardless of food intake.    She has also developed severe bilateral thigh lesions. She describes them as swelling w/ extreme sensitivity to touch. They started approximately two weeks ago and have persisted, with minimal improvement in pain. One thigh was biopsied at [facility] at her most recent admission, and she reports that the site continues to be tender.      Overview/Hospital Course:  Admitted to hospital medicine. Nephrology consulted. Wound care consulted for thigh wounds. Pt gave verbal and written consent for records release from [facility]. 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     At  she was restarted on HD, given sodium thiosulfate. MBD therapy restarted. Restarted on fluconazole.    Dispo pending arrangement of outpatient HD chair      Interval History: afebrile today; hasnt had a BM; fleet enema ordered    - patient went tachycardic today with HR in 130s; EKG ordered; and lopressor 5 mg IV ordered;     Review of Systems   Constitutional: Negative for " activity change and fever.   Respiratory: Negative for cough and shortness of breath.    Gastrointestinal: Negative for abdominal pain and nausea.     Objective:     Vital Signs (Most Recent):  Temp: 99.8 °F (37.7 °C) (01/12/22 0048)  Pulse: 108 (01/12/22 0048)  Resp: 18 (01/12/22 0048)  BP: 126/68 (01/12/22 0048)  SpO2: 96 % (01/12/22 0048) Vital Signs (24h Range):  Temp:  [97.9 °F (36.6 °C)-99.8 °F (37.7 °C)] 99.8 °F (37.7 °C)  Pulse:  [] 108  Resp:  [16-20] 18  SpO2:  [96 %-100 %] 96 %  BP: (116-150)/(65-89) 126/68     Weight: 94.8 kg (208 lb 14.4 oz)  Body mass index is 33.72 kg/m².    Intake/Output Summary (Last 24 hours) at 1/12/2022 0144  Last data filed at 1/11/2022 1645  Gross per 24 hour   Intake 1225 ml   Output 686 ml   Net 539 ml      Physical Exam  Vitals and nursing note reviewed.   Constitutional:       General: She is not in acute distress.  Pulmonary:      Effort: Pulmonary effort is normal.      Breath sounds: No wheezing.   Abdominal:      General: Abdomen is flat. There is no distension.   Musculoskeletal:         General: No swelling.      Left lower leg: No edema.   Skin:     Coloration: Skin is not jaundiced.      Findings: No rash.   Neurological:      General: No focal deficit present.      Mental Status: She is alert and oriented to person, place, and time.   Psychiatric:         Mood and Affect: Mood normal.         Behavior: Behavior normal.         Significant Labs: All pertinent labs within the past 24 hours have been reviewed.    Significant Imaging: I have reviewed all pertinent imaging results/findings within the past 24 hours.      Assessment/Plan:      * COVID-19  Asymptomatic covid-19 infection  - will attempt to arrange infusion inpatient  - isolation precautions  - no hypoxia, no indication for dex/rem      Sepsis due to gram-negative UTI  - started on rocephin 1 g daily  - follow up urine and blood cultures      1/10- patient spiked another fever today of 101, getting  blood cultures and LA; switching rocephin to zosyn and follow up urine cultures     Fever  - spiked a fever of 101.4 today  - getting blood cultures, CXR and U/A as patient does still make urine and LA  - also getting LE U/S B/L to rule out DVT as cause of fever       Renovascular hypertension  Continue home meds; titrate to effect      End stage renal disease  Pt w/ ESRD. Refused dialysis on last admission, now s/p multiple sessions during [facility] hospitalization  - nephrology consulted  - last session 12/30  - access via [facility]  - MBD/anemia therapy as noted elsewhere  - outpatient HD chair pending      Anemia due to pre-ESRD treated with erythropoietin  Getting epo with HD  - CBC daily    1//10 patient's hemoglobin has been trending down, denies any melena or BRBPR; getting epo with HD; s/p 1 unit of PRBC this admission; ordering fobt    Candida esophagitis  Prior admission to [facility] w/ EGD showing candidal esophagitis  - fluconazole      Hyperphosphatemia  Phos lowering important given calciphylaxis.  - continue sevalamer  - on cincalcet for hyperpth   - HD per nephrology      Type 2 diabetes mellitus  Reports taking 10U daily  - accuchecks/SSI      Nausea  Persistent nausea/vomiting reported at admission. No vomiting while inpatient. Last admission nausea resolved w/ bicarb drip, suggesting ESRD component.   - monitor as pt gets dialyzed  - see esophageal candidiasis      Calciphylaxis  Rash on bilateral thighs, extremely tender to touch. [facility] biopsy c/w calciphaxis, per report  - records request sent for biopsy  - nephrology consulted  - wound care  - prn analgesics  - sodium thiosulfate  - treatment of hyperphos/hyperPTH as noted elsewhere  - will likely need outpatient chronic pain follow up      Secondary hyperparathyroidism  PTH severely elevated. Particularly concerning given calciphylaxis  - continue cincalcet  - iPTH pending    Metabolic acidosis  See ESRD      Anemia of renal disease  Drop in hemoglobin  1/1 without clinical bleeding, appropriate response to transfusion  - continue to monitor  - defer ESAs to nephrology  - transfuse to hgb 7      VTE Risk Mitigation (From admission, onward)         Ordered     heparin (porcine) injection 3,200 Units  As needed (PRN)         12/31/21 1335     enoxaparin injection 90 mg  Every 24 hours (non-standard times)         12/30/21 0539     IP VTE HIGH RISK PATIENT  Once         12/30/21 0339     Place sequential compression device  Until discontinued         12/30/21 0339     Place HENRI hose  Until discontinued         12/30/21 0339     Reason for No Pharmacological VTE Prophylaxis  Once        Question:  Reasons:  Answer:  Active Bleeding    12/30/21 0339                      I have assessed these finding virtually using telemed platform and with assistance of bedside nurse                 The attending portion of this evaluation, treatment, and documentation was performed per Chase Chahal MD via Telemedicine AudioVisual using the secure waygum software platform with 2 way audio/video. The provider was located off-site and the patient is located in the hospital. The aforementioned video software was utilized to document the relevant history and physical exam    Chase Chahal MD  Department of Hospital Medicine   [facility] [place], [place] - Telemetry

## 2022-01-12 NOTE — PROGRESS NOTES
Message sent to Dr Dowd via secure chat to inquire if she is the nephrologist that follows the pt. Dr Dowd reports that she has fired the pt months ago and is no longer following her.

## 2022-01-12 NOTE — PROGRESS NOTES
0836- [facility] called [facility] Jaqueline and spoke to Clarissa who stated that this pt is a [facility] pt and gave me the number to [facility] Jeffery, [phone].     0841- [facility] called [facility] Jeffery over 6 times and selected each option in attempt to speak to anyone in the clinic. The phone rang each time until it hung up. CLAIRE Dhillon and Olena, CLAIRE Director notified of failed attempts. SSC to continue to follow up with attempting to contact HD clinic.     0857- [facility] called pt on personal cell phone, [phone] to inquire which [facility] she receives her treatments at but not answer.     0913- [facility] called [facility] central office at [phone] and spoke to Eron who informed me that the pt is actually a pt of [facility] Jada at [address] [facility], phone number [phone]. I was then transferred to the clinic and spoke to Олег who stated that the pt is registered there for a TTS treatment schedule but she has not actually received any treatments there as of yet. Олег stated that he is unfamiliar with this pt and he has to look over her records and call me back but regardless of pt's COVID status she will have a chair due to [facility] having a central COVID unit for positive patients. Олег given my phone number to reach me and Jacquie RANGEL's contact info for placement pending pt's COVID test results from today.     1118- [facility] called Олег back at [facility] to get an update on pt's acceptance for today. Олег then placed me on hold and a [facility] picked up and stated that they cannot take the pt due to no accepting physician. CLAIRE Dhillon and Director of CLAIRE Hyatt notified.

## 2022-01-12 NOTE — NURSING
Patient sinus tach on monitor. 's-140's. Dr. Chahal notified, ordered EKG stat, and IV lopressor. Patient with no IV access. Patient also developed wound on sacrum. Cleaned with wound , foam applied, and wound care consulted.  Night doctor Dr. Osullivan messaged about situation. PO lopressor ordered and given. Patient asymptomatic, denies any chest pain.

## 2022-01-12 NOTE — NURSING
Dr. Mart (Neprology) give okay order to place Midline access. Supervisor and CN made aware.  Patient made aware and agreed for the plan. Awaiting for [facility] Team.

## 2022-01-12 NOTE — PLAN OF CARE
Phone [facility] central intake and spoke with Pushpa in admissions. Relayed th numerous roadblocks we had experienced in getting this patient a cohort HD chair  (outpt).  She advised she has been working on this case and pt has been denied due to non compliance by the provider Dr. Faustin.  I asked if this means patient will be unable to access any [facility] facility, given the 3 denials.  She stated she was still trying [facility], but would also expand to the [place] areas. Reviewed case with covering CM, and will notify Dr. Chahal to see if he wants to try to speak with the community nephrologist for this patient.  Will also await call back from central intake.

## 2022-01-12 NOTE — SUBJECTIVE & OBJECTIVE
Interval History: afebrile today; hasnt had a BM; fleet enema ordered    - patient went tachycardic today with HR in 130s; EKG ordered; and lopressor 5 mg IV ordered;     Review of Systems   Constitutional: Negative for activity change and fever.   Respiratory: Negative for cough and shortness of breath.    Gastrointestinal: Negative for abdominal pain and nausea.     Objective:     Vital Signs (Most Recent):  Temp: 99.8 °F (37.7 °C) (01/12/22 0048)  Pulse: 108 (01/12/22 0048)  Resp: 18 (01/12/22 0048)  BP: 126/68 (01/12/22 0048)  SpO2: 96 % (01/12/22 0048) Vital Signs (24h Range):  Temp:  [97.9 °F (36.6 °C)-99.8 °F (37.7 °C)] 99.8 °F (37.7 °C)  Pulse:  [] 108  Resp:  [16-20] 18  SpO2:  [96 %-100 %] 96 %  BP: (116-150)/(65-89) 126/68     Weight: 94.8 kg (208 lb 14.4 oz)  Body mass index is 33.72 kg/m².    Intake/Output Summary (Last 24 hours) at 1/12/2022 0144  Last data filed at 1/11/2022 1645  Gross per 24 hour   Intake 1225 ml   Output 686 ml   Net 539 ml      Physical Exam  Vitals and nursing note reviewed.   Constitutional:       General: She is not in acute distress.  Pulmonary:      Effort: Pulmonary effort is normal.      Breath sounds: No wheezing.   Abdominal:      General: Abdomen is flat. There is no distension.   Musculoskeletal:         General: No swelling.      Left lower leg: No edema.   Skin:     Coloration: Skin is not jaundiced.      Findings: No rash.   Neurological:      General: No focal deficit present.      Mental Status: She is alert and oriented to person, place, and time.   Psychiatric:         Mood and Affect: Mood normal.         Behavior: Behavior normal.         Significant Labs: All pertinent labs within the past 24 hours have been reviewed.    Significant Imaging: I have reviewed all pertinent imaging results/findings within the past 24 hours.

## 2022-01-13 LAB
ALBUMIN SERPL BCP-MCNC: 1.3 G/DL (ref 3.5–5.2)
ANION GAP SERPL CALC-SCNC: 16 MMOL/L (ref 8–16)
AV INDEX (PROSTH): 0.75
AV MEAN GRADIENT: 7 MMHG
AV PEAK GRADIENT: 15 MMHG
AV VALVE AREA: 2.47 CM2
AV VELOCITY RATIO: 0.68
BASOPHILS # BLD AUTO: 0.01 K/UL (ref 0–0.2)
BASOPHILS NFR BLD: 0.1 % (ref 0–1.9)
BLD PROD TYP BPU: NORMAL
BLOOD UNIT EXPIRATION DATE: NORMAL
BLOOD UNIT TYPE CODE: 5100
BLOOD UNIT TYPE: NORMAL
BSA FOR ECHO PROCEDURE: 2.1 M2
BUN SERPL-MCNC: 25 MG/DL (ref 6–20)
CALCIUM SERPL-MCNC: 9 MG/DL (ref 8.7–10.5)
CHLORIDE SERPL-SCNC: 101 MMOL/L (ref 95–110)
CO2 SERPL-SCNC: 18 MMOL/L (ref 23–29)
CODING SYSTEM: NORMAL
CREAT SERPL-MCNC: 7 MG/DL (ref 0.5–1.4)
CV ECHO LV RWT: 0.61 CM
D DIMER PPP IA.FEU-MCNC: 4.27 MG/L FEU
DIFFERENTIAL METHOD: ABNORMAL
DISPENSE STATUS: NORMAL
DOP CALC AO PEAK VEL: 1.93 M/S
DOP CALC AO VTI: 26.05 CM
DOP CALC LVOT AREA: 3.3 CM2
DOP CALC LVOT DIAMETER: 2.05 CM
DOP CALC LVOT PEAK VEL: 1.32 M/S
DOP CALC LVOT STROKE VOLUME: 64.3 CM3
DOP CALCLVOT PEAK VEL VTI: 19.49 CM
E WAVE DECELERATION TIME: 100.04 MSEC
E/A RATIO: 0.92
E/E' RATIO: 14.55 M/S
ECHO LV POSTERIOR WALL: 1.2 CM (ref 0.6–1.1)
EJECTION FRACTION: 65 %
EOSINOPHIL # BLD AUTO: 0.1 K/UL (ref 0–0.5)
EOSINOPHIL NFR BLD: 0.6 % (ref 0–8)
ERYTHROCYTE [DISTWIDTH] IN BLOOD BY AUTOMATED COUNT: 17.9 % (ref 11.5–14.5)
EST. GFR  (AFRICAN AMERICAN): 8 ML/MIN/1.73 M^2
EST. GFR  (NON AFRICAN AMERICAN): 7 ML/MIN/1.73 M^2
FERRITIN SERPL-MCNC: 2227 NG/ML (ref 20–300)
FRACTIONAL SHORTENING: 27 % (ref 28–44)
GLUCOSE SERPL-MCNC: 187 MG/DL (ref 70–110)
HCT VFR BLD AUTO: 21.4 % (ref 37–48.5)
HGB BLD-MCNC: 6.9 G/DL (ref 12–16)
IMM GRANULOCYTES # BLD AUTO: 0.39 K/UL (ref 0–0.04)
IMM GRANULOCYTES NFR BLD AUTO: 4.9 % (ref 0–0.5)
INTERVENTRICULAR SEPTUM: 0.94 CM (ref 0.6–1.1)
IVRT: 103.81 MSEC
LA MAJOR: 5.79 CM
LA MINOR: 5.26 CM
LA WIDTH: 4.46 CM
LEFT ATRIUM SIZE: 4.62 CM
LEFT ATRIUM VOLUME INDEX: 47.6 ML/M2
LEFT ATRIUM VOLUME: 96.54 CM3
LEFT INTERNAL DIMENSION IN SYSTOLE: 2.89 CM (ref 2.1–4)
LEFT VENTRICLE DIASTOLIC VOLUME INDEX: 33.54 ML/M2
LEFT VENTRICLE DIASTOLIC VOLUME: 68.08 ML
LEFT VENTRICLE MASS INDEX: 68 G/M2
LEFT VENTRICLE SYSTOLIC VOLUME INDEX: 15.8 ML/M2
LEFT VENTRICLE SYSTOLIC VOLUME: 32.05 ML
LEFT VENTRICULAR INTERNAL DIMENSION IN DIASTOLE: 3.95 CM (ref 3.5–6)
LEFT VENTRICULAR MASS: 137.24 G
LV LATERAL E/E' RATIO: 20 M/S
LV SEPTAL E/E' RATIO: 11.43 M/S
LYMPHOCYTES # BLD AUTO: 1.3 K/UL (ref 1–4.8)
LYMPHOCYTES NFR BLD: 16.6 % (ref 18–48)
MCH RBC QN AUTO: 28.9 PG (ref 27–31)
MCHC RBC AUTO-ENTMCNC: 32.2 G/DL (ref 32–36)
MCV RBC AUTO: 90 FL (ref 82–98)
MONOCYTES # BLD AUTO: 0.6 K/UL (ref 0.3–1)
MONOCYTES NFR BLD: 7.6 % (ref 4–15)
MV PEAK A VEL: 0.87 M/S
MV PEAK E VEL: 0.8 M/S
MV STENOSIS PRESSURE HALF TIME: 29.01 MS
MV VALVE AREA P 1/2 METHOD: 7.58 CM2
NEUTROPHILS # BLD AUTO: 5.6 K/UL (ref 1.8–7.7)
NEUTROPHILS NFR BLD: 70.2 % (ref 38–73)
NRBC BLD-RTO: 0 /100 WBC
NUM UNITS TRANS PACKED RBC: NORMAL
PHOSPHATE SERPL-MCNC: 3 MG/DL (ref 2.7–4.5)
PISA TR MAX VEL: 1.84 M/S
PLATELET # BLD AUTO: 246 K/UL (ref 150–450)
PMV BLD AUTO: 9.6 FL (ref 9.2–12.9)
POCT GLUCOSE: 209 MG/DL (ref 70–110)
POCT GLUCOSE: 213 MG/DL (ref 70–110)
POCT GLUCOSE: 214 MG/DL (ref 70–110)
POCT GLUCOSE: 221 MG/DL (ref 70–110)
POTASSIUM SERPL-SCNC: 4.2 MMOL/L (ref 3.5–5.1)
PV PEAK VELOCITY: 1.43 CM/S
RA MAJOR: 4.89 CM
RA PRESSURE: 3 MMHG
RA WIDTH: 4.42 CM
RBC # BLD AUTO: 2.39 M/UL (ref 4–5.4)
RIGHT VENTRICULAR END-DIASTOLIC DIMENSION: 4.7 CM
RV TISSUE DOPPLER FREE WALL SYSTOLIC VELOCITY 1 (APICAL 4 CHAMBER VIEW): 15.55 CM/S
SARS-COV-2 RNA RESP QL NAA+PROBE: DETECTED
SARS-COV-2- CYCLE NUMBER: 31
SINUS: 2.85 CM
SODIUM SERPL-SCNC: 135 MMOL/L (ref 136–145)
STJ: 1.95 CM
TDI LATERAL: 0.04 M/S
TDI SEPTAL: 0.07 M/S
TDI: 0.06 M/S
TR MAX PG: 14 MMHG
TRICUSPID ANNULAR PLANE SYSTOLIC EXCURSION: 2.21 CM
TV REST PULMONARY ARTERY PRESSURE: 17 MMHG
WBC # BLD AUTO: 7.94 K/UL (ref 3.9–12.7)

## 2022-01-13 PROCEDURE — U0003 INFECTIOUS AGENT DETECTION BY NUCLEIC ACID (DNA OR RNA); SEVERE ACUTE RESPIRATORY SYNDROME CORONAVIRUS 2 (SARS-COV-2) (CORONAVIRUS DISEASE [COVID-19]), AMPLIFIED PROBE TECHNIQUE, MAKING USE OF HIGH THROUGHPUT TECHNOLOGIES AS DESCRIBED BY CMS-2020-01-R: HCPCS | Performed by: HOSPITALIST

## 2022-01-13 PROCEDURE — 82728 ASSAY OF FERRITIN: CPT | Performed by: EMERGENCY MEDICINE

## 2022-01-13 PROCEDURE — 25000003 PHARM REV CODE 250: Performed by: HOSPITALIST

## 2022-01-13 PROCEDURE — 25000003 PHARM REV CODE 250: Performed by: NURSE PRACTITIONER

## 2022-01-13 PROCEDURE — 25500020 PHARM REV CODE 255: Performed by: HOSPITALIST

## 2022-01-13 PROCEDURE — P9016 RBC LEUKOCYTES REDUCED: HCPCS | Performed by: INTERNAL MEDICINE

## 2022-01-13 PROCEDURE — 63600175 PHARM REV CODE 636 W HCPCS: Mod: JG | Performed by: INTERNAL MEDICINE

## 2022-01-13 PROCEDURE — 25000003 PHARM REV CODE 250: Performed by: EMERGENCY MEDICINE

## 2022-01-13 PROCEDURE — 85025 COMPLETE CBC W/AUTO DIFF WBC: CPT | Performed by: INTERNAL MEDICINE

## 2022-01-13 PROCEDURE — 25000003 PHARM REV CODE 250: Performed by: INTERNAL MEDICINE

## 2022-01-13 PROCEDURE — U0005 INFEC AGEN DETEC AMPLI PROBE: HCPCS | Performed by: HOSPITALIST

## 2022-01-13 PROCEDURE — 21400001 HC TELEMETRY ROOM

## 2022-01-13 PROCEDURE — 85379 FIBRIN DEGRADATION QUANT: CPT | Performed by: EMERGENCY MEDICINE

## 2022-01-13 PROCEDURE — 80100016 HC MAINTENANCE HEMODIALYSIS

## 2022-01-13 PROCEDURE — 27000207 HC ISOLATION

## 2022-01-13 PROCEDURE — 63600175 PHARM REV CODE 636 W HCPCS: Performed by: EMERGENCY MEDICINE

## 2022-01-13 PROCEDURE — 63700000 PHARM REV CODE 250 ALT 637 W/O HCPCS: Performed by: STUDENT IN AN ORGANIZED HEALTH CARE EDUCATION/TRAINING PROGRAM

## 2022-01-13 PROCEDURE — 63600175 PHARM REV CODE 636 W HCPCS: Performed by: HOSPITALIST

## 2022-01-13 PROCEDURE — 80069 RENAL FUNCTION PANEL: CPT | Performed by: HOSPITALIST

## 2022-01-13 RX ORDER — CALCIUM CARBONATE 200(500)MG
1000 TABLET,CHEWABLE ORAL 3 TIMES DAILY PRN
Status: DISCONTINUED | OUTPATIENT
Start: 2022-01-13 | End: 2022-01-28 | Stop reason: HOSPADM

## 2022-01-13 RX ADMIN — OXYCODONE HYDROCHLORIDE AND ACETAMINOPHEN 500 MG: 500 TABLET ORAL at 01:01

## 2022-01-13 RX ADMIN — FLUCONAZOLE 400 MG: 100 TABLET ORAL at 01:01

## 2022-01-13 RX ADMIN — SEVELAMER CARBONATE 2400 MG: 800 TABLET, FILM COATED ORAL at 05:01

## 2022-01-13 RX ADMIN — ENOXAPARIN SODIUM 90 MG: 100 INJECTION SUBCUTANEOUS at 06:01

## 2022-01-13 RX ADMIN — IRON SUCROSE 200 MG: 20 INJECTION, SOLUTION INTRAVENOUS at 01:01

## 2022-01-13 RX ADMIN — SEVELAMER CARBONATE 2400 MG: 800 TABLET, FILM COATED ORAL at 01:01

## 2022-01-13 RX ADMIN — CLONIDINE 1 PATCH: 0.3 PATCH TRANSDERMAL at 01:01

## 2022-01-13 RX ADMIN — OXYCODONE 10 MG: 5 TABLET ORAL at 09:01

## 2022-01-13 RX ADMIN — THERA TABS 1 TABLET: TAB at 01:01

## 2022-01-13 RX ADMIN — OXYCODONE 10 MG: 5 TABLET ORAL at 05:01

## 2022-01-13 RX ADMIN — NIFEDIPINE 90 MG: 30 TABLET, FILM COATED, EXTENDED RELEASE ORAL at 01:01

## 2022-01-13 RX ADMIN — HEPARIN SODIUM 3200 UNITS: 5000 INJECTION INTRAVENOUS; SUBCUTANEOUS at 08:01

## 2022-01-13 RX ADMIN — OXYCODONE HYDROCHLORIDE AND ACETAMINOPHEN 500 MG: 500 TABLET ORAL at 09:01

## 2022-01-13 RX ADMIN — SODIUM THIOSULFATE 25 G: 250 INJECTION, SOLUTION INTRAVENOUS at 08:01

## 2022-01-13 RX ADMIN — FAMOTIDINE 20 MG: 20 TABLET ORAL at 01:01

## 2022-01-13 RX ADMIN — IOHEXOL 85 ML: 350 INJECTION, SOLUTION INTRAVENOUS at 03:01

## 2022-01-13 RX ADMIN — POLYETHYLENE GLYCOL 3350 17 G: 17 POWDER, FOR SOLUTION ORAL at 01:01

## 2022-01-13 RX ADMIN — EPOETIN ALFA-EPBX 10000 UNITS: 10000 INJECTION, SOLUTION INTRAVENOUS; SUBCUTANEOUS at 08:01

## 2022-01-13 RX ADMIN — CALCIUM CARBONATE (ANTACID) CHEW TAB 500 MG 1000 MG: 500 CHEW TAB at 01:01

## 2022-01-13 NOTE — PROGRESS NOTES
"[facility] - Telemetry  Adult Nutrition  Progress Note    SUMMARY     Recommendations  1) Continue renal diet   2) Add Novasource renal BID    Goals: Pt to meet > 75% EEN by RD follow up  Nutrition Goal Status: new  Communication of RD Recs: reviewed with physician    Assessment and Plan  Nutrition Problem  Inadequate oral intake     Related to (etiology):   Appetite, food preferences     Signs and Symptoms (as evidenced by):   Pt report of fair appetite, eating 50% meals     Interventions(treatment strategy):  Collaboration with other providers  Renal diet     Nutrition Diagnosis Status:   Continues    Reason for Assessment  Reason For Assessment: RD follow-up  Diagnosis: other (see comments) (COVID, ESRD on HD)  Relevant Medical History: ESRD on HD, DM2  Interdisciplinary Rounds: did not attend  General Information Comments: Remote assessment for coverage. Intake appear to be improving, pt completes % meals last few days per chart, LBM 1/11. POC -221. Admit wt appears incorrect, wt has been stable x 2 week admit ~208 lbs.  Nutrition Discharge Planning: Discharge on renal diet    Nutrition Risk Screen  Nutrition Risk Screen: no indicators present    Anthropometrics  Temp: 98.4 °F (36.9 °C)  Height Method: Stated  Height: 5' 6" (167.6 cm)  Height (inches): 66 in  Weight Method: Bed Scale  Weight: 94.3 kg (208 lb)  Weight (lb): 208 lb  Ideal Body Weight (IBW), Female: 130 lb  % Ideal Body Weight, Female (lb): 160 %  BMI (Calculated): 33.6     Lab/Procedures/Meds  Pertinent Labs Reviewed: reviewed  Pertinent Labs Comments: POC -221, Na 135, BUN 25, Cr 7, GFR 8, Alb 1.3  Pertinent Medications Reviewed: reviewed  Pertinent Medications Comments: Vit C, epoetin kelsey epbx injection, famotidine, MVI, polyethylene glycol, sevelamer carbonate    Physical Findings/Assessment  Altered skin integrity to thigh     Estimated/Assessed Needs  Weight Used For Calorie Calculations: 94.3 kg (208 lb)  Energy Calorie " Requirements (kcal): 6508-8815 kcal/day based on 25-30 g/kg  Energy Need Method: Kcal/kg  Protein Requirements: 113-123 g/day based on 1.2-1.3 g/kg for ESRD on HD  Weight Used For Protein Calculations: 94.3 kg (208 lb)  Fluid Requirements (mL): 1000 mL + UOP for ESRD on dialysis  Estimated Fluid Requirement Method: other (see comments)  RDA Method (mL): 2359  CHO Requirement: 295 g/day based on 50% kcal from CHO    Nutrition Prescription Ordered  Current Diet Order: renal    Evaluation of Received Nutrient/Fluid Intake  Energy Calories Required: meeting needs  Protein Required: meeting needs  Fluid Required: meeting needs  Comments: LBM 1/11  Tolerance: tolerating  % Intake of Estimated Energy Needs: 50 - 75 %  % Meal Intake: 50 - 75 %    Nutrition Risk  Level of Risk/Frequency of Follow-up: low (Follow up weekly)     Monitor and Evaluation  Food and Nutrient Intake: energy intake,food and beverage intake  Food and Nutrient Adminstration: diet order  Physical Activity and Function: nutrition-related ADLs and IADLs  Anthropometric Measurements: weight,weight change  Biochemical Data, Medical Tests and Procedures: gastrointestinal profile,glucose/endocrine profile,inflammatory profile,electrolyte and renal panel,lipid profile  Nutrition-Focused Physical Findings: skin,overall appearance     Nutrition Follow-Up  RD Follow-up?: Yes

## 2022-01-13 NOTE — PROGRESS NOTES
Renal Progress Note    Date of Admission:  12/29/2021 10:55 PM    Length of Stay: 14  Days    Subjective: n/a    Objective:    Current Facility-Administered Medications   Medication    0.9%  NaCl infusion (for blood administration)    acetaminophen tablet 650 mg    ascorbic acid (vitamin C) tablet 500 mg    calcium carbonate 200 mg calcium (500 mg) chewable tablet 1,000 mg    cinacalcet tablet 30 mg    cloNIDine 0.3 mg/24 hr td ptwk 1 patch    cyclobenzaprine tablet 5 mg    dextrose 50% injection 12.5 g    dextrose 50% injection 25 g    enoxaparin injection 90 mg    epoetin kelsey-epbx injection 10,000 Units    famotidine tablet 20 mg    fluconazole tablet 400 mg    glucagon (human recombinant) injection 1 mg    glucose chewable tablet 16 g    glucose chewable tablet 24 g    heparin (porcine) injection 3,200 Units    hydrALAZINE injection 10 mg    influenza (QUADRIVALENT PF) vaccine 0.5 mL    insulin aspart U-100 pen 0-5 Units    iron sucrose (VENOFER) 200 mg in sodium chloride 0.9% 100 mL IVPB    labetaloL injection 10 mg    melatonin tablet 6 mg    multivitamin tablet    NIFEdipine 24 hr tablet 90 mg    ondansetron injection 4 mg    oxyCODONE immediate release tablet 10 mg    oxyCODONE immediate release tablet 5 mg    polyethylene glycol packet 17 g    prochlorperazine injection Soln 10 mg    sars-cov-2 (covid-19) (Pfizer COVID-19) 30 mcg/0.3 ml injection 0.3 mL    senna-docusate 8.6-50 mg per tablet 1 tablet    sevelamer carbonate tablet 2,400 mg    sodium chloride 0.9% bolus 250 mL    sodium chloride 0.9% flush 10 mL    sodium thiosulfate 12.5 gram/50 mL (250 mg/mL) injection 25 g       Vitals:    01/12/22 2307 01/13/22 0446 01/13/22 0515 01/13/22 0753   BP: 133/72 122/68  (!) 121/59   BP Location: Left arm Left arm  Left arm   Patient Position: Lying Lying  Lying   Pulse: (!) 112 (!) 124  (!) 113   Resp: 16 16 16 18   Temp: 99 °F (37.2 °C) 99.1 °F (37.3  °C)  (!) 100.7 °F (38.2 °C)   TempSrc:  Oral  Oral   SpO2: 99% 98%  97%   Weight:       Height:           No intake/output data recorded.  No intake/output data recorded.      Physical Exam: Deferred due to Covid-19       Laboratories:    Recent Labs   Lab 01/13/22  0618   WBC 7.94   RBC 2.39*   HGB 6.9*   HCT 21.4*      MCV 90   MCH 28.9   MCHC 32.2       Recent Labs   Lab 01/13/22  0618   CALCIUM 9.0   *   K 4.2   CO2 18*      BUN 25*   CREATININE 7.0*       No results for input(s): COLORU, CLARITYU, SPECGRAV, PHUR, PROTEINUA, GLUCOSEU, BLOODU, WBCU, RBCU, BACTERIA, MUCUS in the last 24 hours.    Invalid input(s):  BILIRUBINCON    Microbiology Results (last 7 days)     Procedure Component Value Units Date/Time    Blood culture [[id number]] Collected: 01/10/22 1311    Order Status: Completed Specimen: Blood Updated: 01/12/22 1503     Blood Culture, Routine No Growth to date      No Growth to date      No Growth to date    Narrative:      Collection has been rescheduled by SB1 at 01/10/2022 13:20 Reason:   patient is refusing 2nd set of blood cultures   Collection has been rescheduled by SB1 at 01/10/2022 13:20 Reason:   patient is refusing 2nd set of blood cultures     Blood culture [[id number]] Collected: 01/08/22 1115    Order Status: Completed Specimen: Blood from Antecubital, Right Hand Updated: 01/12/22 1503     Blood Culture, Routine No Growth after 4 days.     Blood culture [[id number]] Collected: 01/08/22 1120    Order Status: Completed Specimen: Blood from Antecubital, Right Arm Updated: 01/12/22 1503     Blood Culture, Routine No Growth after 4 days.     Blood culture [[id number]] Collected: 01/11/22 0539    Order Status: Completed Specimen: Blood Updated: 01/12/22 0903     Blood Culture, Routine No Growth to date      No Growth to date    Narrative:      Collection has been rescheduled by SB1 at 01/10/2022 13:20 Reason:   patient is refusing 2nd set of blood cultures   Collection has been  rescheduled by SB1 at 01/10/2022 13:20 Reason:   patient is refusing 2nd set of blood cultures     Urine culture [[id number]] Collected: 01/09/22 0211    Order Status: Completed Specimen: Urine Updated: 01/11/22 0832     Urine Culture, Routine No significant growth    Narrative:      Need to get U/A to rule out UTI as cause of fever, if she can  do it as a clean catch that's fine, otherwise need an in an  out cath please  Specimen Source->Urine            Diagnostic Tests: n/a        Assessment:    42 y/o female with ESRD on HD admitted with:    Covid-19 infection  Hyperkalemia RESOLVED  NAGMA  Calciphylaxis  DM type 2  HTN  Anemia of CKD  Chronic non-compliance with treatment            Plan:     - Dialysis Q TTS  - Low K+ dialyzate  - Na+ thiosulfate IVPB on HD days  - Epogen 3 x week  - Transfuse during HD today and PRN  - Renal low K+ diet  - Discharge planning and other problems per admitting          Will f/u on Dialysis days only.

## 2022-01-13 NOTE — PROGRESS NOTES
"      [facility] Medicine  Telemedicine Progress Note    Patient Name: Xuan Ricardo  MRN: [id number]  Patient Class: IP- Inpatient   Admission Date: 12/29/2021  Length of Stay: 13 days  Attending Physician: Chase Chahal MD  Primary Care Provider: Katharine Franks MD          Subjective:     Principal Problem:COVID-19        HPI:  Ms. Ricardo is a 41y F w/ ESRD, recently dialyzed at [facility], presents after leaving AMA from [facility] with nausea/vomiting and bilateral thigh pain.     She says that the nausea and vomiting began a few weeks ago. She mostly reports vomiting up food content, denies overt blood. Says that the vomit is sometimes darker colored. She has been told she has "coffee-ground" emesis but does not describe the contents of her vomit as such. She denies abdominal pain, denies delayed vomiting after eating. Feels nauseas all the time, regardless of food intake.    She has also developed severe bilateral thigh lesions. She describes them as swelling w/ extreme sensitivity to touch. They started approximately two weeks ago and have persisted, with minimal improvement in pain. One thigh was biopsied at [facility] at her most recent admission, and she reports that the site continues to be tender.      Overview/Hospital Course:  Admitted to hospital medicine. Nephrology consulted. Wound care consulted for thigh wounds. Pt gave verbal and written consent for records release from [facility]. At [facility] pt underwent biopsy of R thigh which was c/w calciphylaxis, and underwent EGD which showed candidal esophagitis.     At  she was restarted on HD, given sodium thiosulfate. MBD therapy restarted. Restarted on fluconazole.    Dispo pending arrangement of outpatient HD chair      Interval History: patient doing ok today; still running tachy; afebrile; EKG reviewed shows some t-wave abnormality and sinus tach; getting 2d echo  - will stop albuterol treatments as this could be attributing to this " and patient is on RA    - patient does have anemia, iron counts are low, will give a dose of venofer; getting epo with HD; no overt GI bleeding signs; checking cbc in AM    - still awaiting outpatient COVID HD chair placement; will stop abx today    Review of Systems   Constitutional: Negative for activity change and fever.   Respiratory: Negative for cough and shortness of breath.    Gastrointestinal: Negative for abdominal pain and nausea.     Objective:     Vital Signs (Most Recent):  Temp: 98.9 °F (37.2 °C) (01/12/22 2000)  Pulse: 101 (01/12/22 2000)  Resp: 20 (01/12/22 2000)  BP: 131/71 (01/12/22 2000)  SpO2: 96 % (01/12/22 2000) Vital Signs (24h Range):  Temp:  [98.4 °F (36.9 °C)-99.8 °F (37.7 °C)] 98.9 °F (37.2 °C)  Pulse:  [101-116] 101  Resp:  [16-20] 20  SpO2:  [96 %-99 %] 96 %  BP: (118-131)/(58-73) 131/71     Weight: 94.8 kg (208 lb 14.4 oz)  Body mass index is 33.72 kg/m².  No intake or output data in the 24 hours ending 01/12/22 2241   Physical Exam  Vitals and nursing note reviewed.   Constitutional:       General: She is not in acute distress.  Pulmonary:      Effort: Pulmonary effort is normal.      Breath sounds: No wheezing.   Abdominal:      General: Abdomen is flat. There is no distension.   Musculoskeletal:         General: No swelling.      Left lower leg: No edema.   Skin:     Coloration: Skin is not jaundiced.      Findings: No rash.   Neurological:      General: No focal deficit present.      Mental Status: She is alert and oriented to person, place, and time.   Psychiatric:         Mood and Affect: Mood normal.         Behavior: Behavior normal.         Significant Labs: All pertinent labs within the past 24 hours have been reviewed.    Significant Imaging: I have reviewed all pertinent imaging results/findings within the past 24 hours.      Assessment/Plan:      * COVID-19  Asymptomatic covid-19 infection  - will attempt to arrange infusion inpatient  - isolation precautions  - no  hypoxia, no indication for dex/rem      Sepsis due to gram-negative UTI  - started on rocephin 1 g daily  - follow up urine and blood cultures      1/10- patient spiked another fever today of 101, getting blood cultures and LA; switching rocephin to zosyn and follow up urine cultures     1/12- fevers resolved and patient's urine cultures showed no growth; course completed of augmentin    Fever  - spiked a fever of 101.4 today  - getting blood cultures, CXR and U/A as patient does still make urine and LA  - also getting LE U/S B/L to rule out DVT as cause of fever       Renovascular hypertension  Continue home meds; titrate to effect      End stage renal disease  Pt w/ ESRD. Refused dialysis on last admission, now s/p multiple sessions during [facility]  - nephrology consulted  - last session 12/30  - access via [facility]  - MBD/anemia therapy as noted elsewhere  - outpatient HD chair pending      Anemia of renal disease  Drop in hemoglobin 1/1 without clinical bleeding, appropriate response to transfusion  - continue to monitor  - defer ESAs to nephrology  - transfuse to hgb 7      1/12- will give a dose of venofer in the AM; epo with HD; no overt signs of GI bleeding    Anemia due to pre-ESRD treated with erythropoietin  Getting epo with HD  - CBC daily    1//10 patient's hemoglobin has been trending down, denies any melena or BRBPR; getting epo with HD; s/p 1 unit of PRBC this admission; ordering fobt    Candida esophagitis  Prior admission to [facility] w/ EGD showing candidal esophagitis  - fluconazole      Hyperphosphatemia  Phos lowering important given calciphylaxis.  - continue sevalamer  - on cincalcet for hyperpth   - HD per nephrology      Type 2 diabetes mellitus  Reports taking 10U daily  - accuchecks/SSI      Nausea  Persistent nausea/vomiting reported at admission. No vomiting while inpatient. Last admission nausea resolved w/ bicarb drip, suggesting ESRD component.   - monitor as pt gets dialyzed  -  see esophageal candidiasis      Calciphylaxis  Rash on bilateral thighs, extremely tender to touch. [facility] biopsy c/w calciphaxis, per report  - records request sent for biopsy  - nephrology consulted  - wound care  - prn analgesics  - sodium thiosulfate  - treatment of hyperphos/hyperPTH as noted elsewhere  - will likely need outpatient chronic pain follow up      Secondary hyperparathyroidism  PTH severely elevated. Particularly concerning given calciphylaxis  - continue cincalcet  - iPTH pending    Metabolic acidosis  See ESRD        VTE Risk Mitigation (From admission, onward)         Ordered     heparin (porcine) injection 3,200 Units  As needed (PRN)         12/31/21 1335     enoxaparin injection 90 mg  Every 24 hours (non-standard times)         12/30/21 0539     IP VTE HIGH RISK PATIENT  Once         12/30/21 0339     Place sequential compression device  Until discontinued         12/30/21 0339     Place HENRI hose  Until discontinued         12/30/21 0339     Reason for No Pharmacological VTE Prophylaxis  Once        Question:  Reasons:  Answer:  Active Bleeding    12/30/21 0339                      I have assessed these finding virtually using telemed platform and with assistance of bedside nurse                 The attending portion of this evaluation, treatment, and documentation was performed per Chase Chahal MD via Telemedicine AudioVisual using the secure [employer] software platform with 2 way audio/video. The provider was located off-site and the patient is located in the hospital. The aforementioned video software was utilized to document the relevant history and physical exam    Chase Chahal MD  Department of Hospital Medicine   [facility]

## 2022-01-13 NOTE — PLAN OF CARE
Problem: Adult Inpatient Plan of Care  Goal: Plan of Care Review  Outcome: Ongoing, Progressing  Goal: Patient-Specific Goal (Individualized)  Outcome: Ongoing, Progressing  Goal: Absence of Hospital-Acquired Illness or Injury  Outcome: Ongoing, Progressing  Goal: Optimal Comfort and Wellbeing  Outcome: Ongoing, Progressing  Goal: Readiness for Transition of Care  Outcome: Ongoing, Progressing     Problem: Diabetes Comorbidity  Goal: Blood Glucose Level Within Targeted Range  Outcome: Ongoing, Progressing     Problem: Infection  Goal: Absence of Infection Signs and Symptoms  Outcome: Ongoing, Progressing     Problem: Device-Related Complication Risk (Hemodialysis)  Goal: Safe, Effective Therapy Delivery  Outcome: Ongoing, Progressing     Problem: Hemodynamic Instability (Hemodialysis)  Goal: Effective Tissue Perfusion  Outcome: Ongoing, Progressing     Problem: Infection (Hemodialysis)  Goal: Absence of Infection Signs and Symptoms  Outcome: Ongoing, Progressing     Problem: Impaired Wound Healing  Goal: Optimal Wound Healing  Outcome: Ongoing, Progressing     Problem: Skin Injury Risk Increased  Goal: Skin Health and Integrity  Outcome: Ongoing, Progressing     Problem: Pain Acute  Goal: Acceptable Pain Control and Functional Ability  Outcome: Ongoing, Progressing

## 2022-01-13 NOTE — ASSESSMENT & PLAN NOTE
Drop in hemoglobin 1/1 without clinical bleeding, appropriate response to transfusion  - continue to monitor  - defer ESAs to nephrology  - transfuse to hgb 7      1/12- will give a dose of venofer in the AM; epo with HD; no overt signs of GI bleeding  1/13 - hemoglobin of 6.9 no bleeding.  Repeat CBC ordered.  If hemoglobin below 7, will transfuse  1 unit PRBCs.

## 2022-01-13 NOTE — ASSESSMENT & PLAN NOTE
- started on rocephin 1 g daily  - follow up urine and blood cultures      1/10- patient spiked another fever today of 101, getting blood cultures and LA; switching rocephin to zosyn and follow up urine cultures     1/12- fevers resolved and patient's urine cultures showed no growth; course completed of augmentin

## 2022-01-13 NOTE — PLAN OF CARE
01/13/22 1520   Post-Acute Status   Post-Acute Authorization Dialysis   Diaylsis Status Referrals Sent   Referral sent via fax to [facility] at [phone]

## 2022-01-13 NOTE — ASSESSMENT & PLAN NOTE
Drop in hemoglobin 1/1 without clinical bleeding, appropriate response to transfusion  - continue to monitor  - defer ESAs to nephrology  - transfuse to hgb 7      1/12- will give a dose of venofer in the AM; epo with HD; no overt signs of GI bleeding

## 2022-01-13 NOTE — ASSESSMENT & PLAN NOTE
- started on rocephin 1 g daily  - follow up urine and blood cultures      1/10- patient spiked another fever today of 101, getting blood cultures and LA; switching rocephin to zosyn and follow up urine cultures     1/12- fevers resolved and patient's urine cultures showed no growth; course completed of augmentin  1/13 - patient noted to have fever again.  Will consult Infectious Disease.  Obtain CTA chest before dialysis tomorrow to rule out PE.  Lower extremity duplex negative for DVT.  Cultures with no growth to date.

## 2022-01-13 NOTE — PLAN OF CARE
Rec'd call back from Marlon at [facility] saying that all of their facilities have denied the patient for outpt HD.  Notified CLAIRE Matos to [facility]referrals to [facility].

## 2022-01-13 NOTE — PLAN OF CARE
Recommendations  1) Continue renal diet   2) Add Novasource renal BID    Goals: Pt to meet > 75% EEN by RD follow up  Nutrition Goal Status: new  Communication of RD Recs: reviewed with physician    Assessment and Plan  Nutrition Problem  Inadequate oral intake     Related to (etiology):   Appetite, food preferences     Signs and Symptoms (as evidenced by):   Pt report of fair appetite, eating 50% meals     Interventions(treatment strategy):  Collaboration with other providers  Renal diet     Nutrition Diagnosis Status:   Continues

## 2022-01-13 NOTE — PLAN OF CARE
01/13/22 1144   Discharge Reassessment   Assessment Type Discharge Planning Reassessment   TN called [facility] main number at [phone], spoke with Marlon who stated that this account has been sent to different department for difficult placements.  No unit has been assigned as yet.  TN to continue to follow.

## 2022-01-13 NOTE — PROGRESS NOTES
"      [facility] Medicine  Telemedicine Progress Note    Patient Name: Xuan Ricardo  MRN: [id number]  Patient Class: IP- Inpatient   Admission Date: 12/29/2021  Length of Stay: 14 days  Attending Physician: Marcelina Griffin MD  Primary Care Provider: Katharine Franks MD          Subjective:     Principal Problem:COVID-19        HPI:  Ms. Ricardo is a 41y F w/ ESRD, recently dialyzed at [facility], presents after leaving AMA from [facility] with nausea/vomiting and bilateral thigh pain.     She says that the nausea and vomiting began a few weeks ago. She mostly reports vomiting up food content, denies overt blood. Says that the vomit is sometimes darker colored. She has been told she has "coffee-ground" emesis but does not describe the contents of her vomit as such. She denies abdominal pain, denies delayed vomiting after eating. Feels nauseas all the time, regardless of food intake.    She has also developed severe bilateral thigh lesions. She describes them as swelling w/ extreme sensitivity to touch. They started approximately two weeks ago and have persisted, with minimal improvement in pain. One thigh was biopsied at [facility] at her most recent admission, and she reports that the site continues to be tender.      Overview/Hospital Course:  Admitted to hospital medicine. Nephrology consulted. Wound care consulted for thigh wounds. Pt gave verbal and written consent for records release from [facility]. 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     At  she was restarted on HD, given sodium thiosulfate. MBD therapy restarted. Restarted on fluconazole.    Dispo pending arrangement of outpatient HD chair      Interval History:  Reports her pain is still present but pain medications are helping.   Denies any shortness of breath, cough, sputum production.  Did have a fever with T-max of 100.7° this morning.  Cultures with no " growth to date.  Lower extremity duplex negative.  Scheduled for dialysis tomorrow.  Will obtain CTA chest to rule out patient does appear to have increasing D-dimer.      Review of Systems   Constitutional: Negative for fever.   Respiratory: Negative for shortness of breath.    Cardiovascular: Negative for chest pain.   Gastrointestinal: Negative for abdominal pain.   Neurological: Negative for dizziness. Headaches:      All other systems reviewed and are negative.    Objective:     Vital Signs (Most Recent):  Temp: 98.4 °F (36.9 °C) (01/13/22 1308)  Pulse: (!) 118 (01/13/22 1308)  Resp: 20 (01/13/22 1308)  BP: (!) 109/59 (01/13/22 1308)  SpO2: 99 % (01/13/22 1308) Vital Signs (24h Range):  Temp:  [98.4 °F (36.9 °C)-100.7 °F (38.2 °C)] 98.4 °F (36.9 °C)  Pulse:  [101-124] 118  Resp:  [16-20] 20  SpO2:  [96 %-99 %] 99 %  BP: (109-133)/(59-72) 109/59     Weight: 94.3 kg (208 lb)  Body mass index is 33.57 kg/m².  No intake or output data in the 24 hours ending 01/13/22 1319   Physical Exam  Vitals and nursing note reviewed.   Constitutional:       Appearance: Normal appearance.   HENT:      Head: Normocephalic and atraumatic.   Eyes:      Extraocular Movements: Extraocular movements intact.      Pupils: Pupils are equal, round, and reactive to light.   Cardiovascular:      Rate and Rhythm: Normal rate and regular rhythm.   Pulmonary:      Effort: Pulmonary effort is normal. No respiratory distress.   Abdominal:      General: There is no distension.   Musculoskeletal:         General: Normal range of motion.      Cervical back: Normal range of motion.   Neurological:      General: No focal deficit present.      Mental Status: She is alert and oriented to person, place, and time.   Psychiatric:         Mood and Affect: Mood normal.         Behavior: Behavior normal.         Significant Labs:   All pertinent labs within the past 24 hours have been reviewed.  CBC:   Recent Labs   Lab 01/13/22  0618   WBC 7.94   HGB 6.9*    HCT 21.4*        CMP:   Recent Labs   Lab 01/13/22  0618   *   K 4.2      CO2 18*   *   BUN 25*   CREATININE 7.0*   CALCIUM 9.0   ALBUMIN 1.3*   ANIONGAP 16   EGFRNONAA 7*       Significant Imaging: I have reviewed all pertinent imaging results/findings within the past 24 hours.      Assessment/Plan:      * COVID-19  Asymptomatic covid-19 infection  - will attempt to arrange infusion inpatient  - isolation precautions  - no hypoxia, no indication for dex/rem      Sepsis due to gram-negative UTI  - started on rocephin 1 g daily  - follow up urine and blood cultures      1/10- patient spiked another fever today of 101, getting blood cultures and LA; switching rocephin to zosyn and follow up urine cultures     1/12- fevers resolved and patient's urine cultures showed no growth; course completed of augmentin  1/13 - patient noted to have fever again.  Will consult Infectious Disease.  Obtain CTA chest before dialysis tomorrow to rule out PE.  Lower extremity duplex negative for DVT.  Cultures with no growth to date.    Fever  - spiked a fever of 101.4 today  - getting blood cultures, CXR and U/A as patient does still make urine and LA  - also getting LE U/S B/L to rule out DVT as cause of fever       Anemia due to pre-ESRD treated with erythropoietin  Getting epo with HD  - CBC daily    1//10 patient's hemoglobin has been trending down, denies any melena or BRBPR; getting epo with HD; s/p 1 unit of PRBC this admission; ordering fobt    Candida esophagitis  Prior admission to [facility] w/ EGD showing candidal esophagitis  - fluconazole      Hyperphosphatemia  Phos lowering important given calciphylaxis.  - continue sevalamer  - on cincalcet for hyperpth   - HD per nephrology      Renovascular hypertension  Continue home meds; titrate to effect      Type 2 diabetes mellitus  Reports taking 10U daily  - accuchecks/SSI      Nausea  Persistent nausea/vomiting reported at admission. No vomiting while  inpatient. Last admission nausea resolved w/ bicarb drip, suggesting ESRD component.   - monitor as pt gets dialyzed  - see esophageal candidiasis      Calciphylaxis  Rash on bilateral thighs, extremely tender to touch. [facility] biopsy c/w calciphaxis, per report  - records request sent for biopsy  - nephrology consulted  - wound care  - prn analgesics  - sodium thiosulfate  - treatment of hyperphos/hyperPTH as noted elsewhere  - will likely need outpatient chronic pain follow up      End stage renal disease  Pt w/ ESRD. Refused dialysis on last admission, now s/p multiple sessions during [facility] hospitalization  - nephrology consulted  - last session 12/30  - access via [facility]  - MBD/anemia therapy as noted elsewhere  - outpatient HD chair pending      Secondary hyperparathyroidism  PTH severely elevated. Particularly concerning given calciphylaxis  - continue cincalcet  - iPTH pending    Metabolic acidosis  See ESRD      Anemia of renal disease  Drop in hemoglobin 1/1 without clinical bleeding, appropriate response to transfusion  - continue to monitor  - defer ESAs to nephrology  - transfuse to hgb 7      1/12- will give a dose of venofer in the AM; epo with HD; no overt signs of GI bleeding  1/13 - hemoglobin of 6.9 no bleeding.  Repeat CBC ordered.  If hemoglobin below 7, will transfuse  1 unit PRBCs.      VTE Risk Mitigation (From admission, onward)         Ordered     heparin (porcine) injection 3,200 Units  As needed (PRN)         12/31/21 1335     enoxaparin injection 90 mg  Every 24 hours (non-standard times)         12/30/21 0539     IP VTE HIGH RISK PATIENT  Once         12/30/21 0339     Place sequential compression device  Until discontinued         12/30/21 0339     Place HENRI hose  Until discontinued         12/30/21 0339     Reason for No Pharmacological VTE Prophylaxis  Once        Question:  Reasons:  Answer:  Active Bleeding    12/30/21 0339                      I have assessed these finding virtually  using telemed platform and with assistance of bedside nurse                 The attending portion of this evaluation, treatment, and documentation was performed per Marcelina Griffin MD via Telemedicine AudioVisual using the secure [employer] software platform with 2 way audio/video. The provider was located off-site and the patient is located in the hospital. The aforementioned video software was utilized to document the relevant history and physical exam    Marcelina Griffin MD  Department of [facility] Medicine   [facility]

## 2022-01-13 NOTE — SUBJECTIVE & OBJECTIVE
Interval History:  Reports her pain is still present but pain medications are helping.   Denies any shortness of breath, cough, sputum production.  Did have a fever with T-max of 100.7° this morning.  Cultures with no growth to date.  Lower extremity duplex negative.  Scheduled for dialysis tomorrow.  Will obtain CTA chest to rule out patient does appear to have increasing D-dimer.      Review of Systems   Constitutional: Negative for fever.   Respiratory: Negative for shortness of breath.    Cardiovascular: Negative for chest pain.   Gastrointestinal: Negative for abdominal pain.   Neurological: Negative for dizziness. Headaches:      All other systems reviewed and are negative.    Objective:     Vital Signs (Most Recent):  Temp: 98.4 °F (36.9 °C) (01/13/22 1308)  Pulse: (!) 118 (01/13/22 1308)  Resp: 20 (01/13/22 1308)  BP: (!) 109/59 (01/13/22 1308)  SpO2: 99 % (01/13/22 1308) Vital Signs (24h Range):  Temp:  [98.4 °F (36.9 °C)-100.7 °F (38.2 °C)] 98.4 °F (36.9 °C)  Pulse:  [101-124] 118  Resp:  [16-20] 20  SpO2:  [96 %-99 %] 99 %  BP: (109-133)/(59-72) 109/59     Weight: 94.3 kg (208 lb)  Body mass index is 33.57 kg/m².  No intake or output data in the 24 hours ending 01/13/22 1319   Physical Exam  Vitals and nursing note reviewed.   Constitutional:       Appearance: Normal appearance.   HENT:      Head: Normocephalic and atraumatic.   Eyes:      Extraocular Movements: Extraocular movements intact.      Pupils: Pupils are equal, round, and reactive to light.   Cardiovascular:      Rate and Rhythm: Normal rate and regular rhythm.   Pulmonary:      Effort: Pulmonary effort is normal. No respiratory distress.   Abdominal:      General: There is no distension.   Musculoskeletal:         General: Normal range of motion.      Cervical back: Normal range of motion.   Neurological:      General: No focal deficit present.      Mental Status: She is alert and oriented to person, place, and time.   Psychiatric:          Mood and Affect: Mood normal.         Behavior: Behavior normal.         Significant Labs:   All pertinent labs within the past 24 hours have been reviewed.  CBC:   Recent Labs   Lab 01/13/22  0618   WBC 7.94   HGB 6.9*   HCT 21.4*        CMP:   Recent Labs   Lab 01/13/22 0618   *   K 4.2      CO2 18*   *   BUN 25*   CREATININE 7.0*   CALCIUM 9.0   ALBUMIN 1.3*   ANIONGAP 16   EGFRNONAA 7*       Significant Imaging: I have reviewed all pertinent imaging results/findings within the past 24 hours.

## 2022-01-13 NOTE — CONSULTS
Nursing reports skin breakdown on buttocks  Patient repositioned self slowly due to pain. Examined buttocks along edge of gluteal cleft. Ulcerations in linear pattern along cleft filled with white slough. Patient states constipated so not soiling self with stool, but reports having urinary incontinence even though on dialysis. Currently area covered with Mepilex sacral dressing.   Recommend keeping area clean and dry and avoid moisture.   Nursing to change Mepilex sacral dressing daily and continue Pressure Injury Prevention Interventions. Discussed treatment with patient and encouraged movement in bed.

## 2022-01-14 PROBLEM — R00.0 SINUS TACHYCARDIA: Status: ACTIVE | Noted: 2022-01-14

## 2022-01-14 LAB
BACTERIA BLD CULT: NORMAL
POCT GLUCOSE: 140 MG/DL (ref 70–110)
POCT GLUCOSE: 177 MG/DL (ref 70–110)
POCT GLUCOSE: 185 MG/DL (ref 70–110)
POCT GLUCOSE: 205 MG/DL (ref 70–110)
POCT GLUCOSE: 225 MG/DL (ref 70–110)

## 2022-01-14 PROCEDURE — 25000003 PHARM REV CODE 250: Performed by: HOSPITALIST

## 2022-01-14 PROCEDURE — 25000003 PHARM REV CODE 250: Performed by: EMERGENCY MEDICINE

## 2022-01-14 PROCEDURE — 21400001 HC TELEMETRY ROOM

## 2022-01-14 PROCEDURE — 27000207 HC ISOLATION

## 2022-01-14 PROCEDURE — 63600175 PHARM REV CODE 636 W HCPCS: Performed by: EMERGENCY MEDICINE

## 2022-01-14 PROCEDURE — 93010 EKG 12-LEAD: ICD-10-PCS | Mod: ,,, | Performed by: INTERNAL MEDICINE

## 2022-01-14 PROCEDURE — 63600175 PHARM REV CODE 636 W HCPCS: Performed by: STUDENT IN AN ORGANIZED HEALTH CARE EDUCATION/TRAINING PROGRAM

## 2022-01-14 PROCEDURE — 93010 ELECTROCARDIOGRAM REPORT: CPT | Mod: ,,, | Performed by: INTERNAL MEDICINE

## 2022-01-14 PROCEDURE — 99223 PR INITIAL HOSPITAL CARE,LEVL III: ICD-10-PCS | Mod: ,,, | Performed by: INTERNAL MEDICINE

## 2022-01-14 PROCEDURE — 99223 1ST HOSP IP/OBS HIGH 75: CPT | Mod: ,,, | Performed by: INTERNAL MEDICINE

## 2022-01-14 PROCEDURE — 63700000 PHARM REV CODE 250 ALT 637 W/O HCPCS: Performed by: STUDENT IN AN ORGANIZED HEALTH CARE EDUCATION/TRAINING PROGRAM

## 2022-01-14 PROCEDURE — 25000003 PHARM REV CODE 250: Performed by: INTERNAL MEDICINE

## 2022-01-14 PROCEDURE — 94761 N-INVAS EAR/PLS OXIMETRY MLT: CPT

## 2022-01-14 PROCEDURE — 93005 ELECTROCARDIOGRAM TRACING: CPT

## 2022-01-14 RX ORDER — GABAPENTIN 100 MG/1
100 CAPSULE ORAL 3 TIMES DAILY
Status: DISCONTINUED | OUTPATIENT
Start: 2022-01-14 | End: 2022-01-28 | Stop reason: HOSPADM

## 2022-01-14 RX ORDER — BISACODYL 10 MG
10 SUPPOSITORY, RECTAL RECTAL DAILY PRN
Status: DISCONTINUED | OUTPATIENT
Start: 2022-01-14 | End: 2022-01-28 | Stop reason: HOSPADM

## 2022-01-14 RX ORDER — AMOXICILLIN 250 MG
1 CAPSULE ORAL DAILY
Status: DISCONTINUED | OUTPATIENT
Start: 2022-01-15 | End: 2022-01-28 | Stop reason: HOSPADM

## 2022-01-14 RX ORDER — OXYCODONE HYDROCHLORIDE 15 MG/1
15 TABLET ORAL EVERY 4 HOURS PRN
Status: DISCONTINUED | OUTPATIENT
Start: 2022-01-14 | End: 2022-01-28 | Stop reason: HOSPADM

## 2022-01-14 RX ORDER — POLYETHYLENE GLYCOL 3350 17 G/17G
17 POWDER, FOR SOLUTION ORAL 2 TIMES DAILY
Status: DISCONTINUED | OUTPATIENT
Start: 2022-01-14 | End: 2022-01-28 | Stop reason: HOSPADM

## 2022-01-14 RX ORDER — ADHESIVE BANDAGE
30 BANDAGE TOPICAL DAILY PRN
Status: DISCONTINUED | OUTPATIENT
Start: 2022-01-14 | End: 2022-01-25

## 2022-01-14 RX ORDER — OXYCODONE HYDROCHLORIDE 5 MG/1
10 TABLET ORAL EVERY 4 HOURS PRN
Status: DISCONTINUED | OUTPATIENT
Start: 2022-01-14 | End: 2022-01-28 | Stop reason: HOSPADM

## 2022-01-14 RX ADMIN — OXYCODONE HYDROCHLORIDE AND ACETAMINOPHEN 500 MG: 500 TABLET ORAL at 08:01

## 2022-01-14 RX ADMIN — OXYCODONE 10 MG: 5 TABLET ORAL at 10:01

## 2022-01-14 RX ADMIN — FLUCONAZOLE 400 MG: 100 TABLET ORAL at 10:01

## 2022-01-14 RX ADMIN — GABAPENTIN 100 MG: 100 CAPSULE ORAL at 03:01

## 2022-01-14 RX ADMIN — NIFEDIPINE 90 MG: 30 TABLET, FILM COATED, EXTENDED RELEASE ORAL at 10:01

## 2022-01-14 RX ADMIN — THERA TABS 1 TABLET: TAB at 10:01

## 2022-01-14 RX ADMIN — SEVELAMER CARBONATE 2400 MG: 800 TABLET, FILM COATED ORAL at 05:01

## 2022-01-14 RX ADMIN — OXYCODONE HYDROCHLORIDE AND ACETAMINOPHEN 500 MG: 500 TABLET ORAL at 10:01

## 2022-01-14 RX ADMIN — POLYETHYLENE GLYCOL 3350 17 G: 17 POWDER, FOR SOLUTION ORAL at 10:01

## 2022-01-14 RX ADMIN — SEVELAMER CARBONATE 2400 MG: 800 TABLET, FILM COATED ORAL at 10:01

## 2022-01-14 RX ADMIN — FAMOTIDINE 20 MG: 20 TABLET ORAL at 10:01

## 2022-01-14 RX ADMIN — POLYETHYLENE GLYCOL 3350 17 G: 17 POWDER, FOR SOLUTION ORAL at 08:01

## 2022-01-14 RX ADMIN — CINACALCET 30 MG: 30 TABLET, FILM COATED ORAL at 10:01

## 2022-01-14 RX ADMIN — ENOXAPARIN SODIUM 90 MG: 100 INJECTION SUBCUTANEOUS at 06:01

## 2022-01-14 RX ADMIN — SEVELAMER CARBONATE 2400 MG: 800 TABLET, FILM COATED ORAL at 12:01

## 2022-01-14 RX ADMIN — OXYCODONE HYDROCHLORIDE 15 MG: 15 TABLET ORAL at 03:01

## 2022-01-14 RX ADMIN — GABAPENTIN 100 MG: 100 CAPSULE ORAL at 08:01

## 2022-01-14 RX ADMIN — SENNOSIDES AND DOCUSATE SODIUM 1 TABLET: 50; 8.6 TABLET ORAL at 12:01

## 2022-01-14 RX ADMIN — OXYCODONE 10 MG: 5 TABLET ORAL at 06:01

## 2022-01-14 NOTE — PROGRESS NOTES
"      [facility] Medicine  Telemedicine Progress Note    Patient Name: Xuan Ricardo  MRN: [id number]  Patient Class: IP- Inpatient   Admission Date: 12/29/2021  Length of Stay: 15 days  Attending Physician: Marcelina Griffin MD  Primary Care Provider: Katharine Franks MD          Subjective:     Principal Problem:COVID-19        HPI:  Ms. Ricardo is a 41y F w/ ESRD, recently dialyzed at [facility], presents after leaving AMA from [facility] with nausea/vomiting and bilateral thigh pain.     She says that the nausea and vomiting began a few weeks ago. She mostly reports vomiting up food content, denies overt blood. Says that the vomit is sometimes darker colored. She has been told she has "coffee-ground" emesis but does not describe the contents of her vomit as such. She denies abdominal pain, denies delayed vomiting after eating. Feels nauseas all the time, regardless of food intake.    She has also developed severe bilateral thigh lesions. She describes them as swelling w/ extreme sensitivity to touch. They started approximately two weeks ago and have persisted, with minimal improvement in pain. One thigh was biopsied at [facility] at her most recent admission, and she reports that the site continues to be tender.      Overview/Hospital Course:  Admitted to hospital medicine. Nephrology consulted. Wound care consulted for thigh wounds. Pt gave verbal and written consent for records release from [facility]. 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     At  she was restarted on HD, given sodium thiosulfate. MBD therapy restarted. Restarted on fluconazole.    Dispo pending arrangement of outpatient HD chair      Interval History:  Reports her lower extremity pain now feels worse.  Will increase oxycodone as that could be contributing to her tachycardia.  Last febrile episode was on 01/13 at 7:00 a.m..  Has now been afebrile for " about 24 hours.  However, remains tachycardic with HR mostly in the 120s.  Her appetite remains low, decreased p.o. intake.  Continue to encourage p.o. intake.  Patient did receive 1 unit PRBCs with dialysis on 01/13.  Follow-up CBC pending.  CTA chest, lower extremity duplex negative for blood clots.    Review of Systems   Constitutional: Negative for fever.   Respiratory: Negative for shortness of breath.    Cardiovascular: Negative for chest pain.   Gastrointestinal: Negative for abdominal pain.   Neurological: Negative for dizziness. Headaches:      All other systems reviewed and are negative.    Objective:     Vital Signs (Most Recent):  Temp: 98.9 °F (37.2 °C) (01/14/22 1157)  Pulse: (!) 124 (01/14/22 1157)  Resp: 17 (01/14/22 1157)  BP: 128/62 (01/14/22 1157)  SpO2: 98 % (01/14/22 1157) Vital Signs (24h Range):  Temp:  [97.5 °F (36.4 °C)-98.9 °F (37.2 °C)] 98.9 °F (37.2 °C)  Pulse:  [112-134] 124  Resp:  [16-20] 17  SpO2:  [97 %-100 %] 98 %  BP: (109-135)/(59-71) 128/62     Weight: 94.3 kg (208 lb)  Body mass index is 33.57 kg/m².    Intake/Output Summary (Last 24 hours) at 1/14/2022 1253  Last data filed at 1/13/2022 2352  Gross per 24 hour   Intake 1579 ml   Output 1625 ml   Net -46 ml      Physical Exam  Vitals and nursing note reviewed.   Constitutional:       Appearance: Normal appearance.   HENT:      Head: Normocephalic and atraumatic.   Eyes:      Extraocular Movements: Extraocular movements intact.      Pupils: Pupils are equal, round, and reactive to light.   Cardiovascular:      Rate and Rhythm: Normal rate and regular rhythm.   Pulmonary:      Effort: Pulmonary effort is normal. No respiratory distress.   Abdominal:      General: There is no distension.   Musculoskeletal:         General: Normal range of motion.      Cervical back: Normal range of motion.   Neurological:      General: No focal deficit present.      Mental Status: She is alert and oriented to person, place, and time.   Psychiatric:          Mood and Affect: Mood normal.         Behavior: Behavior normal.         Significant Labs:   All pertinent labs within the past 24 hours have been reviewed.  CBC:   Recent Labs   Lab 01/13/22  0618   WBC 7.94   HGB 6.9*   HCT 21.4*        CMP:   Recent Labs   Lab 01/13/22  0618   *   K 4.2      CO2 18*   *   BUN 25*   CREATININE 7.0*   CALCIUM 9.0   ALBUMIN 1.3*   ANIONGAP 16   EGFRNONAA 7*       Significant Imaging: I have reviewed all pertinent imaging results/findings within the past 24 hours.      Assessment/Plan:      * COVID-19  Asymptomatic covid-19 infection  - will attempt to arrange infusion inpatient  - isolation precautions  - no hypoxia, no indication for dex/rem      Sinus tachycardia  -patient with heart rate persistently elevated in 120s  -EKG with sinus tachycardia  -possibly secondary to pain, continue to treat with p.r.n. analgesia  -reports her appetite has been low, with decreased p.o. intake.  Encourage p.o. intake.  -PE/DVT ruled out  -infectious disease following for any infectious workup  -monitor electrolytes, replete electrolytes as needed  -monitor in telemetry      Sepsis due to gram-negative UTI  - started on rocephin 1 g daily  - follow up urine and blood cultures      1/10- patient spiked another fever today of 101, getting blood cultures and LA; switching rocephin to zosyn and follow up urine cultures     1/12- fevers resolved and patient's urine cultures showed no growth; course completed of augmentin  1/13 - patient noted to have fever again.  Will consult Infectious Disease.  Obtain CTA chest before dialysis tomorrow to rule out PE.  Lower extremity duplex negative for DVT.  Cultures with no growth to date.    Fever  - continued to spike intermittent fevers  - cultures with no growth to date  - noted to have increasing D-dimer however, lower extremity duplex and CTA chest negative for blood clots  - infectious disease consulted, appreciate  recommendations  - continue to monitor fever curve        Anemia due to pre-ESRD treated with erythropoietin  -Getting epo with HD  -1//10 patient's hemoglobin has been trending down, denies any melena or BRBPR; getting epo with HD; s/p 1 unit of PRBC this admission; ordering fobt  -noted to have hemoglobin of 6.9 on 01/13, another unit PRBC transfused  -continue to monitor CBC periodically  -Nursing / Phlebotomy to use pediatric tubes when drawing labs to minimize iatrogenic anemia and blood loss.       Candida esophagitis  -Prior admission to [facility] w/ EGD showing candidal esophagitis  -continue fluconazole  -CTA chest concerning for possible esophageal strictures.  Will need outpatient GI follow-up.      Hyperphosphatemia  Phos lowering important given calciphylaxis.  - continue sevalamer  - on cincalcet for hyperpth   - HD per nephrology      Renovascular hypertension  Continue home meds; titrate to effect      Type 2 diabetes mellitus  Reports taking 10U daily  - accuchecks/SSI      Nausea  Persistent nausea/vomiting reported at admission. No vomiting while inpatient. Last admission nausea resolved w/ bicarb drip, suggesting ESRD component.  Nausea now improved.  Encourage p.o. intake take  - monitor as pt gets dialyzed  - see esophageal candidiasis      Calciphylaxis  Rash on bilateral thighs, extremely tender to touch. [facility] biopsy c/w calciphaxis, per report  - records request sent for biopsy  - nephrology consulted  - wound care  - prn analgesics  - sodium thiosulfate  - treatment of hyperphos/hyperPTH as noted elsewhere  - will likely need outpatient chronic pain follow up      End stage renal disease  Pt w/ ESRD. Refused dialysis on last admission, now s/p multiple sessions during [facility] hospitalization  - nephrology consulted  - last session 12/30  - access via [facility]  - MBD/anemia therapy as noted elsewhere  - outpatient HD chair pending      Secondary hyperparathyroidism  PTH severely elevated.  Particularly concerning given calciphylaxis  - continue cincalcet  - iPTH pending    Metabolic acidosis  See ESRD      Anemia of renal disease  Drop in hemoglobin 1/1 without clinical bleeding, appropriate response to transfusion  - continue to monitor  - defer ESAs to nephrology  - transfuse to hgb 7      1/12- will give a dose of venofer in the AM; epo with HD; no overt signs of GI bleeding  1/13 - hemoglobin of 6.9 no bleeding.  Repeat CBC ordered.  If hemoglobin below 7, will transfuse  1 unit PRBCs.      VTE Risk Mitigation (From admission, onward)         Ordered     heparin (porcine) injection 3,200 Units  As needed (PRN)         12/31/21 1335     enoxaparin injection 90 mg  Every 24 hours (non-standard times)         12/30/21 0539     IP VTE HIGH RISK PATIENT  Once         12/30/21 0339     Place sequential compression device  Until discontinued         12/30/21 0339     Place HENRI hose  Until discontinued         12/30/21 0339     Reason for No Pharmacological VTE Prophylaxis  Once        Question:  Reasons:  Answer:  Active Bleeding    12/30/21 0339                      I have assessed these finding virtually using telemed platform and with assistance of bedside nurse                 The attending portion of this evaluation, treatment, and documentation was performed per Marcelina Griffin MD via Telemedicine AudioVisual using the secure [employer] software platform with 2 way audio/video. The provider was located off-site and the patient is located in the hospital. The aforementioned video software was utilized to document the relevant history and physical exam    Marcelina Griffin MD  Department of Hospital Medicine   [facility] - Telemetry

## 2022-01-14 NOTE — ASSESSMENT & PLAN NOTE
- etiology is unclear to me  - exam is only significant for bilateral thigh pain and tenderness and two indwelling intravascular devices  - since she tells me that she is feeling better, I would observe for now, off abx  - would remove midline, if possible  - if her fever persists, consider imaging thighs, since I am unable to examine them due to tenderness

## 2022-01-14 NOTE — CONSULTS
[facility]  Infectious Disease  Consult Note    Patient Name: Xuan Ricardo  MRN: [id number]  Admission Date: 12/29/2021  Hospital Length of Stay: 15 days  Attending Physician: Marcelina Griffin MD  Primary Care Provider: Katharine Franks MD     Isolation Status: Airborne and Contact and Droplet    Patient information was obtained from patient, past medical records and ER records.      Inpatient consult to Infectious Diseases  Consult performed by: Carlos Montemayor MD  Consult ordered by: Marcelina Griffin MD        Assessment/Plan:     * COVID-19  - asymptomatic  - continue isolation until afebrile (<100F) x 24h    Fever  - etiology is unclear to me  - exam is only significant for bilateral thigh pain and tenderness and two indwelling intravascular devices  - since she tells me that she is feeling better, I would observe for now, off abx  - would remove midline, if possible  - if her fever persists, consider imaging thighs, since I am unable to examine them due to tenderness    Thank you for your consult. I will follow-up with patient. Please contact us if you have any additional questions.    Carlos Montemayor MD  Infectious Disease  [facility]    Subjective:     Principal Problem: COVID-19    HPI: 41-year-old female with end-stage renal disease, previously refusing dialysis but very recently initiated on dialysis, admitted on 12/30 via personal transportation having signed out AMA from [facility] earlier that day, admitted with bilateral lower extremity pain and nausea/vomiting. She was also diagnosed with asymptomatic COVID.     On January 8, she had a fever and was started on CTX. Blood and urine cultures were sterile. Nonetheless, she completed abx and is now off abx. Additional w/u has included venous US and CT angio to r/o PE (both negative). Because of continued fever, ID is consulted. In the meantime, she tells me that her fever has improved. Today, she only complains of bilateral  thigh pain. She denies cough, abd pain, dysuria, and diarrhea.      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 ;  Surgeon: Rakesh Leon MD;  Location: [facility] OR;  Service: Vascular;  Laterality: Left;  RN PREOP 20, UPT done, COVID NEGATRIVE 20  NEED H/P     SECTION, CLASSIC      INSERTION OF TUNNELED CENTRAL VENOUS CATHETER (CVC) WITH SUBCUTANEOUS PORT N/A 2020    Procedure: IR TUNNELED VATH INSERT WITHOUT PORT;  Surgeon: [facility];  Location: [facility] OR;  Service: Radiology;  Laterality: N/A;  1030AM START  RN PREOP 2020    INSERTION OF TUNNELED CENTRAL VENOUS CATHETER (CVC) WITH SUBCUTANEOUS PORT N/A 2020    Procedure: TUNNELED CATH PLACEMENT;  Surgeon: [facility] Diagnostic Provider;  Location: [facility] OR;  Service: Radiology;  Laterality: N/A;  930AM START    REVISION OF ARTERIOVENOUS FISTULA Left 2020    Procedure: REVISION, AV FISTULA, THROMBECTOMY, LEFT UPPER EXTREMITY;  Surgeon: Rakesh Leon MD;  Location: [facility] OR;  Service: Vascular;  Laterality: Left;    TUBAL LIGATION         Review of patient's allergies indicates:   Allergen Reactions    Vicodin [hydrocodone-acetaminophen] Hives    Codeine Hives       Medications:  Medications Prior to Admission   Medication Sig    insulin NPH (NOVOLIN N) 100 unit/mL injection Inject 15 Units into the skin before breakfast.    NIFEdipine (PROCARDIA-XL) 90 MG (OSM) 24 hr tablet Take 1 tablet (90 mg total) by mouth once  "daily.    blood sugar diagnostic Strp Check blood glucose 3 times daily    cloNIDine 0.3 mg/24 hr td ptwk (CATAPRES) 0.3 mg/24 hr Place 1 patch onto the skin every 7 days.    ergocalciferol (ERGOCALCIFEROL) 50,000 unit Cap Take 1 capsule (50,000 Units total) by mouth every 7 days.    lancets 30 gauge Misc 3 (three) times daily    pen needle, diabetic 31 gauge x 5/16" Ndle daily    sevelamer carbonate (RENVELA) 800 mg Tab Take 1 tablet (800 mg total) by mouth 3 (three) times daily with meals.     Antibiotics (From admission, onward)            Start     Stop Route Frequency Ordered    12/31/21 1145  mupirocin 2 % ointment         01/05 0859 Nasl 2 times daily 12/31/21 1043        Antifungals (From admission, onward)            Start     Stop Route Frequency Ordered    01/01/22 1500  fluconazole tablet 400 mg         -- Oral Daily 01/01/22 1348        Antivirals (From admission, onward)    None           There is no immunization history for the selected administration types on file for this patient.    Family History     Problem Relation (Age of Onset)    Asthma Sister    Heart failure Father (58)    Seizures Mother (64)        Social History     Socioeconomic History    Marital status: Single    Number of children: 2   Occupational History    Occupation: Unemployed   Tobacco Use    Smoking status: Former Smoker     Types: Cigarettes    Smokeless tobacco: Never Used   Substance and Sexual Activity    Alcohol use: No    Drug use: Yes     Types: Marijuana     Comment: Occassional Recreational Use only    Sexual activity: Not Currently     Partners: Male   Social History Narrative    Currently unemployed has two young children ages 5 and 7 and she is the sole caretaker     Review of Systems   Constitutional: Positive for fever. Negative for chills.   Musculoskeletal: Positive for myalgias.   All other systems reviewed and are negative.    Objective:     Vital Signs (Most Recent):  Temp: 98.4 °F (36.9 °C) " (01/14/22 0800)  Pulse: (!) 132 (PATIENT COMPLAINS IOFB PAIN AND ALSO STATES SHE HAS N O DIFFICULTY BREATHING) (01/14/22 0808)  Resp: 19 (01/14/22 0800)  BP: 131/64 (01/14/22 0800)  SpO2: 100 % (01/14/22 0808) Vital Signs (24h Range):  Temp:  [97.5 °F (36.4 °C)-98.7 °F (37.1 °C)] 98.4 °F (36.9 °C)  Pulse:  [112-134] 132  Resp:  [16-20] 19  SpO2:  [97 %-100 %] 100 %  BP: (109-135)/(59-71) 131/64     Weight: 94.3 kg (208 lb)  Body mass index is 33.57 kg/m².    Estimated Creatinine Clearance: 12 mL/min (A) (based on SCr of 7 mg/dL (H)).    Physical Exam  Vitals and nursing note reviewed.   Constitutional:       General: She is not in acute distress.     Appearance: Normal appearance. She is not ill-appearing, toxic-appearing or diaphoretic.   HENT:      Head: Normocephalic and atraumatic.      Right Ear: External ear normal.      Left Ear: External ear normal.      Nose: Nose normal.      Mouth/Throat:      Mouth: Mucous membranes are moist.      Pharynx: Oropharynx is clear.   Eyes:      Extraocular Movements: Extraocular movements intact.      Conjunctiva/sclera: Conjunctivae normal.      Pupils: Pupils are equal, round, and reactive to light.   Cardiovascular:      Rate and Rhythm: Regular rhythm. Tachycardia present.   Pulmonary:      Effort: Pulmonary effort is normal.      Breath sounds: Normal breath sounds.   Abdominal:      Tenderness: There is no abdominal tenderness. There is no right CVA tenderness, left CVA tenderness or guarding.   Musculoskeletal:         General: Swelling and tenderness present.   Skin:     General: Skin is warm and dry.      Findings: Lesion present.      Comments: Tender thighs   Neurological:      General: No focal deficit present.      Mental Status: She is alert and oriented to person, place, and time.   Psychiatric:         Mood and Affect: Mood normal.         Behavior: Behavior normal.         Thought Content: Thought content normal.         Judgment: Judgment normal.      Nicolas RSC, PICC/mid RUE    Significant Labs:   Blood Culture:   Recent Labs   Lab 01/08/22  1115 01/08/22  1120 01/10/22  1311 01/11/22  0539   LABBLOO No Growth after 4 days.  No Growth after 4 days.  No Growth to date  No Growth to date  No Growth to date  No Growth to date No Growth to date  No Growth to date  No Growth to date  No Growth to date     CBC:   Recent Labs   Lab 01/13/22  0618   WBC 7.94   HGB 6.9*   HCT 21.4*        CMP:   Recent Labs   Lab 01/13/22  0618   *   K 4.2      CO2 18*   *   BUN 25*   CREATININE 7.0*   CALCIUM 9.0   ALBUMIN 1.3*   ANIONGAP 16   EGFRNONAA 7*     Urine Culture:   Recent Labs   Lab 01/09/22  0211   LABURIN No significant growth     Wound Culture: No results for input(s): LABAERO in the last 4320 hours.    Significant Imaging: I have reviewed all pertinent imaging results/findings within the past 24 hours.

## 2022-01-14 NOTE — SUBJECTIVE & OBJECTIVE
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 ;  Surgeon: Rakesh Leon MD;  Location: [facility] OR;  Service: Vascular;  Laterality: Left;  RN PREOP 20, UPT done, COVID NEGATRIVE 20  NEED H/P     SECTION, CLASSIC      INSERTION OF TUNNELED CENTRAL VENOUS CATHETER (CVC) WITH SUBCUTANEOUS PORT N/A 2020    Procedure: IR TUNNELED VATH INSERT WITHOUT PORT;  Surgeon: [facility] Diagnostic Provider;  Location: [facility] OR;  Service: Radiology;  Laterality: N/A;  1030AM START  RN PREOP 2020    INSERTION OF TUNNELED CENTRAL VENOUS CATHETER (CVC) WITH SUBCUTANEOUS PORT N/A 2020    Procedure: TUNNELED CATH PLACEMENT;  Surgeon: [facility] Diagnostic Provider;  Location: [facility] OR;  Service: Radiology;  Laterality: N/A;  930AM START    REVISION OF ARTERIOVENOUS FISTULA Left 2020    Procedure: REVISION, AV FISTULA, THROMBECTOMY, LEFT UPPER EXTREMITY;  Surgeon: Rakesh Leon MD;  Location: [facility] OR;  Service: Vascular;  Laterality: Left;    TUBAL LIGATION         Review of patient's allergies indicates:   Allergen Reactions    Vicodin [hydrocodone-acetaminophen] Hives    Codeine Hives       Medications:  Medications Prior to Admission   Medication Sig    insulin NPH (NOVOLIN N) 100 unit/mL injection Inject 15 Units into the skin before breakfast.    NIFEdipine (PROCARDIA-XL) 90 MG (OSM) 24 hr tablet Take 1 tablet (90 mg total) by mouth once daily.    blood sugar diagnostic Strp Check blood glucose 3 times  "daily    cloNIDine 0.3 mg/24 hr td ptwk (CATAPRES) 0.3 mg/24 hr Place 1 patch onto the skin every 7 days.    ergocalciferol (ERGOCALCIFEROL) 50,000 unit Cap Take 1 capsule (50,000 Units total) by mouth every 7 days.    lancets 30 gauge Misc 3 (three) times daily    pen needle, diabetic 31 gauge x 5/16" Ndle daily    sevelamer carbonate (RENVELA) 800 mg Tab Take 1 tablet (800 mg total) by mouth 3 (three) times daily with meals.     Antibiotics (From admission, onward)            Start     Stop Route Frequency Ordered    12/31/21 1145  mupirocin 2 % ointment         01/05 0859 Nasl 2 times daily 12/31/21 1043        Antifungals (From admission, onward)            Start     Stop Route Frequency Ordered    01/01/22 1500  fluconazole tablet 400 mg         -- Oral Daily 01/01/22 1348        Antivirals (From admission, onward)    None           There is no immunization history for the selected administration types on file for this patient.    Family History     Problem Relation (Age of Onset)    Asthma Sister    Heart failure Father (58)    Seizures Mother (64)        Social History     Socioeconomic History    Marital status: Single    Number of children: 2   Occupational History    Occupation: Unemployed   Tobacco Use    Smoking status: Former Smoker     Types: Cigarettes    Smokeless tobacco: Never Used   Substance and Sexual Activity    Alcohol use: No    Drug use: Yes     Types: Marijuana     Comment: Occassional Recreational Use only    Sexual activity: Not Currently     Partners: Male   Social History Narrative    Currently unemployed has two young children ages 5 and 7 and she is the sole caretaker     Review of Systems   Constitutional: Positive for fever. Negative for chills.   Musculoskeletal: Positive for myalgias.   All other systems reviewed and are negative.    Objective:     Vital Signs (Most Recent):  Temp: 98.4 °F (36.9 °C) (01/14/22 0800)  Pulse: (!) 132 (PATIENT COMPLAINS IOFB PAIN AND ALSO " STATES SHE HAS N O DIFFICULTY BREATHING) (01/14/22 0808)  Resp: 19 (01/14/22 0800)  BP: 131/64 (01/14/22 0800)  SpO2: 100 % (01/14/22 0808) Vital Signs (24h Range):  Temp:  [97.5 °F (36.4 °C)-98.7 °F (37.1 °C)] 98.4 °F (36.9 °C)  Pulse:  [112-134] 132  Resp:  [16-20] 19  SpO2:  [97 %-100 %] 100 %  BP: (109-135)/(59-71) 131/64     Weight: 94.3 kg (208 lb)  Body mass index is 33.57 kg/m².    Estimated Creatinine Clearance: 12 mL/min (A) (based on SCr of 7 mg/dL (H)).    Physical Exam  Vitals and nursing note reviewed.   Constitutional:       General: She is not in acute distress.     Appearance: Normal appearance. She is not ill-appearing, toxic-appearing or diaphoretic.   HENT:      Head: Normocephalic and atraumatic.      Right Ear: External ear normal.      Left Ear: External ear normal.      Nose: Nose normal.      Mouth/Throat:      Mouth: Mucous membranes are moist.      Pharynx: Oropharynx is clear.   Eyes:      Extraocular Movements: Extraocular movements intact.      Conjunctiva/sclera: Conjunctivae normal.      Pupils: Pupils are equal, round, and reactive to light.   Cardiovascular:      Rate and Rhythm: Regular rhythm. Tachycardia present.   Pulmonary:      Effort: Pulmonary effort is normal.      Breath sounds: Normal breath sounds.   Abdominal:      Tenderness: There is no abdominal tenderness. There is no right CVA tenderness, left CVA tenderness or guarding.   Musculoskeletal:         General: Swelling and tenderness present.   Skin:     General: Skin is warm and dry.      Findings: Lesion present.      Comments: Tender thighs   Neurological:      General: No focal deficit present.      Mental Status: She is alert and oriented to person, place, and time.   Psychiatric:         Mood and Affect: Mood normal.         Behavior: Behavior normal.         Thought Content: Thought content normal.         Judgment: Judgment normal.     Nicolas RSC, PICC/mid RUE    Significant Labs:   Blood Culture:   Recent  Labs   Lab 01/08/22  1115 01/08/22  1120 01/10/22  1311 01/11/22  0539   LABBLOO No Growth after 4 days.  No Growth after 4 days.  No Growth to date  No Growth to date  No Growth to date  No Growth to date No Growth to date  No Growth to date  No Growth to date  No Growth to date     CBC:   Recent Labs   Lab 01/13/22  0618   WBC 7.94   HGB 6.9*   HCT 21.4*        CMP:   Recent Labs   Lab 01/13/22  0618   *   K 4.2      CO2 18*   *   BUN 25*   CREATININE 7.0*   CALCIUM 9.0   ALBUMIN 1.3*   ANIONGAP 16   EGFRNONAA 7*     Urine Culture:   Recent Labs   Lab 01/09/22  0211   LABURIN No significant growth     Wound Culture: No results for input(s): LABAERO in the last 4320 hours.    Significant Imaging: I have reviewed all pertinent imaging results/findings within the past 24 hours.

## 2022-01-14 NOTE — ASSESSMENT & PLAN NOTE
- continued to spike intermittent fevers  - cultures with no growth to date  - noted to have increasing D-dimer however, lower extremity duplex and CTA chest negative for blood clots  - infectious disease consulted, appreciate recommendations  - continue to monitor fever curve

## 2022-01-14 NOTE — ASSESSMENT & PLAN NOTE
-Getting epo with HD  -1//10 patient's hemoglobin has been trending down, denies any melena or BRBPR; getting epo with HD; s/p 1 unit of PRBC this admission; ordering fobt  -noted to have hemoglobin of 6.9 on 01/13, another unit PRBC transfused  -continue to monitor CBC periodically  -Nursing / Phlebotomy to use pediatric tubes when drawing labs to minimize iatrogenic anemia and blood loss.

## 2022-01-14 NOTE — PLAN OF CARE
01/14/22 1404   Discharge Reassessment   Assessment Type Discharge Planning Reassessment   TN called [facility] at [phone] and was told referral had not been received.  TN then uploaded the referral in the [facility] portal @ admit[facility] [employer], Cs # [phone].    1445:  Call received from [place] at [facility].  All questions answered.  She is attempting to set patient up with a chair time of 9am to accomodate her being able to take her children to school.  PPD requested.

## 2022-01-14 NOTE — HPI
41-year-old female with end-stage renal disease, previously refusing dialysis but very recently initiated on dialysis, admitted on 12/30 via personal transportation having signed out AMA from [facility] earlier that day, admitted with bilateral lower extremity pain and nausea/vomiting. She was also diagnosed with asymptomatic COVID.     On January 8, she had a fever and was started on CTX. Blood and urine cultures were sterile. Nonetheless, she completed abx and is now off abx. Additional w/u has included venous US and CT angio to r/o PE (both negative). Because of continued fever, ID is consulted. In the meantime, she tells me that her fever has improved. Today, she only complains of bilateral thigh pain. She denies cough, abd pain, dysuria, and diarrhea.

## 2022-01-14 NOTE — ASSESSMENT & PLAN NOTE
Persistent nausea/vomiting reported at admission. No vomiting while inpatient. Last admission nausea resolved w/ bicarb drip, suggesting ESRD component.  Nausea now improved.  Encourage p.o. intake take  - monitor as pt gets dialyzed  - see esophageal candidiasis

## 2022-01-14 NOTE — SUBJECTIVE & OBJECTIVE
Interval History:  Reports her lower extremity pain now feels worse.  Will increase oxycodone as that could be contributing to her tachycardia.  Last febrile episode was on 01/13 at 7:00 a.m..  Has now been afebrile for about 24 hours.  However, remains tachycardic with HR mostly in the 120s.  Her appetite remains low, decreased p.o. intake.  Continue to encourage p.o. intake.  Patient did receive 1 unit PRBCs with dialysis on 01/13.  Follow-up CBC pending.  CTA chest, lower extremity duplex negative for blood clots.    Review of Systems   Constitutional: Negative for fever.   Respiratory: Negative for shortness of breath.    Cardiovascular: Negative for chest pain.   Gastrointestinal: Negative for abdominal pain.   Neurological: Negative for dizziness. Headaches:      All other systems reviewed and are negative.    Objective:     Vital Signs (Most Recent):  Temp: 98.9 °F (37.2 °C) (01/14/22 1157)  Pulse: (!) 124 (01/14/22 1157)  Resp: 17 (01/14/22 1157)  BP: 128/62 (01/14/22 1157)  SpO2: 98 % (01/14/22 1157) Vital Signs (24h Range):  Temp:  [97.5 °F (36.4 °C)-98.9 °F (37.2 °C)] 98.9 °F (37.2 °C)  Pulse:  [112-134] 124  Resp:  [16-20] 17  SpO2:  [97 %-100 %] 98 %  BP: (109-135)/(59-71) 128/62     Weight: 94.3 kg (208 lb)  Body mass index is 33.57 kg/m².    Intake/Output Summary (Last 24 hours) at 1/14/2022 1253  Last data filed at 1/13/2022 2352  Gross per 24 hour   Intake 1579 ml   Output 1625 ml   Net -46 ml      Physical Exam  Vitals and nursing note reviewed.   Constitutional:       Appearance: Normal appearance.   HENT:      Head: Normocephalic and atraumatic.   Eyes:      Extraocular Movements: Extraocular movements intact.      Pupils: Pupils are equal, round, and reactive to light.   Cardiovascular:      Rate and Rhythm: Normal rate and regular rhythm.   Pulmonary:      Effort: Pulmonary effort is normal. No respiratory distress.   Abdominal:      General: There is no distension.   Musculoskeletal:          General: Normal range of motion.      Cervical back: Normal range of motion.   Neurological:      General: No focal deficit present.      Mental Status: She is alert and oriented to person, place, and time.   Psychiatric:         Mood and Affect: Mood normal.         Behavior: Behavior normal.         Significant Labs:   All pertinent labs within the past 24 hours have been reviewed.  CBC:   Recent Labs   Lab 01/13/22  0618   WBC 7.94   HGB 6.9*   HCT 21.4*        CMP:   Recent Labs   Lab 01/13/22 0618   *   K 4.2      CO2 18*   *   BUN 25*   CREATININE 7.0*   CALCIUM 9.0   ALBUMIN 1.3*   ANIONGAP 16   EGFRNONAA 7*       Significant Imaging: I have reviewed all pertinent imaging results/findings within the past 24 hours.

## 2022-01-14 NOTE — ASSESSMENT & PLAN NOTE
-patient with heart rate persistently elevated in 120s  -EKG with sinus tachycardia  -possibly secondary to pain, continue to treat with p.r.n. analgesia  -reports her appetite has been low, with decreased p.o. intake.  Encourage p.o. intake.  -PE/DVT ruled out  -infectious disease following for any infectious workup  -monitor electrolytes, replete electrolytes as needed  -monitor in telemetry

## 2022-01-14 NOTE — ASSESSMENT & PLAN NOTE
-Prior admission to [facility] w/ EGD showing candidal esophagitis  -continue fluconazole  -CTA chest concerning for possible esophageal strictures.  Will need outpatient GI follow-up.

## 2022-01-15 LAB
ABO + RH BLD: NORMAL
ANION GAP SERPL CALC-SCNC: 15 MMOL/L (ref 8–16)
ANION GAP SERPL CALC-SCNC: 17 MMOL/L (ref 8–16)
ANISOCYTOSIS BLD QL SMEAR: SLIGHT
BACTERIA BLD CULT: NORMAL
BASOPHILS # BLD AUTO: ABNORMAL K/UL (ref 0–0.2)
BASOPHILS NFR BLD: 0 % (ref 0–1.9)
BLD GP AB SCN CELLS X3 SERPL QL: NORMAL
BLD PROD TYP BPU: NORMAL
BLOOD UNIT EXPIRATION DATE: NORMAL
BLOOD UNIT TYPE CODE: 5100
BLOOD UNIT TYPE: NORMAL
BUN SERPL-MCNC: 27 MG/DL (ref 6–20)
BUN SERPL-MCNC: 29 MG/DL (ref 6–20)
CALCIUM SERPL-MCNC: 9.4 MG/DL (ref 8.7–10.5)
CALCIUM SERPL-MCNC: 9.5 MG/DL (ref 8.7–10.5)
CHLORIDE SERPL-SCNC: 97 MMOL/L (ref 95–110)
CHLORIDE SERPL-SCNC: 98 MMOL/L (ref 95–110)
CO2 SERPL-SCNC: 18 MMOL/L (ref 23–29)
CO2 SERPL-SCNC: 20 MMOL/L (ref 23–29)
CODING SYSTEM: NORMAL
CREAT SERPL-MCNC: 7.5 MG/DL (ref 0.5–1.4)
CREAT SERPL-MCNC: 7.9 MG/DL (ref 0.5–1.4)
D DIMER PPP IA.FEU-MCNC: 3.48 MG/L FEU
DIFFERENTIAL METHOD: ABNORMAL
DISPENSE STATUS: NORMAL
EOSINOPHIL # BLD AUTO: ABNORMAL K/UL (ref 0–0.5)
EOSINOPHIL NFR BLD: 0 % (ref 0–8)
ERYTHROCYTE [DISTWIDTH] IN BLOOD BY AUTOMATED COUNT: 17.7 % (ref 11.5–14.5)
EST. GFR  (AFRICAN AMERICAN): 7 ML/MIN/1.73 M^2
EST. GFR  (AFRICAN AMERICAN): 7 ML/MIN/1.73 M^2
EST. GFR  (NON AFRICAN AMERICAN): 6 ML/MIN/1.73 M^2
EST. GFR  (NON AFRICAN AMERICAN): 6 ML/MIN/1.73 M^2
GLUCOSE SERPL-MCNC: 165 MG/DL (ref 70–110)
GLUCOSE SERPL-MCNC: 186 MG/DL (ref 70–110)
HCT VFR BLD AUTO: 23.1 % (ref 37–48.5)
HGB BLD-MCNC: 7.5 G/DL (ref 12–16)
IMM GRANULOCYTES # BLD AUTO: ABNORMAL K/UL (ref 0–0.04)
IMM GRANULOCYTES NFR BLD AUTO: ABNORMAL % (ref 0–0.5)
LYMPHOCYTES # BLD AUTO: ABNORMAL K/UL (ref 1–4.8)
LYMPHOCYTES NFR BLD: 13 % (ref 18–48)
MCH RBC QN AUTO: 29 PG (ref 27–31)
MCHC RBC AUTO-ENTMCNC: 32.5 G/DL (ref 32–36)
MCV RBC AUTO: 89 FL (ref 82–98)
METAMYELOCYTES NFR BLD MANUAL: 2 %
MONOCYTES # BLD AUTO: ABNORMAL K/UL (ref 0.3–1)
MONOCYTES NFR BLD: 7 % (ref 4–15)
NEUTROPHILS NFR BLD: 77 % (ref 38–73)
NEUTS BAND NFR BLD MANUAL: 1 %
NRBC BLD-RTO: 0 /100 WBC
PLATELET # BLD AUTO: 288 K/UL (ref 150–450)
PLATELET BLD QL SMEAR: ABNORMAL
PMV BLD AUTO: 9.5 FL (ref 9.2–12.9)
POCT GLUCOSE: 191 MG/DL (ref 70–110)
POCT GLUCOSE: 198 MG/DL (ref 70–110)
POIKILOCYTOSIS BLD QL SMEAR: SLIGHT
POTASSIUM SERPL-SCNC: 4.3 MMOL/L (ref 3.5–5.1)
POTASSIUM SERPL-SCNC: 4.3 MMOL/L (ref 3.5–5.1)
RBC # BLD AUTO: 2.59 M/UL (ref 4–5.4)
SODIUM SERPL-SCNC: 132 MMOL/L (ref 136–145)
SODIUM SERPL-SCNC: 133 MMOL/L (ref 136–145)
TRANS ERYTHROCYTES VOL PATIENT: NORMAL ML
WBC # BLD AUTO: 9.28 K/UL (ref 3.9–12.7)

## 2022-01-15 PROCEDURE — 85027 COMPLETE CBC AUTOMATED: CPT | Performed by: HOSPITALIST

## 2022-01-15 PROCEDURE — 86901 BLOOD TYPING SEROLOGIC RH(D): CPT | Performed by: INTERNAL MEDICINE

## 2022-01-15 PROCEDURE — 85007 BL SMEAR W/DIFF WBC COUNT: CPT | Performed by: HOSPITALIST

## 2022-01-15 PROCEDURE — 21400001 HC TELEMETRY ROOM

## 2022-01-15 PROCEDURE — P9021 RED BLOOD CELLS UNIT: HCPCS | Performed by: INTERNAL MEDICINE

## 2022-01-15 PROCEDURE — 25000003 PHARM REV CODE 250: Performed by: HOSPITALIST

## 2022-01-15 PROCEDURE — 86920 COMPATIBILITY TEST SPIN: CPT | Performed by: INTERNAL MEDICINE

## 2022-01-15 PROCEDURE — 25000003 PHARM REV CODE 250: Performed by: EMERGENCY MEDICINE

## 2022-01-15 PROCEDURE — 63600175 PHARM REV CODE 636 W HCPCS: Performed by: STUDENT IN AN ORGANIZED HEALTH CARE EDUCATION/TRAINING PROGRAM

## 2022-01-15 PROCEDURE — 25000003 PHARM REV CODE 250: Performed by: INTERNAL MEDICINE

## 2022-01-15 PROCEDURE — 80100016 HC MAINTENANCE HEMODIALYSIS

## 2022-01-15 PROCEDURE — 63700000 PHARM REV CODE 250 ALT 637 W/O HCPCS: Performed by: STUDENT IN AN ORGANIZED HEALTH CARE EDUCATION/TRAINING PROGRAM

## 2022-01-15 PROCEDURE — 80048 BASIC METABOLIC PNL TOTAL CA: CPT | Mod: 91 | Performed by: HOSPITALIST

## 2022-01-15 PROCEDURE — 80048 BASIC METABOLIC PNL TOTAL CA: CPT | Performed by: INTERNAL MEDICINE

## 2022-01-15 PROCEDURE — 85379 FIBRIN DEGRADATION QUANT: CPT | Performed by: EMERGENCY MEDICINE

## 2022-01-15 PROCEDURE — 63600175 PHARM REV CODE 636 W HCPCS: Performed by: INTERNAL MEDICINE

## 2022-01-15 PROCEDURE — 63600175 PHARM REV CODE 636 W HCPCS: Performed by: EMERGENCY MEDICINE

## 2022-01-15 PROCEDURE — 27000207 HC ISOLATION

## 2022-01-15 RX ORDER — METOPROLOL TARTRATE 25 MG/1
12.5 TABLET ORAL 2 TIMES DAILY
Status: DISCONTINUED | OUTPATIENT
Start: 2022-01-15 | End: 2022-01-16

## 2022-01-15 RX ORDER — HYDROCODONE BITARTRATE AND ACETAMINOPHEN 500; 5 MG/1; MG/1
TABLET ORAL
Status: DISCONTINUED | OUTPATIENT
Start: 2022-01-15 | End: 2022-01-28 | Stop reason: HOSPADM

## 2022-01-15 RX ADMIN — GABAPENTIN 100 MG: 100 CAPSULE ORAL at 09:01

## 2022-01-15 RX ADMIN — OXYCODONE HYDROCHLORIDE 15 MG: 15 TABLET ORAL at 08:01

## 2022-01-15 RX ADMIN — NIFEDIPINE 90 MG: 30 TABLET, FILM COATED, EXTENDED RELEASE ORAL at 09:01

## 2022-01-15 RX ADMIN — SEVELAMER CARBONATE 2400 MG: 800 TABLET, FILM COATED ORAL at 04:01

## 2022-01-15 RX ADMIN — POLYETHYLENE GLYCOL 3350 17 G: 17 POWDER, FOR SOLUTION ORAL at 08:01

## 2022-01-15 RX ADMIN — POLYETHYLENE GLYCOL 3350 17 G: 17 POWDER, FOR SOLUTION ORAL at 09:01

## 2022-01-15 RX ADMIN — OXYCODONE HYDROCHLORIDE 15 MG: 15 TABLET ORAL at 12:01

## 2022-01-15 RX ADMIN — GABAPENTIN 100 MG: 100 CAPSULE ORAL at 08:01

## 2022-01-15 RX ADMIN — ENOXAPARIN SODIUM 90 MG: 100 INJECTION SUBCUTANEOUS at 05:01

## 2022-01-15 RX ADMIN — METOPROLOL TARTRATE 12.5 MG: 25 TABLET, FILM COATED ORAL at 01:01

## 2022-01-15 RX ADMIN — SODIUM THIOSULFATE 25 G: 250 INJECTION, SOLUTION INTRAVENOUS at 03:01

## 2022-01-15 RX ADMIN — FLUCONAZOLE 400 MG: 100 TABLET ORAL at 09:01

## 2022-01-15 RX ADMIN — GABAPENTIN 100 MG: 100 CAPSULE ORAL at 02:01

## 2022-01-15 RX ADMIN — OXYCODONE HYDROCHLORIDE AND ACETAMINOPHEN 500 MG: 500 TABLET ORAL at 08:01

## 2022-01-15 RX ADMIN — METOPROLOL TARTRATE 12.5 MG: 25 TABLET, FILM COATED ORAL at 08:01

## 2022-01-15 RX ADMIN — FAMOTIDINE 20 MG: 20 TABLET ORAL at 09:01

## 2022-01-15 RX ADMIN — SEVELAMER CARBONATE 2400 MG: 800 TABLET, FILM COATED ORAL at 12:01

## 2022-01-15 RX ADMIN — SENNOSIDES AND DOCUSATE SODIUM 1 TABLET: 50; 8.6 TABLET ORAL at 09:01

## 2022-01-15 RX ADMIN — OXYCODONE HYDROCHLORIDE 15 MG: 15 TABLET ORAL at 04:01

## 2022-01-15 RX ADMIN — EPOETIN ALFA-EPBX 10000 UNITS: 10000 INJECTION, SOLUTION INTRAVENOUS; SUBCUTANEOUS at 03:01

## 2022-01-15 RX ADMIN — HEPARIN SODIUM 3200 UNITS: 5000 INJECTION INTRAVENOUS; SUBCUTANEOUS at 07:01

## 2022-01-15 RX ADMIN — SEVELAMER CARBONATE 2400 MG: 800 TABLET, FILM COATED ORAL at 09:01

## 2022-01-15 RX ADMIN — CINACALCET 30 MG: 30 TABLET, FILM COATED ORAL at 09:01

## 2022-01-15 RX ADMIN — THERA TABS 1 TABLET: TAB at 09:01

## 2022-01-15 RX ADMIN — OXYCODONE HYDROCHLORIDE AND ACETAMINOPHEN 500 MG: 500 TABLET ORAL at 09:01

## 2022-01-15 NOTE — SUBJECTIVE & OBJECTIVE
Interval History:  Patient remains tachycardic despite increasing pain medications, encouraging oral intake, addressing anxiety.  Now afebrile overnight.  Hemoglobin improved post transfusion.  Continue to monitor in telemetry.  No other acute events overnight.  Continue to trend CBC.     Review of Systems   Constitutional: Negative for fever.   Respiratory: Negative for shortness of breath.    Cardiovascular: Negative for chest pain.   Gastrointestinal: Negative for abdominal pain.   Neurological: Negative for dizziness. Headaches:      All other systems reviewed and are negative.    Objective:     Vital Signs (Most Recent):  Temp: 98.9 °F (37.2 °C) (01/15/22 1106)  Pulse: (!) 120 (01/15/22 1106)  Resp: 18 (01/15/22 1239)  BP: 127/66 (01/15/22 1106)  SpO2: 96 % (01/15/22 1106) Vital Signs (24h Range):  Temp:  [98.4 °F (36.9 °C)-99 °F (37.2 °C)] 98.9 °F (37.2 °C)  Pulse:  [118-135] 120  Resp:  [16-18] 18  SpO2:  [93 %-97 %] 96 %  BP: (118-138)/(65-69) 127/66     Weight: 94.3 kg (208 lb)  Body mass index is 33.57 kg/m².  No intake or output data in the 24 hours ending 01/15/22 1309   Physical Exam  Vitals and nursing note reviewed.   Constitutional:       Appearance: Normal appearance.   HENT:      Head: Normocephalic and atraumatic.   Eyes:      Extraocular Movements: Extraocular movements intact.      Pupils: Pupils are equal, round, and reactive to light.   Cardiovascular:      Rate and Rhythm: Normal rate and regular rhythm.   Pulmonary:      Effort: Pulmonary effort is normal. No respiratory distress.   Abdominal:      General: There is no distension.   Musculoskeletal:         General: Normal range of motion.      Cervical back: Normal range of motion.   Neurological:      General: No focal deficit present.      Mental Status: She is alert and oriented to person, place, and time.   Psychiatric:         Mood and Affect: Mood normal.         Behavior: Behavior normal.         Significant Labs:   All pertinent  labs within the past 24 hours have been reviewed.  CBC:   Recent Labs   Lab 01/15/22  0557   WBC 9.28   HGB 7.5*   HCT 23.1*        CMP:   Recent Labs   Lab 01/15/22  0557 01/15/22  1043   * 132*   K 4.3 4.3   CL 98 97   CO2 18* 20*   * 165*   BUN 27* 29*   CREATININE 7.5* 7.9*   CALCIUM 9.5 9.4   ANIONGAP 17* 15   EGFRNONAA 6* 6*       Significant Imaging: I have reviewed all pertinent imaging results/findings within the past 24 hours.

## 2022-01-15 NOTE — PROGRESS NOTES
""Ms. Cousin is a 41y F w/ ESRD, [place] [facility] presents after [place] [facility] with nausea/vomiting and bilateral thigh pain.      She says that the nausea and vomiting began a few weeks ago. She mostly reports vomiting up food content, denies overt blood. Says that the vomit is sometimes darker colored. She has been told she has "coffee-ground" emesis but does not describe the contents of her vomit as such. She denies abdominal pain, denies delayed vomiting after eating. Feels nauseas all the time, regardless of food intake.     She has also developed severe bilateral thigh lesions. She describes them as swelling w/ extreme sensitivity to touch. They started approximately two weeks ago and have persisted, with minimal improvement in pain. 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 ;  Surgeon: Rakesh Leon MD;  Location: [facility] OR;  Service: Vascular;  Laterality: Left;  RN PREOP 20, UPT done, COVID NEGATRIVE 20  NEED H/P     SECTION, CLASSIC      INSERTION OF TUNNELED CENTRAL VENOUS CATHETER (CVC) WITH SUBCUTANEOUS PORT N/A 2020    Procedure: IR TUNNELED VATH INSERT WITHOUT PORT;  Surgeon: [facility] Diagnostic Provider;  Location: [facility] OR;  Service: Radiology;  Laterality: N/A;  1030AM START  RN PREOP 2020    INSERTION OF TUNNELED CENTRAL VENOUS CATHETER (CVC) " WITH SUBCUTANEOUS PORT N/A 5/22/2020    Procedure: TUNNELED CATH PLACEMENT;  Surgeon: [facility] Diagnostic Provider;  Location: [facility] OR;  Service: Radiology;  Laterality: N/A;  930AM START    REVISION OF ARTERIOVENOUS FISTULA Left 5/8/2020    Procedure: REVISION, AV FISTULA, THROMBECTOMY, LEFT UPPER EXTREMITY;  Surgeon: Rakesh Leon MD;  Location: [facility] OR;  Service: Vascular;  Laterality: Left;    TUBAL LIGATION       Review of patient's allergies indicates:   Allergen Reactions    Vicodin [hydrocodone-acetaminophen] Hives    Codeine Hives       Current Facility-Administered Medications   Medication    0.9%  NaCl infusion (for blood administration)    0.9%  NaCl infusion (for blood administration)    acetaminophen tablet 650 mg    ascorbic acid (vitamin C) tablet 500 mg    bisacodyL suppository 10 mg    calcium carbonate 200 mg calcium (500 mg) chewable tablet 1,000 mg    cinacalcet tablet 30 mg    cloNIDine 0.3 mg/24 hr td ptwk 1 patch    cyclobenzaprine tablet 5 mg    dextrose 50% injection 12.5 g    dextrose 50% injection 25 g    enoxaparin injection 90 mg    epoetin kelsey-epbx injection 10,000 Units    famotidine tablet 20 mg    fluconazole tablet 400 mg    gabapentin capsule 100 mg    glucagon (human recombinant) injection 1 mg    glucose chewable tablet 16 g    glucose chewable tablet 24 g    heparin (porcine) injection 3,200 Units    hydrALAZINE injection 10 mg    influenza (QUADRIVALENT PF) vaccine 0.5 mL    insulin aspart U-100 pen 0-5 Units    labetaloL injection 10 mg    magnesium hydroxide 400 mg/5 ml suspension 2,400 mg    melatonin tablet 6 mg    metoprolol tartrate (LOPRESSOR) split tablet 12.5 mg    multivitamin tablet    NIFEdipine 24 hr tablet 90 mg    ondansetron injection 4 mg    oxyCODONE immediate release tablet 10 mg    oxyCODONE immediate release tablet 15 mg    polyethylene glycol packet 17 g    prochlorperazine injection Soln 10 mg    sars-cov-2  (covid-19) (Pfizer COVID-19) 30 mcg/0.3 ml injection 0.3 mL    senna-docusate 8.6-50 mg per tablet 1 tablet    sevelamer carbonate tablet 2,400 mg    sodium chloride 0.9% bolus 250 mL    sodium chloride 0.9% flush 10 mL    sodium thiosulfate 12.5 gram/50 mL (250 mg/mL) injection 25 g    tuberculin injection 5 Units       LABS    Recent Results (from the past 24 hour(s))   POCT glucose    Collection Time: 01/14/22  5:06 PM   Result Value Ref Range    POCT Glucose 205 (H) 70 - 110 mg/dL   POCT glucose    Collection Time: 01/14/22  8:48 PM   Result Value Ref Range    POCT Glucose 225 (H) 70 - 110 mg/dL   CBC auto differential    Collection Time: 01/15/22  5:57 AM   Result Value Ref Range    WBC 9.28 3.90 - 12.70 K/uL    RBC 2.59 (L) 4.00 - 5.40 M/uL    Hemoglobin 7.5 (L) 12.0 - 16.0 g/dL    Hematocrit 23.1 (L) 37.0 - 48.5 %    MCV 89 82 - 98 fL    MCH 29.0 27.0 - 31.0 pg    MCHC 32.5 32.0 - 36.0 g/dL    RDW 17.7 (H) 11.5 - 14.5 %    Platelets 288 150 - 450 K/uL    MPV 9.5 9.2 - 12.9 fL    Immature Granulocytes CANCELED 0.0 - 0.5 %    Immature Grans (Abs) CANCELED 0.00 - 0.04 K/uL    Lymph # CANCELED 1.0 - 4.8 K/uL    Mono # CANCELED 0.3 - 1.0 K/uL    Eos # CANCELED 0.0 - 0.5 K/uL    Baso # CANCELED 0.00 - 0.20 K/uL    nRBC 0 0 /100 WBC    Gran % 77.0 (H) 38.0 - 73.0 %    Lymph % 13.0 (L) 18.0 - 48.0 %    Mono % 7.0 4.0 - 15.0 %    Eosinophil % 0.0 0.0 - 8.0 %    Basophil % 0.0 0.0 - 1.9 %    Bands 1.0 %    Metamyelocytes 2.0 %    Platelet Estimate Appears normal     Aniso Slight     Poik Slight     Differential Method Manual    Basic metabolic panel    Collection Time: 01/15/22  5:57 AM   Result Value Ref Range    Sodium 133 (L) 136 - 145 mmol/L    Potassium 4.3 3.5 - 5.1 mmol/L    Chloride 98 95 - 110 mmol/L    CO2 18 (L) 23 - 29 mmol/L    Glucose 186 (H) 70 - 110 mg/dL    BUN 27 (H) 6 - 20 mg/dL    Creatinine 7.5 (H) 0.5 - 1.4 mg/dL    Calcium 9.5 8.7 - 10.5 mg/dL    Anion Gap 17 (H) 8 - 16 mmol/L    eGFR if   7 (A) >60 mL/min/1.73 m^2    eGFR if non African American 6 (A) >60 mL/min/1.73 m^2   D-Dimer, Quantitative    Collection Time: 01/15/22  5:57 AM   Result Value Ref Range    D-Dimer 3.48 (H) <0.50 mg/L FEU   POCT glucose    Collection Time: 01/15/22  8:23 AM   Result Value Ref Range    POCT Glucose 198 (H) 70 - 110 mg/dL   Basic metabolic panel    Collection Time: 01/15/22 10:43 AM   Result Value Ref Range    Sodium 132 (L) 136 - 145 mmol/L    Potassium 4.3 3.5 - 5.1 mmol/L    Chloride 97 95 - 110 mmol/L    CO2 20 (L) 23 - 29 mmol/L    Glucose 165 (H) 70 - 110 mg/dL    BUN 29 (H) 6 - 20 mg/dL    Creatinine 7.9 (H) 0.5 - 1.4 mg/dL    Calcium 9.4 8.7 - 10.5 mg/dL    Anion Gap 15 8 - 16 mmol/L    eGFR if African American 7 (A) >60 mL/min/1.73 m^2    eGFR if non African American 6 (A) >60 mL/min/1.73 m^2   Prepare RBC 1 Unit    Collection Time: 01/15/22 10:43 AM   Result Value Ref Range    UNIT NUMBER [id number]     Product Code [id number]     DISPENSE STATUS ISSUED     CODING SYSTEM TIXJ816     Unit Blood Type Code 5100     Unit Blood Type O POS     Unit Expiration [id number]    Type & Screen    Collection Time: 01/15/22 10:43 AM   Result Value Ref Range    Group & Rh O POS     Indirect Kaylee NEG    POCT glucose    Collection Time: 01/15/22 11:06 AM   Result Value Ref Range    POCT Glucose 191 (H) 70 - 110 mg/dL   ]    I/O last 3 completed shifts:  In: 1579 [Blood:629; Other:950]  Out: 1625 [Other:1625]    Vitals:    01/15/22 0822 01/15/22 1106 01/15/22 1239 01/15/22 1530   BP: 131/65 127/66  134/73   Pulse: (!) 118 (!) 120  (!) 120   Resp: 18 18 18 18   Temp: 98.4 °F (36.9 °C) 98.9 °F (37.2 °C)     TempSrc: Oral Oral  Oral   SpO2: (!) 93% 96%     Weight:       Height:           No Jvd, Thyromegaly or Lymphadenopathy  Lungs: Fairly clear anteriorly and laterally  Cor: RRR no G or rubs  Abd: Soft benign good bowel sounds non tender  Ext: No E C C    A)  ESRD with compliance  issues  CalciphylaxisNAGMA  Anemia  2nd hyperpth  DM  2nd hyperpth    P)  Renal diet  HD TTS  epo  Binders prn  Sodium Thiosulfate wit hd  Protect access

## 2022-01-15 NOTE — NURSING
Report received from night nurse NORMAN Gilliam. Visualized patient and assessed patient's overall condition and appearance. No acute distress noted. Will continue to monitor

## 2022-01-15 NOTE — PROGRESS NOTES
"      [facility] Medicine  Telemedicine Progress Note    Patient Name: Xuan Ricardo  MRN: [id number]  Patient Class: IP- Inpatient   Admission Date: 12/29/2021  Length of Stay: 16 days  Attending Physician: Marcelina Griffin MD  Primary Care Provider: Katharine Franks MD          Subjective:     Principal Problem:COVID-19        HPI:  Ms. Ricardo is a 41y F w/ ESRD, recently dialyzed at [facility] [place] [place], presents after leaving AMA from [facility] [place] [place] with nausea/vomiting and bilateral thigh pain.     She says that the nausea and vomiting began a few weeks ago. She mostly reports vomiting up food content, denies overt blood. Says that the vomit is sometimes darker colored. She has been told she has "coffee-ground" emesis but does not describe the contents of her vomit as such. She denies abdominal pain, denies delayed vomiting after eating. Feels nauseas all the time, regardless of food intake.    She has also developed severe bilateral thigh lesions. She describes them as swelling w/ extreme sensitivity to touch. They started approximately two weeks ago and have persisted, with minimal improvement in pain. One thigh was biopsied at [facility] [place], [place] at her most recent admission, and she reports that the site continues to be tender.      Overview/Hospital Course:  Admitted to hospital medicine. Nephrology consulted. Wound care consulted for thigh wounds. Pt gave verbal and written consent for records release from [facility]. 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     At  she was restarted on HD, given sodium thiosulfate. MBD therapy restarted. Restarted on fluconazole.    Dispo pending arrangement of outpatient HD chair      Interval History:  Patient remains tachycardic despite increasing pain medications, encouraging oral intake, addressing anxiety.  Now afebrile overnight.  Hemoglobin improved post transfusion.  Continue to monitor in " telemetry.  No other acute events overnight.  Continue to trend CBC.     Review of Systems   Constitutional: Negative for fever.   Respiratory: Negative for shortness of breath.    Cardiovascular: Negative for chest pain.   Gastrointestinal: Negative for abdominal pain.   Neurological: Negative for dizziness. Headaches:      All other systems reviewed and are negative.    Objective:     Vital Signs (Most Recent):  Temp: 98.9 °F (37.2 °C) (01/15/22 1106)  Pulse: (!) 120 (01/15/22 1106)  Resp: 18 (01/15/22 1239)  BP: 127/66 (01/15/22 1106)  SpO2: 96 % (01/15/22 1106) Vital Signs (24h Range):  Temp:  [98.4 °F (36.9 °C)-99 °F (37.2 °C)] 98.9 °F (37.2 °C)  Pulse:  [118-135] 120  Resp:  [16-18] 18  SpO2:  [93 %-97 %] 96 %  BP: (118-138)/(65-69) 127/66     Weight: 94.3 kg (208 lb)  Body mass index is 33.57 kg/m².  No intake or output data in the 24 hours ending 01/15/22 1309   Physical Exam  Vitals and nursing note reviewed.   Constitutional:       Appearance: Normal appearance.   HENT:      Head: Normocephalic and atraumatic.   Eyes:      Extraocular Movements: Extraocular movements intact.      Pupils: Pupils are equal, round, and reactive to light.   Cardiovascular:      Rate and Rhythm: Normal rate and regular rhythm.   Pulmonary:      Effort: Pulmonary effort is normal. No respiratory distress.   Abdominal:      General: There is no distension.   Musculoskeletal:         General: Normal range of motion.      Cervical back: Normal range of motion.   Neurological:      General: No focal deficit present.      Mental Status: She is alert and oriented to person, place, and time.   Psychiatric:         Mood and Affect: Mood normal.         Behavior: Behavior normal.         Significant Labs:   All pertinent labs within the past 24 hours have been reviewed.  CBC:   Recent Labs   Lab 01/15/22  0557   WBC 9.28   HGB 7.5*   HCT 23.1*        CMP:   Recent Labs   Lab 01/15/22  0557 01/15/22  1043   * 132*   K 4.3 4.3    CL 98 97   CO2 18* 20*   * 165*   BUN 27* 29*   CREATININE 7.5* 7.9*   CALCIUM 9.5 9.4   ANIONGAP 17* 15   EGFRNONAA 6* 6*       Significant Imaging: I have reviewed all pertinent imaging results/findings within the past 24 hours.      Assessment/Plan:      * COVID-19  Asymptomatic covid-19 infection  - will attempt to arrange infusion inpatient  - isolation precautions  - no hypoxia, no indication for dex/rem      Sinus tachycardia  -patient with heart rate persistently elevated in 120s  -EKG with sinus tachycardia  -possibly secondary to pain, continue to treat with p.r.n. analgesia  -reports her appetite has been low, with decreased p.o. intake.  Encourage p.o. intake.  -PE/DVT ruled out  -hemoglobin improved post transfusion  -infectious disease following for any infectious workup  -patient has started on metoprolol 12.5 mg p.o. b.i.d.  -monitor electrolytes, replete electrolytes as needed  -monitor in telemetry      Sepsis due to gram-negative UTI  - started on rocephin 1 g daily  - follow up urine and blood cultures      1/10- patient spiked another fever today of 101, getting blood cultures and LA; switching rocephin to zosyn and follow up urine cultures     1/12- fevers resolved and patient's urine cultures showed no growth; course completed of augmentin  1/13 - patient noted to have fever again.  Will consult Infectious Disease.  Obtain CTA chest before dialysis tomorrow to rule out PE.  Lower extremity duplex negative for DVT.  Cultures with no growth to date.    Fever  - continued to spike intermittent fevers  - cultures with no growth to date  - noted to have increasing D-dimer however, lower extremity duplex and CTA chest negative for blood clots  - infectious disease consulted, appreciate recommendations  - continue to monitor fever curve        Anemia due to pre-ESRD treated with erythropoietin  -Getting epo with HD  -1//10 patient's hemoglobin has been trending down, denies any melena or  BRBPR; getting epo with HD; s/p 1 unit of PRBC this admission; ordering fobt  -noted to have hemoglobin of 6.9 on 01/13, another unit PRBC transfused  -continue to monitor CBC periodically  -Nursing / Phlebotomy to use pediatric tubes when drawing labs to minimize iatrogenic anemia and blood loss.       Candida esophagitis  -Prior admission to [facility] [place] w/ EGD showing candidal esophagitis  -continue fluconazole  -CTA chest concerning for possible esophageal strictures.  Will need outpatient GI follow-up.      Hyperphosphatemia  Phos lowering important given calciphylaxis.  - continue sevalamer  - on cincalcet for hyperpth   - HD per nephrology      Renovascular hypertension  Continue home meds; titrate to effect      Type 2 diabetes mellitus  Reports taking 10U daily  - accuchecks/SSI      Nausea  Persistent nausea/vomiting reported at admission. No vomiting while inpatient. Last admission nausea resolved w/ bicarb drip, suggesting ESRD component.  Nausea now improved.  Encourage p.o. intake take  - monitor as pt gets dialyzed  - see esophageal candidiasis      Calciphylaxis  Rash on bilateral thighs, extremely tender to touch. [facility] biopsy c/w calciphaxis, per report  - records request sent for biopsy  - nephrology consulted  - wound care  - prn analgesics  - sodium thiosulfate  - treatment of hyperphos/hyperPTH as noted elsewhere  - will likely need outpatient chronic pain follow up      End stage renal disease  Pt w/ ESRD. Refused dialysis on last admission, now s/p multiple sessions during [facility] hospitalization  - nephrology consulted  - last session 12/30  - access via [facility]  - MBD/anemia therapy as noted elsewhere  - outpatient HD chair pending      Secondary hyperparathyroidism  PTH severely elevated. Particularly concerning given calciphylaxis  - continue cincalcet  - iPTH pending    Metabolic acidosis  See ESRD      Anemia of renal disease  Drop in hemoglobin 1/1 without clinical bleeding, appropriate response  to transfusion  - continue to monitor  - defer ESAs to nephrology  - transfuse to hgb 7      1/12- will give a dose of venofer in the AM; epo with HD; no overt signs of GI bleeding  1/13 - hemoglobin of 6.9 no bleeding.  Repeat CBC ordered.  If hemoglobin below 7, will transfuse  1 unit PRBCs.      VTE Risk Mitigation (From admission, onward)         Ordered     heparin (porcine) injection 3,200 Units  As needed (PRN)         12/31/21 1335     enoxaparin injection 90 mg  Every 24 hours (non-standard times)         12/30/21 0539     IP VTE HIGH RISK PATIENT  Once         12/30/21 0339     Place sequential compression device  Until discontinued         12/30/21 0339     Place HENRI hose  Until discontinued         12/30/21 0339     Reason for No Pharmacological VTE Prophylaxis  Once        Question:  Reasons:  Answer:  Active Bleeding    12/30/21 0339                      I have assessed these finding virtually using telemed platform and with assistance of bedside nurse                 The attending portion of this evaluation, treatment, and documentation was performed per Marcelina Griffin MD via Telemedicine AudioVisual using the secure [employer] software platform with 2 way audio/video. The provider was located off-site and the patient is located in the hospital. The aforementioned video software was utilized to document the relevant history and physical exam    Marcelina Griffin MD  Department of Hospital Medicine   [facility] - Telemetry

## 2022-01-15 NOTE — ASSESSMENT & PLAN NOTE
-patient with heart rate persistently elevated in 120s  -EKG with sinus tachycardia  -possibly secondary to pain, continue to treat with p.r.n. analgesia  -reports her appetite has been low, with decreased p.o. intake.  Encourage p.o. intake.  -PE/DVT ruled out  -hemoglobin improved post transfusion  -infectious disease following for any infectious workup  -patient has started on metoprolol 12.5 mg p.o. b.i.d.  -monitor electrolytes, replete electrolytes as needed  -monitor in telemetry

## 2022-01-15 NOTE — PLAN OF CARE
Problem: Adult Inpatient Plan of Care  Goal: Plan of Care Review  Outcome: Ongoing, Progressing     Problem: Infection  Goal: Absence of Infection Signs and Symptoms  Outcome: Ongoing, Progressing     Problem: Device-Related Complication Risk (Hemodialysis)  Goal: Safe, Effective Therapy Delivery  Outcome: Ongoing, Progressing     Problem: Impaired Wound Healing  Goal: Optimal Wound Healing  Outcome: Ongoing, Progressing     Problem: Pain Acute  Goal: Acceptable Pain Control and Functional Ability  Outcome: Ongoing, Progressing

## 2022-01-16 LAB
ANION GAP SERPL CALC-SCNC: 15 MMOL/L (ref 8–16)
BASOPHILS # BLD AUTO: ABNORMAL K/UL (ref 0–0.2)
BASOPHILS NFR BLD: 0 % (ref 0–1.9)
BUN SERPL-MCNC: 23 MG/DL (ref 6–20)
CALCIUM SERPL-MCNC: 9.4 MG/DL (ref 8.7–10.5)
CHLORIDE SERPL-SCNC: 93 MMOL/L (ref 95–110)
CO2 SERPL-SCNC: 23 MMOL/L (ref 23–29)
CREAT SERPL-MCNC: 5.6 MG/DL (ref 0.5–1.4)
DIFFERENTIAL METHOD: ABNORMAL
EOSINOPHIL # BLD AUTO: ABNORMAL K/UL (ref 0–0.5)
EOSINOPHIL NFR BLD: 0 % (ref 0–8)
ERYTHROCYTE [DISTWIDTH] IN BLOOD BY AUTOMATED COUNT: 18.3 % (ref 11.5–14.5)
EST. GFR  (AFRICAN AMERICAN): 10 ML/MIN/1.73 M^2
EST. GFR  (NON AFRICAN AMERICAN): 9 ML/MIN/1.73 M^2
GIANT PLATELETS BLD QL SMEAR: PRESENT
GLUCOSE SERPL-MCNC: 185 MG/DL (ref 70–110)
HCT VFR BLD AUTO: 27.6 % (ref 37–48.5)
HGB BLD-MCNC: 8.8 G/DL (ref 12–16)
IMM GRANULOCYTES # BLD AUTO: ABNORMAL K/UL (ref 0–0.04)
IMM GRANULOCYTES NFR BLD AUTO: ABNORMAL % (ref 0–0.5)
LYMPHOCYTES # BLD AUTO: ABNORMAL K/UL (ref 1–4.8)
LYMPHOCYTES NFR BLD: 9 % (ref 18–48)
MCH RBC QN AUTO: 28.2 PG (ref 27–31)
MCHC RBC AUTO-ENTMCNC: 31.9 G/DL (ref 32–36)
MCV RBC AUTO: 89 FL (ref 82–98)
MONOCYTES # BLD AUTO: ABNORMAL K/UL (ref 0.3–1)
MONOCYTES NFR BLD: 9 % (ref 4–15)
NEUTROPHILS NFR BLD: 80 % (ref 38–73)
NEUTS BAND NFR BLD MANUAL: 2 %
NRBC BLD-RTO: 0 /100 WBC
OB PNL STL: POSITIVE
PLATELET # BLD AUTO: 292 K/UL (ref 150–450)
PLATELET BLD QL SMEAR: ABNORMAL
PMV BLD AUTO: 9.4 FL (ref 9.2–12.9)
POCT GLUCOSE: 200 MG/DL (ref 70–110)
POCT GLUCOSE: 203 MG/DL (ref 70–110)
POCT GLUCOSE: 211 MG/DL (ref 70–110)
POCT GLUCOSE: 232 MG/DL (ref 70–110)
POLYCHROMASIA BLD QL SMEAR: ABNORMAL
POTASSIUM SERPL-SCNC: 3.8 MMOL/L (ref 3.5–5.1)
RBC # BLD AUTO: 3.12 M/UL (ref 4–5.4)
SODIUM SERPL-SCNC: 131 MMOL/L (ref 136–145)
WBC # BLD AUTO: 9.75 K/UL (ref 3.9–12.7)

## 2022-01-16 PROCEDURE — 63700000 PHARM REV CODE 250 ALT 637 W/O HCPCS: Performed by: STUDENT IN AN ORGANIZED HEALTH CARE EDUCATION/TRAINING PROGRAM

## 2022-01-16 PROCEDURE — 25000003 PHARM REV CODE 250: Performed by: EMERGENCY MEDICINE

## 2022-01-16 PROCEDURE — 25000003 PHARM REV CODE 250: Performed by: INTERNAL MEDICINE

## 2022-01-16 PROCEDURE — 85027 COMPLETE CBC AUTOMATED: CPT | Performed by: HOSPITALIST

## 2022-01-16 PROCEDURE — 21400001 HC TELEMETRY ROOM

## 2022-01-16 PROCEDURE — 85007 BL SMEAR W/DIFF WBC COUNT: CPT | Performed by: HOSPITALIST

## 2022-01-16 PROCEDURE — 80048 BASIC METABOLIC PNL TOTAL CA: CPT | Performed by: HOSPITALIST

## 2022-01-16 PROCEDURE — 27000207 HC ISOLATION

## 2022-01-16 PROCEDURE — 36415 COLL VENOUS BLD VENIPUNCTURE: CPT | Performed by: HOSPITALIST

## 2022-01-16 PROCEDURE — 82272 OCCULT BLD FECES 1-3 TESTS: CPT | Performed by: HOSPITALIST

## 2022-01-16 PROCEDURE — 25000003 PHARM REV CODE 250: Performed by: HOSPITALIST

## 2022-01-16 PROCEDURE — 63600175 PHARM REV CODE 636 W HCPCS: Performed by: STUDENT IN AN ORGANIZED HEALTH CARE EDUCATION/TRAINING PROGRAM

## 2022-01-16 PROCEDURE — 63600175 PHARM REV CODE 636 W HCPCS: Performed by: EMERGENCY MEDICINE

## 2022-01-16 RX ORDER — METOPROLOL TARTRATE 25 MG/1
25 TABLET, FILM COATED ORAL 2 TIMES DAILY
Status: DISCONTINUED | OUTPATIENT
Start: 2022-01-16 | End: 2022-01-28 | Stop reason: HOSPADM

## 2022-01-16 RX ADMIN — SENNOSIDES AND DOCUSATE SODIUM 1 TABLET: 50; 8.6 TABLET ORAL at 08:01

## 2022-01-16 RX ADMIN — THERA TABS 1 TABLET: TAB at 08:01

## 2022-01-16 RX ADMIN — SEVELAMER CARBONATE 2400 MG: 800 TABLET, FILM COATED ORAL at 08:01

## 2022-01-16 RX ADMIN — OXYCODONE HYDROCHLORIDE 15 MG: 15 TABLET ORAL at 09:01

## 2022-01-16 RX ADMIN — GABAPENTIN 100 MG: 100 CAPSULE ORAL at 02:01

## 2022-01-16 RX ADMIN — CINACALCET 30 MG: 30 TABLET, FILM COATED ORAL at 08:01

## 2022-01-16 RX ADMIN — POLYETHYLENE GLYCOL 3350 17 G: 17 POWDER, FOR SOLUTION ORAL at 09:01

## 2022-01-16 RX ADMIN — OXYCODONE HYDROCHLORIDE AND ACETAMINOPHEN 500 MG: 500 TABLET ORAL at 08:01

## 2022-01-16 RX ADMIN — GABAPENTIN 100 MG: 100 CAPSULE ORAL at 08:01

## 2022-01-16 RX ADMIN — SEVELAMER CARBONATE 2400 MG: 800 TABLET, FILM COATED ORAL at 11:01

## 2022-01-16 RX ADMIN — OXYCODONE HYDROCHLORIDE AND ACETAMINOPHEN 500 MG: 500 TABLET ORAL at 09:01

## 2022-01-16 RX ADMIN — ENOXAPARIN SODIUM 90 MG: 100 INJECTION SUBCUTANEOUS at 05:01

## 2022-01-16 RX ADMIN — SEVELAMER CARBONATE 2400 MG: 800 TABLET, FILM COATED ORAL at 04:01

## 2022-01-16 RX ADMIN — OXYCODONE HYDROCHLORIDE 15 MG: 15 TABLET ORAL at 05:01

## 2022-01-16 RX ADMIN — METOPROLOL TARTRATE 25 MG: 25 TABLET, FILM COATED ORAL at 09:01

## 2022-01-16 RX ADMIN — NIFEDIPINE 90 MG: 30 TABLET, FILM COATED, EXTENDED RELEASE ORAL at 08:01

## 2022-01-16 RX ADMIN — GABAPENTIN 100 MG: 100 CAPSULE ORAL at 09:01

## 2022-01-16 RX ADMIN — INSULIN ASPART 1 UNITS: 100 INJECTION, SOLUTION INTRAVENOUS; SUBCUTANEOUS at 09:01

## 2022-01-16 RX ADMIN — POLYETHYLENE GLYCOL 3350 17 G: 17 POWDER, FOR SOLUTION ORAL at 08:01

## 2022-01-16 RX ADMIN — FAMOTIDINE 20 MG: 20 TABLET ORAL at 08:01

## 2022-01-16 RX ADMIN — INSULIN ASPART 2 UNITS: 100 INJECTION, SOLUTION INTRAVENOUS; SUBCUTANEOUS at 09:01

## 2022-01-16 RX ADMIN — METOPROLOL TARTRATE 12.5 MG: 25 TABLET, FILM COATED ORAL at 08:01

## 2022-01-16 RX ADMIN — FLUCONAZOLE 400 MG: 100 TABLET ORAL at 08:01

## 2022-01-16 NOTE — PLAN OF CARE
Problem: Adult Inpatient Plan of Care  Goal: Plan of Care Review  Outcome: Ongoing, Progressing     Problem: Diabetes Comorbidity  Goal: Blood Glucose Level Within Targeted Range  Outcome: Ongoing, Progressing     Problem: Infection  Goal: Absence of Infection Signs and Symptoms  Outcome: Ongoing, Progressing     Problem: Impaired Wound Healing  Goal: Optimal Wound Healing  Outcome: Ongoing, Progressing     Problem: Pain Acute  Goal: Acceptable Pain Control and Functional Ability  Outcome: Ongoing, Progressing

## 2022-01-16 NOTE — CARE UPDATE
Patient afebrile x 24h, off abx. ID will sign off. Please call for any additional questions.Thanks, CB

## 2022-01-16 NOTE — ASSESSMENT & PLAN NOTE
-patient with heart rate persistently elevated in 120s  -EKG with sinus tachycardia  -possibly secondary to pain, continue to treat with p.r.n. analgesia  -reports her appetite has been low, with decreased p.o. intake.  Encourage p.o. intake.  -PE/DVT ruled out  -hemoglobin improved post transfusion  -infectious disease following for any infectious workup  -patient has started on metoprolol 12.5 mg p.o. b.i.d, dose increased to 25mg PO BID  -monitor electrolytes, replete electrolytes as needed  -monitor in telemetry

## 2022-01-16 NOTE — SUBJECTIVE & OBJECTIVE
Interval History:  Patient remains tachycardic, will increase dose of metoprolol to 25mg PO BID. Otherwise no acute events. Remained afebrile over 24 hrs. Pending outpatient HD chair set up and improvement in tachycardia prior to dc.     Review of Systems   Constitutional: Negative for fever.   Respiratory: Negative for shortness of breath.    Cardiovascular: Negative for chest pain.   Gastrointestinal: Negative for abdominal pain.   Neurological: Negative for dizziness. Headaches:      All other systems reviewed and are negative.    Objective:     Vital Signs (Most Recent):  Temp: 97.8 °F (36.6 °C) (01/16/22 1155)  Pulse: 109 (01/16/22 1155)  Resp: 18 (01/16/22 1155)  BP: 136/70 (01/16/22 1155)  SpO2: (!) 94 % (01/16/22 1155) Vital Signs (24h Range):  Temp:  [97.8 °F (36.6 °C)-99 °F (37.2 °C)] 97.8 °F (36.6 °C)  Pulse:  [103-126] 109  Resp:  [18-20] 18  SpO2:  [92 %-96 %] 94 %  BP: ()/(47-79) 136/70     Weight: 94.3 kg (208 lb)  Body mass index is 33.57 kg/m².    Intake/Output Summary (Last 24 hours) at 1/16/2022 1334  Last data filed at 1/15/2022 1920  Gross per 24 hour   Intake 900 ml   Output 2559 ml   Net -1659 ml      Physical Exam  Vitals and nursing note reviewed.   Constitutional:       Appearance: Normal appearance.   HENT:      Head: Normocephalic and atraumatic.   Eyes:      Extraocular Movements: Extraocular movements intact.      Pupils: Pupils are equal, round, and reactive to light.   Cardiovascular:      Rate and Rhythm: Normal rate and regular rhythm.   Pulmonary:      Effort: Pulmonary effort is normal. No respiratory distress.   Abdominal:      General: There is no distension.   Musculoskeletal:         General: Normal range of motion.      Cervical back: Normal range of motion.   Neurological:      General: No focal deficit present.      Mental Status: She is alert and oriented to person, place, and time.   Psychiatric:         Mood and Affect: Mood normal.         Behavior: Behavior  normal.         Significant Labs:   All pertinent labs within the past 24 hours have been reviewed.  CBC:   Recent Labs   Lab 01/15/22  0557   WBC 9.28   HGB 7.5*   HCT 23.1*        CMP:   Recent Labs   Lab 01/15/22  0557 01/15/22  1043   * 132*   K 4.3 4.3   CL 98 97   CO2 18* 20*   * 165*   BUN 27* 29*   CREATININE 7.5* 7.9*   CALCIUM 9.5 9.4   ANIONGAP 17* 15   EGFRNONAA 6* 6*       Significant Imaging: I have reviewed all pertinent imaging results/findings within the past 24 hours.

## 2022-01-16 NOTE — PROGRESS NOTES
"      [facility] Medicine  Telemedicine Progress Note    Patient Name: Xuan Ricardo  MRN: [id number]  Patient Class: IP- Inpatient   Admission Date: 12/29/2021  Length of Stay: 17 days  Attending Physician: Marcelina Griffin MD  Primary Care Provider: Katharine Franks MD          Subjective:     Principal Problem:COVID-19        HPI:  Ms. Ricardo is a 41y F w/ ESRD, recently dialyzed at [facility], presents after leaving AMA from [facility] with nausea/vomiting and bilateral thigh pain.     She says that the nausea and vomiting began a few weeks ago. She mostly reports vomiting up food content, denies overt blood. Says that the vomit is sometimes darker colored. She has been told she has "coffee-ground" emesis but does not describe the contents of her vomit as such. She denies abdominal pain, denies delayed vomiting after eating. Feels nauseas all the time, regardless of food intake.    She has also developed severe bilateral thigh lesions. She describes them as swelling w/ extreme sensitivity to touch. They started approximately two weeks ago and have persisted, with minimal improvement in pain. One thigh was biopsied at [facility] at her most recent admission, and she reports that the site continues to be tender.      Overview/Hospital Course:  Admitted to hospital medicine. Nephrology consulted. Wound care consulted for thigh wounds. Pt gave verbal and written consent for records release from [facility]. 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     At  she was restarted on HD, given sodium thiosulfate. MBD therapy restarted. Restarted on fluconazole.    Dispo pending arrangement of outpatient HD chair      Interval History:  Patient remains tachycardic, will increase dose of metoprolol to 25mg PO BID. Otherwise no acute events. Remained afebrile over 24 hrs. Pending outpatient HD chair set up and improvement in " tachycardia prior to dc.     Review of Systems   Constitutional: Negative for fever.   Respiratory: Negative for shortness of breath.    Cardiovascular: Negative for chest pain.   Gastrointestinal: Negative for abdominal pain.   Neurological: Negative for dizziness. Headaches:      All other systems reviewed and are negative.    Objective:     Vital Signs (Most Recent):  Temp: 97.8 °F (36.6 °C) (01/16/22 1155)  Pulse: 109 (01/16/22 1155)  Resp: 18 (01/16/22 1155)  BP: 136/70 (01/16/22 1155)  SpO2: (!) 94 % (01/16/22 1155) Vital Signs (24h Range):  Temp:  [97.8 °F (36.6 °C)-99 °F (37.2 °C)] 97.8 °F (36.6 °C)  Pulse:  [103-126] 109  Resp:  [18-20] 18  SpO2:  [92 %-96 %] 94 %  BP: ()/(47-79) 136/70     Weight: 94.3 kg (208 lb)  Body mass index is 33.57 kg/m².    Intake/Output Summary (Last 24 hours) at 1/16/2022 1334  Last data filed at 1/15/2022 1920  Gross per 24 hour   Intake 900 ml   Output 2559 ml   Net -1659 ml      Physical Exam  Vitals and nursing note reviewed.   Constitutional:       Appearance: Normal appearance.   HENT:      Head: Normocephalic and atraumatic.   Eyes:      Extraocular Movements: Extraocular movements intact.      Pupils: Pupils are equal, round, and reactive to light.   Cardiovascular:      Rate and Rhythm: Normal rate and regular rhythm.   Pulmonary:      Effort: Pulmonary effort is normal. No respiratory distress.   Abdominal:      General: There is no distension.   Musculoskeletal:         General: Normal range of motion.      Cervical back: Normal range of motion.   Neurological:      General: No focal deficit present.      Mental Status: She is alert and oriented to person, place, and time.   Psychiatric:         Mood and Affect: Mood normal.         Behavior: Behavior normal.         Significant Labs:   All pertinent labs within the past 24 hours have been reviewed.  CBC:   Recent Labs   Lab 01/15/22  0557   WBC 9.28   HGB 7.5*   HCT 23.1*        CMP:   Recent Labs   Lab  01/15/22  0557 01/15/22  1043   * 132*   K 4.3 4.3   CL 98 97   CO2 18* 20*   * 165*   BUN 27* 29*   CREATININE 7.5* 7.9*   CALCIUM 9.5 9.4   ANIONGAP 17* 15   EGFRNONAA 6* 6*       Significant Imaging: I have reviewed all pertinent imaging results/findings within the past 24 hours.      Assessment/Plan:      * COVID-19  Asymptomatic covid-19 infection  - will attempt to arrange infusion inpatient  - isolation precautions  - no hypoxia, no indication for dex/rem      Sinus tachycardia  -patient with heart rate persistently elevated in 120s  -EKG with sinus tachycardia  -possibly secondary to pain, continue to treat with p.r.n. analgesia  -reports her appetite has been low, with decreased p.o. intake.  Encourage p.o. intake.  -PE/DVT ruled out  -hemoglobin improved post transfusion  -infectious disease following for any infectious workup  -patient has started on metoprolol 12.5 mg p.o. b.i.d, dose increased to 25mg PO BID  -monitor electrolytes, replete electrolytes as needed  -monitor in telemetry      Sepsis due to gram-negative UTI  - started on rocephin 1 g daily  - follow up urine and blood cultures      1/10- patient spiked another fever today of 101, getting blood cultures and LA; switching rocephin to zosyn and follow up urine cultures     1/12- fevers resolved and patient's urine cultures showed no growth; course completed of augmentin  1/13 - patient noted to have fever again.  Will consult Infectious Disease.  Obtain CTA chest before dialysis tomorrow to rule out PE.  Lower extremity duplex negative for DVT.  Cultures with no growth to date.    Fever  - continued to spike intermittent fevers  - cultures with no growth to date  - noted to have increasing D-dimer however, lower extremity duplex and CTA chest negative for blood clots  - infectious disease consulted, appreciate recommendations  - continue to monitor fever curve        Anemia due to pre-ESRD treated with erythropoietin  -Getting  epo with HD  -1//10 patient's hemoglobin has been trending down, denies any melena or BRBPR; getting epo with HD; s/p 1 unit of PRBC this admission; ordering fobt  -noted to have hemoglobin of 6.9 on 01/13, another unit PRBC transfused  -continue to monitor CBC periodically  -Nursing / Phlebotomy to use pediatric tubes when drawing labs to minimize iatrogenic anemia and blood loss.       Candida esophagitis  -Prior admission to [facility] w/ EGD showing candidal esophagitis  -continue fluconazole  -CTA chest concerning for possible esophageal strictures.  Will need outpatient GI follow-up.      Hyperphosphatemia  Phos lowering important given calciphylaxis.  - continue sevalamer  - on cincalcet for hyperpth   - HD per nephrology      Renovascular hypertension  Continue home meds; titrate to effect      Type 2 diabetes mellitus  Reports taking 10U daily  - accuchecks/SSI      Nausea  Persistent nausea/vomiting reported at admission. No vomiting while inpatient. Last admission nausea resolved w/ bicarb drip, suggesting ESRD component.  Nausea now improved.  Encourage p.o. intake take  - monitor as pt gets dialyzed  - see esophageal candidiasis      Calciphylaxis  Rash on bilateral thighs, extremely tender to touch. [facility] biopsy c/w calciphaxis, per report  - records request sent for biopsy  - nephrology consulted  - wound care  - prn analgesics  - sodium thiosulfate  - treatment of hyperphos/hyperPTH as noted elsewhere  - will likely need outpatient chronic pain follow up      End stage renal disease  Pt w/ ESRD. Refused dialysis on last admission, now s/p multiple sessions during [facility] hospitalization  - nephrology consulted  - last session 12/30  - access via [facility]  - MBD/anemia therapy as noted elsewhere  - outpatient HD chair pending      Secondary hyperparathyroidism  PTH severely elevated. Particularly concerning given calciphylaxis  - continue cincalcet  - iPTH pending    Metabolic acidosis  See ESRD      Anemia of  renal disease  Drop in hemoglobin 1/1 without clinical bleeding, appropriate response to transfusion  - continue to monitor  - defer ESAs to nephrology  - transfuse to hgb 7      1/12- will give a dose of venofer in the AM; epo with HD; no overt signs of GI bleeding  1/13 - hemoglobin of 6.9 no bleeding.  Repeat CBC ordered.  If hemoglobin below 7, will transfuse  1 unit PRBCs.      VTE Risk Mitigation (From admission, onward)         Ordered     heparin (porcine) injection 3,200 Units  As needed (PRN)         12/31/21 1335     enoxaparin injection 90 mg  Every 24 hours (non-standard times)         12/30/21 0539     IP VTE HIGH RISK PATIENT  Once         12/30/21 0339     Place sequential compression device  Until discontinued         12/30/21 0339     Place HENRI hose  Until discontinued         12/30/21 0339     Reason for No Pharmacological VTE Prophylaxis  Once        Question:  Reasons:  Answer:  Active Bleeding    12/30/21 0339                      I have assessed these finding virtually using telemed platform and with assistance of bedside nurse                 The attending portion of this evaluation, treatment, and documentation was performed per Marcelina Griffin MD via Telemedicine AudioVisual using the secure Digiboo software platform with 2 way audio/video. The provider was located off-site and the patient is located in the hospital. The aforementioned video software was utilized to document the relevant history and physical exam    Marcelina Griffin MD  Department of Hospital Medicine   [facility] - Telemetry

## 2022-01-17 LAB
D DIMER PPP IA.FEU-MCNC: 4.65 MG/L FEU
PHOSPHATE SERPL-MCNC: 3.7 MG/DL (ref 2.7–4.5)
POCT GLUCOSE: 103 MG/DL (ref 70–110)
POCT GLUCOSE: 195 MG/DL (ref 70–110)
POCT GLUCOSE: 209 MG/DL (ref 70–110)

## 2022-01-17 PROCEDURE — 25000003 PHARM REV CODE 250: Performed by: EMERGENCY MEDICINE

## 2022-01-17 PROCEDURE — 36415 COLL VENOUS BLD VENIPUNCTURE: CPT | Performed by: INTERNAL MEDICINE

## 2022-01-17 PROCEDURE — 25000003 PHARM REV CODE 250: Performed by: INTERNAL MEDICINE

## 2022-01-17 PROCEDURE — 21400001 HC TELEMETRY ROOM

## 2022-01-17 PROCEDURE — 27000207 HC ISOLATION

## 2022-01-17 PROCEDURE — 63600175 PHARM REV CODE 636 W HCPCS: Performed by: EMERGENCY MEDICINE

## 2022-01-17 PROCEDURE — 63700000 PHARM REV CODE 250 ALT 637 W/O HCPCS: Performed by: STUDENT IN AN ORGANIZED HEALTH CARE EDUCATION/TRAINING PROGRAM

## 2022-01-17 PROCEDURE — 25000003 PHARM REV CODE 250: Performed by: HOSPITALIST

## 2022-01-17 PROCEDURE — 84100 ASSAY OF PHOSPHORUS: CPT | Performed by: INTERNAL MEDICINE

## 2022-01-17 PROCEDURE — 63600175 PHARM REV CODE 636 W HCPCS: Performed by: STUDENT IN AN ORGANIZED HEALTH CARE EDUCATION/TRAINING PROGRAM

## 2022-01-17 PROCEDURE — 85379 FIBRIN DEGRADATION QUANT: CPT | Performed by: EMERGENCY MEDICINE

## 2022-01-17 PROCEDURE — 63600175 PHARM REV CODE 636 W HCPCS: Performed by: HOSPITALIST

## 2022-01-17 RX ORDER — HEPARIN SODIUM 5000 [USP'U]/ML
5000 INJECTION, SOLUTION INTRAVENOUS; SUBCUTANEOUS EVERY 8 HOURS
Status: DISCONTINUED | OUTPATIENT
Start: 2022-01-17 | End: 2022-01-24

## 2022-01-17 RX ADMIN — FAMOTIDINE 20 MG: 20 TABLET ORAL at 08:01

## 2022-01-17 RX ADMIN — GABAPENTIN 100 MG: 100 CAPSULE ORAL at 08:01

## 2022-01-17 RX ADMIN — OXYCODONE HYDROCHLORIDE AND ACETAMINOPHEN 500 MG: 500 TABLET ORAL at 10:01

## 2022-01-17 RX ADMIN — HEPARIN SODIUM 5000 UNITS: 5000 INJECTION INTRAVENOUS; SUBCUTANEOUS at 01:01

## 2022-01-17 RX ADMIN — CINACALCET 30 MG: 30 TABLET, FILM COATED ORAL at 08:01

## 2022-01-17 RX ADMIN — OXYCODONE HYDROCHLORIDE 15 MG: 15 TABLET ORAL at 01:01

## 2022-01-17 RX ADMIN — INSULIN ASPART 2 UNITS: 100 INJECTION, SOLUTION INTRAVENOUS; SUBCUTANEOUS at 05:01

## 2022-01-17 RX ADMIN — GABAPENTIN 100 MG: 100 CAPSULE ORAL at 02:01

## 2022-01-17 RX ADMIN — FLUCONAZOLE 400 MG: 100 TABLET ORAL at 08:01

## 2022-01-17 RX ADMIN — SEVELAMER CARBONATE 2400 MG: 800 TABLET, FILM COATED ORAL at 11:01

## 2022-01-17 RX ADMIN — SENNOSIDES AND DOCUSATE SODIUM 1 TABLET: 50; 8.6 TABLET ORAL at 08:01

## 2022-01-17 RX ADMIN — SEVELAMER CARBONATE 2400 MG: 800 TABLET, FILM COATED ORAL at 04:01

## 2022-01-17 RX ADMIN — ENOXAPARIN SODIUM 90 MG: 100 INJECTION SUBCUTANEOUS at 05:01

## 2022-01-17 RX ADMIN — THERA TABS 1 TABLET: TAB at 08:01

## 2022-01-17 RX ADMIN — HEPARIN SODIUM 5000 UNITS: 5000 INJECTION INTRAVENOUS; SUBCUTANEOUS at 10:01

## 2022-01-17 RX ADMIN — OXYCODONE HYDROCHLORIDE AND ACETAMINOPHEN 500 MG: 500 TABLET ORAL at 08:01

## 2022-01-17 RX ADMIN — SEVELAMER CARBONATE 2400 MG: 800 TABLET, FILM COATED ORAL at 08:01

## 2022-01-17 RX ADMIN — GABAPENTIN 100 MG: 100 CAPSULE ORAL at 10:01

## 2022-01-17 NOTE — PLAN OF CARE
Problem: Adult Inpatient Plan of Care  Goal: Plan of Care Review  Outcome: Ongoing, Progressing     Problem: Infection  Goal: Absence of Infection Signs and Symptoms  Outcome: Ongoing, Progressing     Problem: Impaired Wound Healing  Goal: Optimal Wound Healing  Outcome: Ongoing, Progressing     Problem: Pain Acute  Goal: Acceptable Pain Control and Functional Ability  Outcome: Ongoing, Progressing

## 2022-01-18 LAB
ANION GAP SERPL CALC-SCNC: 14 MMOL/L (ref 8–16)
BUN SERPL-MCNC: 36 MG/DL (ref 6–20)
CALCIUM SERPL-MCNC: 8.9 MG/DL (ref 8.7–10.5)
CHLORIDE SERPL-SCNC: 92 MMOL/L (ref 95–110)
CO2 SERPL-SCNC: 21 MMOL/L (ref 23–29)
CREAT SERPL-MCNC: 7.1 MG/DL (ref 0.5–1.4)
EST. GFR  (AFRICAN AMERICAN): 7 ML/MIN/1.73 M^2
EST. GFR  (NON AFRICAN AMERICAN): 6 ML/MIN/1.73 M^2
GLUCOSE SERPL-MCNC: 114 MG/DL (ref 70–110)
POCT GLUCOSE: 220 MG/DL (ref 70–110)
POCT GLUCOSE: 242 MG/DL (ref 70–110)
POCT GLUCOSE: 263 MG/DL (ref 70–110)
POTASSIUM SERPL-SCNC: 4.4 MMOL/L (ref 3.5–5.1)
SARS-COV-2 RDRP RESP QL NAA+PROBE: POSITIVE
SODIUM SERPL-SCNC: 127 MMOL/L (ref 136–145)

## 2022-01-18 PROCEDURE — 63600175 PHARM REV CODE 636 W HCPCS: Mod: JG | Performed by: INTERNAL MEDICINE

## 2022-01-18 PROCEDURE — 27000207 HC ISOLATION

## 2022-01-18 PROCEDURE — 63700000 PHARM REV CODE 250 ALT 637 W/O HCPCS: Performed by: STUDENT IN AN ORGANIZED HEALTH CARE EDUCATION/TRAINING PROGRAM

## 2022-01-18 PROCEDURE — 80048 BASIC METABOLIC PNL TOTAL CA: CPT | Performed by: INTERNAL MEDICINE

## 2022-01-18 PROCEDURE — 36415 COLL VENOUS BLD VENIPUNCTURE: CPT | Performed by: INTERNAL MEDICINE

## 2022-01-18 PROCEDURE — 97110 THERAPEUTIC EXERCISES: CPT

## 2022-01-18 PROCEDURE — 97162 PT EVAL MOD COMPLEX 30 MIN: CPT

## 2022-01-18 PROCEDURE — 97166 OT EVAL MOD COMPLEX 45 MIN: CPT

## 2022-01-18 PROCEDURE — 25000003 PHARM REV CODE 250: Performed by: EMERGENCY MEDICINE

## 2022-01-18 PROCEDURE — 25000003 PHARM REV CODE 250: Performed by: HOSPITALIST

## 2022-01-18 PROCEDURE — 25000003 PHARM REV CODE 250: Performed by: INTERNAL MEDICINE

## 2022-01-18 PROCEDURE — 63600175 PHARM REV CODE 636 W HCPCS: Performed by: STUDENT IN AN ORGANIZED HEALTH CARE EDUCATION/TRAINING PROGRAM

## 2022-01-18 PROCEDURE — 63600175 PHARM REV CODE 636 W HCPCS: Performed by: HOSPITALIST

## 2022-01-18 PROCEDURE — U0002 COVID-19 LAB TEST NON-CDC: HCPCS | Performed by: HOSPITALIST

## 2022-01-18 PROCEDURE — 80100016 HC MAINTENANCE HEMODIALYSIS

## 2022-01-18 PROCEDURE — 21400001 HC TELEMETRY ROOM

## 2022-01-18 RX ADMIN — GABAPENTIN 100 MG: 100 CAPSULE ORAL at 02:01

## 2022-01-18 RX ADMIN — EPOETIN ALFA-EPBX 10000 UNITS: 10000 INJECTION, SOLUTION INTRAVENOUS; SUBCUTANEOUS at 10:01

## 2022-01-18 RX ADMIN — SODIUM THIOSULFATE 25 G: 250 INJECTION, SOLUTION INTRAVENOUS at 10:01

## 2022-01-18 RX ADMIN — OXYCODONE HYDROCHLORIDE AND ACETAMINOPHEN 500 MG: 500 TABLET ORAL at 08:01

## 2022-01-18 RX ADMIN — HEPARIN SODIUM 5000 UNITS: 5000 INJECTION INTRAVENOUS; SUBCUTANEOUS at 05:01

## 2022-01-18 RX ADMIN — THERA TABS 1 TABLET: TAB at 08:01

## 2022-01-18 RX ADMIN — HEPARIN SODIUM 5000 UNITS: 5000 INJECTION INTRAVENOUS; SUBCUTANEOUS at 02:01

## 2022-01-18 RX ADMIN — OXYCODONE HYDROCHLORIDE AND ACETAMINOPHEN 500 MG: 500 TABLET ORAL at 09:01

## 2022-01-18 RX ADMIN — HEPARIN SODIUM 5000 UNITS: 5000 INJECTION INTRAVENOUS; SUBCUTANEOUS at 09:01

## 2022-01-18 RX ADMIN — OXYCODONE HYDROCHLORIDE 15 MG: 15 TABLET ORAL at 02:01

## 2022-01-18 RX ADMIN — GABAPENTIN 100 MG: 100 CAPSULE ORAL at 09:01

## 2022-01-18 RX ADMIN — FLUCONAZOLE 400 MG: 100 TABLET ORAL at 08:01

## 2022-01-18 RX ADMIN — FAMOTIDINE 20 MG: 20 TABLET ORAL at 08:01

## 2022-01-18 RX ADMIN — SEVELAMER CARBONATE 2400 MG: 800 TABLET, FILM COATED ORAL at 04:01

## 2022-01-18 RX ADMIN — SENNOSIDES AND DOCUSATE SODIUM 1 TABLET: 50; 8.6 TABLET ORAL at 08:01

## 2022-01-18 RX ADMIN — SEVELAMER CARBONATE 2400 MG: 800 TABLET, FILM COATED ORAL at 08:01

## 2022-01-18 RX ADMIN — GABAPENTIN 100 MG: 100 CAPSULE ORAL at 08:01

## 2022-01-18 RX ADMIN — CINACALCET 30 MG: 30 TABLET, FILM COATED ORAL at 08:01

## 2022-01-18 NOTE — PROGRESS NOTES
"      [facility] Medicine  Telemedicine Progress Note    Patient Name: Xuan Ricardo  MRN: [id number]  Patient Class: IP- Inpatient   Admission Date: 12/29/2021  Length of Stay: 19 days  Attending Physician: Marcelina Griffin MD  Primary Care Provider: Katharine Franks MD          Subjective:     Principal Problem:COVID-19        HPI:  Ms. Ricardo is a 41y F w/ ESRD, recently dialyzed at [facility], presents after leaving AMA from [facility] with nausea/vomiting and bilateral thigh pain.     She says that the nausea and vomiting began a few weeks ago. She mostly reports vomiting up food content, denies overt blood. Says that the vomit is sometimes darker colored. She has been told she has "coffee-ground" emesis but does not describe the contents of her vomit as such. She denies abdominal pain, denies delayed vomiting after eating. Feels nauseas all the time, regardless of food intake.    She has also developed severe bilateral thigh lesions. She describes them as swelling w/ extreme sensitivity to touch. They started approximately two weeks ago and have persisted, with minimal improvement in pain. One thigh was biopsied at [facility] at her most recent admission, and she reports that the site continues to be tender.      Overview/Hospital Course:  Admitted to hospital medicine. Nephrology consulted. Wound care consulted for thigh wounds. Pt gave verbal and written consent for records release from [facility]. 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     At  she was restarted on HD, given sodium thiosulfate. MBD therapy restarted. Restarted on fluconazole.    Dispo pending arrangement of outpatient HD chair      Interval History:  Tachycardia improved, continue metoprolol. Remains afebrile. no acute events.Pending outpatient HD chair set up.medically stable for dc.    Review of Systems   Constitutional: Negative for fever. "   Respiratory: Negative for shortness of breath.    Cardiovascular: Negative for chest pain.   Gastrointestinal: Negative for abdominal pain.   Neurological: Negative for dizziness. Headaches:      All other systems reviewed and are negative.    Objective:     Vital Signs (Most Recent):  Temp: 97.5 °F (36.4 °C) (01/18/22 0012)  Pulse: 94 (01/18/22 0012)  Resp: 16 (01/18/22 0012)  BP: (!) 108/57 (01/18/22 0012)  SpO2: (!) 92 % (01/18/22 0012) Vital Signs (24h Range):  Temp:  [97.3 °F (36.3 °C)-98.8 °F (37.1 °C)] 97.5 °F (36.4 °C)  Pulse:  [] 94  Resp:  [14-18] 16  SpO2:  [92 %-97 %] 92 %  BP: (102-109)/(57-67) 108/57     Weight: 94.3 kg (208 lb)  Body mass index is 33.57 kg/m².  No intake or output data in the 24 hours ending 01/18/22 0023   Physical Exam  Vitals and nursing note reviewed.   Constitutional:       Appearance: Normal appearance.   HENT:      Head: Normocephalic and atraumatic.   Eyes:      Extraocular Movements: Extraocular movements intact.      Pupils: Pupils are equal, round, and reactive to light.   Cardiovascular:      Rate and Rhythm: Normal rate and regular rhythm.   Pulmonary:      Effort: Pulmonary effort is normal. No respiratory distress.   Abdominal:      General: There is no distension.   Musculoskeletal:         General: Normal range of motion.      Cervical back: Normal range of motion.   Neurological:      General: No focal deficit present.      Mental Status: She is alert and oriented to person, place, and time.   Psychiatric:         Mood and Affect: Mood normal.         Behavior: Behavior normal.         Significant Labs:   All pertinent labs within the past 24 hours have been reviewed.  CBC:   Recent Labs   Lab 01/16/22  1409   WBC 9.75   HGB 8.8*   HCT 27.6*        CMP:   Recent Labs   Lab 01/16/22  1409   *   K 3.8   CL 93*   CO2 23   *   BUN 23*   CREATININE 5.6*   CALCIUM 9.4   ANIONGAP 15   EGFRNONAA 9*       Significant Imaging: I have reviewed all  pertinent imaging results/findings within the past 24 hours.I      Assessment/Plan:      * COVID-19  Asymptomatic covid-19 infection  - will attempt to arrange infusion inpatient  - isolation precautions  - no hypoxia, no indication for dex/rem      Sinus tachycardia  -patient with heart rate persistently elevated in 120s  -EKG with sinus tachycardia  -possibly secondary to pain, continue to treat with p.r.n. analgesia  -reports her appetite has been low, with decreased p.o. intake.  Encourage p.o. intake.  -PE/DVT ruled out  -hemoglobin improved post transfusion  -infectious disease following for any infectious workup  -patient has started on metoprolol 12.5 mg p.o. b.i.d, dose increased to 25mg PO BID  -monitor electrolytes, replete electrolytes as needed  -monitor in telemetry      Sepsis due to gram-negative UTI  - started on rocephin 1 g daily  - follow up urine and blood cultures      1/10- patient spiked another fever today of 101, getting blood cultures and LA; switching rocephin to zosyn and follow up urine cultures     1/12- fevers resolved and patient's urine cultures showed no growth; course completed of augmentin  1/13 - patient noted to have fever again.  Will consult Infectious Disease.  Obtain CTA chest before dialysis tomorrow to rule out PE.  Lower extremity duplex negative for DVT.  Cultures with no growth to date.    Fever  - continued to spike intermittent fevers  - cultures with no growth to date  - noted to have increasing D-dimer however, lower extremity duplex and CTA chest negative for blood clots  - infectious disease consulted, appreciate recommendations  - continue to monitor fever curve        Anemia due to pre-ESRD treated with erythropoietin  -Getting epo with HD  -1//10 patient's hemoglobin has been trending down, denies any melena or BRBPR; getting epo with HD; s/p 1 unit of PRBC this admission; ordering fobt  -noted to have hemoglobin of 6.9 on 01/13, another unit PRBC  transfused  -continue to monitor CBC periodically  -Nursing / Phlebotomy to use pediatric tubes when drawing labs to minimize iatrogenic anemia and blood loss.       Candida esophagitis  -Prior admission to [facility] w/ EGD showing candidal esophagitis  -continue fluconazole  -CTA chest concerning for possible esophageal strictures.  Will need outpatient GI follow-up.      Hyperphosphatemia  Phos lowering important given calciphylaxis.  - continue sevalamer  - on cincalcet for hyperpth   - HD per nephrology      Renovascular hypertension  Continue home meds; titrate to effect      Type 2 diabetes mellitus  Reports taking 10U daily  - accuchecks/SSI      Nausea  Persistent nausea/vomiting reported at admission. No vomiting while inpatient. Last admission nausea resolved w/ bicarb drip, suggesting ESRD component.  Nausea now improved.  Encourage p.o. intake take  - monitor as pt gets dialyzed  - see esophageal candidiasis      Calciphylaxis  Rash on bilateral thighs, extremely tender to touch. [facility] biopsy c/w calciphaxis, per report  - records request sent for biopsy  - nephrology consulted  - wound care  - prn analgesics  - sodium thiosulfate  - treatment of hyperphos/hyperPTH as noted elsewhere  - will likely need outpatient chronic pain follow up      End stage renal disease  Pt w/ ESRD. Refused dialysis on last admission, now s/p multiple sessions during [facility] hospitalization  - nephrology consulted  - last session 12/30  - access via [facility]  - MBD/anemia therapy as noted elsewhere  - outpatient HD chair pending      Secondary hyperparathyroidism  PTH severely elevated. Particularly concerning given calciphylaxis  - continue cincalcet  - iPTH pending    Metabolic acidosis  See ESRD      Anemia of renal disease  Drop in hemoglobin 1/1 without clinical bleeding, appropriate response to transfusion  - continue to monitor  - defer ESAs to nephrology  - transfuse to hgb 7      1/12- will give a dose of venofer in the AM;  epo with HD; no overt signs of GI bleeding  1/13 - hemoglobin of 6.9 no bleeding.  Repeat CBC ordered.  If hemoglobin below 7, will transfuse  1 unit PRBCs.      VTE Risk Mitigation (From admission, onward)         Ordered     heparin (porcine) injection 5,000 Units  Every 8 hours         01/17/22 1213     heparin (porcine) injection 3,200 Units  As needed (PRN)         12/31/21 1335     IP VTE HIGH RISK PATIENT  Once         12/30/21 0339     Place sequential compression device  Until discontinued         12/30/21 0339     Place HENRI hose  Until discontinued         12/30/21 0339     Reason for No Pharmacological VTE Prophylaxis  Once        Question:  Reasons:  Answer:  Active Bleeding    12/30/21 0339                      I have assessed these finding virtually using telemed platform and with assistance of bedside nurse                 The attending portion of this evaluation, treatment, and documentation was performed per Marcelina Griffin MD via Telemedicine AudioVisual using the secure Prezi software platform with 2 way audio/video. The provider was located off-site and the patient is located in the hospital. The aforementioned video software was utilized to document the relevant history and physical exam    Marcelina Griffin MD  Department of Hospital Medicine   [facility]

## 2022-01-18 NOTE — NURSING
NORMAN Arauz from dialysis called with report. Removed  1700mls. B/P 134/52, HR 92, T 97.8,  R 18. Awaiting pt's arrival to the floor.

## 2022-01-18 NOTE — PT/OT/SLP PROGRESS
Physical Therapy      Patient Name:  Xuan Wrightsin   MRN:  [id number]    Patient not seen at this time for PT eval secondary to Dialysis. Will follow-up as able.

## 2022-01-18 NOTE — NURSING
Report received from night nurse NORMAN Guevara. Visualized patient and assessed patient's overall condition and appearance. No acute distress noted. Will continue to monitor

## 2022-01-18 NOTE — PROGRESS NOTES
Xuan Cousin is a 41 y.o. female patient.    Follow for ESRD, dialysis    Patient seen while on dialysis  No new c/o, comfortable    Scheduled Meds:   ascorbic acid (vitamin C)  500 mg Oral BID    cinacalcet  30 mg Oral Daily with breakfast    cloNIDine 0.3 mg/24 hr td ptwk  1 patch Transdermal Q7 Days    epoetin kelsey-epbx  10,000 Units Intravenous Every Tues, Thurs, Sat    famotidine  20 mg Oral Daily    fluconazole  400 mg Oral Daily    gabapentin  100 mg Oral TID    heparin (porcine)  5,000 Units Subcutaneous Q8H    metoprolol tartrate  25 mg Oral BID    multivitamin  1 tablet Oral Daily    NIFEdipine  90 mg Oral Daily    polyethylene glycol  17 g Oral BID    senna-docusate 8.6-50 mg  1 tablet Oral Daily    sevelamer carbonate  2,400 mg Oral TID WM    sodium thiosulfate  25 g Intravenous Every Tues, Thurs, Sat    tuberculin  5 Units Intradermal Once       Review of patient's allergies indicates:   Allergen Reactions    Vicodin [hydrocodone-acetaminophen] Hives    Codeine Hives         Vital Signs Range (Last 24H):  Temp:  [97.3 °F (36.3 °C)-98.8 °F (37.1 °C)]   Pulse:  []   Resp:  [16-18]   BP: ()/(55-63)   SpO2:  [91 %-97 %]     I & O (Last 24H):No intake or output data in the 24 hours ending 01/18/22 1055        Physical Exam:  General appearance: well developed, well nourished, no distress  Lungs:  clear to auscultation bilaterally and normal respiratory effort  Heart: regular rate and rhythm  Abdomen: soft, non-tender non-distented; bowel sounds normal; no masses,  no organomegaly  Extremities: no cyanosis or edema, or clubbing    Laboratory:  I have reviewed all pertinent lab results within the past 24 hours.  CBC:   Recent Labs   Lab 01/16/22  1409   WBC 9.75   RBC 3.12*   HGB 8.8*   HCT 27.6*      MCV 89   MCH 28.2   MCHC 31.9*     CMP:   Recent Labs   Lab 01/13/22  0618 01/15/22  0557 01/18/22  0748   *   < > 114*   CALCIUM 9.0   < > 8.9   ALBUMIN 1.3*   --   --    *   < > 127*   K 4.2   < > 4.4   CO2 18*   < > 21*      < > 92*   BUN 25*   < > 36*   CREATININE 7.0*   < > 7.1*    < > = values in this interval not displayed.       Imp/Plan    ESRD - on dialysis, stable, tolerated well  COVID-19 infection  Calciphylaxis  HTN  DM type 2  Anemia of CKD    Continue present rx  HD q TTS  We'll follow for dialysis        Isaura Meyers  1/18/2022

## 2022-01-18 NOTE — PLAN OF CARE
Problem: Physical Therapy Goal  Goal: Physical Therapy Goal  Description: Goals to be met by: 22     Patient will increase functional independence with mobility by performin. Supine to sit with Modified [place]  2. Rolling to Left and Right with Modified [place]  3. Sit to stand transfer with Modified [place]   4. Bed to chair transfer with Modified [place]   5. Gait >50 feet with Modified [place] using appropriate AD  6. Lower extremity exercise program x10 reps per handout, with independence    Outcome: Ongoing, Progressing     Pt ambulated a few sidesteps along bedside with min A of 2 persons using no AD, gait limited by BLE pain.

## 2022-01-18 NOTE — PLAN OF CARE
Problem: Adult Inpatient Plan of Care  Goal: Plan of Care Review  Outcome: Ongoing, Progressing     Problem: Diabetes Comorbidity  Goal: Blood Glucose Level Within Targeted Range  Outcome: Ongoing, Progressing     Problem: Infection  Goal: Absence of Infection Signs and Symptoms  Outcome: Ongoing, Progressing     Problem: Device-Related Complication Risk (Hemodialysis)  Goal: Safe, Effective Therapy Delivery  Outcome: Ongoing, Progressing     Problem: Pain Acute  Goal: Acceptable Pain Control and Functional Ability  Outcome: Ongoing, Progressing

## 2022-01-18 NOTE — PLAN OF CARE
01/18/22 1128   Discharge Reassessment   Assessment Type Discharge Planning Reassessment   Did the patient's condition or plan change since previous assessment? No   Discharge Plan discussed with: Patient   Communicated PAMELA with patient/caregiver Yes   Discharge Plan A Home with family  (and HD)   Discharge Barriers Identified DIalysis placement issues   Post-Acute Status   Post-Acute Authorization Dialysis   Diaylsis Status   (Pending chair time)   Call received from Shara @ [employer] this am who requested PPD (previously sent), CXR and Mar.  All documents sent via [employer] portal.

## 2022-01-18 NOTE — SUBJECTIVE & OBJECTIVE
Interval History:  Tachycardia improved, continue metoprolol. Remains afebrile. no acute events.Pending outpatient HD chair set up.medically stable for dc.    Review of Systems   Constitutional: Negative for fever.   Respiratory: Negative for shortness of breath.    Cardiovascular: Negative for chest pain.   Gastrointestinal: Negative for abdominal pain.   Neurological: Negative for dizziness. Headaches:      All other systems reviewed and are negative.    Objective:     Vital Signs (Most Recent):  Temp: 97.5 °F (36.4 °C) (01/18/22 0012)  Pulse: 94 (01/18/22 0012)  Resp: 16 (01/18/22 0012)  BP: (!) 108/57 (01/18/22 0012)  SpO2: (!) 92 % (01/18/22 0012) Vital Signs (24h Range):  Temp:  [97.3 °F (36.3 °C)-98.8 °F (37.1 °C)] 97.5 °F (36.4 °C)  Pulse:  [] 94  Resp:  [14-18] 16  SpO2:  [92 %-97 %] 92 %  BP: (102-109)/(57-67) 108/57     Weight: 94.3 kg (208 lb)  Body mass index is 33.57 kg/m².  No intake or output data in the 24 hours ending 01/18/22 0023   Physical Exam  Vitals and nursing note reviewed.   Constitutional:       Appearance: Normal appearance.   HENT:      Head: Normocephalic and atraumatic.   Eyes:      Extraocular Movements: Extraocular movements intact.      Pupils: Pupils are equal, round, and reactive to light.   Cardiovascular:      Rate and Rhythm: Normal rate and regular rhythm.   Pulmonary:      Effort: Pulmonary effort is normal. No respiratory distress.   Abdominal:      General: There is no distension.   Musculoskeletal:         General: Normal range of motion.      Cervical back: Normal range of motion.   Neurological:      General: No focal deficit present.      Mental Status: She is alert and oriented to person, place, and time.   Psychiatric:         Mood and Affect: Mood normal.         Behavior: Behavior normal.         Significant Labs:   All pertinent labs within the past 24 hours have been reviewed.  CBC:   Recent Labs   Lab 01/16/22  1409   WBC 9.75   HGB 8.8*   HCT 27.6*   PLT  292     CMP:   Recent Labs   Lab 01/16/22  1409   *   K 3.8   CL 93*   CO2 23   *   BUN 23*   CREATININE 5.6*   CALCIUM 9.4   ANIONGAP 15   EGFRNONAA 9*       Significant Imaging: I have reviewed all pertinent imaging results/findings within the past 24 hours.I

## 2022-01-18 NOTE — PT/OT/SLP EVAL
Physical Therapy Evaluation    Patient Name:  Xuan Ricardo   MRN:  [id number]    Recommendations:     Discharge Recommendations:   (TBD)   Discharge Equipment Recommendations:  (TBD)   Barriers to discharge: Inaccessible home and Pt with decreased ambulation at this time.    Assessment:     Xuan Ricardo is a 41 y.o. female admitted with a medical diagnosis of COVID-19.  She presents with the following impairments/functional limitations:  impaired functional mobilty,gait instability,impaired balance,decreased lower extremity function,pain,decreased ROM,impaired skin.    Rehab Prognosis: Good; patient would benefit from acute skilled PT services to address these deficits and reach maximum level of function.    Recent Surgery: * No surgery found *      Plan:     During this hospitalization, patient to be seen 5 x/week to address the identified rehab impairments via gait training,therapeutic activities,therapeutic exercises and progress toward the following goals:    · Plan of Care Expires:  02/01/22    Subjective     Chief Complaint: BLE pain with movement  Patient/Family Comments/goals: Pt agreeable to therapy.   Pain/Comfort:  · Pain Rating 1:  (Pt c/o pain to BLE.)  · Pain Addressed 1: Pre-medicate for activity,Reposition,Distraction,Cessation of Activity      Living Environment:  Pt lives with dependent childrend (8 and 9 y.o.) in a [facility] with ~5 LORI at entry.   Prior to admission, patients level of function was independent and driving.  Pt currently not working.  Equipment used at home: none.  Upon discharge, patient will have assistance from sister (taking care of pt's children at this moment).    Objective:     Patient found HOB elevated with PICC line,telemetry (R chest HD access)  upon PT entry to room.    General Precautions: Standard, DM, fall,airborne,contact,droplet ((COVID+)); Pt on HD.     Orthopedic Precautions:N/A   Braces: N/A  Respiratory Status: Room air    Exams:  · Cognitive Exam:  Patient was able  to follow multiple commands.   · Gross Motor Coordination:  WFL  · Postural Exam:  Patient presented with the following abnormalities:    · -       No postural abnormalities identified  · Sensation:    · -       Intact  light/touch BLE  · Skin Integrity/Edema:      · -       Skin integrity: dressings to B inner thighs and L lateral thigh  · -       Edema: Mild B thighs  · BLE ROM: WFL  · BLE Strength: WFL    Functional Mobility:  Pt mostly limited by BLE pain at this time, required extra time to complete all functional mobility.    · Bed Mobility:     · Scooting: minimum assistance for anterior scooting, however required max extra time to complete task 2* BLE pain  · Supine to Sit: moderate assistance with LE and HOB elevated   · Sit to Supine: maximum assistance of 2 persons  · Transfers:     · Sit to Stand: Minimum-moderate assistance and of 2 persons with hand-held assist  · Gait: Pt ambulated ~6 sidesteps along bedside with min A of 2 persons using no AD.  Pt with decreased weight shifting, foot clearance, step length, and trent.    · Balance: Pt with fair static sit/stand balance.     Therapeutic Activities and Exercises:  BLE supine therex x10 reps: AP, GS, and hip IR/ER  BLE seated therex as tolerated: AP and LAQ    Pt educated on acute skilled PT services and goals, verbalized good understanding.     AM-PAC 6 CLICK MOBILITY  Total Score:16     Patient left HOB elevated and BLE elevated on pillows with all lines intact and call button in reach.  Tray table close by.      GOALS:   Multidisciplinary Problems     Physical Therapy Goals        Problem: Physical Therapy Goal    Goal Priority Disciplines Outcome Goal Variances Interventions   Physical Therapy Goal     PT, PT/OT Ongoing, Progressing     Description: Goals to be met by: 22     Patient will increase functional independence with mobility by performin. Supine to sit with Modified [place]  2. Rolling to Left and Right with Modified  [place]  3. Sit to stand transfer with Modified [place]   4. Bed to chair transfer with Modified [place]   5. Gait >50 feet with Modified Bureau using appropriate AD  6. 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 ;  Surgeon: Rakesh Leon MD;  Location: [facility] OR;  Service: Vascular;  Laterality: Left;  RN PREOP 20, UPT done, COVID NEGATRIVE 20  NEED H/P     SECTION, CLASSIC      INSERTION OF TUNNELED CENTRAL VENOUS CATHETER (CVC) WITH SUBCUTANEOUS PORT N/A 2020    Procedure: IR TUNNELED VATH INSERT WITHOUT PORT;  Surgeon: [facility] Diagnostic Provider;  Location: [facility] OR;  Service: Radiology;  Laterality: N/A;  1030AM START  RN PREOP 2020    INSERTION OF TUNNELED CENTRAL VENOUS CATHETER (CVC) WITH SUBCUTANEOUS PORT N/A 2020    Procedure: TUNNELED CATH PLACEMENT;  Surgeon: [facility] Diagnostic Provider;  Location: [facility] OR;  Service: Radiology;  Laterality: N/A;  930AM START    REVISION OF ARTERIOVENOUS FISTULA Left 2020    Procedure: REVISION, AV FISTULA, THROMBECTOMY, LEFT UPPER EXTREMITY;  Surgeon: Rakesh Leon MD;  Location: [facility] OR;  Service: Vascular;  Laterality: Left;    TUBAL LIGATION         Time Tracking:     PT Received On: 22  PT Start Time: 1504     PT Stop Time: 1534  PT Total Time (min): 30 min     Billable Minutes: Evaluation 20 min  co-eval with OT and Therapeutic Exercise 10 min      01/18/2022

## 2022-01-18 NOTE — CONSULTS
Nursing consult for wound right inner thigh, outside right thigh and sacrum  Wound assessed 1/3 and 1/12 and recommendations written.   Recommend continued cleansing of sites with Vashe and Mepilex foam dressings.   History calciphylaxis and urinary incontinence.

## 2022-01-18 NOTE — NURSING
Bedside Report given to night nurse NORMAN Guevara. Walking rounds completed. Visualized and assessed patient NAD noted. Safety precautions maintained and call light within reach.     Chart check completed.

## 2022-01-18 NOTE — PT/OT/SLP PROGRESS
Occupational Therapy      Patient Name:  Xuan Wrightsin   MRN:  [id number]    Patient not seen am today secondary to Dialysis. OT will follow-up pm this date.     1/18/2022

## 2022-01-19 LAB
ANION GAP SERPL CALC-SCNC: 15 MMOL/L (ref 8–16)
ANISOCYTOSIS BLD QL SMEAR: SLIGHT
BASOPHILS # BLD AUTO: ABNORMAL K/UL (ref 0–0.2)
BASOPHILS NFR BLD: 0 % (ref 0–1.9)
BUN SERPL-MCNC: 18 MG/DL (ref 6–20)
CALCIUM SERPL-MCNC: 8.6 MG/DL (ref 8.7–10.5)
CHLORIDE SERPL-SCNC: 100 MMOL/L (ref 95–110)
CO2 SERPL-SCNC: 19 MMOL/L (ref 23–29)
CREAT SERPL-MCNC: 4.8 MG/DL (ref 0.5–1.4)
D DIMER PPP IA.FEU-MCNC: 6.43 MG/L FEU
DIFFERENTIAL METHOD: ABNORMAL
EOSINOPHIL # BLD AUTO: ABNORMAL K/UL (ref 0–0.5)
EOSINOPHIL NFR BLD: 0 % (ref 0–8)
ERYTHROCYTE [DISTWIDTH] IN BLOOD BY AUTOMATED COUNT: 19.1 % (ref 11.5–14.5)
EST. GFR  (AFRICAN AMERICAN): 12 ML/MIN/1.73 M^2
EST. GFR  (NON AFRICAN AMERICAN): 10 ML/MIN/1.73 M^2
GLUCOSE SERPL-MCNC: 197 MG/DL (ref 70–110)
HCT VFR BLD AUTO: 25.5 % (ref 37–48.5)
HGB BLD-MCNC: 8 G/DL (ref 12–16)
IMM GRANULOCYTES # BLD AUTO: ABNORMAL K/UL (ref 0–0.04)
IMM GRANULOCYTES NFR BLD AUTO: ABNORMAL % (ref 0–0.5)
LYMPHOCYTES # BLD AUTO: ABNORMAL K/UL (ref 1–4.8)
LYMPHOCYTES NFR BLD: 5 % (ref 18–48)
MAGNESIUM SERPL-MCNC: 2.2 MG/DL (ref 1.6–2.6)
MCH RBC QN AUTO: 29.3 PG (ref 27–31)
MCHC RBC AUTO-ENTMCNC: 31.4 G/DL (ref 32–36)
MCV RBC AUTO: 93 FL (ref 82–98)
MONOCYTES # BLD AUTO: ABNORMAL K/UL (ref 0.3–1)
MONOCYTES NFR BLD: 5 % (ref 4–15)
MYELOCYTES NFR BLD MANUAL: 1 %
NEUTROPHILS NFR BLD: 83 % (ref 38–73)
NEUTS BAND NFR BLD MANUAL: 6 %
NRBC BLD-RTO: 1 /100 WBC
PHOSPHATE SERPL-MCNC: 2.7 MG/DL (ref 2.7–4.5)
PLATELET # BLD AUTO: 290 K/UL (ref 150–450)
PMV BLD AUTO: 10 FL (ref 9.2–12.9)
POCT GLUCOSE: 199 MG/DL (ref 70–110)
POCT GLUCOSE: 201 MG/DL (ref 70–110)
POCT GLUCOSE: 219 MG/DL (ref 70–110)
POCT GLUCOSE: 310 MG/DL (ref 70–110)
POIKILOCYTOSIS BLD QL SMEAR: SLIGHT
POLYCHROMASIA BLD QL SMEAR: ABNORMAL
POTASSIUM SERPL-SCNC: 4.2 MMOL/L (ref 3.5–5.1)
RBC # BLD AUTO: 2.73 M/UL (ref 4–5.4)
SODIUM SERPL-SCNC: 134 MMOL/L (ref 136–145)
WBC # BLD AUTO: 10 K/UL (ref 3.9–12.7)

## 2022-01-19 PROCEDURE — 63600175 PHARM REV CODE 636 W HCPCS: Performed by: STUDENT IN AN ORGANIZED HEALTH CARE EDUCATION/TRAINING PROGRAM

## 2022-01-19 PROCEDURE — 85007 BL SMEAR W/DIFF WBC COUNT: CPT | Performed by: HOSPITALIST

## 2022-01-19 PROCEDURE — 25000003 PHARM REV CODE 250: Performed by: INTERNAL MEDICINE

## 2022-01-19 PROCEDURE — 80048 BASIC METABOLIC PNL TOTAL CA: CPT | Performed by: HOSPITALIST

## 2022-01-19 PROCEDURE — 94761 N-INVAS EAR/PLS OXIMETRY MLT: CPT

## 2022-01-19 PROCEDURE — 25000003 PHARM REV CODE 250: Performed by: EMERGENCY MEDICINE

## 2022-01-19 PROCEDURE — 83735 ASSAY OF MAGNESIUM: CPT | Performed by: HOSPITALIST

## 2022-01-19 PROCEDURE — 85027 COMPLETE CBC AUTOMATED: CPT | Performed by: HOSPITALIST

## 2022-01-19 PROCEDURE — 87040 BLOOD CULTURE FOR BACTERIA: CPT | Mod: 59 | Performed by: HOSPITALIST

## 2022-01-19 PROCEDURE — 27000207 HC ISOLATION

## 2022-01-19 PROCEDURE — 25000003 PHARM REV CODE 250: Performed by: HOSPITALIST

## 2022-01-19 PROCEDURE — 85379 FIBRIN DEGRADATION QUANT: CPT | Performed by: EMERGENCY MEDICINE

## 2022-01-19 PROCEDURE — 84100 ASSAY OF PHOSPHORUS: CPT | Performed by: HOSPITALIST

## 2022-01-19 PROCEDURE — 63600175 PHARM REV CODE 636 W HCPCS: Performed by: EMERGENCY MEDICINE

## 2022-01-19 PROCEDURE — 63600175 PHARM REV CODE 636 W HCPCS: Performed by: HOSPITALIST

## 2022-01-19 PROCEDURE — 21400001 HC TELEMETRY ROOM

## 2022-01-19 PROCEDURE — 63700000 PHARM REV CODE 250 ALT 637 W/O HCPCS: Performed by: STUDENT IN AN ORGANIZED HEALTH CARE EDUCATION/TRAINING PROGRAM

## 2022-01-19 PROCEDURE — 36415 COLL VENOUS BLD VENIPUNCTURE: CPT | Performed by: EMERGENCY MEDICINE

## 2022-01-19 RX ORDER — METOPROLOL TARTRATE 25 MG/1
25 TABLET, FILM COATED ORAL 2 TIMES DAILY
Qty: 60 TABLET | Refills: 11 | Status: ON HOLD | OUTPATIENT
Start: 2022-01-19 | End: 2022-02-14 | Stop reason: HOSPADM

## 2022-01-19 RX ORDER — OXYCODONE HYDROCHLORIDE 10 MG/1
10 TABLET ORAL EVERY 4 HOURS PRN
Qty: 18 TABLET | Refills: 0 | Status: SHIPPED | OUTPATIENT
Start: 2022-01-19

## 2022-01-19 RX ORDER — CINACALCET 30 MG/1
30 TABLET, FILM COATED ORAL
Qty: 30 TABLET | Refills: 11 | Status: SHIPPED | OUTPATIENT
Start: 2022-01-20 | End: 2023-01-20

## 2022-01-19 RX ORDER — CALCIUM CARBONATE 200(500)MG
1000 TABLET,CHEWABLE ORAL 3 TIMES DAILY PRN
Qty: 150 TABLET | Refills: 0 | Status: SHIPPED | OUTPATIENT
Start: 2022-01-19 | End: 2023-01-19

## 2022-01-19 RX ORDER — SEVELAMER CARBONATE 800 MG/1
2400 TABLET, FILM COATED ORAL
Qty: 270 TABLET | Refills: 11 | Status: SHIPPED | OUTPATIENT
Start: 2022-01-19 | End: 2023-01-19

## 2022-01-19 RX ORDER — GABAPENTIN 100 MG/1
100 CAPSULE ORAL 3 TIMES DAILY
Qty: 90 CAPSULE | Refills: 0 | Status: SHIPPED | OUTPATIENT
Start: 2022-01-19 | End: 2023-01-19

## 2022-01-19 RX ORDER — FLUCONAZOLE 200 MG/1
400 TABLET ORAL DAILY
Qty: 14 TABLET | Refills: 0 | Status: SHIPPED | OUTPATIENT
Start: 2022-01-20 | End: 2022-01-28 | Stop reason: HOSPADM

## 2022-01-19 RX ADMIN — HEPARIN SODIUM 5000 UNITS: 5000 INJECTION INTRAVENOUS; SUBCUTANEOUS at 02:01

## 2022-01-19 RX ADMIN — INSULIN ASPART 1 UNITS: 100 INJECTION, SOLUTION INTRAVENOUS; SUBCUTANEOUS at 09:01

## 2022-01-19 RX ADMIN — ACETAMINOPHEN 650 MG: 325 TABLET ORAL at 09:01

## 2022-01-19 RX ADMIN — HEPARIN SODIUM 5000 UNITS: 5000 INJECTION INTRAVENOUS; SUBCUTANEOUS at 09:01

## 2022-01-19 RX ADMIN — OXYCODONE HYDROCHLORIDE AND ACETAMINOPHEN 500 MG: 500 TABLET ORAL at 09:01

## 2022-01-19 RX ADMIN — OXYCODONE HYDROCHLORIDE AND ACETAMINOPHEN 500 MG: 500 TABLET ORAL at 08:01

## 2022-01-19 RX ADMIN — OXYCODONE HYDROCHLORIDE 15 MG: 15 TABLET ORAL at 09:01

## 2022-01-19 RX ADMIN — THERA TABS 1 TABLET: TAB at 08:01

## 2022-01-19 RX ADMIN — CINACALCET 30 MG: 30 TABLET, FILM COATED ORAL at 08:01

## 2022-01-19 RX ADMIN — FLUCONAZOLE 400 MG: 100 TABLET ORAL at 08:01

## 2022-01-19 RX ADMIN — SEVELAMER CARBONATE 2400 MG: 800 TABLET, FILM COATED ORAL at 08:01

## 2022-01-19 RX ADMIN — HEPARIN SODIUM 5000 UNITS: 5000 INJECTION INTRAVENOUS; SUBCUTANEOUS at 05:01

## 2022-01-19 RX ADMIN — ACETAMINOPHEN 650 MG: 325 TABLET ORAL at 04:01

## 2022-01-19 RX ADMIN — GABAPENTIN 100 MG: 100 CAPSULE ORAL at 02:01

## 2022-01-19 RX ADMIN — SEVELAMER CARBONATE 2400 MG: 800 TABLET, FILM COATED ORAL at 04:01

## 2022-01-19 RX ADMIN — FAMOTIDINE 20 MG: 20 TABLET ORAL at 08:01

## 2022-01-19 RX ADMIN — INSULIN ASPART 4 UNITS: 100 INJECTION, SOLUTION INTRAVENOUS; SUBCUTANEOUS at 12:01

## 2022-01-19 RX ADMIN — POLYETHYLENE GLYCOL 3350 17 G: 17 POWDER, FOR SOLUTION ORAL at 09:01

## 2022-01-19 RX ADMIN — SEVELAMER CARBONATE 2400 MG: 800 TABLET, FILM COATED ORAL at 11:01

## 2022-01-19 RX ADMIN — METOPROLOL TARTRATE 25 MG: 25 TABLET, FILM COATED ORAL at 08:01

## 2022-01-19 RX ADMIN — METOPROLOL TARTRATE 25 MG: 25 TABLET, FILM COATED ORAL at 09:01

## 2022-01-19 RX ADMIN — GABAPENTIN 100 MG: 100 CAPSULE ORAL at 09:01

## 2022-01-19 RX ADMIN — GABAPENTIN 100 MG: 100 CAPSULE ORAL at 08:01

## 2022-01-19 NOTE — PT/OT/SLP EVAL
"Occupational Therapy   Evaluation    Name: Xuan Ricardo  MRN: [id number]  Admitting Diagnosis:  COVID-19  Recent Surgery: * No surgery found *      Recommendations:     Discharge Recommendations:  (TBD)  Discharge Equipment Recommendations:  bedside commode  Barriers to discharge:  Decreased caregiver support    Assessment:     Xuan Ricardo is a 41 y.o. female with a medical diagnosis of COVID-19. The Patient presents currently with suboptimally regulated LB pain. 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 Performance deficits affecting function: weakness,impaired functional mobilty,decreased safety awareness,impaired endurance,gait instability,pain,impaired balance,decreased lower extremity function,impaired self care skills,impaired cardiopulmonary response to activity.      Rehab Prognosis: Good; patient would benefit from acute skilled OT services to address these deficits and reach maximum level of function.       Plan:     Patient to be seen 3 x/week,5 x/week to address the above listed problems via self-care/home management,therapeutic activities,therapeutic exercises  · Plan of Care Expires: 02/18/22  · Plan of Care Reviewed with: patient    Subjective     Chief Complaint: Intractable post procedural pian.   Patient/Family Comments/goals: return to own home at [facility].     Occupational Profile:  Living Environment: Patient resides in private home, (double), 5 steps to enter. Home equipped w/ tub/shower combo, no DME useed or owned.   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 (+) driving (-) employment outside of home.   Equipment Used at Home:  none  Assistance upon Discharge: Self, and Sister whom resides here in town. Children being cared for by Patient's Sister while Patient in acute setting.     Pain/Comfort:  · Pain Rating 1:  ("excruciating" R outter thigh, genral proximal LB)  · Pain Addressed 1: Reposition,Pre-medicate for " activity,Distraction,Cessation of Activity  · Pain Rating Post-Intervention 1:  (Patient unable to quantify.)    Patients cultural, spiritual, Denominational conflicts given the current situation: no    Objective:     Communicated with: RN prior to session.  Patient found supine with PICC line,telemetry upon OT entry to room.    General Precautions: Standard, fall,droplet,airborne,contact   Orthopedic Precautions:N/A   Braces: N/A  Respiratory Status:Room air    Occupational Performance:    Bed Mobility:    · Patient completed Rolling/Turning to Right with moderate assistance max extra time and LB lateral advancement assist.  · Patient completed Scooting/Bridging with Min a, max extra time 2* pain and Patient wish for volition at own pace.   · Patient completed Supine to Sit with mod a  · Patient completed Sit to Supine with max a x2.     Functional Mobility/Transfers:  · Patient completed Sit <> Stand Transfer min <> mod a x2 staff  with  B(B) HHA and no a-equip.   Functional Mobility: Patient demonstrated right directional lateral sidesteps along bedside with min A x2 staff 50% VC, using no AD. Activity limited by pain.    Activities of Daily Living:  · Feeding:  NT. Patient uanble to engage.     · Upper Body Dressing: max a gown, back ties.   · Lower Body Dressing: max a footwear  · Toileting:Adult brief in place. Patient required extensive assist in stand, inability to bend, rotate trunk or release BUE engaged as stabilizers.    Cognitive/Visual Perceptual:  Cognitive/Psychosocial Skills:     -       Oriented to: Person, Place and Situation   -       Follows Commands/attention:Follows multistep  commands  -       Communication: clear/fluent  -       Memory: No Deficits noted  -       Safety awareness/insight to disability: guarded   -       Mood/Affect/Coping skills/emotional control: Despondent, Cooperative and Anxious    Physical Exam:  Skin integrity: Visible UB skin intact  Upper Extremity Range of Motion:  (B)  WFL  Upper Extremity Strength: (B) >/= 3+/5 with inclusion of .  Fine / gross Motor Coordination:WNL    AMPAC 6 Click ADL:  AMPAC Total Score: 10    Treatment & Education:    *Patienteducation provided re: role of OT, and OT Treatment rationales / protocols. Patient indicates understanding.  *Patient agreeable to therapy session. Lights on / shade open for session .  SC:   *Basic self-care tasks and rudimentary functional mobility undertaken -- assist levels noted above.  SESSION'S END:  *General in room safety and (S)'d mobility advised, call button use reviewed.  *Questions/concerns within OT scope addressed  Education:    Patient left HOB elevated with all lines intact, call button in reach and RN present    GOALS:   Multidisciplinary Problems     Occupational Therapy Goals        Problem: Occupational Therapy Goal    Goal Priority Disciplines Outcome Interventions   Occupational Therapy Goal     OT, PT/OT Ongoing, Progressing    Description: Goals to be met by: 2/18/2022    Patient will increase functional independence with ADLs by performing:    Feeding with [place].  UE Dressing with [place].  LE Dressing with Stand-by Assistance.  Grooming while seated with [place].  Toileting from bedside commode with Set-up Assistance for hygiene and clothing management.   Bathing UB and pauly care sponge with Set-up Assistance.  Toilet transfer to bedside commode with Stand-by Assistance.                     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 ;  Surgeon: Rakesh Leon MD;  Location: [facility] OR;  Service: Vascular;  Laterality: Left;  RN PREOP 20, UPT done, COVID NEGATRIVE 20  NEED H/P     SECTION, CLASSIC      INSERTION OF TUNNELED CENTRAL VENOUS CATHETER (CVC) WITH SUBCUTANEOUS PORT N/A 2020    Procedure: IR TUNNELED VATH INSERT WITHOUT PORT;  Surgeon: [facility] Diagnostic Provider;  Location: [facility] OR;  Service: Radiology;  Laterality: N/A;  1030AM START  RN PREOP 2020    INSERTION OF TUNNELED CENTRAL VENOUS CATHETER (CVC) WITH SUBCUTANEOUS PORT N/A 2020    Procedure: TUNNELED CATH PLACEMENT;  Surgeon: Pr[facility] Diagnostic Provider;  Location: [facility] OR;  Service: Radiology;  Laterality: N/A;  930AM START    REVISION OF ARTERIOVENOUS FISTULA Left 2020    Procedure: REVISION, AV FISTULA, THROMBECTOMY, LEFT UPPER EXTREMITY;  Surgeon: Rakesh Leon MD;  Location: [facility] OR;  Service: Vascular;  Laterality: Left;    TUBAL LIGATION         Time Tracking:     OT Date of Treatment: 22  OT Start Time: 305  OT Stop Time: 033  OT Total Time (min): 32 min  *Evaluation completed with Physical Therapist on this date to best establish plan of care for acute setting.  *Co-tx performed for this visit due to patient need for two skilled therapists to ensure patient and staff safety and to accommodate for patient activity tolerance.    Billable Minutes:Evaluation 15    2022

## 2022-01-19 NOTE — PLAN OF CARE
[facility]    HOME HEALTH ORDERS  FACE TO FACE ENCOUNTER    Patient Name: Xuan Ricardo  YOB: 1980    PCP: Katharine Franks MD   PCP Address: [address] [place] [address] / [place] [id number]  PCP Phone Number: [phone]  PCP Fax: [phone]    Encounter Date: 01/19/2022    Admit to Home Health    Diagnoses:  Active Hospital Problems    Diagnosis  POA    *COVID-19 [U07.1]  Yes    Sinus tachycardia [R00.0]  No    Fever [R50.9]  Yes    Anemia due to pre-ESRD treated with erythropoietin [N03.9, D63.1]  Yes    Candida esophagitis [B37.81]  Yes    Hyperphosphatemia [E83.39]  Yes    Calciphylaxis [E83.59]  Yes    Nausea [R11.0]  Yes    Type 2 diabetes mellitus [E11.9]  Yes    Renovascular hypertension [I15.0]  Yes    End stage renal disease [N18.6]  Yes    Secondary hyperparathyroidism [N25.81]  Yes    Metabolic acidosis [E87.2]  Yes    Anemia of renal disease [N18.9, D63.1]  Yes     Chronic      Resolved Hospital Problems   No resolved problems to display.       No future appointments.   Follow-up Information     [facility] - [facility].    Specialty: Emergency Medicine  Why: As needed, If symptoms worsen  Contact information:  [address]  [facility] [address] [phone]           Katharine Franks MD. Schedule an appointment as soon as possible for a visit in 1 week.    Specialty: Internal Medicine  Contact information:  [address] [place] [address]  [place] [id number] [phone]             [facility] GASTROENTEROLOGY. Schedule an appointment as soon as possible for a visit in 1 week.    Specialty: Gastroenterology  Contact information:  [address]  [facility] [id number] [phone]                         I have seen and examined this patient face to face today. My clinical findings that support the need for the home health skilled services and home bound status are the following:  Weakness/numbness causing balance and gait  disturbance due to Infection and Weakness/Debility making it taxing to leave home.    Allergies:  Review of patient's allergies indicates:   Allergen Reactions    Vicodin [hydrocodone-acetaminophen] Hives    Codeine Hives       Diet: renal diet    Activities: activity as tolerated    Nursing:   SN to complete comprehensive assessment including routine vital signs. Instruct on disease process and s/s of complications to report to MD. Review/verify medication list sent home with the patient at time of discharge  and instruct patient/caregiver as needed. Frequency may be adjusted depending on start of care date.    Notify MD if SBP > 160 or < 90; DBP > 90 or < 50; HR > 120 or < 50; Temp > 101;       CONSULTS:    Physical Therapy to evaluate and treat. Evaluate for home safety and equipment needs; Establish/upgrade home exercise program. Perform / instruct on therapeutic exercises, gait training, transfer training, and Range of Motion.  Occupational Therapy to evaluate and treat. Evaluate home environment for safety and equipment needs. Perform/Instruct on transfers, ADL training, ROM, and therapeutic exercises.  Aide to provide assistance with personal care, ADLs, and vital signs.    MISCELLANEOUS CARE:  Routine Skin for Bedridden Patients: Instruct patient/caregiver to apply moisture barrier cream to all skin folds and wet areas in perineal area daily and after baths and all bowel movements.    WOUND CARE ORDERS  Local wound care to biopsy site of calciphylaxis weekly- Cleanse gently with NS and dress with Mepilex 6x6 dressing.        Medications: Review discharge medications with patient and family and provide education.      Current Discharge Medication List      START taking these medications    Details   calcium carbonate (TUMS) 200 mg calcium (500 mg) chewable tablet Take 2 tablets (1,000 mg total) by mouth 3 (three) times daily as needed.  Qty: 60 tablet, Refills: 1      cinacalcet (SENSIPAR) 30 MG Tab Take 1  "tablet (30 mg total) by mouth daily with breakfast.  Qty: 30 tablet, Refills: 11      fluconazole (DIFLUCAN) 200 MG Tab Take 2 tablets (400 mg total) by mouth once daily. for 7 days  Qty: 14 tablet, Refills: 0      gabapentin (NEURONTIN) 100 MG capsule Take 1 capsule (100 mg total) by mouth 3 (three) times daily.  Qty: 90 capsule, Refills: 0      metoprolol tartrate (LOPRESSOR) 25 MG tablet Take 1 tablet (25 mg total) by mouth 2 (two) times daily.  Qty: 60 tablet, Refills: 11    Comments: .      oxyCODONE (ROXICODONE) 10 mg Tab immediate release tablet Take 1 tablet (10 mg total) by mouth every 4 (four) hours as needed.  Qty: 18 tablet, Refills: 0    Comments: Quantity prescribed more than 7 day supply? No         CONTINUE these medications which have CHANGED    Details   sevelamer carbonate (RENVELA) 800 mg Tab Take 3 tablets (2,400 mg total) by mouth 3 (three) times daily with meals.  Qty: 270 tablet, Refills: 11         CONTINUE these medications which have NOT CHANGED    Details   insulin NPH (NOVOLIN N) 100 unit/mL injection Inject 15 Units into the skin before breakfast.  Qty: 10 mL, Refills: 2    Associated Diagnoses: Type 2 diabetes mellitus with stage 5 chronic kidney disease not on chronic dialysis, with long-term current use of insulin      NIFEdipine (PROCARDIA-XL) 90 MG (OSM) 24 hr tablet Take 1 tablet (90 mg total) by mouth once daily.  Qty: 30 tablet, Refills: 2    Comments: .      blood sugar diagnostic Strp Check blood glucose 3 times daily  Qty: 100 each, Refills: 5    Associated Diagnoses: Type 2 diabetes mellitus with stage 5 chronic kidney disease not on chronic dialysis, with long-term current use of insulin      cloNIDine 0.3 mg/24 hr td ptwk (CATAPRES) 0.3 mg/24 hr Place 1 patch onto the skin every 7 days.  Qty: 4 patch, Refills: 2    Comments: .      lancets 30 gauge Misc 3 (three) times daily      pen needle, diabetic 31 gauge x 5/16" Ndle daily         STOP taking these medications       " ergocalciferol (ERGOCALCIFEROL) 50,000 unit Cap Comments:   Reason for Stopping:               I certify that this patient is confined to her home and needs intermittent skilled nursing care, physical therapy and occupational therapy.    Marcelina Griffin MD  1/19/2022 10:18 AM  Department of Hospital medicine

## 2022-01-19 NOTE — DISCHARGE SUMMARY
"[facility] Medicine  Discharge Summary      Patient Name: Xuan Ricardo  MRN: [id number]  Patient Class: IP- Inpatient  Admission Date: 12/29/2021  Hospital Length of Stay: 20 days  Discharge Date and Time:  01/19/2022 11:15 AM  Attending Physician: Marcelina Griffin MD   Discharging Provider: Marcelina Griffin MD  Primary Care Provider: Katharine Franks MD      HPI:   Ms. Ricardo is a 41y F w/ ESRD, [place] [facility] [facility] with nausea/vomiting and bilateral thigh pain.     She says that the nausea and vomiting began a few weeks ago. She mostly reports vomiting up food content, denies overt blood. Says that the vomit is sometimes darker colored. She has been told she has "coffee-ground" emesis but does not describe the contents of her vomit as such. She denies abdominal pain, denies delayed vomiting after eating. Feels nauseas all the time, regardless of food intake.    She has also developed severe bilateral thigh lesions. She describes them as swelling w/ extreme sensitivity to touch. They started approximately two weeks ago and have persisted, with minimal improvement in pain. One thigh was biopsied at [facility] [place] her most recent admission, and she reports that the site continues to be tender.      * No surgery found *      Hospital Course:   ESRD patient with calciphylaxis. Admitted to hospital medicine. Nephrology consulted. Wound care consulted for thigh wounds. Pt gave verbal and written consent for records release from [facility]. At [facility] pt underwent biopsy of R thigh which was c/w calciphylaxis, and underwent EGD which showed candidal esophagitis. At  she was restarted on HD, given sodium thiosulfate. MBD therapy restarted. Restarted on fluconazole. Pain present but controlled on pain meds. COVID positive but stable on RA. had febrile episodes last week which found to have a UTI and treated and abx stopped. Seen by ID, who has now signed off. " Febrile episodes resolved. Has issues with sinus tach and all work up was negative. Maybe some volume down. Started on metoprolol and doing well. COVID HD chair confirmed prior to DC. Outpatient wound care and neprho f/u for calciphylaxsis        Goals of Care Treatment Preferences:  Code Status: Full Code      Consults:   Consults (From admission, onward)        Status Ordering Provider     Inpatient consult to Infectious Diseases  Once        Provider:  Carlos Montemayor MD    Completed YEISON KAUR     Inpatient consult to PICC team ([facility] [place])  Once        Provider:  (Not yet assigned)    Completed BETTY CARLOS     Inpatient virtual consult to Hospital Medicine  Once        Provider:  (Not yet assigned)    Completed MARTINEZ STEVENS     Inpatient consult to Nephrology  Once        Provider:  Isaura Meyers MD    Completed TG DOWD     Inpatient consult to Nephrology  Once        Provider:  Tg Dowd MD    Completed MARTINEZ STEVENS     Inpatient consult to Social Work  Once        Provider:  (Not yet assigned)    Completed AISSATOU CHAN          No new Assessment & Plan notes have been filed under this hospital service since the last note was generated.  Service: Hospital Medicine    Final Active Diagnoses:    Diagnosis Date Noted POA    PRINCIPAL PROBLEM:  COVID-19 [U07.1] 12/30/2021 Yes    Sinus tachycardia [R00.0] 01/14/2022 No    Fever [R50.9] 01/08/2022 Yes    Anemia due to pre-ESRD treated with erythropoietin [N03.9, D63.1] 01/07/2022 Yes    Candida esophagitis [B37.81] 01/03/2022 Yes    Hyperphosphatemia [E83.39] 12/31/2021 Yes    Calciphylaxis [E83.59] 12/30/2021 Yes    Nausea [R11.0] 12/30/2021 Yes    Type 2 diabetes mellitus [E11.9] 12/30/2021 Yes    Renovascular hypertension [I15.0] 12/30/2021 Yes    End stage renal disease [N18.6] 01/27/2020 Yes    Secondary hyperparathyroidism [N25.81]  Yes    Metabolic acidosis [E87.2] 05/03/2019 Yes    Anemia of  renal disease [N18.9, D63.1] 04/09/2016 Yes     Chronic      Problems Resolved During this Admission:       Discharged Condition: fair    [place] [facility]    [place] Up:   Follow-up Information     [facility] - Emergency Dept.    Specialty: Emergency Medicine  Why: As needed, If symptoms worsen  Contact information:  [address]  [facility] [id number] [phone]           Katharine Franks MD On 1/27/2022.    Specialty: Internal Medicine  Why: @3:15pm, for Hospital Follow up  at the [facility]  Contact information:  [address] [facility] [address]  [facility] [address]  [phone]                       Patient Instructions:      Ambulatory referral/consult to Gastroenterology   Standing Status: Future   Referral Priority: Routine Referral Type: Consultation   Referral Reason: Specialty Services Required   Requested Specialty: Gastroenterology   Number of Visits Requested: 1     Ambulatory referral/consult to Wound Clinic   Standing Status: Future   Referral Priority: Routine Referral Type: Consultation   Referral Reason: Specialty Services Required   Requested Specialty: Wound Care   Number of Visits Requested: 1     Diet renal     Notify your health care provider if you experience any of the following:  temperature >100.4     Notify your health care provider if you experience any of the following:  persistent nausea and vomiting or diarrhea     Notify your health care provider if you experience any of the following:  severe uncontrolled pain     Notify your health care provider if you experience any of the following:  redness, tenderness, or signs of infection (pain, swelling, redness, odor or green/yellow discharge around incision site)     Notify your health care provider if you experience any of the following:  difficulty breathing or increased cough     Notify your health care provider if you experience any of the following:  severe persistent headache     Notify your  health care provider if you experience any of the following:  worsening rash     Notify your health care provider if you experience any of the following:  persistent dizziness, light-headedness, or visual disturbances     Notify your health care provider if you experience any of the following:  increased confusion or weakness     Activity as tolerated       Significant Diagnostic Studies: Labs:   CMP   Recent Labs   Lab 01/18/22  0748 01/19/22  0444   * 134*   K 4.4 4.2   CL 92* 100   CO2 21* 19*   * 197*   BUN 36* 18   CREATININE 7.1* 4.8*   CALCIUM 8.9 8.6*   ANIONGAP 14 15   ESTGFRAFRICA 7* 12*   EGFRNONAA 6* 10*    and CBC   Recent Labs   Lab 01/19/22  0444   WBC 10.00   HGB 8.0*   HCT 25.5*          Pending Diagnostic Studies:     None         Medications:  Reconciled Home Medications:      Medication List      START taking these medications    calcium carbonate 200 mg calcium (500 mg) chewable tablet  Commonly known as: TUMS  Take 2 tablets (1,000 mg total) by mouth 3 (three) times daily as needed.     cinacalcet 30 MG Tab  Commonly known as: SENSIPAR  Take 1 tablet (30 mg total) by mouth daily with breakfast.  Start taking on: January 20, 2022     fluconazole 200 MG Tab  Commonly known as: DIFLUCAN  Take 2 tablets (400 mg total) by mouth once daily. for 7 days  Start taking on: January 20, 2022     gabapentin 100 MG capsule  Commonly known as: NEURONTIN  Take 1 capsule (100 mg total) by mouth 3 (three) times daily.     metoprolol tartrate 25 MG tablet  Commonly known as: LOPRESSOR  Take 1 tablet (25 mg total) by mouth 2 (two) times daily.     oxyCODONE 10 mg Tab immediate release tablet  Commonly known as: ROXICODONE  Take 1 tablet (10 mg total) by mouth every 4 (four) hours as needed.        CHANGE how you take these medications    sevelamer carbonate 800 mg Tab  Commonly known as: RENVELA  Take 3 tablets (2,400 mg total) by mouth 3 (three) times daily with meals.  What changed: how much  "to take        CONTINUE taking these medications    blood sugar diagnostic Strp  Check blood glucose 3 times daily     cloNIDine 0.3 mg/24 hr td ptwk 0.3 mg/24 hr  Commonly known as: CATAPRES  Place 1 patch onto the skin every 7 days.     insulin  unit/mL injection  Inject 15 Units into the skin before breakfast.     lancets 30 gauge Misc  3 (three) times daily     NIFEdipine 90 MG (OSM) 24 hr tablet  Commonly known as: PROCARDIA-XL  Take 1 tablet (90 mg total) by mouth once daily.     pen needle, diabetic 31 gauge x 5/16" Ndle  daily        STOP taking these medications    ergocalciferol 50,000 unit Cap  Commonly known as: ERGOCALCIFEROL            Indwelling Lines/Drains at time of discharge:   Lines/Drains/Airways     Central Venous Catheter Line                 Hemodialysis Catheter 12/29/21 right subclavian 21 days                Time spent on the discharge of patient: 37 minutes         The attending portion of this evaluation, treatment, and documentation was performed per Marcelina Griffin MD via Telemedicine AudioVisual using the secure [employer] software platform with 2 way audio/video. The provider was located off-site and the patient is located in the hospital. The aforementioned video software was utilized to document the relevant history and physical exam    Marcelina Griffin MD  Department of Hospital Medicine  [facility] - Telemetry  "

## 2022-01-19 NOTE — PLAN OF CARE
01/19/22 1207   Discharge Reassessment   Assessment Type Discharge Planning Reassessment   DC planned for today but held due to change in condition.  TN to continue to follow patient and assist with DC plan.

## 2022-01-19 NOTE — ASSESSMENT & PLAN NOTE
- started on rocephin 1 g daily  - follow up urine and blood cultures      1/10- patient spiked another fever today of 101, getting blood cultures and LA; switching rocephin to zosyn and follow up urine cultures     1/12- fevers resolved and patient's urine cultures showed no growth; course completed of augmentin  1/13 - patient noted to have fever again.  Will consult Infectious Disease.  Obtain CTA chest before dialysis tomorrow to rule out PE.  Lower extremity duplex negative for DVT.  Cultures with no growth to date.  1/18 - fevers now resolved. Off antibitics. ID signed off

## 2022-01-19 NOTE — PLAN OF CARE
Problem: Adult Inpatient Plan of Care  Goal: Plan of Care Review  Outcome: Ongoing, Progressing     Problem: Diabetes Comorbidity  Goal: Blood Glucose Level Within Targeted Range  Outcome: Ongoing, Progressing     Problem: Impaired Wound Healing  Goal: Optimal Wound Healing  Outcome: Ongoing, Progressing     Problem: Pain Acute  Goal: Acceptable Pain Control and Functional Ability  Outcome: Ongoing, Progressing

## 2022-01-19 NOTE — PROGRESS NOTES
"      [facility] Medicine  Telemedicine Progress Note    Patient Name: Xuan Ricardo  MRN: [id number]  Patient Class: IP- Inpatient   Admission Date: 12/29/2021  Length of Stay: 20 days  Attending Physician: Marcelina Griffin MD  Primary Care Provider: Katharine Franks MD          Subjective:     Principal Problem:COVID-19        HPI:  Ms. Ricardo is a 41y F w/ ESRD, recently dialyzed at [facility], presents after leaving AMA from [facility] with nausea/vomiting and bilateral thigh pain.     She says that the nausea and vomiting began a few weeks ago. She mostly reports vomiting up food content, denies overt blood. Says that the vomit is sometimes darker colored. She has been told she has "coffee-ground" emesis but does not describe the contents of her vomit as such. She denies abdominal pain, denies delayed vomiting after eating. Feels nauseas all the time, regardless of food intake.    She has also developed severe bilateral thigh lesions. She describes them as swelling w/ extreme sensitivity to touch. They started approximately two weeks ago and have persisted, with minimal improvement in pain. One thigh was biopsied at [facility] at her most recent admission, and she reports that the site continues to be tender.      Overview/Hospital Course:  ESRD patient with calciphylaxis. Admitted to hospital medicine. Nephrology consulted. Wound care consulted for thigh wounds. Pt gave verbal and written consent for records release from [facility]. At [facility] pt underwent biopsy of R thigh which was c/w calciphylaxis, and underwent EGD which showed candidal esophagitis. At  she was restarted on HD, given sodium thiosulfate. MBD therapy restarted. Restarted on fluconazole. Pain present but controlled on pain meds. COVID positive but stable on RA. had febrile episodes last week which found to have a UTI and treated and abx stopped. Seen by ID, who has now signed off. Febrile episodes had resolved but pt spiked " another fever on 1/19. Cultures ordered. Has issues with sinus tach and all work up was negative. Maybe some volume down. Started on metoprolol and doing well. COVID HD chair confirmed prior to DC. Outpatient wound care and neprho f/u for calciphylaxsis       Interval History:  Patient spiked a fever this morning with T-max of 101.2°.  Also noted to be tachycardic during the febrile episode.  Blood pressure stable.  Hemodialysis has finally been set up outpatient.  Will workup fever today with blood cultures, UA with reflex cultures, CXR.  Hold antibiotics and ID consult for now unless patient continues to spike fevers. Still having some lower extremity pain.     Review of Systems   Constitutional: Negative for fever.   Respiratory: Negative for shortness of breath.    Cardiovascular: Negative for chest pain.   Gastrointestinal: Negative for abdominal pain.   Neurological: Negative for dizziness. Headaches:      All other systems reviewed and are negative.    Objective:     Vital Signs (Most Recent):  Temp: 98.5 °F (36.9 °C) (01/19/22 1143)  Pulse: (!) 116 (01/19/22 1143)  Resp: 20 (01/19/22 0821)  BP: (!) 117/59 (01/19/22 1143)  SpO2: (!) 90 % (01/19/22 1143) Vital Signs (24h Range):  Temp:  [97.9 °F (36.6 °C)-101.2 °F (38.4 °C)] 98.5 °F (36.9 °C)  Pulse:  [] 116  Resp:  [16-20] 20  SpO2:  [90 %-96 %] 90 %  BP: ()/(53-67) 117/59     Weight: 94.3 kg (208 lb)  Body mass index is 33.57 kg/m².    Intake/Output Summary (Last 24 hours) at 1/19/2022 1158  Last data filed at 1/18/2022 1330  Gross per 24 hour   Intake 600 ml   Output 2300 ml   Net -1700 ml      Physical Exam  Vitals and nursing note reviewed.   Constitutional:       Appearance: Normal appearance.   HENT:      Head: Normocephalic and atraumatic.   Eyes:      Extraocular Movements: Extraocular movements intact.      Pupils: Pupils are equal, round, and reactive to light.   Cardiovascular:      Rate and Rhythm: Normal rate and regular rhythm.    Pulmonary:      Effort: Pulmonary effort is normal. No respiratory distress.   Abdominal:      General: There is no distension.   Musculoskeletal:         General: Normal range of motion.      Cervical back: Normal range of motion.   Neurological:      General: No focal deficit present.      Mental Status: She is alert and oriented to person, place, and time.   Psychiatric:         Mood and Affect: Mood normal.         Behavior: Behavior normal.         Significant Labs:   All pertinent labs within the past 24 hours have been reviewed.  CBC:   Recent Labs   Lab 01/19/22  0444   WBC 10.00   HGB 8.0*   HCT 25.5*        CMP:   Recent Labs   Lab 01/18/22  0748 01/19/22  0444   * 134*   K 4.4 4.2   CL 92* 100   CO2 21* 19*   * 197*   BUN 36* 18   CREATININE 7.1* 4.8*   CALCIUM 8.9 8.6*   ANIONGAP 14 15   EGFRNONAA 6* 10*       Significant Imaging: I have reviewed all pertinent imaging results/findings within the past 24 hours.      Assessment/Plan:      * COVID-19  Asymptomatic covid-19 infection  - will attempt to arrange infusion inpatient  - isolation precautions  - no hypoxia, no indication for dex/rem      Sinus tachycardia  -patient with heart rate persistently elevated in 120s  -EKG with sinus tachycardia  -possibly secondary to pain, continue to treat with p.r.n. analgesia  -reports her appetite has been low, with decreased p.o. intake.  Encourage p.o. intake.  -PE/DVT ruled out  -hemoglobin improved post transfusion  -infectious disease following for any infectious workup  -patient has started on metoprolol 12.5 mg p.o. b.i.d, dose increased to 25mg PO BID  -monitor electrolytes, replete electrolytes as needed  -monitor in telemetry      Sepsis due to gram-negative UTI  - started on rocephin 1 g daily  - follow up urine and blood cultures      1/10- patient spiked another fever today of 101, getting blood cultures and LA; switching rocephin to zosyn and follow up urine cultures     1/12-  fevers resolved and patient's urine cultures showed no growth; course completed of augmentin  1/13 - patient noted to have fever again.  Will consult Infectious Disease.  Obtain CTA chest before dialysis tomorrow to rule out PE.  Lower extremity duplex negative for DVT.  Cultures with no growth to date.  1/18 - fevers now resolved. Off antibitics. ID signed off    Fever  - continued to spike intermittent fevers  - cultures with no growth to date  - noted to have increasing D-dimer however, lower extremity duplex and CTA chest negative for blood clots  - infectious disease consulted, appreciate recommendations  - fevers had resolved for several days but pt spiked another fever on 1/19  - continue to monitor fever curve        Anemia due to pre-ESRD treated with erythropoietin  -Getting epo with HD  -1//10 patient's hemoglobin has been trending down, denies any melena or BRBPR; getting epo with HD; s/p 1 unit of PRBC this admission; ordering fobt  -noted to have hemoglobin of 6.9 on 01/13, another unit PRBC transfused  -continue to monitor CBC periodically  -Nursing / Phlebotomy to use pediatric tubes when drawing labs to minimize iatrogenic anemia and blood loss.       Candida esophagitis  -[place] [facility] [place] showing candidal es[place]itis  -was given fluconazole this admission   -CTA chest concerning for possible esophageal strictures.  Will need outpatient GI follow-up.      Hyperphosphatemia  Phos lowering important given calciphylaxis.  - continue sevalamer  - on cincalcet for hyperpth   - HD per nephrology      Renovascular hypertension  Continue home meds; titrate to effect      Type 2 diabetes mellitus  Reports taking 10U daily  - accuchecks/SSI      Nausea  Persistent nausea/vomiting reported at admission. No vomiting while inpatient. Last admission nausea resolved w/ bicarb drip, suggesting ESRD component.  Nausea now improved.  Encourage p.o. intake take  - monitor as pt gets dialyzed  - see  esophageal candidiasis      Calciphylaxis  Rash on bilateral thighs, extremely tender to touch. [facility] biopsy c/w calciphaxis, per report  - records request sent for biopsy  - nephrology consulted  - wound care  - prn analgesics  - sodium thiosulfate  - treatment of hyperphos/hyperPTH as noted elsewhere  - will likely need outpatient chronic pain follow up      End stage renal disease  Pt w/ ESRD. Refused dialysis on last admission, now s/p multiple sessions during [facility] hospitalization  - nephrology consulted  - last session 12/30  - access via [facility]  - MBD/anemia therapy as noted elsewhere  - outpatient HD chair pending      Secondary hyperparathyroidism  PTH severely elevated. Particularly concerning given calciphylaxis  - continue cincalcet  - iPTH pending    Metabolic acidosis  See ESRD      Anemia of renal disease  Drop in hemoglobin 1/1 without clinical bleeding, appropriate response to transfusion  - continue to monitor  - defer ESAs to nephrology  - transfuse to hgb 7      1/12- will give a dose of venofer in the AM; epo with HD; no overt signs of GI bleeding  1/13 - hemoglobin of 6.9 no bleeding.  Repeat CBC ordered.  If hemoglobin below 7, will transfuse  1 unit PRBCs.      VTE Risk Mitigation (From admission, onward)         Ordered     heparin (porcine) injection 5,000 Units  Every 8 hours         01/17/22 1213     heparin (porcine) injection 3,200 Units  As needed (PRN)         12/31/21 1335     IP VTE HIGH RISK PATIENT  Once         12/30/21 0339     Place sequential compression device  Until discontinued         12/30/21 0339     Place HENRI hose  Until discontinued         12/30/21 0339     Reason for No Pharmacological VTE Prophylaxis  Once        Question:  Reasons:  Answer:  Active Bleeding    12/30/21 0339                      I have assessed these finding virtually using telemed platform and with assistance of bedside nurse                 The attending portion of this evaluation, treatment, and  documentation was performed per Marcelina Griffin MD via Telemedicine AudioVisual using the secure [employer] software platform with 2 way audio/video. The provider was located off-site and the patient is located in the hospital. The aforementioned video software was utilized to document the relevant history and physical exam    Marcelina Griffin MD  Department of [facility] Medicine   [facility]

## 2022-01-19 NOTE — PLAN OF CARE
01/19/22 1130   Medicare Message   Important Message from Medicare regarding Discharge Appeal Rights Explained to patient/caregiver   Date IMM was signed 01/19/22   Time IMM was signed 0930   TN called patient's room via video chat but was unable to wake patient with verbal stimuli.  Copy will be sent certified mail:  [phone]

## 2022-01-19 NOTE — SUBJECTIVE & OBJECTIVE
Interval History:  Pt now afebrile, tachycardia improved. Otherwise no acute events. Still having some lower extremity pain. Pending outpatient HD chair set up.    Review of Systems   Constitutional: Negative for fever.   Respiratory: Negative for shortness of breath.    Cardiovascular: Negative for chest pain.   Gastrointestinal: Negative for abdominal pain.   Neurological: Negative for dizziness. Headaches:      All other systems reviewed and are negative.    Objective:     Vital Signs (Most Recent):  Temp: 97.9 °F (36.6 °C) (01/18/22 1820)  Pulse: (!) 121 (01/18/22 1820)  Resp: 16 (01/18/22 1820)  BP: 102/67 (01/18/22 1820)  SpO2: (!) 93 % (01/18/22 1820) Vital Signs (24h Range):  Temp:  [97.3 °F (36.3 °C)-97.9 °F (36.6 °C)] 97.9 °F (36.6 °C)  Pulse:  [] 121  Resp:  [16-18] 16  SpO2:  [91 %-93 %] 93 %  BP: ()/(52-67) 102/67     Weight: 94.3 kg (208 lb)  Body mass index is 33.57 kg/m².    Intake/Output Summary (Last 24 hours) at 1/18/2022 2041  Last data filed at 1/18/2022 1330  Gross per 24 hour   Intake 600 ml   Output 2300 ml   Net -1700 ml      Physical Exam  Vitals and nursing note reviewed.   Constitutional:       Appearance: Normal appearance.   HENT:      Head: Normocephalic and atraumatic.   Eyes:      Extraocular Movements: Extraocular movements intact.      Pupils: Pupils are equal, round, and reactive to light.   Cardiovascular:      Rate and Rhythm: Normal rate and regular rhythm.   Pulmonary:      Effort: Pulmonary effort is normal. No respiratory distress.   Abdominal:      General: There is no distension.   Musculoskeletal:         General: Normal range of motion.      Cervical back: Normal range of motion.   Neurological:      General: No focal deficit present.      Mental Status: She is alert and oriented to person, place, and time.   Psychiatric:         Mood and Affect: Mood normal.         Behavior: Behavior normal.         Significant Labs:   All pertinent labs within the past 24  hours have been reviewed.  CBC:   No results for input(s): WBC, HGB, HCT, PLT in the last 48 hours.  CMP:   Recent Labs   Lab 01/18/22  0748   *   K 4.4   CL 92*   CO2 21*   *   BUN 36*   CREATININE 7.1*   CALCIUM 8.9   ANIONGAP 14   EGFRNONAA 6*       Significant Imaging: I have reviewed all pertinent imaging results/findings within the past 24 hours.

## 2022-01-19 NOTE — PLAN OF CARE
Problem: Occupational Therapy Goal  Goal: Occupational Therapy Goal  Description: Goals to be met by: 2/18/2022    Patient will increase functional independence with ADLs by performing:    Feeding with [place].  UE Dressing with [place].  LE Dressing with Stand-by Assistance.  Grooming while seated with [place].  Toileting from bedside commode with Set-up Assistance for hygiene and clothing management.   Bathing UB and pauly care sponge with Set-up Assistance.  Toilet transfer to bedside commode with Stand-by Assistance.    Outcome: Ongoing, Progressing

## 2022-01-19 NOTE — ASSESSMENT & PLAN NOTE
- continued to spike intermittent fevers  - cultures with no growth to date  - noted to have increasing D-dimer however, lower extremity duplex and CTA chest negative for blood clots  - infectious disease consulted, appreciate recommendations  - fevers now resolved.   - continue to monitor fever curve

## 2022-01-19 NOTE — ASSESSMENT & PLAN NOTE
-Prior admission to [facility] w/ EGD showing candidal esophagitis  -was given fluconazole this admission   -CTA chest concerning for possible esophageal strictures.  Will need outpatient GI follow-up.

## 2022-01-19 NOTE — PROGRESS NOTES
HD treatment schedule received from [facility] and is as follows:     [facility]: [facility] First Treatment: 01/21/2022 @ 02:00 PM     Regular Treatment: M/W/F 2:30 PM        Provider and CM provided update during rounds.

## 2022-01-19 NOTE — PROGRESS NOTES
"      [facility] Medicine  Telemedicine Progress Note    Patient Name: Xuan Ricardo  MRN: [id number]  Patient Class: IP- Inpatient   Admission Date: 12/29/2021  Length of Stay: 19 days  Attending Physician: Marcelina Griffin MD  Primary Care Provider: Katharine Franks MD          Subjective:     Principal Problem:COVID-19        HPI:  Ms. Ricardo is a 41y F w/ ESRD, recently dialyzed at [facility], presents after leaving AMA from [facility] with nausea/vomiting and bilateral thigh pain.     She says that the nausea and vomiting began a few weeks ago. She mostly reports vomiting up food content, denies overt blood. Says that the vomit is sometimes darker colored. She has been told she has "coffee-ground" emesis but does not describe the contents of her vomit as such. She denies abdominal pain, denies delayed vomiting after eating. Feels nauseas all the time, regardless of food intake.    She has also developed severe bilateral thigh lesions. She describes them as swelling w/ extreme sensitivity to touch. They started approximately two weeks ago and have persisted, with minimal improvement in pain. One thigh was biopsied at [facility] at her most recent admission, and she reports that the site continues to be tender.      Overview/Hospital Course:  Admitted to hospital medicine. Nephrology consulted. Wound care consulted for thigh wounds. Pt gave verbal and written consent for records release from [facility]. 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     At  she was restarted on HD, given sodium thiosulfate. MBD therapy restarted. Restarted on fluconazole.    Dispo pending arrangement of outpatient HD chair      Interval History:  Pt now afebrile, tachycardia improved. Otherwise no acute events. Still having some lower extremity pain. Pending outpatient HD chair set up.    Review of Systems   Constitutional: Negative for fever. "   Respiratory: Negative for shortness of breath.    Cardiovascular: Negative for chest pain.   Gastrointestinal: Negative for abdominal pain.   Neurological: Negative for dizziness. Headaches:      All other systems reviewed and are negative.    Objective:     Vital Signs (Most Recent):  Temp: 97.9 °F (36.6 °C) (01/18/22 1820)  Pulse: (!) 121 (01/18/22 1820)  Resp: 16 (01/18/22 1820)  BP: 102/67 (01/18/22 1820)  SpO2: (!) 93 % (01/18/22 1820) Vital Signs (24h Range):  Temp:  [97.3 °F (36.3 °C)-97.9 °F (36.6 °C)] 97.9 °F (36.6 °C)  Pulse:  [] 121  Resp:  [16-18] 16  SpO2:  [91 %-93 %] 93 %  BP: ()/(52-67) 102/67     Weight: 94.3 kg (208 lb)  Body mass index is 33.57 kg/m².    Intake/Output Summary (Last 24 hours) at 1/18/2022 2041  Last data filed at 1/18/2022 1330  Gross per 24 hour   Intake 600 ml   Output 2300 ml   Net -1700 ml      Physical Exam  Vitals and nursing note reviewed.   Constitutional:       Appearance: Normal appearance.   HENT:      Head: Normocephalic and atraumatic.   Eyes:      Extraocular Movements: Extraocular movements intact.      Pupils: Pupils are equal, round, and reactive to light.   Cardiovascular:      Rate and Rhythm: Normal rate and regular rhythm.   Pulmonary:      Effort: Pulmonary effort is normal. No respiratory distress.   Abdominal:      General: There is no distension.   Musculoskeletal:         General: Normal range of motion.      Cervical back: Normal range of motion.   Neurological:      General: No focal deficit present.      Mental Status: She is alert and oriented to person, place, and time.   Psychiatric:         Mood and Affect: Mood normal.         Behavior: Behavior normal.         Significant Labs:   All pertinent labs within the past 24 hours have been reviewed.  CBC:   No results for input(s): WBC, HGB, HCT, PLT in the last 48 hours.  CMP:   Recent Labs   Lab 01/18/22  0748   *   K 4.4   CL 92*   CO2 21*   *   BUN 36*   CREATININE 7.1*    CALCIUM 8.9   ANIONGAP 14   EGFRNONAA 6*       Significant Imaging: I have reviewed all pertinent imaging results/findings within the past 24 hours.      Assessment/Plan:      * COVID-19  Asymptomatic covid-19 infection  - will attempt to arrange infusion inpatient  - isolation precautions  - no hypoxia, no indication for dex/rem      Sinus tachycardia  -patient with heart rate persistently elevated in 120s  -EKG with sinus tachycardia  -possibly secondary to pain, continue to treat with p.r.n. analgesia  -reports her appetite has been low, with decreased p.o. intake.  Encourage p.o. intake.  -PE/DVT ruled out  -hemoglobin improved post transfusion  -infectious disease following for any infectious workup  -patient has started on metoprolol 12.5 mg p.o. b.i.d, dose increased to 25mg PO BID  -monitor electrolytes, replete electrolytes as needed  -monitor in telemetry      Sepsis due to gram-negative UTI  - started on rocephin 1 g daily  - follow up urine and blood cultures      1/10- patient spiked another fever today of 101, getting blood cultures and LA; switching rocephin to zosyn and follow up urine cultures     1/12- fevers resolved and patient's urine cultures showed no growth; course completed of augmentin  1/13 - patient noted to have fever again.  Will consult Infectious Disease.  Obtain CTA chest before dialysis tomorrow to rule out PE.  Lower extremity duplex negative for DVT.  Cultures with no growth to date.  1/18 - fevers now resolved. Off antibitics. ID signed off    Fever  - continued to spike intermittent fevers  - cultures with no growth to date  - noted to have increasing D-dimer however, lower extremity duplex and CTA chest negative for blood clots  - infectious disease consulted, appreciate recommendations  - fevers now resolved.   - continue to monitor fever curve        Anemia due to pre-ESRD treated with erythropoietin  -Getting epo with HD  -1//10 patient's hemoglobin has been trending down,  denies any melena or BRBPR; getting epo with HD; s/p 1 unit of PRBC this admission; ordering fobt  -noted to have hemoglobin of 6.9 on 01/13, another unit PRBC transfused  -continue to monitor CBC periodically  -Nursing / Phlebotomy to use pediatric tubes when drawing labs to minimize iatrogenic anemia and blood loss.       Candida esophagitis  -Prior [place] [facility] [place]/ [place] showing candidal esophagitis  -was given fluconazole this admission   -CTA chest concerning for possible esophageal strictures.  Will need outpatient GI follow-up.      Hyperphosphatemia  Phos lowering important given calciphylaxis.  - continue sevalamer  - on cincalcet for hyperpth   - HD per nephrology      Renovascular hypertension  Continue home meds; titrate to effect      Type 2 diabetes mellitus  Reports taking 10U daily  - accuchecks/SSI      Nausea  Persistent nausea/vomiting reported at admission. No vomiting while inpatient. Last admission nausea resolved w/ bicarb drip, suggesting ESRD component.  Nausea now improved.  Encourage p.o. intake take  - monitor as pt gets dialyzed  - see esophageal candidiasis      Calciphylaxis  Rash on bilateral thighs, extremely tender to touch. [facility] biopsy c/w calciphaxis, per report  - records request sent for biopsy  - nephrology consulted  - wound care  - prn analgesics  - sodium thiosulfate  - treatment of hyperphos/hyperPTH as noted elsewhere  - will likely need outpatient chronic pain follow up      End stage renal disease  Pt w/ ESRD. Refused dialysis on last admission, now s/p multiple sessions during [facility] hospitalization  - nephrology consulted  - last session 12/30  - access via [facility]  - MBD/anemia therapy as noted elsewhere  - outpatient HD chair pending      Secondary hyperparathyroidism  PTH severely elevated. Particularly concerning given calciphylaxis  - continue cincalcet  - iPTH pending    Metabolic acidosis  See ESRD      Anemia of renal disease  Drop in hemoglobin 1/1 without  clinical bleeding, appropriate response to transfusion  - continue to monitor  - defer ESAs to nephrology  - transfuse to hgb 7      1/12- will give a dose of venofer in the AM; epo with HD; no overt signs of GI bleeding  1/13 - hemoglobin of 6.9 no bleeding.  Repeat CBC ordered.  If hemoglobin below 7, will transfuse  1 unit PRBCs.    VTE Risk Mitigation (From admission, onward)         Ordered     heparin (porcine) injection 5,000 Units  Every 8 hours         01/17/22 1213     heparin (porcine) injection 3,200 Units  As needed (PRN)         12/31/21 1335     IP VTE HIGH RISK PATIENT  Once         12/30/21 0339     Place sequential compression device  Until discontinued         12/30/21 0339     Place HENRI hose  Until discontinued         12/30/21 0339     Reason for No Pharmacological VTE Prophylaxis  Once        Question:  Reasons:  Answer:  Active Bleeding    12/30/21 0339                      I have assessed these finding virtually using telemed platform and with assistance of bedside nurse                 The attending portion of this evaluation, treatment, and documentation was performed per Marcelina Griffin MD via Telemedicine AudioVisual using the secure [employer] software platform with 2 way audio/video. The provider was located off-site and the patient is located in the hospital. The aforementioned video software was utilized to document the relevant history and physical exam    Marcelina Griffin MD  Department of Hospital Medicine   [facility]

## 2022-01-19 NOTE — ASSESSMENT & PLAN NOTE
- continued to spike intermittent fevers  - cultures with no growth to date  - noted to have increasing D-dimer however, lower extremity duplex and CTA chest negative for blood clots  - infectious disease consulted, appreciate recommendations  - fevers had resolved for several days but pt spiked another fever on 1/19  - continue to monitor fever curve

## 2022-01-20 PROBLEM — N03.9 ANEMIA DUE TO PRE-ESRD TREATED WITH ERYTHROPOIETIN: Status: RESOLVED | Noted: 2022-01-07 | Resolved: 2022-01-20

## 2022-01-20 PROBLEM — D63.1 ANEMIA DUE TO PRE-ESRD TREATED WITH ERYTHROPOIETIN: Status: RESOLVED | Noted: 2022-01-07 | Resolved: 2022-01-20

## 2022-01-20 LAB
POCT GLUCOSE: 149 MG/DL (ref 70–110)
POCT GLUCOSE: 165 MG/DL (ref 70–110)
POCT GLUCOSE: 172 MG/DL (ref 70–110)
POCT GLUCOSE: 208 MG/DL (ref 70–110)

## 2022-01-20 PROCEDURE — 25000003 PHARM REV CODE 250: Performed by: INTERNAL MEDICINE

## 2022-01-20 PROCEDURE — 25000003 PHARM REV CODE 250: Performed by: EMERGENCY MEDICINE

## 2022-01-20 PROCEDURE — 63600175 PHARM REV CODE 636 W HCPCS: Performed by: INTERNAL MEDICINE

## 2022-01-20 PROCEDURE — 97530 THERAPEUTIC ACTIVITIES: CPT

## 2022-01-20 PROCEDURE — 25000003 PHARM REV CODE 250: Performed by: HOSPITALIST

## 2022-01-20 PROCEDURE — 21400001 HC TELEMETRY ROOM

## 2022-01-20 PROCEDURE — 80100014 HC HEMODIALYSIS 1:1

## 2022-01-20 PROCEDURE — 27000207 HC ISOLATION

## 2022-01-20 PROCEDURE — 94761 N-INVAS EAR/PLS OXIMETRY MLT: CPT

## 2022-01-20 PROCEDURE — 63600175 PHARM REV CODE 636 W HCPCS: Performed by: STUDENT IN AN ORGANIZED HEALTH CARE EDUCATION/TRAINING PROGRAM

## 2022-01-20 PROCEDURE — 63700000 PHARM REV CODE 250 ALT 637 W/O HCPCS: Performed by: STUDENT IN AN ORGANIZED HEALTH CARE EDUCATION/TRAINING PROGRAM

## 2022-01-20 PROCEDURE — 97116 GAIT TRAINING THERAPY: CPT

## 2022-01-20 PROCEDURE — 63600175 PHARM REV CODE 636 W HCPCS: Performed by: HOSPITALIST

## 2022-01-20 RX ADMIN — POLYETHYLENE GLYCOL 3350 17 G: 17 POWDER, FOR SOLUTION ORAL at 09:01

## 2022-01-20 RX ADMIN — GABAPENTIN 100 MG: 100 CAPSULE ORAL at 09:01

## 2022-01-20 RX ADMIN — SEVELAMER CARBONATE 2400 MG: 800 TABLET, FILM COATED ORAL at 08:01

## 2022-01-20 RX ADMIN — HEPARIN SODIUM 5000 UNITS: 5000 INJECTION INTRAVENOUS; SUBCUTANEOUS at 05:01

## 2022-01-20 RX ADMIN — HEPARIN SODIUM 3200 UNITS: 5000 INJECTION INTRAVENOUS; SUBCUTANEOUS at 05:01

## 2022-01-20 RX ADMIN — EPOETIN ALFA-EPBX 10000 UNITS: 10000 INJECTION, SOLUTION INTRAVENOUS; SUBCUTANEOUS at 05:01

## 2022-01-20 RX ADMIN — GABAPENTIN 100 MG: 100 CAPSULE ORAL at 02:01

## 2022-01-20 RX ADMIN — HEPARIN SODIUM 5000 UNITS: 5000 INJECTION INTRAVENOUS; SUBCUTANEOUS at 02:01

## 2022-01-20 RX ADMIN — CLONIDINE 1 PATCH: 0.3 PATCH TRANSDERMAL at 08:01

## 2022-01-20 RX ADMIN — METOPROLOL TARTRATE 25 MG: 25 TABLET, FILM COATED ORAL at 08:01

## 2022-01-20 RX ADMIN — OXYCODONE HYDROCHLORIDE AND ACETAMINOPHEN 500 MG: 500 TABLET ORAL at 08:01

## 2022-01-20 RX ADMIN — OXYCODONE HYDROCHLORIDE AND ACETAMINOPHEN 500 MG: 500 TABLET ORAL at 09:01

## 2022-01-20 RX ADMIN — SEVELAMER CARBONATE 2400 MG: 800 TABLET, FILM COATED ORAL at 04:01

## 2022-01-20 RX ADMIN — CINACALCET 30 MG: 30 TABLET, FILM COATED ORAL at 08:01

## 2022-01-20 RX ADMIN — SEVELAMER CARBONATE 2400 MG: 800 TABLET, FILM COATED ORAL at 12:01

## 2022-01-20 RX ADMIN — FLUCONAZOLE 400 MG: 100 TABLET ORAL at 08:01

## 2022-01-20 RX ADMIN — THERA TABS 1 TABLET: TAB at 08:01

## 2022-01-20 RX ADMIN — GABAPENTIN 100 MG: 100 CAPSULE ORAL at 08:01

## 2022-01-20 RX ADMIN — FAMOTIDINE 20 MG: 20 TABLET ORAL at 08:01

## 2022-01-20 RX ADMIN — METOPROLOL TARTRATE 25 MG: 25 TABLET, FILM COATED ORAL at 09:01

## 2022-01-20 RX ADMIN — SODIUM THIOSULFATE 25 G: 250 INJECTION, SOLUTION INTRAVENOUS at 05:01

## 2022-01-20 NOTE — ASSESSMENT & PLAN NOTE
Pt w/ ESRD. Refused dialysis on last admission, now s/p multiple sessions during [facility]  - nephrology consulted  - last session 12/30  - access via [facility]  - MBD/anemia therapy as noted elsewhere  - outpatient HD chair arranged

## 2022-01-20 NOTE — PROGRESS NOTES
TN notified Shara mathew [facility] that patient's first treatment will be delayed due to patient will remain in the hospital until she transfers to rehab.

## 2022-01-20 NOTE — ASSESSMENT & PLAN NOTE
Drop in hemoglobin 1/1 without clinical bleeding, appropriate response to transfusion  - continue to monitor  - defer ESAs to nephrology  - transfuse to hgb 7      1/12- will give a dose of venofer in the AM; epo with HD; no overt signs of GI bleeding  1/13 - hemoglobin of 6.9 no bleeding.  Repeat CBC ordered.  If hemoglobin below 7, will transfuse  1 unit PRBCs.  1/20 - stable, hemoglobin 8 g/dL yesterday

## 2022-01-20 NOTE — PLAN OF CARE
Pt progressing towards goals, pt with increased drowsiness this date. Pt on RA throughout session, O2 93% at rest. Pt performed functional mobility in room with [place] and use of RW. Pt with mild c/o SOB, O2 taken at ~79%, unsure if accurate reading, no increased labor of breathing noted. O2 recovering to 93% with increased v/cs for PLB once returned to supine. Recommending IPR upon d/c. Patient was living in community setting functioning at independent level for ADLs, and mobility prior to this event.  There is an expectation of returning to prior level of function to maintain independence thus avoiding readmission.  Patient's clinical condition meets full Inpatient Rehab (IPR) criteria; including the ability to actively participate in 3 hours of therapy.  A lower level of care(SNF) cannot provide the interdisciplinary treatment approach needed.       Problem: Occupational Therapy Goal  Goal: Occupational Therapy Goal  Description: Goals to be met by: 2/18/2022    Patient will increase functional independence with ADLs by performing:    Feeding with [place].  UE Dressing with [place].  LE Dressing with Stand-by Assistance.  Grooming while seated with [place].  Toileting from bedside commode with Set-up Assistance for hygiene and clothing management.   Bathing UB and pauly care sponge with Set-up Assistance.  Toilet transfer to bedside commode with Stand-by Assistance.    Outcome: Ongoing, Progressing

## 2022-01-20 NOTE — PROGRESS NOTES
Received report per DANIEL Mak LPN/arrived to pt bedside. arterial port aspirated sluggishly to right chest wall perm cvc. Both ports flushed without difficulty noted. Maintenance hemodialysis started x 3 hours. Plan is to remove 2 L as tolerated. Pt lethargic. Voiced no c/o upon arousal. VSS at this time.

## 2022-01-20 NOTE — PROGRESS NOTES
[facility]  [facility]   Progress Note (Follow-Up)    SUMMARY     Recommendations/Interventions:  1) Continue renal diet as tolerated by pt, encouraging PO intake.  2) Continue Renal oral supplement BID to assist in meeting needs  3) Monitor renal labs, hyperglycemia, hyponatremia    Goals:   1) Pt to consistently meet >75% of EEN/EPN by PO/ONS intake  2) Nutrition related labs to trend WNL    Nutrition Goal Status: progressing towards goal  Communication of RD Recs:  (POC)    Dietitian Rounds Brief  1/20: D/t COVID protocol unable to meet with pt. Spoke with RN and pt intake is fair. Pt states that she is tired of eating the same food all the time. Per last Physician note (1/19) pt was supposed to be discharged on 1/19 but d/t to worsening in condition had to stay. HD is set up for discharge. LBM 1/19. Wt is stable.    1/13: Remote assessment for coverage. Intake appear to be improving, pt completes % meals last few days per chart, LBM 1/11. POC -221. Admit wt appears incorrect, wt has been stable x 2 week admit ~208 lbs.    1/07: Remote assessment for coverage, unable to complete NFPE. Spoke with pt on room phone, reports fair appetite, eating 50% meals, c/o temperature of meals. RD made menu suggestions to improve intake. Pt agreeable to try oral nutrition supplement. Denies N/V/D/C, LBM 2 days ago per pt. UBW ~220 lbs. Noted with about 50 lbs wt loss over the last 1.5 years per chart. Unable to determine malnutrition status at this time.    Assessment and Plan  Nutrition Problem  Inadequate oral intake     Related to (etiology):   Food preferences    Signs and Symptoms (as evidenced by):   Poor PO intake    Interventions/Recommendations (treatment strategy):  Mineral Modified diet (Renal)  Commercial beverages (Renal oral supplement)  Collaboration of care with other providers    Nutrition Diagnosis Status:   Continues      Reason for Assessment    Reason For Assessment: PATRIA  "follow-up  Diagnosis:  (COVID)  Relevant Medical History: ESRD on HD, DM2, hyperphosphatemia  Nutrition Discharge Planning: Discharge on renal diet    Nutrition Risk Screen    Nutrition Risk Screen: no indicators present    Nutrition/Diet History    Spiritual, Cultural Beliefs, Synagogue Practices, Values that Affect Care: no  Food Allergies: NKFA    Anthropometrics    Temp: 97.5 °F (36.4 °C)  Height Method: Stated  Height: 5' 5.98" (167.6 cm)  Height (inches): 65.98 in  Weight Method: Bed Scale  Weight: 94.3 kg (207 lb 14.3 oz)  Weight (lb): 207.9 lb  Ideal Body Weight (IBW), Female: 129.9 lb  % Ideal Body Weight, Female (lb): 160.05 %  BMI (Calculated): 33.6  BMI Grade: 30 - 34.9- obesity - grade I       Weight History:  Wt Readings from Last 10 Encounters:   01/20/22 94.3 kg (207 lb 14.3 oz)   12/11/21 90.7 kg (200 lb)   10/26/20 109.8 kg (242 lb)   08/15/20 117.2 kg (258 lb 6.1 oz)   05/25/20 110 kg (242 lb 8.1 oz)   05/22/20 114.7 kg (252 lb 13.9 oz)   05/08/20 117.5 kg (259 lb 0.6 oz)   05/07/20 117.5 kg (259 lb 0.7 oz)   05/05/20 117.5 kg (259 lb)   05/04/20 117.5 kg (259 lb 0.7 oz)       Lab/Procedures/Meds: Pertinent Labs Reviewed  Glucose   Date Value Ref Range Status   01/19/2022 197 (H) 70 - 110 mg/dL Final     Sodium   Date Value Ref Range Status   01/19/2022 134 (L) 136 - 145 mmol/L Final       Medications:Pertinent Medications Reviewed  Scheduled Meds:   ascorbic acid (vitamin C)  500 mg Oral BID    cinacalcet  30 mg Oral Daily with breakfast    cloNIDine 0.3 mg/24 hr td ptwk  1 patch Transdermal Q7 Days    epoetin kelsey-epbx  10,000 Units Intravenous Every Tues, Thurs, Sat    famotidine  20 mg Oral Daily    fluconazole  400 mg Oral Daily    gabapentin  100 mg Oral TID    heparin (porcine)  5,000 Units Subcutaneous Q8H    metoprolol tartrate  25 mg Oral BID    multivitamin  1 tablet Oral Daily    NIFEdipine  90 mg Oral Daily    polyethylene glycol  17 g Oral BID    senna-docusate 8.6-50 mg "  1 tablet Oral Daily    sevelamer carbonate  2,400 mg Oral TID WM    sodium thiosulfate  25 g Intravenous Every Tues, Thurs, Sat    tuberculin  5 Units Intradermal Once     Continuous Infusions:  PRN Meds:.sodium chloride, sodium chloride, acetaminophen, bisacodyL, calcium carbonate, cyclobenzaprine, dextrose 50%, dextrose 50%, glucagon (human recombinant), glucose, glucose, heparin (porcine), hydrALAZINE, influenza, insulin aspart U-100, labetaloL, magnesium hydroxide 400 mg/5 ml, melatonin, ondansetron, oxyCODONE, oxyCODONE, prochlorperazine, sars-cov-2 (covid-19), sodium chloride 0.9%, sodium chloride 0.9%    Estimated/Assessed Needs    Weight Used For Calorie Calculations: 94.3 kg (207 lb 14.3 oz)  Energy Calorie Requirements (kcal): 2358 - 3300  Energy Need Method: Kcal/kg (25-35 per ESRD)  Protein Requirements: 113-142 g (1.2-1.5 g/kg per ESRD on HD)  Weight Used For Protein Calculations: 94.3 kg (207 lb 14.3 oz)  Fluid Requirements (mL): 1000 mL + UOP  Estimated Fluid Requirement Method: other (see comments) (per HD; Additional fluid managed by MD)  CHO Requirement: 295 g    Nutrition Prescription Ordered    Current Diet Order: Renal    Evaluation of Received Nutrient/Fluid Intake    Energy Calories Required: not meeting needs  Protein Required: not meeting needs  Fluid Required: not meeting needs  Comments: LBM 1/19  % Intake of Estimated Energy Needs: 25 - 50 %  % Meal Intake: 25 - 50 %  No intake or output data in the 24 hours ending 01/20/22 1547     Nutrition Risk    Level of Risk/Frequency of Follow-up:  (1-2x/wk)     Monitor and Evaluation    Food and Nutrient Intake: energy intake,food and beverage intake  Food and Nutrient Adminstration: diet order  Physical Activity and Function: nutrition-related ADLs and IADLs  Anthropometric Measurements: weight,weight change,body mass index  Biochemical Data, Medical Tests and Procedures: electrolyte and renal panel,gastrointestinal profile,glucose/endocrine  profile,inflammatory profile,lipid profile  Nutrition-Focused Physical Findings: overall appearance,extremities, muscles and bones,head and eyes,skin     Nutrition Follow-Up    RD Follow-up?: Yes

## 2022-01-20 NOTE — PLAN OF CARE
Problem: Adult Inpatient Plan of Care  Goal: Plan of Care Review  Outcome: Ongoing, Progressing     Problem: Diabetes Comorbidity  Goal: Blood Glucose Level Within Targeted Range  Outcome: Ongoing, Progressing     Problem: Infection  Goal: Absence of Infection Signs and Symptoms  Outcome: Ongoing, Progressing     Problem: Device-Related Complication Risk (Hemodialysis)  Goal: Safe, Effective Therapy Delivery  Outcome: Ongoing, Progressing     Problem: Hemodynamic Instability (Hemodialysis)  Goal: Effective Tissue Perfusion  Outcome: Ongoing, Progressing     Problem: Infection (Hemodialysis)  Goal: Absence of Infection Signs and Symptoms  Outcome: Ongoing, Progressing     Problem: Impaired Wound Healing  Goal: Optimal Wound Healing  Outcome: Ongoing, Progressing     Problem: Skin Injury Risk Increased  Goal: Skin Health and Integrity  Outcome: Ongoing, Progressing     Problem: Pain Acute  Goal: Acceptable Pain Control and Functional Ability  Outcome: Ongoing, Progressing

## 2022-01-20 NOTE — ASSESSMENT & PLAN NOTE
- continued to spike intermittent fevers  - cultures with no growth to date  - noted to have increasing D-dimer however, lower extremity duplex and CTA chest negative for blood clots  - infectious disease consulted, appreciate recommendations  - fevers had resolved for several days but pt spiked another fever on 1/19  - Could be driven by her oxycodone  - continue to monitor fever curve, cultures x48h. Can discharge after if negative

## 2022-01-20 NOTE — ASSESSMENT & PLAN NOTE
Asymptomatic covid-19 infection  - isolation precautions  - no hypoxia, no indication for dex/rem

## 2022-01-20 NOTE — ASSESSMENT & PLAN NOTE
-Prior admission to [facility] w/ EGD showing candidal esophagitis  -was given fluconazole this admission   -CTA chest concerning for possible esophageal strictures.  Will need outpatient GI follow-up.

## 2022-01-20 NOTE — NURSING
Bedside Report given to night nurse NORMAN Miller. Walking rounds completed. Visualized and assessed patient NAD noted. Safety precautions maintained and call light within reach.     Chart check completed.

## 2022-01-20 NOTE — SUBJECTIVE & OBJECTIVE
Interval History:     Ms. Cousin was feeling better this morning. Her fever curve is improving. Her BP has remained stable. Her cultures remain negative. CXR yesterday was fine. She has no urinary complaints, just stable mild lower extremity discomfort bilaterally for which she has tylenol PRN. Prior US one week ago negative for DVT.    Review of Systems   Constitutional: Positive for fatigue and fever. Negative for chills.   Respiratory: Positive for shortness of breath (intermittent). Negative for cough.    Cardiovascular: Negative for chest pain and leg swelling.   Gastrointestinal: Negative for abdominal pain, constipation, diarrhea, nausea and vomiting.   Neurological: Negative for weakness and numbness.     Objective:     Vital Signs (Most Recent):  Temp: 98.1 °F (36.7 °C) (01/20/22 0830)  Pulse: 100 (01/20/22 0830)  Resp: 18 (01/20/22 0830)  BP: 124/64 (01/20/22 0830)  SpO2: 99 % (01/20/22 0830) Vital Signs (24h Range):  Temp:  [97.3 °F (36.3 °C)-98.5 °F (36.9 °C)] 98.1 °F (36.7 °C)  Pulse:  [100-118] 100  Resp:  [16-18] 18  SpO2:  [91 %-99 %] 99 %  BP: (113-126)/(57-69) 124/64     Weight: 94.3 kg (207 lb 14.3 oz)  Body mass index is 33.57 kg/m².  No intake or output data in the 24 hours ending 01/20/22 1208   Physical Exam  Constitutional:       General: She is not in acute distress.  HENT:      Head: Normocephalic and atraumatic.   Eyes:      General: No scleral icterus.        Right eye: No discharge.         Left eye: No discharge.   Pulmonary:      Effort: Pulmonary effort is normal.      Comments: Breathing comfortably on room air  Abdominal:      General: There is no distension.   Musculoskeletal:      Comments: Moves all extremities well   Skin:     Findings: No erythema or rash.   Neurological:      General: No focal deficit present.      Mental Status: She is alert and oriented to person, place, and time.      Comments: Alert, conversant   Psychiatric:         Mood and Affect: Mood normal.          Behavior: Behavior normal.         Significant Labs: All pertinent labs within the past 24 hours have been reviewed.    Significant Imaging: I have reviewed all pertinent imaging results/findings within the past 24 hours.

## 2022-01-20 NOTE — PLAN OF CARE
Problem: Physical Therapy Goal  Goal: Physical Therapy Goal  Description: Goals to be met by: 22     Patient will increase functional independence with mobility by performin. Supine to sit with Modified [place]  2. Rolling to Left and Right with Modified [place]  3. Sit to stand transfer with Modified [place]   4. Bed to chair transfer with Modified [place]   5. Gait >50 feet with Modified [place] using appropriate AD  6. Lower extremity exercise program x10 reps per handout, with independence    Outcome: Ongoing, Progressing     Pt will benefit from IPR.

## 2022-01-20 NOTE — PT/OT/SLP PROGRESS
Occupational Therapy   Treatment    Name: Xuan Ricardo  MRN: [id number]  Admitting Diagnosis:  COVID-19       Recommendations:     Discharge Recommendations: rehabilitation facility  Discharge Equipment Recommendations:  bedside commode,bath bench,wheelchair (if d/c to home)  Barriers to discharge:  Decreased caregiver support    Assessment:     Xuan Ricardo is a 41 y.o. female with a medical diagnosis of COVID-19.  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 Diagnoses of Tachycardia, ESRD on hemodialysis, COVID-19 virus infection, Abdominal pain, unspecified abdominal location, Coffee ground emesis, Bilateral lower extremity pain, COVID-19, End stage renal disease, and Abnormal EKG were also pertinent to this visit. Performance deficits affecting function are weakness,impaired functional mobilty,gait instability,impaired endurance,impaired balance,impaired self care skills,decreased lower extremity function,impaired skin,pain,impaired cardiopulmonary response to activity,decreased safety awareness,decreased coordination,decreased ROM.     Pt progressing towards goals, pt with increased drowsiness this date. Pt on RA throughout session, O2 93% at rest. Pt performed functional mobility in room with [place] and use of RW. Pt with mild c/o SOB, O2 taken at ~79%, unsure if accurate reading, no increased labor of breathing noted. O2 recovering to 93% with increased v/cs for PLB once returned to supine. Recommending IPR upon d/c. Patient was living in community setting functioning at independent level for ADLs, and mobility prior to this event.  There is an expectation of returning to prior level of function to maintain independence thus avoiding readmission.  Patient's clinical condition meets full Inpatient Rehab (IPR) criteria; including the ability to actively participate in 3 hours of therapy.  A lower level of care(SNF) cannot provide the interdisciplinary treatment approach needed.     Rehab  Prognosis:  Good; patient would benefit from acute skilled OT services to address these deficits and reach maximum level of function.       Plan:     Patient to be seen 3 x/week,5 x/week to address the above listed problems via self-care/home management,therapeutic activities,therapeutic exercises  · Plan of Care Expires: 02/18/22  · Plan of Care Reviewed with: patient    Subjective     Pain/Comfort:  · Pain Rating 1: other (see comments) (Pt with decreased pain to BLE this date)  · Pain Addressed 1: Reposition,Distraction,Cessation of Activity    Objective:     Communicated with: vincenzo prior to session.  Patient found HOB elevated with PICC line,telemetry,bed alarm upon OT entry to room.    General Precautions: Standard, airborne,contact,droplet,fall   Orthopedic Precautions:N/A   Braces: N/A  Respiratory Status: Room air     Occupational Performance:     Bed Mobility:    · Patient completed Scooting/Bridging with minimum assistance and moderate assistance to scoot to EOB   · Patient completed Supine to Sit with moderate assistance 2/2 LE management with HOB elevated  · Patient completed Sit to Supine with moderate assistance 2/2 LE management     Functional Mobility/Transfers:  · Patient completed Sit <> Stand Transfer with moderate assistance and of 2 persons  with  rolling walker   · Functional Mobility: Pt performed functional mobility in room with [place] and use of RW, pt with decreased gait speed while forward flexed at trunk with downward gaze. Pt taking 2 standing rest breaks during functional mobility with increased v/cs for PLB. Pt with c/o mild SOB.    Activities of Daily Living:  · Grooming: stand by assistance seated EOB to wash face      AMPAC 6 Click ADL: 11    Treatment & Education:  Pt progressing towards goals, pt with increased drowsiness noted this date.   Pt on RA throughout session, O2 93% at rest.   Pt performed G&H seated EOB as above.   Pt performed functional mobility in room with [place] and use  of RW as above.   Pt with mild c/o SOB, O2 taken at ~79%, unsure if accurate reading, no increased labor of breathing noted.   O2 recovering to 93% with increased v/cs for PLB once returned to supine.   HR ~120's throughout session.     Patient left HOB elevated with all lines intact, call button in reach, bed alarm on and nsg notifiedEducation:      GOALS:   Multidisciplinary Problems     Occupational Therapy Goals        Problem: Occupational Therapy Goal    Goal Priority Disciplines Outcome Interventions   Occupational Therapy Goal     OT, PT/OT Ongoing, Progressing    Description: Goals to be met by: 2/18/2022    Patient will increase functional independence with ADLs by performing:    Feeding with [place].  UE Dressing with [place].  LE Dressing with Stand-by Assistance.  Grooming while seated with [place].  Toileting from bedside commode with Set-up Assistance for hygiene and clothing management.   Bathing UB and pauly care sponge with Set-up Assistance.  Toilet transfer to bedside commode with Stand-by Assistance.                     Time Tracking:     OT Date of Treatment: 01/20/22  OT Start Time: 1035  OT Stop Time: 1107  OT Total Time (min): 32 min    Billable Minutes:Therapeutic Activity 16 cotx with PT    OT/RY: OT     RY Visit Number: 0    1/20/2022

## 2022-01-20 NOTE — PROGRESS NOTES
"      [facility]  Telemedicine Progress Note    Patient Name: Xuan Ricardo  MRN: [id number]  Patient Class: IP- Inpatient   Admission Date: 12/29/2021  Length of Stay: 21 days  Attending Physician: Wily Gong MD  Primary Care Provider: Katharine Franks MD          Subjective:     Principal Problem:COVID-19        HPI:  Ms. Ricardo is a 41y F w/ ESRD, recently dialyzed at [facility], presents after leaving AMA from [facility] with nausea/vomiting and bilateral thigh pain.     She says that the nausea and vomiting began a few weeks ago. She mostly reports vomiting up food content, denies overt blood. Says that the vomit is sometimes darker colored. She has been told she has "coffee-ground" emesis but does not describe the contents of her vomit as such. She denies abdominal pain, denies delayed vomiting after eating. Feels nauseas all the time, regardless of food intake.    She has also developed severe bilateral thigh lesions. She describes them as swelling w/ extreme sensitivity to touch. They started approximately two weeks ago and have persisted, with minimal improvement in pain. One thigh was biopsied at [facility] at her most recent admission, and she reports that the site continues to be tender.      Overview/Hospital Course:  ESRD patient with calciphylaxis. Admitted to hospital medicine. Nephrology consulted. Wound care consulted for thigh wounds. Pt gave verbal and written consent for records release from [facility]. At [facility] pt underwent biopsy of R thigh which was c/w calciphylaxis, and underwent EGD which showed candidal esophagitis. At  she was restarted on HD, given sodium thiosulfate. MBD therapy restarted. Restarted on fluconazole. Pain present but controlled on pain meds. COVID positive but stable on RA. had febrile episodes last week which found to have a UTI and treated and abx stopped. Seen by ID, who has now signed off. Febrile episodes had resolved but pt spiked " another fever on 1/19. Cultures ordered. Has issues with sinus tach and all work up was negative. Maybe some volume down. Started on metoprolol and doing well. COVID HD chair confirmed prior to DC. Outpatient wound care and neprho f/u for calciphylaxsis       Interval History:     Ms. Cousin was feeling better this morning. Her fever curve is improving. Her BP has remained stable. Her cultures remain negative. CXR yesterday was fine. She has no urinary complaints, just stable mild lower extremity discomfort bilaterally for which she has tylenol PRN. Prior US one week ago negative for DVT.    Review of Systems   Constitutional: Positive for fatigue and fever. Negative for chills.   Respiratory: Positive for shortness of breath (intermittent). Negative for cough.    Cardiovascular: Negative for chest pain and leg swelling.   Gastrointestinal: Negative for abdominal pain, constipation, diarrhea, nausea and vomiting.   Neurological: Negative for weakness and numbness.     Objective:     Vital Signs (Most Recent):  Temp: 98.1 °F (36.7 °C) (01/20/22 0830)  Pulse: 100 (01/20/22 0830)  Resp: 18 (01/20/22 0830)  BP: 124/64 (01/20/22 0830)  SpO2: 99 % (01/20/22 0830) Vital Signs (24h Range):  Temp:  [97.3 °F (36.3 °C)-98.5 °F (36.9 °C)] 98.1 °F (36.7 °C)  Pulse:  [100-118] 100  Resp:  [16-18] 18  SpO2:  [91 %-99 %] 99 %  BP: (113-126)/(57-69) 124/64     Weight: 94.3 kg (207 lb 14.3 oz)  Body mass index is 33.57 kg/m².  No intake or output data in the 24 hours ending 01/20/22 1208   Physical Exam  Constitutional:       General: She is not in acute distress.  HENT:      Head: Normocephalic and atraumatic.   Eyes:      General: No scleral icterus.        Right eye: No discharge.         Left eye: No discharge.   Pulmonary:      Effort: Pulmonary effort is normal.      Comments: Breathing comfortably on room air  Abdominal:      General: There is no distension.   Musculoskeletal:      Comments: Moves all extremities well    Skin:     Findings: No erythema or rash.   Neurological:      General: No focal deficit present.      Mental Status: She is alert and oriented to person, place, and time.      Comments: Alert, conversant   Psychiatric:         Mood and Affect: Mood normal.         Behavior: Behavior normal.         Significant Labs: All pertinent labs within the past 24 hours have been reviewed.    Significant Imaging: I have reviewed all pertinent imaging results/findings within the past 24 hours.      Assessment/Plan:      * COVID-19  Asymptomatic covid-19 infection  - isolation precautions  - no hypoxia, no indication for dex/rem      Fever  - continued to spike intermittent fevers  - cultures with no growth to date  - noted to have increasing D-dimer however, lower extremity duplex and CTA chest negative for blood clots  - infectious disease consulted, appreciate recommendations  - fevers had resolved for several days but pt spiked another fever on 1/19  - Could be driven by her oxycodone  - continue to monitor fever curve, cultures x48h. Can discharge after if negative        Sinus tachycardia  -patient with heart rate persistently elevated in 120s  -EKG with sinus tachycardia  -possibly secondary to pain, continue to treat with p.r.n. analgesia  -reports her appetite has been low, with decreased p.o. intake.  Encourage p.o. intake.  -PE/DVT ruled out  -hemoglobin improved post transfusion  -infectious disease following for any infectious workup  -patient has started on metoprolol 12.5 mg p.o. b.i.d, dose increased to 25mg PO BID  -monitor electrolytes, replete electrolytes as needed  -monitor in telemetry      Sepsis due to gram-negative UTI  - started on rocephin 1 g daily  - follow up urine and blood cultures      1/10- patient spiked another fever today of 101, getting blood cultures and LA; switching rocephin to zosyn and follow up urine cultures     1/12- fevers resolved and patient's urine cultures showed no growth;  course completed of augmentin  1/13 - patient noted to have fever again.  Will consult Infectious Disease.  Obtain CTA chest before dialysis tomorrow to rule out PE.  Lower extremity duplex negative for DVT.  Cultures with no growth to date.  1/18 - fevers now resolved. Off antibitics. ID signed off    Candida esophagitis  -Prior admission to [facility] w/ EGD showing candidal esophagitis  -was given fluconazole this admission   -CTA chest concerning for possible esophageal strictures.  Will need outpatient GI follow-up.      Hyperphosphatemia  Phos lowering important given calciphylaxis.  - continue sevalamer  - on cincalcet for hyperpth   - HD per nephrology      Renovascular hypertension  Continue home meds; titrate to effect      Type 2 diabetes mellitus  Reports taking 10U daily  - accuchecks/SSI  - Stable in 180s      Nausea  Persistent nausea/vomiting reported at admission. No vomiting while inpatient. Last admission nausea resolved w/ bicarb drip, suggesting ESRD component.  Nausea now improved.  Encourage p.o. intake take  - monitor as pt gets dialyzed  - see esophageal candidiasis      Calciphylaxis  Rash on bilateral thighs, extremely tender to touch. [facility] biopsy c/w calciphaxis, per report  - records request sent for biopsy  - nephrology consulted  - wound care  - prn analgesics  - sodium thiosulfate  - treatment of hyperphos/hyperPTH as noted elsewhere  - will likely need outpatient chronic pain follow up      End stage renal disease  Pt w/ ESRD. Refused dialysis on last admission, now s/p multiple sessions during [facility] hospitalization  - nephrology consulted  - last session 12/30  - access via [facility]  - MBD/anemia therapy as noted elsewhere  - outpatient HD chair arranged      Secondary hyperparathyroidism  PTH severely elevated. Particularly concerning given calciphylaxis  - continue cincalcet  - iPTH pending can f/u with her nephrologist    Metabolic acidosis  See ESRD      Anemia of renal disease  Drop in  hemoglobin 1/1 without clinical bleeding, appropriate response to transfusion  - continue to monitor  - defer ESAs to nephrology  - transfuse to hgb 7      1/12- will give a dose of venofer in the AM; epo with HD; no overt signs of GI bleeding  1/13 - hemoglobin of 6.9 no bleeding.  Repeat CBC ordered.  If hemoglobin below 7, will transfuse  1 unit PRBCs.  1/20 - stable, hemoglobin 8 g/dL yesterday    VTE Risk Mitigation (From admission, onward)         Ordered     heparin (porcine) injection 5,000 Units  Every 8 hours         01/17/22 1213     heparin (porcine) injection 3,200 Units  As needed (PRN)         12/31/21 1335     IP VTE HIGH RISK PATIENT  Once         12/30/21 0339     Place sequential compression device  Until discontinued         12/30/21 0339     Place HENRI hose  Until discontinued         12/30/21 0339     Reason for No Pharmacological VTE Prophylaxis  Once        Question:  Reasons:  Answer:  Active Bleeding    12/30/21 0339                      I have assessed these finding virtually using telemed platform and with assistance of bedside nurse                 The attending portion of this evaluation, treatment, and documentation was performed per Wily Gong MD via Telemedicine AudioVisual using the secure "[employer] software platform with 2 way audio/video. The provider was located off-site and the patient is located in the hospital. The aforementioned video software was utilized to document the relevant history and physical exam    Wily Gong MD  Department of Hospital Medicine   [facility] - Telemetry

## 2022-01-20 NOTE — PLAN OF CARE
Recommendations:  1) Continue renal diet as tolerated by pt, encouraging PO intake.  2) Continue Renal oral supplement BID to assist in meeting needs  3) Monitor renal labs, hyperglycemia, hyponatremia     Goals:   1) Pt to consistently meet >75% of EEN/EPN by PO/ONS intake  2) Nutrition related labs to trend WNL     Nutrition Goal Status: progressing towards goal  Communication of RD Recs:  (POC)

## 2022-01-20 NOTE — PT/OT/SLP PROGRESS
Physical Therapy Treatment    Patient Name:  Xuan Ricardo   MRN:  [id number]    Recommendations:     Discharge Recommendations:  rehabilitation facility   Discharge Equipment Recommendations: bedside commode,bath bench,wheelchair (if D/C'ed home)   Barriers to discharge home: Inaccessible home and Pt with decreased functional mobility and ambulation at this time.    Assessment:     Xuan Ricardo is a 41 y.o. female admitted with a medical diagnosis of COVID-19.  She presents with the following impairments/functional limitations:  impaired functional mobilty,gait instability,impaired balance,decreased lower extremity function,pain,decreased ROM,impaired skin.    Rehab Prognosis: Good; patient would benefit from acute skilled PT services to address these deficits and reach maximum level of function.    Recent Surgery: * No surgery found *      Plan:     During this hospitalization, patient to be seen 6 x/week to address the identified rehab impairments via gait training,therapeutic activities,therapeutic exercises and progress toward the following goals:    · Plan of Care Expires:  02/01/22    Subjective     Chief Complaint: Pt reported feeling sleepy from morning medicine.    Patient/Family Comments/goals: Pt agreeable to therapy.   Pain/Comfort:  · Pain Rating 1:  (Pt with minimal pain to BLE today.)  · Pain Addressed 1: Reposition,Distraction,Cessation of Activity      Objective:     Patient found HOB elevated with PICC line,telemetry (R chest HD access) upon PT entry to room.     General Precautions: Standard, DM, fall,airborne,contact,droplet ((COVID+)); Pt on HD.    Orthopedic Precautions:N/A   Braces: N/A  Respiratory Status: Room air     Functional Mobility: Pt appeared sleepy and quiet today, reported that one of her medicine is making her feel this way.  Pt with generalized weakness and decreased endurance during ambulation within the room 2* COVID isolation.  It appeared that pt may have some desaturation in  O2 during activity on RA.  Pt able to recover, however required extra time and rest in bed.  Pt was unable to recover sitting EOB.    · Bed Mobility:     · Scooting: minimum assistance and moderate assistance for anterior scooting  · Bridging: stand by assistance to reposition HOB  · Supine to Sit: moderate assistance with lower body and HOB elevated   · Sit to Supine: moderate assistance with lower body  · Transfers:     · Sit to Stand:  moderate assistance and of 2 persons with rolling walker x2 trials with bed elevated   · Gait: Pt ambulated ~15 ft within room and ~6 sidesteps along bedside with min A using RW.  Pt with forward flexed trunk, decreased weight shifting, decreased foot clearance, decreased step length, and decreased trent.  Pt easily fatigued with decreased in spO2 on RA.    · Balance: Pt with fair/fair- dynamic standing balance.     V/S: spO2 on RA ~93% at rest  It was difficulty to obtain spO2 on pt's fingers, unsure if reading is accurate.  spO2 on RA ~79% and HR ~120's bpm during activity, however pt reported only feeling a little SOB.  Pt with no labored breathing.  Pt required extra time and rest in bed in order to recover.      AM-PAC 6 CLICK MOBILITY  Turning over in bed (including adjusting bedclothes, sheets and blankets)?: 3  Sitting down on and standing up from a chair with arms (e.g., wheelchair, bedside commode, etc.): 2  Moving from lying on back to sitting on the side of the bed?: 3  Moving to and from a bed to a chair (including a wheelchair)?: 3  Need to walk in hospital room?: 2  Climbing 3-5 steps with a railing?: 1  Basic Mobility Total Score: 14       Therapeutic Activities and Exercises:  Balance Training  Static Standing:  Patient performed static standing on level surface using rolling walker with Moderate Assistance-min A and moderate verbal cues for upright trunk x2 trials.       Patient left HOB elevated with all lines intact, call button in reach, bed alarm on and  nurse Luly notified.  BLE elevated on pillow.  Tray table close by.      GOALS:   Multidisciplinary Problems     Physical Therapy Goals        Problem: Physical Therapy Goal    Goal Priority Disciplines Outcome Goal Variances Interventions   Physical Therapy Goal     PT, PT/OT Ongoing, Progressing     Description: Goals to be met by: 22     Patient will increase functional independence with mobility by performin. Supine to sit with Modified [place]  2. Rolling to Left and Right with Modified [place]  3. Sit to stand transfer with Modified [place]   4. Bed to chair transfer with Modified [place]   5. Gait >50 feet with Modified [place] using appropriate AD  6. Lower extremity exercise program x10 reps per handout, with independence                     Time Tracking:     PT Received On: 22  PT Start Time: 1038     PT Stop Time: 1107  PT Total Time (min): 29 min     Billable Minutes: Gait Training 15 min co-tx with OT    Treatment Type: Treatment              2022

## 2022-01-20 NOTE — NURSING
Report received from night nurse NORMAN Miller. Visualized patient and assessed patient's overall condition and appearance. No acute distress noted. Will continue to monitor

## 2022-01-20 NOTE — ASSESSMENT & PLAN NOTE
PTH severely elevated. Particularly concerning given calciphylaxis  - continue cincalcet  - iPTH pending can f/u with her nephrologist

## 2022-01-21 LAB
POCT GLUCOSE: 167 MG/DL (ref 70–110)
POCT GLUCOSE: 180 MG/DL (ref 70–110)
POCT GLUCOSE: 212 MG/DL (ref 70–110)
POCT GLUCOSE: 263 MG/DL (ref 70–110)

## 2022-01-21 PROCEDURE — 97530 THERAPEUTIC ACTIVITIES: CPT

## 2022-01-21 PROCEDURE — 97535 SELF CARE MNGMENT TRAINING: CPT

## 2022-01-21 PROCEDURE — 25000003 PHARM REV CODE 250: Performed by: EMERGENCY MEDICINE

## 2022-01-21 PROCEDURE — 21400001 HC TELEMETRY ROOM

## 2022-01-21 PROCEDURE — 63600175 PHARM REV CODE 636 W HCPCS: Performed by: EMERGENCY MEDICINE

## 2022-01-21 PROCEDURE — 25000003 PHARM REV CODE 250: Performed by: INTERNAL MEDICINE

## 2022-01-21 PROCEDURE — 25000003 PHARM REV CODE 250: Performed by: HOSPITALIST

## 2022-01-21 PROCEDURE — 63700000 PHARM REV CODE 250 ALT 637 W/O HCPCS: Performed by: STUDENT IN AN ORGANIZED HEALTH CARE EDUCATION/TRAINING PROGRAM

## 2022-01-21 PROCEDURE — 63600175 PHARM REV CODE 636 W HCPCS: Performed by: HOSPITALIST

## 2022-01-21 PROCEDURE — 63600175 PHARM REV CODE 636 W HCPCS: Performed by: STUDENT IN AN ORGANIZED HEALTH CARE EDUCATION/TRAINING PROGRAM

## 2022-01-21 RX ADMIN — GABAPENTIN 100 MG: 100 CAPSULE ORAL at 09:01

## 2022-01-21 RX ADMIN — OXYCODONE HYDROCHLORIDE 15 MG: 15 TABLET ORAL at 11:01

## 2022-01-21 RX ADMIN — INSULIN ASPART 3 UNITS: 100 INJECTION, SOLUTION INTRAVENOUS; SUBCUTANEOUS at 02:01

## 2022-01-21 RX ADMIN — CINACALCET 30 MG: 30 TABLET, FILM COATED ORAL at 09:01

## 2022-01-21 RX ADMIN — SEVELAMER CARBONATE 2400 MG: 800 TABLET, FILM COATED ORAL at 09:01

## 2022-01-21 RX ADMIN — THERA TABS 1 TABLET: TAB at 09:01

## 2022-01-21 RX ADMIN — METOPROLOL TARTRATE 25 MG: 25 TABLET, FILM COATED ORAL at 09:01

## 2022-01-21 RX ADMIN — FLUCONAZOLE 400 MG: 100 TABLET ORAL at 09:01

## 2022-01-21 RX ADMIN — HEPARIN SODIUM 5000 UNITS: 5000 INJECTION INTRAVENOUS; SUBCUTANEOUS at 02:01

## 2022-01-21 RX ADMIN — OXYCODONE HYDROCHLORIDE AND ACETAMINOPHEN 500 MG: 500 TABLET ORAL at 09:01

## 2022-01-21 RX ADMIN — Medication 6 MG: at 09:01

## 2022-01-21 RX ADMIN — GABAPENTIN 100 MG: 100 CAPSULE ORAL at 02:01

## 2022-01-21 RX ADMIN — POLYETHYLENE GLYCOL 3350 17 G: 17 POWDER, FOR SOLUTION ORAL at 09:01

## 2022-01-21 RX ADMIN — FAMOTIDINE 20 MG: 20 TABLET ORAL at 09:01

## 2022-01-21 RX ADMIN — SEVELAMER CARBONATE 2400 MG: 800 TABLET, FILM COATED ORAL at 04:01

## 2022-01-21 RX ADMIN — SEVELAMER CARBONATE 2400 MG: 800 TABLET, FILM COATED ORAL at 11:01

## 2022-01-21 NOTE — PROGRESS NOTES
"      [facility] Medicine  Telemedicine Progress Note    Patient Name: Xuan Ricardo  MRN: [id number]  Patient Class: IP- Inpatient   Admission Date: 12/29/2021  Length of Stay: 22 days  Attending Physician: Wily Gong MD  Primary Care Provider: Katharine Franks MD          Subjective:     Principal Problem:COVID-19        HPI:  Ms. Ricardo is a 41y F w/ ESRD, recently dial[place] at [facility], presents after leaving AMA from [facility] with nausea/vomiting and bilateral thigh pain.     She says that the nausea and vomiting began a few weeks ago. She mostly reports vomiting up food content, denies overt blood. Says that the vomit is sometimes darker colored. She has been told she has "coffee-ground" emesis but does not describe the contents of her vomit as such. She denies abdominal pain, denies delayed vomiting after eating. Feels nauseas all the time, regardless of food intake.    She has also developed severe bilateral thigh lesions. She describes them as swelling w/ extreme sensitivity to touch. They started approximately two weeks ago and have persisted, with minimal improvement in pain. One thigh was biopsied at [facility] at her most recent admission, and she reports that the site continues to be tender.      Overview/Hospital Course:  ESRD patient with calciphylaxis. Admitted to hospital medicine. Nephrology consulted. Wound care consulted for thigh wounds. Pt gave verbal and written consent for records release from [facility]. At [facility] pt underwent biopsy of R thigh which was c/w calciphylaxis, and underwent EGD which showed candidal esophagitis. At  she was restarted on HD, given sodium thiosulfate. MBD therapy restarted. Restarted on fluconazole. Pain present but controlled on pain meds. COVID positive but stable on RA. had febrile episodes last week which found to have a UTI and treated and abx stopped. Seen by ID, who has now signed off. Febrile episodes had resolved but pt spiked " another fever on 1/19. Cultures ordered. Has issues with sinus tach and all work up was negative. Maybe some volume down. Started on metoprolol and doing well. COVID HD chair confirmed prior to DC. Outpatient wound care and neprho f/u for calciphylaxsis       Interval History:     Ms. Cousin was stable overnight. Her cultures remain negative. She has no new complaints. She has remained afebrile and her blood cultures negative.    She is medically stable for discharge, now pending inpatient rehab per PT/OT recommendations.    Discontinued isolation precautions as she is afebrile >24h and 20 days out from her diagnosis    Review of Systems   Constitutional: Positive for fatigue. Negative for chills and fever.   Respiratory: Positive for shortness of breath (intermittent). Negative for cough.    Cardiovascular: Negative for chest pain and leg swelling.   Gastrointestinal: Negative for abdominal pain, constipation, diarrhea, nausea and vomiting.   Neurological: Negative for weakness and numbness.     Objective:     Vital Signs (Most Recent):  Temp: 99.1 °F (37.3 °C) (01/21/22 0808)  Pulse: 100 (01/21/22 0808)  Resp: 19 (01/21/22 0808)  BP: 131/76 (01/21/22 0808)  SpO2: 95 % (01/21/22 0808) Vital Signs (24h Range):  Temp:  [97.3 °F (36.3 °C)-99.1 °F (37.3 °C)] 99.1 °F (37.3 °C)  Pulse:  [] 100  Resp:  [16-20] 19  SpO2:  [95 %-100 %] 95 %  BP: ()/(54-79) 131/76     Weight: 94.3 kg (207 lb 14.3 oz)  Body mass index is 33.57 kg/m².    Intake/Output Summary (Last 24 hours) at 1/21/2022 1107  Last data filed at 1/20/2022 1815  Gross per 24 hour   Intake 500 ml   Output 1500 ml   Net -1000 ml      Physical Exam  Constitutional:       General: She is not in acute distress.  HENT:      Head: Normocephalic and atraumatic.   Eyes:      General: No scleral icterus.        Right eye: No discharge.         Left eye: No discharge.   Pulmonary:      Effort: Pulmonary effort is normal.      Comments: Breathing comfortably on  room air  Abdominal:      General: There is no distension.   Musculoskeletal:      Comments: Moves all extremities well   Skin:     Findings: No erythema or rash.   Neurological:      General: No focal deficit present.      Mental Status: She is alert and oriented to person, place, and time.      Comments: Alert, conversant   Psychiatric:         Mood and Affect: Mood normal.         Behavior: Behavior normal.         Significant Labs: All pertinent labs within the past 24 hours have been reviewed.    Significant Imaging: I have reviewed all pertinent imaging results/findings within the past 24 hours.      Assessment/Plan:      * COVID-19  Asymptomatic covid-19 infection  - isolation precautions  - no hypoxia, no indication for dex/rem  - Diagnosed 12/30, now has surpassed even severe COVID isolation requirements so isolation was discontinued    Fever  - Cultures with no growth to date  - noted to have increasing D-dimer however, lower extremity duplex and CTA chest negative for blood clots  - infectious disease consulted, appreciate recommendations  - fevers had resolved for several days but pt spiked another fever on 1/19  - Could be driven by her oxycodone  - Afebrile with negative BCx x48h  - Medically stable for d/c        Sinus tachycardia    - Waxing and waning, generally improved from admission when patient's HR was persistently 120s  -EKG with sinus tachycardia  -possibly secondary to pain, continue to treat with p.r.n. analgesia  -reports her appetite has been low, with decreased p.o. intake.  Encourage p.o. intake.  -PE/DVT ruled out  -hemoglobin improved post transfusion  -infectious disease following for any infectious workup  -patient has started on metoprolol 12.5 mg p.o. b.i.d, dose increased to 25mg PO BID  -monitor electrolytes, replete electrolytes as needed  -monitor in telemetry      Sepsis due to gram-negative UTI  - started on rocephin 1 g daily  - follow up urine and blood cultures      1/10-  patient spiked another fever today of 101, getting blood cultures and LA; switching rocephin to zosyn and follow up urine cultures     1/12- fevers resolved and patient's urine cultures showed no growth; course completed of augmentin  1/13 - patient noted to have fever again.  Will consult Infectious Disease.  Obtain CTA chest before dialysis tomorrow to rule out PE.  Lower extremity duplex negative for DVT.  Cultures with no growth to date.  1/18 - fevers now resolved. Off antibitics. ID signed off    Candida esophagitis  -Prior admission to [facility] w/ EGD showing candidal esophagitis  -was given fluconazole this admission   -CTA chest concerning for possible esophageal strictures.  Will need outpatient GI follow-up.      Hyperphosphatemia  Phos lowering important given calciphylaxis.  - continue sevalamer  - on cincalcet for hyperpth   - HD per nephrology      Renovascular hypertension  Continue home meds; titrate to effect      Type 2 diabetes mellitus  Reports taking 10U daily  - accuchecks/SSI  - Stable in 180s      Nausea  Persistent nausea/vomiting reported at admission. No vomiting while inpatient. Last admission nausea resolved w/ bicarb drip, suggesting ESRD component.  Nausea now improved.  Encourage p.o. intake take  - monitor as pt gets dialyzed  - see esophageal candidiasis      Calciphylaxis  Rash on bilateral thighs, extremely tender to touch. [facility] biopsy c/w calciphaxis, per report  - records request sent for biopsy  - nephrology consulted  - wound care  - prn analgesics  - sodium thiosulfate  - treatment of hyperphos/hyperPTH as noted elsewhere  - will likely need outpatient chronic pain follow up      End stage renal disease  Pt w/ ESRD. Refused dialysis on last admission, now s/p multiple sessions during [facility] hospitalization  - nephrology consulted  - last session 12/30  - access via [facility]  - MBD/anemia therapy as noted elsewhere  - outpatient HD chair arranged      Secondary  hyperparathyroidism  PTH severely elevated. Particularly concerning given calciphylaxis  - continue cincalcet  - iPTH pending can f/u with her nephrologist    Metabolic acidosis  See ESRD      Anemia of renal disease  Drop in hemoglobin 1/1 without clinical bleeding, appropriate response to transfusion  - continue to monitor  - defer ESAs to nephrology  - transfuse to hgb 7      1/12- will give a dose of venofer in the AM; epo with HD; no overt signs of GI bleeding  1/13 - hemoglobin of 6.9 no bleeding.  Repeat CBC ordered.  If hemoglobin below 7, will transfuse  1 unit PRBCs.  1/20 - stable, hemoglobin 8 g/dL yesterday    VTE Risk Mitigation (From admission, onward)         Ordered     heparin (porcine) injection 5,000 Units  Every 8 hours         01/17/22 1213     heparin (porcine) injection 3,200 Units  As needed (PRN)         12/31/21 1335     IP VTE HIGH RISK PATIENT  Once         12/30/21 0339     Place sequential compression device  Until discontinued         12/30/21 0339     Place HENRI hose  Until discontinued         12/30/21 0339     Reason for No Pharmacological VTE Prophylaxis  Once        Question:  Reasons:  Answer:  Active Bleeding    12/30/21 0339                      I have assessed these finding virtually using telemed platform and with assistance of bedside nurse                 The attending portion of this evaluation, treatment, and documentation was performed per Wily Gong MD via Telemedicine AudioVisual using the secure Vidyo software platform with 2 way audio/video. The provider was located off-site and the patient is located in the hospital. The aforementioned video software was utilized to document the relevant history and physical exam    Wily Gong MD  Department of Hospital Medicine   [facility] [place] - Telemetry

## 2022-01-21 NOTE — ASSESSMENT & PLAN NOTE
- Waxing and waning, generally improved from admission when patient's HR was persistently 120s  -EKG with sinus tachycardia  -possibly secondary to pain, continue to treat with p.r.n. analgesia  -reports her appetite has been low, with decreased p.o. intake.  Encourage p.o. intake.  -PE/DVT ruled out  -hemoglobin improved post transfusion  -infectious disease following for any infectious workup  -patient has started on metoprolol 12.5 mg p.o. b.i.d, dose increased to 25mg PO BID  -monitor electrolytes, replete electrolytes as needed  -monitor in telemetry

## 2022-01-21 NOTE — ASSESSMENT & PLAN NOTE
Asymptomatic covid-19 infection  - isolation precautions  - no hypoxia, no indication for dex/rem  - Diagnosed 12/30, now has surpassed even severe COVID isolation requirements so isolation was discontinued

## 2022-01-21 NOTE — ASSESSMENT & PLAN NOTE
- Cultures with no growth to date  - noted to have increasing D-dimer however, lower extremity duplex and CTA chest negative for blood clots  - infectious disease consulted, appreciate recommendations  - fevers had resolved for several days but pt spiked another fever on 1/19  - Could be driven by her oxycodone  - Afebrile with negative BCx x48h  - Medically stable for d/c

## 2022-01-21 NOTE — PROGRESS NOTES
Maintenance hemodialysis done x 3 hours as ordered/pt reinfused. UF goal decreased d/t hypotension during tx and bp later improved. Flushed both ports to right chest wall cvc without difficulty noted, heparin locked, caps applied. Sterile dsg change done. Report given to DANIEL Mak LPN.    Net fluid removed 1000 ml as tolerated

## 2022-01-21 NOTE — NURSING
Bedside Report given to night nurse NORMAN Marley. Walking rounds completed. Visualized and assessed patient NAD noted. Safety precautions maintained and call light within reach.     Chart check completed.

## 2022-01-21 NOTE — SUBJECTIVE & OBJECTIVE
Interval History:     Ms. Cousin was stable overnight. Her cultures remain negative. She has no new complaints. She has remained afebrile and her blood cultures negative.    She is medically stable for discharge, now pending inpatient rehab per PT/OT recommendations.    Discontinued isolation precautions as she is afebrile >24h and 20 days out from her diagnosis    Review of Systems   Constitutional: Positive for fatigue. Negative for chills and fever.   Respiratory: Positive for shortness of breath (intermittent). Negative for cough.    Cardiovascular: Negative for chest pain and leg swelling.   Gastrointestinal: Negative for abdominal pain, constipation, diarrhea, nausea and vomiting.   Neurological: Negative for weakness and numbness.     Objective:     Vital Signs (Most Recent):  Temp: 99.1 °F (37.3 °C) (01/21/22 0808)  Pulse: 100 (01/21/22 0808)  Resp: 19 (01/21/22 0808)  BP: 131/76 (01/21/22 0808)  SpO2: 95 % (01/21/22 0808) Vital Signs (24h Range):  Temp:  [97.3 °F (36.3 °C)-99.1 °F (37.3 °C)] 99.1 °F (37.3 °C)  Pulse:  [] 100  Resp:  [16-20] 19  SpO2:  [95 %-100 %] 95 %  BP: ()/(54-79) 131/76     Weight: 94.3 kg (207 lb 14.3 oz)  Body mass index is 33.57 kg/m².    Intake/Output Summary (Last 24 hours) at 1/21/2022 1107  Last data filed at 1/20/2022 1815  Gross per 24 hour   Intake 500 ml   Output 1500 ml   Net -1000 ml      Physical Exam  Constitutional:       General: She is not in acute distress.  HENT:      Head: Normocephalic and atraumatic.   Eyes:      General: No scleral icterus.        Right eye: No discharge.         Left eye: No discharge.   Pulmonary:      Effort: Pulmonary effort is normal.      Comments: Breathing comfortably on room air  Abdominal:      General: There is no distension.   Musculoskeletal:      Comments: Moves all extremities well   Skin:     Findings: No erythema or rash.   Neurological:      General: No focal deficit present.      Mental Status: She is alert and  oriented to person, place, and time.      Comments: Alert, conversant   Psychiatric:         Mood and Affect: Mood normal.         Behavior: Behavior normal.         Significant Labs: All pertinent labs within the past 24 hours have been reviewed.    Significant Imaging: I have reviewed all pertinent imaging results/findings within the past 24 hours.

## 2022-01-21 NOTE — PT/OT/SLP PROGRESS
Occupational Therapy   Treatment    Name: Xuan Ricardo  MRN: [id number]  Admitting Diagnosis:  COVID-19       Recommendations:     Discharge Recommendations: rehabilitation facility  Discharge Equipment Recommendations:  bedside commode,bath bench,wheelchair (if d/c home)  Barriers to discharge:   (not at PLOF; high risk of falls, unplanned readmission, and morbidity if d/c home)    Assessment:     Xuan Ricardo is a 41 y.o. female with a medical diagnosis of COVID-19. Performance deficits affecting function are weakness,impaired endurance,decreased ROM,decreased coordination,impaired self care skills,impaired functional mobilty,decreased lower extremity function,impaired skin,edema,decreased safety awareness,gait instability,impaired balance,pain,impaired cardiopulmonary response to activity.     Rehab Prognosis:  Good; patient would benefit from acute skilled OT services to address these deficits and reach maximum level of function.       Plan:     Patient to be seen 5 x/week to address the above listed problems via self-care/home management,therapeutic exercises,therapeutic activities  · Plan of Care Expires: 02/18/22  · Plan of Care Reviewed with: patient    Subjective     Chief complaint: appears withdrawn, sad, did not want to walk away from the bed today   Patient/family comments/ goals: agreeable to session with encouragement     Pain/Comfort:  · Pain Rating 1: 10/10  · Location - Side 1: Bilateral  · Location 1: leg  · Pain Addressed 1: Reposition,Distraction,Cessation of Activity,Nurse notified    Objective:     Patient found HOB elevated with BLE elevated with PICC line,telemetry,bed alarm upon OT entry to room.    General Precautions: Standard, airborne,contact,droplet,fall   Orthopedic Precautions:N/A   Braces: N/A  Respiratory Status: Room air     V/S: spO2 on RA 96-99% and HR ~120-140's bpm     Occupational Performance:     Bed Mobility:  Pt required prolonged time with rest breaks with all aspects of  functional mobility; pt demo'd increased motivation to do more for herself as time progressed.    · Patient completed Rolling/Turning to Right with minimum assistance  · Patient completed Scooting anteriorly with min to mod A   · Patient completed Supine to Sit with moderate assistance and HOB elevated  · Patient completed Sit to Supine with moderate assistance   · Patient completed Bridging with SBA-CGA     Functional Mobility/Transfers:  · Patient completed Sit <> Stand Transfer with moderate assistance and of 2 persons  with  rolling walker with bed elevated  · Functional Mobility: pt took ~8 sidesteps at EOB with RW and MIN A. Pt declined further mobility today.      Activities of Daily Living:  · Grooming: supervision seated EOB unsupported with bedside table in front of pt to wash her face, don deodorant, and brush her teeth . Pt declined standing at the sink for tasks today.       [facility] 6 Click ADL: 16    Treatment & Education:  · Pt re-educated on OT role/POC.   · Importance of OOB activity with staff assistance.  · Safety during functional t/f and mobility   · Self-care tasks/functional mobility completed- assistance level noted above   · Offered music/answer her facetime call once EOB for encouragement/distraction, but pt declined both. Minimally engaging in conversation today.   · All questions/concerns answered within OT scope of practice       Patient left HOB elevated R sidelying with B/L LE elevated on pillows  with all lines intact, call button in reach, bed alarm on, nurse, Luly, notified and all needs met/within reach. Education:  Luly notified that 3 big white pills were found in pt's bed    GOALS:   Multidisciplinary Problems     Occupational Therapy Goals        Problem: Occupational Therapy Goal    Goal Priority Disciplines Outcome Interventions   Occupational Therapy Goal     OT, PT/OT Ongoing, Progressing    Description: Goals to be met by: 2/18/2022    Patient will increase functional  independence with ADLs by performing:    Feeding with [place]  UE Dressing with [place]  LE Dressing with Stand-by Assistance  Grooming while standing at the sink with Stand-by Assistance  Toileting from bedside commode with Stand-by Assistance for hygiene and clothing management.   Toilet transfer to bedside commode with Stand-by Assistance  Supine <> sit with Contact guard assistance   Step transfer with contact guard assistance                      Time Tracking:     OT Date of Treatment: 01/21/22  OT Start Time: 1029  OT Stop Time: 1103  OT Total Time (min): 34 min    Billable Minutes:Self Care/Home Management 17 min   Total Time 34 min (co-treat with PT)    OT/YR: OT     RY Visit Number: 0    1/21/2022

## 2022-01-21 NOTE — PROGRESS NOTES
Xuan Cousin is a 41 y.o. female patient.    Follow for ESRD, dialysis    Patient seen while on dialysis  No new c/o, comfortable    Scheduled Meds:   ascorbic acid (vitamin C)  500 mg Oral BID    cinacalcet  30 mg Oral Daily with breakfast    cloNIDine 0.3 mg/24 hr td ptwk  1 patch Transdermal Q7 Days    epoetin kelsey-epbx  10,000 Units Intravenous Every Tues, Thurs, Sat    famotidine  20 mg Oral Daily    fluconazole  400 mg Oral Daily    gabapentin  100 mg Oral TID    heparin (porcine)  5,000 Units Subcutaneous Q8H    metoprolol tartrate  25 mg Oral BID    multivitamin  1 tablet Oral Daily    NIFEdipine  90 mg Oral Daily    polyethylene glycol  17 g Oral BID    senna-docusate 8.6-50 mg  1 tablet Oral Daily    sevelamer carbonate  2,400 mg Oral TID WM    sodium thiosulfate  25 g Intravenous Every Tues, Thurs, Sat    tuberculin  5 Units Intradermal Once       Review of patient's allergies indicates:   Allergen Reactions    Vicodin [hydrocodone-acetaminophen] Hives    Codeine Hives         Vital Signs Range (Last 24H):  Temp:  [97.3 °F (36.3 °C)-98.2 °F (36.8 °C)]   Pulse:  []   Resp:  [16-18]   BP: ()/(54-79)   SpO2:  [94 %-100 %]     I & O (Last 24H):    Intake/Output Summary (Last 24 hours) at 1/20/2022 1943  Last data filed at 1/20/2022 1815  Gross per 24 hour   Intake 500 ml   Output 1500 ml   Net -1000 ml           Physical Exam:  General appearance: well developed, well nourished, no distress  Lungs:  clear to auscultation bilaterally and normal respiratory effort  Heart: regular rate and rhythm  Abdomen: soft, non-tender non-distented; bowel sounds normal; no masses,  no organomegaly  Extremities: no cyanosis or edema, or clubbing    Laboratory:  I have reviewed all pertinent lab results within the past 24 hours.  CBC:   Recent Labs   Lab 01/19/22  0444   WBC 10.00   RBC 2.73*   HGB 8.0*   HCT 25.5*      MCV 93   MCH 29.3   MCHC 31.4*     CMP:   Recent Labs   Lab  01/19/22  0444   *   CALCIUM 8.6*   *   K 4.2   CO2 19*      BUN 18   CREATININE 4.8*       Imp/Plan    ESRD - on dialysis, stable, tolerated well  COVID-19 infection  HTN  DM type 2  Calciphylaxis  Anemia    Continue HD q TTS  We'll follow for dialysis      Isaura Meyers  1/20/2022

## 2022-01-21 NOTE — NURSING
Report received from night nurse NORMAN Marley. Visualized patient and assessed patient's overall condition and appearance. No acute distress noted. Will continue to monitor

## 2022-01-21 NOTE — PLAN OF CARE
01/21/22 1555   Discharge Reassessment   Assessment Type Discharge Planning Reassessment   Patient has been accepted by [facility].  They have submitted to [facility] for auth.  TN spoke with Aide at [facility] who stated this case will go to MRU.  Dr Gong has agreed to [id number] if needed.  TN provided Aide with his contact info.

## 2022-01-21 NOTE — PLAN OF CARE
Problem: Physical Therapy Goal  Goal: Physical Therapy Goal  Description: Goals to be met by: 22     Patient will increase functional independence with mobility by performin. Supine to sit with Modified Felt  2. Rolling to Left and Right with Modified Felt  3. Sit to stand transfer with Modified Felt   4. Bed to chair transfer with Modified Felt   5. Gait >50 feet with Modified Felt using appropriate AD  6. Lower extremity exercise program x10 reps per handout, with independence    Outcome: Ongoing, Progressing     Pt ambulated ~8 ft along bedside with min A using RW, spO2 on RA ~96% and HR ~140's bpm.

## 2022-01-21 NOTE — PT/OT/SLP PROGRESS
Physical Therapy Treatment    Patient Name:  Xuan Ricardo   MRN:  [id number]    Recommendations:     Discharge Recommendations:  rehabilitation facility   Discharge Equipment Recommendations: bedside commode,bath bench,wheelchair (if D/C'ed home)   Barriers to discharge home: Inaccessible home, Decreased caregiver support and Pt with decreased functional mobility and ambulation at this time.    Assessment:     Xuan Ricardo is a 41 y.o. female admitted with a medical diagnosis of COVID-19.  She presents with the following impairments/functional limitations:  impaired functional mobilty,gait instability,impaired balance,decreased lower extremity function,pain,decreased ROM,impaired skin.    Rehab Prognosis: Good; patient would benefit from acute skilled PT services to address these deficits and reach maximum level of function.    Recent Surgery: * No surgery found *      Plan:     During this hospitalization, patient to be seen 6 x/week to address the identified rehab impairments via gait training,therapeutic activities,therapeutic exercises and progress toward the following goals:    · Plan of Care Expires:  02/01/22    Subjective     Chief Complaint: pain  Patient/Family Comments/goals: Pt agreeable to therapy, needed encouragement today.   Pain/Comfort:  · Pain Rating 1: 10/10  · Location - Side 1: Bilateral  · Location 1: leg  · Pain Addressed 1: Reposition,Distraction,Cessation of Activity,Nurse notified      Objective:     Patient found HOB elevated with PICC line,telemetry (R chest HD access) upon PT entry to room.     General Precautions: Standard, DM, fall,airborne,contact,droplet ((COVID+)); Pt on HD.     Orthopedic Precautions:N/A   Braces: N/A  Respiratory Status: Room air     Functional Mobility:  Pt alert, not talking much today.  Pt stated that she's ok, however appeared sad and uncomfortable during functional mobility.    · Bed Mobility:     · Rolling Right: minimum assistance  · Scooting: minimum  assistance and moderate assistance for anterior scooting  · Bridging: stand by assistance and contact guard assistance to reposition HOB  · Supine to Sit: moderate assistance with LE and HOB elevated  · Sit to Supine: moderate assistance with LE  · Transfers:     · Sit to Stand:  moderate assistance and of 2 persons with rolling walker and bed elevated   · Gait: Pt ambulated ~8 ft along bedside with min A using RW.  Pt with wide ISAAC, decreased weight shifting, decreased foot clearance, decreased step length, and decreased trent.  Pt also with forward flexed trunk, required min VC's for trunk extension.  Pt limited by BLE pain.  Pt with no drop in spO2 on RA today.  Pt declined further standing trials and gait training, requested to get back in bed.    · Balance: Pt with fair dynamic standing balance.     V/S: spO2 on RA 96-99% and HR ~120-140's bpm    AM-PAC 6 CLICK MOBILITY  Turning over in bed (including adjusting bedclothes, sheets and blankets)?: 3  Sitting down on and standing up from a chair with arms (e.g., wheelchair, bedside commode, etc.): 2  Moving from lying on back to sitting on the side of the bed?: 3  Moving to and from a bed to a chair (including a wheelchair)?: 3  Need to walk in hospital room?: 2  Climbing 3-5 steps with a railing?: 1  Basic Mobility Total Score: 14       Therapeutic Activities and Exercises:  BLE seated therex x5-10 reps: hip flex and LAQ      Patient left right sidelying and HOB elevated and BLE elevated on pillows with all lines intact, call button in reach, bed alarm on and nurse Luly notified (3 big white pills found in pt's bed).  Tray table close by.    GOALS:   Multidisciplinary Problems     Physical Therapy Goals        Problem: Physical Therapy Goal    Goal Priority Disciplines Outcome Goal Variances Interventions   Physical Therapy Goal     PT, PT/OT Ongoing, Progressing     Description: Goals to be met by: 2/1/22     Patient will increase functional independence  with mobility by performin. Supine to sit with Modified [facility]  2. Rolling to Left and Right with Modified [facility]  3. Sit to stand transfer with Modified [facility]   4. Bed to chair transfer with Modified [facility]   5. Gait >50 feet with Modified [facility] using appropriate AD  6. Lower extremity exercise program x10 reps per handout, with independence                     Time Tracking:     PT Received On: 22  PT Start Time: 1028     PT Stop Time: 1102  PT Total Time (min): 34 min     Billable Minutes: Therapeutic Activity 17 min co-tx with OT    Treatment Type: Treatment              2022

## 2022-01-21 NOTE — PLAN OF CARE
Problem: Occupational Therapy Goal  Goal: Occupational Therapy Goal  Description: Goals to be met by: 2/18/2022    Patient will increase functional independence with ADLs by performing:    Feeding with [place]  UE Dressing with [place]  LE Dressing with Stand-by Assistance  Grooming while standing at the sink with Stand-by Assistance  Toileting from bedside commode with Stand-by Assistance for hygiene and clothing management.   Toilet transfer to bedside commode with Stand-by Assistance  Supine <> sit with Contact guard assistance   Step transfer with contact guard assistance     Outcome: Ongoing, Progressing     Pt participatory with grooming ADLs seated EOB with supervision.

## 2022-01-22 LAB
ANION GAP SERPL CALC-SCNC: 15 MMOL/L (ref 8–16)
ANISOCYTOSIS BLD QL SMEAR: SLIGHT
BASOPHILS # BLD AUTO: ABNORMAL K/UL (ref 0–0.2)
BASOPHILS NFR BLD: 0 % (ref 0–1.9)
BUN SERPL-MCNC: 24 MG/DL (ref 6–20)
CALCIUM SERPL-MCNC: 8.8 MG/DL (ref 8.7–10.5)
CHLORIDE SERPL-SCNC: 100 MMOL/L (ref 95–110)
CO2 SERPL-SCNC: 22 MMOL/L (ref 23–29)
CREAT SERPL-MCNC: 5.7 MG/DL (ref 0.5–1.4)
DIFFERENTIAL METHOD: ABNORMAL
EOSINOPHIL # BLD AUTO: ABNORMAL K/UL (ref 0–0.5)
EOSINOPHIL NFR BLD: 2 % (ref 0–8)
ERYTHROCYTE [DISTWIDTH] IN BLOOD BY AUTOMATED COUNT: 19.9 % (ref 11.5–14.5)
EST. GFR  (AFRICAN AMERICAN): 10 ML/MIN/1.73 M^2
EST. GFR  (NON AFRICAN AMERICAN): 8 ML/MIN/1.73 M^2
GLUCOSE SERPL-MCNC: 174 MG/DL (ref 70–110)
HCT VFR BLD AUTO: 23 % (ref 37–48.5)
HGB BLD-MCNC: 7.1 G/DL (ref 12–16)
HYPOCHROMIA BLD QL SMEAR: ABNORMAL
IMM GRANULOCYTES # BLD AUTO: ABNORMAL K/UL (ref 0–0.04)
IMM GRANULOCYTES NFR BLD AUTO: ABNORMAL % (ref 0–0.5)
LYMPHOCYTES # BLD AUTO: ABNORMAL K/UL (ref 1–4.8)
LYMPHOCYTES NFR BLD: 17 % (ref 18–48)
MCH RBC QN AUTO: 28.7 PG (ref 27–31)
MCHC RBC AUTO-ENTMCNC: 30.9 G/DL (ref 32–36)
MCV RBC AUTO: 93 FL (ref 82–98)
MONOCYTES # BLD AUTO: ABNORMAL K/UL (ref 0.3–1)
MONOCYTES NFR BLD: 6 % (ref 4–15)
NEUTROPHILS NFR BLD: 75 % (ref 38–73)
NRBC BLD-RTO: 0 /100 WBC
OVALOCYTES BLD QL SMEAR: ABNORMAL
PHOSPHATE SERPL-MCNC: 2.7 MG/DL (ref 2.7–4.5)
PLATELET # BLD AUTO: 219 K/UL (ref 150–450)
PLATELET BLD QL SMEAR: ABNORMAL
PMV BLD AUTO: 9.8 FL (ref 9.2–12.9)
POCT GLUCOSE: 162 MG/DL (ref 70–110)
POCT GLUCOSE: 167 MG/DL (ref 70–110)
POCT GLUCOSE: 188 MG/DL (ref 70–110)
POCT GLUCOSE: 190 MG/DL (ref 70–110)
POIKILOCYTOSIS BLD QL SMEAR: SLIGHT
POLYCHROMASIA BLD QL SMEAR: ABNORMAL
POTASSIUM SERPL-SCNC: 4 MMOL/L (ref 3.5–5.1)
RBC # BLD AUTO: 2.47 M/UL (ref 4–5.4)
SCHISTOCYTES BLD QL SMEAR: ABNORMAL
SODIUM SERPL-SCNC: 137 MMOL/L (ref 136–145)
WBC # BLD AUTO: 9.93 K/UL (ref 3.9–12.7)

## 2022-01-22 PROCEDURE — 25000003 PHARM REV CODE 250: Performed by: EMERGENCY MEDICINE

## 2022-01-22 PROCEDURE — 63600175 PHARM REV CODE 636 W HCPCS: Performed by: HOSPITALIST

## 2022-01-22 PROCEDURE — 84100 ASSAY OF PHOSPHORUS: CPT | Performed by: INTERNAL MEDICINE

## 2022-01-22 PROCEDURE — 85007 BL SMEAR W/DIFF WBC COUNT: CPT | Performed by: INTERNAL MEDICINE

## 2022-01-22 PROCEDURE — 25000003 PHARM REV CODE 250: Performed by: INTERNAL MEDICINE

## 2022-01-22 PROCEDURE — 85027 COMPLETE CBC AUTOMATED: CPT | Performed by: INTERNAL MEDICINE

## 2022-01-22 PROCEDURE — 21400001 HC TELEMETRY ROOM

## 2022-01-22 PROCEDURE — 80048 BASIC METABOLIC PNL TOTAL CA: CPT | Performed by: INTERNAL MEDICINE

## 2022-01-22 PROCEDURE — 25000003 PHARM REV CODE 250: Performed by: HOSPITALIST

## 2022-01-22 PROCEDURE — 80100016 HC MAINTENANCE HEMODIALYSIS

## 2022-01-22 PROCEDURE — 36415 COLL VENOUS BLD VENIPUNCTURE: CPT | Performed by: INTERNAL MEDICINE

## 2022-01-22 PROCEDURE — 63600175 PHARM REV CODE 636 W HCPCS: Mod: JG | Performed by: INTERNAL MEDICINE

## 2022-01-22 PROCEDURE — 63600175 PHARM REV CODE 636 W HCPCS: Performed by: STUDENT IN AN ORGANIZED HEALTH CARE EDUCATION/TRAINING PROGRAM

## 2022-01-22 PROCEDURE — 63600175 PHARM REV CODE 636 W HCPCS: Performed by: INTERNAL MEDICINE

## 2022-01-22 PROCEDURE — 63700000 PHARM REV CODE 250 ALT 637 W/O HCPCS: Performed by: STUDENT IN AN ORGANIZED HEALTH CARE EDUCATION/TRAINING PROGRAM

## 2022-01-22 PROCEDURE — 27000207 HC ISOLATION

## 2022-01-22 RX ORDER — FERROUS GLUCONATE 324(37.5)
324 TABLET ORAL 2 TIMES DAILY WITH MEALS
Status: DISCONTINUED | OUTPATIENT
Start: 2022-01-22 | End: 2022-01-26

## 2022-01-22 RX ADMIN — CINACALCET 30 MG: 30 TABLET, FILM COATED ORAL at 08:01

## 2022-01-22 RX ADMIN — OXYCODONE HYDROCHLORIDE 15 MG: 15 TABLET ORAL at 05:01

## 2022-01-22 RX ADMIN — OXYCODONE HYDROCHLORIDE AND ACETAMINOPHEN 500 MG: 500 TABLET ORAL at 09:01

## 2022-01-22 RX ADMIN — HEPARIN SODIUM 3200 UNITS: 5000 INJECTION INTRAVENOUS; SUBCUTANEOUS at 04:01

## 2022-01-22 RX ADMIN — OXYCODONE HYDROCHLORIDE 15 MG: 15 TABLET ORAL at 09:01

## 2022-01-22 RX ADMIN — Medication 6 MG: at 09:01

## 2022-01-22 RX ADMIN — SENNOSIDES AND DOCUSATE SODIUM 1 TABLET: 50; 8.6 TABLET ORAL at 09:01

## 2022-01-22 RX ADMIN — SEVELAMER CARBONATE 2400 MG: 800 TABLET, FILM COATED ORAL at 05:01

## 2022-01-22 RX ADMIN — POLYETHYLENE GLYCOL 3350 17 G: 17 POWDER, FOR SOLUTION ORAL at 09:01

## 2022-01-22 RX ADMIN — SEVELAMER CARBONATE 2400 MG: 800 TABLET, FILM COATED ORAL at 12:01

## 2022-01-22 RX ADMIN — HEPARIN SODIUM 5000 UNITS: 5000 INJECTION INTRAVENOUS; SUBCUTANEOUS at 09:01

## 2022-01-22 RX ADMIN — METOPROLOL TARTRATE 25 MG: 25 TABLET, FILM COATED ORAL at 09:01

## 2022-01-22 RX ADMIN — GABAPENTIN 100 MG: 100 CAPSULE ORAL at 05:01

## 2022-01-22 RX ADMIN — SEVELAMER CARBONATE 2400 MG: 800 TABLET, FILM COATED ORAL at 08:01

## 2022-01-22 RX ADMIN — GABAPENTIN 100 MG: 100 CAPSULE ORAL at 09:01

## 2022-01-22 RX ADMIN — NIFEDIPINE 90 MG: 30 TABLET, FILM COATED, EXTENDED RELEASE ORAL at 09:01

## 2022-01-22 RX ADMIN — FLUCONAZOLE 400 MG: 100 TABLET ORAL at 09:01

## 2022-01-22 RX ADMIN — Medication 324 MG: at 05:01

## 2022-01-22 RX ADMIN — FAMOTIDINE 20 MG: 20 TABLET ORAL at 09:01

## 2022-01-22 RX ADMIN — THERA TABS 1 TABLET: TAB at 09:01

## 2022-01-22 RX ADMIN — HEPARIN SODIUM 5000 UNITS: 5000 INJECTION INTRAVENOUS; SUBCUTANEOUS at 05:01

## 2022-01-22 RX ADMIN — EPOETIN ALFA-EPBX 10000 UNITS: 10000 INJECTION, SOLUTION INTRAVENOUS; SUBCUTANEOUS at 03:01

## 2022-01-22 RX ADMIN — SODIUM THIOSULFATE 25 G: 250 INJECTION, SOLUTION INTRAVENOUS at 01:01

## 2022-01-22 NOTE — NURSING
Pt completed HD. Net 1.4 L fluid removed as tolerated. Administered sodium thiosulfate and epoetin. Tolerated well. VSS. NAD. CVC lines heparin locked, clamped, and capped. Report given to primary RN.

## 2022-01-22 NOTE — NURSING
Received report from offgoing nurse. Pt lying in bed, NAD noted. Communication board updated. Will continue to monitor.

## 2022-01-22 NOTE — PROGRESS NOTES
"      [facility] Medicine  Telemedicine Progress Note    Patient Name: Xuan Ricardo  MRN: [id number]  Patient Class: IP- Inpatient   Admission Date: 12/29/2021  Length of Stay: 23 days  Attending Physician: Wily Gong MD  Primary Care Provider: Katharine Franks MD          Subjective:     Principal Problem:COVID-19        HPI:  Ms. Ricardo is a 41y F w/ ESRD, recently dialyzed at [facility], presents after leaving AMA from [facility] with nausea/vomiting and bilateral thigh pain.     She says that the nausea and vomiting began a few weeks ago. She mostly reports vomiting up food content, denies overt blood. Says that the vomit is sometimes darker colored. She has been told she has "coffee-ground" emesis but does not describe the contents of her vomit as such. She denies abdominal pain, denies delayed vomiting after eating. Feels nauseas all the time, regardless of food intake.    She has also developed severe bilateral thigh lesions. She describes them as swelling w/ extreme sensitivity to touch. They started approximately two weeks ago and have persisted, with minimal improvement in pain. One thigh was biopsied at [facility] at her most recent admission, and she reports that the site continues to be tender.      Overview/Hospital Course:  ESRD patient with calciphylaxis. Admitted to hospital medicine. Nephrology consulted. Wound care consulted for thigh wounds. Pt gave verbal and written consent for records release from [facility]. At [facility] pt underwent biopsy of R thigh which was c/w calciphylaxis, and underwent EGD which showed candidal esophagitis. At  she was restarted on HD, given sodium thiosulfate. MBD therapy restarted. Restarted on fluconazole. Pain present but controlled on pain meds. COVID positive but stable on RA. had febrile episodes last week which found to have a UTI and treated and abx stopped. Seen by ID, who has now signed off. Febrile episodes had resolved but pt spiked " another fever on 1/19. Cultures ordered. Has issues with sinus tach and all work up was negative. Maybe some volume down. Started on metoprolol and doing well. COVID HD chair confirmed prior to DC. Outpatient wound care and neprho f/u for calciphylaxsis       Interval History:     Ms. Cousin was stable overnight. Her cultures remain negative. She has no new complaints, just lower extremity discomfort which she describes as chronic.    She is medically stable for discharge, now pending inpatient rehab per PT/OT recommendations.    Plan for HD today    Review of Systems   Constitutional: Positive for fatigue. Negative for chills and fever.   Respiratory: Positive for shortness of breath (intermittent). Negative for cough.    Cardiovascular: Negative for chest pain and leg swelling.   Gastrointestinal: Negative for abdominal pain, constipation, diarrhea, nausea and vomiting.   Musculoskeletal: Positive for arthralgias.   Neurological: Negative for weakness and numbness.     Objective:     Vital Signs (Most Recent):  Temp: 98.4 °F (36.9 °C) (01/22/22 1204)  Pulse: 88 (01/22/22 1330)  Resp: 18 (01/22/22 1255)  BP: (!) 102/58 (01/22/22 1330)  SpO2: 98 % (01/22/22 1204) Vital Signs (24h Range):  Temp:  [98 °F (36.7 °C)-98.7 °F (37.1 °C)] 98.4 °F (36.9 °C)  Pulse:  [] 88  Resp:  [16-20] 18  SpO2:  [93 %-99 %] 98 %  BP: (102-131)/(56-84) 102/58     Weight: 94.3 kg (207 lb 14.3 oz)  Body mass index is 33.57 kg/m².  No intake or output data in the 24 hours ending 01/22/22 1348   Physical Exam  Constitutional:       General: She is not in acute distress.  HENT:      Head: Normocephalic and atraumatic.   Eyes:      General: No scleral icterus.        Right eye: No discharge.         Left eye: No discharge.   Pulmonary:      Effort: Pulmonary effort is normal.      Comments: Breathing comfortably on room air  Abdominal:      General: There is no distension.   Musculoskeletal:      Comments: Moves all extremities well    Skin:     Findings: No erythema or rash.   Neurological:      General: No focal deficit present.      Mental Status: She is alert and oriented to person, place, and time.      Comments: Alert, conversant   Psychiatric:         Mood and Affect: Mood normal.         Behavior: Behavior normal.         Significant Labs: All pertinent labs within the past 24 hours have been reviewed.    Significant Imaging: I have reviewed all pertinent imaging results/findings within the past 24 hours.      Assessment/Plan:      * COVID-19  Asymptomatic covid-19 infection  - isolation precautions  - no hypoxia, no indication for dex/rem  - Diagnosed 12/30, now has surpassed even severe COVID isolation requirements so isolation was discontinued    Fever  - Cultures with no growth to date  - noted to have increasing D-dimer however, lower extremity duplex and CTA chest negative for blood clots  - infectious disease consulted, appreciate recommendations  - fevers had resolved for several days but pt spiked another fever on 1/19  - Could be driven by her oxycodone  - Afebrile with negative BCx x48h  - Medically stable for d/c        Sinus tachycardia    - Waxing and waning, generally improved from admission when patient's HR was persistently 120s  -EKG with sinus tachycardia  -possibly secondary to pain, continue to treat with p.r.n. analgesia  -reports her appetite has been low, with decreased p.o. intake.  Encourage p.o. intake.  -PE/DVT ruled out  -hemoglobin improved post transfusion  -infectious disease following for any infectious workup  -patient has started on metoprolol 12.5 mg p.o. b.i.d, dose increased to 25mg PO BID  -monitor electrolytes, replete electrolytes as needed  -monitor in telemetry      Sepsis due to gram-negative UTI  - started on rocephin 1 g daily  - follow up urine and blood cultures      1/10- patient spiked another fever today of 101, getting blood cultures and LA; switching rocephin to zosyn and follow up  urine cultures     1/12- fevers resolved and patient's urine cultures showed no growth; course completed of augmentin  1/13 - patient noted to have fever again.  Will consult Infectious Disease.  Obtain CTA chest before dialysis tomorrow to rule out PE.  Lower extremity duplex negative for DVT.  Cultures with no growth to date.  1/18 - fevers now resolved. Off antibitics. ID signed off    Candida esophagitis  -Prior admission to [facility] w/ EGD showing candidal esophagitis  -was given fluconazole this admission   -CTA chest concerning for possible esophageal strictures.  Will need outpatient GI follow-up.      Hyperphosphatemia  Phos lowering important given calciphylaxis.  - continue sevalamer  - on cincalcet for hyperpth   - HD per nephrology      Renovascular hypertension  Continue home meds; titrate to effect      Type 2 diabetes mellitus  Reports taking 10U daily  - accuchecks/SSI  - Stable in 180s      Nausea  Persistent nausea/vomiting reported at admission. No vomiting while inpatient. Last admission nausea resolved w/ bicarb drip, suggesting ESRD component.  Nausea now improved.  Encourage p.o. intake take  - monitor as pt gets dialyzed  - see esophageal candidiasis      Calciphylaxis  Rash on bilateral thighs, extremely tender to touch. [facility] biopsy c/w calciphaxis, per report  - records request sent for biopsy  - nephrology consulted  - wound care  - prn analgesics  - sodium thiosulfate  - treatment of hyperphos/hyperPTH as noted elsewhere  - will likely need outpatient chronic pain follow up      End stage renal disease  Pt w/ ESRD. Refused dialysis on last admission, now s/p multiple sessions during [facility] hospitalization  - nephrology consulted  - last session 12/30  - access via [facility]  - MBD/anemia therapy as noted elsewhere  - outpatient HD chair arranged      Secondary hyperparathyroidism  PTH severely elevated. Particularly concerning given calciphylaxis  - continue cincalcet  - iPTH pending can f/u  with her nephrologist    Metabolic acidosis  See ESRD      Anemia of renal disease  Drop in hemoglobin 1/1 without clinical bleeding, appropriate response to transfusion  - continue to monitor  - defer ESAs to nephrology  - transfuse to hgb 7      1/12- will give a dose of venofer in the AM; epo with HD; no overt signs of GI bleeding  1/13 - hemoglobin of 6.9 no bleeding.  Repeat CBC ordered.  If hemoglobin below 7, will transfuse  1 unit PRBCs.  1/20 - stable, hemoglobin 8 g/dL yesterday      VTE Risk Mitigation (From admission, onward)         Ordered     heparin (porcine) injection 5,000 Units  Every 8 hours         01/17/22 1213     heparin (porcine) injection 3,200 Units  As needed (PRN)         12/31/21 1335     IP VTE HIGH RISK PATIENT  Once         12/30/21 0339     Place sequential compression device  Until discontinued         12/30/21 0339     Place HENRI hose  Until discontinued         12/30/21 0339     Reason for No Pharmacological VTE Prophylaxis  Once        Question:  Reasons:  Answer:  Active Bleeding    12/30/21 0339                      I have assessed these finding virtually using telemed platform and with assistance of bedside nurse                 The attending portion of this evaluation, treatment, and documentation was performed per Wily Gong MD via Telemedicine AudioVisual using the secure [employer] software platform with 2 way audio/video. The provider was located off-site and the patient is located in the hospital. The aforementioned video software was utilized to document the relevant history and physical exam    Wily Gong MD  Department of Hospital Medicine   [facility] [place] - Tele[facility]

## 2022-01-22 NOTE — SUBJECTIVE & OBJECTIVE
Interval History:     Ms. Cousin was stable overnight. Her cultures remain negative. She has no new complaints, just lower extremity discomfort which she describes as chronic.    She is medically stable for discharge, now pending inpatient rehab per PT/OT recommendations.    Plan for HD today    Review of Systems   Constitutional: Positive for fatigue. Negative for chills and fever.   Respiratory: Positive for shortness of breath (intermittent). Negative for cough.    Cardiovascular: Negative for chest pain and leg swelling.   Gastrointestinal: Negative for abdominal pain, constipation, diarrhea, nausea and vomiting.   Musculoskeletal: Positive for arthralgias.   Neurological: Negative for weakness and numbness.     Objective:     Vital Signs (Most Recent):  Temp: 98.4 °F (36.9 °C) (01/22/22 1204)  Pulse: 88 (01/22/22 1330)  Resp: 18 (01/22/22 1255)  BP: (!) 102/58 (01/22/22 1330)  SpO2: 98 % (01/22/22 1204) Vital Signs (24h Range):  Temp:  [98 °F (36.7 °C)-98.7 °F (37.1 °C)] 98.4 °F (36.9 °C)  Pulse:  [] 88  Resp:  [16-20] 18  SpO2:  [93 %-99 %] 98 %  BP: (102-131)/(56-84) 102/58     Weight: 94.3 kg (207 lb 14.3 oz)  Body mass index is 33.57 kg/m².  No intake or output data in the 24 hours ending 01/22/22 1348   Physical Exam  Constitutional:       General: She is not in acute distress.  HENT:      Head: Normocephalic and atraumatic.   Eyes:      General: No scleral icterus.        Right eye: No discharge.         Left eye: No discharge.   Pulmonary:      Effort: Pulmonary effort is normal.      Comments: Breathing comfortably on room air  Abdominal:      General: There is no distension.   Musculoskeletal:      Comments: Moves all extremities well   Skin:     Findings: No erythema or rash.   Neurological:      General: No focal deficit present.      Mental Status: She is alert and oriented to person, place, and time.      Comments: Alert, conversant   Psychiatric:         Mood and Affect: Mood normal.          Behavior: Behavior normal.         Significant Labs: All pertinent labs within the past 24 hours have been reviewed.    Significant Imaging: I have reviewed all pertinent imaging results/findings within the past 24 hours.

## 2022-01-22 NOTE — PROGRESS NOTES
Date of Admission:12/29/2021    SUBJECTIVE: hd today. gisell bedside    Current Facility-Administered Medications   Medication    0.9%  NaCl infusion (for blood administration)    0.9%  NaCl infusion (for blood administration)    acetaminophen tablet 650 mg    ascorbic acid (vitamin C) tablet 500 mg    bisacodyL suppository 10 mg    calcium carbonate 200 mg calcium (500 mg) chewable tablet 1,000 mg    cinacalcet tablet 30 mg    cloNIDine 0.3 mg/24 hr td ptwk 1 patch    cyclobenzaprine tablet 5 mg    dextrose 50% injection 12.5 g    dextrose 50% injection 25 g    epoetin kelsey-epbx injection 10,000 Units    famotidine tablet 20 mg    fluconazole tablet 400 mg    gabapentin capsule 100 mg    glucagon (human recombinant) injection 1 mg    glucose chewable tablet 16 g    glucose chewable tablet 24 g    heparin (porcine) injection 3,200 Units    heparin (porcine) injection 5,000 Units    hydrALAZINE injection 10 mg    influenza (QUADRIVALENT PF) vaccine 0.5 mL    insulin aspart U-100 pen 0-5 Units    labetaloL injection 10 mg    magnesium hydroxide 400 mg/5 ml suspension 2,400 mg    melatonin tablet 6 mg    metoprolol tartrate (LOPRESSOR) tablet 25 mg    multivitamin tablet    NIFEdipine 24 hr tablet 90 mg    ondansetron injection 4 mg    oxyCODONE immediate release tablet 10 mg    oxyCODONE immediate release tablet 15 mg    polyethylene glycol packet 17 g    prochlorperazine injection Soln 10 mg    sars-cov-2 (covid-19) (Pfizer COVID-19) 30 mcg/0.3 ml injection 0.3 mL    senna-docusate 8.6-50 mg per tablet 1 tablet    sevelamer carbonate tablet 2,400 mg    sodium chloride 0.9% bolus 250 mL    sodium chloride 0.9% flush 10 mL    sodium thiosulfate 12.5 gram/50 mL (250 mg/mL) injection 25 g    tuberculin injection 5 Units       Wt Readings from Last 3 Encounters:   01/20/22 94.3 kg (207 lb 14.3 oz)   12/11/21 90.7 kg (200 lb)   10/26/20 109.8 kg (242 lb)     Temp Readings from Last  3 Encounters:   01/22/22 98.4 °F (36.9 °C) (Oral)   12/13/21 98 °F (36.7 °C) (Oral)   10/26/20 98 °F (36.7 °C) (Oral)     BP Readings from Last 3 Encounters:   01/22/22 (!) 117/56   12/13/21 131/69   10/26/20 (!) 163/88     Pulse Readings from Last 3 Encounters:   01/22/22 97   12/13/21 90   10/26/20 91     No intake or output data in the 24 hours ending 01/22/22 1243    PE:  GEN:wd female in nad  HEENT:ncat, eomi,mmm  CVS:s1s2 regular  PULM:ctab  ABD:bs  EXT:no leg edema  NEURO:awake, alert  pc of chest    Recent Labs   Lab 01/22/22  0157   *      K 4.0      CO2 22*   BUN 24*   CREATININE 5.7*   CALCIUM 8.8       Lab Results   Component Value Date    PTH 1,498.8 (H) 12/31/2021    CALCIUM 8.8 01/22/2022    PHOS 2.7 01/22/2022       Recent Labs   Lab 01/22/22  0157   WBC 9.93   RBC 2.47*   HGB 7.1*   HCT 23.0*      MCV 93   MCH 28.7   MCHC 30.9*           A/P:  1.esrd. cont hd TTS.   2.2nd hyperpara. Cont tx.  3.htn. cont tx.  4.covid 19 infection.  More than 20d. Isolation stopped.  5.dm2. Following sugars.  6.calciphylaxis. thiosulfate with hd.  7.anemia 2nd to esrd.add oral iron. Increase epo.

## 2022-01-23 LAB
BACTERIA BLD CULT: NORMAL
BACTERIA BLD CULT: NORMAL
POCT GLUCOSE: 212 MG/DL (ref 70–110)
POCT GLUCOSE: 215 MG/DL (ref 70–110)
POCT GLUCOSE: 230 MG/DL (ref 70–110)
POCT GLUCOSE: 239 MG/DL (ref 70–110)
POCT GLUCOSE: 247 MG/DL (ref 70–110)

## 2022-01-23 PROCEDURE — 27000207 HC ISOLATION

## 2022-01-23 PROCEDURE — 63600175 PHARM REV CODE 636 W HCPCS: Performed by: STUDENT IN AN ORGANIZED HEALTH CARE EDUCATION/TRAINING PROGRAM

## 2022-01-23 PROCEDURE — 25000003 PHARM REV CODE 250: Performed by: INTERNAL MEDICINE

## 2022-01-23 PROCEDURE — 21400001 HC TELEMETRY ROOM

## 2022-01-23 PROCEDURE — 63600175 PHARM REV CODE 636 W HCPCS: Performed by: EMERGENCY MEDICINE

## 2022-01-23 PROCEDURE — 63700000 PHARM REV CODE 250 ALT 637 W/O HCPCS: Performed by: STUDENT IN AN ORGANIZED HEALTH CARE EDUCATION/TRAINING PROGRAM

## 2022-01-23 PROCEDURE — 63600175 PHARM REV CODE 636 W HCPCS: Performed by: HOSPITALIST

## 2022-01-23 PROCEDURE — 25000003 PHARM REV CODE 250: Performed by: EMERGENCY MEDICINE

## 2022-01-23 PROCEDURE — 25000003 PHARM REV CODE 250: Performed by: HOSPITALIST

## 2022-01-23 RX ADMIN — HEPARIN SODIUM 5000 UNITS: 5000 INJECTION INTRAVENOUS; SUBCUTANEOUS at 05:01

## 2022-01-23 RX ADMIN — Medication 324 MG: at 05:01

## 2022-01-23 RX ADMIN — CINACALCET 30 MG: 30 TABLET, FILM COATED ORAL at 08:01

## 2022-01-23 RX ADMIN — GABAPENTIN 100 MG: 100 CAPSULE ORAL at 09:01

## 2022-01-23 RX ADMIN — THERA TABS 1 TABLET: TAB at 08:01

## 2022-01-23 RX ADMIN — SEVELAMER CARBONATE 2400 MG: 800 TABLET, FILM COATED ORAL at 05:01

## 2022-01-23 RX ADMIN — HEPARIN SODIUM 5000 UNITS: 5000 INJECTION INTRAVENOUS; SUBCUTANEOUS at 09:01

## 2022-01-23 RX ADMIN — POLYETHYLENE GLYCOL 3350 17 G: 17 POWDER, FOR SOLUTION ORAL at 09:01

## 2022-01-23 RX ADMIN — FLUCONAZOLE 400 MG: 100 TABLET ORAL at 08:01

## 2022-01-23 RX ADMIN — OXYCODONE HYDROCHLORIDE AND ACETAMINOPHEN 500 MG: 500 TABLET ORAL at 08:01

## 2022-01-23 RX ADMIN — INSULIN ASPART 2 UNITS: 100 INJECTION, SOLUTION INTRAVENOUS; SUBCUTANEOUS at 08:01

## 2022-01-23 RX ADMIN — Medication 6 MG: at 10:01

## 2022-01-23 RX ADMIN — Medication 324 MG: at 08:01

## 2022-01-23 RX ADMIN — OXYCODONE HYDROCHLORIDE 15 MG: 15 TABLET ORAL at 10:01

## 2022-01-23 RX ADMIN — SENNOSIDES AND DOCUSATE SODIUM 1 TABLET: 50; 8.6 TABLET ORAL at 08:01

## 2022-01-23 RX ADMIN — METOPROLOL TARTRATE 25 MG: 25 TABLET, FILM COATED ORAL at 08:01

## 2022-01-23 RX ADMIN — OXYCODONE HYDROCHLORIDE AND ACETAMINOPHEN 500 MG: 500 TABLET ORAL at 09:01

## 2022-01-23 RX ADMIN — SEVELAMER CARBONATE 2400 MG: 800 TABLET, FILM COATED ORAL at 08:01

## 2022-01-23 RX ADMIN — FAMOTIDINE 20 MG: 20 TABLET ORAL at 08:01

## 2022-01-23 RX ADMIN — METOPROLOL TARTRATE 25 MG: 25 TABLET, FILM COATED ORAL at 09:01

## 2022-01-23 RX ADMIN — INSULIN ASPART 2 UNITS: 100 INJECTION, SOLUTION INTRAVENOUS; SUBCUTANEOUS at 05:01

## 2022-01-23 RX ADMIN — NIFEDIPINE 90 MG: 30 TABLET, FILM COATED, EXTENDED RELEASE ORAL at 08:01

## 2022-01-23 RX ADMIN — INSULIN ASPART 2 UNITS: 100 INJECTION, SOLUTION INTRAVENOUS; SUBCUTANEOUS at 01:01

## 2022-01-23 RX ADMIN — GABAPENTIN 100 MG: 100 CAPSULE ORAL at 04:01

## 2022-01-23 RX ADMIN — HEPARIN SODIUM 5000 UNITS: 5000 INJECTION INTRAVENOUS; SUBCUTANEOUS at 01:01

## 2022-01-23 NOTE — NURSING
Received handoff report from offgoing nurse. Pt lying in bed, NAD noted. Communication board updated. Will continue to monitor.

## 2022-01-23 NOTE — SUBJECTIVE & OBJECTIVE
"Interval History:     Ms. Cousin was stable overnight, commenting that she feels "a little better" as I interviewed her as she was eating her grits. Her cultures remain negative. She has no new complaints and was a little more talkative today. She says HD yesterday was "like normal"    She is medically stable for discharge, now pending inpatient rehab per PT/OT recommendations.    Review of Systems   Constitutional: Positive for fatigue. Negative for chills and fever.   Respiratory: Positive for shortness of breath (intermittent). Negative for cough.    Cardiovascular: Negative for chest pain and leg swelling.   Gastrointestinal: Negative for abdominal pain, constipation, diarrhea, nausea and vomiting.   Musculoskeletal: Positive for arthralgias.   Neurological: Negative for weakness and numbness.     Objective:     Vital Signs (Most Recent):  Temp: 98.2 °F (36.8 °C) (01/23/22 0742)  Pulse: (!) 116 (01/23/22 0742)  Resp: 18 (01/23/22 0742)  BP: 110/63 (01/23/22 0742)  SpO2: 99 % (01/23/22 0742) Vital Signs (24h Range):  Temp:  [97.7 °F (36.5 °C)-98.8 °F (37.1 °C)] 98.2 °F (36.8 °C)  Pulse:  [] 116  Resp:  [16-18] 18  SpO2:  [95 %-99 %] 99 %  BP: (100-131)/(52-84) 110/63     Weight: 94.3 kg (207 lb 14.3 oz)  Body mass index is 33.57 kg/m².    Intake/Output Summary (Last 24 hours) at 1/23/2022 1006  Last data filed at 1/22/2022 1620  Gross per 24 hour   Intake 600 ml   Output 1985 ml   Net -1385 ml      Physical Exam  Constitutional:       General: She is not in acute distress.  HENT:      Head: Normocephalic and atraumatic.   Eyes:      General: No scleral icterus.        Right eye: No discharge.         Left eye: No discharge.   Pulmonary:      Effort: Pulmonary effort is normal.      Comments: Breathing comfortably on room air  Abdominal:      General: There is no distension.   Musculoskeletal:      Comments: Moves all extremities well   Skin:     Findings: No erythema or rash.   Neurological:      General: " No focal deficit present.      Mental Status: She is alert and oriented to person, place, and time.      Comments: Alert, conversant   Psychiatric:         Mood and Affect: Mood normal.         Behavior: Behavior normal.         Significant Labs: All pertinent labs within the past 24 hours have been reviewed.    Significant Imaging: I have reviewed all pertinent imaging results/findings within the past 24 hours.

## 2022-01-23 NOTE — PROGRESS NOTES
"      [facility] Medicine  Telemedicine Progress Note    Patient Name: Xuan Ricardo  MRN: [id number]  Patient Class: IP- Inpatient   Admission Date: 12/29/2021  Length of Stay: 24 days  Attending Physician: Wily Gong MD  Primary Care Provider: Katharine Franks MD          Subjective:     Principal Problem:COVID-19        HPI:  Ms. Ricardo is a 41y F w/ ESRD, recently dialyzed at [facility], presents after leaving AMA from [facility] with nausea/vomiting and bilateral thigh pain.     She says that the nausea and vomiting began a few weeks ago. She mostly reports vomiting up food content, denies overt blood. Says that the vomit is sometimes darker colored. She has been told she has "coffee-ground" emesis but does not describe the contents of her vomit as such. She denies abdominal pain, denies delayed vomiting after eating. Feels nauseas all the time, regardless of food intake.    She has also developed severe bilateral thigh lesions. She describes them as swelling w/ extreme sensitivity to touch. They started approximately two weeks ago and have persisted, with minimal improvement in pain. One thigh was biopsied at [facility] at her most recent admission, and she reports that the site continues to be tender.      Overview/Hospital Course:  ESRD patient with calciphylaxis. Admitted to hospital medicine. Nephrology consulted. Wound care consulted for thigh wounds. Pt gave verbal and written consent for records release from [facility]. At [facility] pt underwent biopsy of R thigh which was c/w calciphylaxis, and underwent EGD which showed candidal esophagitis. At  she was restarted on HD, given sodium thiosulfate. MBD therapy restarted. Restarted on fluconazole. Pain present but controlled on pain meds. COVID positive but stable on RA. had febrile episodes last week which found to have a UTI and treated and abx stopped. Seen by ID, who has now signed off. Febrile episodes had resolved but pt spiked " "another fever on 1/19. Cultures ordered. Has issues with sinus tach and all work up was negative. Maybe some volume down. Started on metoprolol and doing well. COVID HD chair confirmed prior to DC. Outpatient wound care and neprho f/u for calciphylaxsis       Interval History:     Ms. Cousin was stable overnight, commenting that she feels "a little better" as I interviewed her as she was eating her grits. Her cultures remain negative. She has no new complaints and was a little more talkative today. She says HD yesterday was "like normal"    She is medically stable for discharge, now pending inpatient rehab per PT/OT recommendations.    Review of Systems   Constitutional: Positive for fatigue. Negative for chills and fever.   Respiratory: Positive for shortness of breath (intermittent). Negative for cough.    Cardiovascular: Negative for chest pain and leg swelling.   Gastrointestinal: Negative for abdominal pain, constipation, diarrhea, nausea and vomiting.   Musculoskeletal: Positive for arthralgias.   Neurological: Negative for weakness and numbness.     Objective:     Vital Signs (Most Recent):  Temp: 98.2 °F (36.8 °C) (01/23/22 0742)  Pulse: (!) 116 (01/23/22 0742)  Resp: 18 (01/23/22 0742)  BP: 110/63 (01/23/22 0742)  SpO2: 99 % (01/23/22 0742) Vital Signs (24h Range):  Temp:  [97.7 °F (36.5 °C)-98.8 °F (37.1 °C)] 98.2 °F (36.8 °C)  Pulse:  [] 116  Resp:  [16-18] 18  SpO2:  [95 %-99 %] 99 %  BP: (100-131)/(52-84) 110/63     Weight: 94.3 kg (207 lb 14.3 oz)  Body mass index is 33.57 kg/m².    Intake/Output Summary (Last 24 hours) at 1/23/2022 1006  Last data filed at 1/22/2022 1620  Gross per 24 hour   Intake 600 ml   Output 1985 ml   Net -1385 ml      Physical Exam  Constitutional:       General: She is not in acute distress.  HENT:      Head: Normocephalic and atraumatic.   Eyes:      General: No scleral icterus.        Right eye: No discharge.         Left eye: No discharge.   Pulmonary:      Effort: " Pulmonary effort is normal.      Comments: Breathing comfortably on room air  Abdominal:      General: There is no distension.   Musculoskeletal:      Comments: Moves all extremities well   Skin:     Findings: No erythema or rash.   Neurological:      General: No focal deficit present.      Mental Status: She is alert and oriented to person, place, and time.      Comments: Alert, conversant   Psychiatric:         Mood and Affect: Mood normal.         Behavior: Behavior normal.         Significant Labs: All pertinent labs within the past 24 hours have been reviewed.    Significant Imaging: I have reviewed all pertinent imaging results/findings within the past 24 hours.      Assessment/Plan:      * COVID-19  Asymptomatic covid-19 infection  - isolation precautions  - no hypoxia, no indication for dex/rem  - Diagnosed 12/30, now has surpassed even severe COVID isolation requirements so isolation was discontinued    Fever  - Cultures with no growth to date  - noted to have increasing D-dimer however, lower extremity duplex and CTA chest negative for blood clots  - infectious disease consulted, appreciate recommendations  - fevers had resolved for several days but pt spiked another fever on 1/19  - Could be driven by her oxycodone  - Afebrile with negative BCx x48h  - Medically stable for d/c        Sinus tachycardia    - Waxing and waning, generally improved from admission when patient's HR was persistently 120s  -EKG with sinus tachycardia  -possibly secondary to pain, continue to treat with p.r.n. analgesia  -reports her appetite has been low, with decreased p.o. intake.  Encourage p.o. intake.  -PE/DVT ruled out  -hemoglobin improved post transfusion  -infectious disease following for any infectious workup  -patient has started on metoprolol 12.5 mg p.o. b.i.d, dose increased to 25mg PO BID  -monitor electrolytes, replete electrolytes as needed  -monitor in telemetry      Sepsis due to gram-negative UTI  - started on  rocephin 1 g daily  - follow up urine and blood cultures      1/10- patient spiked another fever today of 101, getting blood cultures and LA; switching rocephin to zosyn and follow up urine cultures     1/12- fevers resolved and patient's urine cultures showed no growth; course completed of augmentin  1/13 - patient noted to have fever again.  Will consult Infectious Disease.  Obtain CTA chest before dialysis tomorrow to rule out PE.  Lower extremity duplex negative for DVT.  Cultures with no growth to date.  1/18 - fevers now resolved. Off antibitics. ID signed off    Candida esophagitis  -[place] [facility] [place]/ [place] showing candidal esophagitis  -was given fluconazole this admission   -CTA chest concerning for possible esophageal strictures.  Will need outpatient GI follow-up.      Hyperphosphatemia  Phos lowering important given calciphylaxis.  - continue sevalamer  - on cincalcet for hyperpth   - HD per nephrology      Renovascular hypertension  Continue home meds; titrate to effect      Type 2 diabetes mellitus  Reports taking 10U daily  - accuchecks/SSI  - Stable in 180s      Nausea  Persistent nausea/vomiting reported at admission. No vomiting while inpatient. Last admission nausea resolved w/ bicarb drip, suggesting ESRD component.  Nausea now improved.  Encourage p.o. intake take  - monitor as pt gets dialyzed  - see esophageal candidiasis      Calciphylaxis  Rash on bilateral thighs, extremely tender to touch. [facility] biopsy c/w calciphaxis, per report  - records request sent for biopsy  - nephrology consulted  - wound care  - prn analgesics  - sodium thiosulfate  - treatment of hyperphos/hyperPTH as noted elsewhere  - will likely need outpatient chronic pain follow up      End stage renal disease  Pt w/ ESRD. Refused dialysis on last admission, now s/p multiple sessions during [facility] hospitalization  - nephrology consulted  - last session 12/30  - access via [facility]  - MBD/anemia therapy as noted  elsewhere  - outpatient HD chair arranged      Secondary hyperparathyroidism  PTH severely elevated. Particularly concerning given calciphylaxis  - continue cincalcet  - iPTH pending can f/u with her nephrologist    Metabolic acidosis  See ESRD      Anemia of renal disease  Drop in hemoglobin 1/1 without clinical bleeding, appropriate response to transfusion  - continue to monitor  - defer ESAs to nephrology  - transfuse to hgb 7      1/12- will give a dose of venofer in the AM; epo with HD; no overt signs of GI bleeding  1/13 - hemoglobin of 6.9 no bleeding.  Repeat CBC ordered.  If hemoglobin below 7, will transfuse  1 unit PRBCs.  1/20 - stable, hemoglobin 8 g/dL yesterday      VTE Risk Mitigation (From admission, onward)         Ordered     heparin (porcine) injection 5,000 Units  Every 8 hours         01/17/22 1213     heparin (porcine) injection 3,200 Units  As needed (PRN)         12/31/21 1335     IP VTE HIGH RISK PATIENT  Once         12/30/21 0339     Place sequential compression device  Until discontinued         12/30/21 0339     Place HENRI hose  Until discontinued         12/30/21 0339     Reason for No Pharmacological VTE Prophylaxis  Once        Question:  Reasons:  Answer:  Active Bleeding    12/30/21 0339                      I have assessed these finding virtually using telemed platform and with assistance of bedside nurse                 The attending portion of this evaluation, treatment, and documentation was performed per Wily Gong MD via Telemedicine AudioVisual using the secure [employer] software platform with 2 way audio/video. The provider was located off-site and the patient is located in the hospital. The aforementioned video software was utilized to document the relevant history and physical exam    Wily Gong MD  Department of Hospital Medicine   [facility] [place] - Telemetry

## 2022-01-24 PROBLEM — K59.01 SLOW TRANSIT CONSTIPATION: Status: ACTIVE | Noted: 2022-01-24

## 2022-01-24 LAB
ANISOCYTOSIS BLD QL SMEAR: SLIGHT
ANISOCYTOSIS BLD QL SMEAR: SLIGHT
BASOPHILS # BLD AUTO: ABNORMAL K/UL (ref 0–0.2)
BASOPHILS # BLD AUTO: ABNORMAL K/UL (ref 0–0.2)
BASOPHILS NFR BLD: 0 % (ref 0–1.9)
BASOPHILS NFR BLD: 0 % (ref 0–1.9)
DIFFERENTIAL METHOD: ABNORMAL
DIFFERENTIAL METHOD: ABNORMAL
EOSINOPHIL # BLD AUTO: ABNORMAL K/UL (ref 0–0.5)
EOSINOPHIL # BLD AUTO: ABNORMAL K/UL (ref 0–0.5)
EOSINOPHIL NFR BLD: 0 % (ref 0–8)
EOSINOPHIL NFR BLD: 0 % (ref 0–8)
ERYTHROCYTE [DISTWIDTH] IN BLOOD BY AUTOMATED COUNT: 19.9 % (ref 11.5–14.5)
ERYTHROCYTE [DISTWIDTH] IN BLOOD BY AUTOMATED COUNT: 19.9 % (ref 11.5–14.5)
HCT VFR BLD AUTO: 21.8 % (ref 37–48.5)
HCT VFR BLD AUTO: 23.8 % (ref 37–48.5)
HGB BLD-MCNC: 6.6 G/DL (ref 12–16)
HGB BLD-MCNC: 7.1 G/DL (ref 12–16)
HIV1+2 IGG SERPL QL IA.RAPID: NORMAL
HYPOCHROMIA BLD QL SMEAR: ABNORMAL
IMM GRANULOCYTES # BLD AUTO: ABNORMAL K/UL (ref 0–0.04)
IMM GRANULOCYTES # BLD AUTO: ABNORMAL K/UL (ref 0–0.04)
IMM GRANULOCYTES NFR BLD AUTO: ABNORMAL % (ref 0–0.5)
IMM GRANULOCYTES NFR BLD AUTO: ABNORMAL % (ref 0–0.5)
LYMPHOCYTES # BLD AUTO: ABNORMAL K/UL (ref 1–4.8)
LYMPHOCYTES # BLD AUTO: ABNORMAL K/UL (ref 1–4.8)
LYMPHOCYTES NFR BLD: 13 % (ref 18–48)
LYMPHOCYTES NFR BLD: 9 % (ref 18–48)
MCH RBC QN AUTO: 29 PG (ref 27–31)
MCH RBC QN AUTO: 29.3 PG (ref 27–31)
MCHC RBC AUTO-ENTMCNC: 29.8 G/DL (ref 32–36)
MCHC RBC AUTO-ENTMCNC: 30.3 G/DL (ref 32–36)
MCV RBC AUTO: 97 FL (ref 82–98)
MCV RBC AUTO: 97 FL (ref 82–98)
METAMYELOCYTES NFR BLD MANUAL: 2 %
METAMYELOCYTES NFR BLD MANUAL: 2 %
MONOCYTES # BLD AUTO: ABNORMAL K/UL (ref 0.3–1)
MONOCYTES # BLD AUTO: ABNORMAL K/UL (ref 0.3–1)
MONOCYTES NFR BLD: 1 % (ref 4–15)
MONOCYTES NFR BLD: 7 % (ref 4–15)
MYELOCYTES NFR BLD MANUAL: 2 %
MYELOCYTES NFR BLD MANUAL: 5 %
NEUTROPHILS NFR BLD: 67 % (ref 38–73)
NEUTROPHILS NFR BLD: 76 % (ref 38–73)
NEUTS BAND NFR BLD MANUAL: 12 %
NEUTS BAND NFR BLD MANUAL: 4 %
NRBC BLD-RTO: 0 /100 WBC
NRBC BLD-RTO: 0 /100 WBC
OVALOCYTES BLD QL SMEAR: ABNORMAL
OVALOCYTES BLD QL SMEAR: ABNORMAL
PLATELET # BLD AUTO: 195 K/UL (ref 150–450)
PLATELET # BLD AUTO: 252 K/UL (ref 150–450)
PLATELET BLD QL SMEAR: ABNORMAL
PLATELET BLD QL SMEAR: ABNORMAL
PMV BLD AUTO: 10.1 FL (ref 9.2–12.9)
PMV BLD AUTO: 10.4 FL (ref 9.2–12.9)
POCT GLUCOSE: 186 MG/DL (ref 70–110)
POCT GLUCOSE: 188 MG/DL (ref 70–110)
POCT GLUCOSE: 211 MG/DL (ref 70–110)
POCT GLUCOSE: 212 MG/DL (ref 70–110)
POIKILOCYTOSIS BLD QL SMEAR: SLIGHT
POIKILOCYTOSIS BLD QL SMEAR: SLIGHT
POLYCHROMASIA BLD QL SMEAR: ABNORMAL
POLYCHROMASIA BLD QL SMEAR: ABNORMAL
RBC # BLD AUTO: 2.25 M/UL (ref 4–5.4)
RBC # BLD AUTO: 2.45 M/UL (ref 4–5.4)
TARGETS BLD QL SMEAR: ABNORMAL
WBC # BLD AUTO: 11.49 K/UL (ref 3.9–12.7)
WBC # BLD AUTO: 13.13 K/UL (ref 3.9–12.7)

## 2022-01-24 PROCEDURE — C9113 INJ PANTOPRAZOLE SODIUM, VIA: HCPCS | Performed by: STUDENT IN AN ORGANIZED HEALTH CARE EDUCATION/TRAINING PROGRAM

## 2022-01-24 PROCEDURE — 99223 PR INITIAL HOSPITAL CARE,LEVL III: ICD-10-PCS | Mod: ,,, | Performed by: INTERNAL MEDICINE

## 2022-01-24 PROCEDURE — 63600175 PHARM REV CODE 636 W HCPCS: Performed by: STUDENT IN AN ORGANIZED HEALTH CARE EDUCATION/TRAINING PROGRAM

## 2022-01-24 PROCEDURE — 25000003 PHARM REV CODE 250: Performed by: HOSPITALIST

## 2022-01-24 PROCEDURE — 63700000 PHARM REV CODE 250 ALT 637 W/O HCPCS: Performed by: STUDENT IN AN ORGANIZED HEALTH CARE EDUCATION/TRAINING PROGRAM

## 2022-01-24 PROCEDURE — 25000003 PHARM REV CODE 250: Performed by: INTERNAL MEDICINE

## 2022-01-24 PROCEDURE — 99223 1ST HOSP IP/OBS HIGH 75: CPT | Mod: ,,, | Performed by: INTERNAL MEDICINE

## 2022-01-24 PROCEDURE — 86703 HIV-1/HIV-2 1 RESULT ANTBDY: CPT | Performed by: STUDENT IN AN ORGANIZED HEALTH CARE EDUCATION/TRAINING PROGRAM

## 2022-01-24 PROCEDURE — 63600175 PHARM REV CODE 636 W HCPCS: Performed by: INTERNAL MEDICINE

## 2022-01-24 PROCEDURE — 36415 COLL VENOUS BLD VENIPUNCTURE: CPT | Performed by: INTERNAL MEDICINE

## 2022-01-24 PROCEDURE — 94761 N-INVAS EAR/PLS OXIMETRY MLT: CPT

## 2022-01-24 PROCEDURE — 97530 THERAPEUTIC ACTIVITIES: CPT

## 2022-01-24 PROCEDURE — 86850 RBC ANTIBODY SCREEN: CPT | Performed by: ANESTHESIOLOGY

## 2022-01-24 PROCEDURE — 86920 COMPATIBILITY TEST SPIN: CPT | Performed by: INTERNAL MEDICINE

## 2022-01-24 PROCEDURE — 21400001 HC TELEMETRY ROOM

## 2022-01-24 PROCEDURE — 27000207 HC ISOLATION

## 2022-01-24 PROCEDURE — 85027 COMPLETE CBC AUTOMATED: CPT | Mod: 91 | Performed by: INTERNAL MEDICINE

## 2022-01-24 PROCEDURE — 36415 COLL VENOUS BLD VENIPUNCTURE: CPT | Performed by: STUDENT IN AN ORGANIZED HEALTH CARE EDUCATION/TRAINING PROGRAM

## 2022-01-24 PROCEDURE — 25000003 PHARM REV CODE 250: Performed by: EMERGENCY MEDICINE

## 2022-01-24 PROCEDURE — 63600175 PHARM REV CODE 636 W HCPCS: Performed by: HOSPITALIST

## 2022-01-24 PROCEDURE — 85007 BL SMEAR W/DIFF WBC COUNT: CPT | Mod: 91 | Performed by: INTERNAL MEDICINE

## 2022-01-24 RX ORDER — PANTOPRAZOLE SODIUM 40 MG/10ML
40 INJECTION, POWDER, LYOPHILIZED, FOR SOLUTION INTRAVENOUS ONCE
Status: COMPLETED | OUTPATIENT
Start: 2022-01-24 | End: 2022-01-24

## 2022-01-24 RX ORDER — PANTOPRAZOLE SODIUM 40 MG/10ML
40 INJECTION, POWDER, LYOPHILIZED, FOR SOLUTION INTRAVENOUS 2 TIMES DAILY
Status: DISCONTINUED | OUTPATIENT
Start: 2022-01-24 | End: 2022-01-27

## 2022-01-24 RX ORDER — PANTOPRAZOLE SODIUM 40 MG/10ML
40 INJECTION, POWDER, LYOPHILIZED, FOR SOLUTION INTRAVENOUS DAILY
Status: DISCONTINUED | OUTPATIENT
Start: 2022-01-24 | End: 2022-01-24

## 2022-01-24 RX ORDER — PANTOPRAZOLE SODIUM 40 MG/10ML
80 INJECTION, POWDER, LYOPHILIZED, FOR SOLUTION INTRAVENOUS ONCE
Status: DISCONTINUED | OUTPATIENT
Start: 2022-01-24 | End: 2022-01-27

## 2022-01-24 RX ORDER — LACTULOSE 10 G/15ML
20 SOLUTION ORAL ONCE
Status: COMPLETED | OUTPATIENT
Start: 2022-01-24 | End: 2022-01-24

## 2022-01-24 RX ADMIN — FAMOTIDINE 20 MG: 20 TABLET ORAL at 10:01

## 2022-01-24 RX ADMIN — Medication 324 MG: at 10:01

## 2022-01-24 RX ADMIN — SODIUM CHLORIDE, SODIUM LACTATE, POTASSIUM CHLORIDE, AND CALCIUM CHLORIDE 1000 ML: .6; .31; .03; .02 INJECTION, SOLUTION INTRAVENOUS at 01:01

## 2022-01-24 RX ADMIN — PANTOPRAZOLE SODIUM 40 MG: 40 INJECTION, POWDER, FOR SOLUTION INTRAVENOUS at 02:01

## 2022-01-24 RX ADMIN — FLUCONAZOLE 400 MG: 100 TABLET ORAL at 10:01

## 2022-01-24 RX ADMIN — Medication 324 MG: at 04:01

## 2022-01-24 RX ADMIN — PANTOPRAZOLE SODIUM 40 MG: 40 INJECTION, POWDER, FOR SOLUTION INTRAVENOUS at 01:01

## 2022-01-24 RX ADMIN — THERA TABS 1 TABLET: TAB at 10:01

## 2022-01-24 RX ADMIN — SENNOSIDES AND DOCUSATE SODIUM 1 TABLET: 50; 8.6 TABLET ORAL at 10:01

## 2022-01-24 RX ADMIN — METOPROLOL TARTRATE 25 MG: 25 TABLET, FILM COATED ORAL at 10:01

## 2022-01-24 RX ADMIN — CINACALCET 30 MG: 30 TABLET, FILM COATED ORAL at 10:01

## 2022-01-24 RX ADMIN — LACTULOSE 20 G: 10 SOLUTION ORAL at 10:01

## 2022-01-24 RX ADMIN — SEVELAMER CARBONATE 2400 MG: 800 TABLET, FILM COATED ORAL at 10:01

## 2022-01-24 RX ADMIN — GABAPENTIN 100 MG: 100 CAPSULE ORAL at 10:01

## 2022-01-24 RX ADMIN — OXYCODONE HYDROCHLORIDE 15 MG: 15 TABLET ORAL at 06:01

## 2022-01-24 RX ADMIN — HEPARIN SODIUM 5000 UNITS: 5000 INJECTION INTRAVENOUS; SUBCUTANEOUS at 05:01

## 2022-01-24 RX ADMIN — GABAPENTIN 100 MG: 100 CAPSULE ORAL at 02:01

## 2022-01-24 RX ADMIN — OXYCODONE HYDROCHLORIDE AND ACETAMINOPHEN 500 MG: 500 TABLET ORAL at 10:01

## 2022-01-24 NOTE — PROGRESS NOTES
"      [facility] Medicine  Telemedicine Progress Note    Patient Name: Xuan Ricardo  MRN: [id number]  Patient Class: IP- Inpatient   Admission Date: 12/29/2021  Length of Stay: 25 days  Attending Physician: Wily Gong MD  Primary Care Provider: Katharine Franks MD          Subjective:     Principal Problem:COVID-19        HPI:  Ms. Ricardo is a 41y F w/ ESRD, [place] [facility], presents after [place] [facility] with nausea/vomiting and bilateral thigh pain.     She says that the nausea and vomiting began a few weeks ago. She mostly reports vomiting up food content, denies overt blood. Says that the vomit is sometimes darker colored. She has been told she has "coffee-ground" emesis but does not describe the contents of her vomit as such. She denies abdominal pain, denies delayed vomiting after eating. Feels nauseas all the time, regardless of food intake.    She has also developed severe bilateral thigh lesions. She describes them as swelling w/ extreme sensitivity to touch. They started approximately two weeks ago and have persisted, with minimal improvement in pain. One thigh was biopsied [place] [facility] [place] her most recent admission, and she reports that the site continues to be tender.      Overview/Hospital Course:  ESRD patient with calciphylaxis. Admitted to hospital medicine. Nephrology consulted. Wound care consulted for thigh wounds. Pt gave verbal and written consent for records release from [facility]. At [facility] pt underwent biopsy of R thigh which was c/w calciphylaxis, and underwent EGD which showed candidal esophagitis. At  she was restarted on HD, given sodium thiosulfate. MBD therapy restarted. Restarted on fluconazole. Pain present but controlled on pain meds. COVID positive but stable on RA. had febrile episodes last week which found to have a UTI and treated and abx stopped. Seen by ID, who has now signed off. Febrile episodes had resolved but pt spiked " "another fever on 1/19. Cultures ordered. Has issues with sinus tach and all work up was negative. Maybe some volume down. Started on metoprolol and doing well. COVID HD chair confirmed prior to DC. Outpatient wound care and neprho f/u for calciphylaxsis       Interval History:     Ms. Ricardo reports feeling "okay" She has no new complaints. Wound care requested for f/u exam of her previously identified wounds per nursing suggestion. She denies any new symptoms though last documented BM several days ago. Lactulose ordered.    Review of Systems   Constitutional: Positive for fatigue. Negative for chills and fever.   Respiratory: Positive for shortness of breath (intermittent). Negative for cough.    Cardiovascular: Negative for chest pain and leg swelling.   Gastrointestinal: Positive for constipation. Negative for abdominal pain, diarrhea, nausea and vomiting.   Musculoskeletal: Positive for arthralgias.   Neurological: Negative for weakness and numbness.     Objective:     Vital Signs (Most Recent):  Temp: 98.8 °F (37.1 °C) (01/24/22 0839)  Pulse: (!) 115 (01/24/22 0839)  Resp: 18 (01/24/22 0839)  BP: 113/64 (01/24/22 0839)  SpO2: 96 % (01/24/22 0839) Vital Signs (24h Range):  Temp:  [97.9 °F (36.6 °C)-98.9 °F (37.2 °C)] 98.8 °F (37.1 °C)  Pulse:  [105-115] 115  Resp:  [17-18] 18  SpO2:  [93 %-98 %] 96 %  BP: (104-116)/(55-64) 113/64     Weight: 94.3 kg (207 lb 14.3 oz)  Body mass index is 33.57 kg/m².  No intake or output data in the 24 hours ending 01/24/22 0931   Physical Exam  Constitutional:       General: She is not in acute distress.  HENT:      Head: Normocephalic and atraumatic.   Eyes:      General: No scleral icterus.        Right eye: No discharge.         Left eye: No discharge.   Pulmonary:      Effort: Pulmonary effort is normal.      Comments: Breathing comfortably on room air  Abdominal:      General: There is no distension.   Musculoskeletal:      Comments: Moves all extremities well   Skin:     " Findings: No erythema or rash.   Neurological:      General: No focal deficit present.      Mental Status: She is alert and oriented to person, place, and time.      Comments: Alert, conversant   Psychiatric:         Mood and Affect: Mood normal.         Behavior: Behavior normal.         Significant Labs: All pertinent labs within the past 24 hours have been reviewed.    Significant Imaging: I have reviewed all pertinent imaging results/findings within the past 24 hours.      Assessment/Plan:      * COVID-19  Asymptomatic covid-19 infection  - isolation precautions  - no hypoxia, no indication for dex/rem  - Diagnosed 12/30, now has surpassed even severe COVID isolation requirements so isolation was discontinued    Slow transit constipation    - Lactulose x1 1/24  - Mobilize w/ PT/OT    Fever  - Cultures with no growth to date  - noted to have increasing D-dimer however, lower extremity duplex and CTA chest negative for blood clots  - infectious disease consulted, appreciate recommendations  - fevers had resolved for several days but pt spiked another fever on 1/19  - Could be driven by her oxycodone  - Afebrile with negative BCx x48h  - Medically stable for d/c        Sinus tachycardia    - Waxing and waning, generally improved from admission when patient's HR was persistently 120s  -EKG with sinus tachycardia  -possibly secondary to pain, continue to treat with p.r.n. analgesia  -reports her appetite has been low, with decreased p.o. intake.  Encourage p.o. intake.  -PE/DVT ruled out  -hemoglobin improved post transfusion  -infectious disease following for any infectious workup  -patient has started on metoprolol 12.5 mg p.o. b.i.d, dose increased to 25mg PO BID  -monitor electrolytes, replete electrolytes as needed  -monitor in telemetry      Sepsis due to gram-negative UTI  - started on rocephin 1 g daily  - follow up urine and blood cultures      1/10- patient spiked another fever today of 101, getting blood  cultures and LA; switching rocephin to zosyn and follow up urine cultures     1/12- fevers resolved and patient's urine cultures showed no growth; course completed of augmentin  1/13 - patient noted to have fever again.  Will consult Infectious Disease.  Obtain CTA chest before dialysis tomorrow to rule out PE.  Lower extremity duplex negative for DVT.  Cultures with no growth to date.  1/18 - fevers now resolved. Off antibitics. ID signed off    Candida esophagitis  -Prior admission to [facility] w/ EGD showing candidal esophagitis  -was given fluconazole this admission   -CTA chest concerning for possible esophageal strictures.  Will need outpatient GI follow-up.      Hyperphosphatemia  Phos lowering important given calciphylaxis.  - continue sevalamer  - on cincalcet for hyperpth   - HD per nephrology      Renovascular hypertension  Continue home meds; titrate to effect      Type 2 diabetes mellitus  Reports taking 10U daily  - accuchecks/SSI  - Stable in 180s      Nausea  Persistent nausea/vomiting reported at admission. No vomiting while inpatient. Last admission nausea resolved w/ bicarb drip, suggesting ESRD component.  Nausea now improved.  Encourage p.o. intake take  - monitor as pt gets dialyzed  - see esophageal candidiasis      Calciphylaxis  Rash on bilateral thighs, extremely tender to touch. [facility] biopsy c/w calciphaxis, per report  - records request sent for biopsy  - nephrology consulted  - wound care  - prn analgesics  - sodium thiosulfate  - treatment of hyperphos/hyperPTH as noted elsewhere  - will likely need outpatient chronic pain follow up      End stage renal disease  Pt w/ ESRD. Refused dialysis on last admission, now s/p multiple sessions during [facility] hospitalization  - nephrology consulted  - last session 12/30  - access via [facility]  - MBD/anemia therapy as noted elsewhere  - outpatient HD chair arranged      Secondary hyperparathyroidism  PTH severely elevated. Particularly concerning given  calciphylaxis  - continue cincalcet  - iPTH pending can f/u with her nephrologist    Metabolic acidosis  See ESRD      Anemia of renal disease  Drop in hemoglobin 1/1 without clinical bleeding, appropriate response to transfusion  - continue to monitor  - defer ESAs to nephrology  - transfuse to hgb 7      1/12- will give a dose of venofer in the AM; epo with HD; no overt signs of GI bleeding  1/13 - hemoglobin of 6.9 no bleeding.  Repeat CBC ordered.  If hemoglobin below 7, will transfuse  1 unit PRBCs.  1/20 - stable, hemoglobin 8 g/dL yesterday    VTE Risk Mitigation (From admission, onward)         Ordered     heparin (porcine) injection 5,000 Units  Every 8 hours         01/17/22 1213     heparin (porcine) injection 3,200 Units  As needed (PRN)         12/31/21 1335     IP VTE HIGH RISK PATIENT  Once         12/30/21 0339     Place sequential compression device  Until discontinued         12/30/21 0339     Place HENRI hose  Until discontinued         12/30/21 0339     Reason for No Pharmacological VTE Prophylaxis  Once        Question:  Reasons:  Answer:  Active Bleeding    12/30/21 0339                      I have assessed these finding virtually using telemed platform and with assistance of bedside nurse                 The attending portion of this evaluation, treatment, and documentation was performed per Wily Gong MD via Telemedicine AudioVisual using the secure [employer] software platform with 2 way audio/video. The provider was located off-site and the patient is located in the hospital. The aforementioned video software was utilized to document the relevant history and physical exam    Wily Gong MD  Department of [facility] Medicine   [facility] - Telemetry

## 2022-01-24 NOTE — PLAN OF CARE
CHUCKIE received call from Aide ([employer]STO). Aide informed SW that patient was denied for IPR. My explained that PHN MD tried contact [facility] MD 3x.     13:29  CHUCKIE was informed by MD that he did speak with PHN MD.

## 2022-01-24 NOTE — NURSING
[facility]  Rapid Response Nurse Intervention/ Task    Date of Visit: 01/24/2022  Time of Visit: 1350       INTERVENTIONS/ TASK Completed:     #18 gauge iv started to Left AC without difficulties.

## 2022-01-24 NOTE — CONSULTS
[facility]  Gastroenterology  Consult Note    Patient Name: Xuan Ricardo  MRN: [id number]  Admission Date: 12/29/2021  Hospital Length of Stay: 25 days  Code Status: Full Code   Attending Provider: coy bernabe  Consulting Provider: Coy Bernabe MD  Primary Care Physician: Katharine Franks MD  Principal Problem:COVID-19    Inpatient consult to Gastroenterology  Consult performed by: Coy Bernabe MD  Consult ordered by: Wily Gong MD  Reason for consult:  hematemesis  Assessment/Recommendations:  41-year-old woman multiple significant medical problems.  Last month at [facility] was found to have Candida esophagitis.   I think it is more likely that bleeding now is from severe reflux esophagitis.  I do not think an urgent EGD is needed, but we will proceed with one tomorrow or the following day.  May require intubation to protect airway as CT scan demonstrated fluid in the esophagus, there for more complicated than usual        Subjective:     HPI:  41-year-old woman with multiple significant medical problems including severe diabetes, metabolic abnormalities, and CKD.  We are consulted today because of hematemesis.  Vomiting of coffee-ground emesis today.  This has been a recurring factor.  I see that she contacted her primary care doctor in August of 2021 for hematemesis and was advised to go the emergency room then.  I do not have any details of that.  Last month at [facility] she had hematemesis and apparently an EGD showed Candida esophagitis.  I cannot find that report on Care everywhere or media tab she was treated with Diflucan.  Diflucan was restarted here.  Hematemesis now.  Dark brown stool but no melena or dark tarry stool.  Some epigastric discomfort.  It is difficult to say if she is having any dysphagia or odynophagia.  She has difficulty initiating the swallow for pills according to the nurse.  The patient says liquids go down easily but cause burning pain in the epigastric  area.  And she only eats small amount of food but I cannot discern whether that is due to dysphagia or odynophagia.  According to the nurse she is mostly in the supine position and does not like to sit up.    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 ;  Surgeon: Rakesh Leon MD;  Location: [facility] OR;  Service: Vascular;  Laterality: Left;  RN PREOP 20, UPT done, COVID NEGATRIVE 20  NEED H/P     SECTION, CLASSIC      INSERTION OF TUNNELED CENTRAL VENOUS CATHETER (CVC) WITH SUBCUTANEOUS PORT N/A 2020    Procedure: IR TUNNELED VATH INSERT WITHOUT PORT;  Surgeon: [facility] Diagnostic Provider;  Location: [facility] OR;  Service: Radiology;  Laterality: N/A;  1030AM START  RN PREOP 2020    INSERTION OF TUNNELED CENTRAL VENOUS CATHETER (CVC) WITH SUBCUTANEOUS PORT N/A 2020    Procedure: TUNNELED CATH PLACEMENT;  Surgeon: [facility] Diagnostic Provider;  Location: [facility] OR;  Service: Radiology;  Laterality: N/A;  930AM START    REVISION OF ARTERIOVENOUS FISTULA Left 2020    Procedure: REVISION, AV FISTULA, THROMBECTOMY, LEFT UPPER EXTREMITY;  Surgeon: Rakesh Leon MD;  Location: [facility] OR;  Service: Vascular;  Laterality: Left;    TUBAL LIGATION         Review of patient's allergies indicates:   Allergen Reactions    Vicodin [hydrocodone-acetaminophen] Hives    Codeine Hives     Family History     Problem Relation (Age of Onset)    Asthma Sister    Heart failure Father (58)    Seizures Mother  (64)        Tobacco Use    Smoking status: Former Smoker     Types: Cigarettes    Smokeless tobacco: Never Used   Substance and Sexual Activity    Alcohol use: No    Drug use: Yes     Types: Marijuana     Comment: Occassional Recreational Use only    Sexual activity: Not Currently     Partners: Male     Review of Systems   Constitutional: Positive for fatigue. Negative for activity change, appetite change, chills, diaphoresis, fever and unexpected weight change.   HENT: Negative for congestion, ear pain, mouth sores, nosebleeds, postnasal drip, rhinorrhea, sinus pressure, sore throat, trouble swallowing and voice change.    Eyes: Negative for pain.   Respiratory: Positive for shortness of breath. Negative for cough and wheezing.    Cardiovascular: Negative for chest pain, palpitations and leg swelling.   Genitourinary: Negative for difficulty urinating, dysuria, flank pain, hematuria and menstrual problem.   Musculoskeletal: Negative for arthralgias, back pain, gait problem, joint swelling, myalgias and neck pain.   Skin: Negative for rash.   Neurological: Positive for weakness. Negative for dizziness, tremors, syncope, numbness and headaches.   Hematological: Negative for adenopathy. Does not bruise/bleed easily.   Psychiatric/Behavioral: Negative for agitation, behavioral problems, confusion, decreased concentration and dysphoric mood. The patient is not nervous/anxious.      Objective:     Vital Signs (Most Recent):  Temp: 98.4 °F (36.9 °C) (01/24/22 1107)  Pulse: (!) 117 (01/24/22 1107)  Resp: 16 (01/24/22 1107)  BP: (!) 100/54 (01/24/22 1107)  SpO2: 96 % (01/24/22 1107) Vital Signs (24h Range):  Temp:  [97.9 °F (36.6 °C)-98.9 °F (37.2 °C)] 98.4 °F (36.9 °C)  Pulse:  [108-117] 117  Resp:  [16-18] 16  SpO2:  [93 %-98 %] 96 %  BP: (100-116)/(54-64) 100/54     Weight: 94.3 kg (207 lb 14.3 oz) (01/20/22 1100)  Body mass index is 33.57 kg/m².    No intake or output data in the 24 hours ending 01/24/22  1410    Lines/Drains/Airways     Central Venous Catheter Line                 Hemodialysis Catheter 12/29/21 right subclavian 26 days          Peripheral Intravenous Line                 Midline Catheter Insertion/Assessment  - Single Lumen 01/12/22 0245 Right brachial vein  12 days                Physical Exam  Constitutional:       General: She is not in acute distress.     Appearance: She is well-developed and well-nourished.   HENT:      Head: Normocephalic and atraumatic.      Right Ear: External ear normal.      Left Ear: External ear normal.      Nose: Nose normal.      Mouth/Throat:      Mouth: Oropharynx is clear and moist.      Pharynx: No oropharyngeal exudate.   Eyes:      General: No scleral icterus.     Conjunctiva/sclera: Conjunctivae normal.      Pupils: Pupils are equal, round, and reactive to light.   Neck:      Thyroid: No thyromegaly.   Cardiovascular:      Rate and Rhythm: Regular rhythm. Tachycardia present.      Pulses: Intact distal pulses.      Heart sounds: Normal heart sounds. No murmur heard.  No gallop.    Pulmonary:      Effort: Pulmonary effort is normal.      Breath sounds: Normal breath sounds. No wheezing or rales.   Abdominal:      General: Abdomen is protuberant. Bowel sounds are normal. There is no distension. There are no signs of injury.      Palpations: There is no mass.      Tenderness: There is no abdominal tenderness.   Musculoskeletal:         General: No tenderness or edema. Normal range of motion.      Cervical back: Normal range of motion and neck supple.   Lymphadenopathy:      Cervical: No cervical adenopathy.   Skin:     General: Skin is warm and dry.      Findings: No rash.   Neurological:      Mental Status: She is alert and oriented to person, place, and time.      Cranial Nerves: No cranial nerve deficit.   Psychiatric:         Mood and Affect: Mood and affect normal.         Behavior: Behavior normal.         Thought Content: Thought content normal.          Judgment: Judgment normal.         Significant Labs:  Blood Culture: No results for input(s): LABBLOO in the last 48 hours.  CBC: No results for input(s): WBC, HGB, HCT, PLT in the last 48 hours.  CMP: No results for input(s): GLU, CALCIUM, ALBUMIN, PROT, NA, K, CO2, CL, BUN, CREATININE, ALKPHOS, ALT, AST, BILITOT in the last 48 hours.    Significant Imaging:      Assessment/Plan:     Active Diagnoses:    Diagnosis Date Noted POA    PRINCIPAL PROBLEM:  COVID-19 [U07.1] 12/30/2021 Yes    Slow transit constipation [K59.01] 01/24/2022 No    Sinus tachycardia [R00.0] 01/14/2022 No    Fever [R50.9] 01/08/2022 Yes    Candida esophagitis [B37.81] 01/03/2022 Yes    Hyperphosphatemia [E83.39] 12/31/2021 Yes    Calciphylaxis [E83.59] 12/30/2021 Yes    Nausea [R11.0] 12/30/2021 Yes    Type 2 diabetes mellitus [E11.9] 12/30/2021 Yes    Renovascular hypertension [I15.0] 12/30/2021 Yes    End stage renal disease [N18.6] 01/27/2020 Yes    Secondary hyperparathyroidism [N25.81]  Yes    Metabolic acidosis [E87.2] 05/03/2019 Yes    Anemia of renal disease [N18.9, D63.1] 04/09/2016 Yes     Chronic      Problems Resolved During this Admission:    Diagnosis Date Noted Date Resolved POA    Anemia due to pre-ESRD treated with erythropoietin [N03.9, D63.1] 01/07/2022 01/20/2022 Yes        assessment.  Complicated patient with multiple medical problems who presents with hematemesis, upper GI bleeding likely from the esophagus.  She has been on PPI once daily, however with her being in the supine position on a fairly constant basis she may still have  significant reflux esophagitis.  CT scan demonstrates fluid in the esophagus consistent with a dilated esophagus.  I recommend that we change PPI to IV formulation and use either a drip or twice a day IV infusion.  Will follow hemoglobin closely.  Her baseline is 7 so it would not be surprising if she requires blood transfusion.  I do not think  Urgent EGD is needed but will  anticipate doing EGD either tomorrow or the day after.  It is okay from my standpoint for her to have clear liquids now just NPO past midnight.  This will require a conference with anesthesia for I will likely think that she needs intubation to protect the airway for the EGD.  Certainly a more complicated case than usual    Thank you for your consult. I will follow-up with patient. Please contact us if you have any additional questions.    Coy Merino MD  Gastroenterology  [facility]

## 2022-01-24 NOTE — CARE UPDATE
After missing one call at 0907 I spoke with Dr. Abdullahi at 1018 for peer to peer. The conversation lasted 4 minutes. The information conveyed from their representative regarding the denial (unable to contact MD) is incorrect. Will attempt to gather more information regarding the denial.    Wily Gong M.D.  Department of [facility] Medicine

## 2022-01-24 NOTE — SIGNIFICANT EVENT
Messaged by RN concerning patient w/ hematemesis.     Reviewed vitals, last  and /54. Ordered 1L LR. Asked for an additional IV. NPO. GI consulted. Stopped SQH. Stopped famotidine in favor of protonix IV BID starting with 80 mEq bolus now. Discussed case w/ Dr. Armenta who is on site and will take over care given the decompensation.    - Dr. Gong

## 2022-01-24 NOTE — NURSING
Wound care done to bilateral thighs(inner and outer) and sacrum with abd pads and medipore tape. Pt tolerated well. Will continue to monitor

## 2022-01-24 NOTE — PT/OT/SLP PROGRESS
Occupational Therapy      Patient Name:  Xuan Wrightsin   MRN:  [id number]    Patient not seen today secondary to: pt found holding emesis bag (black, coffee grain-like) and loose BM. Bed mobility done to help reposition pt; however, unable to assist with cleaning pt d/t pt requiring new dressings to BLE that were soiled from BM. Nurse, Luly, notified. Will follow-up later as able.     1/24/2022

## 2022-01-24 NOTE — PLAN OF CARE
Problem: Physical Therapy Goal  Goal: Physical Therapy Goal  Description: Goals to be met by: 22     Patient will increase functional independence with mobility by performin. Supine to sit with Modified [place]  2. Rolling to Left and Right with Modified [place]  3. Sit to stand transfer with Modified [place]   4. Bed to chair transfer with Modified [place]   5. Gait >50 feet with Modified [place] using appropriate AD  6. Lower extremity exercise program x10 reps per handout, with independence    Outcome: Ongoing, Progressing     Pt found with coffee ground emesis and loose dark stool, limiting therapy tx today.  Will continue to assess.

## 2022-01-24 NOTE — PT/OT/SLP PROGRESS
Physical Therapy Treatment    Patient Name:  Xuan Ricardo   MRN:  [id number]    Recommendations:     Discharge Recommendations:  rehabilitation facility   Discharge Equipment Recommendations: bedside commode,bath bench,wheelchair (if D/C'ed home)   Barriers to discharge home: Inaccessible home and Pt with decreased mobility and ambulation at this time.    Assessment:     Xuan Ricardo is a 41 y.o. female admitted with a medical diagnosis of COVID-19.  She presents with the following impairments/functional limitations:  impaired functional mobilty,gait instability,impaired balance,decreased lower extremity function,pain,decreased ROM,impaired skin.    Rehab Prognosis: Good; patient would benefit from acute skilled PT services to address these deficits and reach maximum level of function.    Recent Surgery: * No surgery found *      Plan:     During this hospitalization, patient to be seen 5 x/week to address the identified rehab impairments via gait training,therapeutic activities,therapeutic exercises and progress toward the following goals:    · Plan of Care Expires:  02/01/22    Subjective     Chief Complaint: vomiting and loose stool  Patient/Family Comments/goals: Pt reported waiting on nursing staff for changing of pads 2* loose BM.   Pain/Comfort:  · Pain Rating 1:  (Pt with minimal pain to BLE during bed mobility.)  · Pain Addressed 1: Reposition,Cessation of Activity      Objective:     Patient found left sidelying with PICC line,telemetry (R chest HD access) upon PT entry to room.     General Precautions: Standard, DM, fall,airborne,contact,droplet ((COVID+)); Pt on HD.     Orthopedic Precautions:N/A   Braces: N/A  Respiratory Status: Room air     Functional Mobility:  Pt found in bed with emesis bag full of coffee ground emesis.  Pt also with large amount of liquid dark stool steeping into BLE dressings.  H/H this am was 7.1/23.0.  Pt required nursing care at this time to address LE dressings change as well.   Nurse Luly notified.  Pt was assisted with bed in chair position for lunch.    · Bed Mobility:     · Rolling Left:  minimum assistance  · Rolling Right: minimum assistance  · Scooting/bridging: minimum assistance to reposition HOB; Pt able to use BUE on siderails and push with BLE.  Pt required min A for positioning of BLE.          AM-PAC 6 CLICK MOBILITY  Turning over in bed (including adjusting bedclothes, sheets and blankets)?: 3  Sitting down on and standing up from a chair with arms (e.g., wheelchair, bedside commode, etc.): 2  Moving from lying on back to sitting on the side of the bed?: 3  Moving to and from a bed to a chair (including a wheelchair)?: 3  Need to walk in hospital room?: 2  Climbing 3-5 steps with a railing?: 1  Basic Mobility Total Score: 14       Patient left with bed in chair position and BLE elevated on pillow with all lines intact, call button in reach and nurse Luly notified.  Tray table close by.     GOALS:   Multidisciplinary Problems     Physical Therapy Goals        Problem: Physical Therapy Goal    Goal Priority Disciplines Outcome Goal Variances Interventions   Physical Therapy Goal     PT, PT/OT Ongoing, Progressing     Description: Goals to be met by: 22     Patient will increase functional independence with mobility by performin. Supine to sit with Modified [place]  2. Rolling to Left and Right with Modified [place]  3. Sit to stand transfer with Modified [place]   4. Bed to chair transfer with Modified [place]   5. Gait >50 feet with Modified [place] using appropriate AD  6. Lower extremity exercise program x10 reps per handout, with independence                     Time Tracking:     PT Received On: 22  PT Start Time: 1210     PT Stop Time: 1218  PT Total Time (min): 8 min     Billable Minutes: Therapeutic Activity 8 min    Treatment Type: Treatment              2022

## 2022-01-24 NOTE — CARE UPDATE
Notified by [facility] Dr. Gong about patient having coffee ground emesis and will take over care at this time. I have seen and examined the patient today. She appeared to have episode of coffee ground emesis and lower blood pressure readings for the past week. Nifedipine has been held on multiple occasions. Hb has ranged from 7.1-8.5 over the last week as well. History of esophageal candidiasis on fluconazole. Given bolus fluid, stop anti-hypertensives and start on PPI. GI consulted, possible EGD tomorrow. Do not see that HIV has been checked in our system since 2019 - will order as this is unusual for immunocompetent person to have. Will continue to follow.    Mehdi Armenta MD  Department of Hospital Medicine  [facility]

## 2022-01-24 NOTE — NURSING
Report given to oncoming nurse. Fall/safety precautions maintained. Call light and personal belongings within reach. chart check complete.

## 2022-01-24 NOTE — SUBJECTIVE & OBJECTIVE
"Interval History:     Ms. Cousin reports feeling "okay" She has no new complaints. Wound care requested for f/u exam of her previously identified wounds per nursing suggestion. She denies any new symptoms though last documented BM several days ago. Lactulose ordered.    Review of Systems   Constitutional: Positive for fatigue. Negative for chills and fever.   Respiratory: Positive for shortness of breath (intermittent). Negative for cough.    Cardiovascular: Negative for chest pain and leg swelling.   Gastrointestinal: Positive for constipation. Negative for abdominal pain, diarrhea, nausea and vomiting.   Musculoskeletal: Positive for arthralgias.   Neurological: Negative for weakness and numbness.     Objective:     Vital Signs (Most Recent):  Temp: 98.8 °F (37.1 °C) (01/24/22 0839)  Pulse: (!) 115 (01/24/22 0839)  Resp: 18 (01/24/22 0839)  BP: 113/64 (01/24/22 0839)  SpO2: 96 % (01/24/22 0839) Vital Signs (24h Range):  Temp:  [97.9 °F (36.6 °C)-98.9 °F (37.2 °C)] 98.8 °F (37.1 °C)  Pulse:  [105-115] 115  Resp:  [17-18] 18  SpO2:  [93 %-98 %] 96 %  BP: (104-116)/(55-64) 113/64     Weight: 94.3 kg (207 lb 14.3 oz)  Body mass index is 33.57 kg/m².  No intake or output data in the 24 hours ending 01/24/22 0931   Physical Exam  Constitutional:       General: She is not in acute distress.  HENT:      Head: Normocephalic and atraumatic.   Eyes:      General: No scleral icterus.        Right eye: No discharge.         Left eye: No discharge.   Pulmonary:      Effort: Pulmonary effort is normal.      Comments: Breathing comfortably on room air  Abdominal:      General: There is no distension.   Musculoskeletal:      Comments: Moves all extremities well   Skin:     Findings: No erythema or rash.   Neurological:      General: No focal deficit present.      Mental Status: She is alert and oriented to person, place, and time.      Comments: Alert, conversant   Psychiatric:         Mood and Affect: Mood normal.         " Behavior: Behavior normal.         Significant Labs: All pertinent labs within the past 24 hours have been reviewed.    Significant Imaging: I have reviewed all pertinent imaging results/findings within the past 24 hours.

## 2022-01-25 ENCOUNTER — ANESTHESIA (OUTPATIENT)
Dept: ENDOSCOPY | Facility: HOSPITAL | Age: 42
DRG: 640 | End: 2022-01-25
Payer: MEDICARE

## 2022-01-25 ENCOUNTER — ANESTHESIA EVENT (OUTPATIENT)
Dept: ENDOSCOPY | Facility: HOSPITAL | Age: 42
DRG: 640 | End: 2022-01-25
Payer: MEDICARE

## 2022-01-25 LAB
ABO + RH BLD: NORMAL
ANION GAP SERPL CALC-SCNC: 9 MMOL/L (ref 8–16)
ANISOCYTOSIS BLD QL SMEAR: SLIGHT
ANISOCYTOSIS BLD QL SMEAR: SLIGHT
BASOPHILS # BLD AUTO: ABNORMAL K/UL (ref 0–0.2)
BASOPHILS NFR BLD: 0 % (ref 0–1.9)
BASOPHILS NFR BLD: 0 % (ref 0–1.9)
BLD GP AB SCN CELLS X3 SERPL QL: NORMAL
BLD PROD TYP BPU: NORMAL
BLOOD UNIT EXPIRATION DATE: NORMAL
BLOOD UNIT TYPE CODE: 5100
BLOOD UNIT TYPE: NORMAL
BUN SERPL-MCNC: 36 MG/DL (ref 6–20)
CALCIUM SERPL-MCNC: 8.9 MG/DL (ref 8.7–10.5)
CHLORIDE SERPL-SCNC: 100 MMOL/L (ref 95–110)
CO2 SERPL-SCNC: 23 MMOL/L (ref 23–29)
CODING SYSTEM: NORMAL
CREAT SERPL-MCNC: 7 MG/DL (ref 0.5–1.4)
DIFFERENTIAL METHOD: ABNORMAL
DIFFERENTIAL METHOD: ABNORMAL
DISPENSE STATUS: NORMAL
EOSINOPHIL # BLD AUTO: ABNORMAL K/UL (ref 0–0.5)
EOSINOPHIL NFR BLD: 0 % (ref 0–8)
EOSINOPHIL NFR BLD: 0 % (ref 0–8)
ERYTHROCYTE [DISTWIDTH] IN BLOOD BY AUTOMATED COUNT: 19.7 % (ref 11.5–14.5)
ERYTHROCYTE [DISTWIDTH] IN BLOOD BY AUTOMATED COUNT: 19.9 % (ref 11.5–14.5)
EST. GFR  (AFRICAN AMERICAN): 8 ML/MIN/1.73 M^2
EST. GFR  (NON AFRICAN AMERICAN): 7 ML/MIN/1.73 M^2
GLUCOSE SERPL-MCNC: 153 MG/DL (ref 70–110)
HCT VFR BLD AUTO: 23.3 % (ref 37–48.5)
HCT VFR BLD AUTO: 26.4 % (ref 37–48.5)
HGB BLD-MCNC: 6.9 G/DL (ref 12–16)
HGB BLD-MCNC: 8.2 G/DL (ref 12–16)
HYPOCHROMIA BLD QL SMEAR: ABNORMAL
HYPOCHROMIA BLD QL SMEAR: ABNORMAL
IMM GRANULOCYTES # BLD AUTO: ABNORMAL K/UL (ref 0–0.04)
IMM GRANULOCYTES # BLD AUTO: ABNORMAL K/UL (ref 0–0.04)
IMM GRANULOCYTES NFR BLD AUTO: ABNORMAL % (ref 0–0.5)
IMM GRANULOCYTES NFR BLD AUTO: ABNORMAL % (ref 0–0.5)
LYMPHOCYTES # BLD AUTO: ABNORMAL K/UL (ref 1–4.8)
LYMPHOCYTES NFR BLD: 11 % (ref 18–48)
LYMPHOCYTES NFR BLD: 13 % (ref 18–48)
MCH RBC QN AUTO: 28.2 PG (ref 27–31)
MCH RBC QN AUTO: 28.6 PG (ref 27–31)
MCHC RBC AUTO-ENTMCNC: 29.6 G/DL (ref 32–36)
MCHC RBC AUTO-ENTMCNC: 31.1 G/DL (ref 32–36)
MCV RBC AUTO: 92 FL (ref 82–98)
MCV RBC AUTO: 95 FL (ref 82–98)
METAMYELOCYTES NFR BLD MANUAL: 2 %
METAMYELOCYTES NFR BLD MANUAL: 6 %
MONOCYTES # BLD AUTO: ABNORMAL K/UL (ref 0.3–1)
MONOCYTES NFR BLD: 12 % (ref 4–15)
MONOCYTES NFR BLD: 3 % (ref 4–15)
MYELOCYTES NFR BLD MANUAL: 2 %
MYELOCYTES NFR BLD MANUAL: 2 %
NEUTROPHILS NFR BLD: 67 % (ref 38–73)
NEUTROPHILS NFR BLD: 67 % (ref 38–73)
NEUTS BAND NFR BLD MANUAL: 6 %
NEUTS BAND NFR BLD MANUAL: 9 %
NRBC BLD-RTO: 0 /100 WBC
NRBC BLD-RTO: 0 /100 WBC
NUM UNITS TRANS PACKED RBC: NORMAL
PHOSPHATE SERPL-MCNC: 4.2 MG/DL (ref 2.7–4.5)
PLATELET # BLD AUTO: 208 K/UL (ref 150–450)
PLATELET # BLD AUTO: 257 K/UL (ref 150–450)
PLATELET BLD QL SMEAR: ABNORMAL
PMV BLD AUTO: 10.1 FL (ref 9.2–12.9)
PMV BLD AUTO: 10.4 FL (ref 9.2–12.9)
POCT GLUCOSE: 130 MG/DL (ref 70–110)
POCT GLUCOSE: 172 MG/DL (ref 70–110)
POCT GLUCOSE: 189 MG/DL (ref 70–110)
POCT GLUCOSE: 189 MG/DL (ref 70–110)
POLYCHROMASIA BLD QL SMEAR: ABNORMAL
POTASSIUM SERPL-SCNC: 5 MMOL/L (ref 3.5–5.1)
RBC # BLD AUTO: 2.45 M/UL (ref 4–5.4)
RBC # BLD AUTO: 2.87 M/UL (ref 4–5.4)
SODIUM SERPL-SCNC: 132 MMOL/L (ref 136–145)
WBC # BLD AUTO: 14.01 K/UL (ref 3.9–12.7)
WBC # BLD AUTO: 9.49 K/UL (ref 3.9–12.7)

## 2022-01-25 PROCEDURE — 85007 BL SMEAR W/DIFF WBC COUNT: CPT | Performed by: INTERNAL MEDICINE

## 2022-01-25 PROCEDURE — 63600175 PHARM REV CODE 636 W HCPCS: Performed by: STUDENT IN AN ORGANIZED HEALTH CARE EDUCATION/TRAINING PROGRAM

## 2022-01-25 PROCEDURE — 84100 ASSAY OF PHOSPHORUS: CPT | Performed by: INTERNAL MEDICINE

## 2022-01-25 PROCEDURE — 90935 HEMODIALYSIS ONE EVALUATION: CPT

## 2022-01-25 PROCEDURE — 25000003 PHARM REV CODE 250: Performed by: INTERNAL MEDICINE

## 2022-01-25 PROCEDURE — 85027 COMPLETE CBC AUTOMATED: CPT | Mod: 91 | Performed by: INTERNAL MEDICINE

## 2022-01-25 PROCEDURE — 97530 THERAPEUTIC ACTIVITIES: CPT

## 2022-01-25 PROCEDURE — 25000003 PHARM REV CODE 250: Performed by: EMERGENCY MEDICINE

## 2022-01-25 PROCEDURE — 63600175 PHARM REV CODE 636 W HCPCS: Mod: JG | Performed by: INTERNAL MEDICINE

## 2022-01-25 PROCEDURE — 36415 COLL VENOUS BLD VENIPUNCTURE: CPT | Performed by: INTERNAL MEDICINE

## 2022-01-25 PROCEDURE — 36415 COLL VENOUS BLD VENIPUNCTURE: CPT | Performed by: ANESTHESIOLOGY

## 2022-01-25 PROCEDURE — 63600175 PHARM REV CODE 636 W HCPCS: Performed by: INTERNAL MEDICINE

## 2022-01-25 PROCEDURE — 63700000 PHARM REV CODE 250 ALT 637 W/O HCPCS: Performed by: STUDENT IN AN ORGANIZED HEALTH CARE EDUCATION/TRAINING PROGRAM

## 2022-01-25 PROCEDURE — 80048 BASIC METABOLIC PNL TOTAL CA: CPT | Performed by: INTERNAL MEDICINE

## 2022-01-25 PROCEDURE — P9016 RBC LEUKOCYTES REDUCED: HCPCS | Performed by: INTERNAL MEDICINE

## 2022-01-25 PROCEDURE — 25000003 PHARM REV CODE 250: Performed by: HOSPITALIST

## 2022-01-25 PROCEDURE — C9113 INJ PANTOPRAZOLE SODIUM, VIA: HCPCS | Performed by: INTERNAL MEDICINE

## 2022-01-25 PROCEDURE — 21400001 HC TELEMETRY ROOM

## 2022-01-25 RX ORDER — HYDROCODONE BITARTRATE AND ACETAMINOPHEN 500; 5 MG/1; MG/1
TABLET ORAL
Status: DISCONTINUED | OUTPATIENT
Start: 2022-01-25 | End: 2022-01-28 | Stop reason: HOSPADM

## 2022-01-25 RX ADMIN — OXYCODONE HYDROCHLORIDE AND ACETAMINOPHEN 500 MG: 500 TABLET ORAL at 08:01

## 2022-01-25 RX ADMIN — OXYCODONE HYDROCHLORIDE 15 MG: 15 TABLET ORAL at 12:01

## 2022-01-25 RX ADMIN — CINACALCET 30 MG: 30 TABLET, FILM COATED ORAL at 08:01

## 2022-01-25 RX ADMIN — PANTOPRAZOLE SODIUM 40 MG: 40 INJECTION, POWDER, FOR SOLUTION INTRAVENOUS at 08:01

## 2022-01-25 RX ADMIN — EPOETIN ALFA-EPBX 14000 UNITS: 10000 INJECTION, SOLUTION INTRAVENOUS; SUBCUTANEOUS at 11:01

## 2022-01-25 RX ADMIN — Medication 324 MG: at 09:01

## 2022-01-25 RX ADMIN — GABAPENTIN 100 MG: 100 CAPSULE ORAL at 02:01

## 2022-01-25 RX ADMIN — THERA TABS 1 TABLET: TAB at 08:01

## 2022-01-25 RX ADMIN — OXYCODONE HYDROCHLORIDE 15 MG: 15 TABLET ORAL at 04:01

## 2022-01-25 RX ADMIN — FLUCONAZOLE 400 MG: 100 TABLET ORAL at 08:01

## 2022-01-25 RX ADMIN — SEVELAMER CARBONATE 2400 MG: 800 TABLET, FILM COATED ORAL at 04:01

## 2022-01-25 RX ADMIN — Medication 324 MG: at 04:01

## 2022-01-25 RX ADMIN — GABAPENTIN 100 MG: 100 CAPSULE ORAL at 08:01

## 2022-01-25 RX ADMIN — METOPROLOL TARTRATE 25 MG: 25 TABLET, FILM COATED ORAL at 08:01

## 2022-01-25 RX ADMIN — OXYCODONE HYDROCHLORIDE 15 MG: 15 TABLET ORAL at 09:01

## 2022-01-25 RX ADMIN — SODIUM THIOSULFATE 25 G: 250 INJECTION, SOLUTION INTRAVENOUS at 10:01

## 2022-01-25 RX ADMIN — METOPROLOL TARTRATE 25 MG: 25 TABLET, FILM COATED ORAL at 04:01

## 2022-01-25 RX ADMIN — HEPARIN SODIUM 3200 UNITS: 5000 INJECTION INTRAVENOUS; SUBCUTANEOUS at 11:01

## 2022-01-25 NOTE — ASSESSMENT & PLAN NOTE
-Prior admission to [facility] w/ EGD showing candidal esophagitis  -was given fluconazole this admission   -CTA chest concerning for possible esophageal strictures.   - GI consulted for bleed.

## 2022-01-25 NOTE — PT/OT/SLP PROGRESS
Physical Therapy Treatment    Patient Name:  Xuan Ricardo   MRN:  [id number]    Recommendations:     Discharge Recommendations:  rehabilitation facility   Discharge Equipment Recommendations: bedside commode,bath bench,wheelchair (if D/C'ed home)   Barriers to discharge home: Inaccessible home and Pt with decreased functional mobility and ambulation at this time.    Assessment:     Xuan Ricardo is a 41 y.o. female admitted with a medical diagnosis of COVID-19.  She presents with the following impairments/functional limitations:  impaired functional mobilty,gait instability,impaired balance,decreased lower extremity function,pain,decreased ROM,impaired skin.    Rehab Prognosis: Good; patient would benefit from acute skilled PT services to address these deficits and reach maximum level of function.    Recent Surgery: Procedure(s) (LRB):  EGD (ESOPHAGOGASTRODUODENOSCOPY) (N/A) * Surgery Date in Future *    Plan:     During this hospitalization, patient to be seen 5 x/week to address the identified rehab impairments via gait training,therapeutic activities,therapeutic exercises and progress toward the following goals:    · Plan of Care Expires:  02/01/22    Subjective     Chief Complaint: BLE pain with weight bearing  Patient/Family Comments/goals: Pt agreeable to therapy.  Pain/Comfort:  · Pain Rating 1:  (Pt c/o BLE pain.)  · Pain Addressed 1: Reposition,Distraction,Cessation of Activity      Objective:     Patient found right sidelying with PICC line,telemetry (R chest HD access) upon PT entry to room.     General Precautions: Standard, fall,airborne,contact,droplet ((COVID+))   Orthopedic Precautions:N/A   Braces: N/A  Respiratory Status: Room air     Functional Mobility:  Pt required extra time to perform all functional mobility, didn't want any assistance from therapists however due to BLE pain needed education and assistance to be successful.  Gait continued to be limited 2* BLE pain.     · Bed Mobility:      · Rolling Right: minimum assistance x2 trials   · Scooting: contact guard assistance and minimum assistance for anterior scooting   · Bridging: minimum assistance for BLE placement to reposition HOB  · Supine to Sit: minimum assistance with HOB elevated   · Sit to Supine: moderate assistance with BLE  · Transfers:     · Sit to Stand:  minimum assistance and of 2 persons with rolling walker x2 trials; Pt with severe forward flexed posture, required extra time to achieve trunk extension.  Pt with decreased safety awareness and required max VC's for proper technique and posture.    · Gait: Pt ambulated ~6 sidesteps along bedside with min A using RW.  Pt with decreased weight shifting, decreased foot clearance, decreased step length, and decreased trent.  Gait limited by increased in BLE pain during weight bearing.    · Balance: Pt with fair static sit/stand balance.        AM-PAC 6 CLICK MOBILITY  Turning over in bed (including adjusting bedclothes, sheets and blankets)?: 3  Sitting down on and standing up from a chair with arms (e.g., wheelchair, bedside commode, etc.): 2  Moving from lying on back to sitting on the side of the bed?: 3  Moving to and from a bed to a chair (including a wheelchair)?: 2  Need to walk in hospital room?: 2  Climbing 3-5 steps with a railing?: 1  Basic Mobility Total Score: 13       Therapeutic Activities and Exercises:  Balance Training  Static Standing:  Patient performed static standing on level surface  using rolling walker with Minimal Assistance and moderate and maximal verbal cues for trunk extension and WB through BUE x2 trials.       Patient left right sidelying and BLE elevated on pillows with all lines intact, call button in reach and bed alarm on.  Tray table close by.     GOALS:   Multidisciplinary Problems     Physical Therapy Goals        Problem: Physical Therapy Goal    Goal Priority Disciplines Outcome Goal Variances Interventions   Physical Therapy Goal     PT, PT/OT  Ongoing, Progressing     Description: Goals to be met by: 22     Patient will increase functional independence with mobility by performin. Supine to sit with Modified [place]  2. Rolling to Left and Right with Modified [place]  3. Sit to stand transfer with Modified [place]   4. Bed to chair transfer with Modified [place]   5. Gait >50 feet with Modified [place] using appropriate AD  6. Lower extremity exercise program x10 reps per handout, with independence                     Time Tracking:     PT Received On: 22  PT Start Time: 1534     PT Stop Time: 1606  PT Total Time (min): 32 min     Billable Minutes: Therapeutic Activity 19 min co-tx with OT    Treatment Type: Treatment              2022

## 2022-01-25 NOTE — NURSING
Pt ended HD 30 min early 2/2 leg pain. Administered 1 unit pRBCs, sodium thiosulfate, and epoetin. CVC lines heparin locked, clamped, and capped. Net 766 cc removed. Report given to primary nurse.

## 2022-01-25 NOTE — ASSESSMENT & PLAN NOTE
Persistent nausea/vomiting reported at admission. No vomiting while inpatient. Last admission nausea resolved w/ bicarb drip, suggesting ESRD component.  Nausea now resolved.  Encourage p.o. intake take  - monitor as pt gets dialyzed  - see esophageal candidiasis

## 2022-01-25 NOTE — NURSING
Pt transported to [facility] via transport to scheduled testing. NAD noted.

## 2022-01-25 NOTE — PT/OT/SLP PROGRESS
Occupational Therapy   Treatment    Name: Xuan Ricardo  MRN: [id number]  Admitting Diagnosis:  COVID-19  * Surgery Date in Future *    Recommendations:     Discharge Recommendations: rehabilitation facility  Discharge Equipment Recommendations:  bedside commode,bath bench,wheelchair (if d/c home)  Barriers to discharge:   (not at PLOF; high risk of falls, unplanned readmission, and morbidity if d/c home)    Assessment:     Xuan Ricardo is a 41 y.o. female with a medical diagnosis of COVID-19. Performance deficits affecting function are weakness,impaired endurance,decreased ROM,decreased coordination,decreased upper extremity function,impaired self care skills,impaired functional mobilty,decreased lower extremity function,impaired skin,edema,decreased safety awareness,gait instability,impaired balance,pain,impaired cardiopulmonary response to activity.     Increased time required with all aspects of functional mobility d/t BLE pain. MIN A for few sidesteps at EOB using RW    Rehab Prognosis:  Good; patient would benefit from acute skilled OT services to address these deficits and reach maximum level of function.       Plan:     Patient to be seen 5 x/week to address the above listed problems via self-care/home management,therapeutic exercises,therapeutic activities  · Plan of Care Expires: 02/18/22  · Plan of Care Reviewed with: patient    Subjective     Chief complaint: was cramping in dialysis and still limited by BLE pain   Patient/family comments/ goals: motivated to keep trying to scoot anteriorly without help     Pain/Comfort:  Pain Rating 1:  (c/o BLE pain)  Pain Addressed 1: Reposition,Distraction,Cessation of Activity    Objective:     Communicated with: nursing prior to session.  Patient found HOB elevated with PICC line,peripheral IV,telemetry,bed alarm upon OT entry to room.    General Precautions: Standard, airborne,contact,droplet,fall,NPO   Orthopedic Precautions:N/A   Braces: N/A  Respiratory Status:  Room air     Occupational Performance:     Bed Mobility:    · Patient completed Rolling/Turning to Left with  minimum assistance  · Patient completed Scooting anteriorly with minimum assistance  · Patient completed Supine to Sit with minimum assistance  · Prolonged time with verbal cueing for body mechanics required to complete this. Pt declined assistance, requesting extra time and rest breaks to complete herself.   · Patient completed Sit to Supine with moderate assistance for BLE   · Patient completed Scooting in supine to HOB with minimum assistance x2     Functional Mobility/Transfers:  · Patient completed Sit <> Stand Transfer with minimum assistance  with  rolling walker x2 trials   · Functional Mobility: pt took a few sidesteps to HOB with RW and MIN A. Pt tearful after first standing trial. Pt required max verbal cueing for hand placement on RW and use of BUE to support self.     Activities of Daily Living:  · Upper Body Dressing: minimum assistance to don/doff back gown while seated EOB      [facility] 6 Click ADL: 16    Treatment & Education:  · Pt re-educated on OT role/POC.   · Importance of OOB activity with staff assistance.  · Safety during functional t/f and mobility   · Self-care tasks/functional mobility completed- assistance level noted above   · Encouraged BUE AROM 3x/10, 10 reps each: elbow flex/ext, shoulder flex/ext, and shoulder horizontal ab/dduction   · All questions/concerns answered within OT scope of practice       Patient left HOB elevated L sidelying with BLE elevated on pillows with all lines intact, call button in reach, bed alarm on and all needs met/within reach; bedside table near pt Education:      GOALS:   Multidisciplinary Problems     Occupational Therapy Goals        Problem: Occupational Therapy Goal    Goal Priority Disciplines Outcome Interventions   Occupational Therapy Goal     OT, PT/OT Ongoing, Progressing    Description: Goals to be met by: 2/18/2022    Patient will  increase functional independence with ADLs by performing:    Feeding with [place]  UE Dressing with [place]  LE Dressing with Stand-by Assistance  Grooming while standing at the sink with Stand-by Assistance  Toileting from bedside commode with Stand-by Assistance for hygiene and clothing management.   Toilet transfer to bedside commode with Stand-by Assistance  Supine <> sit with Contact guard assistance   Step transfer with contact guard assistance                      Time Tracking:     OT Date of Treatment: 01/25/22  OT Start Time: 1539  OT Stop Time: 1606  OT Total Time (min): 27 min    Billable Minutes:Therapeutic Activity 13 min   Total Time 27 min (co-treat with PT)    OT/RY: OT     RY Visit Number: 0    1/25/2022

## 2022-01-25 NOTE — PROGRESS NOTES
[facility]  Gastroenterology  Progress Note    Patient Name: Xuan Ricardo  MRN: [id number]  Admission Date: 12/29/2021  Hospital Length of Stay: 26 days  Code Status: Full Code   Attending Provider: Keiry Dorsey MD  Consulting Provider: Coy Merino MD  Primary Care Physician: Katharine Franks MD  Principal Problem: COVID-19    Subjective:     Interval History:  Consulted yesterday because of hematemesis.  Hemoglobin fell slightly.  She denies any episodes of melena or further episodes of vomiting.    Review of Systems  Objective:     Vital Signs (Most Recent):  Temp: 97.9 °F (36.6 °C) (01/25/22 0830)  Pulse: 100 (01/25/22 0830)  Resp: 18 (01/25/22 0830)  BP: 123/66 (01/25/22 0830)  SpO2: 96 % (01/25/22 0830) Vital Signs (24h Range):  Temp:  [97.5 °F (36.4 °C)-98.4 °F (36.9 °C)] 97.9 °F (36.6 °C)  Pulse:  [] 100  Resp:  [15-18] 18  SpO2:  [94 %-100 %] 96 %  BP: ()/(48-66) 123/66     Weight: 94.3 kg (207 lb 14.3 oz) (01/20/22 1100)  Body mass index is 33.57 kg/m².    No intake or output data in the 24 hours ending 01/25/22 0847    Lines/Drains/Airways     Central Venous Catheter Line                 Hemodialysis Catheter 12/29/21 right subclavian 27 days          Peripheral Intravenous Line                 Midline Catheter Insertion/Assessment  - Single Lumen 01/12/22 0245 Right brachial vein  13 days         Peripheral IV - Single Lumen 01/24/22 1350 18 G Left Antecubital <1 day                Physical Exam    Significant Labs:  CBC:   Recent Labs   Lab 01/24/22  1337 01/24/22  2039 01/25/22  0341   WBC 13.13* 11.49 9.49   HGB 7.1* 6.6* 6.9*   HCT 23.8* 21.8* 23.3*    195 208         Significant Imaging:      Assessment/Plan:     Active Diagnoses:    Diagnosis Date Noted POA    PRINCIPAL PROBLEM:  COVID-19 [U07.1] 12/30/2021 Yes    Slow transit constipation [K59.01] 01/24/2022 No    Sinus tachycardia [R00.0] 01/14/2022 No    Fever [R50.9] 01/08/2022 Yes    Candida  esophagitis [B37.81] 01/03/2022 Yes    Hyperphosphatemia [E83.39] 12/31/2021 Yes    Calciphylaxis [E83.59] 12/30/2021 Yes    Nausea [R11.0] 12/30/2021 Yes    Type 2 diabetes mellitus [E11.9] 12/30/2021 Yes    Renovascular hypertension [I15.0] 12/30/2021 Yes    End stage renal disease [N18.6] 01/27/2020 Yes    Secondary hyperparathyroidism [N25.81]  Yes    Metabolic acidosis [E87.2] 05/03/2019 Yes    Anemia of renal disease [N18.9, D63.1] 04/09/2016 Yes     Chronic      Problems Resolved During this Admission:    Diagnosis Date Noted Date Resolved POA    Anemia due to pre-ESRD treated with erythropoietin [N03.9, D63.1] 01/07/2022 01/20/2022 Yes       Assessment  1. Upper GI bleeding likely due to esophagitis, possible Caity-Yoder tear.  I suggest that we proceed with EGD today, conference with anesthesia because of the abnormal CT scan suggesting fluid in the esophagus, and therefore aspiration risk    Thank you for your consult. I will follow-up with patient. Please contact us if you have any additional questions.    Coy Merino MD  Gastroenterology  [facility]

## 2022-01-25 NOTE — CONSULTS
[facility] - Telemetry  Wound Care    Patient Name:  Xuan Ricardo   MRN:  [id number]  Date: 1/25/2022  Diagnosis: COVID-19    History:     Past Medical History:   Diagnosis Date    Cataract     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     Diabetes mellitus 1996    Insulin Dependent    Galactorrhea     Hyperphosphatemia     Hypertension     Iron deficiency anemia     Obesity     Secondary hyperparathyroidism of renal origin     Vitamin D deficiency        Social History     Socioeconomic History    Marital status: Single    Number of children: 2   Occupational History    Occupation: Unemployed   Tobacco Use    Smoking status: Former Smoker     Types: Cigarettes    Smokeless tobacco: Never Used   Substance and Sexual Activity    Alcohol use: No    Drug use: Yes     Types: Marijuana     Comment: Occassional Recreational Use only    Sexual activity: Not Currently     Partners: Male   Social History Narrative    Currently unemployed has two young children ages 5 and 7 and she is the sole caretaker       Precautions:     Allergies as of 12/29/2021 - Reviewed 12/29/2021   Allergen Reaction Noted    Vicodin [hydrocodone-acetaminophen] Hives 05/04/2013    Codeine Hives 02/25/2015       WOC Assessment Details/Treatment   Re consulted for wounds left lateral thigh, right medial thigh, and sacral blisters  1/3 Darkened areas on left lateral thigh and right medial thigh- treatment with Mepilex foam dressing  1/12 Ulcerations in linear pattern along gluteal cleft- moisture/incontinence associated skin damage- treatment with Mepilex foam, keep clean and dry, Pressure Injury Prevention Interventions  1/18 Recommended continued cleansing of sites with Vashe and Mepilex foam dressings  1/25 Areas of eschar on left lateral thigh and right medial thigh extending further with evolution to dry eschar with foul odor. Now has blistering and eschar on left medial thigh. Painful. States recently turned in bed; Mepilex  foam dressing in place to sacrum; ABD pads secured to medial thigh and left lateral thigh with Medipore tape. Denies pain from tape. Encouraged patient to turn on right side and positioned with foam wedge.   Recommend- Keep areas of calciphylaxis clean and dry. Continue Pressure Injury Prevention Interventions.   Medical management of calciphylaxis-sodium thiosulfate ordered with dialysis T, Th, S.      01/25/2022

## 2022-01-25 NOTE — PLAN OF CARE
CHUCKIE completed LOCET and faxed PASRR for 142.   SW received 142.   SW scan PASRR and 142 into [facility].     CHUCKIE also spoke with Aide (PHN) informing her of SNF request.

## 2022-01-25 NOTE — PLAN OF CARE
Problem: Occupational Therapy Goal  Goal: Occupational Therapy Goal  Description: Goals to be met by: 2/18/2022    Patient will increase functional independence with ADLs by performing:    Feeding with [place]  UE Dressing with [place]  LE Dressing with Stand-by Assistance  Grooming while standing at the sink with Stand-by Assistance  Toileting from bedside commode with Stand-by Assistance for hygiene and clothing management.   Toilet transfer to bedside commode with Stand-by Assistance  Supine <> sit with Contact guard assistance   Step transfer with contact guard assistance     Outcome: Ongoing, Progressing     Increased time required with all aspects of functional mobility d/t BLE pain. MIN A for few sidesteps at EOB using RW.

## 2022-01-25 NOTE — SUBJECTIVE & OBJECTIVE
Interval History: Hgb < 7. Feels ok    Review of Systems   Constitutional: Negative.    Respiratory: Negative.    Cardiovascular: Negative.    Gastrointestinal: Negative.      Objective:     Vital Signs (Most Recent):  Temp: 98.2 °F (36.8 °C) (01/25/22 1352)  Pulse: 100 (01/25/22 1352)  Resp: 18 (01/25/22 1352)  BP: 132/72 (01/25/22 1352)  SpO2: 97 % (01/25/22 1352) Vital Signs (24h Range):  Temp:  [97.5 °F (36.4 °C)-98.2 °F (36.8 °C)] 98.2 °F (36.8 °C)  Pulse:  [] 100  Resp:  [15-18] 18  SpO2:  [94 %-100 %] 97 %  BP: ()/(48-76) 132/72     Weight: 94.3 kg (207 lb 14.3 oz)  Body mass index is 33.57 kg/m².    Intake/Output Summary (Last 24 hours) at 1/25/2022 1526  Last data filed at 1/25/2022 1220  Gross per 24 hour   Intake 834 ml   Output 1600 ml   Net -766 ml      Physical Exam  Vitals and nursing note reviewed.   Constitutional:       General: She is not in acute distress.     Appearance: She is not toxic-appearing.   Cardiovascular:      Rate and Rhythm: Regular rhythm. Tachycardia present.      Comments: 99 bpm  Pulmonary:      Effort: Pulmonary effort is normal.      Breath sounds: No wheezing or rales.   Abdominal:      General: There is no distension.      Palpations: Abdomen is soft.      Tenderness: There is no abdominal tenderness.   Neurological:      Mental Status: She is alert and oriented to person, place, and time.   Psychiatric:         Mood and Affect: Mood normal.         Behavior: Behavior normal.         Thought Content: Thought content normal.         Judgment: Judgment normal.         Significant Labs: All pertinent labs within the past 24 hours have been reviewed.    Significant Imaging: I have reviewed all pertinent imaging results/findings within the past 24 hours.  I have reviewed and interpreted all pertinent imaging results/findings within the past 24 hours.

## 2022-01-25 NOTE — PT/OT/SLP PROGRESS
Occupational Therapy      Patient Name:  Xuan Wrightsin   MRN:  [id number]    Patient not seen today secondary to Dialysis. Will follow-up later as able.    1/25/2022

## 2022-01-25 NOTE — ANESTHESIA PREPROCEDURE EVALUATION
2022  Xuan Cousin is a 41 y.o., female.  To undergo Procedure(s) (LRB):  EGD (ESOPHAGOGASTRODUODENOSCOPY) (N/A)     Denies CP/SOB/MI/CVA/URI symptoms.  Endorses GERD with certain foods.  METS > 4  NPO > 8    Past Medical History:  Past Medical History:   Diagnosis Date    Cataract     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     Diabetes mellitus     Insulin Dependent    Galactorrhea     Hyperphosphatemia     Hypertension     Iron deficiency anemia     Obesity     Secondary hyperparathyroidism of renal origin     Vitamin D deficiency        Past Surgical History:  Past Surgical History:   Procedure Laterality Date    AV FISTULA PLACEMENT Left 2020    Procedure: CREATION, AV FISTULA, LEFT RADIOCEPHALIC FISTULA, LEFT UPPER EXTREMITY , INTRAOP ULTRASOUND, vEIN MAPPING. ;  Surgeon: Rakesh Leon MD;  Location: [facility] OR;  Service: Vascular;  Laterality: Left;  RN PREOP 20, UPT done, COVID NEGATRIVE 20  NEED H/P     SECTION, CLASSIC      INSERTION OF TUNNELED CENTRAL VENOUS CATHETER (CVC) WITH SUBCUTANEOUS PORT N/A 2020    Procedure: IR TUNNELED VATH INSERT WITHOUT PORT;  Surgeon: [facility] Diagnostic Provider;  Location: [facility] OR;  Service: Radiology;  Laterality: N/A;  1030AM START  RN PREOP 2020    INSERTION OF TUNNELED CENTRAL VENOUS CATHETER (CVC) WITH SUBCUTANEOUS PORT N/A 2020    Procedure: TUNNELED CATH PLACEMENT;  Surgeon: O[facility] Diagnostic Provider;  Location: [facility] OR;  Service: Radiology;  Laterality: N/A;  930AM START    REVISION OF ARTERIOVENOUS FISTULA Left 2020    Procedure: REVISION, AV FISTULA, THROMBECTOMY, LEFT UPPER EXTREMITY;  Surgeon: Rakesh Leon MD;  Location: [facility] OR;  Service: Vascular;  Laterality: Left;    TUBAL LIGATION         Social History:  Social History     Socioeconomic History    Marital status: Single  "   Number of children: 2   Occupational History    Occupation: Unemployed   Tobacco Use    Smoking status: Former Smoker     Types: Cigarettes    Smokeless tobacco: Never Used   Substance and Sexual Activity    Alcohol use: No    Drug use: Yes     Types: Marijuana     Comment: Occassional Recreational Use only    Sexual activity: Not Currently     Partners: Male   Social History Narrative    Currently unemployed has two young children ages 5 and 7 and she is the sole caretaker       Medications:  No current facility-administered medications on file prior to encounter.     Current Outpatient Medications on File Prior to Encounter   Medication Sig Dispense Refill    insulin NPH (NOVOLIN N) 100 unit/mL injection Inject 15 Units into the skin before breakfast. 10 mL 2    NIFEdipine (PROCARDIA-XL) 90 MG (OSM) 24 hr tablet Take 1 tablet (90 mg total) by mouth once daily. 30 tablet 2    blood sugar diagnostic Strp Check blood glucose 3 times daily 100 each 5    cloNIDine 0.3 mg/24 hr td ptwk (CATAPRES) 0.3 mg/24 hr Place 1 patch onto the skin every 7 days. 4 patch 2    lancets 30 gauge Misc 3 (three) times daily      pen needle, diabetic 31 gauge x 5/16" Ndle daily         Allergies:  Review of patient's allergies indicates:   Allergen Reactions    Vicodin [hydrocodone-acetaminophen] Hives    Codeine Hives       Active Problems:  Patient Active Problem List   Diagnosis    Anemia of renal disease    Hypertensive CKD (chronic kidney disease)    Nephrotic range proteinuria    Metabolic acidosis    Increased prolactin level    Secondary hyperparathyroidism    Iron deficiency anemia    Vitamin D deficiency    Galactorrhea    Cataract    Class 1 obesity due to excess calories with serious comorbidity in adult    Chronic fatigue    Missed menses    Uterine leiomyoma    Type 2 diabetes mellitus with stage 5 chronic kidney disease not on chronic dialysis, with long-term current use of insulin    " Hypertension    Hypertensive urgency    Symptomatic anemia    End stage renal disease    Uremia    CKD (chronic kidney disease) stage 5, GFR less than 15 ml/min    Nausea & vomiting    Thrombocytopenia    Hematemesis    Goals of care, counseling/discussion    Acute blood loss anemia    Calciphylaxis    Nausea    Type 2 diabetes mellitus    COVID-19    Renovascular hypertension    Hyperphosphatemia    Candida esophagitis    Fever    Sepsis due to gram-negative UTI    Sinus tachycardia    Slow transit constipation       Diagnostic Studies:    CBC:  Recent Labs   Lab 01/26/22  0238   WBC 12.81*   RBC 2.66*   HGB 7.8*   HCT 24.7*      MCV 93   MCH 29.3   MCHC 31.6*        CMP:  Recent Labs   Lab 01/26/22  0238   CALCIUM 8.6*   *   K 4.0   CO2 23   CL 99   BUN 22*   CREATININE 5.1*     EKG (1/14/22):  Sinus tachycardia   LVH with repolarization abnormality   Abnormal ECG     TTE (1/13/22):  · The estimated ejection fraction is 65%.  · The left ventricle is normal in size with concentric remodeling and normal systolic function.  · Grade II left ventricular diastolic dysfunction.  · Normal right ventricular size with normal right ventricular systolic function.  · Moderate left atrial enlargement.  · Mild right atrial enlargement.  · Normal central venous pressure (3 mmHg).  · The estimated PA systolic pressure is 17 mmHg.    CT Chest (1/13/22):  No pulmonary artery thromboembolism.     Left ventricular muscular hypertrophy     Dilated thoracic esophagus which could be related to severe reflux, achalasia, distal stricture etcetera.  Further evaluation is warranted.    24 Hour Vitals:  Temp:  [36.7 °C (98.1 °F)-37.1 °C (98.8 °F)] 36.7 °C (98.1 °F)  Pulse:  [100-120] 100  Resp:  [16-20] 18  SpO2:  [95 %-99 %] 95 %  BP: (102-118)/(54-57) 118/56   See Nursing Charting For Additional Vitals    Anesthesia Evaluation    I have reviewed the Patient Summary Reports.    I have reviewed the Nursing  Notes.       Review of Systems  Anesthesia Hx:  No problems with previous Anesthesia   Denies Personal Hx of Anesthesia complications.   Social:  Former Smoker, No Alcohol Use    Hematology/Oncology:     Oncology Normal    -- Anemia: Hematology Comments: S/p 1u PRBC 1/25    EENT/Dental:EENT/Dental Normal   Cardiovascular:   Exercise tolerance: good Hypertension ECG has been reviewed.    Pulmonary:   COVID+ since 12/30/2021; stable on RA; no longer on isolation   Renal/:   Chronic Renal Disease, ESRD Last HD 1/25   Hepatic/GI:   Hematemesis; h/o candida esophagitis    Dilated esophagus with fluid present   Musculoskeletal:  Musculoskeletal Normal    Neurological:  Neurology Normal    Endocrine:   Diabetes, type 2    Dermatological:  Skin Normal    Psych:  Psychiatric Normal           Physical Exam  General:  Obesity    Airway/Jaw/Neck:   MP2, TMD > 3FB     Chest/Lungs:  Chest/Lungs Clear    Heart/Vascular:  Heart Findings: Normal            Anesthesia Plan  Type of Anesthesia, risks & benefits discussed:  Anesthesia Type:  general    Patient's Preference:   Plan Factors:          Intra-op Monitoring Plan: standard ASA monitors  Intra-op Monitoring Plan Comments:   Post Op Pain Control Plan: multimodal analgesia  Post Op Pain Control Plan Comments:     Induction:   IV and rapid sequence  Beta Blocker:         Informed Consent: Patient understands risks and agrees with Anesthesia plan.  Questions answered. Anesthesia consent signed with patient.  ASA Score: 3     Day of Surgery Review of History & Physical:        Anesthesia Plan Notes: 41 y.o. woman with ESRD on HD via tunneled catheter, HTN, DM, admitted for N/V, found to have COVID infection. Has been stable on RA from respiratory standpoint. During hospital say, has been treated for candida esophagitis and sepsis from UTI. Had drop in hemoglobin, so GI plan for EGD for eval.    GA with OETT, RSI        Ready For Surgery From Anesthesia Perspective.

## 2022-01-25 NOTE — PROGRESS NOTES
"[facility] Medicine  Progress Note    Patient Name: Xuan Ricardo  MRN: [id number]  Patient Class: IP- Inpatient   Admission Date: 12/29/2021  Length of Stay: 26 days  Attending Physician: Keiry Dorsey MD  Primary Care Provider: Katharine Franks MD        Subjective:     Principal Problem:COVID-19        HPI:  Ms. Ricardo is a 41y F w/ ESRD, recently dialyzed at [facility], presents after leaving AMA from [facility] with nausea/vomiting and bilateral thigh pain.     She says that the nausea and vomiting began a few weeks ago. She mostly reports vomiting up food content, denies overt blood. Says that the vomit is sometimes darker colored. She has been told she has "coffee-ground" emesis but does not describe the contents of her vomit as such. She denies abdominal pain, denies delayed vomiting after eating. Feels nauseas all the time, regardless of food intake.    She has also developed severe bilateral thigh lesions. She describes them as swelling w/ extreme sensitivity to touch. They started approximately two weeks ago and have persisted, with minimal improvement in pain. One thigh was biopsied at [facility] at her most recent admission, and she reports that the site continues to be tender.      Overview/Hospital Course:  ESRD patient with calciphylaxis. Admitted to hospital medicine. Nephrology consulted. Wound care consulted for thigh wounds. Pt gave verbal and written consent for records release from [facility]. At [facility] pt underwent biopsy of R thigh which was c/w calciphylaxis, and underwent EGD which showed candidal esophagitis. At  she was restarted on HD, given sodium thiosulfate. MBD therapy restarted. Restarted on fluconazole. Pain present but controlled on pain meds. COVID positive but stable on RA. had febrile episodes last week which found to have a UTI and treated and abx stopped. Seen by ID, who has now signed off. Febrile episodes had resolved but pt spiked another fever on 1/19. " Cultures ordered. Has issues with sinus tach and all work up was negative. Maybe some volume down. Started on metoprolol and doing well. COVID HD chair confirmed prior to DC. Outpatient wound care and neprho f/u for calciphylaxsis. Developed worsening anemia and concern coffee ground emesis. GI consulted. Initiated on pantoprazole. Transfused       Interval History: Hgb < 7. Feels ok    Review of Systems   Constitutional: Negative.    Respiratory: Negative.    Cardiovascular: Negative.    Gastrointestinal: Negative.      Objective:     Vital Signs (Most Recent):  Temp: 98.2 °F (36.8 °C) (01/25/22 1352)  Pulse: 100 (01/25/22 1352)  Resp: 18 (01/25/22 1352)  BP: 132/72 (01/25/22 1352)  SpO2: 97 % (01/25/22 1352) Vital Signs (24h Range):  Temp:  [97.5 °F (36.4 °C)-98.2 °F (36.8 °C)] 98.2 °F (36.8 °C)  Pulse:  [] 100  Resp:  [15-18] 18  SpO2:  [94 %-100 %] 97 %  BP: ()/(48-76) 132/72     Weight: 94.3 kg (207 lb 14.3 oz)  Body mass index is 33.57 kg/m².    Intake/Output Summary (Last 24 hours) at 1/25/2022 1526  Last data filed at 1/25/2022 1220  Gross per 24 hour   Intake 834 ml   Output 1600 ml   Net -766 ml      Physical Exam  Vitals and nursing note reviewed.   Constitutional:       General: She is not in acute distress.     Appearance: She is not toxic-appearing.   Cardiovascular:      Rate and Rhythm: Regular rhythm. Tachycardia present.      Comments: 99 bpm  Pulmonary:      Effort: Pulmonary effort is normal.      Breath sounds: No wheezing or rales.   Abdominal:      General: There is no distension.      Palpations: Abdomen is soft.      Tenderness: There is no abdominal tenderness.   Neurological:      Mental Status: She is alert and oriented to person, place, and time.   Psychiatric:         Mood and Affect: Mood normal.         Behavior: Behavior normal.         Thought Content: Thought content normal.         Judgment: Judgment normal.         Significant Labs: All pertinent labs within the past  24 hours have been reviewed.    Significant Imaging: I have reviewed all pertinent imaging results/findings within the past 24 hours.  I have reviewed and interpreted all pertinent imaging results/findings within the past 24 hours.      Assessment/Plan:      * COVID-19  Asymptomatic covid-19 infection  - isolation precautions  - no hypoxia, no indication for dex/rem  - Diagnosed 12/30, now has surpassed even severe COVID isolation requirements so isolation was discontinued    Anemia of renal disease  I am told patient had episode of coffee ground emesis.   I agree with pantoprazole pending endoscopic evaluation. Will need a unit of blood today during dialysis. GI on board. Hopefully we can proceed with upper endoscopy tomorrow. NPO at midnight.         Slow transit constipation    - Lactulose x1 1/24  - Mobilize w/ PT/OT    Sinus tachycardia  Likely due to anemia. Will transfuse one unit. Cardiac monitoring      Sepsis due to gram-negative UTI  Resolved     Fever  - Cultures with no growth to date  - Afebrile with negative BCx x48h          Candida esophagitis  -Prior admission to [facility] w/ EGD showing candidal esophagitis  -was given fluconazole this admission   -CTA chest concerning for possible esophageal strictures.   - GI consulted for bleed.     Hyperphosphatemia  Phos lowering important given calciphylaxis.  - continue sevalamer  - on cincalcet for hyperpth   - HD per nephrology      Renovascular hypertension  Continue home meds; titrate to effect      Type 2 diabetes mellitus  Reports taking 10U daily  - accuchecks/SSI  - Stable in 130s      Nausea  Persistent nausea/vomiting reported at admission. No vomiting while inpatient. Last admission nausea resolved w/ bicarb drip, suggesting ESRD component.  Nausea now resolved.  Encourage p.o. intake take  - monitor as pt gets dialyzed  - see esophageal candidiasis      Calciphylaxis  Rash on bilateral thighs, extremely tender to touch. [facility] biopsy c/w calciphaxis,  per report  - records request sent for biopsy  - nephrology consulted  - wound care  - prn analgesics  - sodium thiosulfate  - treatment of hyperphos/hyperPTH as noted elsewhere  - will likely need outpatient chronic pain follow up      End stage renal disease  Pt w/ ESRD. Refused dialysis on last admission, now s/p multiple sessions during [facility]  - nephrology consulted  - last session 12/30  - access via [facility]  - outpatient HD chair arranged      Secondary hyperparathyroidism  PTH severely elevated. Particularly concerning given calciphylaxis  - continue cincalcet      Metabolic acidosis  Resolved       VTE Risk Mitigation (From admission, onward)         Ordered     heparin (porcine) injection 3,200 Units  As needed (PRN)         12/31/21 1335     IP VTE HIGH RISK PATIENT  Once         12/30/21 0339     Place sequential compression device  Until discontinued         12/30/21 0339     Place HENRI hose  Until discontinued         12/30/21 0339     Reason for No Pharmacological VTE Prophylaxis  Once        Question:  Reasons:  Answer:  Active Bleeding    12/30/21 0339                Discharge Planning   PAMELA: 1/19/2022     Code Status: Full Code   Is the patient medically ready for discharge?: Yes    Reason for patient still in hospital (select all that apply): Treatment  Discharge Plan A: Skilled Nursing Facility   Discharge Delays: (!) Post-Acute Set-up      Discussed with patient at bedside.         Keiry Edouard MD  Department of Hospital Medicine   [facility] - Telemetry

## 2022-01-25 NOTE — PROGRESS NOTES
Xuan Cousin is a 41 y.o. female patient.    Follow for ESRD, dialysis    Patient seen while on dialysis  No new c/o, comfortable    Scheduled Meds:   ascorbic acid (vitamin C)  500 mg Oral BID    cinacalcet  30 mg Oral Daily with breakfast    epoetin kelsey-epbx  14,000 Units Intravenous Every Tues, Thurs, Sat    ferrous gluconate  324 mg Oral BID WM    fluconazole  400 mg Oral Daily    gabapentin  100 mg Oral TID    metoprolol tartrate  25 mg Oral BID    multivitamin  1 tablet Oral Daily    pantoprazole  80 mg Intravenous Once    Followed by    pantoprazole  40 mg Intravenous BID    polyethylene glycol  17 g Oral BID    senna-docusate 8.6-50 mg  1 tablet Oral Daily    sevelamer carbonate  2,400 mg Oral TID WM    sodium thiosulfate  25 g Intravenous Every Tues, Thurs, Sat    tuberculin  5 Units Intradermal Once       Review of patient's allergies indicates:   Allergen Reactions    Vicodin [hydrocodone-acetaminophen] Hives    Codeine Hives         Vital Signs Range (Last 24H):  Temp:  [97.5 °F (36.4 °C)-98.4 °F (36.9 °C)]   Pulse:  []   Resp:  [15-18]   BP: ()/(48-70)   SpO2:  [94 %-100 %]     I & O (Last 24H):No intake or output data in the 24 hours ending 01/25/22 1102        Physical Exam:  General appearance: well developed, well nourished, no distress  Lungs:  clear to auscultation bilaterally and normal respiratory effort  Heart: regular rate and rhythm  Abdomen: soft, non-tender non-distented; bowel sounds normal; no masses,  no organomegaly  Extremities: no cyanosis or edema, or clubbing    Laboratory:  I have reviewed all pertinent lab results within the past 24 hours.  CBC:   Recent Labs   Lab 01/25/22  0825   WBC 14.01*   RBC 2.87*   HGB 8.2*   HCT 26.4*      MCV 92   MCH 28.6   MCHC 31.1*     CMP:   Recent Labs   Lab 01/25/22  0341   *   CALCIUM 8.9   *   K 5.0   CO2 23      BUN 36*   CREATININE 7.0*       Imp/Plan    ESRD - on  dialysis  COVID-19 infection  Calciphylaxis  HTN  DM type 2  Anemia of CKD    Continue present Rx  HD q TTS  We'll follow for dialysis      Trac FATOU Meyers  1/25/2022

## 2022-01-25 NOTE — PLAN OF CARE
[facility] - Telemetry  Discharge Reassessment    Primary Care Provider: Katharine Franks MD    Expected Discharge Date: 1/19/2022    Reassessment (most recent)       Discharge Reassessment - 01/25/22 1241          Discharge Reassessment    Assessment Type Discharge Planning Reassessment     Did the patient's condition or plan change since previous assessment? No     Discharge Plan discussed with: Sibling     Name(s) and Number(s) Tressa Aguirre (Sister)   [phone]     Discharge Plan A Skilled Nursing Facility     Discharge Plan B Home Health     DME Needed Upon Discharge  none        Post-Acute Status    Post-Acute Authorization Placement     Post-Acute Placement Status Pending payor review/awaiting authorization (if required)     Hospital Resources/Appts/Education Provided Provided patient/caregiver with written discharge plan information     Discharge Delays Post-Acute Set-up                   CHUCKIE spoke with patient's sister informing her that [facility] denied patient for IPR. CHUCKIE explained to patient's sister SNF and  services. Patient's sister is interested in SNF placement.

## 2022-01-25 NOTE — PLAN OF CARE
Pt not medically clear for DC, developed hematemesis 1/24/2022 - changed service back to hospitalists.     Tested COVID positive 1/18/2022.    Therapy recommending IRF due to debility. [id number] completed and PHN denied rehab.  Offered SNF.     SW discussed with pt and nephew, they are agreeable to SNF.  Referrals sent, awaiting accepting facility.     Awaiting PASSR/142 and PPD.     Pt has dialysis chair established at [facility]([phone])  MWF 2 pm, will need to be notified when pt is clear for DC.

## 2022-01-25 NOTE — NURSING
Received report from NORMAN Bauman in dialysis. 766cc removed today. Pt c/o cramping and recieved prn pain med. /76, . Awaiting pt's arrival to the floor

## 2022-01-25 NOTE — PLAN OF CARE
Problem: Physical Therapy Goal  Goal: Physical Therapy Goal  Description: Goals to be met by: 22     Patient will increase functional independence with mobility by performin. Supine to sit with Modified [place]  2. Rolling to Left and Right with Modified [place]  3. Sit to stand transfer with Modified [place]   4. Bed to chair transfer with Modified [place]   5. Gait >50 feet with Modified [place] using appropriate AD  6. Lower extremity exercise program x10 reps per handout, with independence    Outcome: Ongoing, Progressing    Pt ambulated a few sidesteps along bedside with min A using RW, limited by BLE pain at this time.

## 2022-01-25 NOTE — PT/OT/SLP PROGRESS
Physical Therapy      Patient Name:  Xuan Ricardo   MRN:  [id number]    Patient not seen at this time for PT tx secondary to Dialysis. Will follow-up as able.

## 2022-01-25 NOTE — ASSESSMENT & PLAN NOTE
Pt w/ ESRD. Refused dialysis on last admission, now s/p multiple sessions during [facility]  - nephrology consulted  - last session 12/30  - access via [facility]  - outpatient HD chair arranged

## 2022-01-26 PROBLEM — R50.9 FEVER: Status: RESOLVED | Noted: 2022-01-08 | Resolved: 2022-01-26

## 2022-01-26 PROBLEM — R11.0 NAUSEA: Status: RESOLVED | Noted: 2021-12-30 | Resolved: 2022-01-26

## 2022-01-26 LAB
ANION GAP SERPL CALC-SCNC: 12 MMOL/L (ref 8–16)
ANISOCYTOSIS BLD QL SMEAR: SLIGHT
BASOPHILS # BLD AUTO: ABNORMAL K/UL (ref 0–0.2)
BASOPHILS NFR BLD: 0 % (ref 0–1.9)
BUN SERPL-MCNC: 22 MG/DL (ref 6–20)
CALCIUM SERPL-MCNC: 8.6 MG/DL (ref 8.7–10.5)
CHLORIDE SERPL-SCNC: 99 MMOL/L (ref 95–110)
CO2 SERPL-SCNC: 23 MMOL/L (ref 23–29)
CREAT SERPL-MCNC: 5.1 MG/DL (ref 0.5–1.4)
DACRYOCYTES BLD QL SMEAR: ABNORMAL
DIFFERENTIAL METHOD: ABNORMAL
EOSINOPHIL # BLD AUTO: ABNORMAL K/UL (ref 0–0.5)
EOSINOPHIL NFR BLD: 1 % (ref 0–8)
ERYTHROCYTE [DISTWIDTH] IN BLOOD BY AUTOMATED COUNT: 19.1 % (ref 11.5–14.5)
EST. GFR  (AFRICAN AMERICAN): 11 ML/MIN/1.73 M^2
EST. GFR  (NON AFRICAN AMERICAN): 10 ML/MIN/1.73 M^2
GLUCOSE SERPL-MCNC: 158 MG/DL (ref 70–110)
HCT VFR BLD AUTO: 24.7 % (ref 37–48.5)
HGB BLD-MCNC: 7.8 G/DL (ref 12–16)
HYPOCHROMIA BLD QL SMEAR: ABNORMAL
IMM GRANULOCYTES # BLD AUTO: ABNORMAL K/UL (ref 0–0.04)
IMM GRANULOCYTES NFR BLD AUTO: ABNORMAL % (ref 0–0.5)
LYMPHOCYTES # BLD AUTO: ABNORMAL K/UL (ref 1–4.8)
LYMPHOCYTES NFR BLD: 14 % (ref 18–48)
MCH RBC QN AUTO: 29.3 PG (ref 27–31)
MCHC RBC AUTO-ENTMCNC: 31.6 G/DL (ref 32–36)
MCV RBC AUTO: 93 FL (ref 82–98)
METAMYELOCYTES NFR BLD MANUAL: 1 %
MONOCYTES # BLD AUTO: ABNORMAL K/UL (ref 0.3–1)
MONOCYTES NFR BLD: 8 % (ref 4–15)
MYELOCYTES NFR BLD MANUAL: 1 %
NEUTROPHILS NFR BLD: 74 % (ref 38–73)
NEUTS BAND NFR BLD MANUAL: 1 %
NRBC BLD-RTO: 0 /100 WBC
OVALOCYTES BLD QL SMEAR: ABNORMAL
PLATELET # BLD AUTO: 237 K/UL (ref 150–450)
PLATELET BLD QL SMEAR: ABNORMAL
PMV BLD AUTO: 10 FL (ref 9.2–12.9)
POCT GLUCOSE: 150 MG/DL (ref 70–110)
POCT GLUCOSE: 179 MG/DL (ref 70–110)
POIKILOCYTOSIS BLD QL SMEAR: SLIGHT
POLYCHROMASIA BLD QL SMEAR: ABNORMAL
POTASSIUM SERPL-SCNC: 4 MMOL/L (ref 3.5–5.1)
RBC # BLD AUTO: 2.66 M/UL (ref 4–5.4)
SARS-COV-2 RDRP RESP QL NAA+PROBE: NEGATIVE
SODIUM SERPL-SCNC: 134 MMOL/L (ref 136–145)
STOMATOCYTES BLD QL SMEAR: PRESENT
WBC # BLD AUTO: 12.81 K/UL (ref 3.9–12.7)

## 2022-01-26 PROCEDURE — 43235 EGD DIAGNOSTIC BRUSH WASH: CPT | Mod: ,,, | Performed by: INTERNAL MEDICINE

## 2022-01-26 PROCEDURE — 43235 EGD DIAGNOSTIC BRUSH WASH: CPT | Performed by: INTERNAL MEDICINE

## 2022-01-26 PROCEDURE — 21400001 HC TELEMETRY ROOM

## 2022-01-26 PROCEDURE — 97530 THERAPEUTIC ACTIVITIES: CPT

## 2022-01-26 PROCEDURE — D9220A PRA ANESTHESIA: Mod: CRNA,,, | Performed by: STUDENT IN AN ORGANIZED HEALTH CARE EDUCATION/TRAINING PROGRAM

## 2022-01-26 PROCEDURE — 63600175 PHARM REV CODE 636 W HCPCS: Performed by: INTERNAL MEDICINE

## 2022-01-26 PROCEDURE — 97110 THERAPEUTIC EXERCISES: CPT

## 2022-01-26 PROCEDURE — U0002 COVID-19 LAB TEST NON-CDC: HCPCS | Performed by: ANESTHESIOLOGY

## 2022-01-26 PROCEDURE — 63600175 PHARM REV CODE 636 W HCPCS: Performed by: STUDENT IN AN ORGANIZED HEALTH CARE EDUCATION/TRAINING PROGRAM

## 2022-01-26 PROCEDURE — 37000008 HC ANESTHESIA 1ST 15 MINUTES: Performed by: INTERNAL MEDICINE

## 2022-01-26 PROCEDURE — 80048 BASIC METABOLIC PNL TOTAL CA: CPT | Performed by: INTERNAL MEDICINE

## 2022-01-26 PROCEDURE — 63700000 PHARM REV CODE 250 ALT 637 W/O HCPCS: Performed by: STUDENT IN AN ORGANIZED HEALTH CARE EDUCATION/TRAINING PROGRAM

## 2022-01-26 PROCEDURE — 99223 1ST HOSP IP/OBS HIGH 75: CPT | Mod: ,,, | Performed by: INTERNAL MEDICINE

## 2022-01-26 PROCEDURE — 36415 COLL VENOUS BLD VENIPUNCTURE: CPT | Performed by: INTERNAL MEDICINE

## 2022-01-26 PROCEDURE — D9220A PRA ANESTHESIA: ICD-10-PCS | Mod: CRNA,,, | Performed by: STUDENT IN AN ORGANIZED HEALTH CARE EDUCATION/TRAINING PROGRAM

## 2022-01-26 PROCEDURE — 25000003 PHARM REV CODE 250: Performed by: INTERNAL MEDICINE

## 2022-01-26 PROCEDURE — 25000003 PHARM REV CODE 250: Performed by: EMERGENCY MEDICINE

## 2022-01-26 PROCEDURE — 99223 PR INITIAL HOSPITAL CARE,LEVL III: ICD-10-PCS | Mod: ,,, | Performed by: INTERNAL MEDICINE

## 2022-01-26 PROCEDURE — 37000009 HC ANESTHESIA EA ADD 15 MINS: Performed by: INTERNAL MEDICINE

## 2022-01-26 PROCEDURE — D9220A PRA ANESTHESIA: ICD-10-PCS | Mod: ANES,,, | Performed by: ANESTHESIOLOGY

## 2022-01-26 PROCEDURE — 85027 COMPLETE CBC AUTOMATED: CPT | Performed by: INTERNAL MEDICINE

## 2022-01-26 PROCEDURE — 25000003 PHARM REV CODE 250: Performed by: STUDENT IN AN ORGANIZED HEALTH CARE EDUCATION/TRAINING PROGRAM

## 2022-01-26 PROCEDURE — 85007 BL SMEAR W/DIFF WBC COUNT: CPT | Performed by: INTERNAL MEDICINE

## 2022-01-26 PROCEDURE — 43235 PR EGD, FLEX, DIAGNOSTIC: ICD-10-PCS | Mod: ,,, | Performed by: INTERNAL MEDICINE

## 2022-01-26 PROCEDURE — C9113 INJ PANTOPRAZOLE SODIUM, VIA: HCPCS | Performed by: INTERNAL MEDICINE

## 2022-01-26 PROCEDURE — 25000003 PHARM REV CODE 250: Performed by: HOSPITALIST

## 2022-01-26 PROCEDURE — D9220A PRA ANESTHESIA: Mod: ANES,,, | Performed by: ANESTHESIOLOGY

## 2022-01-26 RX ORDER — SUCCINYLCHOLINE CHLORIDE 20 MG/ML
INJECTION INTRAMUSCULAR; INTRAVENOUS
Status: DISCONTINUED | OUTPATIENT
Start: 2022-01-26 | End: 2022-01-26

## 2022-01-26 RX ORDER — SUCCINYLCHOLINE CHLORIDE 20 MG/ML
INJECTION INTRAMUSCULAR; INTRAVENOUS
Status: DISPENSED
Start: 2022-01-26 | End: 2022-01-27

## 2022-01-26 RX ORDER — PROPOFOL 10 MG/ML
INJECTION, EMULSION INTRAVENOUS
Status: DISPENSED
Start: 2022-01-26 | End: 2022-01-27

## 2022-01-26 RX ORDER — ESMOLOL HYDROCHLORIDE 10 MG/ML
INJECTION INTRAVENOUS
Status: DISPENSED
Start: 2022-01-26 | End: 2022-01-27

## 2022-01-26 RX ORDER — LIDOCAINE HYDROCHLORIDE 20 MG/ML
INJECTION INTRAVENOUS
Status: DISCONTINUED | OUTPATIENT
Start: 2022-01-26 | End: 2022-01-26

## 2022-01-26 RX ORDER — PHENYLEPHRINE HCL IN 0.9% NACL 1 MG/10 ML
SYRINGE (ML) INTRAVENOUS
Status: DISPENSED
Start: 2022-01-26 | End: 2022-01-27

## 2022-01-26 RX ORDER — LIDOCAINE HYDROCHLORIDE 20 MG/ML
INJECTION, SOLUTION EPIDURAL; INFILTRATION; INTRACAUDAL; PERINEURAL
Status: DISPENSED
Start: 2022-01-26 | End: 2022-01-27

## 2022-01-26 RX ORDER — PROPOFOL 10 MG/ML
VIAL (ML) INTRAVENOUS
Status: DISCONTINUED | OUTPATIENT
Start: 2022-01-26 | End: 2022-01-26

## 2022-01-26 RX ORDER — FERROUS GLUCONATE 324(37.5)
324 TABLET ORAL
Status: DISCONTINUED | OUTPATIENT
Start: 2022-01-27 | End: 2022-01-28 | Stop reason: HOSPADM

## 2022-01-26 RX ORDER — PHENYLEPHRINE HYDROCHLORIDE 10 MG/ML
INJECTION INTRAVENOUS
Status: DISCONTINUED | OUTPATIENT
Start: 2022-01-26 | End: 2022-01-26

## 2022-01-26 RX ADMIN — GABAPENTIN 100 MG: 100 CAPSULE ORAL at 02:01

## 2022-01-26 RX ADMIN — METOPROLOL TARTRATE 25 MG: 25 TABLET, FILM COATED ORAL at 08:01

## 2022-01-26 RX ADMIN — SODIUM CHLORIDE: 0.9 INJECTION, SOLUTION INTRAVENOUS at 04:01

## 2022-01-26 RX ADMIN — PROPOFOL 130 MG: 10 INJECTION, EMULSION INTRAVENOUS at 04:01

## 2022-01-26 RX ADMIN — LIDOCAINE HYDROCHLORIDE 100 MG: 20 INJECTION, SOLUTION INTRAVENOUS at 04:01

## 2022-01-26 RX ADMIN — PANTOPRAZOLE SODIUM 40 MG: 40 INJECTION, POWDER, FOR SOLUTION INTRAVENOUS at 09:01

## 2022-01-26 RX ADMIN — ESMOLOL HYDROCHLORIDE 20 MG: 10 INJECTION INTRAVENOUS at 04:01

## 2022-01-26 RX ADMIN — OXYCODONE 10 MG: 5 TABLET ORAL at 02:01

## 2022-01-26 RX ADMIN — Medication 324 MG: at 08:01

## 2022-01-26 RX ADMIN — SENNOSIDES AND DOCUSATE SODIUM 1 TABLET: 50; 8.6 TABLET ORAL at 08:01

## 2022-01-26 RX ADMIN — FLUCONAZOLE 400 MG: 100 TABLET ORAL at 08:01

## 2022-01-26 RX ADMIN — CINACALCET 30 MG: 30 TABLET, FILM COATED ORAL at 08:01

## 2022-01-26 RX ADMIN — THERA TABS 1 TABLET: TAB at 08:01

## 2022-01-26 RX ADMIN — GABAPENTIN 100 MG: 100 CAPSULE ORAL at 08:01

## 2022-01-26 RX ADMIN — PANTOPRAZOLE SODIUM 40 MG: 40 INJECTION, POWDER, FOR SOLUTION INTRAVENOUS at 08:01

## 2022-01-26 RX ADMIN — PHENYLEPHRINE HYDROCHLORIDE 200 MCG: 10 INJECTION INTRAVENOUS at 04:01

## 2022-01-26 RX ADMIN — OXYCODONE HYDROCHLORIDE AND ACETAMINOPHEN 500 MG: 500 TABLET ORAL at 08:01

## 2022-01-26 RX ADMIN — SUCCINYLCHOLINE CHLORIDE 120 MG: 20 INJECTION, SOLUTION INTRAMUSCULAR; INTRAVENOUS at 04:01

## 2022-01-26 NOTE — NURSING
Bedside Report given to night nurse NORMAN Dempsey.Walking rounds completed. Visualized and assessed patient NAD noted. Safety precautions maintained and call light within reach.     Chart check completed.

## 2022-01-26 NOTE — OR NURSING
PROCEDURE AND RECOVERY COMPLETED.PARTIAL LINEN CHANGED DUE TO OOZING OF WOUNDS;TOLERATED WITH MINIMAL DISCOMFORTS; RESULTS DISCUSSED WITH PATIENT; REPORT CALLED TO NORMAN CASTRO . PATIENT DENIES CONCERNS; PATIENT READY TO TRANSPORT TO ROOM.

## 2022-01-26 NOTE — SUBJECTIVE & OBJECTIVE
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 ;  Surgeon: Rakesh Leon MD;  Location: [facility] OR;  Service: Vascular;  Laterality: Left;  RN PREOP 20, UPT done, COVID NEGATRIVE 20  NEED H/P     SECTION, CLASSIC      INSERTION OF TUNNELED CENTRAL VENOUS CATHETER (CVC) WITH SUBCUTANEOUS PORT N/A 2020    Procedure: IR TUNNELED VATH INSERT WITHOUT PORT;  Surgeon: [facility] Diagnostic Provider;  Location: [facility] OR;  Service: Radiology;  Laterality: N/A;  1030AM START  RN PREOP 2020    INSERTION OF TUNNELED CENTRAL VENOUS CATHETER (CVC) WITH SUBCUTANEOUS PORT N/A 2020    Procedure: TUNNELED CATH PLACEMENT;  Surgeon: [facility] Diagnostic Provider;  Location: [facility] OR;  Service: Radiology;  Laterality: N/A;  930AM START    REVISION OF ARTERIOVENOUS FISTULA Left 2020    Procedure: REVISION, AV FISTULA, THROMBECTOMY, LEFT UPPER EXTREMITY;  Surgeon: Rakesh Leon MD;  Location: [facility] OR;  Service: Vascular;  Laterality: Left;    TUBAL LIGATION         Review of patient's allergies indicates:   Allergen Reactions    Vicodin [hydrocodone-acetaminophen] Hives    Codeine Hives       No current facility-administered medications on file prior to encounter.     Current Outpatient Medications on File Prior to Encounter   Medication Sig    insulin NPH (NOVOLIN N) 100 unit/mL injection Inject 15 Units into the skin before breakfast.    NIFEdipine (PROCARDIA-XL) 90 MG (OSM) 24 hr tablet Take 1 tablet (90 mg  "total) by mouth once daily.    blood sugar diagnostic Strp Check blood glucose 3 times daily    cloNIDine 0.3 mg/24 hr td ptwk (CATAPRES) 0.3 mg/24 hr Place 1 patch onto the skin every 7 days.    lancets 30 gauge Misc 3 (three) times daily    pen needle, diabetic 31 gauge x 5/16" Ndle daily     Family History     Problem Relation (Age of Onset)    Asthma Sister    Heart failure Father (58)    Seizures Mother (64)        Tobacco Use    Smoking status: Former Smoker     Types: Cigarettes    Smokeless tobacco: Never Used   Substance and Sexual Activity    Alcohol use: No    Drug use: Yes     Types: Marijuana     Comment: Occassional Recreational Use only    Sexual activity: Not Currently     Partners: Male     Review of Systems   Constitutional: Negative for decreased appetite.   HENT: Negative for ear discharge.    Eyes: Negative for blurred vision.   Respiratory: Negative for hemoptysis.    Endocrine: Negative for polyphagia.   Hematologic/Lymphatic: Negative for adenopathy.   Skin: Negative for color change.   Musculoskeletal: Negative for joint swelling.   Genitourinary: Negative for bladder incontinence.   Neurological: Negative for brief paralysis.   Psychiatric/Behavioral: Negative for hallucinations.   Allergic/Immunologic: Negative for hives.     Objective:     Vital Signs (Most Recent):  Temp: 98.1 °F (36.7 °C) (01/26/22 0721)  Pulse: 100 (01/26/22 0721)  Resp: 18 (01/26/22 0721)  BP: (!) 118/56 (01/26/22 0721)  SpO2: 95 % (01/26/22 0721) Vital Signs (24h Range):  Temp:  [98.1 °F (36.7 °C)-98.8 °F (37.1 °C)] 98.1 °F (36.7 °C)  Pulse:  [] 100  Resp:  [16-20] 18  SpO2:  [95 %-99 %] 95 %  BP: ()/(53-76) 118/56     Weight: 94.3 kg (207 lb 14.3 oz)  Body mass index is 33.57 kg/m².    SpO2: 95 %  O2 Device (Oxygen Therapy): room air      Intake/Output Summary (Last 24 hours) at 1/26/2022 1026  Last data filed at 1/25/2022 1220  Gross per 24 hour   Intake 834 ml   Output 1600 ml   Net -766 ml "       Lines/Drains/Airways     Central Venous Catheter Line                 Hemodialysis Catheter 12/29/21 right subclavian 28 days          Peripheral Intravenous Line                 Midline Catheter Insertion/Assessment  - Single Lumen 01/12/22 0245 Right brachial vein  14 days         Peripheral IV - Single Lumen 01/24/22 1350 18 G Left Antecubital 1 day                Physical Exam  Constitutional:       Appearance: She is well-developed and well-nourished.   HENT:      Head: Normocephalic and atraumatic.   Eyes:      Conjunctiva/sclera: Conjunctivae normal.      Pupils: Pupils are equal, round, and reactive to light.   Cardiovascular:      Rate and Rhythm: Normal rate.      Pulses: Intact distal pulses.      Heart sounds: Normal heart sounds.   Pulmonary:      Effort: Pulmonary effort is normal.      Breath sounds: Normal breath sounds.   Abdominal:      General: Bowel sounds are normal.      Palpations: Abdomen is soft.   Musculoskeletal:         General: Normal range of motion.      Cervical back: Normal range of motion and neck supple.   Skin:     General: Skin is warm and dry.   Neurological:      Mental Status: She is alert and oriented to person, place, and time.         Significant Labs: All pertinent lab results from the last 24 hours have been reviewed.    Significant Imaging: Echocardiogram: 2D echo with color flow doppler: No results found for this or any previous visit.

## 2022-01-26 NOTE — PT/OT/SLP PROGRESS
Occupational Therapy      Patient Name:  Xuan Cousin   MRN:  [id number]    Patient not seen today x2 trials, family needs in room pm and bath ongoing am. OT will follow-up 01/27/22.    1/26/2022

## 2022-01-26 NOTE — ASSESSMENT & PLAN NOTE
Rash on bilateral thighs, extremely tender to touch. Patient asked me not to touch her legs and would not turn due to pain. My exam was very limited due to this. I can see dressing to lateral-posterior aspect of right leg has a discharge. Lateral aspect of left thigh appears unchanged. [facility] biopsy c/w calciphaxis, per report  - wound care and pain management  - prn analgesics  - sodium thiosulfate  - treatment of hyperphos/hyperPTH as noted elsewhere  - will likely need outpatient chronic pain follow up

## 2022-01-26 NOTE — PROGRESS NOTES
"[facility] Medicine  Progress Note    Patient Name: Xuan Ricardo  MRN: [id number]  Patient Class: IP- Inpatient   Admission Date: 12/29/2021  Length of Stay: 27 days  Attending Physician: Keiry Dorsey MD  Primary Care Provider: Katharine Franks MD        Subjective:     Principal Problem:COVID-19        HPI:  Ms. Ricardo is a 41y F w/ ESRD, recently dialyzed at [facility], presents after leaving AMA from [facility] with nausea/vomiting and bilateral thigh pain.     She says that the nausea and vomiting began a few weeks ago. She mostly reports vomiting up food content, denies overt blood. Says that the vomit is sometimes darker colored. She has been told she has "coffee-ground" emesis but does not describe the contents of her vomit as such. She denies abdominal pain, denies delayed vomiting after eating. Feels nauseas all the time, regardless of food intake.    She has also developed severe bilateral thigh lesions. She describes them as swelling w/ extreme sensitivity to touch. They started approximately two weeks ago and have persisted, with minimal improvement in pain. One thigh was biopsied at [facility] at her most recent admission, and she reports that the site continues to be tender.      Overview/Hospital Course:  ESRD patient with calciphylaxis. Admitted to hospital medicine. Nephrology consulted. Wound care consulted for thigh wounds. Pt gave verbal and written consent for records release from [facility]. At [facility] pt underwent biopsy of R thigh which was c/w calciphylaxis, and underwent EGD which showed candidal esophagitis. At  she was restarted on HD, given sodium thiosulfate. MBD therapy restarted. Restarted on fluconazole. Pain present but controlled on pain meds. COVID positive but stable on RA. had febrile episodes last week which found to have a UTI and treated and abx stopped. Seen by ID, who has now signed off. Febrile episodes had resolved but pt spiked another fever on 1/19. " "Cultures ordered. Has issues with sinus tach and all work up was negative. Maybe some volume down. Started on metoprolol and doing well. COVID HD chair confirmed prior to DC. Outpatient wound care and neprho f/u for calciphylaxsis. Developed worsening anemia and concern coffee ground emesis. GI consulted. Initiated on pantoprazole. Transfused       Interval History: complains of BLE pain. States it's a "burning pain". States this is chronic.     Review of Systems   Constitutional: Negative.    Respiratory: Negative.    Cardiovascular: Negative.    Gastrointestinal: Negative.    Musculoskeletal:        BLE pain     Objective:     Vital Signs (Most Recent):  Temp: 98.4 °F (36.9 °C) (01/26/22 1610)  Pulse: (!) 112 (01/26/22 1610)  Resp: (!) 22 (01/26/22 1610)  BP: (!) 122/55 (01/26/22 1610)  SpO2: 100 % (01/26/22 1610) Vital Signs (24h Range):  Temp:  [98.1 °F (36.7 °C)-98.8 °F (37.1 °C)] 98.4 °F (36.9 °C)  Pulse:  [100-120] 112  Resp:  [16-22] 22  SpO2:  [95 %-100 %] 100 %  BP: (102-122)/(54-57) 122/55     Weight: 94.3 kg (207 lb 14.3 oz)  Body mass index is 33.57 kg/m².  No intake or output data in the 24 hours ending 01/26/22 1626   Physical Exam  Vitals and nursing note reviewed.   Constitutional:       General: She is not in acute distress.     Appearance: She is not toxic-appearing.   Cardiovascular:      Rate and Rhythm: Regular rhythm. Tachycardia present.      Comments: 101 bpm  Pulmonary:      Effort: Pulmonary effort is normal.      Breath sounds: No wheezing or rales.   Abdominal:      General: There is no distension.      Palpations: Abdomen is soft.      Tenderness: There is no abdominal tenderness.   Neurological:      Mental Status: She is alert and oriented to person, place, and time.   Psychiatric:         Mood and Affect: Mood normal.         Behavior: Behavior normal.         Thought Content: Thought content normal.         Judgment: Judgment normal.         Significant Labs: All pertinent labs " within the past 24 hours have been reviewed.    Significant Imaging: I have reviewed all pertinent imaging results/findings within the past 24 hours.  I have reviewed and interpreted all pertinent imaging results/findings within the past 24 hours.      Assessment/Plan:      * COVID-19  Asymptomatic covid-19 infection  - isolation precautions  - no hypoxia, no indication for dex/rem  - Diagnosed 12/30/2021. Negative 1/26/2022  - will dc isolation    Anemia of renal disease  I am told patient had episode of coffee ground emesis.   I agree with pantoprazole pending endoscopic evaluation. No blood transfusion needed today. F/u EGD results        Slow transit constipation  - continue current bowel regimen   - Mobilize w/ PT/OT    Sinus tachycardia  Likely due to anemia and pain. Transfused. Cardiac monitoring      Sepsis due to gram-negative UTI  - resolved     Candida esophagitis  -Prior admission to [facility] w/ EGD showing candidal esophagitis  -completed course of fluconazole  -CTA chest concerning for possible esophageal strictures.   - GI consulted for bleed. Plan for EGD today as she also has symptomatic anemia    Hyperphosphatemia  Phos lowering important given calciphylaxis.  - continue sevalamer  - on cincalcet for hyperpth  - HD per nephrology      Renovascular hypertension  BP at goal without antihypertensive therapy      Type 2 diabetes mellitus  Glucose at goal without insulin requirements. Continue checking AC/HS and SSI for now. Adjust as needed for goal glucose 140-180      Calciphylaxis  Rash on bilateral thighs, extremely tender to touch. Patient asked me not to touch her legs and would not turn due to pain. My exam was very limited due to this. I can see dressing to lateral-posterior aspect of right leg has a discharge. Lateral aspect of left thigh appears unchanged. [facility] biopsy c/w calciphaxis, per report  - wound care and pain management  - prn analgesics  - sodium thiosulfate  - treatment of  hyperphos/hyperPTH as noted elsewhere  - will likely need outpatient chronic pain follow up      End stage renal disease  Pt w/ ESRD. Refused dialysis on last admission, now s/p multiple sessions during [facility]  - nephrology consulted  - last session 12/30  - access via [facility]  - outpatient HD chair arranged      Symptomatic anemia  Hgb now > 7 g/dL after a unit of blood. No nausea or vomiting today. Is to undergo EGD today. Continue pantoprazole 40 mg IV BID      Secondary hyperparathyroidism  PTH severely elevated. Particularly concerning given calciphylaxis  - continue cincalcet      Metabolic acidosis  Resolved       VTE Risk Mitigation (From admission, onward)         Ordered     heparin (porcine) injection 3,200 Units  As needed (PRN)         12/31/21 1335     IP VTE HIGH RISK PATIENT  Once         12/30/21 0339     Place sequential compression device  Until discontinued         12/30/21 0339     Place HENRI hose  Until discontinued         12/30/21 0339     Reason for No Pharmacological VTE Prophylaxis  Once        Question:  Reasons:  Answer:  Active Bleeding    12/30/21 0339                Discharge Planning   PAMELA: 1/19/2022     Code Status: Full Code   Is the patient medically ready for discharge?: Yes    Reason for patient still in hospital (select all that apply): Treatment  Discharge Plan A: Skilled Nursing Facility   Discharge Delays: (!) Post-Acute Set-up      Discussed with patient and sister at bedside.       Keiry Edouard MD  Department of Hospital Medicine   [facility] - Endoscopy

## 2022-01-26 NOTE — SUBJECTIVE & OBJECTIVE
"Interval History: complains of BLE pain. States it's a "burning pain". States this is chronic.     Review of Systems   Constitutional: Negative.    Respiratory: Negative.    Cardiovascular: Negative.    Gastrointestinal: Negative.    Musculoskeletal:        BLE pain     Objective:     Vital Signs (Most Recent):  Temp: 98.4 °F (36.9 °C) (01/26/22 1610)  Pulse: (!) 112 (01/26/22 1610)  Resp: (!) 22 (01/26/22 1610)  BP: (!) 122/55 (01/26/22 1610)  SpO2: 100 % (01/26/22 1610) Vital Signs (24h Range):  Temp:  [98.1 °F (36.7 °C)-98.8 °F (37.1 °C)] 98.4 °F (36.9 °C)  Pulse:  [100-120] 112  Resp:  [16-22] 22  SpO2:  [95 %-100 %] 100 %  BP: (102-122)/(54-57) 122/55     Weight: 94.3 kg (207 lb 14.3 oz)  Body mass index is 33.57 kg/m².  No intake or output data in the 24 hours ending 01/26/22 1626   Physical Exam  Vitals and nursing note reviewed.   Constitutional:       General: She is not in acute distress.     Appearance: She is not toxic-appearing.   Cardiovascular:      Rate and Rhythm: Regular rhythm. Tachycardia present.      Comments: 101 bpm  Pulmonary:      Effort: Pulmonary effort is normal.      Breath sounds: No wheezing or rales.   Abdominal:      General: There is no distension.      Palpations: Abdomen is soft.      Tenderness: There is no abdominal tenderness.   Neurological:      Mental Status: She is alert and oriented to person, place, and time.   Psychiatric:         Mood and Affect: Mood normal.         Behavior: Behavior normal.         Thought Content: Thought content normal.         Judgment: Judgment normal.         Significant Labs: All pertinent labs within the past 24 hours have been reviewed.    Significant Imaging: I have reviewed all pertinent imaging results/findings within the past 24 hours.  I have reviewed and interpreted all pertinent imaging results/findings within the past 24 hours.  "

## 2022-01-26 NOTE — ASSESSMENT & PLAN NOTE
Hgb now > 7 g/dL after a unit of blood. No nausea or vomiting today. Is to undergo EGD today. Continue pantoprazole 40 mg IV BID

## 2022-01-26 NOTE — PLAN OF CARE
Problem: Physical Therapy Goal  Goal: Physical Therapy Goal  Description: Goals to be met by: 22     Patient will increase functional independence with mobility by performin. Supine to sit with Modified [place]  2. Rolling to Left and Right with Modified [place]  3. Sit to stand transfer with Modified [place]   4. Bed to chair transfer with Modified [place]   5. Gait >50 feet with Modified [place] using appropriate AD  6. Lower extremity exercise program x10 reps per handout, with independence    Outcome: Ongoing, Progressing    Pt still limited with gait due to pain, also with c/o SOB today.

## 2022-01-26 NOTE — ASSESSMENT & PLAN NOTE
Glucose at goal without insulin requirements. Continue checking AC/HS and SSI for now. Adjust as needed for goal glucose 140-180

## 2022-01-26 NOTE — TRANSFER OF CARE
"Anesthesia Transfer of Care Note    Patient: Xuan Ricardo    Procedure(s) Performed: Procedure(s) (LRB):  EGD (ESOPHAGOGASTRODUODENOSCOPY) (N/A)    Patient location: GI    Anesthesia Type: general    Transport from OR: Transported from OR on room air with adequate spontaneous ventilation    Post pain: adequate analgesia    Post assessment: no apparent anesthetic complications and tolerated procedure well    Post vital signs: stable    Level of consciousness: awake    Nausea/Vomiting: no nausea/vomiting    Complications: none    Transfer of care protocol was followed      Last vitals:   Visit Vitals  /69   Pulse 106   Temp 36.7 °C (98.1 °F) (Oral)   Resp 18   Ht 5' 5.98" (1.676 m)   Wt 94.3 kg (207 lb 14.3 oz)   LMP 11/17/2021 (Approximate)   SpO2 96%   Breastfeeding No   BMI 33.57 kg/m²     "

## 2022-01-26 NOTE — H&P
Endoscopy History and Physical    PCP - Katharine Franks MD     Procedure - EGD  ASA - per anesthesia  Mallampati - per anesthesia  History of Anesthesia problems - no  Family history Anesthesia problems -  no   Plan of anesthesia - General, MAC    HPI:  This is a 41 y.o. 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    Surgical History:  has a past surgical history that includes  section, classic; Tubal ligation; Insertion of tunneled central venous catheter (CVC) with subcutaneous port (N/A, 2020); AV fistula placement (Left, 2020); Revision of arteriovenous fistula (Left, 2020); and Insertion of tunneled central venous catheter (CVC) with subcutaneous port (N/A, 2020).    Family History: family history includes Asthma in her sister; Heart failure (age of onset: 58) in her father; Seizures (age of onset: 64) in her mother.. Otherwise no colon cancer, inflammatory bowel disease, or GI malignancies.    Social History:  reports that she has quit smoking. Her smoking use included cigarettes. She has never used smokeless tobacco. She reports current drug use. Drug: Marijuana. She reports that she does not drink alcohol.    Review of patient's allergies indicates:   Allergen Reactions    Vicodin [hydrocodone-acetaminophen] Hives    Codeine Hives       Medications:   Medications Prior to Admission   Medication Sig Dispense Refill Last Dose    insulin NPH (NOVOLIN N) 100 unit/mL injection Inject 15 Units into the skin before breakfast.  "10 mL 2 12/29/2021    NIFEdipine (PROCARDIA-XL) 90 MG (OSM) 24 hr tablet Take 1 tablet (90 mg total) by mouth once daily. 30 tablet 2 12/29/2021    blood sugar diagnostic Strp Check blood glucose 3 times daily 100 each 5     cloNIDine 0.3 mg/24 hr td ptwk (CATAPRES) 0.3 mg/24 hr Place 1 patch onto the skin every 7 days. 4 patch 2     lancets 30 gauge Misc 3 (three) times daily       pen needle, diabetic 31 gauge x 5/16" Ndle daily       [DISCONTINUED] ergocalciferol (ERGOCALCIFEROL) 50,000 unit Cap Take 1 capsule (50,000 Units total) by mouth every 7 days. 12 capsule 0     [DISCONTINUED] sevelamer carbonate (RENVELA) 800 mg Tab Take 1 tablet (800 mg total) by mouth 3 (three) times daily with meals. 90 tablet 2        Physical Exam:    Vital Signs:   Vitals:    01/26/22 1442   BP:    Pulse:    Resp: 18   Temp:        Gen: NAD, lying comfortably  HENT: NCAT, oropharynx clear  Eyes: anicteric sclerae, EOMI grossly  Neck: supple, no visible masses/goiter  Cardiac: RRR  Lungs: non-labored breathing  Abd: soft, NT/ND, normoactive BS  Ext: no LE edema, warm, well perfused  Skin: skin intact on exposed body parts, no visible rashes, lesions  Neuro: A&Ox4, neuro exam grossly intact, moves all extremities  Psych: appropriate mood, affect      Labs:  Lab Results   Component Value Date    WBC 12.81 (H) 01/26/2022    HGB 7.8 (L) 01/26/2022    HCT 24.7 (L) 01/26/2022     01/26/2022    CHOL 142 05/05/2019    TRIG 91 05/05/2019    HDL 42 05/05/2019    ALT 20 12/29/2021    AST 20 12/29/2021     (L) 01/26/2022    K 4.0 01/26/2022    CL 99 01/26/2022    CREATININE 5.1 (H) 01/26/2022    BUN 22 (H) 01/26/2022    CO2 23 01/26/2022    TSH 4.347 (H) 12/29/2021    INR 1.0 12/30/2021    HGBA1C 5.2 12/30/2021       Plan:  EGD for hematemesis evaluation    I have explained the risks and benefits of endoscopy procedures to the patient including but not limited to bleeding, perforation, infection, and death.  The patient was " asked if they understand and allowed to ask any further questions to their satisfaction.      Mario Alberto Irene MD

## 2022-01-26 NOTE — HPI
"Ms. Cousin is a 41y F w/ ESRD, [place] [facility] [facility] with nausea/vomiting and bilateral thigh pain.      She says that the nausea and vomiting began a few weeks ago. She mostly reports vomiting up food content, denies overt blood. Says that the vomit is sometimes darker colored. She has been told she has "coffee-ground" emesis but does not describe the contents of her vomit as such. She denies abdominal pain, denies delayed vomiting after eating. Feels nauseas all the time, regardless of food intake.     She has also developed severe bilateral thigh lesions. She describes them as swelling w/ extreme sensitivity to touch. They started approximately two weeks ago and have persisted, with minimal improvement in pain. One thigh was biopsied at [facility] at her most recent admission, and she reports that the site continues to be tender.        Overview/Hospital Course:  ESRD patient with calciphylaxis. Admitted to hospital medicine. Nephrology consulted. Wound care consulted for thigh wounds. Pt gave verbal and written consent for records release from [facility]. At [facility] pt underwent biopsy of R thigh which was c/w calciphylaxis, and underwent EGD which showed candidal esophagitis. At  she was restarted on HD, given sodium thiosulfate. MBD therapy restarted. Restarted on fluconazole. Pain present but controlled on pain meds. COVID positive but stable on RA. had febrile episodes last week which found to have a UTI and treated and abx stopped. Seen by ID, who has now signed off. Febrile episodes had resolved but pt spiked another fever on 1/19. Cultures ordered. Has issues with sinus tach and all work up was negative. Maybe some volume down. Started on metoprolol and doing well. COVID HD chair confirmed prior to DC. Outpatient wound care and neprho f/u for calciphylaxsis. Developed worsening anemia and concern coffee ground emesis. GI consulted. Initiated on pantoprazole. " Transfused      Denies CP or SOB  EKG sinus tachycardia LVH NSSTT changes  HR currently 100-110    Echo 1/13/22  The estimated ejection fraction is 65%.  The left ventricle is normal in size with concentric remodeling and normal systolic function.  Grade II left ventricular diastolic dysfunction.  Normal right ventricular size with normal right ventricular systolic function.  Moderate left atrial enlargement.  Mild right atrial enlargement.  Normal central venous pressure (3 mmHg).  The estimated PA systolic pressure is 17 mmHg.

## 2022-01-26 NOTE — ASSESSMENT & PLAN NOTE
Pt w/ ESRD. Refused dialysis on last admission, now s/p multiple sessions during [facility]  - nephrology consulted  - last session 12/30  - access via [facility]  - outpatient HD chair arranged

## 2022-01-26 NOTE — PT/OT/SLP PROGRESS
Physical Therapy Treatment    Patient Name:  Xuan Ricardo   MRN:  [id number]    Recommendations:     Discharge Recommendations:  rehabilitation facility   Discharge Equipment Recommendations: bedside commode,bath bench,wheelchair (if D/C'ed home)   Barriers to discharge home: Inaccessible home and Pt with decreased ambulation at this time.    Assessment:     Xuan Ricardo is a 41 y.o. female admitted with a medical diagnosis of COVID-19.  She presents with the following impairments/functional limitations:  impaired functional mobilty,gait instability,impaired balance,decreased lower extremity function,pain,decreased ROM,impaired skin.    Rehab Prognosis: Good; patient would benefit from acute skilled PT services to address these deficits and reach maximum level of function.    Recent Surgery: Procedure(s) (LRB):  EGD (ESOPHAGOGASTRODUODENOSCOPY) (N/A) Day of Surgery    Plan:     During this hospitalization, patient to be seen 5 x/week to address the identified rehab impairments via gait training,therapeutic activities,therapeutic exercises and progress toward the following goals:    · Plan of Care Expires:  02/01/22    Subjective     Chief Complaint: pain  Patient/Family Comments/goals: Pt agreeable to therapy.   Pain/Comfort:  · Pain Rating 1:  (Pt with BLE pain.)  · Pain Addressed 1: Distraction,Cessation of Activity,Reposition      Objective:     Patient found right sidelying with PICC line,telemetry (R chest HD access) upon PT entry to room.     General Precautions: Standard, DM, fall,airborne,contact,droplet ((COVID+)); Pt on HD.     Orthopedic Precautions:N/A   Braces: N/A  Respiratory Status: Room air     Functional Mobility:  Sister present in room and visiting with pt today.  Pt feeling a little better, however now with no appetite.   · Bed Mobility:     · Rolling Right: minimum assistance  · Scooting: minimum assistance for anterior and posterior scooting  · Bridging: minimum assistance for BLE placement to  reposition HOB  · Supine to Sit: minimum assistance with HOB elevated   · Sit to Supine: moderate assistance with BLE   · Transfers:     · Sit to Stand:  minimum assistance and of 2 persons with rolling walker; Pt with forward flexed posture, required extra time to achieve trunk extension.  Pt with decreased safety awareness and required mod VC's for proper technique and posture.    · Gait: Pt ambulated ~6 sidesteps along bedside with min A using RW. Pt with decreased weight shifting, decreased foot clearance, decreased step length, and decreased trent.  Gait limited by increased in BLE pain during weight bearing.  Pt also c/o SOB, spO2 on RA low 80's however readings were unreliable.  Pt placed on 2L O2 NC and reported feeling better, nurse Jessica notified and present and will continue to monitor.   · Balance: Pt with fair dynamic standing balance.       AM-PAC 6 CLICK MOBILITY  Turning over in bed (including adjusting bedclothes, sheets and blankets)?: 3  Sitting down on and standing up from a chair with arms (e.g., wheelchair, bedside commode, etc.): 2  Moving from lying on back to sitting on the side of the bed?: 3  Moving to and from a bed to a chair (including a wheelchair)?: 3  Need to walk in hospital room?: 2  Climbing 3-5 steps with a railing?: 1  Basic Mobility Total Score: 14       Therapeutic Activities and Exercises:  BLE seated therex x10 reps: AP, LAQ, and hip fex    Balance Training  Static Standing:  Patient performed static standing on level surface  using rolling walker with Minimal Assistance and moderate verbal cues for trunk extension.       Patient left right sidelying and BLE elevated on pillows with all lines intact, call button in reach, bed alarm on and sister present and nurse Lopez notified. Tray table close by.    GOALS:   Multidisciplinary Problems     Physical Therapy Goals        Problem: Physical Therapy Goal    Goal Priority Disciplines Outcome Goal Variances Interventions    Physical Therapy Goal     PT, PT/OT Ongoing, Progressing     Description: Goals to be met by: 22     Patient will increase functional independence with mobility by performin. Supine to sit with Modified [place]  2. Rolling to Left and Right with Modified [place]  3. Sit to stand transfer with Modified [place]   4. Bed to chair transfer with Modified [place]   5. Gait >50 feet with Modified [place] using appropriate AD  6. Lower extremity exercise program x10 reps per handout, with independence                     Time Tracking:     PT Received On: 22  PT Start Time: 1110     PT Stop Time: 1145  PT Total Time (min): 35 min     Billable Minutes: Therapeutic Activity 18 min and Therapeutic Exercise 17 min    Treatment Type: Treatment              2022

## 2022-01-26 NOTE — ASSESSMENT & PLAN NOTE
Asymptomatic covid-19 infection  - isolation precautions  - no hypoxia, no indication for dex/rem  - Diagnosed 12/30/2021. Negative 1/26/2022  - will dc isolation

## 2022-01-26 NOTE — PROVATION PATIENT INSTRUCTIONS
Discharge Summary/Instructions after an Endoscopic Procedure  Patient Name: Xuan Wrightsin  Patient MRN: [id number]  Patient YOB: 1980 Wednesday, January 26, 2022  Mario Alberto Irene MD  Dear patient,  As a result of recent federal legislation (The Federal Cures Act), you may   receive lab or pathology results from your procedure in your [facility]   account before your physician is able to contact you. Your physician or   their representative will relay the results to you with their   recommendations at their soonest availability.  Thank you,  RESTRICTIONS:  During your procedure today, you received medications for sedation.  These   medications may affect your judgment, balance and coordination.  Therefore,   for 24 hours, you have the following restrictions:   - DO NOT drive a car, operate machinery, make legal/financial decisions,   sign important papers or drink alcohol.    ACTIVITY:  Today: no heavy lifting, straining or running due to procedural   sedation/anesthesia.  The following day: return to full activity including work.  DIET:  Eat and drink normally unless instructed otherwise.     TREATMENT FOR COMMON SIDE EFFECTS:  - Mild abdominal pain, nausea, belching, bloating or excessive gas:  rest,   eat lightly and use a heating pad.  - Sore Throat: treat with throat lozenges and/or gargle with warm salt   water.  - Because air was used during the procedure, expelling large amounts of air   from your rectum or belching is normal.  - If a bowel prep was taken, you may not have a bowel movement for 1-3 days.    This is normal.  SYMPTOMS TO WATCH FOR AND REPORT TO YOUR PHYSICIAN:  1. Abdominal pain or bloating, other than gas cramps.  2. Chest pain.  3. Back pain.  4. Signs of infection such as: chills or fever occurring within 24 hours   after the procedure.  5. Rectal bleeding, which would show as bright red, maroon, or black stools.   (A tablespoon of blood from the rectum is not serious, especially if    hemorrhoids are present.)  6. Vomiting.  7. Weakness or dizziness.  GO DIRECTLY TO THE NEAREST EMERGENCY ROOM IF YOU HAVE ANY OF THE FOLLOWING:      Difficulty breathing              Chills and/or fever over 101 F   Persistent vomiting and/or vomiting blood   Severe abdominal pain   Severe chest pain   Black, tarry stools   Bleeding- more than one tablespoon   Any other symptom or condition that you feel may need urgent attention  Your doctor recommends these additional instructions:  If any biopsies were taken, your doctors clinic will contact you in 1 to 2   weeks with any results.  - [facility].   - Resume previous diet.   - Protonix 40 BID x 8 -12 weeks. Consider repeat upper endoscopy in 8-10   weeks to evaluate for esophagitis healing (this will be pending patient's   clinical course)  - Consider outpatient gastric emptying scan to evaluate for gastroparesis.   If ongoing n/v in the hospital,  can consider reglan 10 mg PO TID for 4   weeks  - Keep head of the bed elevated   - Avoid NSAID use  For questions, problems or results please call your physician - Mario Alberto Irene MD at Work:  [phone].  [facility] can be reached at [phone]     IF A COMPLICATION OR EMERGENCY SITUATION ARISES AND YOU ARE UNABLE TO REACH   YOUR PHYSICIAN - GO DIRECTLY TO THE EMERGENCY ROOM.  Mario Alberto Irene MD  1/26/2022 4:36:35 PM  This report has been verified and signed electronically.  Dear patient,  As a result of recent federal legislation (The Federal Cures Act), you may   receive lab or pathology results from your procedure in your [facility]   account before your physician is able to contact you. Your physician or   their representative will relay the results to you with their   recommendations at their soonest availability.  Thank you,  PROVATION

## 2022-01-26 NOTE — ASSESSMENT & PLAN NOTE
I am told patient had episode of coffee ground emesis.   I agree with pantoprazole pending endoscopic evaluation. No blood transfusion needed today. F/u EGD results

## 2022-01-26 NOTE — ASSESSMENT & PLAN NOTE
-Prior [place] [facility] [place]/ [place] showing candidal esophagitis  -completed course of fluconazole  -CTA chest concerning for possible esophageal strictures.   - GI consulted for bleed. Plan for EGD today as she also has symptomatic anemia

## 2022-01-26 NOTE — CONSULTS
"[facility] - Telemetry  Cardiology  Consult Note    Patient Name: Xuan Ricardo  MRN: [id number]  Admission Date: 12/29/2021  Hospital Length of Stay: 27 days  Code Status: Full Code   Attending Provider: Keiry Dorsey MD   Consulting Provider: Bryant Yanez MD  Primary Care Physician: Katharine Franks MD  Principal Problem:COVID-19    Patient information was obtained from patient and ER records.     Consults  Subjective:     Chief Complaint:  tachycardia     HPI:   Ms. Ricardo is a 41y F w/ ESRD, recently dialyzed at [facility], presents after leaving AMA from [facility] with nausea/vomiting and bilateral thigh pain.      She says that the nausea and vomiting began a few weeks ago. She mostly reports vomiting up food content, denies overt blood. Says that the vomit is sometimes darker colored. She has been told she has "coffee-ground" emesis but does not describe the contents of her vomit as such. She denies abdominal pain, denies delayed vomiting after eating. Feels nauseas all the time, regardless of food intake.     She has also developed severe bilateral thigh lesions. She describes them as swelling w/ extreme sensitivity to touch. They started approximately two weeks ago and have persisted, with minimal improvement in pain. One thigh was biopsied at [facility] at her most recent admission, and she reports that the site continues to be tender.        Overview/Hospital Course:  ESRD patient with calciphylaxis. Admitted to hospital medicine. Nephrology consulted. Wound care consulted for thigh wounds. Pt gave verbal and written consent for records release from [facility]. At [facility] pt underwent biopsy of R thigh which was c/w calciphylaxis, and underwent EGD which showed candidal esophagitis. At  she was restarted on HD, given sodium thiosulfate. MBD therapy restarted. Restarted on fluconazole. Pain present but controlled on pain meds. COVID positive but stable on RA. had febrile episodes last week which found to " have a UTI and treated and abx stopped. Seen by ID, who has now signed off. Febrile episodes had resolved but pt spiked another fever on . Cultures ordered. Has issues with sinus tach and all work up was negative. Maybe some volume down. Started on metoprolol and doing well. COVID HD chair confirmed prior to DC. Outpatient wound care and neprho f/u for calciphylaxsis. Developed worsening anemia and concern coffee ground emesis. GI consulted. Initiated on pantoprazole. Transfused      Denies CP or SOB  EKG sinus tachycardia LVH NSSTT changes  HR currently 100-110    Echo 22  · The estimated ejection fraction is 65%.  · The left ventricle is normal in size with concentric remodeling and normal systolic function.  · Grade II left ventricular diastolic dysfunction.  · Normal right ventricular size with normal right ventricular systolic function.  · Moderate left atrial enlargement.  · Mild right atrial enlargement.  · Normal central venous pressure (3 mmHg).  · The estimated PA systolic pressure is 17 mmHg.        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 ;  Surgeon: Rakesh Leon MD;  Location: [facility] OR;  Service: Vascular;  Laterality: Left;  RN PREOP 20, UPT done, COVID NEGATRIVE 20  NEED H/P     SECTION, CLASSIC      INSERTION OF TUNNELED CENTRAL VENOUS CATHETER (CVC) WITH SUBCUTANEOUS PORT N/A 2020    Procedure: IR TUNNELED VATH INSERT WITHOUT PORT;  Surgeon: [facility] Diagnostic Provider;   "Location: [facility] OR;  Service: Radiology;  Laterality: N/A;  1030AM START  RN PREOP 1/24/2020    INSERTION OF TUNNELED CENTRAL VENOUS CATHETER (CVC) WITH SUBCUTANEOUS PORT N/A 5/22/2020    Procedure: TUNNELED CATH PLACEMENT;  Surgeon: [facility] Provider;  Location: [facility] OR;  Service: Radiology;  Laterality: N/A;  930AM START    REVISION OF ARTERIOVENOUS FISTULA Left 5/8/2020    Procedure: REVISION, AV FISTULA, THROMBECTOMY, LEFT UPPER EXTREMITY;  Surgeon: Rakesh Leon MD;  Location: [facility] OR;  Service: Vascular;  Laterality: Left;    TUBAL LIGATION         Review of patient's allergies indicates:   Allergen Reactions    Vicodin [hydrocodone-acetaminophen] Hives    Codeine Hives       No current facility-administered medications on file prior to encounter.     Current Outpatient Medications on File Prior to Encounter   Medication Sig    insulin NPH (NOVOLIN N) 100 unit/mL injection Inject 15 Units into the skin before breakfast.    NIFEdipine (PROCARDIA-XL) 90 MG (OSM) 24 hr tablet Take 1 tablet (90 mg total) by mouth once daily.    blood sugar diagnostic Strp Check blood glucose 3 times daily    cloNIDine 0.3 mg/24 hr td ptwk (CATAPRES) 0.3 mg/24 hr Place 1 patch onto the skin every 7 days.    lancets 30 gauge Misc 3 (three) times daily    pen needle, diabetic 31 gauge x 5/16" Ndle daily     Family History     Problem Relation (Age of Onset)    Asthma Sister    Heart failure Father (58)    Seizures Mother (64)        Tobacco Use    Smoking status: Former Smoker     Types: Cigarettes    Smokeless tobacco: Never Used   Substance and Sexual Activity    Alcohol use: No    Drug use: Yes     Types: Marijuana     Comment: Occassional Recreational Use only    Sexual activity: Not Currently     Partners: Male     Review of Systems   Constitutional: Negative for decreased appetite.   HENT: Negative for ear discharge.    Eyes: Negative for blurred vision.   Respiratory: Negative for hemoptysis.  "   Endocrine: Negative for polyphagia.   Hematologic/Lymphatic: Negative for adenopathy.   Skin: Negative for color change.   Musculoskeletal: Negative for joint swelling.   Genitourinary: Negative for bladder incontinence.   Neurological: Negative for brief paralysis.   Psychiatric/Behavioral: Negative for hallucinations.   Allergic/Immunologic: Negative for hives.     Objective:     Vital Signs (Most Recent):  Temp: 98.1 °F (36.7 °C) (01/26/22 0721)  Pulse: 100 (01/26/22 0721)  Resp: 18 (01/26/22 0721)  BP: (!) 118/56 (01/26/22 0721)  SpO2: 95 % (01/26/22 0721) Vital Signs (24h Range):  Temp:  [98.1 °F (36.7 °C)-98.8 °F (37.1 °C)] 98.1 °F (36.7 °C)  Pulse:  [] 100  Resp:  [16-20] 18  SpO2:  [95 %-99 %] 95 %  BP: ()/(53-76) 118/56     Weight: 94.3 kg (207 lb 14.3 oz)  Body mass index is 33.57 kg/m².    SpO2: 95 %  O2 Device (Oxygen Therapy): room air      Intake/Output Summary (Last 24 hours) at 1/26/2022 1026  Last data filed at 1/25/2022 1220  Gross per 24 hour   Intake 834 ml   Output 1600 ml   Net -766 ml       Lines/Drains/Airways     Central Venous Catheter Line                 Hemodialysis Catheter 12/29/21 right subclavian 28 days          Peripheral Intravenous Line                 Midline Catheter Insertion/Assessment  - Single Lumen 01/12/22 0245 Right brachial vein  14 days         Peripheral IV - Single Lumen 01/24/22 1350 18 G Left Antecubital 1 day                Physical Exam  Constitutional:       Appearance: She is well-developed and well-nourished.   HENT:      Head: Normocephalic and atraumatic.   Eyes:      Conjunctiva/sclera: Conjunctivae normal.      Pupils: Pupils are equal, round, and reactive to light.   Cardiovascular:      Rate and Rhythm: Normal rate.      Pulses: Intact distal pulses.      Heart sounds: Normal heart sounds.   Pulmonary:      Effort: Pulmonary effort is normal.      Breath sounds: Normal breath sounds.   Abdominal:      General: Bowel sounds are normal.       Palpations: Abdomen is soft.   Musculoskeletal:         General: Normal range of motion.      Cervical back: Normal range of motion and neck supple.   Skin:     General: Skin is warm and dry.   Neurological:      Mental Status: She is alert and oriented to person, place, and time.         Significant Labs: All pertinent lab results from the last 24 hours have been reviewed.    Significant Imaging: Echocardiogram: 2D echo with color flow doppler: No results found for this or any previous visit.    Assessment and Plan:     Sinus tachycardia  HR improved some with BB. Appears to be multifactorial. Recent echo with good EF. Cleared for procedure at low cardiac risk. Will f/u prn    End stage renal disease  Per primary    Symptomatic anemia  Per GI        VTE Risk Mitigation (From admission, onward)         Ordered     heparin (porcine) injection 3,200 Units  As needed (PRN)         12/31/21 1335     IP VTE HIGH RISK PATIENT  Once         12/30/21 0339     Place sequential compression device  Until discontinued         12/30/21 0339     Place HENRI hose  Until discontinued         12/30/21 0339     Reason for No Pharmacological VTE Prophylaxis  Once        Question:  Reasons:  Answer:  Active Bleeding    12/30/21 0339                Thank you for your consult. I will sign off. Please contact us if you have any additional questions.    Bryant Yanez MD  Cardiology   [facility]

## 2022-01-26 NOTE — ASSESSMENT & PLAN NOTE
HR improved some with BB. Appears to be multifactorial. Recent echo with good EF. Cleared for procedure at low cardiac risk. Will f/u prn

## 2022-01-27 DIAGNOSIS — K20.90 ESOPHAGITIS: Primary | ICD-10-CM

## 2022-01-27 PROBLEM — R53.81 DEBILITY: Status: ACTIVE | Noted: 2022-01-27

## 2022-01-27 PROBLEM — K20.80 ESOPHAGITIS, LOS ANGELES GRADE D: Status: ACTIVE | Noted: 2022-01-27

## 2022-01-27 PROBLEM — U07.1 COVID-19: Status: RESOLVED | Noted: 2021-12-30 | Resolved: 2022-01-27

## 2022-01-27 LAB
ABO + RH BLD: NORMAL
ALBUMIN SERPL BCP-MCNC: 1 G/DL (ref 3.5–5.2)
ALP SERPL-CCNC: 147 U/L (ref 55–135)
ALT SERPL W/O P-5'-P-CCNC: 19 U/L (ref 10–44)
ANION GAP SERPL CALC-SCNC: 13 MMOL/L (ref 8–16)
ANISOCYTOSIS BLD QL SMEAR: SLIGHT
AST SERPL-CCNC: 30 U/L (ref 10–40)
BASOPHILS NFR BLD: 0 % (ref 0–1.9)
BILIRUB SERPL-MCNC: 3 MG/DL (ref 0.1–1)
BLD GP AB SCN CELLS X3 SERPL QL: NORMAL
BUN SERPL-MCNC: 30 MG/DL (ref 6–20)
CALCIUM SERPL-MCNC: 9.1 MG/DL (ref 8.7–10.5)
CHLORIDE SERPL-SCNC: 97 MMOL/L (ref 95–110)
CO2 SERPL-SCNC: 23 MMOL/L (ref 23–29)
CREAT SERPL-MCNC: 6.3 MG/DL (ref 0.5–1.4)
DIFFERENTIAL METHOD: ABNORMAL
EOSINOPHIL NFR BLD: 1 % (ref 0–8)
ERYTHROCYTE [DISTWIDTH] IN BLOOD BY AUTOMATED COUNT: 19.3 % (ref 11.5–14.5)
EST. GFR  (AFRICAN AMERICAN): 9 ML/MIN/1.73 M^2
EST. GFR  (NON AFRICAN AMERICAN): 7 ML/MIN/1.73 M^2
GLUCOSE SERPL-MCNC: 141 MG/DL (ref 70–110)
HCT VFR BLD AUTO: 27.3 % (ref 37–48.5)
HGB BLD-MCNC: 8.2 G/DL (ref 12–16)
HYPOCHROMIA BLD QL SMEAR: ABNORMAL
IMM GRANULOCYTES # BLD AUTO: ABNORMAL K/UL (ref 0–0.04)
IMM GRANULOCYTES NFR BLD AUTO: ABNORMAL % (ref 0–0.5)
LYMPHOCYTES NFR BLD: 7 % (ref 18–48)
MCH RBC QN AUTO: 28.4 PG (ref 27–31)
MCHC RBC AUTO-ENTMCNC: 30 G/DL (ref 32–36)
MCV RBC AUTO: 95 FL (ref 82–98)
METAMYELOCYTES NFR BLD MANUAL: 1 %
MONOCYTES NFR BLD: 8 % (ref 4–15)
NEUTROPHILS NFR BLD: 80 % (ref 38–73)
NEUTS BAND NFR BLD MANUAL: 3 %
NRBC BLD-RTO: 0 /100 WBC
PHOSPHATE SERPL-MCNC: 3.5 MG/DL (ref 2.7–4.5)
PLATELET # BLD AUTO: 285 K/UL (ref 150–450)
PLATELET BLD QL SMEAR: ABNORMAL
PMV BLD AUTO: 9.7 FL (ref 9.2–12.9)
POCT GLUCOSE: 171 MG/DL (ref 70–110)
POCT GLUCOSE: 178 MG/DL (ref 70–110)
POLYCHROMASIA BLD QL SMEAR: ABNORMAL
POTASSIUM SERPL-SCNC: 4.2 MMOL/L (ref 3.5–5.1)
PROT SERPL-MCNC: 5.9 G/DL (ref 6–8.4)
RBC # BLD AUTO: 2.89 M/UL (ref 4–5.4)
SODIUM SERPL-SCNC: 133 MMOL/L (ref 136–145)
WBC # BLD AUTO: 13.8 K/UL (ref 3.9–12.7)

## 2022-01-27 PROCEDURE — 84100 ASSAY OF PHOSPHORUS: CPT | Performed by: INTERNAL MEDICINE

## 2022-01-27 PROCEDURE — 25000003 PHARM REV CODE 250: Performed by: INTERNAL MEDICINE

## 2022-01-27 PROCEDURE — 86901 BLOOD TYPING SEROLOGIC RH(D): CPT | Performed by: INTERNAL MEDICINE

## 2022-01-27 PROCEDURE — 21400001 HC TELEMETRY ROOM

## 2022-01-27 PROCEDURE — 25000003 PHARM REV CODE 250: Performed by: HOSPITALIST

## 2022-01-27 PROCEDURE — 85027 COMPLETE CBC AUTOMATED: CPT | Performed by: INTERNAL MEDICINE

## 2022-01-27 PROCEDURE — 63600175 PHARM REV CODE 636 W HCPCS: Performed by: STUDENT IN AN ORGANIZED HEALTH CARE EDUCATION/TRAINING PROGRAM

## 2022-01-27 PROCEDURE — 80053 COMPREHEN METABOLIC PANEL: CPT | Performed by: INTERNAL MEDICINE

## 2022-01-27 PROCEDURE — 63600175 PHARM REV CODE 636 W HCPCS: Mod: JG | Performed by: INTERNAL MEDICINE

## 2022-01-27 PROCEDURE — 63600175 PHARM REV CODE 636 W HCPCS: Performed by: INTERNAL MEDICINE

## 2022-01-27 PROCEDURE — 25000003 PHARM REV CODE 250: Performed by: EMERGENCY MEDICINE

## 2022-01-27 PROCEDURE — 80100016 HC MAINTENANCE HEMODIALYSIS

## 2022-01-27 PROCEDURE — 85007 BL SMEAR W/DIFF WBC COUNT: CPT | Performed by: INTERNAL MEDICINE

## 2022-01-27 RX ORDER — PANTOPRAZOLE SODIUM 40 MG/1
40 TABLET, DELAYED RELEASE ORAL 2 TIMES DAILY
Status: DISCONTINUED | OUTPATIENT
Start: 2022-01-27 | End: 2022-01-28 | Stop reason: HOSPADM

## 2022-01-27 RX ADMIN — GABAPENTIN 100 MG: 100 CAPSULE ORAL at 08:01

## 2022-01-27 RX ADMIN — CYCLOBENZAPRINE HYDROCHLORIDE 5 MG: 5 TABLET, FILM COATED ORAL at 08:01

## 2022-01-27 RX ADMIN — CINACALCET 30 MG: 30 TABLET, FILM COATED ORAL at 08:01

## 2022-01-27 RX ADMIN — OXYCODONE HYDROCHLORIDE 15 MG: 15 TABLET ORAL at 01:01

## 2022-01-27 RX ADMIN — OXYCODONE HYDROCHLORIDE 15 MG: 15 TABLET ORAL at 08:01

## 2022-01-27 RX ADMIN — METOPROLOL TARTRATE 25 MG: 25 TABLET, FILM COATED ORAL at 08:01

## 2022-01-27 RX ADMIN — Medication 324 MG: at 08:01

## 2022-01-27 RX ADMIN — SODIUM THIOSULFATE 25 G: 250 INJECTION, SOLUTION INTRAVENOUS at 11:01

## 2022-01-27 RX ADMIN — HEPARIN SODIUM 3200 UNITS: 5000 INJECTION INTRAVENOUS; SUBCUTANEOUS at 12:01

## 2022-01-27 RX ADMIN — SEVELAMER CARBONATE 2400 MG: 800 TABLET, FILM COATED ORAL at 09:01

## 2022-01-27 RX ADMIN — Medication 6 MG: at 08:01

## 2022-01-27 RX ADMIN — THERA TABS 1 TABLET: TAB at 08:01

## 2022-01-27 RX ADMIN — PANTOPRAZOLE SODIUM 40 MG: 40 TABLET, DELAYED RELEASE ORAL at 08:01

## 2022-01-27 RX ADMIN — EPOETIN ALFA-EPBX 14000 UNITS: 10000 INJECTION, SOLUTION INTRAVENOUS; SUBCUTANEOUS at 09:01

## 2022-01-27 RX ADMIN — SEVELAMER CARBONATE 2400 MG: 800 TABLET, FILM COATED ORAL at 05:01

## 2022-01-27 RX ADMIN — GABAPENTIN 100 MG: 100 CAPSULE ORAL at 03:01

## 2022-01-27 NOTE — PT/OT/SLP PROGRESS
Occupational Therapy      Patient Name:  Xuan Wrightsin   MRN:  [id number]    Patient not seen today secondary to Dialysis. Will follow-up later as able.    1/27/2022

## 2022-01-27 NOTE — PROGRESS NOTES
Xuan Cousin is a 41 y.o. female patient.    Follow for ESRD, dialysis    Patient seen while on dialysis  No new c/o, comfortable    Scheduled Meds:   cinacalcet  30 mg Oral Daily with breakfast    epoetin kelsey-epbx  14,000 Units Intravenous Every Tues, Thurs, Sat    ferrous gluconate  324 mg Oral Daily with breakfast    gabapentin  100 mg Oral TID    metoprolol tartrate  25 mg Oral BID    multivitamin  1 tablet Oral Daily    pantoprazole  80 mg Intravenous Once    Followed by    pantoprazole  40 mg Intravenous BID    polyethylene glycol  17 g Oral BID    senna-docusate 8.6-50 mg  1 tablet Oral Daily    sevelamer carbonate  2,400 mg Oral TID WM    sodium thiosulfate  25 g Intravenous Every Tues, Thurs, Sat    tuberculin  5 Units Intradermal Once       Review of patient's allergies indicates:   Allergen Reactions    Vicodin [hydrocodone-acetaminophen] Hives    Codeine Hives         Vital Signs Range (Last 24H):  Temp:  [97.3 °F (36.3 °C)-98.4 °F (36.9 °C)]   Pulse:  []   Resp:  [17-22]   BP: ()/(55-79)   SpO2:  [95 %-100 %]     I & O (Last 24H):    Intake/Output Summary (Last 24 hours) at 1/27/2022 1112  Last data filed at 1/26/2022 1648  Gross per 24 hour   Intake 100 ml   Output --   Net 100 ml           Physical Exam:  General appearance: well developed, well nourished, no distress  Lungs:  clear to auscultation bilaterally and normal respiratory effort  Heart: regular rate and rhythm  Abdomen: soft, non-tender non-distented; bowel sounds normal; no masses,  no organomegaly  Extremities: no cyanosis or edema, or clubbing    Laboratory:  I have reviewed all pertinent lab results within the past 24 hours.  CBC:   Recent Labs   Lab 01/27/22  0732   WBC 13.80*   RBC 2.89*   HGB 8.2*   HCT 27.3*      MCV 95   MCH 28.4   MCHC 30.0*     CMP:   Recent Labs   Lab 01/27/22  0732   *   CALCIUM 9.1   ALBUMIN 1.0*   PROT 5.9*   *   K 4.2   CO2 23   CL 97   BUN 30*    CREATININE 6.3*   ALKPHOS 147*   ALT 19   AST 30   BILITOT 3.0*       Imp/Plan    ESRD - on dialysis, stable, tolerated well  COVID-19 infection  Calciphylaxis  HTN  DM type 2  Anemia of CKD    Continue present Rx  We'll follow for dialysis q TTS      Trac T Mira  1/27/2022

## 2022-01-27 NOTE — SUBJECTIVE & OBJECTIVE
Interval History: complains of pain to BLE. Hgb stable. Getting dialysis during my evaluation.     Review of Systems   Constitutional: Negative.    Respiratory: Negative.    Cardiovascular: Negative.    Gastrointestinal: Negative.    Musculoskeletal:        BLE pain     Objective:     Vital Signs (Most Recent):  Temp: 98.3 °F (36.8 °C) (01/27/22 0742)  Pulse: 107 (01/27/22 1159)  Resp: 18 (01/27/22 1337)  BP: 118/79 (01/27/22 0742)  SpO2: 98 % (01/27/22 0742) Vital Signs (24h Range):  Temp:  [97.3 °F (36.3 °C)-98.4 °F (36.9 °C)] 98.3 °F (36.8 °C)  Pulse:  [] 107  Resp:  [17-22] 18  SpO2:  [95 %-100 %] 98 %  BP: ()/(55-79) 118/79     Weight: 94.3 kg (207 lb 14.3 oz)  Body mass index is 33.57 kg/m².    Intake/Output Summary (Last 24 hours) at 1/27/2022 1425  Last data filed at 1/26/2022 1648  Gross per 24 hour   Intake 100 ml   Output --   Net 100 ml      Physical Exam  Vitals and nursing note reviewed.   Constitutional:       General: She is not in acute distress.     Appearance: She is not toxic-appearing.      Comments: Appears in pain   Cardiovascular:      Rate and Rhythm: Regular rhythm. Tachycardia present.      Comments: 100 bpm  Pulmonary:      Effort: Pulmonary effort is normal.      Breath sounds: No wheezing or rales.   Abdominal:      General: There is no distension.      Palpations: Abdomen is soft.      Tenderness: There is no abdominal tenderness.   Skin:     Comments: Dressing to medial aspects of thighs and lateral aspect of left thigh. Patient does not allow me to remove dressing nor examine legs due to pain.    Neurological:      Mental Status: She is alert and oriented to person, place, and time.   Psychiatric:         Mood and Affect: Mood normal.         Behavior: Behavior normal.         Thought Content: Thought content normal.         Judgment: Judgment normal.         Significant Labs: All pertinent labs within the past 24 hours have been reviewed.    Significant Imaging: I have  reviewed all pertinent imaging results/findings within the past 24 hours.  I have reviewed and interpreted all pertinent imaging results/findings within the past 24 hours.

## 2022-01-27 NOTE — ASSESSMENT & PLAN NOTE
With esophagitis. Per my discussion with GI, this is likely reflux-type  HOB elevated, pantoprazole 40 mg BID and diet as tolerated.  Completed course of fluconazole for esophageal candidiasis.

## 2022-01-27 NOTE — ASSESSMENT & PLAN NOTE
Hgb now > 7 g/dL after a unit of blood. No nausea or vomiting today. Continue pantoprazole 40 mg BID for esophagitis.

## 2022-01-27 NOTE — PLAN OF CARE
Patient initially admitted with ESRD.  Long complicated hospital stay.  Including COVID, esophagitis and now debility.  Pt with increased pain and SOB, per MD is barely ambulatory.     Pt tested COVID negative 1/26/2022.     Prior to hospital stay pt lived with sister and children.  Sister is prepared to take pt home but is aware pt will need help.      Therapy recommending IRF.  Denied by [facility] following peer to peer.  They have offered SNF.      Multiple referrals sent in [facility] - multiple denials due to no available beds and pt low ambulatory status.     Pt requires outpatient HD setup at [facility].   Pt has been refused to all [facility] due to noncomplaince.      Pt accepted to [facility] 2pm.   [phone] ( left with them today to confirm they are prepared to have pt start at their clinic on Monday)  Awaiting callback.

## 2022-01-27 NOTE — PROGRESS NOTES
Received report per DORA Brumfield, LPN/pt arrived to [facility] via bed per transporter. Right chest wall cvc aspirated/flushed without difficulty noted. Maintenance hemodialysis started x 3.5 hours. Plan is to remove 2 L as tolerated. Tolerating tx well at this time.

## 2022-01-27 NOTE — ANESTHESIA POSTPROCEDURE EVALUATION
Anesthesia Post Evaluation    Patient: Xuan Ricardo    Procedure(s) Performed: Procedure(s) (LRB):  EGD (ESOPHAGOGASTRODUODENOSCOPY) (N/A)    Final Anesthesia Type: general      Patient location during evaluation: GI PACU  Patient participation: Yes- Able to Participate  Level of consciousness: awake and alert and oriented  Post-procedure vital signs: reviewed and stable  Pain management: adequate  Airway patency: patent    PONV status at discharge: No PONV  Anesthetic complications: no      Cardiovascular status: hemodynamically stable and blood pressure returned to baseline  Respiratory status: spontaneous ventilation, room air and unassisted  Hydration status: euvolemic  Follow-up not needed.          Vitals Value Taken Time   BP 99/57 01/27/22 0005   Temp 36.3 °C (97.3 °F) 01/27/22 0005   Pulse 95 01/27/22 0005   Resp 17 01/27/22 0005   SpO2 99 % 01/27/22 0005         Event Time   Out of Recovery 01/26/2022 17:25:00         Pain/Vienet Score: Pain Rating Prior to Med Admin: 6 (1/26/2022  2:42 PM)  Vineet Score: 10 (1/26/2022  5:03 PM)

## 2022-01-27 NOTE — PLAN OF CARE
GI note    The patient underwent EGD on 01/26/2022 which notable for grade D esophagitis.  She should be treated with twice daily PPI therapy until repeat EGD is done in 8-12 weeks.  I have entered an order for repeat scheduling of her EGD at that time.    Please re-engage the GI service for any further inpatient needs.    Lorne Krishnamurthy MD

## 2022-01-27 NOTE — ASSESSMENT & PLAN NOTE
Asymptomatic covid-19 infection  - isolation precautions  - no hypoxia, no indication for dex/rem  - Diagnosed 12/30/2021. Negative 1/26/2022  - DC'd isolation

## 2022-01-27 NOTE — PT/OT/SLP PROGRESS
Physical Therapy      Patient Name:  Xuan Ricardo   MRN:  [id number]    Patient not seen at this time for PT tx secondary to Dialysis. Will follow-up as able.

## 2022-01-27 NOTE — PROGRESS NOTES
Maintenance hemodialysis done x 3.5 hours as ordered/pt reinfused. UF decreased d/t hypotension/later improved. Right chest wall cvc sterile dsg change done. Flushed both ports without difficulty, heparin locked, caps applied. Pt c/o pain located to wound sites. Report given to DORA Brumfield, LPN/pt returned to room 311 via bed per transporter.    Net fluid removed 1000 ml as tolerated.

## 2022-01-27 NOTE — PROGRESS NOTES
"[facility] Medicine  Progress Note    Patient Name: Xuan Ricardo  MRN: [id number]  Patient Class: IP- Inpatient   Admission Date: 12/29/2021  Length of Stay: 28 days  Attending Physician: Keiry Dorsey MD  Primary Care Provider: Katharine Franks MD        Subjective:     Principal Problem:COVID-19        HPI:  Ms. Ricardo is a 41y F w/ ESRD, recently dialyzed at [facility], presents after leaving AMA from [facility] with nausea/vomiting and bilateral thigh pain.     She says that the nausea and vomiting began a few weeks ago. She mostly reports vomiting up food content, denies overt blood. Says that the vomit is sometimes darker colored. She has been told she has "coffee-ground" emesis but does not describe the contents of her vomit as such. She denies abdominal pain, denies delayed vomiting after eating. Feels nauseas all the time, regardless of food intake.    She has also developed severe bilateral thigh lesions. She describes them as swelling w/ extreme sensitivity to touch. They started approximately two weeks ago and have persisted, with minimal improvement in pain. One thigh was biopsied at [facility] at her most recent admission, and she reports that the site continues to be tender.    - Adebayo Reyes MD      Overview/Hospital Course:  ESRD patient with calciphylaxis. Admitted to hospital medicine. Nephrology consulted. Wound care consulted for thigh wounds. Pt gave verbal and written consent for records release from [facility]. At [facility] pt underwent biopsy of R thigh which was c/w calciphylaxis, and underwent EGD which showed candidal esophagitis. At  she was restarted on HD, given sodium thiosulfate. MBD therapy restarted. Restarted on fluconazole. Pain present but controlled on pain meds. COVID positive but stable on RA. had febrile episodes last week which found to have a UTI and treated and abx stopped. Seen by ID, who has now signed off. Febrile episodes had resolved but pt spiked " another fever on 1/19. Cultures ordered. Has issues with sinus tach and all work up was negative. Maybe some volume down. Started on metoprolol and doing well. COVID HD chair confirmed prior to DC. Outpatient wound care and neprho f/u for calciphylaxsis. Developed worsening anemia and concern coffee ground emesis. GI consulted. Initiated on pantoprazole. Transfused and stable. Underwent EGD on 1/27. Showed esophagitis, suspected to be due to reflux. Completed course of fluconazole. HOB elevated and continue pantoprazole BID. Is very deconditioned and in pain from calciphylaxis. Stable for discharge to SNF once arranged.       Interval History: complains of pain to BLE. Hgb stable. Getting dialysis during my evaluation.     Review of Systems   Constitutional: Negative.    Respiratory: Negative.    Cardiovascular: Negative.    Gastrointestinal: Negative.    Musculoskeletal:        BLE pain     Objective:     Vital Signs (Most Recent):  Temp: 98.3 °F (36.8 °C) (01/27/22 0742)  Pulse: 107 (01/27/22 1159)  Resp: 18 (01/27/22 1337)  BP: 118/79 (01/27/22 0742)  SpO2: 98 % (01/27/22 0742) Vital Signs (24h Range):  Temp:  [97.3 °F (36.3 °C)-98.4 °F (36.9 °C)] 98.3 °F (36.8 °C)  Pulse:  [] 107  Resp:  [17-22] 18  SpO2:  [95 %-100 %] 98 %  BP: ()/(55-79) 118/79     Weight: 94.3 kg (207 lb 14.3 oz)  Body mass index is 33.57 kg/m².    Intake/Output Summary (Last 24 hours) at 1/27/2022 1425  Last data filed at 1/26/2022 1648  Gross per 24 hour   Intake 100 ml   Output --   Net 100 ml      Physical Exam  Vitals and nursing note reviewed.   Constitutional:       General: She is not in acute distress.     Appearance: She is not toxic-appearing.      Comments: Appears in pain   Cardiovascular:      Rate and Rhythm: Regular rhythm. Tachycardia present.      Comments: 100 bpm  Pulmonary:      Effort: Pulmonary effort is normal.      Breath sounds: No wheezing or rales.   Abdominal:      General: There is no distension.       Palpations: Abdomen is soft.      Tenderness: There is no abdominal tenderness.   Skin:     Comments: Dressing to medial aspects of thighs and lateral aspect of left thigh. Patient does not allow me to remove dressing nor examine legs due to pain.    Neurological:      Mental Status: She is alert and oriented to person, place, and time.   Psychiatric:         Mood and Affect: Mood normal.         Behavior: Behavior normal.         Thought Content: Thought content normal.         Judgment: Judgment normal.         Significant Labs: All pertinent labs within the past 24 hours have been reviewed.    Significant Imaging: I have reviewed all pertinent imaging results/findings within the past 24 hours.  I have reviewed and interpreted all pertinent imaging results/findings within the past 24 hours.      Assessment/Plan:      * COVID-19  Asymptomatic covid-19 infection  - isolation precautions  - no hypoxia, no indication for dex/rem  - Diagnosed 12/30/2021. Negative 1/26/2022  - DC'd isolation    Anemia of renal disease  With esophagitis. Per my discussion with GI, this is likely reflux-type  HOB elevated, pantoprazole 40 mg BID and diet as tolerated.  Completed course of fluconazole for esophageal candidiasis.        Slow transit constipation  - continue current bowel regimen   - Mobilize w/ PT/OT    Sinus tachycardia  Likely due to anemia and pain. Transfused. Cardiac monitoring      Sepsis due to gram-negative UTI  Resolved     Candida esophagitis  -Prior admission to [facility] w/ EGD showing candidal esophagitis  -completed course of fluconazole      Hyperphosphatemia  Phos lowering important given calciphylaxis.  - continue sevalamer  - on cincalcet for hyperpth  - HD per nephrology      Renovascular hypertension  BP at goal without antihypertensive therapy      Type 2 diabetes mellitus  Glucose at goal without insulin requirements. Continue checking AC/HS and SSI for now. Adjust as needed for goal glucose  140-180      Calciphylaxis  Rash on bilateral thighs, extremely tender to touch. Patient asked me not to touch her legs and would not turn due to pain. My exam was very limited due to this. I can see dressing to lateral-posterior aspect of right leg has a discharge. Lateral aspect of left thigh appears unchanged. [facility] biopsy c/w calciphaxis, per report  - wound care and pain management  - prn analgesics  - sodium thiosulfate  - treatment of hyperphos/hyperPTH as noted elsewhere  - will likely need outpatient chronic pain follow up      End stage renal disease  Pt w/ ESRD. Refused dialysis on last admission, now s/p multiple sessions during [facility] hospitalization  - nephrology consulted  - last session 12/30  - access via [facility]  - outpatient HD chair arranged      Symptomatic anemia  Hgb now > 7 g/dL after a unit of blood. No nausea or vomiting today. Continue pantoprazole 40 mg BID for esophagitis.      Secondary hyperparathyroidism  PTH severely elevated. Particularly concerning given calciphylaxis  - continue cincalcet      Metabolic acidosis  Resolved       VTE Risk Mitigation (From admission, onward)         Ordered     heparin (porcine) injection 3,200 Units  As needed (PRN)         12/31/21 1335     IP VTE HIGH RISK PATIENT  Once         12/30/21 0339     Place sequential compression device  Until discontinued         12/30/21 0339     Place HENRI hose  Until discontinued         12/30/21 0339     Reason for No Pharmacological VTE Prophylaxis  Once        Question:  Reasons:  Answer:  Active Bleeding    12/30/21 0339                Discharge Planning   PAMELA: 1/19/2022     Code Status: Full Code   Is the patient medically ready for discharge?: Yes    Reason for patient still in hospital (select all that apply): Pending disposition  Discharge Plan A: Skilled Nursing Facility   Discharge Delays: (!) Post-Acute Set-up        Patient is medically cleared for discharge to SNF once arranged.       Keiry Edouard  MD  Department of Hospital Medicine   [facility] - Telemetry

## 2022-01-27 NOTE — PLAN OF CARE
SNF referral update:     Calls placed to:   1. [facility] Mahm[facility] to review and call back   2. Tabitha Rosa to review and call back   3. [facility]- Wait listed until mid- February   4. [facility]- Left , awaiting call back   5. [facility]- Called x's 3 times, phone hangs up once placed on hold. Will attempt to follow up   6. [facility]- Left VM, awaiting call back      Denied by:   1. [facility]   2. [facility]   3. [facility]  4. [facility]   5. [facility]   6. [facility]   7. [facility]   8. [facility]   9. [facility]   10. [facility]   11. [facility]   12. [facility]   13. [facility]  14. [facility]    15. [facility]   16. [facility]

## 2022-01-27 NOTE — PROGRESS NOTES
Nursing reports patient refusing dressing changes due to pain at site of wounds. Nursing to change dressings daily to keep wounds clean. Areas of necrotic tissue continues to extend. MD aware of progression of wounds.

## 2022-01-27 NOTE — PLAN OF CARE
Recommendations    1) Speech to evaluate for appropriate texture per hx/concern of esophageal strictures  2) Continue Renal diet; texture per SLP   3) Continue Renal ONS to assist in meeting needs  4) Continue Antiemetics, bowel regimen to assist in minimizing nausea and constipation  5) Hyponatremia, Hyperglycemia, Renal Labs    Goals:   1) Patient to meet > 50% EEN/EPN via PO/ONS intake  2) Pt to have minimized nausea and consistent BM by next RD visit  3) Monitored labs to trend toward target ranges      Nutrition Goal Status: progressing towards goal  Communication of RD Recs:  (POC)

## 2022-01-27 NOTE — PROGRESS NOTES
[facility] - Telemetry  Adult Nutrition  Progress Note    SUMMARY       Recommendations    1) Speech to evaluate for appropriate texture per hx/concern of esophageal strictures  2) Continue Renal diet; texture per SLP   3) Continue Renal ONS to assist in meeting needs  4) Continue Antiemetics, bowel regimen to assist in minimizing nausea and constipation  5) Hyponatremia, Hyperglycemia, Renal Labs    Goals:   1) Patient to meet > 50% EEN/EPN via PO/ONS intake  2) Pt to have minimized nausea and consistent BM by next RD visit  3) Monitored labs to trend toward target ranges      Nutrition Goal Status: progressing towards goal  Communication of RD Recs:  (POC)    Assessment and Plan  Nutrition Problem  Inadequate oral intake    Related to (etiology):   Decreased appetite  Nausea, Vomiting,   Candida Esophagitis, Esophageal stricture hx    Signs and Symptoms (as evidenced by):   Decreased PO intake - 0 - 25% x 3-4 days     Interventions/Recommendations (treatment strategy):  Texture Modified (Per SLP)  Mineral Modified Diet (Renal)  Commercial Beverage (Renal ONS)  Collaboration of care with other providers    Nutrition Diagnosis Status:   Worsening       Reason for Assessment    Reason For Assessment: RD follow-up (COVID 19)  Diagnosis:  (COVID-19)  Relevant Medical History: T2DM, ESRD on HD, Anemia, Constipation, Candida Espohagitis  Interdisciplinary Rounds: did not attend  General Information Comments: Recent intake has decreased per nurse pt completes 25% meals last few days and appetite has been poor in general, LBM 1/26. POC  - 189. Weight has remained stable.  Nutrition Discharge Planning: Discharge on renal diet    Nutrition Risk Screen    Nutrition Risk Screen: no indicators present    Nutrition/Diet History    Spiritual, Cultural Beliefs, Rastafarian Practices, Values that Affect Care: no  Food Allergies: NKFA  Factors Affecting Nutritional Intake: painful swallowing,nausea/vomiting,decreased  "appetite,constipation    Anthropometrics    Temp: 98.3 °F (36.8 °C)  Height Method: Stated  Height: 5' 5.98" (167.6 cm)  Height (inches): 65.98 in  Weight Method: Bed Scale  Weight: 94.3 kg (207 lb 14.3 oz)  Weight (lb): 207.9 lb  Ideal Body Weight (IBW), Female: 129.9 lb  % Ideal Body Weight, Female (lb): 160.05 %  BMI (Calculated): 33.6  BMI Grade: 30 - 34.9- obesity - grade I       Lab/Procedures/Meds    Pertinent Labs Reviewed: reviewed  CBC:  Recent Labs   Lab 01/27/22  0732   WBC 13.80*   HGB 8.2*   HCT 27.3*        CMP:  Recent Labs   Lab 01/27/22  0732   CALCIUM 9.1   ALBUMIN 1.0*   PROT 5.9*   *   K 4.2   CO2 23   CL 97   BUN 30*   CREATININE 6.3*   ALKPHOS 147*   ALT 19   AST 30   BILITOT 3.0*     Glucose   Date Value Ref Range Status   01/27/2022 141 (H) 70 - 110 mg/dL Final       Pertinent Medications Reviewed: reviewed  Scheduled Meds:   cinacalcet  30 mg Oral Daily with breakfast    epoetin kelsey-epbx  14,000 Units Intravenous Every Tues, Thurs, Sat    ferrous gluconate  324 mg Oral Daily with breakfast    gabapentin  100 mg Oral TID    metoprolol tartrate  25 mg Oral BID    multivitamin  1 tablet Oral Daily    pantoprazole  40 mg Oral BID    polyethylene glycol  17 g Oral BID    senna-docusate 8.6-50 mg  1 tablet Oral Daily    sevelamer carbonate  2,400 mg Oral TID WM    sodium thiosulfate  25 g Intravenous Every Tues, Thurs, Sat    tuberculin  5 Units Intradermal Once     Continuous Infusions:  PRN Meds:.sodium chloride, sodium chloride, sodium chloride, acetaminophen, bisacodyL, calcium carbonate, cyclobenzaprine, dextrose 50%, dextrose 50%, glucagon (human recombinant), glucose, glucose, heparin (porcine), hydrALAZINE, influenza, insulin aspart U-100, labetaloL, melatonin, ondansetron, oxyCODONE, oxyCODONE, prochlorperazine, sars-cov-2 (covid-19), sodium chloride 0.9%, sodium chloride 0.9%      Estimated/Assessed Needs    Weight Used For Calorie Calculations: 94.3 kg (207 lb " 14.3 oz)  Energy Calorie Requirements (kcal): 2358 - 3300  Energy Need Method: Kcal/kg (25-35 per ESRD)  Protein Requirements: 113-142 g (1.2-1.5 g/kg per ESRD)  Weight Used For Protein Calculations: 94.3 kg (207 lb 14.3 oz)  Fluid Requirements (mL): 1000 mL + UOP  Estimated Fluid Requirement Method: per HD; Additional fluid managed by MD  RDA Method (mL): 2358  CHO Requirement: 295 g      Nutrition Prescription Ordered    Current Diet Order: Renal  Oral Nutrition Supplement: Novasource or Similar BID    Evaluation of Received Nutrient/Fluid Intake    Energy Calories Required: not meeting needs  Protein Required: not meeting needs  Fluid Required: not meeting needs  Comments: LBM 1/26  Tolerance: tolerating  % Intake of Estimated Energy Needs: 0 - 25 %  % Meal Intake: 0 - 25 %    Nutrition Risk    Level of Risk/Frequency of Follow-up:  (1-2x/wk)     Monitor and Evaluation    Food and Nutrient Intake: energy intake,food and beverage intake  Food and Nutrient Adminstration: diet order  Physical Activity and Function: nutrition-related ADLs and IADLs  Anthropometric Measurements: weight,weight change,body mass index  Biochemical Data, Medical Tests and Procedures: electrolyte and renal panel,gastrointestinal profile,glucose/endocrine profile,inflammatory profile,lipid profile  Nutrition-Focused Physical Findings: overall appearance,extremities, muscles and bones,head and eyes,skin     Nutrition Follow-Up    RD Follow-up?: Yes

## 2022-01-28 VITALS
TEMPERATURE: 99 F | HEIGHT: 66 IN | HEART RATE: 112 BPM | OXYGEN SATURATION: 92 % | RESPIRATION RATE: 18 BRPM | DIASTOLIC BLOOD PRESSURE: 64 MMHG | SYSTOLIC BLOOD PRESSURE: 111 MMHG | WEIGHT: 207.88 LBS | BODY MASS INDEX: 33.41 KG/M2

## 2022-01-28 LAB
ALBUMIN SERPL BCP-MCNC: 1 G/DL (ref 3.5–5.2)
ALP SERPL-CCNC: 145 U/L (ref 55–135)
ALT SERPL W/O P-5'-P-CCNC: 17 U/L (ref 10–44)
ANION GAP SERPL CALC-SCNC: 14 MMOL/L (ref 8–16)
AST SERPL-CCNC: 29 U/L (ref 10–40)
BASOPHILS # BLD AUTO: 0.02 K/UL (ref 0–0.2)
BASOPHILS NFR BLD: 0.1 % (ref 0–1.9)
BILIRUB SERPL-MCNC: 3.1 MG/DL (ref 0.1–1)
BUN SERPL-MCNC: 17 MG/DL (ref 6–20)
CALCIUM SERPL-MCNC: 8.7 MG/DL (ref 8.7–10.5)
CHLORIDE SERPL-SCNC: 97 MMOL/L (ref 95–110)
CO2 SERPL-SCNC: 24 MMOL/L (ref 23–29)
CREAT SERPL-MCNC: 4.1 MG/DL (ref 0.5–1.4)
DIFFERENTIAL METHOD: ABNORMAL
EOSINOPHIL # BLD AUTO: 0 K/UL (ref 0–0.5)
EOSINOPHIL NFR BLD: 0.3 % (ref 0–8)
ERYTHROCYTE [DISTWIDTH] IN BLOOD BY AUTOMATED COUNT: 19 % (ref 11.5–14.5)
EST. GFR  (AFRICAN AMERICAN): 14 ML/MIN/1.73 M^2
EST. GFR  (NON AFRICAN AMERICAN): 13 ML/MIN/1.73 M^2
GLUCOSE SERPL-MCNC: 141 MG/DL (ref 70–110)
HCT VFR BLD AUTO: 23.6 % (ref 37–48.5)
HGB BLD-MCNC: 7.4 G/DL (ref 12–16)
IMM GRANULOCYTES # BLD AUTO: 0.7 K/UL (ref 0–0.04)
IMM GRANULOCYTES NFR BLD AUTO: 4.6 % (ref 0–0.5)
LYMPHOCYTES # BLD AUTO: 1.2 K/UL (ref 1–4.8)
LYMPHOCYTES NFR BLD: 8 % (ref 18–48)
MCH RBC QN AUTO: 29.4 PG (ref 27–31)
MCHC RBC AUTO-ENTMCNC: 31.4 G/DL (ref 32–36)
MCV RBC AUTO: 94 FL (ref 82–98)
MONOCYTES # BLD AUTO: 0.9 K/UL (ref 0.3–1)
MONOCYTES NFR BLD: 5.9 % (ref 4–15)
NEUTROPHILS # BLD AUTO: 12.2 K/UL (ref 1.8–7.7)
NEUTROPHILS NFR BLD: 81.1 % (ref 38–73)
NRBC BLD-RTO: 0 /100 WBC
PLATELET # BLD AUTO: 284 K/UL (ref 150–450)
PMV BLD AUTO: 9.6 FL (ref 9.2–12.9)
POCT GLUCOSE: 174 MG/DL (ref 70–110)
POCT GLUCOSE: 210 MG/DL (ref 70–110)
POTASSIUM SERPL-SCNC: 3.8 MMOL/L (ref 3.5–5.1)
PROT SERPL-MCNC: 5.6 G/DL (ref 6–8.4)
RBC # BLD AUTO: 2.52 M/UL (ref 4–5.4)
SODIUM SERPL-SCNC: 135 MMOL/L (ref 136–145)
WBC # BLD AUTO: 15.09 K/UL (ref 3.9–12.7)

## 2022-01-28 PROCEDURE — 63600175 PHARM REV CODE 636 W HCPCS: Performed by: STUDENT IN AN ORGANIZED HEALTH CARE EDUCATION/TRAINING PROGRAM

## 2022-01-28 PROCEDURE — 25000003 PHARM REV CODE 250: Performed by: EMERGENCY MEDICINE

## 2022-01-28 PROCEDURE — 94761 N-INVAS EAR/PLS OXIMETRY MLT: CPT

## 2022-01-28 PROCEDURE — 85025 COMPLETE CBC W/AUTO DIFF WBC: CPT | Performed by: INTERNAL MEDICINE

## 2022-01-28 PROCEDURE — 25000003 PHARM REV CODE 250: Performed by: INTERNAL MEDICINE

## 2022-01-28 PROCEDURE — 36415 COLL VENOUS BLD VENIPUNCTURE: CPT | Performed by: INTERNAL MEDICINE

## 2022-01-28 PROCEDURE — 25000003 PHARM REV CODE 250: Performed by: HOSPITALIST

## 2022-01-28 PROCEDURE — 80053 COMPREHEN METABOLIC PANEL: CPT | Performed by: INTERNAL MEDICINE

## 2022-01-28 PROCEDURE — 97530 THERAPEUTIC ACTIVITIES: CPT

## 2022-01-28 RX ORDER — SODIUM THIOSULFATE 250 MG/ML
25 INJECTION, SOLUTION INTRAVENOUS
Status: ON HOLD
Start: 2022-01-28 | End: 2022-02-14 | Stop reason: HOSPADM

## 2022-01-28 RX ORDER — PANTOPRAZOLE SODIUM 40 MG/1
40 TABLET, DELAYED RELEASE ORAL 2 TIMES DAILY
Qty: 60 TABLET | Refills: 1 | Status: SHIPPED | OUTPATIENT
Start: 2022-01-28 | End: 2022-03-29

## 2022-01-28 RX ORDER — FERROUS GLUCONATE 324(38)MG
324 TABLET ORAL
Qty: 30 TABLET | Refills: 2 | Status: ON HOLD | OUTPATIENT
Start: 2022-01-29 | End: 2022-02-14 | Stop reason: HOSPADM

## 2022-01-28 RX ADMIN — SEVELAMER CARBONATE 2400 MG: 800 TABLET, FILM COATED ORAL at 08:01

## 2022-01-28 RX ADMIN — PANTOPRAZOLE SODIUM 40 MG: 40 TABLET, DELAYED RELEASE ORAL at 08:01

## 2022-01-28 RX ADMIN — GABAPENTIN 100 MG: 100 CAPSULE ORAL at 03:01

## 2022-01-28 RX ADMIN — THERA TABS 1 TABLET: TAB at 08:01

## 2022-01-28 RX ADMIN — METOPROLOL TARTRATE 25 MG: 25 TABLET, FILM COATED ORAL at 08:01

## 2022-01-28 RX ADMIN — GABAPENTIN 100 MG: 100 CAPSULE ORAL at 08:01

## 2022-01-28 RX ADMIN — CINACALCET 30 MG: 30 TABLET, FILM COATED ORAL at 08:01

## 2022-01-28 RX ADMIN — Medication 324 MG: at 08:01

## 2022-01-28 RX ADMIN — SEVELAMER CARBONATE 2400 MG: 800 TABLET, FILM COATED ORAL at 12:01

## 2022-01-28 RX ADMIN — ACETAMINOPHEN 650 MG: 325 TABLET ORAL at 05:01

## 2022-01-28 NOTE — PT/OT/SLP PROGRESS
Physical Therapy      Patient Name:  Xuan Wrightsin   MRN:  [id number]    Patient not seen today secondary to Other (pt sleeping, covered her face with blanket , didn't acknowledged PTA . Pt's nephew at bedside . BLE HEP given with instructions and demonstrations ( pt's newphew verbalized understanding). Encouraged pt to perform BLE ex's per handout throughout the day  . Pt will be D/C home today.

## 2022-01-28 NOTE — PLAN OF CARE
Family stated they were prepared to drive pt home.    CM received call from nursing that patient would require WC van transport to home.     ADT30 placed, WC van requested for 1815.  Anticipate pickup by 8 pm.  Sister updated.      Charge nurse notified.  Virtual Medicine team notified, orders added for [facility] to help manage services and expect them to call patient tomorrow.

## 2022-01-28 NOTE — PLAN OF CARE
Problem: Occupational Therapy Goal  Goal: Occupational Therapy Goal  Description: Goals to be met by: 2/18/2022    Patient will increase functional independence with ADLs by performing:    Feeding with [place]  UE Dressing with [place]  LE Dressing with Stand-by Assistance  Grooming while standing at the sink with Stand-by Assistance  Toileting from bedside commode with Stand-by Assistance for hygiene and clothing management.   Toilet transfer to bedside commode with Stand-by Assistance  Supine <> sit with Contact guard assistance   Step transfer with contact guard assistance     Outcome: Ongoing, Not Progressing     Pt would not talk to OT nor get OOB. Sister and nephew present. OT edu both on benefits of wedge, green air cushion, and pressure relief boots for pressure relief.

## 2022-01-28 NOTE — PLAN OF CARE
[facility] - Telemetry  Discharge Final Note    Primary Care Provider: Katharine Franks MD    Expected Discharge Date: 1/28/2022    SW spoke with patient's nephew via phone explained all discharge instructions will be with discharge orders. CHUCKIE discussed patient responsibilities for MANAGING YOUR HEALTH AT HOME  EDUCATION:  Things You are responsible For To Manage Your Care At Home:  1.    Getting your prescriptions filled   2.    Taking your medications as directed, DO NOT MISS ANY DOSES!  3.    Going to your follow-up doctor appointment. This is important because it  allow the doctor to monitor your progress and determine if  any changes need to made to your treatment plan.  Call [facility] Help at home number for new or repeated problems / symptoms   Call 911 for CP and / or SOB  SW informed nurse Roselyn via secure chat that patient is ready for discharge from case management standpoint.       Final Discharge Note (most recent)       Final Note - 01/28/22 1621          Final Note    Assessment Type Final Discharge Note     Anticipated Discharge Disposition Home-Health Care Svc   PHN will arrange Home Health.    What phone number can be called within the next 1-3 days to see how you are doing after discharge? [phone]     Hospital Resources/Appts/Education Provided Provided patient/caregiver with written discharge plan information;Appointments scheduled by Navigator/Coordinator;Appointments scheduled and added to AVS        Post-Acute Status    Post-Acute Authorization Dialysis;Home Health     Home Health Status Set-up Complete/Auth obtained     Diaylsis Status Set-up Complete/Auth obtained                     Important Message from Medicare  Important Message from Medicare regarding Discharge Appeal Rights: Explained to patient/caregiver     Date IMM was signed: 01/19/22  Time IMM was signed: 0930    Contact Info       [facility]   Specialty: Emergency Medicine    [address]  [place]  [place] [address]   Phone: [phone]       Next Steps: Follow up    Instructions: As needed, If symptoms worsen    Alonso Gil    [address]  [place] [address]   Phone: [phone]       Next Steps: Go on 1/31/2022    Instructions: Hemodialysis, please arrive at 1:30 PM, your schedule after that will be MWF 2:00 pm    Mario Alberto Irene MD   Specialty: Gastroenterology    [address] [facility] [address]  [address]  [place] [address]   Phone: [phone]       Next Steps: Follow up on 2/18/2022    Instructions: @11:30am, for Gastorenterology follow up    Katharine Franks MD   Specialty: Internal Medicine   Relationship: PCP - General    [address]  [place] [address]   Phone: [phone]       Next Steps: Schedule an appointment as soon as possible for a visit in 1 week(s)    Instructions: post hospitalization follow up     [facility]   Specialty: DME Provider, Home Health Services    [address]  [address]  [place] [address]   Phone: [phone]       Next Steps: Follow up on 1/29/2022    Instructions: Home Health: Follow up with PHN for home health arrangements.     [facility]   Specialty: DME Provider    [address]  [address]  [place] [address]   Phone: [phone]       Next Steps: Follow up    Instructions: DME: please contact [facility] for any issues or concerns with dme

## 2022-01-28 NOTE — PLAN OF CARE
Problem: Adult Inpatient Plan of Care  Goal: Plan of Care Review  Outcome: Met  Flowsheets (Taken 1/28/2022 1629)  Plan of Care Reviewed With: patient     Problem: Diabetes Comorbidity  Goal: Blood Glucose Level Within Targeted Range  Outcome: Met  Intervention: Monitor and Manage Glycemia  Flowsheets (Taken 1/28/2022 1629)  Glycemic Management: blood glucose monitored     Problem: Impaired Wound Healing  Goal: Optimal Wound Healing  Outcome: Met  Intervention: Promote Wound Healing  Flowsheets (Taken 1/28/2022 1629)  Oral Nutrition Promotion:   safe use of adaptive equipment encouraged   rest periods promoted  Sleep/Rest Enhancement:   regular sleep/rest pattern promoted   awakenings minimized  Activity Management:   Leg kicks - L2   Rolling - L1  Pain Management Interventions:   around-the-clock dosing utilized   care clustered   pillow support provided   pain management plan reviewed with patient/caregiver     Problem: Pain Acute  Goal: Acceptable Pain Control and Functional Ability  Outcome: Met  Intervention: Develop Pain Management Plan  Flowsheets (Taken 1/28/2022 1629)  Pain Management Interventions:   around-the-clock dosing utilized   care clustered   pillow support provided   pain management plan reviewed with patient/caregiver  Intervention: Prevent or Manage Pain  Flowsheets (Taken 1/28/2022 1629)  Sleep/Rest Enhancement:   regular sleep/rest pattern promoted   awakenings minimized  Bowel Elimination Promotion:   ambulation promoted   commode/bedpan at bedside

## 2022-01-28 NOTE — PLAN OF CARE
[facility]      HOME HEALTH ORDERS  FACE TO FACE ENCOUNTER    Patient Name: Xuan Ricardo  YOB: 1980    PCP: Katharine Franks MD   PCP Address: [address] [place] [address]  PCP Phone Number: [phone]  PCP Fax: [phone]    Encounter Date: 12/29/21    Admit to Home Health    Diagnoses:  Active Hospital Problems    Diagnosis  POA    *Hematemesis [K92.0]  Yes    Debility [R53.81]  Yes    Esophagitis, [place] grade D [K20.80]  Yes    Slow transit constipation [K59.01]  No    Sinus tachycardia [R00.0]  No    Candida esophagitis [B37.81]  Yes    Hyperphosphatemia [E83.39]  Yes    Calciphylaxis [E83.59]  Yes    Type 2 diabetes mellitus [E11.9]  Yes    Renovascular hypertension [I15.0]  Yes    End stage renal disease [N18.6]  Yes    Symptomatic anemia [D64.9]  Yes    Secondary hyperparathyroidism [N25.81]  Yes    Metabolic acidosis [E87.2]  Yes    Anemia of renal disease [N18.9, D63.1]  Yes     Chronic      Resolved Hospital Problems    Diagnosis Date Resolved POA    Fever [R50.9] 01/26/2022 Yes    Nausea [R11.0] 01/26/2022 Yes    COVID-19 [U07.1] 01/27/2022 Yes       Follow Up Appointments:  Future Appointments   Date Time Provider Department Center   2/18/2022 11:30 AM Mario Alberto Irene MD [facility]       Allergies:  Review of patient's allergies indicates:   Allergen Reactions    Vicodin [hydrocodone-acetaminophen] Hives    Codeine Hives       Medications:   Current Discharge Medication List      START taking these medications    Details   calcium carbonate (TUMS) 200 mg calcium (500 mg) chewable tablet Take 2 tablets (1,000 mg total) by mouth 3 (three) times daily as needed.  Qty: 150 tablet, Refills: 0      cinacalcet (SENSIPAR) 30 MG Tab Take 1 tablet (30 mg total) by mouth daily with breakfast.  Qty: 30 tablet, Refills: 11      epoetin kelsey-epbx (RETACRIT) 2,000 unit/mL injection Inject 7 mLs (14,000 Units total) into the skin  "every Mon, Wed, Fri. With dialysis      ferrous gluconate (FERGON) 324 MG tablet Take 1 tablet (324 mg total) by mouth daily with breakfast.  Qty: 30 tablet, Refills: 2      gabapentin (NEURONTIN) 100 MG capsule Take 1 capsule (100 mg total) by mouth 3 (three) times daily.  Qty: 90 capsule, Refills: 0      metoprolol tartrate (LOPRESSOR) 25 MG tablet Take 1 tablet (25 mg total) by mouth 2 (two) times daily.  Qty: 60 tablet, Refills: 11    Comments: .      oxyCODONE (ROXICODONE) 10 mg Tab immediate release tablet Take 1 tablet (10 mg total) by mouth every 4 (four) hours as needed.  Qty: 18 tablet, Refills: 0    Comments: Quantity prescribed more than 7 day supply? No      pantoprazole (PROTONIX) 40 MG tablet Take 1 tablet (40 mg total) by mouth 2 (two) times daily.  Qty: 60 tablet, Refills: 1      sodium thiosulfate 12.5 gram/50 mL (250 mg/mL) injection Inject 100 mLs (25 g total) into the vein every Mon, Wed, Fri. With dialysis         CONTINUE these medications which have CHANGED    Details   sevelamer carbonate (RENVELA) 800 mg Tab Take 3 tablets (2,400 mg total) by mouth 3 (three) times daily with meals.  Qty: 270 tablet, Refills: 11         STOP taking these medications       insulin NPH (NOVOLIN N) 100 unit/mL injection Comments:   Reason for Stopping:         NIFEdipine (PROCARDIA-XL) 90 MG (OSM) 24 hr tablet Comments:   Reason for Stopping:         blood sugar diagnostic Strp Comments:   Reason for Stopping:         cloNIDine 0.3 mg/24 hr td ptwk (CATAPRES) 0.3 mg/24 hr Comments:   Reason for Stopping:         ergocalciferol (ERGOCALCIFEROL) 50,000 unit Cap Comments:   Reason for Stopping:         lancets 30 gauge Misc Comments:   Reason for Stopping:         pen needle, diabetic 31 gauge x 5/16" Ndle Comments:   Reason for Stopping:                 I have seen and examined this patient within the last 30 days. My clinical findings that support the need for the home health skilled services and home bound " status are the following:no   Weakness/numbness causing balance and gait disturbance due to Weakness/Debility and Anemia making it taxing to leave home.  Requiring assistive device to leave home due to unsteady gait caused by  Weakness/Debility and Anemia.  Patient with medication mismanagement issues requiring home bound status as evidenced by  Unstable vital signs (blood pressure, heart rate), Poor understanding of medication regimen/dosage and Poor adherence to medication regimen/dosage.     Diet:   renal diet    Labs:  n/a    Referrals/ Consults  Physical Therapy to evaluate and treat. Evaluate for home safety and equipment needs; Establish/upgrade home exercise program. Perform / instruct on therapeutic exercises, gait training, transfer training, and Range of Motion.  Occupational Therapy to evaluate and treat. Evaluate home environment for safety and equipment needs. Perform/Instruct on transfers, ADL training, ROM, and therapeutic exercises.   to evaluate for community resources/long-range planning.    Activities:   activity as tolerated    Nursing:   Agency to admit patient within 24 hours of hospital discharge unless specified on physician order or at patient request    SN to complete comprehensive assessment including routine vital signs. Instruct on disease process and s/s of complications to report to MD. Review/verify medication list sent home with the patient at time of discharge  and instruct patient/caregiver as needed. Frequency may be adjusted depending on start of care date.     Skilled nurse to perform up to 3 visits PRN for symptoms related to diagnosis    Notify MD if SBP > 160 or < 90; DBP > 90 or < 50; HR > 120 or < 50; Temp > 101; O2 < 88%; Other:       Ok to schedule additional visits based on staff availability and patient request on consecutive days within the home health episode.    When multiple disciplines ordered:    Start of Care occurs on Sunday - Wednesday schedule  remaining discipline evaluations as ordered on separate consecutive days following the start of care.    Thursday SOC -schedule subsequent evaluations Friday and Monday the following week.     Friday - Saturday SOC - schedule subsequent discipline evaluations on consecutive days starting Monday of the following week.      Home Health Aide:  Nursing Three times weekly, Physical Therapy Three times weekly, Occupational Therapy Three times weekly and Medical Social Work Three times weekly    I certify that this patient is confined to her home and needs intermittent skilled nursing care, physical therapy and occupational therapy.      Signing Physician:     Dea Hernandez MD  Department of Hospital Medicine

## 2022-01-28 NOTE — NURSING
[facility]- Rapid Response Follow-up Note     Followed up with patient for proactive rounding. MEWS 4, HR 120s, after reviewing chart pt is usually sinus tach and treatment team is aware per notes and has been worked up for this issue. Scheduled metoprolol given. No acute issues at this time.   Please call Rapid Response RN with any questions or concerns at  [phone]

## 2022-01-28 NOTE — PLAN OF CARE
CHUCKIE spoke with Lou ([facility] Clinical Manager) re: patient still being accepted to dialysis center. Lou confirmed that patient is still has chair time and able to coming to dialysis center. Patient's chair time is MWF @ 1:00 pm. Lou asked when patient is planning to discharge. CHUCKIE explained he is still working on final discharge date and will get back with Lou on patient's discharging date.     13:40  CHUCKIE met with patient's sister and nephew at bedside. Patient's sister explained to CHUCKIE that she is ready to take patient home today, and she will bring patient to her dialysis appointment. Patient's sister stated she would like a wc and any other dme items that patient can get. CHUCKIE voiced understanding. Patient's sister stated patient will be staying with her at [address] [address]. CHUCKIE voiced understanding.     CHUCKIE secure chat Nancy ([facility]) to review patient's wheelchair and bsc order.     15:05  CHUCKIE manually faxed patient's HH orders to N.      CHUCKIE will continue to follow up with N for HH agency, and Nancy ([facility]) for patient's wc and bsc.     15:59  CHUCKIE spoke with Letty ([facility]). Letty stated she approved patient wc and rolling walker. Brenda ([facility]) will bring wc and bsc to patient's room.     CHUCKIE spoke with Chloe ([facility]) who confirmed receipt of HH orders.     CHUCKIE spoke with patient's nephew via phone. CHUCKIE explained to patient's nephew that N has received HH orders and to contact N if they do not contact patient with HH agency by tomorrow. Patient's nephew voiced understanding. CHUCKIE also explained all of patient's follow up appointments will be with discharge instructions. Patient's nephew voiced understanding. CHUCKIE explained to patient that shower bench will not be covered by patient insurance but wc and bsc is being delivered to patient's room currently. Patient's stated ok.   No further needs at this time.     16:25  CHUCKIE spoke with patient's sister inquiring about  transportation for patient going home. Patient's sister stated she will bring patient home. No further needs at this time.

## 2022-01-28 NOTE — CONSULTS
Thank you for your consult to [facility]. We have reviewed the patient chart. This patient does meet criteria for [facility] service at this time. Will assume care on 01/28/22 at 7AM       Signing Physician:     Dea Hernandez MD  Cell: [phone]  Department [facility]

## 2022-01-28 NOTE — PT/OT/SLP PROGRESS
Occupational Therapy   Treatment    Name: Xuan Ricardo  MRN: [id number]  Admitting Diagnosis:  Hematemesis  2 Days Post-Op    Recommendations:     Discharge Recommendations: rehabilitation facility  Discharge Equipment Recommendations:  wheelchair,bedside commode,bath bench  Barriers to discharge:   (not at PLOF; high risk of falls, unplanned readmission, and morbidity if d/c home)    Assessment:     Xuan Ricardo is a 41 y.o. female with a medical diagnosis of Hematemesis. Performance deficits affecting function are weakness,impaired endurance,decreased ROM,decreased coordination,decreased upper extremity function,impaired self care skills,decreased lower extremity function,impaired skin,impaired functional mobilty,edema,decreased safety awareness,gait instability,impaired balance,impaired cardiopulmonary response to activity,pain.     Pt would not talk to OT nor get OOB. Sister and nephew present. OT edu both on benefits of wedge, green air cushion, and pressure relief boots for pressure relief    Rehab Prognosis:  Fair; patient would benefit from acute skilled OT services to address these deficits and reach maximum level of function.       Plan:     Patient to be seen 5 x/week to address the above listed problems via self-care/home management,therapeutic activities,therapeutic exercises  · Plan of Care Expires: 02/18/22  · Plan of Care Reviewed with: patient    Subjective     Chief complaint: pt shook her head no to therapy and would not explain why   Patient/family comments/ goals: sister and nephew engaged in education for care     Pain/Comfort:  · Pain Rating 1:  (pt moaned in pain with attempt to roll)  · Pain Addressed 1: Cessation of Activity    Objective:     Communicated with: nurse, Jessica, prior to session.  Patient found HOB elevated L sidelying with PICC line,peripheral IV,telemetry,bed alarm upon OT entry to room.    General Precautions: Standard, fall   Orthopedic Precautions:N/A   Braces:  N/A  Respiratory Status: Room air     Occupational Performance:     Bed Mobility:    · Pt partially rolled to the L with upper body but refused to use LE to assist then laid back in supine.     Functional Mobility/Transfers:  · Pt refused     Activities of Daily Living:  · Pt refused       [facility] 6 Click ADL: 16    Treatment & Education:  · Pt re-educated on OT role/POC.   · Pt on telehealth upon OT arrival- OT informed MD of therapy DME recs if d/c home   · OT provided pt and family with pressure relief boots and edu nephew on how to don and reason for use  · OT edu nephew and sister on importance of pressure relief- use of wedge to turn to L, flat, and R every ~2 hours while in bed with elevating pressure points   ·  edu on use of air cushion in w/c and importance of pt weight shifting in the wheelchair every ~ 15 min   · edu nephew on importance of having tight/smooth fitted sheets under pt to protect her skin   · Edu nephew (sister had to leave by this point) on importance of family checking pt routinely throughout the day if she had a urinary/BM accident as she will need to be cleaned right away each time to protect her skin and pt does not recognize that she has gone (as noticed by therapy and nursing)   · All questions/concerns answered within OT scope of practice       Patient left HOB minimally elevated L sidelying with B/L pressure relief boots on with all lines intact, call button in reach, bed alarm on, nurseJessica, notified, nephew present and all needs met/within reachEducation:      GOALS:   Multidisciplinary Problems     Occupational Therapy Goals        Problem: Occupational Therapy Goal    Goal Priority Disciplines Outcome Interventions   Occupational Therapy Goal     OT, PT/OT Ongoing, Not Progressing    Description: Goals to be met by: 2/18/2022    Patient will increase functional independence with ADLs by performing:    Feeding with [place]  UE Dressing with [place]  LE Dressing with  Stand-by Assistance  Grooming while standing at the sink with Stand-by Assistance  Toileting from bedside commode with Stand-by Assistance for hygiene and clothing management.   Toilet transfer to bedside commode with Stand-by Assistance  Supine <> sit with Contact guard assistance   Step transfer with contact guard assistance                      Time Tracking:     OT Date of Treatment: 01/28/22  OT Start Time: 1357  OT Stop Time: 1409  OT Total Time (min): 12 min    Billable Minutes:Therapeutic Activity 12 min    OT/RY: OT     RY Visit Number: 0    1/28/2022

## 2022-01-29 NOTE — NURSING
Patient has left the unit via wheelchair, with sister and nephew, Advised to wait for wheelchair van as it was safer for the patient, Family refused. Instructions reviewed,  Prescriptions Delivered at bedside. Wound care dressing changed, educated family on how to do it, supplies given. IV's x2 removed with tip intact

## 2022-01-31 ENCOUNTER — PATIENT OUTREACH (OUTPATIENT)
Dept: ADMINISTRATIVE | Facility: CLINIC | Age: 42
End: 2022-01-31
Payer: MEDICARE

## 2022-01-31 ENCOUNTER — NURSE TRIAGE (OUTPATIENT)
Dept: ADMINISTRATIVE | Facility: CLINIC | Age: 42
End: 2022-01-31
Payer: MEDICARE

## 2022-01-31 NOTE — PATIENT INSTRUCTIONS
Patient Education       Gastrointestinal Bleeding Discharge Instructions   About this topic   Gastrointestinal bleeding is when bleeding occurs in the digestive tract. It is also called GI bleeding. Bleeding can be anywhere from the mouth to the anus. The anus is the opening where stool leaves the body. GI bleeding is most often a sign of some other health problem. It is often hard to find the cause. When it happens in the upper digestive tract it is called upper GI bleeding. The stool can be very dark, almost black in color, and tarry. When it is in the lower digestive tract it is called lower GI bleeding. The stool is most often red in color. There may be very little blood or a lot of blood. GI bleeding may be very serious. You may need to have a colonoscopy, endoscopy, or surgery to stop the bleeding.     What care is needed at home?   · Ask your doctor what you need to do when you go home. Make sure you ask questions if you do not understand what the doctor says. This way you will know what you need to do.  · Avoid taking drugs called NSAIDs. These include aspirin, ibuprofen, naproxen.  · If you smoke, quit.  · Ask your doctor what kind of diet is right for you. You may need to start with soft foods before moving on to your regular diet.  · Avoid drinking beer, wine, and mixed drinks (alcohol).  · Drink 6 to 8 glasses of water each day.  · Avoid straining when you have a bowel movement.  · Talk with your doctor about taking a stool softener or adding foods to your diet that will soften stool.  What follow-up care is needed?   Your doctor may ask you to make visits to the office to check on your progress. Be sure to keep these visits.  What drugs may be needed?   The doctor may order drugs to:  · Help with pain  · Fight an infection  · Control your blood pressure  · Treat or prevent nausea  · Prevent repeat bleeding once it stops  · Help to build up your blood count  · Lower stomach acid  What problems could  happen?   · Low red cell count  · Low hemoglobin  · Low blood pressure  · The need for a blood transfusion  What can be done to prevent this health problem?   Colon cancer screening should be started at age 45. Your doctor may tell you to have testing done earlier if there is a family history of cancer.  When do I need to call the doctor?   · Bleeding from the anus  · Change in bowel habits  · Change in stool color, especially if there is bright red blood or black, tarry stool  · Belly pain or soreness  · You throw up blood or coffee ground looking material  · Feeling very weak and tired  · Weight loss that you cannot explain  · Trouble breathing  · Dizziness or passing out  · Increased heart rate  · You are not feeling better in 2 to 3 days or you are feeling worse  Teach Back: Helping You Understand   The Teach Back Method helps you understand the information we are giving you. After you talk with the staff, tell them in your own words what you learned. This helps to make sure the staff has described each thing clearly. It also helps to explain things that may have been confusing. Before going home, make sure you can do these:  · I can tell you about my condition.  · I can tell you what changes I need to make with my diet or drugs.  · I can tell you what I will do if I throw up blood or have bloody or black tarry stools.  Last Reviewed Date   2021-03-17  Consumer Information Use and Disclaimer   This information is not specific medical advice and does not replace information you receive from your health care provider. This is only a brief summary of general information. It does NOT include all information about conditions, illnesses, injuries, tests, procedures, treatments, therapies, discharge instructions or life-style choices that may apply to you. You must talk with your health care provider for complete information about your health and treatment options. This information should not be used to decide whether  or not to accept your health care providers advice, instructions or recommendations. Only your health care provider has the knowledge and training to provide advice that is right for you.  Copyright   Copyright © 2022 [employer] and its affiliates and/or licensors. All rights reserved.  Clarita teaching reviewed with Tressa Aguirre . She verbalized understanding.    Education was provided based on the patient's discharge diagnosis using the attached Clarita patient education as a reference.

## 2022-01-31 NOTE — PROGRESS NOTES
C3 nurse spoke with Xuan Ricardo and her sister, Tressa Aguirre, for a T[facility] post hospital discharge follow up call. The patient has a scheduled HOSFU appointment with [facility], [facility] on 2/8/2022 NP @ HOME.

## 2022-01-31 NOTE — TELEPHONE ENCOUNTER
"    Reason for Disposition   Jaundice    Additional Information   Negative: Unconscious or difficult to awaken   Negative: Acting confused (e.g., disoriented, slurred speech)   Negative: Seizure has occurred   Negative: Has fainted (passed out)   Negative: Very weak (e.g., can't stand)   Negative: Acetaminophen overdose or poisoning suspected   Negative: Sounds like a life-threatening emergency to the triager   Negative: [1] Abdominal pain AND [2] severe   Negative: [1] Constant abdominal pain AND [2] present > 2 hours   Negative: [1] Vomiting AND [2] contains red blood or black ("coffee ground") material   Negative: [1] Vomiting AND [2] signs of dehydration (e.g., very dry mouth, lightheaded)   Negative: Fever   Negative: Shaking chills   Negative: [1] Drinking very little AND [2] dehydration suspected (e.g., no urine > 12 hours, very dry mouth, very lightheaded)   Negative: Patient sounds very sick or weak to the triager   Negative: Abdominal pain    Protocols used: [id number]    Pt's caregiver Tressa Aguirre stated the white of the pt's eyes are yellow. Stated she did not have this when she discharged from the hospital. Advised to have pt see a MD within 24 hrs. She stated the pt is about to go to dialysis.  "

## 2022-02-01 ENCOUNTER — HOSPITAL ENCOUNTER (INPATIENT)
Facility: HOSPITAL | Age: 42
LOS: 13 days | Discharge: HOSPICE | DRG: 853 | End: 2022-02-14
Attending: EMERGENCY MEDICINE | Admitting: STUDENT IN AN ORGANIZED HEALTH CARE EDUCATION/TRAINING PROGRAM
Payer: MEDICARE

## 2022-02-01 DIAGNOSIS — Z99.2 ESRD (END STAGE RENAL DISEASE) ON DIALYSIS: Primary | ICD-10-CM

## 2022-02-01 DIAGNOSIS — E83.59 CALCIPHYLAXIS: ICD-10-CM

## 2022-02-01 DIAGNOSIS — N18.6 ESRD (END STAGE RENAL DISEASE) ON DIALYSIS: Primary | ICD-10-CM

## 2022-02-01 DIAGNOSIS — R73.9 HYPERGLYCEMIA: ICD-10-CM

## 2022-02-01 DIAGNOSIS — M72.6 NECROTIZING FASCIITIS: ICD-10-CM

## 2022-02-01 DIAGNOSIS — I95.9 HYPOTENSION: ICD-10-CM

## 2022-02-01 DIAGNOSIS — Z71.89 GOALS OF CARE, COUNSELING/DISCUSSION: ICD-10-CM

## 2022-02-01 DIAGNOSIS — D64.9 SYMPTOMATIC ANEMIA: ICD-10-CM

## 2022-02-01 DIAGNOSIS — L03.119 CELLULITIS OF THIGH: ICD-10-CM

## 2022-02-01 DIAGNOSIS — R00.0 TACHYCARDIA: ICD-10-CM

## 2022-02-01 PROBLEM — E11.10 DKA (DIABETIC KETOACIDOSIS): Status: ACTIVE | Noted: 2022-02-01

## 2022-02-01 PROBLEM — R57.8 HEMORRHAGIC SHOCK: Status: ACTIVE | Noted: 2022-02-01

## 2022-02-01 LAB
ABO + RH BLD: NORMAL
ALBUMIN SERPL BCP-MCNC: 0.8 G/DL (ref 3.5–5.2)
ALLENS TEST: ABNORMAL
ALP SERPL-CCNC: 196 U/L (ref 55–135)
ALT SERPL W/O P-5'-P-CCNC: 30 U/L (ref 10–44)
ANION GAP SERPL CALC-SCNC: 29 MMOL/L (ref 8–16)
ANISOCYTOSIS BLD QL SMEAR: SLIGHT
AST SERPL-CCNC: 81 U/L (ref 10–40)
B-OH-BUTYR BLD STRIP-SCNC: 1.5 MMOL/L (ref 0–0.5)
BASOPHILS # BLD AUTO: ABNORMAL K/UL (ref 0–0.2)
BASOPHILS NFR BLD: 0 % (ref 0–1.9)
BILIRUB SERPL-MCNC: 3.6 MG/DL (ref 0.1–1)
BLD GP AB SCN CELLS X3 SERPL QL: NORMAL
BNP SERPL-MCNC: 145 PG/ML (ref 0–99)
BUN SERPL-MCNC: 62 MG/DL (ref 6–20)
CALCIUM SERPL-MCNC: 7.2 MG/DL (ref 8.7–10.5)
CHLORIDE SERPL-SCNC: 82 MMOL/L (ref 95–110)
CK SERPL-CCNC: 288 U/L (ref 20–180)
CO2 SERPL-SCNC: 17 MMOL/L (ref 23–29)
CREAT SERPL-MCNC: 7.9 MG/DL (ref 0.5–1.4)
CTP QC/QA: YES
DACRYOCYTES BLD QL SMEAR: ABNORMAL
DELSYS: ABNORMAL
DIFFERENTIAL METHOD: ABNORMAL
EOSINOPHIL # BLD AUTO: ABNORMAL K/UL (ref 0–0.5)
EOSINOPHIL NFR BLD: 0 % (ref 0–8)
ERYTHROCYTE [DISTWIDTH] IN BLOOD BY AUTOMATED COUNT: 19.3 % (ref 11.5–14.5)
EST. GFR  (AFRICAN AMERICAN): 7 ML/MIN/1.73 M^2
EST. GFR  (NON AFRICAN AMERICAN): 6 ML/MIN/1.73 M^2
FLOW: 2
GLUCOSE SERPL-MCNC: 171 MG/DL (ref 70–110)
GLUCOSE SERPL-MCNC: 220 MG/DL (ref 70–110)
GLUCOSE SERPL-MCNC: 753 MG/DL (ref 70–110)
HCO3 UR-SCNC: 20.8 MMOL/L (ref 24–28)
HCT VFR BLD AUTO: 14.7 % (ref 37–48.5)
HGB BLD-MCNC: 4.3 G/DL (ref 12–16)
HYPOCHROMIA BLD QL SMEAR: ABNORMAL
IMM GRANULOCYTES # BLD AUTO: ABNORMAL K/UL (ref 0–0.04)
IMM GRANULOCYTES NFR BLD AUTO: ABNORMAL % (ref 0–0.5)
IRON SERPL-MCNC: 22 UG/DL (ref 30–160)
LACTATE SERPL-SCNC: 1.7 MMOL/L (ref 0.5–2.2)
LACTATE SERPL-SCNC: 3 MMOL/L (ref 0.5–2.2)
LDH SERPL L TO P-CCNC: 301 U/L (ref 110–260)
LYMPHOCYTES # BLD AUTO: ABNORMAL K/UL (ref 1–4.8)
LYMPHOCYTES NFR BLD: 4 % (ref 18–48)
MAGNESIUM SERPL-MCNC: 1.8 MG/DL (ref 1.6–2.6)
MCH RBC QN AUTO: 29.5 PG (ref 27–31)
MCHC RBC AUTO-ENTMCNC: 29.3 G/DL (ref 32–36)
MCV RBC AUTO: 101 FL (ref 82–98)
METAMYELOCYTES NFR BLD MANUAL: 1 %
MODE: ABNORMAL
MONOCYTES # BLD AUTO: ABNORMAL K/UL (ref 0.3–1)
MONOCYTES NFR BLD: 1 % (ref 4–15)
MYELOCYTES NFR BLD MANUAL: 2 %
NEUTROPHILS NFR BLD: 85 % (ref 38–73)
NEUTS BAND NFR BLD MANUAL: 6 %
NRBC BLD-RTO: 0 /100 WBC
OVALOCYTES BLD QL SMEAR: ABNORMAL
PCO2 BLDA: 44 MMHG (ref 35–45)
PH SMN: 7.28 [PH] (ref 7.35–7.45)
PLATELET # BLD AUTO: 209 K/UL (ref 150–450)
PLATELET BLD QL SMEAR: ABNORMAL
PMV BLD AUTO: 9.7 FL (ref 9.2–12.9)
PO2 BLDA: 35 MMHG (ref 40–60)
POC BE: -5 MMOL/L
POC SATURATED O2: 60 % (ref 95–100)
POC TCO2: 22 MMOL/L (ref 24–29)
POCT GLUCOSE: 100 MG/DL (ref 70–110)
POCT GLUCOSE: 171 MG/DL (ref 70–110)
POCT GLUCOSE: 176 MG/DL (ref 70–110)
POCT GLUCOSE: 187 MG/DL (ref 70–110)
POCT GLUCOSE: 190 MG/DL (ref 70–110)
POCT GLUCOSE: 205 MG/DL (ref 70–110)
POCT GLUCOSE: 220 MG/DL (ref 70–110)
POIKILOCYTOSIS BLD QL SMEAR: SLIGHT
POLYCHROMASIA BLD QL SMEAR: ABNORMAL
POTASSIUM SERPL-SCNC: 4.9 MMOL/L (ref 3.5–5.1)
PROMYELOCYTES NFR BLD MANUAL: 1 %
PROT SERPL-MCNC: 5.3 G/DL (ref 6–8.4)
RBC # BLD AUTO: 1.46 M/UL (ref 4–5.4)
RETICS/RBC NFR AUTO: 3.2 % (ref 0.5–2.5)
SAMPLE: ABNORMAL
SARS-COV-2 RDRP RESP QL NAA+PROBE: POSITIVE
SATURATED IRON: 58 % (ref 20–50)
SCHISTOCYTES BLD QL SMEAR: ABNORMAL
SITE: ABNORMAL
SODIUM SERPL-SCNC: 128 MMOL/L (ref 136–145)
SP02: 95
T4 FREE SERPL-MCNC: 0.51 NG/DL (ref 0.71–1.51)
T4 FREE SERPL-MCNC: 0.67 NG/DL (ref 0.71–1.51)
TOTAL IRON BINDING CAPACITY: 38 UG/DL (ref 250–450)
TRANSFERRIN SERPL-MCNC: 26 MG/DL (ref 200–375)
TROPONIN I SERPL DL<=0.01 NG/ML-MCNC: 0.06 NG/ML (ref 0–0.03)
TROPONIN I SERPL DL<=0.01 NG/ML-MCNC: 0.08 NG/ML (ref 0–0.03)
TSH SERPL DL<=0.005 MIU/L-ACNC: 5.88 UIU/ML (ref 0.4–4)
WBC # BLD AUTO: 19.09 K/UL (ref 3.9–12.7)

## 2022-02-01 PROCEDURE — 82746 ASSAY OF FOLIC ACID SERUM: CPT | Performed by: EMERGENCY MEDICINE

## 2022-02-01 PROCEDURE — 20000000 HC ICU ROOM

## 2022-02-01 PROCEDURE — 93010 EKG 12-LEAD: ICD-10-PCS | Mod: ,,, | Performed by: INTERNAL MEDICINE

## 2022-02-01 PROCEDURE — 63600175 PHARM REV CODE 636 W HCPCS: Performed by: EMERGENCY MEDICINE

## 2022-02-01 PROCEDURE — 82962 GLUCOSE BLOOD TEST: CPT

## 2022-02-01 PROCEDURE — 83735 ASSAY OF MAGNESIUM: CPT | Performed by: EMERGENCY MEDICINE

## 2022-02-01 PROCEDURE — 83880 ASSAY OF NATRIURETIC PEPTIDE: CPT | Performed by: EMERGENCY MEDICINE

## 2022-02-01 PROCEDURE — 25000003 PHARM REV CODE 250: Performed by: EMERGENCY MEDICINE

## 2022-02-01 PROCEDURE — P9016 RBC LEUKOCYTES REDUCED: HCPCS | Performed by: EMERGENCY MEDICINE

## 2022-02-01 PROCEDURE — 36430 TRANSFUSION BLD/BLD COMPNT: CPT

## 2022-02-01 PROCEDURE — C9113 INJ PANTOPRAZOLE SODIUM, VIA: HCPCS | Performed by: EMERGENCY MEDICINE

## 2022-02-01 PROCEDURE — 63600175 PHARM REV CODE 636 W HCPCS: Performed by: STUDENT IN AN ORGANIZED HEALTH CARE EDUCATION/TRAINING PROGRAM

## 2022-02-01 PROCEDURE — 82803 BLOOD GASES ANY COMBINATION: CPT

## 2022-02-01 PROCEDURE — 84443 ASSAY THYROID STIM HORMONE: CPT | Performed by: EMERGENCY MEDICINE

## 2022-02-01 PROCEDURE — 83605 ASSAY OF LACTIC ACID: CPT | Performed by: EMERGENCY MEDICINE

## 2022-02-01 PROCEDURE — 96367 TX/PROPH/DG ADDL SEQ IV INF: CPT

## 2022-02-01 PROCEDURE — 93010 ELECTROCARDIOGRAM REPORT: CPT | Mod: ,,, | Performed by: INTERNAL MEDICINE

## 2022-02-01 PROCEDURE — 85027 COMPLETE CBC AUTOMATED: CPT | Performed by: EMERGENCY MEDICINE

## 2022-02-01 PROCEDURE — C9113 INJ PANTOPRAZOLE SODIUM, VIA: HCPCS | Performed by: STUDENT IN AN ORGANIZED HEALTH CARE EDUCATION/TRAINING PROGRAM

## 2022-02-01 PROCEDURE — 86900 BLOOD TYPING SEROLOGIC ABO: CPT | Performed by: EMERGENCY MEDICINE

## 2022-02-01 PROCEDURE — 93005 ELECTROCARDIOGRAM TRACING: CPT

## 2022-02-01 PROCEDURE — 84466 ASSAY OF TRANSFERRIN: CPT | Performed by: EMERGENCY MEDICINE

## 2022-02-01 PROCEDURE — 25000003 PHARM REV CODE 250: Performed by: STUDENT IN AN ORGANIZED HEALTH CARE EDUCATION/TRAINING PROGRAM

## 2022-02-01 PROCEDURE — 96375 TX/PRO/DX INJ NEW DRUG ADDON: CPT

## 2022-02-01 PROCEDURE — U0002 COVID-19 LAB TEST NON-CDC: HCPCS | Performed by: EMERGENCY MEDICINE

## 2022-02-01 PROCEDURE — 99900035 HC TECH TIME PER 15 MIN (STAT)

## 2022-02-01 PROCEDURE — 85045 AUTOMATED RETICULOCYTE COUNT: CPT | Performed by: EMERGENCY MEDICINE

## 2022-02-01 PROCEDURE — 85007 BL SMEAR W/DIFF WBC COUNT: CPT | Performed by: EMERGENCY MEDICINE

## 2022-02-01 PROCEDURE — 83615 LACTATE (LD) (LDH) ENZYME: CPT | Performed by: EMERGENCY MEDICINE

## 2022-02-01 PROCEDURE — 86920 COMPATIBILITY TEST SPIN: CPT | Performed by: EMERGENCY MEDICINE

## 2022-02-01 PROCEDURE — 84484 ASSAY OF TROPONIN QUANT: CPT | Mod: 91 | Performed by: EMERGENCY MEDICINE

## 2022-02-01 PROCEDURE — 82550 ASSAY OF CK (CPK): CPT | Performed by: EMERGENCY MEDICINE

## 2022-02-01 PROCEDURE — 99291 CRITICAL CARE FIRST HOUR: CPT | Mod: 25

## 2022-02-01 PROCEDURE — 86850 RBC ANTIBODY SCREEN: CPT | Performed by: EMERGENCY MEDICINE

## 2022-02-01 PROCEDURE — 86920 COMPATIBILITY TEST SPIN: CPT | Performed by: STUDENT IN AN ORGANIZED HEALTH CARE EDUCATION/TRAINING PROGRAM

## 2022-02-01 PROCEDURE — 93010 ELECTROCARDIOGRAM REPORT: CPT | Mod: 76,,, | Performed by: INTERNAL MEDICINE

## 2022-02-01 PROCEDURE — 87040 BLOOD CULTURE FOR BACTERIA: CPT | Performed by: EMERGENCY MEDICINE

## 2022-02-01 PROCEDURE — 82607 VITAMIN B-12: CPT | Performed by: EMERGENCY MEDICINE

## 2022-02-01 PROCEDURE — 96365 THER/PROPH/DIAG IV INF INIT: CPT

## 2022-02-01 PROCEDURE — 96361 HYDRATE IV INFUSION ADD-ON: CPT

## 2022-02-01 PROCEDURE — 84439 ASSAY OF FREE THYROXINE: CPT | Performed by: EMERGENCY MEDICINE

## 2022-02-01 PROCEDURE — 80053 COMPREHEN METABOLIC PANEL: CPT | Performed by: EMERGENCY MEDICINE

## 2022-02-01 PROCEDURE — 82010 KETONE BODYS QUAN: CPT | Performed by: EMERGENCY MEDICINE

## 2022-02-01 RX ORDER — PANTOPRAZOLE SODIUM 40 MG/10ML
40 INJECTION, POWDER, LYOPHILIZED, FOR SOLUTION INTRAVENOUS
Status: COMPLETED | OUTPATIENT
Start: 2022-02-01 | End: 2022-02-01

## 2022-02-01 RX ORDER — OXYCODONE HYDROCHLORIDE 5 MG/1
10 TABLET ORAL EVERY 4 HOURS PRN
Status: DISCONTINUED | OUTPATIENT
Start: 2022-02-01 | End: 2022-02-15 | Stop reason: HOSPADM

## 2022-02-01 RX ORDER — HYDROCODONE BITARTRATE AND ACETAMINOPHEN 500; 5 MG/1; MG/1
TABLET ORAL
Status: DISCONTINUED | OUTPATIENT
Start: 2022-02-01 | End: 2022-02-03

## 2022-02-01 RX ORDER — SODIUM THIOSULFATE 250 MG/ML
25 INJECTION, SOLUTION INTRAVENOUS
Status: DISCONTINUED | OUTPATIENT
Start: 2022-02-02 | End: 2022-02-11

## 2022-02-01 RX ORDER — SODIUM CHLORIDE 0.9 % (FLUSH) 0.9 %
10 SYRINGE (ML) INJECTION
Status: DISCONTINUED | OUTPATIENT
Start: 2022-02-01 | End: 2022-02-02

## 2022-02-01 RX ORDER — ONDANSETRON 8 MG/1
8 TABLET, ORALLY DISINTEGRATING ORAL EVERY 8 HOURS PRN
Status: DISCONTINUED | OUTPATIENT
Start: 2022-02-01 | End: 2022-02-15 | Stop reason: HOSPADM

## 2022-02-01 RX ORDER — FERROUS GLUCONATE 324(37.5)
324 TABLET ORAL
Status: DISCONTINUED | OUTPATIENT
Start: 2022-02-02 | End: 2022-02-15 | Stop reason: HOSPADM

## 2022-02-01 RX ORDER — GABAPENTIN 100 MG/1
100 CAPSULE ORAL 2 TIMES DAILY
Status: DISCONTINUED | OUTPATIENT
Start: 2022-02-01 | End: 2022-02-15 | Stop reason: HOSPADM

## 2022-02-01 RX ORDER — PANTOPRAZOLE SODIUM 40 MG/10ML
40 INJECTION, POWDER, LYOPHILIZED, FOR SOLUTION INTRAVENOUS 2 TIMES DAILY
Status: DISCONTINUED | OUTPATIENT
Start: 2022-02-01 | End: 2022-02-15 | Stop reason: HOSPADM

## 2022-02-01 RX ORDER — SEVELAMER CARBONATE 800 MG/1
2400 TABLET, FILM COATED ORAL
Status: DISCONTINUED | OUTPATIENT
Start: 2022-02-02 | End: 2022-02-15 | Stop reason: HOSPADM

## 2022-02-01 RX ORDER — HYDROMORPHONE HYDROCHLORIDE 2 MG/ML
1 INJECTION, SOLUTION INTRAMUSCULAR; INTRAVENOUS; SUBCUTANEOUS
Status: COMPLETED | OUTPATIENT
Start: 2022-02-01 | End: 2022-02-02

## 2022-02-01 RX ORDER — CINACALCET 30 MG/1
30 TABLET, FILM COATED ORAL
Status: DISCONTINUED | OUTPATIENT
Start: 2022-02-02 | End: 2022-02-15 | Stop reason: HOSPADM

## 2022-02-01 RX ADMIN — GABAPENTIN 100 MG: 100 CAPSULE ORAL at 09:02

## 2022-02-01 RX ADMIN — PANTOPRAZOLE SODIUM 40 MG: 40 INJECTION, POWDER, FOR SOLUTION INTRAVENOUS at 03:02

## 2022-02-01 RX ADMIN — SODIUM CHLORIDE 500 ML: 0.9 INJECTION, SOLUTION INTRAVENOUS at 01:02

## 2022-02-01 RX ADMIN — OXYCODONE 10 MG: 5 TABLET ORAL at 11:02

## 2022-02-01 RX ADMIN — PANTOPRAZOLE SODIUM 40 MG: 40 INJECTION, POWDER, FOR SOLUTION INTRAVENOUS at 09:02

## 2022-02-01 NOTE — PROGRESS NOTES
Pharmacokinetic Initial Assessment: IV Vancomycin    Assessment/Plan:    Initiate intravenous vancomycin with loading dose of 1500 mg once with subsequent doses when random concentrations are less than 20 mcg/mL  Desired empiric serum trough concentration is 10 to 20 mcg/mL  Draw vancomycin random level on 2/2 at 0600.  Pharmacy will continue to follow and monitor vancomycin.      Please contact pharmacy at extension [phone] with any questions regarding this assessment.     Thank you for the consult,   Nacho GRAY Javi       Patient brief summary:  Xuan Cousin is a 41 y.o. female initiated on antimicrobial therapy with IV Vancomycin for treatment of suspected skin & soft tissue infection    Drug Allergies:   Review of patient's allergies indicates:   Allergen Reactions    Vicodin [hydrocodone-acetaminophen] Hives    Codeine Hives       Actual Body Weight:   94.3 kg    Renal Function:   Estimated Creatinine Clearance: 19.7 mL/min (A) (based on SCr of 4.1 mg/dL (H)).,     Dialysis Method (if applicable):  N/A    CBC (last 72 hours):  Recent Labs   Lab Result Units 02/01/22  1408   WBC K/uL 19.09*   Hemoglobin g/dL 4.3*   Hematocrit % 14.7*   Platelets K/uL 209   Gran % % 85.0*   Lymph % % 4.0*   Mono % % 1.0*   Eosinophil % % 0.0   Basophil % % 0.0   Differential Method  Manual       Metabolic Panel (last 72 hours):  No results for input(s): SODIUM, POTASSIUM, CHLORIDE, CO2, GLUCOSE, BUN BLD, CREATININE, ALBUMIN, BILIRUBIN TOTAL, ALK PHOS, AST, ALT, MAGNESIUM, PHOSPHORUS in the last 72 hours.    Drug levels (last 3 results):  No results for input(s): VANCOMYCINRA, VANCOMYCINPE, VANCOMYCINTR in the last 72 hours.    Microbiologic Results:  Microbiology Results (last 7 days)       Procedure Component Value Units Date/Time    Blood culture #1 [[id number]] Collected: 02/01/22 1429    Order Status: Sent Specimen: Blood from Peripheral, Forearm, Left Updated: 02/01/22 1444    Blood culture #2 [[id number]] Collected: 02/01/22  1406    Order Status: Sent Specimen: Blood from Peripheral, Upper Arm, Left Updated: 02/01/22 1418

## 2022-02-01 NOTE — ED TRIAGE NOTES
Pt to ED via EMS reporting she went to her PCP for a follow up and was told her BP is low and to come to the ER. Pt very fatigued, barely moving in bed. Skin break down noted in between thighs, warm, discolored (red and black) dry, and blistering. Pt is a dialysis pt and states she hasn't received dialysis since she was last admitted here a week ago.

## 2022-02-02 ENCOUNTER — ANESTHESIA (OUTPATIENT)
Dept: SURGERY | Facility: HOSPITAL | Age: 42
DRG: 853 | End: 2022-02-02
Payer: MEDICARE

## 2022-02-02 ENCOUNTER — ANESTHESIA EVENT (OUTPATIENT)
Dept: SURGERY | Facility: HOSPITAL | Age: 42
DRG: 853 | End: 2022-02-02
Payer: MEDICARE

## 2022-02-02 PROBLEM — E11.10 DKA (DIABETIC KETOACIDOSIS): Status: RESOLVED | Noted: 2022-02-01 | Resolved: 2022-02-02

## 2022-02-02 PROBLEM — D72.829 LEUKOCYTOSIS: Status: ACTIVE | Noted: 2022-02-02

## 2022-02-02 PROBLEM — D68.9 COAGULOPATHY: Status: ACTIVE | Noted: 2022-02-02

## 2022-02-02 PROBLEM — R57.8 HEMORRHAGIC SHOCK: Status: RESOLVED | Noted: 2022-02-01 | Resolved: 2022-02-02

## 2022-02-02 PROBLEM — D62 ACUTE BLOOD LOSS ANEMIA: Status: RESOLVED | Noted: 2021-12-11 | Resolved: 2022-02-02

## 2022-02-02 PROBLEM — E87.1 HYPONATREMIA: Status: ACTIVE | Noted: 2022-02-02

## 2022-02-02 LAB
ALBUMIN SERPL BCP-MCNC: 0.9 G/DL (ref 3.5–5.2)
ALP SERPL-CCNC: 147 U/L (ref 55–135)
ALT SERPL W/O P-5'-P-CCNC: 28 U/L (ref 10–44)
ANION GAP SERPL CALC-SCNC: 14 MMOL/L (ref 8–16)
ANION GAP SERPL CALC-SCNC: 18 MMOL/L (ref 8–16)
ANION GAP SERPL CALC-SCNC: 19 MMOL/L (ref 8–16)
APTT BLDCRRT: 56 SEC (ref 21–32)
AST SERPL-CCNC: 52 U/L (ref 10–40)
BASOPHILS # BLD AUTO: ABNORMAL K/UL (ref 0–0.2)
BASOPHILS NFR BLD: 0 % (ref 0–1.9)
BILIRUB SERPL-MCNC: 4.1 MG/DL (ref 0.1–1)
BLD PROD TYP BPU: NORMAL
BLD PROD TYP BPU: NORMAL
BLOOD UNIT EXPIRATION DATE: NORMAL
BLOOD UNIT EXPIRATION DATE: NORMAL
BLOOD UNIT TYPE CODE: 5100
BLOOD UNIT TYPE CODE: 5100
BLOOD UNIT TYPE: NORMAL
BLOOD UNIT TYPE: NORMAL
BUN SERPL-MCNC: 70 MG/DL (ref 6–20)
BUN SERPL-MCNC: 71 MG/DL (ref 6–20)
BUN SERPL-MCNC: 76 MG/DL (ref 6–20)
CALCIUM SERPL-MCNC: 7.9 MG/DL (ref 8.7–10.5)
CALCIUM SERPL-MCNC: 8.2 MG/DL (ref 8.7–10.5)
CALCIUM SERPL-MCNC: 8.2 MG/DL (ref 8.7–10.5)
CHLORIDE SERPL-SCNC: 96 MMOL/L (ref 95–110)
CHLORIDE SERPL-SCNC: 98 MMOL/L (ref 95–110)
CHLORIDE SERPL-SCNC: 98 MMOL/L (ref 95–110)
CO2 SERPL-SCNC: 18 MMOL/L (ref 23–29)
CO2 SERPL-SCNC: 19 MMOL/L (ref 23–29)
CO2 SERPL-SCNC: 22 MMOL/L (ref 23–29)
CODING SYSTEM: NORMAL
CODING SYSTEM: NORMAL
CREAT SERPL-MCNC: 8.3 MG/DL (ref 0.5–1.4)
CREAT SERPL-MCNC: 8.3 MG/DL (ref 0.5–1.4)
CREAT SERPL-MCNC: 8.5 MG/DL (ref 0.5–1.4)
DIFFERENTIAL METHOD: ABNORMAL
DISPENSE STATUS: NORMAL
DISPENSE STATUS: NORMAL
EOSINOPHIL # BLD AUTO: ABNORMAL K/UL (ref 0–0.5)
EOSINOPHIL NFR BLD: 1 % (ref 0–8)
ERYTHROCYTE [DISTWIDTH] IN BLOOD BY AUTOMATED COUNT: 17.2 % (ref 11.5–14.5)
EST. GFR  (AFRICAN AMERICAN): 6 ML/MIN/1.73 M^2
EST. GFR  (NON AFRICAN AMERICAN): 5 ML/MIN/1.73 M^2
FOLATE SERPL-MCNC: 6.8 NG/ML (ref 4–24)
GLUCOSE SERPL-MCNC: 154 MG/DL (ref 70–110)
GLUCOSE SERPL-MCNC: 181 MG/DL (ref 70–110)
GLUCOSE SERPL-MCNC: 277 MG/DL (ref 70–110)
HCT VFR BLD AUTO: 24.6 % (ref 37–48.5)
HCT VFR BLD AUTO: 25.3 % (ref 37–48.5)
HGB BLD-MCNC: 7.9 G/DL (ref 12–16)
HGB BLD-MCNC: 7.9 G/DL (ref 12–16)
IMM GRANULOCYTES # BLD AUTO: ABNORMAL K/UL (ref 0–0.04)
IMM GRANULOCYTES NFR BLD AUTO: ABNORMAL % (ref 0–0.5)
INR PPP: 2.2 (ref 0.8–1.2)
LYMPHOCYTES # BLD AUTO: ABNORMAL K/UL (ref 1–4.8)
LYMPHOCYTES NFR BLD: 3 % (ref 18–48)
MAGNESIUM SERPL-MCNC: 2 MG/DL (ref 1.6–2.6)
MCH RBC QN AUTO: 29.4 PG (ref 27–31)
MCHC RBC AUTO-ENTMCNC: 32.1 G/DL (ref 32–36)
MCV RBC AUTO: 91 FL (ref 82–98)
METAMYELOCYTES NFR BLD MANUAL: 2 %
MONOCYTES # BLD AUTO: ABNORMAL K/UL (ref 0.3–1)
MONOCYTES NFR BLD: 3 % (ref 4–15)
MYELOCYTES NFR BLD MANUAL: 1 %
NEUTROPHILS NFR BLD: 77 % (ref 38–73)
NEUTS BAND NFR BLD MANUAL: 13 %
NRBC BLD-RTO: 0 /100 WBC
NUM UNITS TRANS PACKED RBC: NORMAL
NUM UNITS TRANS PACKED RBC: NORMAL
PHOSPHATE SERPL-MCNC: 4.6 MG/DL (ref 2.7–4.5)
PLATELET # BLD AUTO: 249 K/UL (ref 150–450)
PMV BLD AUTO: 10 FL (ref 9.2–12.9)
POCT GLUCOSE: 124 MG/DL (ref 70–110)
POCT GLUCOSE: 198 MG/DL (ref 70–110)
POCT GLUCOSE: 259 MG/DL (ref 70–110)
POCT GLUCOSE: 276 MG/DL (ref 70–110)
POCT GLUCOSE: 284 MG/DL (ref 70–110)
POCT GLUCOSE: 286 MG/DL (ref 70–110)
POCT GLUCOSE: 290 MG/DL (ref 70–110)
POCT GLUCOSE: 322 MG/DL (ref 70–110)
POCT GLUCOSE: 68 MG/DL (ref 70–110)
POCT GLUCOSE: 92 MG/DL (ref 70–110)
POTASSIUM SERPL-SCNC: 5.1 MMOL/L (ref 3.5–5.1)
POTASSIUM SERPL-SCNC: 5.4 MMOL/L (ref 3.5–5.1)
POTASSIUM SERPL-SCNC: 5.6 MMOL/L (ref 3.5–5.1)
PROT SERPL-MCNC: 4.9 G/DL (ref 6–8.4)
PROTHROMBIN TIME: 23 SEC (ref 9–12.5)
RBC # BLD AUTO: 2.69 M/UL (ref 4–5.4)
SODIUM SERPL-SCNC: 132 MMOL/L (ref 136–145)
SODIUM SERPL-SCNC: 135 MMOL/L (ref 136–145)
SODIUM SERPL-SCNC: 135 MMOL/L (ref 136–145)
TROPONIN I SERPL DL<=0.01 NG/ML-MCNC: 0.11 NG/ML (ref 0–0.03)
VANCOMYCIN SERPL-MCNC: 22.3 UG/ML
VIT B12 SERPL-MCNC: 1687 PG/ML (ref 210–950)
WBC # BLD AUTO: 20.64 K/UL (ref 3.9–12.7)

## 2022-02-02 PROCEDURE — 63600175 PHARM REV CODE 636 W HCPCS: Performed by: NURSE ANESTHETIST, CERTIFIED REGISTERED

## 2022-02-02 PROCEDURE — 87075 CULTR BACTERIA EXCEPT BLOOD: CPT | Performed by: SURGERY

## 2022-02-02 PROCEDURE — 37000008 HC ANESTHESIA 1ST 15 MINUTES: Performed by: SURGERY

## 2022-02-02 PROCEDURE — 87076 CULTURE ANAEROBE IDENT EACH: CPT | Performed by: SURGERY

## 2022-02-02 PROCEDURE — 99223 PR INITIAL HOSPITAL CARE,LEVL III: ICD-10-PCS | Mod: ,,, | Performed by: SURGERY

## 2022-02-02 PROCEDURE — 99223 PR INITIAL HOSPITAL CARE,LEVL III: ICD-10-PCS | Mod: 57,,, | Performed by: SURGERY

## 2022-02-02 PROCEDURE — S5010 5% DEXTROSE AND 0.45% SALINE: HCPCS | Performed by: INTERNAL MEDICINE

## 2022-02-02 PROCEDURE — P9016 RBC LEUKOCYTES REDUCED: HCPCS | Performed by: EMERGENCY MEDICINE

## 2022-02-02 PROCEDURE — 85018 HEMOGLOBIN: CPT | Performed by: INTERNAL MEDICINE

## 2022-02-02 PROCEDURE — 25000003 PHARM REV CODE 250: Performed by: INTERNAL MEDICINE

## 2022-02-02 PROCEDURE — 25000003 PHARM REV CODE 250: Performed by: STUDENT IN AN ORGANIZED HEALTH CARE EDUCATION/TRAINING PROGRAM

## 2022-02-02 PROCEDURE — 25000003 PHARM REV CODE 250: Performed by: SURGERY

## 2022-02-02 PROCEDURE — 63600175 PHARM REV CODE 636 W HCPCS: Performed by: EMERGENCY MEDICINE

## 2022-02-02 PROCEDURE — 25000003 PHARM REV CODE 250: Performed by: HOSPITALIST

## 2022-02-02 PROCEDURE — C1751 CATH, INF, PER/CENT/MIDLINE: HCPCS

## 2022-02-02 PROCEDURE — D9220A PRA ANESTHESIA: Mod: CRNA,,, | Performed by: NURSE ANESTHETIST, CERTIFIED REGISTERED

## 2022-02-02 PROCEDURE — 11004 PR DEBRIDE NECROTIC SKIN/ TISSUE, GENIT & PERINM: ICD-10-PCS | Mod: ,,, | Performed by: SURGERY

## 2022-02-02 PROCEDURE — 37000009 HC ANESTHESIA EA ADD 15 MINS: Performed by: SURGERY

## 2022-02-02 PROCEDURE — 83735 ASSAY OF MAGNESIUM: CPT | Performed by: STUDENT IN AN ORGANIZED HEALTH CARE EDUCATION/TRAINING PROGRAM

## 2022-02-02 PROCEDURE — 11004 DBRDMT SKIN XTRNL GENT&PER: CPT | Mod: ,,, | Performed by: SURGERY

## 2022-02-02 PROCEDURE — 20000000 HC ICU ROOM

## 2022-02-02 PROCEDURE — 27201423 OPTIME MED/SURG SUP & DEVICES STERILE SUPPLY: Performed by: SURGERY

## 2022-02-02 PROCEDURE — 36569 INSJ PICC 5 YR+ W/O IMAGING: CPT

## 2022-02-02 PROCEDURE — 85007 BL SMEAR W/DIFF WBC COUNT: CPT | Performed by: STUDENT IN AN ORGANIZED HEALTH CARE EDUCATION/TRAINING PROGRAM

## 2022-02-02 PROCEDURE — 36415 COLL VENOUS BLD VENIPUNCTURE: CPT | Performed by: STUDENT IN AN ORGANIZED HEALTH CARE EDUCATION/TRAINING PROGRAM

## 2022-02-02 PROCEDURE — 80048 BASIC METABOLIC PNL TOTAL CA: CPT | Performed by: INTERNAL MEDICINE

## 2022-02-02 PROCEDURE — 87206 SMEAR FLUORESCENT/ACID STAI: CPT | Performed by: SURGERY

## 2022-02-02 PROCEDURE — 25000003 PHARM REV CODE 250: Performed by: EMERGENCY MEDICINE

## 2022-02-02 PROCEDURE — 63600175 PHARM REV CODE 636 W HCPCS: Performed by: STUDENT IN AN ORGANIZED HEALTH CARE EDUCATION/TRAINING PROGRAM

## 2022-02-02 PROCEDURE — 63600175 PHARM REV CODE 636 W HCPCS: Performed by: HOSPITALIST

## 2022-02-02 PROCEDURE — 85014 HEMATOCRIT: CPT | Performed by: INTERNAL MEDICINE

## 2022-02-02 PROCEDURE — A4216 STERILE WATER/SALINE, 10 ML: HCPCS | Performed by: INTERNAL MEDICINE

## 2022-02-02 PROCEDURE — 80202 ASSAY OF VANCOMYCIN: CPT | Performed by: EMERGENCY MEDICINE

## 2022-02-02 PROCEDURE — 99223 1ST HOSP IP/OBS HIGH 75: CPT | Mod: 57,,, | Performed by: SURGERY

## 2022-02-02 PROCEDURE — 80100014 HC HEMODIALYSIS 1:1

## 2022-02-02 PROCEDURE — D9220A PRA ANESTHESIA: ICD-10-PCS | Mod: ANES,,, | Performed by: ANESTHESIOLOGY

## 2022-02-02 PROCEDURE — 85610 PROTHROMBIN TIME: CPT | Performed by: INTERNAL MEDICINE

## 2022-02-02 PROCEDURE — 85730 THROMBOPLASTIN TIME PARTIAL: CPT | Performed by: INTERNAL MEDICINE

## 2022-02-02 PROCEDURE — 36000706: Performed by: SURGERY

## 2022-02-02 PROCEDURE — 84100 ASSAY OF PHOSPHORUS: CPT | Performed by: STUDENT IN AN ORGANIZED HEALTH CARE EDUCATION/TRAINING PROGRAM

## 2022-02-02 PROCEDURE — 84484 ASSAY OF TROPONIN QUANT: CPT | Performed by: INTERNAL MEDICINE

## 2022-02-02 PROCEDURE — C9113 INJ PANTOPRAZOLE SODIUM, VIA: HCPCS | Performed by: STUDENT IN AN ORGANIZED HEALTH CARE EDUCATION/TRAINING PROGRAM

## 2022-02-02 PROCEDURE — D9220A PRA ANESTHESIA: Mod: ANES,,, | Performed by: ANESTHESIOLOGY

## 2022-02-02 PROCEDURE — 85027 COMPLETE CBC AUTOMATED: CPT | Performed by: STUDENT IN AN ORGANIZED HEALTH CARE EDUCATION/TRAINING PROGRAM

## 2022-02-02 PROCEDURE — 25000003 PHARM REV CODE 250: Performed by: NURSE ANESTHETIST, CERTIFIED REGISTERED

## 2022-02-02 PROCEDURE — 87070 CULTURE OTHR SPECIMN AEROBIC: CPT | Performed by: SURGERY

## 2022-02-02 PROCEDURE — 99223 1ST HOSP IP/OBS HIGH 75: CPT | Mod: ,,, | Performed by: SURGERY

## 2022-02-02 PROCEDURE — 87116 MYCOBACTERIA CULTURE: CPT | Performed by: SURGERY

## 2022-02-02 PROCEDURE — 94761 N-INVAS EAR/PLS OXIMETRY MLT: CPT

## 2022-02-02 PROCEDURE — 80053 COMPREHEN METABOLIC PANEL: CPT | Performed by: STUDENT IN AN ORGANIZED HEALTH CARE EDUCATION/TRAINING PROGRAM

## 2022-02-02 PROCEDURE — 36000707: Performed by: SURGERY

## 2022-02-02 PROCEDURE — 87205 SMEAR GRAM STAIN: CPT | Performed by: SURGERY

## 2022-02-02 PROCEDURE — D9220A PRA ANESTHESIA: ICD-10-PCS | Mod: CRNA,,, | Performed by: NURSE ANESTHETIST, CERTIFIED REGISTERED

## 2022-02-02 PROCEDURE — 87102 FUNGUS ISOLATION CULTURE: CPT | Performed by: SURGERY

## 2022-02-02 RX ORDER — MUPIROCIN 20 MG/G
OINTMENT TOPICAL 2 TIMES DAILY
Status: COMPLETED | OUTPATIENT
Start: 2022-02-02 | End: 2022-02-06

## 2022-02-02 RX ORDER — GLUCAGON 1 MG
1 KIT INJECTION
Status: DISCONTINUED | OUTPATIENT
Start: 2022-02-02 | End: 2022-02-15 | Stop reason: HOSPADM

## 2022-02-02 RX ORDER — HYDROCODONE BITARTRATE AND ACETAMINOPHEN 500; 5 MG/1; MG/1
TABLET ORAL
Status: DISCONTINUED | OUTPATIENT
Start: 2022-02-02 | End: 2022-02-04

## 2022-02-02 RX ORDER — DEXTROSE MONOHYDRATE 100 MG/ML
INJECTION, SOLUTION INTRAVENOUS CONTINUOUS
Status: DISCONTINUED | OUTPATIENT
Start: 2022-02-02 | End: 2022-02-02

## 2022-02-02 RX ORDER — ONDANSETRON 2 MG/ML
INJECTION INTRAMUSCULAR; INTRAVENOUS
Status: DISCONTINUED | OUTPATIENT
Start: 2022-02-02 | End: 2022-02-02

## 2022-02-02 RX ORDER — ETOMIDATE 2 MG/ML
INJECTION INTRAVENOUS
Status: DISCONTINUED | OUTPATIENT
Start: 2022-02-02 | End: 2022-02-02

## 2022-02-02 RX ORDER — SODIUM CHLORIDE 0.9 % (FLUSH) 0.9 %
10 SYRINGE (ML) INJECTION EVERY 6 HOURS
Status: DISCONTINUED | OUTPATIENT
Start: 2022-02-02 | End: 2022-02-15 | Stop reason: HOSPADM

## 2022-02-02 RX ORDER — VASOPRESSIN 20 [USP'U]/ML
INJECTION, SOLUTION INTRAMUSCULAR; SUBCUTANEOUS
Status: DISCONTINUED | OUTPATIENT
Start: 2022-02-02 | End: 2022-02-02

## 2022-02-02 RX ORDER — INSULIN ASPART 100 [IU]/ML
0-5 INJECTION, SOLUTION INTRAVENOUS; SUBCUTANEOUS
Status: DISCONTINUED | OUTPATIENT
Start: 2022-02-02 | End: 2022-02-15 | Stop reason: HOSPADM

## 2022-02-02 RX ORDER — SODIUM CHLORIDE 0.9 G/100ML
IRRIGANT IRRIGATION
Status: DISCONTINUED | OUTPATIENT
Start: 2022-02-02 | End: 2022-02-02 | Stop reason: HOSPADM

## 2022-02-02 RX ORDER — SODIUM CHLORIDE 9 MG/ML
INJECTION, SOLUTION INTRAVENOUS
Status: DISCONTINUED | OUTPATIENT
Start: 2022-02-02 | End: 2022-02-04

## 2022-02-02 RX ORDER — DEXAMETHASONE SODIUM PHOSPHATE 4 MG/ML
INJECTION, SOLUTION INTRA-ARTICULAR; INTRALESIONAL; INTRAMUSCULAR; INTRAVENOUS; SOFT TISSUE
Status: DISCONTINUED | OUTPATIENT
Start: 2022-02-02 | End: 2022-02-02

## 2022-02-02 RX ORDER — PHENYLEPHRINE HYDROCHLORIDE 10 MG/ML
INJECTION INTRAVENOUS
Status: DISCONTINUED | OUTPATIENT
Start: 2022-02-02 | End: 2022-02-02

## 2022-02-02 RX ORDER — SODIUM CHLORIDE 0.9 % (FLUSH) 0.9 %
10 SYRINGE (ML) INJECTION
Status: DISCONTINUED | OUTPATIENT
Start: 2022-02-02 | End: 2022-02-15 | Stop reason: HOSPADM

## 2022-02-02 RX ORDER — NOREPINEPHRINE BITARTRATE/D5W 4MG/250ML
0-3 PLASTIC BAG, INJECTION (ML) INTRAVENOUS CONTINUOUS
Status: DISCONTINUED | OUTPATIENT
Start: 2022-02-02 | End: 2022-02-05

## 2022-02-02 RX ORDER — SODIUM CHLORIDE 9 MG/ML
INJECTION, SOLUTION INTRAVENOUS ONCE
Status: DISCONTINUED | OUTPATIENT
Start: 2022-02-02 | End: 2022-02-05

## 2022-02-02 RX ORDER — IBUPROFEN 200 MG
24 TABLET ORAL
Status: DISCONTINUED | OUTPATIENT
Start: 2022-02-02 | End: 2022-02-15 | Stop reason: HOSPADM

## 2022-02-02 RX ORDER — IBUPROFEN 200 MG
16 TABLET ORAL
Status: DISCONTINUED | OUTPATIENT
Start: 2022-02-02 | End: 2022-02-15 | Stop reason: HOSPADM

## 2022-02-02 RX ORDER — LIDOCAINE HYDROCHLORIDE 20 MG/ML
INJECTION INTRAVENOUS
Status: DISCONTINUED | OUTPATIENT
Start: 2022-02-02 | End: 2022-02-02

## 2022-02-02 RX ORDER — HYDROCODONE BITARTRATE AND ACETAMINOPHEN 500; 5 MG/1; MG/1
TABLET ORAL
Status: DISCONTINUED | OUTPATIENT
Start: 2022-02-02 | End: 2022-02-03

## 2022-02-02 RX ORDER — ROCURONIUM BROMIDE 10 MG/ML
INJECTION, SOLUTION INTRAVENOUS
Status: DISCONTINUED | OUTPATIENT
Start: 2022-02-02 | End: 2022-02-02

## 2022-02-02 RX ORDER — DEXTROSE MONOHYDRATE AND SODIUM CHLORIDE 5; .45 G/100ML; G/100ML
INJECTION, SOLUTION INTRAVENOUS CONTINUOUS
Status: DISCONTINUED | OUTPATIENT
Start: 2022-02-02 | End: 2022-02-02

## 2022-02-02 RX ADMIN — SUGAMMADEX 50 MG: 100 INJECTION, SOLUTION INTRAVENOUS at 07:02

## 2022-02-02 RX ADMIN — PANTOPRAZOLE SODIUM 40 MG: 40 INJECTION, POWDER, FOR SOLUTION INTRAVENOUS at 08:02

## 2022-02-02 RX ADMIN — DEXTROSE AND SODIUM CHLORIDE: 5; .45 INJECTION, SOLUTION INTRAVENOUS at 03:02

## 2022-02-02 RX ADMIN — ROCURONIUM BROMIDE 50 MG: 10 INJECTION, SOLUTION INTRAVENOUS at 05:02

## 2022-02-02 RX ADMIN — PIPERACILLIN AND TAZOBACTAM 4.5 G: 4; .5 INJECTION, POWDER, LYOPHILIZED, FOR SOLUTION INTRAVENOUS; PARENTERAL at 05:02

## 2022-02-02 RX ADMIN — SUGAMMADEX 100 MG: 100 INJECTION, SOLUTION INTRAVENOUS at 07:02

## 2022-02-02 RX ADMIN — SODIUM CHLORIDE 250 ML: 0.9 INJECTION, SOLUTION INTRAVENOUS at 03:02

## 2022-02-02 RX ADMIN — MUPIROCIN: 20 OINTMENT TOPICAL at 11:02

## 2022-02-02 RX ADMIN — GABAPENTIN 100 MG: 100 CAPSULE ORAL at 08:02

## 2022-02-02 RX ADMIN — VASOPRESSIN 0.5 UNITS: 20 INJECTION INTRAVENOUS at 06:02

## 2022-02-02 RX ADMIN — HYDROMORPHONE HYDROCHLORIDE 1 MG: 2 INJECTION INTRAMUSCULAR; INTRAVENOUS; SUBCUTANEOUS at 08:02

## 2022-02-02 RX ADMIN — PHENYLEPHRINE HYDROCHLORIDE 300 MCG: 10 INJECTION INTRAVENOUS at 05:02

## 2022-02-02 RX ADMIN — ONDANSETRON 4 MG: 2 INJECTION, SOLUTION INTRAMUSCULAR; INTRAVENOUS at 06:02

## 2022-02-02 RX ADMIN — Medication 10 ML: at 05:02

## 2022-02-02 RX ADMIN — PHENYLEPHRINE HYDROCHLORIDE 200 MCG: 10 INJECTION INTRAVENOUS at 06:02

## 2022-02-02 RX ADMIN — LIDOCAINE HYDROCHLORIDE 100 MG: 20 INJECTION, SOLUTION INTRAVENOUS at 05:02

## 2022-02-02 RX ADMIN — SODIUM CHLORIDE 500 ML: 0.9 INJECTION, SOLUTION INTRAVENOUS at 04:02

## 2022-02-02 RX ADMIN — ETOMIDATE 18 MG: 2 INJECTION, SOLUTION INTRAVENOUS at 05:02

## 2022-02-02 RX ADMIN — SODIUM CHLORIDE: 0.9 INJECTION, SOLUTION INTRAVENOUS at 06:02

## 2022-02-02 RX ADMIN — VASOPRESSIN 0.5 UNITS: 20 INJECTION INTRAVENOUS at 05:02

## 2022-02-02 RX ADMIN — PHENYLEPHRINE HYDROCHLORIDE 200 MCG: 10 INJECTION INTRAVENOUS at 05:02

## 2022-02-02 RX ADMIN — CINACALCET 30 MG: 30 TABLET, FILM COATED ORAL at 08:02

## 2022-02-02 RX ADMIN — DEXTROSE: 10 SOLUTION INTRAVENOUS at 04:02

## 2022-02-02 RX ADMIN — MUPIROCIN: 20 OINTMENT TOPICAL at 08:02

## 2022-02-02 RX ADMIN — HYDROMORPHONE HYDROCHLORIDE 1 MG: 2 INJECTION INTRAMUSCULAR; INTRAVENOUS; SUBCUTANEOUS at 11:02

## 2022-02-02 RX ADMIN — Medication 10 ML: at 11:02

## 2022-02-02 RX ADMIN — SODIUM CHLORIDE, SODIUM LACTATE, POTASSIUM CHLORIDE, AND CALCIUM CHLORIDE: .6; .31; .03; .02 INJECTION, SOLUTION INTRAVENOUS at 05:02

## 2022-02-02 RX ADMIN — INSULIN ASPART 3 UNITS: 100 INJECTION, SOLUTION INTRAVENOUS; SUBCUTANEOUS at 11:02

## 2022-02-02 RX ADMIN — DEXAMETHASONE SODIUM PHOSPHATE 4 MG: 4 INJECTION, SOLUTION INTRAMUSCULAR; INTRAVENOUS at 06:02

## 2022-02-02 RX ADMIN — Medication 10 ML: at 06:02

## 2022-02-02 RX ADMIN — NOREPINEPHRINE BITARTRATE 0.02 MCG/KG/MIN: 4 INJECTION, SOLUTION INTRAVENOUS at 08:02

## 2022-02-02 RX ADMIN — DEXTROSE 125 ML: 10 SOLUTION INTRAVENOUS at 04:02

## 2022-02-02 RX ADMIN — SODIUM THIOSULFATE 25 G: 250 INJECTION, SOLUTION INTRAVENOUS at 05:02

## 2022-02-02 NOTE — EICU
41 year old female admitted with shock,?sepsis due to multiple wounds,anemia with HB 4.3 gm/dl,diabetic ketoacidosis and COVID 19 infection.    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    On camera assessment patient alert and awake and communicative,multiple wounds seen over the medial aspect of her thighs and on sacrum and sides  ,Spo2 96%,RR8,/52    Lab data  Glucose 176<-753  Beta hydroxybutyrate 1.5  PH 7.282  Lactate 1.7<-3  H/H 4.3/14.7(7.4/23.6 5 days ago)  Troponin 0.059    Bun/creatinine 62/7.9(was 17/4.1  4 days ago)  WBC 19.09      Assessment and plan:  1.Shock-sepsis most likely,?hypovolumia-NS 250ml bolus,PRBC transfusion if Hemoglobin less than 7gm/dl,may need pressor support,on pip/tazo,pancultures and deescalate as needed,PICC line placement ordered due to poor IV access  2.DKA-continue insulin infusion,initiated D51/2 NS at 125ml/hour,correct electrolytes as needed  3.ESRD on HD-Missed dialysis for 1 week,nephrology for HD in AM  4.Anemia-most likely Iron deficiency related to CKD-2 units PRBC given,was 7.9 after 1 unit,will avoid further transfusions,iron supplements and epoitin  5.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

## 2022-02-02 NOTE — ASSESSMENT & PLAN NOTE
"With concern of superimposed infection.  This patient does have evidence of infective focus  My overall impression is sepsis. Vital signs were reviewed and noted in progress note.  Antibiotics given-   Antibiotics (From admission, onward)            Start     Stop Route Frequency Ordered    02/02/22 1800  piperacillin-tazobactam 4.5 g in dextrose 5 % 100 mL IVPB (ready to mix system)         -- IV Every 12 hours (non-standard times) 02/02/22 0716    02/02/22 1215  mupirocin 2 % ointment         02/07 0859 Nasl 2 times daily 02/02/22 1106    02/01/22 1539  vancomycin - pharmacy to dose  (vancomycin IVPB)        "And" Linked Group Details    -- IV pharmacy to manage frequency 02/01/22 1440        Cultures were taken-   Microbiology Results (last 7 days)     Procedure Component Value Units Date/Time    Blood culture #1 [[id number]] Collected: 02/01/22 1429    Order Status: Completed Specimen: Blood from Peripheral, Forearm, Left Updated: 02/01/22 2112     Blood Culture, Routine No Growth to date    Narrative:      Blood Culture #1    Blood culture #2 [[id number]] Collected: 02/01/22 1406    Order Status: Completed Specimen: Blood from Peripheral, Upper Arm, Left Updated: 02/01/22 2112     Blood Culture, Routine No Growth to date    Narrative:      Blood Culture #2        Latest lactate reviewed, they are-  Recent Labs   Lab 02/01/22  1408 02/01/22  2307   LACTATE 3.0* 1.7       Organ dysfunction indicated by Hypotension, DKA  Source- Skin  Source control Achieved by- ABx's  Appreciate Surgery input.  Concern about possible necrotizing fascitis.  Going to OR today.      "

## 2022-02-02 NOTE — CONSULTS
Consult:    41-year-old female her previous admission for calciphylaxis end-stage renal disease and bilateral upper thigh presentation of her calciphylaxis.  Was evaluated by the ICU and thawed a surgical consult was necessary to rule out necrotizing fasciitis.  Patient currently complains of severe tenderness to the lateral another hernia back for her upper thighs.    After chart review it is quite evident not this has been an ongoing process sent her previous admission less than 1 a month ago.  On her admission to Day she is COVID positive which complicates everything.    On my physical examination of her she is quite tender to the lateral posterior as well as medial aspects of her upper thighs bilaterally with appropriate dressings on each site.  There was no evidence of any fluctuance or erythema that I can appreciate.    To rule out necrotizing fasciitis she will need a CT scan of her pelvis and upper thighs.  Because she is not hemodynamically unstable this does not have to be done on a stat basis and can be done routinely by tomorrow morning.  I will continue to follow her with you.    Simón Blanco MD

## 2022-02-02 NOTE — CONSULTS
"  [facility] - Intensive Care  Adult Nutrition  Consult Note    SUMMARY     Recommendations  1) Add Novasource Renal TID to promote wound healing   2) Continue Diabetic diet - monitor renal indices    Goals: Pt to consume > 50% EEN by RD follow up  Nutrition Goal Status: new  Communication of RD Recs: reviewed with physician    Assessment and Plan  Nutrition Problem  Increased nutrient needs    Related to (etiology):   Wound healing    Signs and Symptoms (as evidenced by):   Patient with erythema and blistering of bilateral inner thigh and buttocks.  Concerning for calciphylaxis with superimposed infection per MD    Interventions(treatment strategy):  Collaboration with other providers  Carbohydrate modified diet    Nutrition Diagnosis Status:   New    Malnutrition Assessment    SANA NFPE due to remote assessment    Reason for Assessment  Reason For Assessment: consult (wounds)  Diagnosis: other (see comments) (Calciphylaxis)  Relevant Medical History: IDDM, CKD5 recently started on HD, HTN  Interdisciplinary Rounds: did not attend  General Information Comments: Remote assessment for coverage, pt readmitted with DKA, wounds. Was admitted in January and noted with fair intake. Per chart, with 50 lbs wt loss over the last 1.5 years. Unable to complete NFPE due to remote assessment. Suspect malnutrition. Recommend addition of ONS to promote wound healing.  Nutrition Discharge Planning: Discharge on renal with dialysis diet    Nutrition Risk Screen  Nutrition Risk Screen: large or nonhealing wound, burn or pressure injury    Nutrition/Diet History  Food Preferences: dislikes food at faciilty per chart review    Anthropometrics  Temp: 97.6 °F (36.4 °C)  Height Method: Estimated  Height: 5' 4" (162.6 cm)  Height (inches): 64 in  Weight Method: Bed Scale  Weight: 94.3 kg (207 lb 14.3 oz)  Weight (lb): 207.9 lb  Ideal Body Weight (IBW), Female: 120 lb  % Ideal Body Weight, Female (lb): 173.25 %  BMI (Calculated): 35.7   " "  Lab/Procedures/Meds  Pertinent Labs Reviewed: reviewed  Pertinent Labs Comments: Na 132, BUN 76, Cr 8.3, GFR 6, Glu 277, Ca 8.2, POC -286, Hgb A1c 5.2 on 12/30/21  Pertinent Medications Reviewed: reviewed  Pertinent Medications Comments: NaCl, epoetin kelsey-epbx, ferrous gluconate, gabapentin, pantoprazole, sevelamer carbonate    Physical Findings/Assessment  "Patient with erythema and blistering of bilateral inner thing and buttocks.  Concerning for calciphylaxis with superimposed infection"     Estimated/Assessed Needs  Weight Used For Calorie Calculations: 93.9 kg (207 lb)  Energy Calorie Requirements (kcal): 2819 kcal/day based on 30 kcal/kg for ESRD on HD  Energy Need Method: Kcal/kg  Protein Requirements: 113 g/day based on 1.2 g/kg for ESRD on HD, wounds  Weight Used For Protein Calculations: 93.9 kg (207 lb)  Fluid Requirements (mL): 1000 mL + UOP  RDA Method (mL): 2819  CHO Requirement: 353 g/day based on 50% kcal from CHO    Nutrition Prescription Ordered  Current Diet Order: 2000 kcal DM    Evaluation of Received Nutrient/Fluid Intake  Energy Calories Required: not meeting needs  Protein Required: not meeting needs  Comments: LBM 2/2  % Intake of Estimated Energy Needs: 0 - 25 %  % Meal Intake: SANA, just cleared for diet today    Nutrition Risk  Level of Risk/Frequency of Follow-up: high (2x weekly)     Monitor and Evaluation  Food and Nutrient Intake: food and beverage intake  Food and Nutrient Adminstration: diet order  Physical Activity and Function: nutrition-related ADLs and IADLs  Anthropometric Measurements: weight change  Biochemical Data, Medical Tests and Procedures: lipid profile,electrolyte and renal panel,gastrointestinal profile,glucose/endocrine profile,inflammatory profile  Nutrition-Focused Physical Findings: skin,extremities, muscles and bones     Nutrition Follow-Up  RD Follow-up?: Yes  "

## 2022-02-02 NOTE — ASSESSMENT & PLAN NOTE
Wound care and pain control are critical aspects of the management of calciphylaxis. A wound care team should be involved in the selection of dressings and chemical debridement agents and in the administration of negative pressure wound therapy. In all patients, repetitive local trauma to skin (eg, subcutaneous injections) should be avoided altogether or at least minimized (eg, by rotating injection sites for therapies such as insulin).

## 2022-02-02 NOTE — HOSPITAL COURSE
42 y/o female presented to ER with hypotension.  Patient was recently started on dialysis and had not had dialysis for one week since last hospital discharge.  She was noted to have multiple abnormalities on presentation.  She was severely anemic with Hgb of 4.3.  transfused 2 units of blood with adequate correction of H/H.  Patient was also noted to have AG metabolic acidosis with hyperglycemia.  Started on insulin drip for DKA.  Leukocytosis on CBC.  Patient with erythema and blistering of bilateral inner thighs and buttocks.  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  Started on ABX's and Surgery consulted.  Admitted with necrotizing fascitis of the buttocks. Surgery consulted and patient brought to OR 2/2/22 with significant debridement. On antibiotics. Mental status worsening. Palliative Care consulted, family updated. Brought back to OR on 2/4/22 for debridement as well. Weaned off norepinephrine, on broad spectrum antibiotics pending cultures. Patient w/ increasing white count and tachycardia, antibiotics broadened, found to have further areas of necrotizing fasciitis and taken back to the OR 2/11. The patient's prior wounds showed no evidence of infection but were extremely large and showed poor wound healing.    After multiple providers expressed concern that the wounds will not heal given the frequent re-infection and worsening despite maximal medical therapy, as well as the patient's declining physical/mental/nutritional status, and the burden of aggressive medical care including frequent painful wound care changes and likely loop colostomy, the patient's family was amenable to hospice discussion. CM/SW consulted to initiate contact w/ hospice agency. After much discussion with multiple family members with multiple different providers on the care team, the patient and her family decided to transition to hospice. That wish was honored and the patient was discharged with hospice.

## 2022-02-02 NOTE — PLAN OF CARE
Problem: Adult Inpatient Plan of Care  Goal: Plan of Care Review  Outcome: Ongoing, Progressing  Goal: Patient-Specific Goal (Individualized)  Outcome: Ongoing, Progressing  Goal: Absence of Hospital-Acquired Illness or Injury  Outcome: Ongoing, Progressing  Goal: Optimal Comfort and Wellbeing  Outcome: Ongoing, Progressing     Problem: Diabetes Comorbidity  Goal: Blood Glucose Level Within Targeted Range  Outcome: Ongoing, Progressing     Problem: Device-Related Complication Risk (Hemodialysis)  Goal: Safe, Effective Therapy Delivery  Outcome: Ongoing, Progressing

## 2022-02-02 NOTE — NURSING
General surgery consult called into Dr Blanco as per ordered. Info given. On his way to see patient. Primary nurse notified.

## 2022-02-02 NOTE — SUBJECTIVE & OBJECTIVE
Interval History: No new complaints.    Review of Systems   HENT: Negative for ear discharge and ear pain.    Eyes: Negative for discharge and itching.   Endocrine: Negative for cold intolerance and heat intolerance.   Neurological: Negative for seizures and syncope.     Objective:     Vital Signs (Most Recent):  Temp: 97.7 °F (36.5 °C) (02/02/22 1135)  Pulse: 110 (02/02/22 1400)  Resp: 17 (02/02/22 1400)  BP: 94/64 (02/02/22 1400)  SpO2: (!) 93 % (02/02/22 1400) Vital Signs (24h Range):  Temp:  [97.6 °F (36.4 °C)-98.8 °F (37.1 °C)] 97.7 °F (36.5 °C)  Pulse:  [] 110  Resp:  [9-22] 17  SpO2:  [88 %-100 %] 93 %  BP: ()/(36-67) 94/64     Weight: 94.3 kg (207 lb 14.3 oz)  Body mass index is 35.68 kg/m².    Intake/Output Summary (Last 24 hours) at 2/2/2022 1455  Last data filed at 2/2/2022 0913  Gross per 24 hour   Intake 2125.06 ml   Output --   Net 2125.06 ml      Physical Exam  Constitutional:       Appearance: She is ill-appearing. She is not diaphoretic.   HENT:      Head: Normocephalic and atraumatic.      Mouth/Throat:      Mouth: Mucous membranes are dry.      Pharynx: No oropharyngeal exudate or posterior oropharyngeal erythema.   Cardiovascular:      Rate and Rhythm: Regular rhythm. Tachycardia present.   Pulmonary:      Effort: No respiratory distress.      Breath sounds: Normal breath sounds.   Abdominal:      General: Bowel sounds are normal.      Palpations: Abdomen is soft.   Musculoskeletal:         General: No deformity or signs of injury.      Cervical back: Neck supple. No rigidity.   Skin:     General: Skin is warm.      Comments: Erythema and blistering of bilateral inner thighs and buttocks.   Neurological:      Mental Status: Mental status is at baseline.      Cranial Nerves: No cranial nerve deficit.         Significant Labs:   All pertinent labs within the past 24 hours have been reviewed.  BMP:   Recent Labs   Lab 02/02/22  0355 02/02/22  0355 02/02/22  0835   *   < > 277*    *   < > 132*   K 5.4*   < > 5.1   CL 98   < > 96   CO2 19*   < > 22*   BUN 70*   < > 76*   CREATININE 8.3*   < > 8.3*   CALCIUM 7.9*   < > 8.2*   MG 2.0  --   --     < > = values in this interval not displayed.     CBC:   Recent Labs   Lab 02/01/22  1408 02/02/22  0104 02/02/22  0355   WBC 19.09*  --  20.64*   HGB 4.3* 7.9* 7.9*   HCT 14.7* 25.3* 24.6*     --  249       Significant Imaging: I have reviewed all pertinent imaging results/findings within the past 24 hours.

## 2022-02-02 NOTE — PROGRESS NOTES
[facility]      General Surgery Progress Note    02/02/2022  1:33 PM      Patient: Xuan Wrightsin  MRN: [id number]  Admit Date: 2/1/2022  LOS: 1      Subjective                                                                                                        Interval Events: Patient remained hypotensive, ct scan at 1130 showed air tracking in the subcutaneous tissue at the buttock up to the rectum    Patient Active Problem List   Diagnosis    Anemia of renal disease    Hypertensive CKD (chronic kidney disease)    Nephrotic range proteinuria    Metabolic acidosis    Increased prolactin level    Secondary hyperparathyroidism    Iron deficiency anemia    Vitamin D deficiency    Galactorrhea    Cataract    Class 1 obesity due to excess calories with serious comorbidity in adult    Chronic fatigue    Missed menses    Uterine leiomyoma    Type 2 diabetes mellitus with stage 5 chronic kidney disease not on chronic dialysis, with long-term current use of insulin    Hypertension    Hypertensive urgency    Symptomatic anemia    End stage renal disease    Uremia    CKD (chronic kidney disease) stage 5, GFR less than 15 ml/min    Nausea & vomiting    Thrombocytopenia    Hematemesis    Goals of care, counseling/discussion    Acute blood loss anemia    Calciphylaxis    Type 2 diabetes mellitus    COVID-19 virus infection    Renovascular hypertension    Hyperphosphatemia    Candida esophagitis    Sepsis due to gram-negative UTI    Sinus tachycardia    Slow transit constipation    Debility    Esophagitis, Sullivan grade D    Hemorrhagic shock    DKA (diabetic ketoacidosis)       No current facility-administered medications on file prior to encounter.     Current Outpatient Medications on File Prior to Encounter   Medication Sig Dispense Refill    calcium carbonate (TUMS) 200 mg calcium (500 mg) chewable tablet Take 2 tablets (1,000 mg total) by mouth 3 (three) times  daily as needed. 150 tablet 0    cinacalcet (SENSIPAR) 30 MG Tab Take 1 tablet (30 mg total) by mouth daily with breakfast. 30 tablet 11    epoetin kelsey-epbx (RETACRIT) 2,000 unit/mL injection Inject 7 mLs (14,000 Units total) into the skin every Mon, Wed, Fri. With dialysis      ferrous gluconate (FERGON) 324 MG tablet Take 1 tablet (324 mg total) by mouth daily with breakfast. 30 tablet 2    gabapentin (NEURONTIN) 100 MG capsule Take 1 capsule (100 mg total) by mouth 3 (three) times daily. 90 capsule 0    metoprolol tartrate (LOPRESSOR) 25 MG tablet Take 1 tablet (25 mg total) by mouth 2 (two) times daily. 60 tablet 11    oxyCODONE (ROXICODONE) 10 mg Tab immediate release tablet Take 1 tablet (10 mg total) by mouth every 4 (four) hours as needed. 18 tablet 0    pantoprazole (PROTONIX) 40 MG tablet Take 1 tablet (40 mg total) by mouth 2 (two) times daily. 60 tablet 1    sevelamer carbonate (RENVELA) 800 mg Tab Take 3 tablets (2,400 mg total) by mouth 3 (three) times daily with meals. 270 tablet 11    sodium thiosulfate 12.5 gram/50 mL (250 mg/mL) injection Inject 100 mLs (25 g total) into the vein every Mon, Wed, Fri. With dialysis         Objective                                                                                                          Vitals:    02/02/22 1100 02/02/22 1135 02/02/22 1200 02/02/22 1300   BP: 110/65  (!) 89/43 (!) 91/51   BP Location:   Left arm    Patient Position:   Lying    Pulse: 100  105 105   Resp: 13  14 15   Temp:  97.7 °F (36.5 °C)     TempSrc:  Oral     SpO2: 100%  99% 99%   Weight:       Height:           CBC   Recent Labs   Lab 02/01/22  1408 02/01/22  1408 02/02/22  0104 02/02/22  0355   WBC 19.09*  --   --  20.64*   HGB 4.3*   < > 7.9* 7.9*   HCT 14.7*   < > 25.3* 24.6*     --   --  249    < > = values in this interval not displayed.       CHEM   Recent Labs   Lab 02/02/22  0355 02/02/22  0835   * 132*   K 5.4* 5.1   CL 98 96   CO2 19* 22*   BUN  70* 76*   CREATININE 8.3* 8.3*   * 277*       PHYSICAL EXAM:    GEN: awake, alert, ill appearing  HEENT: NC/AT, EOMI  RESP: CTAB, good air movement, no resp distress  CV: tachycardia regular rhythm  ABD: Soft, NT/ND, no guarding or rebound  EXT:  Moves all, extensive skin lesions noted on bilateral lower extremities related to calcyphylaxis  Neuro: A&Ox3, CNII-XII intact  Skin: large lesions noted on bilateral. Lower extremities and buttock    Imaging Results          X-Ray Chest AP Portable (Final result)  Result time 02/01/22 15:38:15    Final result by Murphy Foy MD (02/01/22 15:38:15)                 Impression:      1. No acute cardiopulmonary process appreciated.      Electronically signed by: Murphy Foy  Date:    02/01/2022  Time:    15:38             Narrative:    EXAMINATION:  XR CHEST AP PORTABLE    CLINICAL HISTORY:  HYpotention;    TECHNIQUE:  Single frontal portable view of the chest was performed.    COMPARISON:  Chest radiograph 01/19/2022    FINDINGS:  RIJV-approach THDC is stable when compared to 01/19/2022 however, with marked interval retraction when compared to original placement on 05/22/2020 with terminus now projecting over the expected location of the mid SVC.    Cardiomediastinal silhouette is within normal limits.    No focal consolidation, overt interstitial edema, sizable pleural effusion or pneumothorax.    Multilevel degenerative changes of the imaged spine.                                Assessment / Plan:                                                                                           A/P: Case of 41 y.o. admitted to the icu with hypotension and suspected sepsis, ct scan showed extensive subcutaneous air at the buttock    -OR emergently for debridement due to concern for nec fasc      Carlos Alberto Christianson MD  [facility] Surgery PGY-V

## 2022-02-02 NOTE — NURSING
11:30 Pt transported to and from CT scan without problem. Continue NPO until CT resulted.  13:30 Dr. Christianson at bedside and informed her that she will require surgery this afternoon. Pt had soft pasty bowel movement; sacrum and buttocks blackened with sloughing skin. Dr. Salvador notified of above. Pt's sister at bedside.  14:00 Ring with stones removed from (L) hand and given to sister Gypsy.  16:15 Notified Dr. Salvador that BP soft; he stated to give NS bolus and ok to administer scheduled thiosulfate.  17:35 Released pt to anesthesia for transfer to OR. Zosyn and thiosulfate sent with pt. Gypsy at bedside at time of departure.

## 2022-02-02 NOTE — ASSESSMENT & PLAN NOTE
Baseline Hgb around 7.5  Presents with Hgb of 4.3  No obvious bleeding.  Transfused 2 units of blood with adequate correction of H/H  Continue to monitor.

## 2022-02-02 NOTE — PROGRESS NOTES
Pharmacokinetic Assessment Follow Up: IV Vancomycin    Vancomycin serum concentration assessment(s):    The random level was drawn correctly and can be used to guide therapy at this time. The measurement is above the desired definitive target range of 10 to 20 mcg/mL.    Vancomycin Regimen Plan:    No dose today.  Re-dose when the random level is less than 20 mcg/mL, next level to be drawn at 0300 on 2/3/2022    Drug levels (last 3 results):  Recent Labs   Lab Result Units 02/02/22  0355   Vancomycin, Random ug/mL 22.3       Pharmacy will continue to follow and monitor vancomycin.    Please contact pharmacy at extension [phone] for questions regarding this assessment.    Thank you for the consult,   Julien Bolivar Jr       Patient brief summary:  Xuan Wrightsin is a 41 y.o. female initiated on antimicrobial therapy with IV Vancomycin for treatment of skin & soft tissue infection    Drug Allergies:   Review of patient's allergies indicates:   Allergen Reactions    Vicodin [hydrocodone-acetaminophen] Hives    Codeine Hives       Actual Body Weight:   94.3 kg    Renal Function:   Estimated Creatinine Clearance: 9.7 mL/min (A) (based on SCr of 8.3 mg/dL (H)).,     Dialysis Method (if applicable):  N/A    CBC (last 72 hours):  Recent Labs   Lab Result Units 02/01/22  1408 02/02/22  0104 02/02/22  0355   WBC K/uL 19.09*  --  20.64*   Hemoglobin g/dL 4.3* 7.9* 7.9*   Hematocrit % 14.7* 25.3* 24.6*   Platelets K/uL 209  --  249   Gran % % 85.0*  --  77.0*   Lymph % % 4.0*  --  3.0*   Mono % % 1.0*  --  3.0*   Eosinophil % % 0.0  --  1.0   Basophil % % 0.0  --  0.0   Differential Method  Manual  --  Manual       Metabolic Panel (last 72 hours):  Recent Labs   Lab Result Units 02/01/22  1530 02/02/22  0104 02/02/22  0355   Sodium mmol/L 128* 135* 135*   Potassium mmol/L 4.9 5.6* 5.4*   Chloride mmol/L 82* 98 98   CO2 mmol/L 17* 18* 19*   Glucose mg/dL 753* 154* 181*   BUN mg/dL 62* 71* 70*   Creatinine mg/dL 7.9* 8.5* 8.3*    Albumin g/dL 0.8*  --  0.9*   Total Bilirubin mg/dL 3.6*  --  4.1*   Alkaline Phosphatase U/L 196*  --  147*   AST U/L 81*  --  52*   ALT U/L 30  --  28   Magnesium mg/dL 1.8  --  2.0   Phosphorus mg/dL  --   --  4.6*       Vancomycin Administrations:  vancomycin given in the last 96 hours                     vancomycin 1.5 g in dextrose 5 % 250 mL IVPB (ready to mix) (mg) 1,500 mg New Bag 02/01/22 9608                    Microbiologic Results:  Microbiology Results (last 7 days)       Procedure Component Value Units Date/Time    Blood culture #1 [[id number]] Collected: 02/01/22 1429    Order Status: Completed Specimen: Blood from Peripheral, Forearm, Left Updated: 02/01/22 2112     Blood Culture, Routine No Growth to date    Narrative:      Blood Culture #1    Blood culture #2 [[id number]] Collected: 02/01/22 1406    Order Status: Completed Specimen: Blood from Peripheral, Upper Arm, Left Updated: 02/01/22 2112     Blood Culture, Routine No Growth to date    Narrative:      Blood Culture #2

## 2022-02-02 NOTE — EICU
Glucose 68mg/dl  Insulin infusion not initiated yet   D5 1/2 NS at 125ml/hour    Plan:  D10% 125 ML bolus  D10% start at 50 ml/hour  Hypoglycemia protocol initiated

## 2022-02-02 NOTE — CONSULTS
[facility] - Intensive Care  Wound Care    Patient Name:  Xuan Ricrado   MRN:  [id number]  Date: 2/2/2022  Diagnosis: Calciphylaxis    History:     Past Medical History:   Diagnosis Date    Cataract     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     Diabetes mellitus 1996    Insulin Dependent    Galactorrhea     Hyperphosphatemia     Hypertension     Iron deficiency anemia     Obesity     Secondary hyperparathyroidism of renal origin     Vitamin D deficiency        Social History     Socioeconomic History    Marital status: Single    Number of children: 2   Occupational History    Occupation: Unemployed   Tobacco Use    Smoking status: Former Smoker     Types: Cigarettes    Smokeless tobacco: Never Used   Substance and Sexual Activity    Alcohol use: No    Drug use: Yes     Types: Marijuana     Comment: Occassional Recreational Use only    Sexual activity: Not Currently     Partners: Male   Social History Narrative    Currently unemployed has two young children ages 5 and 7 and she is the sole caretaker       Precautions:     Allergies as of 02/01/2022 - Reviewed 02/01/2022   Allergen Reaction Noted    Vicodin [hydrocodone-acetaminophen] Hives 05/04/2013    Codeine Hives 02/25/2015       WOC Assessment Details/Treatment   Consulted for wounds lower extremities and altered skin integrity upper thigh perineum calciphylaxis  A 41 year old female readmitted 2/1/22 from home with calciphylaxis; DKA; DM II with Stage 5 CKD; COVID 19 virus infection; acute blood loss anemia; ESRD on HD  Patient recently admitted at [facility] 12/30/21-1/28/22. Discharged home with  and referral to outpatient wound clinic. Treatment for calciphylaxis with sodium thiosulfate at HD MW  Biopsy at [facility] prior to admission 12/30/21 showed calciphylaxis.  1/3 WOC consult- Darkened  Areas left lateral thigh and right medial thigh- epidermis was intact and no drainage  1/12 WOC consult-  Moisture associated skin breakdown on  buttocks.   1/25 WOC consult- Progression of calciphylaxis to left medial thigh and evolution and extension of eschar on left lateral thigh and right medial thigh.   2/2 WBC 20.64 Hgb 7.9 Hct 24.6 Alb 0.9   12/30 A1C 5.2  On Isolibrium mattress  Nursing reports patient going to OR this pm for debridement of buttocks.   Plan: Assist with wound management post op as needed.           02/02/2022

## 2022-02-02 NOTE — ANESTHESIA PREPROCEDURE EVALUATION
02/02/2022  Xuan Cousin is a 41 y.o., female.  Pre-operative evaluation for Procedure(s) (LRB):  DEBRIDEMENT, BUTTOCK (N/A)  NPO >8  METS 1-3    Necrotizing fascitis. Septic with hypotension and tachycardia    Vitals:    02/02/22 1200 02/02/22 1300 02/02/22 1400 02/02/22 1500   BP: (!) 89/43 (!) 91/51 94/64 (!) 87/51   BP Location: Left arm      Patient Position: Lying      Pulse: 105 105 110 106   Resp: 14 15 17 16   Temp:       TempSrc:       SpO2: 99% 99% (!) 93% (!) 94%   Weight:       Height:             Patient Active Problem List   Diagnosis    Anemia of renal disease    Hypertensive CKD (chronic kidney disease)    Nephrotic range proteinuria    Metabolic acidosis    Increased prolactin level    Secondary hyperparathyroidism    Iron deficiency anemia    Vitamin D deficiency    Galactorrhea    Cataract    Class 1 obesity due to excess calories with serious comorbidity in adult    Chronic fatigue    Missed menses    Uterine leiomyoma    Type 2 diabetes mellitus with stage 5 chronic kidney disease not on chronic dialysis, with long-term current use of insulin    Hypertension    Hypertensive urgency    Symptomatic anemia    End stage renal disease    Uremia    CKD (chronic kidney disease) stage 5, GFR less than 15 ml/min    Nausea & vomiting    Thrombocytopenia    Hematemesis    Goals of care, counseling/discussion    Calciphylaxis    Type 2 diabetes mellitus    COVID-19 virus infection    Renovascular hypertension    Hyperphosphatemia    Candida esophagitis    Sepsis due to gram-negative UTI    Sinus tachycardia    Slow transit constipation    Debility    Esophagitis, Cades grade D    Hyponatremia    Leukocytosis    Coagulopathy       Review of patient's allergies indicates:   Allergen Reactions    Vicodin [hydrocodone-acetaminophen] Hives    Codeine Hives        No current facility-administered medications on file prior to encounter.     Current Outpatient Medications on File Prior to Encounter   Medication Sig Dispense Refill    calcium carbonate (TUMS) 200 mg calcium (500 mg) chewable tablet Take 2 tablets (1,000 mg total) by mouth 3 (three) times daily as needed. 150 tablet 0    cinacalcet (SENSIPAR) 30 MG Tab Take 1 tablet (30 mg total) by mouth daily with breakfast. 30 tablet 11    epoetin kelsey-epbx (RETACRIT) 2,000 unit/mL injection Inject 7 mLs (14,000 Units total) into the skin every Mon, Wed, Fri. With dialysis      ferrous gluconate (FERGON) 324 MG tablet Take 1 tablet (324 mg total) by mouth daily with breakfast. 30 tablet 2    gabapentin (NEURONTIN) 100 MG capsule Take 1 capsule (100 mg total) by mouth 3 (three) times daily. 90 capsule 0    metoprolol tartrate (LOPRESSOR) 25 MG tablet Take 1 tablet (25 mg total) by mouth 2 (two) times daily. 60 tablet 11    oxyCODONE (ROXICODONE) 10 mg Tab immediate release tablet Take 1 tablet (10 mg total) by mouth every 4 (four) hours as needed. 18 tablet 0    pantoprazole (PROTONIX) 40 MG tablet Take 1 tablet (40 mg total) by mouth 2 (two) times daily. 60 tablet 1    sevelamer carbonate (RENVELA) 800 mg Tab Take 3 tablets (2,400 mg total) by mouth 3 (three) times daily with meals. 270 tablet 11    sodium thiosulfate 12.5 gram/50 mL (250 mg/mL) injection Inject 100 mLs (25 g total) into the vein every Mon, Wed, Fri. With dialysis         Past Surgical History:   Procedure Laterality Date    AV FISTULA PLACEMENT Left 2020    Procedure: CREATION, AV FISTULA, LEFT RADIOCEPHALIC FISTULA, LEFT UPPER EXTREMITY , INTRAOP ULTRASOUND, vEIN MAPPING. ;  Surgeon: Rakesh Leon MD;  Location: [facility] OR;  Service: Vascular;  Laterality: Left;  RN PREOP 20, UPT done, COVID NEGATRIVE 20  NEED H/P     SECTION, CLASSIC      ESOPHAGOGASTRODUODENOSCOPY N/A 2022    Procedure: EGD  (ESOPHAGOGASTRODUODENOSCOPY);  Surgeon: Mario Alberto Irene MD;  Location: [facility] ENDO;  Service: Endoscopy;  Laterality: N/A;    INSERTION OF TUNNELED CENTRAL VENOUS CATHETER (CVC) WITH SUBCUTANEOUS PORT N/A 1/27/2020    Procedure: IR TUNNELED VATH INSERT WITHOUT PORT;  Surgeon: Prachi Diagnostic Provider;  Location: [facility] OR;  Service: Radiology;  Laterality: N/A;  1030AM START  RN PREOP 1/24/2020    INSERTION OF TUNNELED CENTRAL VENOUS CATHETER (CVC) WITH SUBCUTANEOUS PORT N/A 5/22/2020    Procedure: TUNNELED CATH PLACEMENT;  Surgeon: Prachi Diagnostic Provider;  Location: [facility] OR;  Service: Radiology;  Laterality: N/A;  930AM START    REVISION OF ARTERIOVENOUS FISTULA Left 5/8/2020    Procedure: REVISION, AV FISTULA, THROMBECTOMY, LEFT UPPER EXTREMITY;  Surgeon: Rakesh Leon MD;  Location: [facility] OR;  Service: Vascular;  Laterality: Left;    TUBAL LIGATION         Social History     Socioeconomic History    Marital status: Single    Number of children: 2   Occupational History    Occupation: Unemployed   Tobacco Use    Smoking status: Former Smoker     Types: Cigarettes    Smokeless tobacco: Never Used   Substance and Sexual Activity    Alcohol use: No    Drug use: Yes     Types: Marijuana     Comment: Occassional Recreational Use only    Sexual activity: Not Currently     Partners: Male   Social History Narrative    Currently unemployed has two young children ages 5 and 7 and she is the sole caretaker         CBC:   Recent Labs     02/01/22  1408 02/01/22  1408 02/02/22  0104 02/02/22  0355   WBC 19.09*  --   --  20.64*   RBC 1.46*  --   --  2.69*   HGB 4.3*   < > 7.9* 7.9*   HCT 14.7*   < > 25.3* 24.6*     --   --  249   *  --   --  91   MCH 29.5  --   --  29.4   MCHC 29.3*  --   --  32.1    < > = values in this interval not displayed.       CMP:   Recent Labs     02/01/22  1530 02/02/22  0104 02/02/22  0355 02/02/22  0835   *   < > 135* 132*   K 4.9   < > 5.4* 5.1   CL 82*   < > 98 96    CO2 17*   < > 19* 22*   BUN 62*   < > 70* 76*   CREATININE 7.9*   < > 8.3* 8.3*   *   < > 181* 277*   MG 1.8  --  2.0  --    PHOS  --   --  4.6*  --    CALCIUM 7.2*   < > 7.9* 8.2*   ALBUMIN 0.8*  --  0.9*  --    PROT 5.3*  --  4.9*  --    ALKPHOS 196*  --  147*  --    ALT 30  --  28  --    AST 81*  --  52*  --    BILITOT 3.6*  --  4.1*  --     < > = values in this interval not displayed.       INR  Recent Labs     02/02/22  0104   INR 2.2*   APTT 56.0*           Diagnostic Studies:      EKG:  Vent. Rate : 100 BPM     Atrial Rate : 100 BPM      P-R Int : 152 ms          QRS Dur : 084 ms       QT Int : 336 ms       P-R-T Axes : 077 075 068 degrees      QTc Int : 433 ms     Normal sinus rhythm   Early repolarization   ?pericarditis   Nonspecific T wave abnormality   Abnormal ECG   When compared with ECG of 01-FEB-2022 13:32,   No significant change was found   Confirmed by Jay Rothman MD ([id number]) on 2/2/2022 11:35:56 AM    2D Echo:  No results found for this or any previous visit.    1/13/22  · The estimated ejection fraction is 65%.  · The left ventricle is normal in size with concentric remodeling and normal systolic function.  · Grade II left ventricular diastolic dysfunction.  · Normal right ventricular size with normal right ventricular systolic function.  · Moderate left atrial enlargement.  · Mild right atrial enlargement.  · Normal central venous pressure (3 mmHg).  · The estimated PA systolic pressure is 17 mmHg.      Anesthesia Evaluation    I have reviewed the Patient Summary Reports.    I have reviewed the Nursing Notes. I have reviewed the NPO Status.   I have reviewed the Medications.     Review of Systems  Anesthesia Hx:  No problems with previous Anesthesia  History of prior surgery of interest to airway management or planning:  Denies Personal Hx of Anesthesia complications.   Social:  Former Smoker    Hematology/Oncology:         -- Anemia:   EENT/Dental:EENT/Dental Normal    Cardiovascular:   Hypertension hyperlipidemia ECG has been reviewed.    Pulmonary:   Recent URI COVID positive   Renal/:   Chronic Renal Disease    Musculoskeletal:   Necrotizing fascitis to buttocks   Endocrine:   Diabetes, type 2        Physical Exam  General:  Obesity    Airway/Jaw/Neck:  Airway Findings: Mouth Opening: Small, but > 3cm Tongue: Normal  Mallampati: III  TM Distance: 4 - 6 cm        Eyes/Ears/Nose:  EYES/EARS/NOSE FINDINGS: Normal   Dental:  Dental Findings: In tact         Mental Status:  Mental Status Findings:  Confusion, Lethargic         Anesthesia Plan  Type of Anesthesia, risks & benefits discussed:  Anesthesia Type:  general    Patient's Preference:   Plan Factors:          Intra-op Monitoring Plan: standard ASA monitors and arterial line  Intra-op Monitoring Plan Comments:   Post Op Pain Control Plan: multimodal analgesia  Post Op Pain Control Plan Comments:     Induction:   rapid sequence and IV  Beta Blocker:  Patient is on a Beta-Blocker and has received one dose within the past 24 hours (No further documentation required).       Informed Consent: Patient understands risks and agrees with Anesthesia plan.  Questions answered. Anesthesia consent signed with patient.  ASA Score: 4  emergent   Day of Surgery Review of History & Physical:  There are no significant changes.      Anesthesia Plan Notes: S/p 5 U pRBCs aiwth FFP and PRBCs available. Sister signed paper consent        Ready For Surgery From Anesthesia Perspective.

## 2022-02-02 NOTE — H&P
[facility]  [facility] Medicine  History & Physical    Patient Name: Xuan Ricardo  MRN: [id number]  Patient Class: IP- Inpatient  Admission Date: 2/1/2022  Attending Physician: Mehdi Armenta MD   Primary Care Provider: Katharine Franks MD         Patient information was obtained from patient, past medical records and ER records.     Subjective:     Principal Problem:Calciphylaxis    Chief Complaint:   Chief Complaint   Patient presents with    Hypotension     EMS called to 42yo female that was at her dr's office and was found to be hypotensive and would get lightheaded sitting up. Dialysis patient that has not had dialysis in about a week according to family. EMS stated it was 70/50       HPI: 41 y.o. female PMHx IDDM, CKD, HTN presents with lightheadedness and fatigue x 1 day. Symptoms progressively getting worse.  She states she has noted anything bring it own initially but symptoms worse when she sits up.  Patient states that she skin wounds based on calciphylaxis.  Patient reports pain to a rash to the bilateral thighs for which her sister has been applying ointment. She went to her PCP office who noted her to have low BP.   Per EMS, patient had a blood pressure of 70/50 en route. She denies fever, hematuria, or bleeding wounds. Patient states she has not been to dialysis in a week with her last dialysis occurring in the hospital on 1/26.     Echo Summary Jan 2022    · The estimated ejection fraction is 65%.  · The left ventricle is normal in size with concentric remodeling and normal systolic function.  · Grade II left ventricular diastolic dysfunction.  · Normal right ventricular size with normal right ventricular systolic function.  · Moderate left atrial enlargement.  · Mild right atrial enlargement.  · Normal central venous pressure (3 mmHg).  · The estimated PA systolic pressure is 17 mmHg.      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 ;  Surgeon: Rakesh Leon MD;  Location: [facility] OR;  Service: Vascular;  Laterality: Left;  RN PREOP 20, UPT done, COVID NEGATRIVE 20  NEED H/P     SECTION, CLASSIC      ESOPHAGOGASTRODUODENOSCOPY N/A 2022    Procedure: EGD (ESOPHAGOGASTRODUODENOSCOPY);  Surgeon: Mario Alberto Irene MD;  Location: [facility];  Service: Endoscopy;  Laterality: N/A;    INSERTION OF TUNNELED CENTRAL VENOUS CATHETER (CVC) WITH SUBCUTANEOUS PORT N/A 2020    Procedure: IR TUNNELED VATH INSERT WITHOUT PORT;  Surgeon: [facility] Diagnostic Provider;  Location: [facility] OR;  Service: Radiology;  Laterality: N/A;  1030AM START  RN PREOP 2020    INSERTION OF TUNNELED CENTRAL VENOUS CATHETER (CVC) WITH SUBCUTANEOUS PORT N/A 2020    Procedure: TUNNELED CATH PLACEMENT;  Surgeon: Dos Diagnostic Provider;  Location: [facility] OR;  Service: Radiology;  Laterality: N/A;  930AM START    REVISION OF ARTERIOVENOUS FISTULA Left 2020    Procedure: REVISION, AV FISTULA, THROMBECTOMY, LEFT UPPER EXTREMITY;  Surgeon: Rakesh Leon MD;  Location: [facility] OR;  Service: Vascular;  Laterality: Left;    TUBAL LIGATION       Social History     Tobacco Use    Smoking status: Former Smoker     Types: Cigarettes    Smokeless tobacco: Never Used   Substance Use Topics    Alcohol use: No    Drug use: Yes     Types: Marijuana     Comment: Occassional Recreational Use only     Family History   Problem Relation Age of Onset    Seizures Mother 64    Heart failure Father 58    Asthma Sister      Breast cancer Neg Hx     Colon cancer Neg Hx     Ovarian cancer Neg Hx      Review of patient's allergies indicates:   Allergen Reactions    Vicodin [hydrocodone-acetaminophen] Hives    Codeine Hives         Current Outpatient Medications:     calcium carbonate (TUMS) 200 mg calcium (500 mg) chewable tablet, Take 2 tablets (1,000 mg total) by mouth 3 (three) times daily as needed., Disp: 150 tablet, Rfl: 0    cinacalcet (SENSIPAR) 30 MG Tab, Take 1 tablet (30 mg total) by mouth daily with breakfast., Disp: 30 tablet, Rfl: 11    epoetin kelsey-epbx (RETACRIT) 2,000 unit/mL injection, Inject 7 mLs (14,000 Units total) into the skin every Mon, Wed, Fri. With dialysis, Disp: , Rfl:     ferrous gluconate (FERGON) 324 MG tablet, Take 1 tablet (324 mg total) by mouth daily with breakfast., Disp: 30 tablet, Rfl: 2    gabapentin (NEURONTIN) 100 MG capsule, Take 1 capsule (100 mg total) by mouth 3 (three) times daily., Disp: 90 capsule, Rfl: 0    metoprolol tartrate (LOPRESSOR) 25 MG tablet, Take 1 tablet (25 mg total) by mouth 2 (two) times daily., Disp: 60 tablet, Rfl: 11    oxyCODONE (ROXICODONE) 10 mg Tab immediate release tablet, Take 1 tablet (10 mg total) by mouth every 4 (four) hours as needed., Disp: 18 tablet, Rfl: 0    pantoprazole (PROTONIX) 40 MG tablet, Take 1 tablet (40 mg total) by mouth 2 (two) times daily., Disp: 60 tablet, Rfl: 1    sevelamer carbonate (RENVELA) 800 mg Tab, Take 3 tablets (2,400 mg total) by mouth 3 (three) times daily with meals., Disp: 270 tablet, Rfl: 11    sodium thiosulfate 12.5 gram/50 mL (250 mg/mL) injection, Inject 100 mLs (25 g total) into the vein every Mon, Wed, Fri. With dialysis, Disp: , Rfl:     Review of Systems as per HPI  Review of Systems   Unable to perform ROS: Other (patient declined interview)   Constitutional: Positive for malaise/fatigue.   Skin: Positive for rash.        wound       Physical Exam     ED Triage Vitals   Enc Vitals Group      BP  "02/01/22 1324 (!) 107/51      Pulse 02/01/22 1324 (!) 114      Resp 02/01/22 1324 16      Temp 02/01/22 1324 98.3 °F (36.8 °C)      Temp src 02/01/22 1324 Oral      SpO2 02/01/22 1435 98 %      Weight 02/01/22 1324 208 lb      Height 02/01/22 1324 5' 4"      Head Circumference --       Peak Flow --       Pain Score --       Pain Loc --       Pain Edu? --       Excl. in GC? --      Vitals:    02/01/22 1900 02/01/22 1902 02/01/22 1922 02/01/22 1932   BP: (!) 99/50 (!) 99/50 (!) 95/50 (!) 91/52   BP Location:       Patient Position:       Pulse: 102 102 100 100   Resp: 15 14 15 17   Temp: 98.6 °F (37 °C)      TempSrc: Oral      SpO2: 100% 99% 95% (!) 94%   Weight:       Height:           Vital signs and nursing assessment noted: tachycardia    GEN:   Mild distress, sleepy but easily aroused, well appearing, nontoxic appearing  HEENT:  + scleral icterus, PERRLA, EOMI, moist membranes, nl conjunctiva, no scleral icterus, no nystagmus, no nodes/nodules, soft, supple, FROM, no trachial deviation  CV:   Tachycardia, regular rhythm, no m/r/g, 2+ radial pulses, <2sec cap refill, no obvious JVD  RESP:  CTA B, no w/r/r, equal and bilateral chest rise, no respiratory distress  ABD:   soft, Nontender, Nondistended, +BS, no guarding/rebound  :   Deferred  EXT:   FROM, MARTINEZ x 4, no edema, no swelling, no calf tenderness, no bony tenderness, no warmth or redness, no crepitus, no obvious deformity  LYMPH:  no gross adenopathy  NEURO:  GCS 15, CN II-XII grossly intact, no obvious motor/sensory deficit, no tremor  PSYCH:   no SI/HI, no anxiety, nl mood/affect, nl judgement/thought process  Physical Exam  Skin:     General: Skin is warm.      Capillary Refill: Capillary refill takes less than 2 seconds.      Coloration: Skin is not ashen, cyanotic, jaundiced, mottled, pale or sallow.      Findings: Abrasion, burn, rash (hyperpigmented) and wound (tender bilateral upper thighs, perineum, left calf) present. No acne, ecchymosis or " lesion.            Tests   Significant Labs and/or Imaging: I have reviewed all pertinent results/findings within the past 24 hours.  Labs Reviewed   CBC W/ AUTO DIFFERENTIAL - Abnormal; Notable for the following components:       Result Value    WBC 19.09 (*)     RBC 1.46 (*)     Hemoglobin 4.3 (*)     Hematocrit 14.7 (*)      (*)     MCHC 29.3 (*)     RDW 19.3 (*)     Gran % 85.0 (*)     Lymph % 4.0 (*)     Mono % 1.0 (*)     All other components within normal limits    Narrative:        Hemoglobin/Hematocrit critical result(s) called and verbal   readback obtained from Karen Caldwell. by [facility] 02/01/2022 14:31   LACTIC ACID, PLASMA - Abnormal; Notable for the following components:    Lactate (Lactic Acid) 3.0 (*)     All other components within normal limits   TROPONIN I - Abnormal; Notable for the following components:    Troponin I 0.059 (*)     All other components within normal limits   B-TYPE NATRIURETIC PEPTIDE - Abnormal; Notable for the following components:     (*)     All other components within normal limits   RETICULOCYTES - Abnormal; Notable for the following components:    Retic 3.2 (*)     All other components within normal limits   IRON AND TIBC - Abnormal; Notable for the following components:    Iron 22 (*)     Transferrin 26 (*)     TIBC 38 (*)     Saturated Iron 58 (*)     All other components within normal limits   LACTATE DEHYDROGENASE - Abnormal; Notable for the following components:     (*)     All other components within normal limits   CK - Abnormal; Notable for the following components:     (*)     All other components within normal limits   T4, FREE - Abnormal; Notable for the following components:    Free T4 0.51 (*)     All other components within normal limits   COMPREHENSIVE METABOLIC PANEL - Abnormal; Notable for the following components:    Sodium 128 (*)     Chloride 82 (*)     CO2 17 (*)     Glucose 753 (*)     BUN 62 (*)     Creatinine 7.9 (*)     Calcium  7.2 (*)     Total Protein 5.3 (*)     Albumin 0.8 (*)     Total Bilirubin 3.6 (*)     Alkaline Phosphatase 196 (*)     AST 81 (*)     Anion Gap 29 (*)     eGFR if  7 (*)     eGFR if non  6 (*)     All other components within normal limits    Narrative:        GLUCOSE critical result(s) called and verbal readback obtained   from ENID URBINA by RM6 02/01/2022 17:08   TROPONIN I - Abnormal; Notable for the following components:    Troponin I 0.083 (*)     All other components within normal limits   TSH - Abnormal; Notable for the following components:    TSH 5.881 (*)     All other components within normal limits   BETA - HYDROXYBUTYRATE, SERUM - Abnormal; Notable for the following components:    Beta-Hydroxybutyrate 1.5 (*)     All other components within normal limits   T4, FREE - Abnormal; Notable for the following components:    Free T4 0.67 (*)     All other components within normal limits   SARS-COV-2 RDRP GENE - Abnormal; Notable for the following components:    POC Rapid COVID Positive (*)     All other components within normal limits    Narrative:     This test utilizes isothermal nucleic acid amplification   technology to detect the SARS-CoV-2 RdRp nucleic acid segment.   The analytical sensitivity (limit of detection) is 125 genome   equivalents/mL.   A POSITIVE result implies infection with the SARS-CoV-2 virus;   the patient is presumed to be contagious.     A NEGATIVE result means that SARS-CoV-2 nucleic acids are not   present above the limit of detection. A NEGATIVE result should be   treated as presumptive. It does not rule out the possibility of   COVID-19 and should not be the sole basis for treatment decisions.   If COVID-19 is strongly suspected based on clinical and exposure   history, re-testing using an alternate molecular assay should be   considered.   This test is only for use under the Food and Drug   Administration s Emergency Use Authorization (EUA).    Commercial kits are provided by [employer].   Performance characteristics of the EUA have been independently   verified by [facility] Department of   Pathology and Laboratory Medicine.   _________________________________________________________________   The authorized Fact Sheet for Healthcare Providers and the authorized Fact   Sheet for Patients of the ID NOW COVID-19 are available on the FDA   website:     https://www.fda.gov/media/710992/download  https://www.fda.gov/media/745685/download       ISTAT PROCEDURE - Abnormal; Notable for the following components:    POC PH 7.282 (*)     POC PO2 35 (*)     POC HCO3 20.8 (*)     POC SATURATED O2 60 (*)     POC TCO2 22 (*)     All other components within normal limits   POCT GLUCOSE - Abnormal; Notable for the following components:    POCT Glucose 220 (*)     All other components within normal limits   POCT GLUCOSE - Abnormal; Notable for the following components:    POCT Glucose 171 (*)     All other components within normal limits   CULTURE, BLOOD   CULTURE, BLOOD   MAGNESIUM   FOLATE   VITAMIN B12   TYPE & SCREEN   POCT GLUCOSE   POCT GLUCOSE MONITORING CONTINUOUS   POCT GLUCOSE MONITORING CONTINUOUS   POCT GLUCOSE MONITORING CONTINUOUS   PREPARE RBC SOFT     X-Ray Chest AP Portable   Final Result      1. No acute cardiopulmonary process appreciated.         Electronically signed by: Murphy Foy   Date:    02/01/2022   Time:    15:38        Results for orders placed or performed during the hospital encounter of 12/29/21   EKG 12-lead    Collection Time: 01/14/22  1:35 PM    Narrative    Test Reason : R00.0,    Vent. Rate : 123 BPM     Atrial Rate : 123 BPM     P-R Int : 154 ms          QRS Dur : 082 ms      QT Int : 294 ms       P-R-T Axes : 044 029 -63 degrees     QTc Int : 420 ms    Sinus tachycardia  LVH with repolarization abnormality  Abnormal ECG  When compared with ECG of 12-JAN-2022 06:05,  Significant changes have  occurred  Confirmed by Jay Rothman MD ([id number]) on 1/14/2022 4:54:01 PM    Referred By: [email or web address]   SELF           Confirmed By:Jay Rothman MD     EKG    Sinus tachycardia with   Nonspecific ST and T wavel abnormality  Abnormal ECG  When compared with ECG of 14-JAN-2022  Assessment/Plan:     * Calciphylaxis   Wound care and pain control are critical aspects of the management of calciphylaxis. A wound care team should be involved in the selection of dressings and chemical debridement agents and in the administration of negative pressure wound therapy. In all patients, repetitive local trauma to skin (eg, subcutaneous injections) should be avoided altogether or at least minimized (eg, by rotating injection sites for therapies such as insulin).       DKA (diabetic ketoacidosis)    Severe DKA as evidenced by pH, elevated beta hydroxybutyrate and blood glucose of 787  Lab Results   Component Value Date     HGBA1C 5.2 Jan 2022   Correction of the fluid and electrolyte abnormalities and frequent monitoring.  Isotonic saline should be infused as quickly as possible in patients with hypovolemic shock. In hypovolemic patients without shock (and without heart failure), we begin with isotonic (0.9 percent) saline infused at a rate of 15 to 20 mL/kg per hour (approximately 1000 mL/hour in an average-sized person) for the first couple of hours. This is followed by one-half isotonic (0.45 percent) saline at a rate of approximately 250 to 500 mL/hour if the serum sodium is normal or elevated; isotonic saline is continued at a rate of approximately 250 to 500 mL/hour if hyponatremia is present. We add dextrose to the saline solution when the serum glucose reaches 300 mg/dL .  Initiate a multiple-dose (basal-bolus), subcutaneous insulin schedule when the ketoacidosis has resolved and the patient is able to eat.         Type 2 diabetes mellitus with stage 5 chronic kidney disease not on chronic dialysis, with long-term  current use of insulin  Check HgA1C. Serial accucheck. Diabetic diet. IRSS on resolution of dka        COVID-19 virus infection  O2 saturations >94% on room air or on ambulation   Initiate standard COVID protocols; COVID-19 testing ,Infection Control notification  and isolation- respiratory, contact and droplet per protocol        Management: Hold Remdesivir as not hypoxia. Hold decadron secondary to DKA. Hold anticoagulation secondary to acute blood loss anemia. Maintain oxygen saturations 92-96% via Nasal Cannula 2 LPM and monitor with continuous/intermittent pulse oximetry. , Inhaled bronchodilators as needed for shortness of breath. and Continuous cardiac monitoring.     Advance Care Planning  Current advance care plan has been discussed with patient.  Code Status: Full     Admit inpatient.             Acute blood loss anemia  No obvious anatomic site of bleeding cannot be identified, further evaluations for occult bleeding are appropriate. Consider checking occult stool. Transfuse PRBC. Supplement with iron      End stage renal disease    Nephrology consulted in anticipation of HD    VTE Risk Mitigation (From admission, onward)         Ordered     IP VTE HIGH RISK PATIENT  Once         02/01/22 1816     Place sequential compression device  Until discontinued         02/01/22 1816     Place sequential compression device  Until discontinued         02/01/22 1810               Patient placed on continuous cardiac monitor, automatic blood pressure cuff and continuous pulse oximeter.  Critical care was necessary to treat or prevent imminent or life-threatening deterioration.   Critical care time: 31 min  Time spent at the bedside, reviewing test results, discussing the case with staff and/or consult(s), documenting the medical record and time spent with family members discussing treatment and manage.    The time involved in the performance of separately reportable procedures was not counted toward critical care  time.          Sg Sharp MD  Department of Hospital Medicine   [facility] - Emergency Dept

## 2022-02-02 NOTE — PLAN OF CARE
Patient in the ICU, alert and oriented. Patient has wounds to her bilateral lower extremities. General sx was contacted and seen patient. Ordered a CT abd and pelvis to rule out necrotizing fascitis. Patient has been in pain and stated wounds started a weeks ago. Patient was to be started on an insulin drip but bgc was 68 prior to the start of infusion.  D10 bolus given, D5 1/2 normal was infusing. Repeat was 198. Orders to recheck q1hr. 250 bolus given for low bp. VS stable at this time, free from fall and injury.

## 2022-02-02 NOTE — ASSESSMENT & PLAN NOTE
O2 saturations >94% on room air or on ambulation   Initiate standard COVID protocols; COVID-19 testing ,Infection Control notification  and isolation- respiratory, contact and droplet per protocol        Management: Hold Remdesivir as not hypoxia. Hold decadron secondary to DKA. Hold anticoagulation secondary to acute blood loss anemia. Maintain oxygen saturations 92-96% via Nasal Cannula 2 LPM and monitor with continuous/intermittent pulse oximetry. , Inhaled bronchodilators as needed for shortness of breath. and Continuous cardiac monitoring.     Advance Care Planning  Current advance care plan has been discussed with patient.  Code Status: Full     Admit inpatient.

## 2022-02-02 NOTE — PLAN OF CARE
Recommendations  1) Add Novasource Renal TID to promote wound healing   2) Continue Diabetic diet - monitor renal indices    Goals: Pt to consume > 50% EEN by RD follow up  Nutrition Goal Status: new  Communication of RD Recs: reviewed with physician    Assessment and Plan  Nutrition Problem  Increased nutrient needs    Related to (etiology):   Wound healing    Signs and Symptoms (as evidenced by):   Patient with erythema and blistering of bilateral inner thigh and buttocks.  Concerning for calciphylaxis with superimposed infection per MD    Interventions(treatment strategy):  Collaboration with other providers  Carbohydrate modified diet    Nutrition Diagnosis Status:   New

## 2022-02-02 NOTE — ASSESSMENT & PLAN NOTE
Patient initially tested positive for Covid more than one month ago.  Asymptomatic from Covid with no evidence of pneumonia on CXR.  Will stop airborne precautions.

## 2022-02-02 NOTE — ASSESSMENT & PLAN NOTE
No obvious anatomic site of bleeding cannot be identified, further evaluations for occult bleeding are appropriate. Consider checking occult stool. Transfuse PRBC. Supplement with iron

## 2022-02-02 NOTE — ANESTHESIA PROCEDURE NOTES
Intubation    Date/Time: 2/2/2022 5:45 PM  Performed by: Carrol Braga CRNA  Authorized by: Carrol Braga CRNA     Intubation:     Induction:  Intravenous    Intubated:  Postinduction    Attempts:  1    Attempted By:  KIRSTEN    Blade:  Campos 3    Laryngeal View Grade: Grade I - full view of cords      Difficult Airway Encountered?: No      Complications:  None    Airway Device:  Oral endotracheal tube    Airway Device Size:  7.0    Style/Cuff Inflation:  Cuffed    Inflation Amount (mL):  5    Tube secured:  20    Secured at:  The lips    Placement Verified By:  Capnometry    Complicating Factors:  None    Findings Post-Intubation:  BS equal bilateral and atraumatic/condition of teeth unchanged

## 2022-02-02 NOTE — PLAN OF CARE
Case Management Chart Review:    Pt with diagnosis of Calciphylaxis and ESRD. Chronic conditions that require outpatient dialysis and wound care with pain management. Virtual medicine team suggested hospice/palliative medicine to be on pt case for these reasons at discharge, ambulatory referral added in [facility] for [facility] at [facility].    Prior to hospital DC pt noted to have bilateral thigh wounds, increased pain.  Pt refused MD to check wounds stating removing bandages was too painful.    Pt tested COVID negative in hospital on 1/26/2022.    Discharged from [facility] on 1/28/2020 to home with pt sister, during that long stay [facility] denied IRF and pt had over 20 SNF denials - no options for placement for this pt.      Outpatient Dialysis confirmed with [facility] and [facility] clinic to begin on 1/31/2022 for MWF schedule at [facility] at 2 pm. All information provided to pt and family, all questions answered, and they verbalized understanding.    [facility] assigned home [employer] through [employer] [phone] with a start of care date 2/1/22.      Family agreeable to taking pt home and providing care with  and [facility].  PAYAL gong arranged for pt to go to sisters' home.    Pt called [facility] outpatient HD center on scheduled start date 1/31/22, was told that MD did not accept her. CLAIRE called [facility] [phone] - informed that Dr. Isaura Meyers runs most [facility] clinics on the [facility] and he refused to accept pt, stating he had a history with the patient and was never informed of her acceptance into his unit.   Admit nurse suggested CM refer patient to [facility] (a separate outpatient HD company).  CLAIRE called [facility] [phone], spoke with David - he states pt is accepted to [facility] and Dr. Meyers's refusal was unprofessional and not in line with [facility] policy.  [facility] is researching the case and will call CM back.    When [facility] assigned [facility], [facility],  tried to admit  pt on 2/1/22, pt stated she was on her way to the hospital. Pt was never admitted to home health.    Pt readmitted to [facility] on 2/1/2022 after EMS noted hypotension of 70/50.      Tested COVID positive on 2/1/2022.    Surgical consult in place to evaluate bilateral thigh wounds, CT ordered to rule out necrotizing fasciitis.

## 2022-02-02 NOTE — ASSESSMENT & PLAN NOTE
Severe DKA as evidenced by pH, elevated beta hydroxybutyrate and blood glucose of 787  Lab Results   Component Value Date     HGBA1C 5.2 Jan 2022   Correction of the fluid and electrolyte abnormalities and frequent monitoring.  Isotonic saline should be infused as quickly as possible in patients with hypovolemic shock. In hypovolemic patients without shock (and without heart failure), we begin with isotonic (0.9 percent) saline infused at a rate of 15 to 20 mL/kg per hour (approximately 1000 mL/hour in an average-sized person) for the first couple of hours. This is followed by one-half isotonic (0.45 percent) saline at a rate of approximately 250 to 500 mL/hour if the serum sodium is normal or elevated; isotonic saline is continued at a rate of approximately 250 to 500 mL/hour if hyponatremia is present. We add dextrose to the saline solution when the serum glucose reaches 300 mg/dL .  Initiate a multiple-dose (basal-bolus), subcutaneous insulin schedule when the ketoacidosis has resolved and the patient is able to eat.

## 2022-02-02 NOTE — ASSESSMENT & PLAN NOTE
Initially presented in DKA.  Started on insulin drip, but then patient became hypoglycemia.  DKA has now resolved with closure of AG.  Will start nightly Levemir.  Adjust as necessary.  Continue with sliding scale.

## 2022-02-02 NOTE — PROGRESS NOTES
[facility] Medicine  Progress Note    Patient Name: Xuan Ricarod  MRN: [id number]  Patient Class: IP- Inpatient   Admission Date: 2/1/2022  Length of Stay: 1 days  Attending Physician: Dario Salvador MD  Primary Care Provider: Katharine Franks MD        Subjective:     Principal Problem:Calciphylaxis        HPI:  No notes on file    Overview/Hospital Course:  40 y/o female presented to ER with hypotension.  Patient was recently started on dialysis and had not had dialysis for one week since last hospital discharge.  She was noted to have multiple abnormalities on presentation.  She was severely anemic with Hgb of 4.3.  transfused 2 units of blood with adequate correction of H/H.  Patient was also noted to have AG metabolic acidosis with hyperglycemia.  Stated on insulin drip for DKA.  Leukocytosis on CBC.  Patient with erythema and blistering of bilateral inner thing and buttocks.  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  Started on ABX's and Surgery consulted.        Interval History: No new complaints.    Review of Systems   HENT: Negative for ear discharge and ear pain.    Eyes: Negative for discharge and itching.   Endocrine: Negative for cold intolerance and heat intolerance.   Neurological: Negative for seizures and syncope.     Objective:     Vital Signs (Most Recent):  Temp: 97.7 °F (36.5 °C) (02/02/22 1135)  Pulse: 110 (02/02/22 1400)  Resp: 17 (02/02/22 1400)  BP: 94/64 (02/02/22 1400)  SpO2: (!) 93 % (02/02/22 1400) Vital Signs (24h Range):  Temp:  [97.6 °F (36.4 °C)-98.8 °F (37.1 °C)] 97.7 °F (36.5 °C)  Pulse:  [] 110  Resp:  [9-22] 17  SpO2:  [88 %-100 %] 93 %  BP: ()/(36-67) 94/64     Weight: 94.3 kg (207 lb 14.3 oz)  Body mass index is 35.68 kg/m².    Intake/Output Summary (Last 24 hours) at 2/2/2022 1455  Last data filed at 2/2/2022 0913  Gross per 24 hour   Intake 2125.06 ml   Output --   Net 2125.06 ml      Physical Exam  Constitutional:        Appearance: She is ill-appearing. She is not diaphoretic.   HENT:      Head: Normocephalic and atraumatic.      Mouth/Throat:      Mouth: Mucous membranes are dry.      Pharynx: No oropharyngeal exudate or posterior oropharyngeal erythema.   Cardiovascular:      Rate and Rhythm: Regular rhythm. Tachycardia present.   Pulmonary:      Effort: No respiratory distress.      Breath sounds: Normal breath sounds.   Abdominal:      General: Bowel sounds are normal.      Palpations: Abdomen is soft.   Musculoskeletal:         General: No deformity or signs of injury.      Cervical back: Neck supple. No rigidity.   Skin:     General: Skin is warm.      Comments: Erythema and blistering of bilateral inner thighs and buttocks.   Neurological:      Mental Status: Mental status is at baseline.      Cranial Nerves: No cranial nerve deficit.         Significant Labs:   All pertinent labs within the past 24 hours have been reviewed.  BMP:   Recent Labs   Lab 02/02/22  0355 02/02/22  0355 02/02/22  0835   *   < > 277*   *   < > 132*   K 5.4*   < > 5.1   CL 98   < > 96   CO2 19*   < > 22*   BUN 70*   < > 76*   CREATININE 8.3*   < > 8.3*   CALCIUM 7.9*   < > 8.2*   MG 2.0  --   --     < > = values in this interval not displayed.     CBC:   Recent Labs   Lab 02/01/22  1408 02/02/22  0104 02/02/22  0355   WBC 19.09*  --  20.64*   HGB 4.3* 7.9* 7.9*   HCT 14.7* 25.3* 24.6*     --  249       Significant Imaging: I have reviewed all pertinent imaging results/findings within the past 24 hours.      Assessment/Plan:      * Calciphylaxis  With concern of superimposed infection.  This patient does have evidence of infective focus  My overall impression is sepsis. Vital signs were reviewed and noted in progress note.  Antibiotics given-   Antibiotics (From admission, onward)            Start     Stop Route Frequency Ordered    02/02/22 1800  piperacillin-tazobactam 4.5 g in dextrose 5 % 100 mL IVPB (ready to mix system)   "       -- IV Every 12 hours (non-standard times) 02/02/22 0716    02/02/22 1215  mupirocin 2 % ointment         02/07 0859 Nasl 2 times daily 02/02/22 1106    02/01/22 1539  vancomycin - pharmacy to dose  (vancomycin IVPB)        "And" Linked Group Details    -- IV pharmacy to manage frequency 02/01/22 1440        Cultures were taken-   Microbiology Results (last 7 days)     Procedure Component Value Units Date/Time    Blood culture #1 [[id number]] Collected: 02/01/22 1429    Order Status: Completed Specimen: Blood from Peripheral, Forearm, Left Updated: 02/01/22 2112     Blood Culture, Routine No Growth to date    Narrative:      Blood Culture #1    Blood culture #2 [[id number]] Collected: 02/01/22 1406    Order Status: Completed Specimen: Blood from Peripheral, Upper Arm, Left Updated: 02/01/22 2112     Blood Culture, Routine No Growth to date    Narrative:      Blood Culture #2        Latest lactate reviewed, they are-  Recent Labs   Lab 02/01/22  1408 02/01/22  2307   LACTATE 3.0* 1.7       Organ dysfunction indicated by Hypotension, DKA  Source- Skin  Source control Achieved by- ABx's  Appreciate Surgery input.  Concern about possible necrotizing fascitis.  Going to OR today.        Coagulopathy  Patient with INR of 2.2  Will recheck INR in Am.      Leukocytosis  As above.      Hyponatremia        Debility        COVID-19 virus infection  Patient initially tested positive for Covid more than one month ago.  Asymptomatic from Covid with no evidence of pneumonia on CXR.  Will stop airborne precautions.      End stage renal disease  Has not had HD since last discharge one week ago.  Nephrology consulted for HD.    Type 2 diabetes mellitus with stage 5 chronic kidney disease not on chronic dialysis, with long-term current use of insulin  Initially presented in DKA.  Started on insulin drip, but then patient became hypoglycemia.  DKA has now resolved with closure of AG.  Will start nightly Levemir.  Adjust as " necessary.  Continue with sliding scale.        Hypertensive CKD (chronic kidney disease)  Patient is currently hypotensive.  Not requiring any pressors at this time.      Anemia of renal disease  Baseline Hgb around 7.5  Presents with Hgb of 4.3  No obvious bleeding.  Transfused 2 units of blood with adequate correction of H/H  Continue to monitor.        VTE Risk Mitigation (From admission, onward)         Ordered     IP VTE HIGH RISK PATIENT  Once         02/01/22 1816     Place sequential compression device  Until discontinued         02/01/22 1816     Place sequential compression device  Until discontinued         02/01/22 1810                Discharge Planning   PAMELA:      Code Status: Full Code   Is the patient medically ready for discharge?:     Reason for patient still in hospital (select all that apply): Patient unstable               Critical care time spent on the evaluation and treatment of severe organ dysfunction, review of pertinent labs and imaging studies, discussions with consulting providers and discussions with patient/family: 50 minutes.      Dario Salvador MD  Department of Hospital Medicine   [facility] - [place] - Intensive Care

## 2022-02-02 NOTE — PROCEDURES
"Xuan Ricardo is a 41 y.o. female patient.    Temp: 98.8 °F (37.1 °C) (02/01/22 2212)  Pulse: 101 (02/01/22 2312)  Resp: (!) 22 (02/01/22 2355)  BP: (!) 114/58 (02/01/22 2312)  SpO2: 98 % (02/01/22 2312)  Weight: 94.3 kg (208 lb) (02/01/22 1324)  Height: 5' 4" (162.6 cm) (02/01/22 1324)    PICC  Date/Time: 2/2/2022 2:20 AM  Performed by: Alec Rosado RN  Consent Done: Yes  Time out: Immediately prior to procedure a time out was called to verify the correct patient, procedure, equipment, support staff and site/side marked as required  Indications: med administration  Anesthesia: local infiltration  Local anesthetic: lidocaine 1% without epinephrine  Anesthetic Total (mL): 1  Preparation: skin prepped with ChloraPrep  Skin prep agent dried: skin prep agent completely dried prior to procedure  Sterile barriers: all five maximum sterile barriers used - cap, mask, sterile gown, sterile gloves, and large sterile sheet  Hand hygiene: hand hygiene performed prior to central venous catheter insertion  Location details: right basilic  Catheter type: triple lumen  Catheter size: 5 Fr  Catheter Length: 36 (1cm external )cm    Ultrasound guidance: yes  Vessel Caliber: large and patent, compressibility normal  Needle advanced into vessel with real time Ultrasound guidance.  Guidewire confirmed in vessel.  Sterile sheath used.  Number of attempts: 1  Post-procedure: blood return through all ports, chlorhexidine patch and sterile dressing applied  Estimated blood loss (mL): 0            Name Alec Rosado   2/2/2022  "

## 2022-02-03 ENCOUNTER — ANESTHESIA EVENT (OUTPATIENT)
Dept: SURGERY | Facility: HOSPITAL | Age: 42
DRG: 853 | End: 2022-02-03
Payer: MEDICARE

## 2022-02-03 PROBLEM — A41.9 SEPTIC SHOCK: Status: ACTIVE | Noted: 2022-02-03

## 2022-02-03 PROBLEM — R65.21 SEPTIC SHOCK: Status: ACTIVE | Noted: 2022-02-03

## 2022-02-03 PROBLEM — M72.6 NECROTIZING FASCIITIS: Status: ACTIVE | Noted: 2022-02-03

## 2022-02-03 LAB
ALBUMIN SERPL BCP-MCNC: 0.9 G/DL (ref 3.5–5.2)
ALP SERPL-CCNC: 171 U/L (ref 55–135)
ALT SERPL W/O P-5'-P-CCNC: 27 U/L (ref 10–44)
ANION GAP SERPL CALC-SCNC: 22 MMOL/L (ref 8–16)
ANION GAP SERPL CALC-SCNC: 24 MMOL/L (ref 8–16)
ANISOCYTOSIS BLD QL SMEAR: SLIGHT
AST SERPL-CCNC: 29 U/L (ref 10–40)
BASOPHILS # BLD AUTO: ABNORMAL K/UL (ref 0–0.2)
BASOPHILS # BLD AUTO: ABNORMAL K/UL (ref 0–0.2)
BASOPHILS NFR BLD: 0 % (ref 0–1.9)
BASOPHILS NFR BLD: 0 % (ref 0–1.9)
BILIRUB SERPL-MCNC: 3.5 MG/DL (ref 0.1–1)
BUN SERPL-MCNC: 28 MG/DL (ref 6–20)
BUN SERPL-MCNC: 33 MG/DL (ref 6–20)
CALCIUM SERPL-MCNC: 8.3 MG/DL (ref 8.7–10.5)
CALCIUM SERPL-MCNC: 8.5 MG/DL (ref 8.7–10.5)
CHLORIDE SERPL-SCNC: 98 MMOL/L (ref 95–110)
CHLORIDE SERPL-SCNC: 99 MMOL/L (ref 95–110)
CO2 SERPL-SCNC: 14 MMOL/L (ref 23–29)
CO2 SERPL-SCNC: 15 MMOL/L (ref 23–29)
CREAT SERPL-MCNC: 3.9 MG/DL (ref 0.5–1.4)
CREAT SERPL-MCNC: 4.4 MG/DL (ref 0.5–1.4)
D DIMER PPP IA.FEU-MCNC: 3.05 MG/L FEU
DIFFERENTIAL METHOD: ABNORMAL
DIFFERENTIAL METHOD: ABNORMAL
EOSINOPHIL # BLD AUTO: ABNORMAL K/UL (ref 0–0.5)
EOSINOPHIL # BLD AUTO: ABNORMAL K/UL (ref 0–0.5)
EOSINOPHIL NFR BLD: 0 % (ref 0–8)
EOSINOPHIL NFR BLD: 0 % (ref 0–8)
ERYTHROCYTE [DISTWIDTH] IN BLOOD BY AUTOMATED COUNT: 18.3 % (ref 11.5–14.5)
ERYTHROCYTE [DISTWIDTH] IN BLOOD BY AUTOMATED COUNT: 18.7 % (ref 11.5–14.5)
EST. GFR  (AFRICAN AMERICAN): 13 ML/MIN/1.73 M^2
EST. GFR  (AFRICAN AMERICAN): 15 ML/MIN/1.73 M^2
EST. GFR  (NON AFRICAN AMERICAN): 12 ML/MIN/1.73 M^2
EST. GFR  (NON AFRICAN AMERICAN): 13 ML/MIN/1.73 M^2
FIBRINOGEN PPP-MCNC: >800 MG/DL (ref 182–400)
GLUCOSE SERPL-MCNC: 159 MG/DL (ref 70–110)
GLUCOSE SERPL-MCNC: 190 MG/DL (ref 70–110)
GRAM STN SPEC: NORMAL
HBV SURFACE AG SERPL QL IA: NEGATIVE
HCT VFR BLD AUTO: 23.8 % (ref 37–48.5)
HCT VFR BLD AUTO: 25.6 % (ref 37–48.5)
HGB BLD-MCNC: 7.8 G/DL (ref 12–16)
HGB BLD-MCNC: 8.6 G/DL (ref 12–16)
HYPOCHROMIA BLD QL SMEAR: ABNORMAL
IMM GRANULOCYTES # BLD AUTO: ABNORMAL K/UL (ref 0–0.04)
IMM GRANULOCYTES # BLD AUTO: ABNORMAL K/UL (ref 0–0.04)
IMM GRANULOCYTES NFR BLD AUTO: ABNORMAL % (ref 0–0.5)
IMM GRANULOCYTES NFR BLD AUTO: ABNORMAL % (ref 0–0.5)
INR PPP: 2.7 (ref 0.8–1.2)
INR PPP: 3.3 (ref 0.8–1.2)
LACTATE SERPL-SCNC: 1.6 MMOL/L (ref 0.5–2.2)
LYMPHOCYTES # BLD AUTO: ABNORMAL K/UL (ref 1–4.8)
LYMPHOCYTES # BLD AUTO: ABNORMAL K/UL (ref 1–4.8)
LYMPHOCYTES NFR BLD: 1 % (ref 18–48)
LYMPHOCYTES NFR BLD: 5 % (ref 18–48)
MAGNESIUM SERPL-MCNC: 1.7 MG/DL (ref 1.6–2.6)
MCH RBC QN AUTO: 30 PG (ref 27–31)
MCH RBC QN AUTO: 30.2 PG (ref 27–31)
MCHC RBC AUTO-ENTMCNC: 32.8 G/DL (ref 32–36)
MCHC RBC AUTO-ENTMCNC: 33.6 G/DL (ref 32–36)
MCV RBC AUTO: 90 FL (ref 82–98)
MCV RBC AUTO: 92 FL (ref 82–98)
METAMYELOCYTES NFR BLD MANUAL: 2 %
METAMYELOCYTES NFR BLD MANUAL: 6 %
MONOCYTES # BLD AUTO: ABNORMAL K/UL (ref 0.3–1)
MONOCYTES # BLD AUTO: ABNORMAL K/UL (ref 0.3–1)
MONOCYTES NFR BLD: 2 % (ref 4–15)
MONOCYTES NFR BLD: 2 % (ref 4–15)
MYELOCYTES NFR BLD MANUAL: 2 %
NEUTROPHILS NFR BLD: 71 % (ref 38–73)
NEUTROPHILS NFR BLD: 86 % (ref 38–73)
NEUTS BAND NFR BLD MANUAL: 14 %
NEUTS BAND NFR BLD MANUAL: 9 %
NRBC BLD-RTO: 0 /100 WBC
NRBC BLD-RTO: 0 /100 WBC
PHOSPHATE SERPL-MCNC: 4 MG/DL (ref 2.7–4.5)
PLATELET # BLD AUTO: 268 K/UL (ref 150–450)
PLATELET # BLD AUTO: 274 K/UL (ref 150–450)
PLATELET BLD QL SMEAR: ABNORMAL
PMV BLD AUTO: 10.1 FL (ref 9.2–12.9)
PMV BLD AUTO: 10.1 FL (ref 9.2–12.9)
POCT GLUCOSE: 199 MG/DL (ref 70–110)
POCT GLUCOSE: 219 MG/DL (ref 70–110)
POCT GLUCOSE: 239 MG/DL (ref 70–110)
POCT GLUCOSE: 272 MG/DL (ref 70–110)
POLYCHROMASIA BLD QL SMEAR: ABNORMAL
POTASSIUM SERPL-SCNC: 4.3 MMOL/L (ref 3.5–5.1)
POTASSIUM SERPL-SCNC: 4.5 MMOL/L (ref 3.5–5.1)
PROT SERPL-MCNC: 5.2 G/DL (ref 6–8.4)
PROTHROMBIN TIME: 27.7 SEC (ref 9–12.5)
PROTHROMBIN TIME: 33.6 SEC (ref 9–12.5)
PTH-INTACT SERPL-MCNC: 612.6 PG/ML (ref 9–77)
RBC # BLD AUTO: 2.6 M/UL (ref 4–5.4)
RBC # BLD AUTO: 2.85 M/UL (ref 4–5.4)
SODIUM SERPL-SCNC: 136 MMOL/L (ref 136–145)
SODIUM SERPL-SCNC: 136 MMOL/L (ref 136–145)
VANCOMYCIN SERPL-MCNC: 13.6 UG/ML
WBC # BLD AUTO: 26.39 K/UL (ref 3.9–12.7)
WBC # BLD AUTO: 27.76 K/UL (ref 3.9–12.7)

## 2022-02-03 PROCEDURE — 80053 COMPREHEN METABOLIC PANEL: CPT | Performed by: STUDENT IN AN ORGANIZED HEALTH CARE EDUCATION/TRAINING PROGRAM

## 2022-02-03 PROCEDURE — 83970 ASSAY OF PARATHORMONE: CPT | Performed by: INTERNAL MEDICINE

## 2022-02-03 PROCEDURE — A4216 STERILE WATER/SALINE, 10 ML: HCPCS | Performed by: STUDENT IN AN ORGANIZED HEALTH CARE EDUCATION/TRAINING PROGRAM

## 2022-02-03 PROCEDURE — C9113 INJ PANTOPRAZOLE SODIUM, VIA: HCPCS | Performed by: STUDENT IN AN ORGANIZED HEALTH CARE EDUCATION/TRAINING PROGRAM

## 2022-02-03 PROCEDURE — 80202 ASSAY OF VANCOMYCIN: CPT | Performed by: EMERGENCY MEDICINE

## 2022-02-03 PROCEDURE — 99223 PR INITIAL HOSPITAL CARE,LEVL III: ICD-10-PCS | Mod: CR,,, | Performed by: INTERNAL MEDICINE

## 2022-02-03 PROCEDURE — 25000003 PHARM REV CODE 250: Performed by: STUDENT IN AN ORGANIZED HEALTH CARE EDUCATION/TRAINING PROGRAM

## 2022-02-03 PROCEDURE — 87340 HEPATITIS B SURFACE AG IA: CPT | Performed by: HOSPITALIST

## 2022-02-03 PROCEDURE — 25000003 PHARM REV CODE 250: Performed by: INTERNAL MEDICINE

## 2022-02-03 PROCEDURE — 63600175 PHARM REV CODE 636 W HCPCS: Performed by: INTERNAL MEDICINE

## 2022-02-03 PROCEDURE — 85027 COMPLETE CBC AUTOMATED: CPT | Performed by: HOSPITALIST

## 2022-02-03 PROCEDURE — 85610 PROTHROMBIN TIME: CPT | Performed by: HOSPITALIST

## 2022-02-03 PROCEDURE — 63600175 PHARM REV CODE 636 W HCPCS: Performed by: STUDENT IN AN ORGANIZED HEALTH CARE EDUCATION/TRAINING PROGRAM

## 2022-02-03 PROCEDURE — 99497 ADVNCD CARE PLAN 30 MIN: CPT | Mod: 25,CR,, | Performed by: INTERNAL MEDICINE

## 2022-02-03 PROCEDURE — 99497 PR ADVNCD CARE PLAN 30 MIN: ICD-10-PCS | Mod: 25,CR,, | Performed by: INTERNAL MEDICINE

## 2022-02-03 PROCEDURE — 25000003 PHARM REV CODE 250: Performed by: HOSPITALIST

## 2022-02-03 PROCEDURE — A4216 STERILE WATER/SALINE, 10 ML: HCPCS | Performed by: INTERNAL MEDICINE

## 2022-02-03 PROCEDURE — 85379 FIBRIN DEGRADATION QUANT: CPT | Performed by: ANESTHESIOLOGY

## 2022-02-03 PROCEDURE — 85007 BL SMEAR W/DIFF WBC COUNT: CPT | Mod: 91 | Performed by: STUDENT IN AN ORGANIZED HEALTH CARE EDUCATION/TRAINING PROGRAM

## 2022-02-03 PROCEDURE — 27000207 HC ISOLATION

## 2022-02-03 PROCEDURE — 85027 COMPLETE CBC AUTOMATED: CPT | Mod: 91 | Performed by: STUDENT IN AN ORGANIZED HEALTH CARE EDUCATION/TRAINING PROGRAM

## 2022-02-03 PROCEDURE — 83605 ASSAY OF LACTIC ACID: CPT | Performed by: STUDENT IN AN ORGANIZED HEALTH CARE EDUCATION/TRAINING PROGRAM

## 2022-02-03 PROCEDURE — 80048 BASIC METABOLIC PNL TOTAL CA: CPT | Performed by: HOSPITALIST

## 2022-02-03 PROCEDURE — 84100 ASSAY OF PHOSPHORUS: CPT | Performed by: STUDENT IN AN ORGANIZED HEALTH CARE EDUCATION/TRAINING PROGRAM

## 2022-02-03 PROCEDURE — A4217 STERILE WATER/SALINE, 500 ML: HCPCS | Performed by: INTERNAL MEDICINE

## 2022-02-03 PROCEDURE — 85610 PROTHROMBIN TIME: CPT | Mod: 91 | Performed by: STUDENT IN AN ORGANIZED HEALTH CARE EDUCATION/TRAINING PROGRAM

## 2022-02-03 PROCEDURE — 85007 BL SMEAR W/DIFF WBC COUNT: CPT | Performed by: HOSPITALIST

## 2022-02-03 PROCEDURE — 99232 PR SUBSEQUENT HOSPITAL CARE,LEVL II: ICD-10-PCS | Mod: ,,, | Performed by: SURGERY

## 2022-02-03 PROCEDURE — C9399 UNCLASSIFIED DRUGS OR BIOLOG: HCPCS | Performed by: STUDENT IN AN ORGANIZED HEALTH CARE EDUCATION/TRAINING PROGRAM

## 2022-02-03 PROCEDURE — 94761 N-INVAS EAR/PLS OXIMETRY MLT: CPT

## 2022-02-03 PROCEDURE — 85384 FIBRINOGEN ACTIVITY: CPT | Performed by: ANESTHESIOLOGY

## 2022-02-03 PROCEDURE — 83735 ASSAY OF MAGNESIUM: CPT | Performed by: STUDENT IN AN ORGANIZED HEALTH CARE EDUCATION/TRAINING PROGRAM

## 2022-02-03 PROCEDURE — 20000000 HC ICU ROOM

## 2022-02-03 PROCEDURE — 99223 1ST HOSP IP/OBS HIGH 75: CPT | Mod: CR,,, | Performed by: INTERNAL MEDICINE

## 2022-02-03 PROCEDURE — 63600175 PHARM REV CODE 636 W HCPCS: Performed by: HOSPITALIST

## 2022-02-03 PROCEDURE — 99232 SBSQ HOSP IP/OBS MODERATE 35: CPT | Mod: ,,, | Performed by: SURGERY

## 2022-02-03 PROCEDURE — 27000221 HC OXYGEN, UP TO 24 HOURS

## 2022-02-03 RX ORDER — HEPARIN SODIUM 5000 [USP'U]/ML
3200 INJECTION, SOLUTION INTRAVENOUS; SUBCUTANEOUS
Status: DISCONTINUED | OUTPATIENT
Start: 2022-02-03 | End: 2022-02-15 | Stop reason: HOSPADM

## 2022-02-03 RX ORDER — KETOROLAC TROMETHAMINE 10 MG/1
10 TABLET, FILM COATED ORAL ONCE
Status: DISCONTINUED | OUTPATIENT
Start: 2022-02-03 | End: 2022-02-03

## 2022-02-03 RX ORDER — INDOMETHACIN 25 MG/1
100 CAPSULE ORAL ONCE
Status: COMPLETED | OUTPATIENT
Start: 2022-02-03 | End: 2022-02-03

## 2022-02-03 RX ADMIN — MUPIROCIN: 20 OINTMENT TOPICAL at 09:02

## 2022-02-03 RX ADMIN — NOREPINEPHRINE BITARTRATE 0.06 MCG/KG/MIN: 4 INJECTION, SOLUTION INTRAVENOUS at 06:02

## 2022-02-03 RX ADMIN — Medication 10 ML: at 12:02

## 2022-02-03 RX ADMIN — PIPERACILLIN AND TAZOBACTAM 4.5 G: 4; .5 INJECTION, POWDER, LYOPHILIZED, FOR SOLUTION INTRAVENOUS; PARENTERAL at 05:02

## 2022-02-03 RX ADMIN — PIPERACILLIN AND TAZOBACTAM 4.5 G: 4; .5 INJECTION, POWDER, LYOPHILIZED, FOR SOLUTION INTRAVENOUS; PARENTERAL at 06:02

## 2022-02-03 RX ADMIN — OXYCODONE 10 MG: 5 TABLET ORAL at 04:02

## 2022-02-03 RX ADMIN — Medication 10 ML: at 05:02

## 2022-02-03 RX ADMIN — Medication 324 MG: at 08:02

## 2022-02-03 RX ADMIN — INSULIN ASPART 1 UNITS: 100 INJECTION, SOLUTION INTRAVENOUS; SUBCUTANEOUS at 09:02

## 2022-02-03 RX ADMIN — MUPIROCIN: 20 OINTMENT TOPICAL at 08:02

## 2022-02-03 RX ADMIN — SODIUM BICARBONATE 100 MEQ: 84 INJECTION, SOLUTION INTRAVENOUS at 09:02

## 2022-02-03 RX ADMIN — Medication 10 ML: at 06:02

## 2022-02-03 RX ADMIN — OXYCODONE 10 MG: 5 TABLET ORAL at 08:02

## 2022-02-03 RX ADMIN — INSULIN ASPART 2 UNITS: 100 INJECTION, SOLUTION INTRAVENOUS; SUBCUTANEOUS at 04:02

## 2022-02-03 RX ADMIN — INSULIN ASPART 2 UNITS: 100 INJECTION, SOLUTION INTRAVENOUS; SUBCUTANEOUS at 08:02

## 2022-02-03 RX ADMIN — INSULIN DETEMIR 8 UNITS: 100 INJECTION, SOLUTION SUBCUTANEOUS at 09:02

## 2022-02-03 RX ADMIN — EPOETIN ALFA-EPBX 10000 UNITS: 10000 INJECTION, SOLUTION INTRAVENOUS; SUBCUTANEOUS at 01:02

## 2022-02-03 RX ADMIN — GABAPENTIN 100 MG: 100 CAPSULE ORAL at 09:02

## 2022-02-03 RX ADMIN — CINACALCET 30 MG: 30 TABLET, FILM COATED ORAL at 08:02

## 2022-02-03 RX ADMIN — NOREPINEPHRINE BITARTRATE 0.08 MCG/KG/MIN: 4 INJECTION, SOLUTION INTRAVENOUS at 04:02

## 2022-02-03 RX ADMIN — OXYCODONE 10 MG: 5 TABLET ORAL at 09:02

## 2022-02-03 RX ADMIN — Medication 10 ML: at 11:02

## 2022-02-03 RX ADMIN — GABAPENTIN 100 MG: 100 CAPSULE ORAL at 08:02

## 2022-02-03 RX ADMIN — NOREPINEPHRINE BITARTRATE 0.2 MCG/KG/MIN: 4 INJECTION, SOLUTION INTRAVENOUS at 01:02

## 2022-02-03 RX ADMIN — PANTOPRAZOLE SODIUM 40 MG: 40 INJECTION, POWDER, FOR SOLUTION INTRAVENOUS at 09:02

## 2022-02-03 RX ADMIN — NEPHROCAP 1 CAPSULE: 1 CAP ORAL at 08:02

## 2022-02-03 RX ADMIN — SEVELAMER CARBONATE 2400 MG: 800 TABLET, FILM COATED ORAL at 08:02

## 2022-02-03 RX ADMIN — SODIUM BICARBONATE: 84 INJECTION, SOLUTION INTRAVENOUS at 02:02

## 2022-02-03 RX ADMIN — PANTOPRAZOLE SODIUM 40 MG: 40 INJECTION, POWDER, FOR SOLUTION INTRAVENOUS at 08:02

## 2022-02-03 RX ADMIN — HEPARIN SODIUM 3200 UNITS: 5000 INJECTION INTRAVENOUS; SUBCUTANEOUS at 01:02

## 2022-02-03 NOTE — PROGRESS NOTES
Date of Admission:2/1/2022    SUBJECTIVE: bp low, denies pain    Current Facility-Administered Medications   Medication    0.9%  NaCl infusion (for blood administration)    0.9%  NaCl infusion    0.9%  NaCl infusion    cinacalcet tablet 30 mg    dextrose 50% injection 12.5 g    dextrose 50% injection 25 g    epoetin kelsey-epbx injection 10,000 Units    ferrous gluconate tablet 324 mg    gabapentin capsule 100 mg    glucagon (human recombinant) injection 1 mg    glucose chewable tablet 16 g    glucose chewable tablet 24 g    heparin (porcine) injection 3,200 Units    insulin aspart U-100 pen 0-5 Units    insulin detemir U-100 pen 8 Units    mupirocin 2 % ointment    NORepinephrine 4 mg in dextrose 5% 250 mL infusion (premix) (titrating)    ondansetron disintegrating tablet 8 mg    oxyCODONE immediate release tablet 10 mg    pantoprazole injection 40 mg    piperacillin-tazobactam 4.5 g in dextrose 5 % 100 mL IVPB (ready to mix system)    sevelamer carbonate tablet 2,400 mg    sodium bicarbonate 150 mEq in sterile water 1,000 mL infusion    sodium chloride 0.9% bolus 250 mL    sodium chloride 0.9% flush 10 mL    And    sodium chloride 0.9% flush 10 mL    sodium thiosulfate 12.5 gram/50 mL (250 mg/mL) injection 25 g    vancomycin - pharmacy to dose    vitamin renal formula (B-complex-vitamin c-folic acid) 1 mg per capsule 1 capsule       Wt Readings from Last 3 Encounters:   02/02/22 94.3 kg (207 lb 14.3 oz)   01/20/22 94.3 kg (207 lb 14.3 oz)   12/11/21 90.7 kg (200 lb)     Temp Readings from Last 3 Encounters:   02/03/22 99 °F (37.2 °C) (Axillary)   01/28/22 98.7 °F (37.1 °C)   12/13/21 98 °F (36.7 °C) (Oral)     BP Readings from Last 3 Encounters:   02/03/22 (!) 101/54   01/28/22 111/64   12/13/21 131/69     Pulse Readings from Last 3 Encounters:   02/03/22 (!) 113   01/28/22 (!) 112   12/13/21 90       Intake/Output Summary (Last 24 hours) at 2/3/2022 1412  Last data filed at 2/3/2022  1400  Gross per 24 hour   Intake 2794.97 ml   Output 1600 ml   Net 1194.97 ml       PE:  GEN:wd female in nad  HEENT:ncat,eomi,mm   CVS:s1s2 tachy  PULM:ctab  ABD:+bs, soft  EXT:no leg edema of calves  NEURO:awakens  pc of chest    Recent Labs   Lab 02/03/22  0626   *      K 4.5   CL 98   CO2 14*   BUN 33*   CREATININE 4.4*   CALCIUM 8.5*   MG 1.7       Lab Results   Component Value Date    .6 (H) 02/03/2022    CALCIUM 8.5 (L) 02/03/2022    PHOS 4.0 02/03/2022       Recent Labs   Lab 02/03/22  0801   WBC 27.76*   RBC 2.60*   HGB 7.8*   HCT 23.8*      MCV 92   MCH 30.0   MCHC 32.8           A/P:  1.esrd. hd tomorrow planned.  2.anemia. prbc given. Cont epo.  3.calciphylaxis. cont thiosulfate.  4.hypotension. cont pressor.  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 Phos great. Cont binders.  6.covid 19 infection. Dx last mo.   7.nec fascitis. Cont post op care. Looks dry. Surgery again tomorrow.  8.met acidosis.bicarb gtt. Looks dry.

## 2022-02-03 NOTE — CONSULTS
4954: I called the patient's sister (Gypsy) to extend support. The sister was thankful for call. She is planning to visit this afternoon. Spiritual Care will continue to follow up.      Yao Espinosa, Staff   [phone]

## 2022-02-03 NOTE — PLAN OF CARE
Patient remains in the ICU. Alert, but drowsy patient had debridement to wound on sacrum. Returned from OR in pain, was medicated. Patients BP was low and levo was ordered and started. Dialysis was preformed 1L taken off. Patient tolerated treatment. Vitals remain stable. Free from fall and injury this shift.

## 2022-02-03 NOTE — PROGRESS NOTES
[facility]      General Surgery Progress Note    02/03/2022  11:54 AM      Patient: Xuan Ricardo  MRN: [id number]  Admit Date: 2/1/2022  LOS: 2      Subjective                                                                                                        Interval Events: Taken for debridement of sacrum and buttock last night, patient extubated, mainly noting pain at the debridement site.  Requiring pressors for hypotension, trending down.     Patient Active Problem List   Diagnosis    Anemia of renal disease    Hypertensive CKD (chronic kidney disease)    Nephrotic range proteinuria    Metabolic acidosis    Increased prolactin level    Secondary hyperparathyroidism    Iron deficiency anemia    Vitamin D deficiency    Galactorrhea    Cataract    Class 1 obesity due to excess calories with serious comorbidity in adult    Chronic fatigue    Missed menses    Uterine leiomyoma    Type 2 diabetes mellitus with stage 5 chronic kidney disease not on chronic dialysis, with long-term current use of insulin    Hypertension    Hypertensive urgency    Symptomatic anemia    End stage renal disease    Uremia    CKD (chronic kidney disease) stage 5, GFR less than 15 ml/min    Nausea & vomiting    Thrombocytopenia    Hematemesis    Goals of care, counseling/discussion    Calciphylaxis    Type 2 diabetes mellitus    COVID-19 virus infection    Renovascular hypertension    Hyperphosphatemia    Candida esophagitis    Sepsis due to gram-negative UTI    Sinus tachycardia    Slow transit constipation    Debility    Esophagitis, [place] grade D    Hyponatremia    Leukocytosis    Coagulopathy    Septic shock    Necrotizing fasciitis       No current facility-administered medications on file prior to encounter.     Current Outpatient Medications on File Prior to Encounter   Medication Sig Dispense Refill    calcium carbonate (TUMS) 200 mg calcium (500 mg) chewable  tablet Take 2 tablets (1,000 mg total) by mouth 3 (three) times daily as needed. 150 tablet 0    cinacalcet (SENSIPAR) 30 MG Tab Take 1 tablet (30 mg total) by mouth daily with breakfast. 30 tablet 11    epoetin kelsey-epbx (RETACRIT) 2,000 unit/mL injection Inject 7 mLs (14,000 Units total) into the skin every Mon, Wed, Fri. With dialysis      ferrous gluconate (FERGON) 324 MG tablet Take 1 tablet (324 mg total) by mouth daily with breakfast. 30 tablet 2    gabapentin (NEURONTIN) 100 MG capsule Take 1 capsule (100 mg total) by mouth 3 (three) times daily. 90 capsule 0    metoprolol tartrate (LOPRESSOR) 25 MG tablet Take 1 tablet (25 mg total) by mouth 2 (two) times daily. 60 tablet 11    oxyCODONE (ROXICODONE) 10 mg Tab immediate release tablet Take 1 tablet (10 mg total) by mouth every 4 (four) hours as needed. 18 tablet 0    pantoprazole (PROTONIX) 40 MG tablet Take 1 tablet (40 mg total) by mouth 2 (two) times daily. 60 tablet 1    sevelamer carbonate (RENVELA) 800 mg Tab Take 3 tablets (2,400 mg total) by mouth 3 (three) times daily with meals. 270 tablet 11    sodium thiosulfate 12.5 gram/50 mL (250 mg/mL) injection Inject 100 mLs (25 g total) into the vein every Mon, Wed, Fri. With dialysis         Objective                                                                                                          Vitals:    02/03/22 1030 02/03/22 1045 02/03/22 1100 02/03/22 1115   BP: (!) 99/52 (!) 93/49 (!) 89/49 (!) 90/52   Pulse: 107 107 103 106   Resp: 11 11 12 12   Temp:       TempSrc:       SpO2: 96% (!) 94% 96% 97%   Weight:       Height:           CBC   Recent Labs   Lab 02/03/22  0238 02/03/22  0801   WBC 26.39* 27.76*   HGB 8.6* 7.8*   HCT 25.6* 23.8*    274       CHEM   Recent Labs   Lab 02/03/22  0238 02/03/22  0626    136   K 4.3 4.5   CL 99 98   CO2 15* 14*   BUN 28* 33*   CREATININE 3.9* 4.4*   * 190*       PHYSICAL EXAM:    GEN: Alert and Awake, chronically ill  appearing.  HEENT: NC/AT, EOMI  RESP: CTAB, good air movement, no resp distress  CV: mild tachycardia  ABD: Soft, NT/ND, no guarding or rebound  EXT:  Moves all, extensive calciphylaxis lesions on bilateral lower extremities, sacral wound with dressing in place.  Serosanguinous drainage noted on dressing  Neuro: A&Ox3, CNII-XII intact  Skin: Warm, calciphylaxis lesions throughout bilateral lower extremites    Imaging Results          X-Ray Chest AP Portable (Final result)  Result time 02/01/22 15:38:15    Final result by Murphy Foy MD (02/01/22 15:38:15)                 Impression:      1. No acute cardiopulmonary process appreciated.      Electronically signed by: Murphy Foy  Date:    02/01/2022  Time:    15:38             Narrative:    EXAMINATION:  XR CHEST AP PORTABLE    CLINICAL HISTORY:  HYpotention;    TECHNIQUE:  Single frontal portable view of the chest was performed.    COMPARISON:  Chest radiograph 01/19/2022    FINDINGS:  RIJV-approach THDC is stable when compared to 01/19/2022 however, with marked interval retraction when compared to original placement on 05/22/2020 with terminus now projecting over the expected location of the mid SVC.    Cardiomediastinal silhouette is within normal limits.    No focal consolidation, overt interstitial edema, sizable pleural effusion or pneumothorax.    Multilevel degenerative changes of the imaged spine.                                Assessment / Plan:                                                                                           A/P: Case of 41 y.o. with extensive pmh, admitted in septic shock found to have necrotizing soft tissue infection of the sacrum and buttock.  S/p debridement on 2/2/22.  Patient still critically ill, hgb stable, requiring pressor support for hypotension.    -continue iv abx  -transfuse for hgb <7, if patient is requiring multiple transfusions consider ffp administration due to high INR.  -will take back to the OR tomorrow  2/4/22 for further debridement      Carlos Alberto Christianson MD  [facility] Surgery PGY-V

## 2022-02-03 NOTE — PROGRESS NOTES
CHUCKIE received a call from Jamee with [facility] advising that referral was received.  Jamee expressed concern regarding traffic patient would encounter crossing the bridge to dialysis.  CHUCKIE updated Jamee-patient would be residing with sister who already lives on the [facility] and that patient's family members have expressed that they will assist patient with transportation.  Chuckie also discussed that if patient were assigned to 3rd shift she would arrive at dialysis before school traffic would begin.  Awaiting response from [facility].

## 2022-02-03 NOTE — PROGRESS NOTES
Date of Admission:2/1/2022    SUBJECTIVE: very somnolent , bp low  Sister at bedside    Current Facility-Administered Medications   Medication    0.9%  NaCl infusion (for blood administration)    0.9%  NaCl infusion (for blood administration)    0.9%  NaCl infusion (for blood administration)    0.9%  NaCl infusion    0.9%  NaCl infusion    cinacalcet tablet 30 mg    dextrose 50% injection 12.5 g    dextrose 50% injection 25 g    epoetin kelsey-epbx injection 10,000 Units    ferrous gluconate tablet 324 mg    gabapentin capsule 100 mg    glucagon (human recombinant) injection 1 mg    glucose chewable tablet 16 g    glucose chewable tablet 24 g    HYDROmorphone (PF) injection 1 mg    insulin aspart U-100 pen 0-5 Units    insulin detemir U-100 pen 8 Units    mupirocin 2 % ointment    ondansetron disintegrating tablet 8 mg    oxyCODONE immediate release tablet 10 mg    pantoprazole injection 40 mg    piperacillin-tazobactam 4.5 g in dextrose 5 % 100 mL IVPB (ready to mix system)    sevelamer carbonate tablet 2,400 mg    sodium chloride 0.9% bolus 250 mL    sodium chloride 0.9% flush 10 mL    And    sodium chloride 0.9% flush 10 mL    sodium thiosulfate 12.5 gram/50 mL (250 mg/mL) injection 25 g    vancomycin - pharmacy to dose     Facility-Administered Medications Ordered in Other Encounters   Medication    etomidate injection    lactated ringers infusion    LIDOcaine (cardiac) injection    phenylephrine injection    rocuronium injection       Wt Readings from Last 3 Encounters:   02/02/22 94.3 kg (207 lb 14.3 oz)   01/20/22 94.3 kg (207 lb 14.3 oz)   12/11/21 90.7 kg (200 lb)     Temp Readings from Last 3 Encounters:   02/02/22 97.6 °F (36.4 °C) (Oral)   01/28/22 98.7 °F (37.1 °C)   12/13/21 98 °F (36.7 °C) (Oral)     BP Readings from Last 3 Encounters:   02/02/22 (!) 86/50   01/28/22 111/64   12/13/21 131/69     Pulse Readings from Last 3 Encounters:   02/02/22 105   01/28/22 (!) 112    12/13/21 90       Intake/Output Summary (Last 24 hours) at 2/2/2022 1805  Last data filed at 2/2/2022 1720  Gross per 24 hour   Intake 2657.82 ml   Output --   Net 2657.82 ml       PE:  GEN:wd female in nad  HEENT:ncat,eomi,mm   CVS:s1s2 tachy  PULM:ctab  ABD:+bs, soft  EXT:no leg edema, dresssings  NEURO:awakens  pc of chest    Recent Labs   Lab 02/02/22  0355 02/02/22  0355 02/02/22  0835   *   < > 277*   *   < > 132*   K 5.4*   < > 5.1   CL 98   < > 96   CO2 19*   < > 22*   BUN 70*   < > 76*   CREATININE 8.3*   < > 8.3*   CALCIUM 7.9*   < > 8.2*   MG 2.0  --   --     < > = values in this interval not displayed.       Lab Results   Component Value Date    PTH 1,498.8 (H) 12/31/2021    CALCIUM 8.2 (L) 02/02/2022    PHOS 4.6 (H) 02/02/2022       Recent Labs   Lab 02/02/22  0355   WBC 20.64*   RBC 2.69*   HGB 7.9*   HCT 24.6*      MCV 91   MCH 29.4   MCHC 32.1           A/P:  1.esrd. hd today.  2.anemia. prbc given. Resume epo.  3.calciphylaxis. infection on top? Add thiosulfate back. Nec fascitis?   4.hypotension. cont support.  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 Phos great. Cont binders.  6.covid 19 infection. Dx last mo.   Updated sister. Informed calciphylaxis is a difficult dx to treat. Explained brutal pain. Overall, pt's do poorly with this.

## 2022-02-03 NOTE — ASSESSMENT & PLAN NOTE
- Went to OR with general surgery for initial debridement; likely will need repeat surgeries per their documentation   - Overall, prognosis is poor

## 2022-02-03 NOTE — EICU
BP 95/51  Awaiting hemodialysis initiation    Plan:  Initiate norepinephrine infusion to keep SBP greater than 90 mm of HG

## 2022-02-03 NOTE — CONSULTS
[facility] - Intensive Care  Palliative Medicine  Consult Note    Patient Name: Xuan Ricardo  MRN: [id number]  Admission Date: 2/1/2022  Hospital Length of Stay: 2 days  Code Status: Full Code   Attending Provider: Mehdi Armenta MD  Consulting Provider: Leena Armenta MD  Primary Care Physician: Katharine Franks MD  Principal Problem:Calciphylaxis    Patient information was obtained from relative(s), past medical records and primary team.      Inpatient consult to Palliative Care  Consult performed by: Leena Armenta MD  Consult ordered by: Mehdi Armenta MD  Reason for consult: Advance Care Planning         Assessment/Plan:     Advance Care Planning       2/3/22  - Consult for advance care planning in younger ESRD pt currently in ICU with septic shock, in setting of nec fasc. She is s/p OR debridement with general surgery 2/2/22. Familiar with pt from prior hospital stay; chart reviewed in depth, and discussed pt during MDT rounds.   - Met with pt at bedside; she was lethargic and unable to participate in discussion.   - Along with Dr Armenta (hospitalist), called and spoke with pt's sister Tressa (preferred MPOA along with their sister Gypsy, per prior discussion with pt). Also present during phone call was pt's Aunt Juliet ([phone]), who is a nurse.    - Thorough medical update given, and answered their questions about specifics of pt's illness   - Did share transparent concern that even with maximal medical therapy (including HD, pressors, abx, and additional trips to the OR), we are worried that patient may not survive hospital stay due to severity of her infection. Family was appropriately upset to hear this information, though voiced being hopeful God provides them with a miracle, especially given that pt has children. Support provided.   - For now, plan remains to continue aggressive medical care. Family noted they appreciate direct information, especially given that Juliet has medical background.  "Told them we will continue to provide them with transparent updates.   - Discussed visitor policy for hospital, and encouraged family visitation given acuity of pt's condition  - Our team will follow up with pt and family. Have requested that  call family for additional support, especially given their reliance of miranda.     End stage renal disease  - Nephrology consulted in hospital for HD; discussed pt with Dr Nancy Larsonylaxis  - Has been present for several months; likely contributed to development of nec fasc. Contributes to her overall poor prognosis.     Necrotizing fasciitis  - Went to OR with general surgery for initial debridement; likely will need repeat surgeries per their documentation   - Overall, prognosis is poor     Septic shock  - In the setting of nec fasc, on background of calciphylaxis/ESRD.   - Abx/pressors per primary; general surgery brought pt to OR for debridement on 2/2/22    Leukocytosis  - Abx per primary     Debility  - Impaired functional status at baseline; not able to participate with PT/OT due to extent of wounds and critical illness     COVID-19 virus infection  - Noted; on airborne precautions     Thank you for your consult. I will follow-up with patient. Please contact us if you have any additional questions.     RASHIDA Mckeon  Palliative Medicine Staff   [phone]    Total visit time: 86 minutes    > 50% of 70 min visit spent in chart review, face to face discussion of symptom assessment, coordination of care with other specialists, and discharge planning.    16 min ACP time spent: goals of care, emotional support, formulating and communicating prognosis, exploring burden/ benefit of various approaches of treatment.     Subjective:     HPI:   (From H&P) "41 y.o. female PMHx IDDM, CKD, HTN presents with lightheadedness and fatigue x 1 day. Symptoms progressively getting worse.  She states she has noted anything bring it own initially but symptoms worse when " "she sits up.  Patient states that she skin wounds based on calciphylaxis.  Patient reports pain to a rash to the bilateral thighs for which her sister has been applying ointment. She went to her PCP office who noted her to have low BP. Per EMS, patient had a blood pressure of 70/50 en route. She denies fever, hematuria, or bleeding wounds. Patient states she has not been to dialysis in a week with her last dialysis occurring in the hospital on ."    Since admission to ICU for septic shock, concern that pt's skin wounds were suggestive of nec fasc; she emergently went to OR with general surgery for debridement on 22, which confirmed nec fasc. Familiar with pt from a prior hospitalization; chart reviewed in depth, and discussed pt during MDT rounds. Met with pt at bedside; she was lethargic and unable to participate in discussion. Called and spoke with family; for details of discussion, see advance care planning section of plan.       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 ;  Surgeon: Rakesh Leon MD;  Location: [facility] OR;  Service: Vascular;  Laterality: Left;  RN PREOP 20, UPT done, COVID NEGATRIVE 20  NEED H/P     SECTION, CLASSIC      ESOPHAGOGASTRODUODENOSCOPY N/A 2022    Procedure: EGD (ESOPHAGOGASTRODUODENOSCOPY);  Surgeon: Mario Alberto Irene MD;  Location: [facility] ENDO;  Service: Endoscopy;  Laterality: N/A;    INSERTION OF TUNNELED CENTRAL VENOUS CATHETER (CVC) WITH SUBCUTANEOUS PORT " N/A 1/27/2020    Procedure: IR TUNNELED VATH INSERT WITHOUT PORT;  Surgeon: Prachi Diagnostic Provider;  Location: [facility] OR;  Service: Radiology;  Laterality: N/A;  1030AM START  RN PREOP 1/24/2020    INSERTION OF TUNNELED CENTRAL VENOUS CATHETER (CVC) WITH SUBCUTANEOUS PORT N/A 5/22/2020    Procedure: TUNNELED CATH PLACEMENT;  Surgeon: Prachi Diagnostic Provider;  Location: [facility] OR;  Service: Radiology;  Laterality: N/A;  930AM START    REVISION OF ARTERIOVENOUS FISTULA Left 5/8/2020    Procedure: REVISION, AV FISTULA, THROMBECTOMY, LEFT UPPER EXTREMITY;  Surgeon: Rakesh Leon MD;  Location: [facility] OR;  Service: Vascular;  Laterality: Left;    TUBAL LIGATION         Review of patient's allergies indicates:   Allergen Reactions    Vicodin [hydrocodone-acetaminophen] Hives    Codeine Hives       Medications:  Continuous Infusions:   NORepinephrine bitartrate-D5W 0.07 mcg/kg/min (02/03/22 1000)     Scheduled Meds:   sodium chloride 0.9%   Intravenous Once    cinacalcet  30 mg Oral Daily with breakfast    epoetin kelsey-epbx  10,000 Units Intravenous Every Mon, Wed, Fri    ferrous gluconate  324 mg Oral Daily with breakfast    gabapentin  100 mg Oral BID    insulin detemir U-100  8 Units Subcutaneous QHS    mupirocin   Nasal BID    pantoprazole  40 mg Intravenous BID    piperacillin-tazobactam (ZOSYN) IVPB  4.5 g Intravenous Q12H    sevelamer carbonate  2,400 mg Oral TID WM    sodium chloride 0.9%  10 mL Intravenous Q6H    sodium thiosulfate  25 g Intravenous Every Mon, Wed, Fri    vancomycin (VANCOCIN) IVPB  500 mg Intravenous Once    vitamin renal formula (B-complex-vitamin c-folic acid)  1 capsule Oral Daily     PRN Meds:sodium chloride, sodium chloride 0.9%, dextrose 50%, dextrose 50%, glucagon (human recombinant), glucose, glucose, heparin (porcine), insulin aspart U-100, ondansetron, oxyCODONE, sodium chloride 0.9%, Flushing PICC Protocol **AND** sodium chloride 0.9% **AND** sodium chloride  0.9%, Pharmacy to dose Vancomycin consult **AND** vancomycin - pharmacy to dose    Family History     Problem Relation (Age of Onset)    Asthma Sister    Heart failure Father (58)    Seizures Mother (64)        Tobacco Use    Smoking status: Former Smoker     Types: Cigarettes    Smokeless tobacco: Never Used   Substance and Sexual Activity    Alcohol use: No    Drug use: Yes     Types: Marijuana     Comment: Occassional Recreational Use only    Sexual activity: Not Currently     Partners: Male       Review of Systems   Unable to perform ROS: Acuity of condition     Objective:     Vital Signs (Most Recent):  Temp: 98.8 °F (37.1 °C) (02/03/22 0800)  Pulse: 109 (02/03/22 1000)  Resp: 11 (02/03/22 1000)  BP: (!) 96/50 (02/03/22 1000)  SpO2: 95 % (02/03/22 1000) Vital Signs (24h Range):  Temp:  [97.5 °F (36.4 °C)-98.8 °F (37.1 °C)] 98.8 °F (37.1 °C)  Pulse:  [] 109  Resp:  [9-31] 11  SpO2:  [91 %-100 %] 95 %  BP: ()/() 96/50     Weight: 94.3 kg (207 lb 14.3 oz)  Body mass index is 35.68 kg/m².    Physical Exam  Constitutional:       Comments: Lying in bed, both acutely and chronically ill-appearing, in no distress    HENT:      Head: Normocephalic and atraumatic.      Nose: Nose normal.      Mouth/Throat:      Mouth: Mucous membranes are dry.   Eyes:      Extraocular Movements: Extraocular movements intact.   Cardiovascular:      Rate and Rhythm: Tachycardia present.   Pulmonary:      Comments: NC oxygen, no increased work of breathing   Skin:     General: Skin is warm.      Comments: Wound dressings in place     Neurological:      Comments: Lethargic, unable to participate in discussion   Psychiatric:      Comments: Calm        Significant Labs: All pertinent labs within the past 24 hours have been reviewed.  CBC:   Recent Labs   Lab 02/03/22  0801   WBC 27.76*   HGB 7.8*   HCT 23.8*   MCV 92        BMP:  Recent Labs   Lab 02/03/22  0626   *      K 4.5   CL 98   CO2 14*   BUN  33*   CREATININE 4.4*   CALCIUM 8.5*   MG 1.7     LFT:  Lab Results   Component Value Date    AST 29 02/03/2022    ALKPHOS 171 (H) 02/03/2022    BILITOT 3.5 (H) 02/03/2022     Albumin:   Albumin   Date Value Ref Range Status   02/03/2022 0.9 (L) 3.5 - 5.2 g/dL Final     Protein:   Total Protein   Date Value Ref Range Status   02/03/2022 5.2 (L) 6.0 - 8.4 g/dL Final     Lactic acid:   Lab Results   Component Value Date    LACTATE 1.6 02/03/2022    LACTATE 1.7 02/01/2022       Significant Imaging: I have reviewed all pertinent imaging results/findings within the past 24 hours.

## 2022-02-03 NOTE — PLAN OF CARE
[facility] Intensive Care  Initial Discharge Assessment       Primary Care Provider: Katharine Franks MD    Admission Diagnosis: Cellulitis of thigh [L03.119]  Tachycardia [R00.0]  Hyperglycemia [R73.9]  Calciphylaxis [E83.59]  ESRD (end stage renal disease) on dialysis [N18.6, Z99.2]  Hypotension [I95.9]  Symptomatic anemia [D64.9]    Admission Date: 2/1/2022  Expected Discharge Date:      Discharge Barriers Identified: DIalysis placement issues,Other (see comments) (Patient's sister reported that patient went to the dialysis unit on Monday and was told that she did not have a chair assigned @ [facility].  F/u call has been received from Shara with [facility] inquiring of unit choice.)    Payor: [employer] MEDICARE / Plan: [employer] / Product Type: Medicare Advantage /     Extended Emergency Contact Information  Primary Emergency Contact: AguirreTressa  Mobile Phone: [phone]  Relation: Sister   needed? No  Secondary Emergency Contact: Haleigh Gypsy  Mobile Phone: [phone]  Relation: Sister    Discharge Plan A: Home with family  Discharge Plan B: Home Health      [id number] - [place] - [address]  [address]  [place] [address]  Phone: [phone] Fax: [phone]    [id number] - [place] - [address]  [address]  [facility] [address]  Phone: [phone] Fax: [phone]      Initial Assessment (most recent)       Adult Discharge Assessment - 02/02/22 1825          Discharge Assessment    Assessment Type Discharge Planning Assessment     Confirmed/corrected address, phone number and insurance Yes     Confirmed Demographics Correct on Facesheet     Source of Information family;health record     If unable to respond/provide information was family/caregiver contacted? Yes      Contact Name/Number sisterJose Wrightsin, [phone]     When was your last doctors appointment? 02/01/22     Does patient/caregiver understand observation status --   n/a    Communicated PAMELA with patient/caregiver No     Reason For Admission Celluitis of thigh     Lives With alone;other (see comments)   Plan to stay with sister who lives across the [place] during recovery.    Facility Arrived From: Has an appt with PCP and told to come to ED.     Do you expect to return to your current living situation? Yes     Do you have help at home or someone to help you manage your care at home? Yes     Who are your caregiver(s) and their phone number(s)? Per Gypsy covarrubias, siblings will work together to help patient get to dialysis.  Gypsy Ricardo; [phone]     Prior to hospitilization cognitive status: Alert/Oriented     Current cognitive status: Unable to Assess   Patient on contact precautions.    Walking or Climbing Stairs Difficulty ambulation difficulty, requires equipment     Mobility Management wheelchair     Dressing/Bathing Difficulty bathing difficulty, assistance 1 person     Dressing/Bathing Management Assistance from another     Home Accessibility wheelchair accessible     Equipment Currently Used at Home bedside commode;wheelchair     Readmission within 30 days? Yes     Patient currently being followed by outpatient case management? No     Do you currently have service(s) that help you manage your care at home? No     Do you take prescription medications? Yes     Do you have prescription coverage? Yes     [facility]     Do you have any problems affording any of your prescribed medications? No     Who is going to help you get home at discharge? Siblings     How do you get to doctors appointments? family or friend will provide     Are you on dialysis? Yes     Dialysis Name and Scheduled days To Be Confirmed and MDF to be sent to [facility] for [facility] (MWF @ 1:00  p.m.)     Do you take coumadin? No     Discharge Plan A Home with family     Discharge Plan B Home Health     DME Needed Upon Discharge  none     Discharge Plan discussed with: Sibling     Name(s) and Number(s) Gypsy Cousin;  [phone]     Discharge Barriers Identified DIalysis placement issues;Other (see comments)   Patient's sister reported that patient went to the dialysis unit on Monday and was told that she did not have a chair assigned @ [facility].  F/u call has been received from Shara with [facility] inquiring of unit choice.                  Readmit:  Discharge Needs:  OP HD needs to be arranged and confirmed.  Patient discharged on Friday or Saturday.  Per sister, Gypsy,  upon reporting to [facility] on Monday (1/31/2022) patient was told that she didn't have a chair.  This  received a call from Shara with [facility] on 2/2/2022 asking if patient had decided on a preferred location for dialysis.  SW will continue to assist with securing a dialysis chair for patient.    Per chart review, a home health provider, [facility], had been assigned by N, however, patient returned to hospital prior to admit.

## 2022-02-03 NOTE — ANESTHESIA POSTPROCEDURE EVALUATION
Anesthesia Post Evaluation    Patient: Xuan Ricardo    Procedure(s) Performed: Procedure(s) (LRB):  DEBRIDEMENT, BUTTOCK (N/A)    Final Anesthesia Type: general      Patient location during evaluation: ICU  Patient participation: Yes- Able to Participate  Level of consciousness: awake  Post-procedure vital signs: reviewed and stable  Pain management: adequate  Airway patency: patent  TAE mitigation strategies: Multimodal analgesia, Extubation while patient is awake and Verification of full reversal of neuromuscular block  PONV status at discharge: No PONV  Anesthetic complications: no      Cardiovascular status: blood pressure returned to baseline  Respiratory status: unassisted and spontaneous ventilation  Hydration status: euvolemic  Follow-up not needed.          Vitals Value Taken Time   /58 02/02/22 1937   Temp 36.7 °C (98.1 °F) 02/02/22 1934   Pulse 99 02/02/22 1938   Resp 12 02/02/22 1938   SpO2 100 % 02/02/22 1938   Vitals shown include unvalidated device data.      No case tracking events are documented in the log.      Pain/Vineet Score: Pain Rating Prior to Med Admin: 10 (2/1/2022 11:55 PM)  Pain Rating Post Med Admin: 5 (2/2/2022 12:55 AM)

## 2022-02-03 NOTE — ASSESSMENT & PLAN NOTE
- In the setting of nec fasc, on background of calciphylaxis/ESRD.   - Abx/pressors per primary; general surgery brought pt to OR for debridement on 2/2/22

## 2022-02-03 NOTE — OP NOTE
[facility] - Intensive Care  Operative Note    SUMMARY     Surgery Date: 2/2/2022     Surgeon(s) and Role:     * Zhen Templeton MD - Primary    Assisting Surgeon: Carlos Alberto Christianson MD    Pre-op Diagnosis:  Hypotension [I95.9]  Calciphylaxis [E83.59]    Post-op Diagnosis:  Post-Op Diagnosis Codes:  Necrotizing Soft tissue Infection    Procedure(s) (LRB):  DEBRIDEMENT, BUTTOCK (N/A)   I&D  [place]    Anesthesia: General    Indication for procedure: Xuan Cousin is 41 y.o. female with calciphylaxis and multiple medical comorbidities with poor compliance of ESRD/HD who is in the ICU with obvious Nec Fasc on exam including crepitus and a CT scan demonstrating extensive soft tissue tracking . After discussion of disease process, we discussed options and have elected for operation to immediately goto OR for debridement of necrotizing soft tissue infection and other indicated procedures.    Description of Procedure: After consent was obtained, patient was taken to the OR. The patient was placed prone and was prepped and draped in a standard sterile fashion after gengeral anesthesia was started. Time out was performed. Antibiotics were started with zosyn. We began the procedure by excising the soft fluctuant necrotic patches of skin.  The underlying tissue was foul-smelling and had a lot of dishwater type fluid consistent with necrotizing soft tissue infection.  Tissue was removed and sent for culture.  We used combination of cautery dissection and sharp dissection excised all this obvious necrotic tissue including the underlying fat which is patchy and poorly vascularized consistent with dead tissue.  We continued our dissection extending bilaterally on the buttocks and on the right side extending in the perirectal tissues.  There was an extensive debridement removed all the grossly necrotic tissue and started to extend anteriorly towards the peritoneum.  In the prone position we took it as far as we could go and appeared to  be to an area of relatively normal fat.  The perirectal tissue was debrided and a finger was placed in the anus on rectal exam for guidance with a margins we did not injure or enter the rectum however took a lot of its blood supply require further debridement and it may not survive.  The wound in its entirety was 35 cm from the left to the right extent and 25 cm craniocaudal and tracked 7 cm towards the deep tissue in the perirectal tissues.  There was some bleeding throughout we controlled with pressure as well as electrocautery loss approximately 100 mL of blood and then we packed the wound with Kerlix which was soaked with Betadine and saline.  All counts were correct x2. Patient was awakened from anesthesia and taken to the recovery room in a stable condition having suffered no issues at this time.    Description of the findings of the procedure:  25 x 35 x 7 cm open wound after removal of all the necrotic tissues from the buttocks extending down to the perirectal tissue on the right    Estimated Blood Loss: 100 mL         Specimens:   Necrotic tissue for culture    Drains/Implants: None

## 2022-02-03 NOTE — ASSESSMENT & PLAN NOTE
- Consult for advance care planning in younger ESRD pt currently in ICU with septic shock, in setting of nec fasc. She is s/p OR debridement with general surgery 2/2/22. Familiar with pt from prior hospital stay; chart reviewed in depth, and discussed pt during MDT rounds.   - Met with pt at bedside; she was lethargic and unable to participate in discussion.   - Along with Dr Armenta (hospitalist), called and spoke with pt's sister/preferred MPOA, Tressa. Also present during phone call was pt's Aunt Juliet ([phone]), who is a nurse.    - Thorough medical update given, and answered their questions about specifics of pt's illness   - Did share transparent concern that even with maximal medical therapy (including HD, pressors, abx, and additional trips to the OR), we are worried that patient may not survive hospital stay due to severity of her infection. Family was appropriately upset to hear this information, though voiced that they are hopeful God provides them with a miracle. Support provided.   - For now, plan remains to continue aggressive medical care. Family noted they appreciate direct information, especially given that Juliet has medical background; told them we will continue to provide them with transparent updates.   - Our team will follow up with pt and family. Have requested that  call family for additional support, especially given their reliance of miranda.

## 2022-02-03 NOTE — NURSING
Remains on levophed; pt has become progressively more lethargic as the day progressed; she is now frequently nonverbal and it is difficult to understand her when she does speak. Sister Tressa at bedside. Explained new visiting policy with her and that visiting has to be limited to one visitor per day; she is insisting that other family members need to see her today. Charge RN aware of situation and stated we need to follow the new protocol;attempted to call Gypsy to update her but reached her voicemail.

## 2022-02-03 NOTE — ASSESSMENT & PLAN NOTE
- Impaired functional status at baseline; not able to participate with PT/OT due to extent of wounds and critical illness

## 2022-02-03 NOTE — PROGRESS NOTES
CHUCKIE spoke with patient's sister, Gypsy, to advise her of the dialysis units we would explore.  Sister stated that any unit would be fine.    CHUCKIE spoke with Piper at [facility] to advise of preferred facility.  There is a MWF 3rd shift available at the unit. When result of Hep B Surface Antigen is received, the request will be sent to the unit.  Result is still pending as of 3:12 p.m. this date.

## 2022-02-03 NOTE — TRANSFER OF CARE
"Anesthesia Transfer of Care Note    Patient: Xuan Ricardo    Procedure(s) Performed: Procedure(s) (LRB):  DEBRIDEMENT, BUTTOCK (N/A)    Patient location: ICU    Anesthesia Type: general    Transport from OR: Transported from OR on 100% O2 by closed face mask with adequate spontaneous ventilation    Post pain: adequate analgesia    Post assessment: no apparent anesthetic complications and tolerated procedure well    Post vital signs: stable    Level of consciousness: awake and alert    Nausea/Vomiting: no nausea/vomiting    Complications: none    Transfer of care protocol was followed      Last vitals:   Visit Vitals  /60 (BP Location: Left arm, Patient Position: Lying)   Pulse 99   Temp 36.7 °C (98.1 °F) (Oral)   Resp 16   Ht 5' 4" (1.626 m)   Wt 94.3 kg (207 lb 14.3 oz)   LMP  (LMP Unknown)   SpO2 100%   Breastfeeding No   BMI 35.68 kg/m²     "

## 2022-02-03 NOTE — ASSESSMENT & PLAN NOTE
- Has been present for several months; likely contributed to development of nec fasc. Contributes to her overall poor prognosis.

## 2022-02-03 NOTE — SUBJECTIVE & OBJECTIVE
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 ;  Surgeon: Rakesh Leon MD;  Location: [facility] OR;  Service: Vascular;  Laterality: Left;  RN PREOP 20, UPT done, COVID NEGATRIVE 20  NEED H/P     SECTION, CLASSIC      ESOPHAGOGASTRODUODENOSCOPY N/A 2022    Procedure: EGD (ESOPHAGOGASTRODUODENOSCOPY);  Surgeon: Mario Alberto Irene MD;  Location: [facility];  Service: Endoscopy;  Laterality: N/A;    INSERTION OF TUNNELED CENTRAL VENOUS CATHETER (CVC) WITH SUBCUTANEOUS PORT N/A 2020    Procedure: IR TUNNELED VATH INSERT WITHOUT PORT;  Surgeon: Omer Diagnostic Provider;  Location: [facility] OR;  Service: Radiology;  Laterality: N/A;  1030AM START  RN PREOP 2020    INSERTION OF TUNNELED CENTRAL VENOUS CATHETER (CVC) WITH SUBCUTANEOUS PORT N/A 2020    Procedure: TUNNELED CATH PLACEMENT;  Surgeon: Omer Diagnostic Provider;  Location: [facility] OR;  Service: Radiology;  Laterality: N/A;  930AM START    REVISION OF ARTERIOVENOUS FISTULA Left 2020    Procedure: REVISION, AV FISTULA, THROMBECTOMY, LEFT UPPER EXTREMITY;  Surgeon: Rakesh Leon MD;  Location: [facility] OR;  Service: Vascular;  Laterality: Left;    TUBAL LIGATION         Review of patient's allergies indicates:   Allergen Reactions    Vicodin [hydrocodone-acetaminophen] Hives    Codeine Hives       Medications:  Continuous Infusions:   NORepinephrine bitartrate-D5W 0.07 mcg/kg/min (22 1000)     Scheduled Meds:   sodium chloride  0.9%   Intravenous Once    cinacalcet  30 mg Oral Daily with breakfast    epoetin kelsey-epbx  10,000 Units Intravenous Every Mon, Wed, Fri    ferrous gluconate  324 mg Oral Daily with breakfast    gabapentin  100 mg Oral BID    insulin detemir U-100  8 Units Subcutaneous QHS    mupirocin   Nasal BID    pantoprazole  40 mg Intravenous BID    piperacillin-tazobactam (ZOSYN) IVPB  4.5 g Intravenous Q12H    sevelamer carbonate  2,400 mg Oral TID WM    sodium chloride 0.9%  10 mL Intravenous Q6H    sodium thiosulfate  25 g Intravenous Every Mon, Wed, Fri    vancomycin (VANCOCIN) IVPB  500 mg Intravenous Once    vitamin renal formula (B-complex-vitamin c-folic acid)  1 capsule Oral Daily     PRN Meds:sodium chloride, sodium chloride 0.9%, dextrose 50%, dextrose 50%, glucagon (human recombinant), glucose, glucose, heparin (porcine), insulin aspart U-100, ondansetron, oxyCODONE, sodium chloride 0.9%, Flushing PICC Protocol **AND** sodium chloride 0.9% **AND** sodium chloride 0.9%, Pharmacy to dose Vancomycin consult **AND** vancomycin - pharmacy to dose    Family History     Problem Relation (Age of Onset)    Asthma Sister    Heart failure Father (58)    Seizures Mother (64)        Tobacco Use    Smoking status: Former Smoker     Types: Cigarettes    Smokeless tobacco: Never Used   Substance and Sexual Activity    Alcohol use: No    Drug use: Yes     Types: Marijuana     Comment: Occassional Recreational Use only    Sexual activity: Not Currently     Partners: Male       Review of Systems   Unable to perform ROS: Acuity of condition     Objective:     Vital Signs (Most Recent):  Temp: 98.8 °F (37.1 °C) (02/03/22 0800)  Pulse: 109 (02/03/22 1000)  Resp: 11 (02/03/22 1000)  BP: (!) 96/50 (02/03/22 1000)  SpO2: 95 % (02/03/22 1000) Vital Signs (24h Range):  Temp:  [97.5 °F (36.4 °C)-98.8 °F (37.1 °C)] 98.8 °F (37.1 °C)  Pulse:  [] 109  Resp:  [9-31] 11  SpO2:  [91 %-100 %] 95 %  BP: ()/()  96/50     Weight: 94.3 kg (207 lb 14.3 oz)  Body mass index is 35.68 kg/m².    Physical Exam  Constitutional:       Comments: Lying in bed, both acutely and chronically ill-appearing, in no distress    HENT:      Head: Normocephalic and atraumatic.      Nose: Nose normal.      Mouth/Throat:      Mouth: Mucous membranes are dry.   Eyes:      Extraocular Movements: Extraocular movements intact.   Cardiovascular:      Rate and Rhythm: Tachycardia present.   Pulmonary:      Comments: NC oxygen, no increased work of breathing   Skin:     General: Skin is warm.      Comments: Wound dressings in place     Neurological:      Comments: Lethargic, unable to participate in discussion   Psychiatric:      Comments: Calm        Significant Labs: All pertinent labs within the past 24 hours have been reviewed.  CBC:   Recent Labs   Lab 02/03/22  0801   WBC 27.76*   HGB 7.8*   HCT 23.8*   MCV 92        BMP:  Recent Labs   Lab 02/03/22  0626   *      K 4.5   CL 98   CO2 14*   BUN 33*   CREATININE 4.4*   CALCIUM 8.5*   MG 1.7     LFT:  Lab Results   Component Value Date    AST 29 02/03/2022    ALKPHOS 171 (H) 02/03/2022    BILITOT 3.5 (H) 02/03/2022     Albumin:   Albumin   Date Value Ref Range Status   02/03/2022 0.9 (L) 3.5 - 5.2 g/dL Final     Protein:   Total Protein   Date Value Ref Range Status   02/03/2022 5.2 (L) 6.0 - 8.4 g/dL Final     Lactic acid:   Lab Results   Component Value Date    LACTATE 1.6 02/03/2022    LACTATE 1.7 02/01/2022       Significant Imaging: I have reviewed all pertinent imaging results/findings within the past 24 hours.

## 2022-02-03 NOTE — PLAN OF CARE
During last hospital admission pt was refused to be accepted at all [facility] locations.     Pt was assigned to [facility] on 1/[facility]/2022 and confirmed into this clinic prior to her hospital DC.    Pt readmitted to hospital 2/12022 after being denied HD at clinic stating Dr. Meyers refused to accept pt.    CLAIRE called [facility] [phone], 2/2/2022 spoke with David - sidra states pt is accepted to [facility] and Dr. Meyers's refusal was unprofessional and not in line with [facility] policy.  [facility] Admissions records state that Dr. Meyers refused to accept pt on 1/31/2022.     CM called [facility] today to request update regarding their refussal to accept pt at the clinic assigned,  Alonso is researching the case.  Message left for supervisor at [facility] to call CM back.

## 2022-02-03 NOTE — PROGRESS NOTES
Pharmacokinetic Assessment Follow Up: IV Vancomycin    Vancomycin serum concentration assessment(s):    The random level was drawn correctly and can be used to guide therapy at this time. The measurement is within the desired definitive target range of 10 to 20 mcg/mL.    Vancomycin Regimen Plan:    Give 500 mg after dialysis today.  Re-dose when the random level is less than 20 mcg/mL, next level to be drawn at 0300 on 2/4/2022    Drug levels (last 3 results):  Recent Labs   Lab Result Units 02/02/22  0355 02/03/22  0626   Vancomycin, Random ug/mL 22.3 13.6       Pharmacy will continue to follow and monitor vancomycin.    Please contact pharmacy at extension [phone] for questions regarding this assessment.    Thank you for the consult,   Julien Bolivar Jr       Patient brief summary:  Xuan Wrightsin is a 41 y.o. female initiated on antimicrobial therapy with IV Vancomycin for treatment of skin & soft tissue infection    Drug Allergies:   Review of patient's allergies indicates:   Allergen Reactions    Vicodin [hydrocodone-acetaminophen] Hives    Codeine Hives       Actual Body Weight:   94.3 kg    Renal Function:   Estimated Creatinine Clearance: 18.3 mL/min (A) (based on SCr of 4.4 mg/dL (H)).,     Dialysis Method (if applicable):  intermittent HD    CBC (last 72 hours):  Recent Labs   Lab Result Units 02/01/22  1408 02/02/22  0104 02/02/22  0355 02/03/22  0238   WBC K/uL 19.09*  --  20.64* 26.39*   Hemoglobin g/dL 4.3* 7.9* 7.9* 8.6*   Hematocrit % 14.7* 25.3* 24.6* 25.6*   Platelets K/uL 209  --  249 268   Gran % % 85.0*  --  77.0* 86.0*   Lymph % % 4.0*  --  3.0* 1.0*   Mono % % 1.0*  --  3.0* 2.0*   Eosinophil % % 0.0  --  1.0 0.0   Basophil % % 0.0  --  0.0 0.0   Differential Method  Manual  --  Manual Manual       Metabolic Panel (last 72 hours):  Recent Labs   Lab Result Units 02/01/22  1530 02/02/22  0104 02/02/22  0355 02/02/22  0835 02/03/22  0238 02/03/22  0626   Sodium mmol/L 128* 135* 135* 132* 136  136   Potassium mmol/L 4.9 5.6* 5.4* 5.1 4.3 4.5   Chloride mmol/L 82* 98 98 96 99 98   CO2 mmol/L 17* 18* 19* 22* 15* 14*   Glucose mg/dL 753* 154* 181* 277* 159* 190*   BUN mg/dL 62* 71* 70* 76* 28* 33*   Creatinine mg/dL 7.9* 8.5* 8.3* 8.3* 3.9* 4.4*   Albumin g/dL 0.8*  --  0.9*  --   --  0.9*   Total Bilirubin mg/dL 3.6*  --  4.1*  --   --  3.5*   Alkaline Phosphatase U/L 196*  --  147*  --   --  171*   AST U/L 81*  --  52*  --   --  29   ALT U/L 30  --  28  --   --  27   Magnesium mg/dL 1.8  --  2.0  --   --  1.7   Phosphorus mg/dL  --   --  4.6*  --   --  4.0       Vancomycin Administrations:  vancomycin given in the last 96 hours                     vancomycin 1.5 g in dextrose 5 % 250 mL IVPB (ready to mix) (mg) 1,500 mg New Bag 02/01/22 1928                    Microbiologic Results:  Microbiology Results (last 7 days)       Procedure Component Value Units Date/Time    Culture, Anaerobe [[id number]] Collected: 02/02/22 1905    Order Status: Sent Specimen: Wound from Buttocks, Left Updated: 02/02/22 2005    Aerobic culture [[id number]] Collected: 02/02/22 1905    Order Status: Sent Specimen: Wound from Buttocks, Left Updated: 02/02/22 2005    AFB Culture & Smear [[id number]] Collected: 02/02/22 1905    Order Status: Sent Specimen: Wound from Buttocks, Left Updated: 02/02/22 2005    Fungus culture [[id number]] Collected: 02/02/22 1905    Order Status: Sent Specimen: Wound from Buttocks, Left Updated: 02/02/22 2005    Gram stain [[id number]] Collected: 02/02/22 1905    Order Status: Sent Specimen: Wound from Buttocks, Left Updated: 02/02/22 2005    Blood culture #1 [[id number]] Collected: 02/01/22 1429    Order Status: Completed Specimen: Blood from Peripheral, Forearm, Left Updated: 02/02/22 1503     Blood Culture, Routine No Growth to date      No Growth to date    Narrative:      Blood Culture #1    Blood culture #2 [[id number]] Collected: 02/01/22 1406    Order Status: Completed Specimen: Blood from Peripheral,  Upper Arm, Left Updated: 02/02/22 1503     Blood Culture, Routine No Growth to date      No Growth to date    Narrative:      Blood Culture #2

## 2022-02-04 ENCOUNTER — ANESTHESIA (OUTPATIENT)
Dept: SURGERY | Facility: HOSPITAL | Age: 42
DRG: 853 | End: 2022-02-04
Payer: MEDICARE

## 2022-02-04 LAB
ALBUMIN SERPL BCP-MCNC: 0.8 G/DL (ref 3.5–5.2)
ALP SERPL-CCNC: 144 U/L (ref 55–135)
ALT SERPL W/O P-5'-P-CCNC: 19 U/L (ref 10–44)
ANION GAP SERPL CALC-SCNC: 19 MMOL/L (ref 8–16)
ANISOCYTOSIS BLD QL SMEAR: SLIGHT
AST SERPL-CCNC: 28 U/L (ref 10–40)
BASOPHILS # BLD AUTO: ABNORMAL K/UL (ref 0–0.2)
BASOPHILS NFR BLD: 0 % (ref 0–1.9)
BILIRUB SERPL-MCNC: 4 MG/DL (ref 0.1–1)
BUN SERPL-MCNC: 40 MG/DL (ref 6–20)
CALCIUM SERPL-MCNC: 8.3 MG/DL (ref 8.7–10.5)
CHLORIDE SERPL-SCNC: 91 MMOL/L (ref 95–110)
CO2 SERPL-SCNC: 26 MMOL/L (ref 23–29)
CREAT SERPL-MCNC: 5.2 MG/DL (ref 0.5–1.4)
DIFFERENTIAL METHOD: ABNORMAL
EOSINOPHIL # BLD AUTO: ABNORMAL K/UL (ref 0–0.5)
EOSINOPHIL NFR BLD: 0 % (ref 0–8)
ERYTHROCYTE [DISTWIDTH] IN BLOOD BY AUTOMATED COUNT: 18.3 % (ref 11.5–14.5)
EST. GFR  (AFRICAN AMERICAN): 11 ML/MIN/1.73 M^2
EST. GFR  (NON AFRICAN AMERICAN): 9 ML/MIN/1.73 M^2
GLUCOSE SERPL-MCNC: 133 MG/DL (ref 70–110)
HCT VFR BLD AUTO: 22.5 % (ref 37–48.5)
HGB BLD-MCNC: 7.3 G/DL (ref 12–16)
IMM GRANULOCYTES # BLD AUTO: ABNORMAL K/UL (ref 0–0.04)
IMM GRANULOCYTES NFR BLD AUTO: ABNORMAL % (ref 0–0.5)
INR PPP: 3.7 (ref 0.8–1.2)
LYMPHOCYTES # BLD AUTO: ABNORMAL K/UL (ref 1–4.8)
LYMPHOCYTES NFR BLD: 10 % (ref 18–48)
MAGNESIUM SERPL-MCNC: 1.7 MG/DL (ref 1.6–2.6)
MCH RBC QN AUTO: 29.3 PG (ref 27–31)
MCHC RBC AUTO-ENTMCNC: 32.4 G/DL (ref 32–36)
MCV RBC AUTO: 90 FL (ref 82–98)
MONOCYTES # BLD AUTO: ABNORMAL K/UL (ref 0.3–1)
MONOCYTES NFR BLD: 2 % (ref 4–15)
NEUTROPHILS NFR BLD: 84 % (ref 38–73)
NEUTS BAND NFR BLD MANUAL: 4 %
NRBC BLD-RTO: 0 /100 WBC
PHOSPHATE SERPL-MCNC: 3.9 MG/DL (ref 2.7–4.5)
PLATELET # BLD AUTO: 251 K/UL (ref 150–450)
PLATELET BLD QL SMEAR: ABNORMAL
PMV BLD AUTO: 10 FL (ref 9.2–12.9)
POCT GLUCOSE: 113 MG/DL (ref 70–110)
POCT GLUCOSE: 114 MG/DL (ref 70–110)
POCT GLUCOSE: 141 MG/DL (ref 70–110)
POCT GLUCOSE: 35 MG/DL (ref 70–110)
POCT GLUCOSE: 63 MG/DL (ref 70–110)
POCT GLUCOSE: 95 MG/DL (ref 70–110)
POIKILOCYTOSIS BLD QL SMEAR: SLIGHT
POTASSIUM SERPL-SCNC: 4.1 MMOL/L (ref 3.5–5.1)
PROT SERPL-MCNC: 5 G/DL (ref 6–8.4)
PROTHROMBIN TIME: 37.3 SEC (ref 9–12.5)
RBC # BLD AUTO: 2.49 M/UL (ref 4–5.4)
SODIUM SERPL-SCNC: 136 MMOL/L (ref 136–145)
VANCOMYCIN SERPL-MCNC: 21.2 UG/ML
WBC # BLD AUTO: 26.25 K/UL (ref 3.9–12.7)

## 2022-02-04 PROCEDURE — 25000003 PHARM REV CODE 250: Performed by: STUDENT IN AN ORGANIZED HEALTH CARE EDUCATION/TRAINING PROGRAM

## 2022-02-04 PROCEDURE — C9113 INJ PANTOPRAZOLE SODIUM, VIA: HCPCS | Performed by: STUDENT IN AN ORGANIZED HEALTH CARE EDUCATION/TRAINING PROGRAM

## 2022-02-04 PROCEDURE — 27000207 HC ISOLATION

## 2022-02-04 PROCEDURE — A4216 STERILE WATER/SALINE, 10 ML: HCPCS | Performed by: STUDENT IN AN ORGANIZED HEALTH CARE EDUCATION/TRAINING PROGRAM

## 2022-02-04 PROCEDURE — 37000008 HC ANESTHESIA 1ST 15 MINUTES: Performed by: SURGERY

## 2022-02-04 PROCEDURE — 85027 COMPLETE CBC AUTOMATED: CPT | Performed by: STUDENT IN AN ORGANIZED HEALTH CARE EDUCATION/TRAINING PROGRAM

## 2022-02-04 PROCEDURE — 11006 DBRDMT SKIN XTRNL GENT PER: CPT | Mod: ,,, | Performed by: SURGERY

## 2022-02-04 PROCEDURE — D9220A PRA ANESTHESIA: Mod: ANES,,, | Performed by: ANESTHESIOLOGY

## 2022-02-04 PROCEDURE — D9220A PRA ANESTHESIA: Mod: CRNA,,, | Performed by: NURSE ANESTHETIST, CERTIFIED REGISTERED

## 2022-02-04 PROCEDURE — 80202 ASSAY OF VANCOMYCIN: CPT | Performed by: STUDENT IN AN ORGANIZED HEALTH CARE EDUCATION/TRAINING PROGRAM

## 2022-02-04 PROCEDURE — 94761 N-INVAS EAR/PLS OXIMETRY MLT: CPT

## 2022-02-04 PROCEDURE — 84100 ASSAY OF PHOSPHORUS: CPT | Performed by: STUDENT IN AN ORGANIZED HEALTH CARE EDUCATION/TRAINING PROGRAM

## 2022-02-04 PROCEDURE — 63600175 PHARM REV CODE 636 W HCPCS: Performed by: STUDENT IN AN ORGANIZED HEALTH CARE EDUCATION/TRAINING PROGRAM

## 2022-02-04 PROCEDURE — 63600175 PHARM REV CODE 636 W HCPCS: Mod: JG | Performed by: STUDENT IN AN ORGANIZED HEALTH CARE EDUCATION/TRAINING PROGRAM

## 2022-02-04 PROCEDURE — 63600175 PHARM REV CODE 636 W HCPCS: Performed by: ANESTHESIOLOGY

## 2022-02-04 PROCEDURE — 80053 COMPREHEN METABOLIC PANEL: CPT | Performed by: STUDENT IN AN ORGANIZED HEALTH CARE EDUCATION/TRAINING PROGRAM

## 2022-02-04 PROCEDURE — D9220A PRA ANESTHESIA: ICD-10-PCS | Mod: ANES,,, | Performed by: ANESTHESIOLOGY

## 2022-02-04 PROCEDURE — 11006 PR DEBRIDE NECROT SKIN/ TISS, GENIT,PERI, ADB WALL: ICD-10-PCS | Mod: ,,, | Performed by: SURGERY

## 2022-02-04 PROCEDURE — 25000003 PHARM REV CODE 250: Performed by: INTERNAL MEDICINE

## 2022-02-04 PROCEDURE — A4217 STERILE WATER/SALINE, 500 ML: HCPCS | Performed by: INTERNAL MEDICINE

## 2022-02-04 PROCEDURE — 85007 BL SMEAR W/DIFF WBC COUNT: CPT | Performed by: STUDENT IN AN ORGANIZED HEALTH CARE EDUCATION/TRAINING PROGRAM

## 2022-02-04 PROCEDURE — 20000000 HC ICU ROOM

## 2022-02-04 PROCEDURE — 25000003 PHARM REV CODE 250: Performed by: NURSE ANESTHETIST, CERTIFIED REGISTERED

## 2022-02-04 PROCEDURE — 83735 ASSAY OF MAGNESIUM: CPT | Performed by: STUDENT IN AN ORGANIZED HEALTH CARE EDUCATION/TRAINING PROGRAM

## 2022-02-04 PROCEDURE — 36000707: Performed by: SURGERY

## 2022-02-04 PROCEDURE — 85610 PROTHROMBIN TIME: CPT | Performed by: ANESTHESIOLOGY

## 2022-02-04 PROCEDURE — 36000706: Performed by: SURGERY

## 2022-02-04 PROCEDURE — D9220A PRA ANESTHESIA: ICD-10-PCS | Mod: CRNA,,, | Performed by: NURSE ANESTHETIST, CERTIFIED REGISTERED

## 2022-02-04 PROCEDURE — 63600175 PHARM REV CODE 636 W HCPCS: Performed by: NURSE ANESTHETIST, CERTIFIED REGISTERED

## 2022-02-04 PROCEDURE — 37000009 HC ANESTHESIA EA ADD 15 MINS: Performed by: SURGERY

## 2022-02-04 RX ORDER — PHYTONADIONE 10 MG/ML
1 INJECTION, EMULSION INTRAMUSCULAR; INTRAVENOUS; SUBCUTANEOUS ONCE
Status: COMPLETED | OUTPATIENT
Start: 2022-02-04 | End: 2022-02-04

## 2022-02-04 RX ORDER — PHENYLEPHRINE HYDROCHLORIDE 10 MG/ML
INJECTION INTRAVENOUS
Status: DISCONTINUED | OUTPATIENT
Start: 2022-02-04 | End: 2022-02-04

## 2022-02-04 RX ORDER — FENTANYL CITRATE 50 UG/ML
INJECTION, SOLUTION INTRAMUSCULAR; INTRAVENOUS
Status: DISCONTINUED | OUTPATIENT
Start: 2022-02-04 | End: 2022-02-04

## 2022-02-04 RX ORDER — ROCURONIUM BROMIDE 10 MG/ML
INJECTION, SOLUTION INTRAVENOUS
Status: DISCONTINUED | OUTPATIENT
Start: 2022-02-04 | End: 2022-02-04

## 2022-02-04 RX ORDER — HYDROCODONE BITARTRATE AND ACETAMINOPHEN 500; 5 MG/1; MG/1
TABLET ORAL
Status: DISCONTINUED | OUTPATIENT
Start: 2022-02-04 | End: 2022-02-07

## 2022-02-04 RX ORDER — LIDOCAINE HYDROCHLORIDE 20 MG/ML
INJECTION INTRAVENOUS
Status: DISCONTINUED | OUTPATIENT
Start: 2022-02-04 | End: 2022-02-04

## 2022-02-04 RX ORDER — ETOMIDATE 2 MG/ML
INJECTION INTRAVENOUS
Status: DISCONTINUED | OUTPATIENT
Start: 2022-02-04 | End: 2022-02-04

## 2022-02-04 RX ORDER — MORPHINE SULFATE 4 MG/ML
4 INJECTION, SOLUTION INTRAMUSCULAR; INTRAVENOUS EVERY 4 HOURS PRN
Status: DISCONTINUED | OUTPATIENT
Start: 2022-02-04 | End: 2022-02-05

## 2022-02-04 RX ADMIN — MORPHINE SULFATE 4 MG: 4 INJECTION INTRAVENOUS at 11:02

## 2022-02-04 RX ADMIN — SODIUM THIOSULFATE 25 G: 250 INJECTION, SOLUTION INTRAVENOUS at 05:02

## 2022-02-04 RX ADMIN — Medication 10 ML: at 05:02

## 2022-02-04 RX ADMIN — MORPHINE SULFATE 4 MG: 4 INJECTION INTRAVENOUS at 05:02

## 2022-02-04 RX ADMIN — PANTOPRAZOLE SODIUM 40 MG: 40 INJECTION, POWDER, FOR SOLUTION INTRAVENOUS at 09:02

## 2022-02-04 RX ADMIN — PHENYLEPHRINE HYDROCHLORIDE 100 MCG: 10 INJECTION INTRAVENOUS at 03:02

## 2022-02-04 RX ADMIN — SUGAMMADEX 50 MG: 100 INJECTION, SOLUTION INTRAVENOUS at 03:02

## 2022-02-04 RX ADMIN — PHENYLEPHRINE HYDROCHLORIDE 100 MCG: 10 INJECTION INTRAVENOUS at 02:02

## 2022-02-04 RX ADMIN — OXYCODONE 10 MG: 5 TABLET ORAL at 05:02

## 2022-02-04 RX ADMIN — PIPERACILLIN AND TAZOBACTAM 4.5 G: 4; .5 INJECTION, POWDER, LYOPHILIZED, FOR SOLUTION INTRAVENOUS; PARENTERAL at 05:02

## 2022-02-04 RX ADMIN — ROCURONIUM BROMIDE 50 MG: 10 INJECTION, SOLUTION INTRAVENOUS at 02:02

## 2022-02-04 RX ADMIN — PHYTONADIONE 1 MG: 10 INJECTION, EMULSION INTRAMUSCULAR; INTRAVENOUS; SUBCUTANEOUS at 08:02

## 2022-02-04 RX ADMIN — Medication 10 ML: at 11:02

## 2022-02-04 RX ADMIN — MUPIROCIN: 20 OINTMENT TOPICAL at 08:02

## 2022-02-04 RX ADMIN — ETOMIDATE 16 MG: 2 INJECTION, SOLUTION INTRAVENOUS at 02:02

## 2022-02-04 RX ADMIN — FENTANYL CITRATE 50 MCG: 50 INJECTION, SOLUTION INTRAMUSCULAR; INTRAVENOUS at 02:02

## 2022-02-04 RX ADMIN — GABAPENTIN 100 MG: 100 CAPSULE ORAL at 09:02

## 2022-02-04 RX ADMIN — FENTANYL CITRATE 50 MCG: 50 INJECTION, SOLUTION INTRAMUSCULAR; INTRAVENOUS at 03:02

## 2022-02-04 RX ADMIN — LIDOCAINE HYDROCHLORIDE 100 MG: 20 INJECTION, SOLUTION INTRAVENOUS at 02:02

## 2022-02-04 RX ADMIN — SUGAMMADEX 100 MG: 100 INJECTION, SOLUTION INTRAVENOUS at 03:02

## 2022-02-04 RX ADMIN — SODIUM BICARBONATE: 84 INJECTION, SOLUTION INTRAVENOUS at 12:02

## 2022-02-04 RX ADMIN — PANTOPRAZOLE SODIUM 40 MG: 40 INJECTION, POWDER, FOR SOLUTION INTRAVENOUS at 08:02

## 2022-02-04 RX ADMIN — SODIUM CHLORIDE: 0.9 INJECTION, SOLUTION INTRAVENOUS at 02:02

## 2022-02-04 RX ADMIN — MUPIROCIN: 20 OINTMENT TOPICAL at 09:02

## 2022-02-04 RX ADMIN — EPOETIN ALFA-EPBX 10000 UNITS: 10000 INJECTION, SOLUTION INTRAVENOUS; SUBCUTANEOUS at 05:02

## 2022-02-04 RX ADMIN — NOREPINEPHRINE BITARTRATE 0.04 MCG/KG/MIN: 4 INJECTION, SOLUTION INTRAVENOUS at 02:02

## 2022-02-04 NOTE — PLAN OF CARE
[facility]  Discharge Reassessment    Primary Care Provider: Katharine Franks MD    Expected Discharge Date:  TBD    Reassessment (most recent)       Discharge Reassessment - 02/04/22 1618          Discharge Reassessment    Assessment Type Discharge Planning Reassessment     Did the patient's condition or plan change since previous assessment? Yes     Discharge Plan discussed with: Sibling     Name(s) and Number(s) Gypsy Ricardo;  [phone]     Communicated PAMELA with patient/caregiver No     Discharge Plan A Home Health     Discharge Plan B Other   TBD    DME Needed Upon Discharge  wound care supplies     Discharge Barriers Identified Other (see comments)   Dialysis placement issues.    Why the patient remains in the hospital Requires continued medical care        Post-Acute Status    Post-Acute Authorization Dialysis     Diaylsis Status Patient declined/refused   Ongoing : to secure a provider.    Coverage [facility]     Discharge Delays None known at this time   Patient not medically stable for discharge.                This patient has been screened for Case Management needs.  Based on (documentation in medical record/communication with physician/), patient remains in ICU with treatment ongoing.  Patient back to OR today.    Discharge Plan:  Patient will need OP HD arranged prior to discharge.  Case management department staff working to try to secure a dialysis unit. Patient will also need home health for wound care.      Case Management/Social Work remains available if a need arises, please enter consult for assistance.  For urgent needs contact Case Management Department/on-call at:  [phone]

## 2022-02-04 NOTE — PROGRESS NOTES
Pharmacokinetic Assessment Follow Up: IV Vancomycin    Vancomycin serum concentration assessment(s):    The random level was drawn correctly and can be used to guide therapy at this time. The measurement is above the desired definitive target range of 10 to 20 mcg/mL.    Vancomycin Regimen Plan:    Re-dose when the random level is less than 20 mcg/mL, next level to be drawn at 0600 on 02/04/22    Drug levels (last 3 results):  Recent Labs   Lab Result Units 02/02/22  0355 02/03/22  0626 02/04/22  0511   Vancomycin, Random ug/mL 22.3 13.6 21.2       Pharmacy will continue to follow and monitor vancomycin.    Please contact pharmacy at extension [phone] for questions regarding this assessment.    Thank you for the consult,   Amos Teresa       Patient brief summary:  Xuan Cousin is a 41 y.o. female initiated on antimicrobial therapy with IV Vancomycin for treatment of skin & soft tissue infection    The patient's current regimen is random pulse dosing    Drug Allergies:   Review of patient's allergies indicates:   Allergen Reactions    Vicodin [hydrocodone-acetaminophen] Hives    Codeine Hives       Actual Body Weight:   94.3 kg    Renal Function:   Estimated Creatinine Clearance: 18.3 mL/min (A) (based on SCr of 4.4 mg/dL (H)).,     Dialysis Method (if applicable):  intermittent HD    CBC (last 72 hours):  Recent Labs   Lab Result Units 02/01/22  1408 02/02/22  0104 02/02/22  0355 02/03/22  0238 02/03/22  0801 02/04/22  0511   WBC K/uL 19.09*  --  20.64* 26.39* 27.76* 26.25*   Hemoglobin g/dL 4.3* 7.9* 7.9* 8.6* 7.8* 7.3*   Hematocrit % 14.7* 25.3* 24.6* 25.6* 23.8* 22.5*   Platelets K/uL 209  --  249 268 274 251   Gran % % 85.0*  --  77.0* 86.0* 71.0  --    Lymph % % 4.0*  --  3.0* 1.0* 5.0*  --    Mono % % 1.0*  --  3.0* 2.0* 2.0*  --    Eosinophil % % 0.0  --  1.0 0.0 0.0  --    Basophil % % 0.0  --  0.0 0.0 0.0  --    Differential Method  Manual  --  Manual Manual Manual  --        Metabolic Panel (last  72 hours):  Recent Labs   Lab Result Units 02/01/22  1530 02/02/22  0104 02/02/22  0355 02/02/22  0835 02/03/22  0238 02/03/22  0626   Sodium mmol/L 128* 135* 135* 132* 136 136   Potassium mmol/L 4.9 5.6* 5.4* 5.1 4.3 4.5   Chloride mmol/L 82* 98 98 96 99 98   CO2 mmol/L 17* 18* 19* 22* 15* 14*   Glucose mg/dL 753* 154* 181* 277* 159* 190*   BUN mg/dL 62* 71* 70* 76* 28* 33*   Creatinine mg/dL 7.9* 8.5* 8.3* 8.3* 3.9* 4.4*   Albumin g/dL 0.8*  --  0.9*  --   --  0.9*   Total Bilirubin mg/dL 3.6*  --  4.1*  --   --  3.5*   Alkaline Phosphatase U/L 196*  --  147*  --   --  171*   AST U/L 81*  --  52*  --   --  29   ALT U/L 30  --  28  --   --  27   Magnesium mg/dL 1.8  --  2.0  --   --  1.7   Phosphorus mg/dL  --   --  4.6*  --   --  4.0       Vancomycin Administrations:  vancomycin given in the last 96 hours                     vancomycin 500 mg in dextrose 5 % 100 mL IVPB (ready to mix system) (mg) 500 mg New Bag 02/03/22 1204    vancomycin 1.5 g in dextrose 5 % 250 mL IVPB (ready to mix) (mg) 1,500 mg New Bag 02/01/22 1553                    Microbiologic Results:  Microbiology Results (last 7 days)       Procedure Component Value Units Date/Time    Blood culture #1 [[id number]] Collected: 02/01/22 1429    Order Status: Completed Specimen: Blood from Peripheral, Forearm, Left Updated: 02/03/22 1503     Blood Culture, Routine No Growth to date      No Growth to date      No Growth to date    Narrative:      Blood Culture #1    Blood culture #2 [[id number]] Collected: 02/01/22 1406    Order Status: Completed Specimen: Blood from Peripheral, Upper Arm, Left Updated: 02/03/22 1503     Blood Culture, Routine No Growth to date      No Growth to date      No Growth to date    Narrative:      Blood Culture #2    Aerobic culture [[id number]] Collected: 02/02/22 1905    Order Status: Completed Specimen: Wound from Buttocks, Left Updated: 02/03/22 1307     Aerobic Bacterial Culture No growth    Narrative:      Bilateral buttock  wound    Gram stain [[id number]] Collected: 02/02/22 1905    Order Status: Completed Specimen: Wound from Buttocks, Left Updated: 02/03/22 1145     Gram Stain Result No WBC's      Rare Gram positive cocci in pairs      Rare Gram positive rods    Narrative:      Bilateral buttock wound    AFB Culture & Smear [[id number]] Collected: 02/02/22 1905    Order Status: Sent Specimen: Wound from Buttocks, Left Updated: 02/03/22 0952    Culture, Anaerobe [[id number]] Collected: 02/02/22 1905    Order Status: Sent Specimen: Wound from Buttocks, Left Updated: 02/02/22 2005    Fungus culture [[id number]] Collected: 02/02/22 1905    Order Status: Sent Specimen: Wound from Buttocks, Left Updated: 02/02/22 2005

## 2022-02-04 NOTE — ANESTHESIA PROCEDURE NOTES
Intubation    Date/Time: 2/4/2022 2:57 PM  Performed by: Carrol Braga CRNA  Authorized by: Najma Griffith MD     Intubation:     Induction:  Intravenous    Intubated:  Postinduction    Attempts:  1    Attempted By:  KIRSTEN    Blade:  Campos 3    Laryngeal View Grade: Grade I - full view of cords      Difficult Airway Encountered?: No      Complications:  None    Airway Device:  Oral endotracheal tube    Airway Device Size:  7.0    Style/Cuff Inflation:  Cuffed    Inflation Amount (mL):  5    Tube secured:  21    Secured at:  The lips    Placement Verified By:  Capnometry    Complicating Factors:  None    Findings Post-Intubation:  BS equal bilateral and atraumatic/condition of teeth unchanged

## 2022-02-04 NOTE — SUBJECTIVE & OBJECTIVE
Interval History: post op day 1. Seems confused, I asked her about surgery and she seemed to not remember. I then asked if she would like me to call her children and she said no. Not as responsive today.     Review of Systems   HENT: Negative for ear discharge and ear pain.    Eyes: Negative for discharge and itching.   Endocrine: Negative for cold intolerance and heat intolerance.   Musculoskeletal: Positive for arthralgias.   Neurological: Negative for seizures and syncope.     Objective:     Vital Signs (Most Recent):  Temp: 99.7 °F (37.6 °C) (02/03/22 1630)  Pulse: (!) 111 (02/03/22 1900)  Resp: 16 (02/03/22 1900)  BP: (!) 104/50 (02/03/22 1900)  SpO2: 95 % (02/03/22 1900) Vital Signs (24h Range):  Temp:  [97.5 °F (36.4 °C)-99.7 °F (37.6 °C)] 99.7 °F (37.6 °C)  Pulse:  [] 111  Resp:  [9-34] 16  SpO2:  [91 %-100 %] 95 %  BP: ()/() 104/50     Weight: 94.3 kg (207 lb 14.3 oz)  Body mass index is 35.68 kg/m².    Intake/Output Summary (Last 24 hours) at 2/3/2022 2051  Last data filed at 2/3/2022 1728  Gross per 24 hour   Intake 2040.61 ml   Output 1500 ml   Net 540.61 ml      Physical Exam  Vitals and nursing note reviewed.   Constitutional:       Appearance: She is ill-appearing. She is not diaphoretic.   HENT:      Head: Normocephalic and atraumatic.      Mouth/Throat:      Mouth: Mucous membranes are dry.      Pharynx: No oropharyngeal exudate or posterior oropharyngeal erythema.   Cardiovascular:      Rate and Rhythm: Regular rhythm. Tachycardia present.   Pulmonary:      Effort: No respiratory distress.      Breath sounds: Normal breath sounds.      Comments: On room air  Abdominal:      General: Bowel sounds are normal.      Palpations: Abdomen is soft.   Musculoskeletal:         General: No deformity or signs of injury.      Cervical back: Neck supple. No rigidity.   Skin:     General: Skin is warm.      Comments: Dressings intact, did not undress   Neurological:      Comments: Alert but  lethargic, responds somewhat appropriately and answers questions         Significant Labs:   All pertinent labs within the past 24 hours have been reviewed.  BMP:   Recent Labs   Lab 02/03/22  0626   *      K 4.5   CL 98   CO2 14*   BUN 33*   CREATININE 4.4*   CALCIUM 8.5*   MG 1.7     CBC:   Recent Labs   Lab 02/02/22  0355 02/03/22  0238 02/03/22  0801   WBC 20.64* 26.39* 27.76*   HGB 7.9* 8.6* 7.8*   HCT 24.6* 25.6* 23.8*    268 274       Significant Imaging: I have reviewed all pertinent imaging results/findings within the past 24 hours.

## 2022-02-04 NOTE — ASSESSMENT & PLAN NOTE
"Patient now with necrotizing fascitis of buttocks.  This patient does have evidence of infective focus  My overall impression is sepsis. Vital signs were reviewed and noted in progress note.  Antibiotics given-   Antibiotics (From admission, onward)            Start     Stop Route Frequency Ordered    02/02/22 1800  piperacillin-tazobactam 4.5 g in dextrose 5 % 100 mL IVPB (ready to mix system)         -- IV Every 12 hours (non-standard times) 02/02/22 0716    02/02/22 1215  mupirocin 2 % ointment         02/07 0859 Nasl 2 times daily 02/02/22 1106    02/01/22 1539  vancomycin - pharmacy to dose  (vancomycin IVPB)        "And" Linked Group Details    -- IV pharmacy to manage frequency 02/01/22 1440        Cultures were taken-   Microbiology Results (last 7 days)     Procedure Component Value Units Date/Time    Blood culture #1 [[id number]] Collected: 02/01/22 1429    Order Status: Completed Specimen: Blood from Peripheral, Forearm, Left Updated: 02/04/22 1503     Blood Culture, Routine No Growth to date      No Growth to date      No Growth to date      No Growth to date    Narrative:      Blood Culture #1    Blood culture #2 [[id number]] Collected: 02/01/22 1406    Order Status: Completed Specimen: Blood from Peripheral, Upper Arm, Left Updated: 02/04/22 1503     Blood Culture, Routine No Growth to date      No Growth to date      No Growth to date      No Growth to date    Narrative:      Blood Culture #2    AFB Culture & Smear [[id number]] Collected: 02/02/22 1905    Order Status: Completed Specimen: Wound from Buttocks, Left Updated: 02/04/22 1441     AFB CULTURE STAIN No acid fast bacilli seen.    Narrative:      Bilateral buttock wound    Aerobic culture [[id number]] Collected: 02/02/22 1905    Order Status: Completed Specimen: Wound from Buttocks, Left Updated: 02/04/22 0838     Aerobic Bacterial Culture No growth    Narrative:      Bilateral buttock wound    Culture, Anaerobe [[id number]] Collected: 02/02/22 1905 "    Order Status: Completed Specimen: Wound from Buttocks, Left Updated: 02/04/22 0649     Anaerobic Culture Culture in progress    Narrative:      Bilateral Buttock wound    Gram stain [[id number]] Collected: 02/02/22 1905    Order Status: Completed Specimen: Wound from Buttocks, Left Updated: 02/03/22 1145     Gram Stain Result No WBC's      Rare Gram positive cocci in pairs      Rare Gram positive rods    Narrative:      Bilateral buttock wound    Fungus culture [[id number]] Collected: 02/02/22 1905    Order Status: Sent Specimen: Wound from Buttocks, Left Updated: 02/02/22 2005        Latest lactate reviewed, they are-  Recent Labs   Lab 02/01/22  2307 02/03/22  0801   LACTATE 1.7 1.6       Organ dysfunction indicated by Hypotension, DKA  Source- Skin  Source control Achieved by- ABx's  Appreciate Surgery input.  OR debridement    - DKA resolved, hypotension supported with pressors  - pain control

## 2022-02-04 NOTE — ASSESSMENT & PLAN NOTE
Advance Care Planning     Discussed with sister Tressa and aunt Kareen today with Dr. Armenta about our concerns with patient's illness and how sick she is. We updated them on surgery and need for more surgery and overall poor prognosis. Still hopeful, want everything to be done but also seem to understand how sick patient is.

## 2022-02-04 NOTE — ASSESSMENT & PLAN NOTE
Patient initially tested positive for Covid more than one month ago.  Asymptomatic from Covid with no evidence of pneumonia on CXR.

## 2022-02-04 NOTE — PROGRESS NOTES
[facility] Medicine  Progress Note    Patient Name: Xuan Ricardo  MRN: [id number]  Patient Class: IP- Inpatient   Admission Date: 2/1/2022  Length of Stay: 2 days  Attending Physician: Mehdi Armenta MD  Primary Care Provider: Katharine Franks MD        Subjective:     Principal Problem:Necrotizing fasciitis        HPI:  Admitted with necrotizing fascitis of the buttocks. Surgery consulted and patient brought to OR 2/2/22 with significant debridement. On antibiotics. Mental status worsening. Palliative Care consulted, family updated.      Overview/Hospital Course:  42 y/o female presented to ER with hypotension.  Patient was recently started on dialysis and had not had dialysis for one week since last hospital discharge.  She was noted to have multiple abnormalities on presentation.  She was severely anemic with Hgb of 4.3.  transfused 2 units of blood with adequate correction of H/H.  Patient was also noted to have AG metabolic acidosis with hyperglycemia.  Stated on insulin drip for DKA.  Leukocytosis on CBC.  Patient with erythema and blistering of bilateral inner thing and buttocks.  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  Started on ABX's and Surgery consulted.        Interval History: post op day 1. Seems confused, I asked her about surgery and she seemed to not remember. I then asked if she would like me to call her children and she said no. Not as responsive today.     Review of Systems   HENT: Negative for ear discharge and ear pain.    Eyes: Negative for discharge and itching.   Endocrine: Negative for cold intolerance and heat intolerance.   Musculoskeletal: Positive for arthralgias.   Neurological: Negative for seizures and syncope.     Objective:     Vital Signs (Most Recent):  Temp: 99.7 °F (37.6 °C) (02/03/22 1630)  Pulse: (!) 111 (02/03/22 1900)  Resp: 16 (02/03/22 1900)  BP: (!) 104/50 (02/03/22 1900)  SpO2: 95 % (02/03/22 1900) Vital Signs (24h  Range):  Temp:  [97.5 °F (36.4 °C)-99.7 °F (37.6 °C)] 99.7 °F (37.6 °C)  Pulse:  [] 111  Resp:  [9-34] 16  SpO2:  [91 %-100 %] 95 %  BP: ()/() 104/50     Weight: 94.3 kg (207 lb 14.3 oz)  Body mass index is 35.68 kg/m².    Intake/Output Summary (Last 24 hours) at 2/3/2022 2051  Last data filed at 2/3/2022 1728  Gross per 24 hour   Intake 2040.61 ml   Output 1500 ml   Net 540.61 ml      Physical Exam  Vitals and nursing note reviewed.   Constitutional:       Appearance: She is ill-appearing. She is not diaphoretic.   HENT:      Head: Normocephalic and atraumatic.      Mouth/Throat:      Mouth: Mucous membranes are dry.      Pharynx: No oropharyngeal exudate or posterior oropharyngeal erythema.   Cardiovascular:      Rate and Rhythm: Regular rhythm. Tachycardia present.   Pulmonary:      Effort: No respiratory distress.      Breath sounds: Normal breath sounds.      Comments: On room air  Abdominal:      General: Bowel sounds are normal.      Palpations: Abdomen is soft.   Musculoskeletal:         General: No deformity or signs of injury.      Cervical back: Neck supple. No rigidity.   Skin:     General: Skin is warm.      Comments: Dressings intact, did not undress   Neurological:      Comments: Alert but lethargic, responds somewhat appropriately and answers questions         Significant Labs:   All pertinent labs within the past 24 hours have been reviewed.  BMP:   Recent Labs   Lab 02/03/22  0626   *      K 4.5   CL 98   CO2 14*   BUN 33*   CREATININE 4.4*   CALCIUM 8.5*   MG 1.7     CBC:   Recent Labs   Lab 02/02/22  0355 02/03/22  0238 02/03/22  0801   WBC 20.64* 26.39* 27.76*   HGB 7.9* 8.6* 7.8*   HCT 24.6* 25.6* 23.8*    268 274       Significant Imaging: I have reviewed all pertinent imaging results/findings within the past 24 hours.      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  - due to infection      Hyponatremia  Now resolved      Esophagitis, Kinde grade D  - on PPI bid      Debility        COVID-19 virus infection  Patient initially tested positive for Covid more than one month ago.  Asymptomatic from Covid with no evidence of pneumonia on CXR.        Calciphylaxis  Patient now with necrotizing fascitis of buttocks.  This patient does have evidence of infective focus  My overall impression is sepsis. Vital signs were reviewed and noted in progress note.  Antibiotics given-   Antibiotics (From admission, onward)            Start     Stop Route Frequency Ordered    02/02/22 1800  piperacillin-tazobactam 4.5 g in dextrose 5 % 100 mL IVPB (ready to mix system)         -- IV Every 12 hours (non-standard times) 02/02/22 0716    02/02/22 1215  mupirocin 2 % ointment         02/07 0859 Nasl 2 times daily 02/02/22 1106    02/01/22 1539  vancomycin - pharmacy to dose  (vancomycin IVPB)        "And" Linked Group Details    -- IV pharmacy to manage frequency 02/01/22 1440        Cultures were taken-   Microbiology Results (last 7 days)     Procedure Component Value Units Date/Time    Blood culture #1 [[id number]] Collected: 02/01/22 1429    Order Status: Completed Specimen: Blood from Peripheral, Forearm, Left Updated: 02/03/22 1503     Blood Culture, Routine No Growth to date      No Growth to date      No Growth to date    Narrative:      Blood Culture #1    Blood culture #2 [[id number]] Collected: 02/01/22 1406    Order Status: Completed " Specimen: Blood from Peripheral, Upper Arm, Left Updated: 02/03/22 1503     Blood Culture, Routine No Growth to date      No Growth to date      No Growth to date    Narrative:      Blood Culture #2    Aerobic culture [[id number]] Collected: 02/02/22 1905    Order Status: Completed Specimen: Wound from Buttocks, Left Updated: 02/03/22 1307     Aerobic Bacterial Culture No growth    Narrative:      Bilateral buttock wound    Gram stain [[id number]] Collected: 02/02/22 1905    Order Status: Completed Specimen: Wound from Buttocks, Left Updated: 02/03/22 1145     Gram Stain Result No WBC's      Rare Gram positive cocci in pairs      Rare Gram positive rods    Narrative:      Bilateral buttock wound    AFB Culture & Smear [[id number]] Collected: 02/02/22 1905    Order Status: Sent Specimen: Wound from Buttocks, Left Updated: 02/03/22 0952    Culture, Anaerobe [[id number]] Collected: 02/02/22 1905    Order Status: Sent Specimen: Wound from Buttocks, Left Updated: 02/02/22 2005    Fungus culture [[id number]] Collected: 02/02/22 1905    Order Status: Sent Specimen: Wound from Buttocks, Left Updated: 02/02/22 2005        Latest lactate reviewed, they are-  Recent Labs   Lab 02/01/22  1408 02/01/22  2307 02/03/22  0801   LACTATE 3.0* 1.7 1.6       Organ dysfunction indicated by Hypotension, DKA  Source- Skin  Source control Achieved by- ABx's  Appreciate Surgery input.  OR debridement        Goals of care, counseling/discussion  Advance Care Planning     Discussed with sister Tressa and aunt Kareen today with Dr. Armenta about our concerns with patient's illness and how sick she is. We updated them on surgery and need for more surgery and overall poor prognosis. Still hopeful, want everything to be done but also seem to understand how sick patient is.         End stage renal disease  Has not had HD since last discharge one week ago.  Nephrology consulted for HD.    Type 2 diabetes mellitus with stage 5 chronic kidney disease not on  chronic dialysis, with long-term current use of insulin  Initially presented in DKA.  Started on insulin drip, but then patient became hypoglycemia.  DKA has now resolved with closure of AG.  Will start nightly Levemir.  Adjust as necessary.  Continue with sliding scale.        Hypertensive CKD (chronic kidney disease)  Patient is currently hypotensive  - on pressor support    Anemia of renal disease  Baseline Hgb around 7.5  Presents with Hgb of 4.3  No obvious bleeding, had EGD last admission with esophagitis  Transfused 2 units of blood with adequate correction of H/H  Continue to monitor.        VTE Risk Mitigation (From admission, onward)         Ordered     heparin (porcine) injection 3,200 Units  As needed (PRN)         02/03/22 0024     IP VTE HIGH RISK PATIENT  Once         02/01/22 1816     Place sequential compression device  Until discontinued         02/01/22 1816     Place sequential compression device  Until discontinued         02/01/22 1810                Discharge Planning   PAMELA:      Code Status: Full Code   Is the patient medically ready for discharge?:     Reason for patient still in hospital (select all that apply): Treatment  Discharge Plan A: Home with family            Critical care time spent on the evaluation and treatment of severe organ dysfunction, review of pertinent labs and imaging studies, discussions with consulting providers and discussions with patient/family: 45 minutes.      Mehdi Armenta MD  Department of Hospital Medicine   [facility] - [place] - Intensive Care

## 2022-02-04 NOTE — PLAN OF CARE
Patient remains in the ICU, On levophed and bicarb drip, room air. Patient was very lethargic at the beginning of the shift and she was hard to arouse. Towards the end of the shift pt was more alert and able to take PO pain meds. Pt scheduled to go back to OR for debridement of wound today. Free from fall and injury this shift. VS stable.

## 2022-02-04 NOTE — CONSULTS
Patient had been assigned [facility] at last discharge.  Case managment will assist with getting orders to have home health resumed with previous provider or started with a new provider.

## 2022-02-04 NOTE — PROGRESS NOTES
CHUCKIE reached out to patient's insurance provider, [employer], to request assistance securing a dialysis unit for patient.  have had difficulty securing a unit for this patient.  Last arranged dialysis (1/28/22) was not accepted by nephrologist Isaura Meyers at [facility].    CHUCKIE left a voice message for [facility] Care Coordinator, Suhail LAINEZ, requesting a return call to  at [phone] for assistance securing a dialysis unit for this patient.  Awaiting return call from [facility]>

## 2022-02-04 NOTE — SUBJECTIVE & OBJECTIVE
Interval History: having pain in her feet and buttocks. Niece at bedside. Discussed using morphine for IV pain medicine, she initially was hesitant but agreed as she did not want dilaudid. Will give dose to see how she does and can stop or continue pending results.    Review of Systems   HENT: Negative for ear discharge and ear pain.    Eyes: Negative for discharge and itching.   Endocrine: Negative for cold intolerance and heat intolerance.   Musculoskeletal: Positive for arthralgias.   Neurological: Negative for seizures and syncope.     Objective:     Vital Signs (Most Recent):  Temp: 97.8 °F (36.6 °C) (02/04/22 1115)  Pulse: 99 (02/04/22 1400)  Resp: 10 (02/04/22 1400)  BP: 110/64 (02/04/22 1400)  SpO2: 98 % (02/04/22 1400) Vital Signs (24h Range):  Temp:  [97.8 °F (36.6 °C)-101.3 °F (38.5 °C)] 97.8 °F (36.6 °C)  Pulse:  [] 99  Resp:  [9-34] 10  SpO2:  [91 %-99 %] 98 %  BP: ()/(48-76) 110/64     Weight: 94.3 kg (207 lb 14.3 oz)  Body mass index is 35.68 kg/m².    Intake/Output Summary (Last 24 hours) at 2/4/2022 1518  Last data filed at 2/4/2022 1400  Gross per 24 hour   Intake 2607.06 ml   Output --   Net 2607.06 ml      Physical Exam  Vitals and nursing note reviewed.   Constitutional:       Appearance: She is ill-appearing. She is not diaphoretic.   HENT:      Head: Normocephalic and atraumatic.      Mouth/Throat:      Mouth: Mucous membranes are dry.      Pharynx: No oropharyngeal exudate or posterior oropharyngeal erythema.   Cardiovascular:      Rate and Rhythm: Regular rhythm. Tachycardia present.   Pulmonary:      Effort: No respiratory distress.      Breath sounds: Normal breath sounds.      Comments: On room air  Abdominal:      General: Bowel sounds are normal.      Palpations: Abdomen is soft.   Musculoskeletal:         General: No deformity or signs of injury.      Cervical back: Neck supple. No rigidity.   Skin:     General: Skin is warm.      Comments: Dressings intact, did not undress    Neurological:      Comments: Alert but lethargic, responds somewhat appropriately and answers questions         Significant Labs:   All pertinent labs within the past 24 hours have been reviewed.  BMP:   Recent Labs   Lab 02/04/22  0511   *      K 4.1   CL 91*   CO2 26   BUN 40*   CREATININE 5.2*   CALCIUM 8.3*   MG 1.7     CBC:   Recent Labs   Lab 02/03/22  0238 02/03/22  0801 02/04/22  0511   WBC 26.39* 27.76* 26.25*   HGB 8.6* 7.8* 7.3*   HCT 25.6* 23.8* 22.5*    274 251       Significant Imaging: I have reviewed all pertinent imaging results/findings within the past 24 hours.

## 2022-02-04 NOTE — ASSESSMENT & PLAN NOTE
Baseline Hgb around 7.5  Presents with Hgb of 4.3  No obvious bleeding, had EGD last admission with esophagitis  Transfused 2 units of blood with adequate correction of H/H  Continue to monitor.  - transfuse as needed

## 2022-02-04 NOTE — ASSESSMENT & PLAN NOTE
Has prolonged PT, PTT --> check fibrinogen and d-dimer  - fibrinogen and d-dimer elevated, not consistent with DIC  - monitor closely for bleeding

## 2022-02-04 NOTE — OP NOTE
Pre-operative Diagnosis: Necrotizing soft tissue infection     Post-operative Diagnosis: Same     Surgery Date: 02/04/2022     Surgeon: Dr. Mitchell Pedro     Assistants: Dr. Carlos Alberto Christianson     Anesthesia: General Anesthesia     Procedure Details: After informed consent was obtained from the patients POA, she was taken to the operating room and general anesthesia was induced.  The patient was endotracheally intubated and then placed prone on the operating room table.  The patient is on schedule antibiotics, scds were placed for dvt prophylaxis.  The dressing on the buttock was taken down and the sacrum was prepped and draped in a standard sterile fashion. Timeout identified the correct patient and procedure being performed.  The wound was inspected and minimal necrotic tissue was debrided from the right buttock down to the fascia and the the right side of the anus.  No further purulence was noted.  The wound was again measured at 35cm X 25cm X 7cm.  The wound was packed with betadine soaked kerlix and the patient was taken back to the ICU in critical condition     Findings: Minimal further necrosis debrided, wound packed with a sterile dressing       Estimated Blood Loss: minimal            Drains: none           Total IV Fluids: see anesthesia records           Specimens: none         Complications: none           Disposition: ICU           Condition: Critically ill

## 2022-02-04 NOTE — PROGRESS NOTES
[facility] Medicine  Progress Note    Patient Name: Xuan Ricardo  MRN: [id number]  Patient Class: IP- Inpatient   Admission Date: 2/1/2022  Length of Stay: 3 days  Attending Physician: Mehdi Armenta MD  Primary Care Provider: Katharine Franks MD        Subjective:     Principal Problem:Necrotizing fasciitis        HPI:  42 y/o female presented to ER with hypotension.  Patient was recently started on dialysis and had not had dialysis for one week since last hospital discharge.  She was noted to have multiple abnormalities on presentation.  She was severely anemic with Hgb of 4.3.  transfused 2 units of blood with adequate correction of H/H.  Patient was also noted to have AG metabolic acidosis with hyperglycemia.  Stated on insulin drip for DKA.  Leukocytosis on CBC.  Patient with erythema and blistering of bilateral inner thing and buttocks.  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  Started on ABX's and Surgery consulted.        Overview/Hospital Course:  42 y/o female presented to ER with hypotension.  Patient was recently started on dialysis and had not had dialysis for one week since last hospital discharge.  She was noted to have multiple abnormalities on presentation.  She was severely anemic with Hgb of 4.3.  transfused 2 units of blood with adequate correction of H/H.  Patient was also noted to have AG metabolic acidosis with hyperglycemia.  Stated on insulin drip for DKA.  Leukocytosis on CBC.  Patient with erythema and blistering of bilateral inner thing and buttocks.  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  Started on ABX's and Surgery consulted.  Admitted with necrotizing fascitis of the buttocks. Surgery consulted and patient brought to OR 2/2/22 with significant debridement. On antibiotics. Mental status worsening. Palliative Care consulted, family updated.        Interval History: having pain in her feet and buttocks. Niece at bedside.  Discussed using morphine for IV pain medicine, she initially was hesitant but agreed as she did not want dilaudid. Will give dose to see how she does and can stop or continue pending results.    Review of Systems   HENT: Negative for ear discharge and ear pain.    Eyes: Negative for discharge and itching.   Endocrine: Negative for cold intolerance and heat intolerance.   Musculoskeletal: Positive for arthralgias.   Neurological: Negative for seizures and syncope.     Objective:     Vital Signs (Most Recent):  Temp: 97.8 °F (36.6 °C) (02/04/22 1115)  Pulse: 99 (02/04/22 1400)  Resp: 10 (02/04/22 1400)  BP: 110/64 (02/04/22 1400)  SpO2: 98 % (02/04/22 1400) Vital Signs (24h Range):  Temp:  [97.8 °F (36.6 °C)-101.3 °F (38.5 °C)] 97.8 °F (36.6 °C)  Pulse:  [] 99  Resp:  [9-34] 10  SpO2:  [91 %-99 %] 98 %  BP: ()/(48-76) 110/64     Weight: 94.3 kg (207 lb 14.3 oz)  Body mass index is 35.68 kg/m².    Intake/Output Summary (Last 24 hours) at 2/4/2022 1518  Last data filed at 2/4/2022 1400  Gross per 24 hour   Intake 2607.06 ml   Output --   Net 2607.06 ml      Physical Exam  Vitals and nursing note reviewed.   Constitutional:       Appearance: She is ill-appearing. She is not diaphoretic.   HENT:      Head: Normocephalic and atraumatic.      Mouth/Throat:      Mouth: Mucous membranes are dry.      Pharynx: No oropharyngeal exudate or posterior oropharyngeal erythema.   Cardiovascular:      Rate and Rhythm: Regular rhythm. Tachycardia present.   Pulmonary:      Effort: No respiratory distress.      Breath sounds: Normal breath sounds.      Comments: On room air  Abdominal:      General: Bowel sounds are normal.      Palpations: Abdomen is soft.   Musculoskeletal:         General: No deformity or signs of injury.      Cervical back: Neck supple. No rigidity.   Skin:     General: Skin is warm.      Comments: Dressings intact, did not undress   Neurological:      Comments: Alert but lethargic, responds somewhat  appropriately and answers questions         Significant Labs:   All pertinent labs within the past 24 hours have been reviewed.  BMP:   Recent Labs   Lab 02/04/22  0511   *      K 4.1   CL 91*   CO2 26   BUN 40*   CREATININE 5.2*   CALCIUM 8.3*   MG 1.7     CBC:   Recent Labs   Lab 02/03/22  0238 02/03/22  0801 02/04/22  0511   WBC 26.39* 27.76* 26.25*   HGB 8.6* 7.8* 7.3*   HCT 25.6* 23.8* 22.5*    274 251       Significant Imaging: I have reviewed all pertinent imaging results/findings within the past 24 hours.      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  - due to infection      Hyponatremia  Now resolved      Esophagitis, [place] grade D  - on PPI bid      Debility        COVID-19 virus infection  Patient initially tested positive for Covid more than one month ago.  Asymptomatic from Covid with no evidence of pneumonia on CXR.        Calciphylaxis  Patient now with necrotizing fascitis of buttocks.  This patient does have evidence of infective focus  My overall impression is sepsis. Vital signs were reviewed and noted in progress note.  Antibiotics given-   Antibiotics (From admission, onward)            Start     Stop Route Frequency Ordered    02/02/22 1800  piperacillin-tazobactam 4.5 g in dextrose 5 % 100 mL IVPB (ready to mix system)         -- IV Every 12 hours (non-standard times) 02/02/22  "0716    02/02/22 1215  mupirocin 2 % ointment         02/07 0859 Nasl 2 times daily 02/02/22 1106    02/01/22 1539  vancomycin - pharmacy to dose  (vancomycin IVPB)        "And" Linked Group Details    -- IV pharmacy to manage frequency 02/01/22 1440        Cultures were taken-   Microbiology Results (last 7 days)     Procedure Component Value Units Date/Time    Blood culture #1 [[id number]] Collected: 02/01/22 1429    Order Status: Completed Specimen: Blood from Peripheral, Forearm, Left Updated: 02/04/22 1503     Blood Culture, Routine No Growth to date      No Growth to date      No Growth to date      No Growth to date    Narrative:      Blood Culture #1    Blood culture #2 [[id number]] Collected: 02/01/22 1406    Order Status: Completed Specimen: Blood from Peripheral, Upper Arm, Left Updated: 02/04/22 1503     Blood Culture, Routine No Growth to date      No Growth to date      No Growth to date      No Growth to date    Narrative:      Blood Culture #2    AFB Culture & Smear [[id number]] Collected: 02/02/22 1905    Order Status: Completed Specimen: Wound from Buttocks, Left Updated: 02/04/22 1441     AFB CULTURE STAIN No acid fast bacilli seen.    Narrative:      Bilateral buttock wound    Aerobic culture [[id number]] Collected: 02/02/22 1905    Order Status: Completed Specimen: Wound from Buttocks, Left Updated: 02/04/22 0838     Aerobic Bacterial Culture No growth    Narrative:      Bilateral buttock wound    Culture, Anaerobe [[id number]] Collected: 02/02/22 1905    Order Status: Completed Specimen: Wound from Buttocks, Left Updated: 02/04/22 0649     Anaerobic Culture Culture in progress    Narrative:      Bilateral Buttock wound    Gram stain [[id number]] Collected: 02/02/22 1905    Order Status: Completed Specimen: Wound from Buttocks, Left Updated: 02/03/22 1145     Gram Stain Result No WBC's      Rare Gram positive cocci in pairs      Rare Gram positive rods    Narrative:      Bilateral buttock wound    " Fungus culture [[id number]] Collected: 02/02/22 1905    Order Status: Sent Specimen: Wound from Buttocks, Left Updated: 02/02/22 2005        Latest lactate reviewed, they are-  Recent Labs   Lab 02/01/22 2307 02/03/22  0801   LACTATE 1.7 1.6       Organ dysfunction indicated by Hypotension, DKA  Source- Skin  Source control Achieved by- ABx's  Appreciate Surgery input.  OR debridement    - DKA resolved, hypotension supported with pressors  - pain control        Goals of care, counseling/discussion  Advance Care Planning     Discussed with sister Tressa and aunt Kareen today with Dr. Armenta about our concerns with patient's illness and how sick she is. We updated them on surgery and need for more surgery and overall poor prognosis. Still hopeful, want everything to be done but also seem to understand how sick patient is.         End stage renal disease  Has not had HD since last discharge one week ago.  Nephrology consulted for HD.    Type 2 diabetes mellitus with stage 5 chronic kidney disease not on chronic dialysis, with long-term current use of insulin  Initially presented in DKA.  Started on insulin drip, but then patient became hypoglycemia.  DKA has now resolved with closure of AG.  Will start nightly Levemir.  Adjust as necessary.  Continue with sliding scale.        Hypertensive CKD (chronic kidney disease)  Patient is currently hypotensive  - on pressor support    Anemia of renal disease  Baseline Hgb around 7.5  Presents with Hgb of 4.3  No obvious bleeding, had EGD last admission with esophagitis  Transfused 2 units of blood with adequate correction of H/H  Continue to monitor.  - transfuse as needed         VTE Risk Mitigation (From admission, onward)         Ordered     heparin (porcine) injection 3,200 Units  As needed (PRN)         02/03/22 0024     IP VTE HIGH RISK PATIENT  Once         02/01/22 1816     Place sequential compression device  Until discontinued         02/01/22 1816     Place sequential  compression device  Until discontinued         02/01/22 1810                Discharge Planning   PAMELA:      Code Status: Full Code   Is the patient medically ready for discharge?:     Reason for patient still in hospital (select all that apply): Treatment  Discharge Plan A: Home with family            Critical care time spent on the evaluation and treatment of severe organ dysfunction, review of pertinent labs and imaging studies, discussions with consulting providers and discussions with patient/family: 45 minutes.      Mehdi Armenta MD  Department of Hospital Medicine   [facility] - Intensive Care

## 2022-02-04 NOTE — TRANSFER OF CARE
"Anesthesia Transfer of Care Note    Patient: Xuan Ricardo    Procedure(s) Performed: Procedure(s) (LRB):  DEBRIDEMENT, BUTTOCK (N/A)    Patient location: ICU    Anesthesia Type: general    Transport from OR: Transported from OR on 100% O2 by closed face mask with adequate spontaneous ventilation    Post pain: adequate analgesia    Post assessment: no apparent anesthetic complications and tolerated procedure well    Post vital signs: stable    Level of consciousness: awake and alert    Nausea/Vomiting: no nausea/vomiting    Complications: none    Transfer of care protocol was followed      Last vitals:   Visit Vitals  /83 (BP Location: Left arm, Patient Position: Lying)   Pulse 106   Temp 36.4 °C (97.5 °F) (Oral)   Resp 17   Ht 5' 4" (1.626 m)   Wt 94.3 kg (207 lb 14.3 oz)   LMP  (LMP Unknown)   SpO2 100%   Breastfeeding No   BMI 35.68 kg/m²     "

## 2022-02-04 NOTE — AI DETERIORATION ALERT
Sepsis Screen (most recent)     Sepsis Screen - 02/04/22 1123     Is the patient's history or complaint suggestive of a possible infection? Yes  -JW    Are there at least two of the following signs and symptoms present? Yes  -    Sepsis signs/symptoms - Tachycardia Tachycardia     >90  -    Sepsis signs/symptoms - WBC WBC < 4,000 or WBC > 12,000  -    Sepsis signs/symptoms - Altered Mental Status Altered Mental Status  -JW    Are any of the following organ dysfunction criteria present and not considered to be due to a chronic condition? Yes  -    Organ Dysfunction Criteria Creatinine > 2.0  -    Organ Dysfunction Criteria Total Bilirubin > 2.0 Platelet count < 100,000  -    Organ Dysfunction Criteria INR > 1.5 or aPTT > 60  -    Start Sepsis Timer No   on ABX -          User Key  (r) = Recorded By, (t) = Taken By, (c) = Cosigned By    Initials Name    JW Sadia Burns RN              Patient screens positive but is on ABX therapy, therefore timer not started. WCTM.

## 2022-02-04 NOTE — PROGRESS NOTES
2/1/22 ekg noted with st elevation vs t inv mid January ...communicated with Dr Armenta who will ask cards to yehuda

## 2022-02-04 NOTE — NURSING
11:24 Glucose registered 35; rechecked different finger and reading was 113.  14:50 Released to OR staff for transfer to OR. Niece at bedside prior to leaving the unit.  16:05 Received pt from OR. She is sleepy but arousable with unlabored respirations. Dressing covering buttocks; mesh panties in place; sheets changed. Niece at bedside and updated.  17:30 Pt taking few icechips without problem.

## 2022-02-04 NOTE — PLAN OF CARE
During last hospital admission pt was refused to be accepted at all [facility] locations.      Pt readmitted to hospital 2/12022 after being denied HD at [facility] stating Dr. Meyers refused to accept pt.     CM called [facility]  to discuss Dr. Meyers's refusal to provide dialysis to a patient accepted into clinic, awaiting callback from  Alisha Cooley [phone].

## 2022-02-04 NOTE — ASSESSMENT & PLAN NOTE
Has prolonged PT, PTT --> check fibrinogen and d-dimer  - may have some component of DIC given underlying infection

## 2022-02-04 NOTE — ASSESSMENT & PLAN NOTE
"Patient now with necrotizing fascitis of buttocks.  This patient does have evidence of infective focus  My overall impression is sepsis. Vital signs were reviewed and noted in progress note.  Antibiotics given-   Antibiotics (From admission, onward)            Start     Stop Route Frequency Ordered    02/02/22 1800  piperacillin-tazobactam 4.5 g in dextrose 5 % 100 mL IVPB (ready to mix system)         -- IV Every 12 hours (non-standard times) 02/02/22 0716    02/02/22 1215  mupirocin 2 % ointment         02/07 0859 Nasl 2 times daily 02/02/22 1106    02/01/22 1539  vancomycin - pharmacy to dose  (vancomycin IVPB)        "And" Linked Group Details    -- IV pharmacy to manage frequency 02/01/22 1440        Cultures were taken-   Microbiology Results (last 7 days)     Procedure Component Value Units Date/Time    Blood culture #1 [[id number]] Collected: 02/01/22 1429    Order Status: Completed Specimen: Blood from Peripheral, Forearm, Left Updated: 02/03/22 1503     Blood Culture, Routine No Growth to date      No Growth to date      No Growth to date    Narrative:      Blood Culture #1    Blood culture #2 [[id number]] Collected: 02/01/22 1406    Order Status: Completed Specimen: Blood from Peripheral, Upper Arm, Left Updated: 02/03/22 1503     Blood Culture, Routine No Growth to date      No Growth to date      No Growth to date    Narrative:      Blood Culture #2    Aerobic culture [[id number]] Collected: 02/02/22 1905    Order Status: Completed Specimen: Wound from Buttocks, Left Updated: 02/03/22 1307     Aerobic Bacterial Culture No growth    Narrative:      Bilateral buttock wound    Gram stain [[id number]] Collected: 02/02/22 1905    Order Status: Completed Specimen: Wound from Buttocks, Left Updated: 02/03/22 1145     Gram Stain Result No WBC's      Rare Gram positive cocci in pairs      Rare Gram positive rods    Narrative:      Bilateral buttock wound    AFB Culture & Smear [[id number]] Collected: 02/02/22 1905    " Order Status: Sent Specimen: Wound from Buttocks, Left Updated: 02/03/22 0952    Culture, Anaerobe [[id number]] Collected: 02/02/22 1905    Order Status: Sent Specimen: Wound from Buttocks, Left Updated: 02/02/22 2005    Fungus culture [[id number]] Collected: 02/02/22 1905    Order Status: Sent Specimen: Wound from Buttocks, Left Updated: 02/02/22 2005        Latest lactate reviewed, they are-  Recent Labs   Lab 02/01/22  1408 02/01/22  2307 02/03/22  0801   LACTATE 3.0* 1.7 1.6       Organ dysfunction indicated by Hypotension, DKA  Source- Skin  Source control Achieved by- ABx's  Appreciate Surgery input.  OR debridement

## 2022-02-04 NOTE — HPI
42 y/o female presented to ER with hypotension.  Patient was recently started on dialysis and had not had dialysis for one week since last hospital discharge.  She was noted to have multiple abnormalities on presentation.  She was severely anemic with Hgb of 4.3.  transfused 2 units of blood with adequate correction of H/H.  Patient was also noted to have AG metabolic acidosis with hyperglycemia.  Stated on insulin drip for DKA.  Leukocytosis on CBC.  Patient with erythema and blistering of bilateral inner thing and buttocks.  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  Started on ABX's and Surgery consulted.

## 2022-02-04 NOTE — PROGRESS NOTES
Date of Admission:2/1/2022    SUBJECTIVE: notes pain    Current Facility-Administered Medications   Medication    0.9%  NaCl infusion (for blood administration)    0.9%  NaCl infusion    0.9%  NaCl infusion    cinacalcet tablet 30 mg    dextrose 50% injection 12.5 g    dextrose 50% injection 25 g    epoetin kelsey-epbx injection 10,000 Units    ferrous gluconate tablet 324 mg    gabapentin capsule 100 mg    glucagon (human recombinant) injection 1 mg    glucose chewable tablet 16 g    glucose chewable tablet 24 g    heparin (porcine) injection 3,200 Units    insulin aspart U-100 pen 0-5 Units    insulin detemir U-100 pen 8 Units    morphine injection 4 mg    mupirocin 2 % ointment    NORepinephrine 4 mg in dextrose 5% 250 mL infusion (premix) (titrating)    ondansetron disintegrating tablet 8 mg    oxyCODONE immediate release tablet 10 mg    pantoprazole injection 40 mg    piperacillin-tazobactam 4.5 g in dextrose 5 % 100 mL IVPB (ready to mix system)    sevelamer carbonate tablet 2,400 mg    sodium chloride 0.9% bolus 250 mL    sodium chloride 0.9% flush 10 mL    And    sodium chloride 0.9% flush 10 mL    sodium thiosulfate 12.5 gram/50 mL (250 mg/mL) injection 25 g    vancomycin - pharmacy to dose    vitamin renal formula (B-complex-vitamin c-folic acid) 1 mg per capsule 1 capsule       Wt Readings from Last 3 Encounters:   02/02/22 94.3 kg (207 lb 14.3 oz)   01/20/22 94.3 kg (207 lb 14.3 oz)   12/11/21 90.7 kg (200 lb)     Temp Readings from Last 3 Encounters:   02/04/22 97.8 °F (36.6 °C) (Oral)   01/28/22 98.7 °F (37.1 °C)   12/13/21 98 °F (36.7 °C) (Oral)     BP Readings from Last 3 Encounters:   02/04/22 108/60   01/28/22 111/64   12/13/21 131/69     Pulse Readings from Last 3 Encounters:   02/04/22 104   01/28/22 (!) 112   12/13/21 90       Intake/Output Summary (Last 24 hours) at 2/4/2022 1252  Last data filed at 2/4/2022 1200  Gross per 24 hour   Intake 2718.44 ml   Output --    Net 2718.44 ml       PE:  GEN:wd female in pain  HEENT:ncat,eomi,mm   CVS:s1s2 tachy  PULM:ctab  ABD:+bs, soft  EXT:no leg edema of calves  NEURO:awake  pc of chest    Recent Labs   Lab 02/04/22  0511   *      K 4.1   CL 91*   CO2 26   BUN 40*   CREATININE 5.2*   CALCIUM 8.3*   MG 1.7       Lab Results   Component Value Date    .6 (H) 02/03/2022    CALCIUM 8.3 (L) 02/04/2022    PHOS 3.9 02/04/2022       Recent Labs   Lab 02/04/22  0511   WBC 26.25*   RBC 2.49*   HGB 7.3*   HCT 22.5*      MCV 90   MCH 29.3   MCHC 32.4           A/P:  1.esrd. hd moved to tomorrow.  2.anemia. prbc given. Cont epo.  3.calciphylaxis. cont thiosulfate.  4.hypotension. cont pressor.  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 Phos great. Cont binders.  6.covid 19 infection. Dx last mo.   7.nec fascitis. Cont post op care. Surgery today again.  8.met acidosis.bicarb gtt given.

## 2022-02-04 NOTE — PROGRESS NOTES
CHUCKIE received a message from Shara with [facility] Admission.  Patient not accepted at [facility].  Requested additional locations.  Alternate locations given:  [facility] and [facility].  Per intake, [facility] has no chairs available.  Information will be sent to [facility].    CHUCKIE contacted alternate facilities:  [facility] @ [phone].  This facility is associated with [facility].  Patient has been denied by Dr. Dowd.  [facility]@ [phone].  Number no longer in service.  Attempted contacting [facility] at [phone] to get an answer to confirm number to fax to.

## 2022-02-04 NOTE — ASSESSMENT & PLAN NOTE
Baseline Hgb around 7.5  Presents with Hgb of 4.3  No obvious bleeding, had EGD last admission with esophagitis  Transfused 2 units of blood with adequate correction of H/H  Continue to monitor.      of a live  girl .  Placenta & membranes delivered spontaneously & intact.   Cervix & vagina inspected- no lacerations noted.   Mother & baby in stable condition.

## 2022-02-04 NOTE — ANESTHESIA PREPROCEDURE EVALUATION
02/03/2022    Pre-operative evaluation for Procedure(s) (LRB):  DEBRIDEMENT, BUTTOCK (N/A)    Xuan Cousin is a 41 y.o. female     Patient Active Problem List   Diagnosis    Anemia of renal disease    Hypertensive CKD (chronic kidney disease)    Nephrotic range proteinuria    Metabolic acidosis    Increased prolactin level    Secondary hyperparathyroidism    Iron deficiency anemia    Vitamin D deficiency    Galactorrhea    Cataract    Class 1 obesity due to excess calories with serious comorbidity in adult    Chronic fatigue    Missed menses    Uterine leiomyoma    Type 2 diabetes mellitus with stage 5 chronic kidney disease not on chronic dialysis, with long-term current use of insulin    Hypertension    Hypertensive urgency    Symptomatic anemia    End stage renal disease    Uremia    CKD (chronic kidney disease) stage 5, GFR less than 15 ml/min    Nausea & vomiting    Thrombocytopenia    Hematemesis    Goals of care, counseling/discussion    Calciphylaxis    Type 2 diabetes mellitus    COVID-19 virus infection    Renovascular hypertension    Hyperphosphatemia    Candida esophagitis    Sepsis due to gram-negative UTI    Sinus tachycardia    Slow transit constipation    Debility    Esophagitis, [place] grade D    Hyponatremia    Leukocytosis    Coagulopathy    Septic shock    Necrotizing fasciitis    Cellulitis of thigh       Review of patient's allergies indicates:   Allergen Reactions    Vicodin [hydrocodone-acetaminophen] Hives    Codeine Hives       No current facility-administered medications on file prior to encounter.     Current Outpatient Medications on File Prior to Encounter   Medication Sig Dispense Refill    calcium carbonate (TUMS) 200 mg calcium (500 mg) chewable tablet Take 2 tablets (1,000 mg total) by mouth 3 (three) times daily as needed. 150 tablet 0    cinacalcet (SENSIPAR) 30 MG Tab Take 1 tablet (30 mg total) by mouth daily with breakfast. 30  tablet 11    epoetin kelsey-epbx (RETACRIT) 2,000 unit/mL injection Inject 7 mLs (14,000 Units total) into the skin every Mon, Wed, Fri. With dialysis      ferrous gluconate (FERGON) 324 MG tablet Take 1 tablet (324 mg total) by mouth daily with breakfast. 30 tablet 2    gabapentin (NEURONTIN) 100 MG capsule Take 1 capsule (100 mg total) by mouth 3 (three) times daily. 90 capsule 0    metoprolol tartrate (LOPRESSOR) 25 MG tablet Take 1 tablet (25 mg total) by mouth 2 (two) times daily. 60 tablet 11    oxyCODONE (ROXICODONE) 10 mg Tab immediate release tablet Take 1 tablet (10 mg total) by mouth every 4 (four) hours as needed. 18 tablet 0    pantoprazole (PROTONIX) 40 MG tablet Take 1 tablet (40 mg total) by mouth 2 (two) times daily. 60 tablet 1    sevelamer carbonate (RENVELA) 800 mg Tab Take 3 tablets (2,400 mg total) by mouth 3 (three) times daily with meals. 270 tablet 11    sodium thiosulfate 12.5 gram/50 mL (250 mg/mL) injection Inject 100 mLs (25 g total) into the vein every Mon, Wed, Fri. With dialysis         Past Surgical History:   Procedure Laterality Date    AV FISTULA PLACEMENT Left 2020    Procedure: CREATION, AV FISTULA, LEFT RADIOCEPHALIC FISTULA, LEFT UPPER EXTREMITY , INTRAOP ULTRASOUND, vEIN MAPPING. ;  Surgeon: Rakesh Leon MD;  Location: [facility] OR;  Service: Vascular;  Laterality: Left;  RN PREOP 20, UPT done, COVID NEGATRIVE 20  NEED H/P     SECTION, CLASSIC      DEBRIDEMENT OF BUTTOCKS N/A 2022    Procedure: DEBRIDEMENT, BUTTOCK;  Surgeon: Zhen Templeton MD;  Location: [facility] OR;  Service: General;  Laterality: N/A;    ESOPHAGOGASTRODUODENOSCOPY N/A 2022    Procedure: EGD (ESOPHAGOGASTRODUODENOSCOPY);  Surgeon: Mario Alberto Irene MD;  Location: [facility] ENDO;  Service: Endoscopy;  Laterality: N/A;    INSERTION OF TUNNELED CENTRAL VENOUS CATHETER (CVC) WITH SUBCUTANEOUS PORT N/A 2020    Procedure: IR TUNNELED VATH INSERT WITHOUT PORT;  Surgeon: Prachi  Diagnostic Provider;  Location: [facility] OR;  Service: Radiology;  Laterality: N/A;  1030AM START  RN PREOP 1/24/2020    INSERTION OF TUNNELED CENTRAL VENOUS CATHETER (CVC) WITH SUBCUTANEOUS PORT N/A 5/22/2020    Procedure: TUNNELED CATH PLACEMENT;  Surgeon: Prachi Diagnostic Provider;  Location: [facility] OR;  Service: Radiology;  Laterality: N/A;  930AM START    REVISION OF ARTERIOVENOUS FISTULA Left 5/8/2020    Procedure: REVISION, AV FISTULA, THROMBECTOMY, LEFT UPPER EXTREMITY;  Surgeon: Rakesh Leon MD;  Location: [facility] OR;  Service: Vascular;  Laterality: Left;    TUBAL LIGATION         Social History     Socioeconomic History    Marital status: Single    Number of children: 2   Occupational History    Occupation: Unemployed   Tobacco Use    Smoking status: Former Smoker     Types: Cigarettes    Smokeless tobacco: Never Used   Substance and Sexual Activity    Alcohol use: No    Drug use: Yes     Types: Marijuana     Comment: Occassional Recreational Use only    Sexual activity: Not Currently     Partners: Male   Social History Narrative    Currently unemployed has two young children ages 5 and 7 and she is the sole caretaker         CBC:   Recent Labs     02/03/22  0801 02/04/22  0511   WBC 27.76* 26.25*   RBC 2.60* 2.49*   HGB 7.8* 7.3*   HCT 23.8* 22.5*    251   MCV 92 90   MCH 30.0 29.3   MCHC 32.8 32.4       CMP:   Recent Labs     02/03/22  0626 02/04/22  0511    136   K 4.5 4.1   CL 98 91*   CO2 14* 26   BUN 33* 40*   CREATININE 4.4* 5.2*   * 133*   MG 1.7 1.7   PHOS 4.0 3.9   CALCIUM 8.5* 8.3*   ALBUMIN 0.9* 0.8*   PROT 5.2* 5.0*   ALKPHOS 171* 144*   ALT 27 19   AST 29 28   BILITOT 3.5* 4.0*       INR  Recent Labs     02/02/22  0104 02/02/22  0104 02/03/22  0238 02/03/22  0801 02/04/22  0527   INR 2.2*   < > 2.7* 3.3* 3.7*   APTT 56.0*  --   --   --   --     < > = values in this interval not displayed.         Anesthesia Evaluation    I have reviewed the Patient Summary  Reports.    I have reviewed the Nursing Notes. I have reviewed the NPO Status.   I have reviewed the Medications.     Review of Systems  Anesthesia Hx:  No problems with previous Anesthesia  History of prior surgery of interest to airway management or planning:  Denies Personal Hx of Anesthesia complications.   Social:  Former Smoker    Hematology/Oncology:     Oncology Normal    -- Anemia: Hematology Comments: INR elevated at 3.3, presumed secondary to sepsis. Fibrinogen >800; d-dimer=3 which is elevated but this is the lowest it has been in the last few weeks.    EENT/Dental:EENT/Dental Normal   Cardiovascular:   Denies Pacemaker. Hypertension  Denies Valvular problems/Murmurs.  Denies CABG/stent.  hyperlipidemia ECG has been reviewed. Echo 1/2022 shows some diastolic dysfunction but systolic intact (65%), no valvular disease and no pulm HTN.  St elevation noted in inf leads   Pulmonary:   Recent URI COVID positive   Renal/:   Chronic Renal Disease, ESRD, Dialysis    Hepatic/GI:   No Bowel Prep. Denies PUD. GERD Denies Liver Disease. Denies Hepatitis.    Musculoskeletal:  Musculoskeletal Normal Necrotizing fascitis to buttocks   Neurological:   Denies CVA. Denies Seizures. Pt has altered mental status, likely secondary to sepsis   Endocrine:   Diabetes, type 2    Dermatological:  Skin Normal    Psych:  Psychiatric Normal           Physical Exam  General:  Obesity    Airway/Jaw/Neck:  AIRWAY FINDINGS: Normal      Chest/Lungs:  Chest/Lungs Clear    Heart/Vascular:  Heart Findings: Rate: Tachycardia  Rhythm: Regular Rhythm        Mental Status:  Mental Status Findings: (lethargic but responsive to voice. Pt's answers were not intelligible)  Lethargic  Pt is lethargic but arousable       Anesthesia Plan  Type of Anesthesia, risks & benefits discussed:  Anesthesia Type:  general    Patient's Preference:   Plan Factors:          Intra-op Monitoring Plan: standard ASA monitors  Intra-op Monitoring Plan Comments:   Post  Op Pain Control Plan:   Post Op Pain Control Plan Comments:     Induction:   IV  Beta Blocker:         Informed Consent: Patient understands risks and agrees with Anesthesia plan.  Questions answered.   ASA Score: 4     Day of Surgery Review of History & Physical:        Anesthesia Plan Notes: 41 yr old woman with PMH ESRD on HD, DM, HTN, and bmi=35 admitted with thigh cellulitis, sepsis and has tested covid   positive. The thigh/buttocks wound is necrotizing fasciitis. Pt's cxr and breathing/oxygenation do not support pna.   Pt is on room air with oxygenation in mid-90's.  Pt has complicated PMH and now her INR has trended to 3.3, likely  Secondary to sepsis. D-dimer = 3 but this is the lowest it has been in weeks. Fibrinogen > 800, pt does NOT appear to be  In DIC. Pt has poor prognosis and family has been told this. The sisters and an aunt who is a nurse had an extensive   convo with Palliative Care and they elected to continue with full treatment including full code.   On evening 2/3 pt was noted by RNs to be more lethargic. Will want to ensure that surgeon  And family are aware of pt's decline in mental status and possibility of post-op ventilation. In addition, would   discuss INR with surgeon.  Note: need to obtain consent & make sure family understands increased lethargy on 2/3 and possibility of remaining   Intubated post-op        Ready For Surgery From Anesthesia Perspective.

## 2022-02-04 NOTE — ASSESSMENT & PLAN NOTE
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

## 2022-02-04 NOTE — ASSESSMENT & PLAN NOTE
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

## 2022-02-05 LAB
ABO + RH BLD: NORMAL
ALBUMIN SERPL BCP-MCNC: 0.7 G/DL (ref 3.5–5.2)
ALP SERPL-CCNC: 120 U/L (ref 55–135)
ALT SERPL W/O P-5'-P-CCNC: 17 U/L (ref 10–44)
ANION GAP SERPL CALC-SCNC: 25 MMOL/L (ref 8–16)
ANISOCYTOSIS BLD QL SMEAR: SLIGHT
AST SERPL-CCNC: 25 U/L (ref 10–40)
BACTERIA BLD CULT: NORMAL
BACTERIA BLD CULT: NORMAL
BACTERIA SPEC AEROBE CULT: NORMAL
BASOPHILS NFR BLD: 0 % (ref 0–1.9)
BILIRUB SERPL-MCNC: 4.3 MG/DL (ref 0.1–1)
BLD GP AB SCN CELLS X3 SERPL QL: NORMAL
BLD PROD TYP BPU: NORMAL
BLOOD UNIT EXPIRATION DATE: NORMAL
BLOOD UNIT TYPE CODE: 5100
BLOOD UNIT TYPE: NORMAL
BUN SERPL-MCNC: 49 MG/DL (ref 6–20)
CALCIUM SERPL-MCNC: 8.2 MG/DL (ref 8.7–10.5)
CHLORIDE SERPL-SCNC: 92 MMOL/L (ref 95–110)
CO2 SERPL-SCNC: 22 MMOL/L (ref 23–29)
CODING SYSTEM: NORMAL
CREAT SERPL-MCNC: 5.6 MG/DL (ref 0.5–1.4)
DIFFERENTIAL METHOD: ABNORMAL
DISPENSE STATUS: NORMAL
EOSINOPHIL NFR BLD: 1 % (ref 0–8)
ERYTHROCYTE [DISTWIDTH] IN BLOOD BY AUTOMATED COUNT: 17.3 % (ref 11.5–14.5)
EST. GFR  (AFRICAN AMERICAN): 10 ML/MIN/1.73 M^2
EST. GFR  (NON AFRICAN AMERICAN): 9 ML/MIN/1.73 M^2
GLUCOSE SERPL-MCNC: 88 MG/DL (ref 70–110)
HCT VFR BLD AUTO: 20.3 % (ref 37–48.5)
HGB BLD-MCNC: 6.9 G/DL (ref 12–16)
HYPOCHROMIA BLD QL SMEAR: ABNORMAL
IMM GRANULOCYTES # BLD AUTO: ABNORMAL K/UL (ref 0–0.04)
IMM GRANULOCYTES NFR BLD AUTO: ABNORMAL % (ref 0–0.5)
LYMPHOCYTES NFR BLD: 6 % (ref 18–48)
MAGNESIUM SERPL-MCNC: 1.8 MG/DL (ref 1.6–2.6)
MCH RBC QN AUTO: 29.6 PG (ref 27–31)
MCHC RBC AUTO-ENTMCNC: 34 G/DL (ref 32–36)
MCV RBC AUTO: 87 FL (ref 82–98)
METAMYELOCYTES NFR BLD MANUAL: 1 %
MONOCYTES NFR BLD: 0 % (ref 4–15)
NEUTROPHILS NFR BLD: 90 % (ref 38–73)
NEUTS BAND NFR BLD MANUAL: 2 %
NRBC BLD-RTO: 0 /100 WBC
NUM UNITS TRANS FFP: NORMAL
NUM UNITS TRANS FFP: NORMAL
NUM UNITS TRANS PACKED RBC: NORMAL
OVALOCYTES BLD QL SMEAR: ABNORMAL
PHOSPHATE SERPL-MCNC: 4.6 MG/DL (ref 2.7–4.5)
PLATELET # BLD AUTO: 192 K/UL (ref 150–450)
PLATELET BLD QL SMEAR: ABNORMAL
PMV BLD AUTO: 9.7 FL (ref 9.2–12.9)
POCT GLUCOSE: 101 MG/DL (ref 70–110)
POCT GLUCOSE: 34 MG/DL (ref 70–110)
POCT GLUCOSE: 66 MG/DL (ref 70–110)
POCT GLUCOSE: 78 MG/DL (ref 70–110)
POCT GLUCOSE: 88 MG/DL (ref 70–110)
POCT GLUCOSE: 89 MG/DL (ref 70–110)
POIKILOCYTOSIS BLD QL SMEAR: SLIGHT
POLYCHROMASIA BLD QL SMEAR: ABNORMAL
POTASSIUM SERPL-SCNC: 4.1 MMOL/L (ref 3.5–5.1)
PROT SERPL-MCNC: 4.8 G/DL (ref 6–8.4)
RBC # BLD AUTO: 2.33 M/UL (ref 4–5.4)
SODIUM SERPL-SCNC: 139 MMOL/L (ref 136–145)
TARGETS BLD QL SMEAR: ABNORMAL
TRANS ERYTHROCYTES VOL PATIENT: NORMAL ML
VANCOMYCIN SERPL-MCNC: 18.5 UG/ML
WBC # BLD AUTO: 19.14 K/UL (ref 3.9–12.7)

## 2022-02-05 PROCEDURE — C9113 INJ PANTOPRAZOLE SODIUM, VIA: HCPCS | Performed by: STUDENT IN AN ORGANIZED HEALTH CARE EDUCATION/TRAINING PROGRAM

## 2022-02-05 PROCEDURE — 80100014 HC HEMODIALYSIS 1:1

## 2022-02-05 PROCEDURE — 80053 COMPREHEN METABOLIC PANEL: CPT | Performed by: STUDENT IN AN ORGANIZED HEALTH CARE EDUCATION/TRAINING PROGRAM

## 2022-02-05 PROCEDURE — 84100 ASSAY OF PHOSPHORUS: CPT | Performed by: STUDENT IN AN ORGANIZED HEALTH CARE EDUCATION/TRAINING PROGRAM

## 2022-02-05 PROCEDURE — 20000000 HC ICU ROOM

## 2022-02-05 PROCEDURE — 99231 PR SUBSEQUENT HOSPITAL CARE,LEVL I: ICD-10-PCS | Mod: ,,, | Performed by: SURGERY

## 2022-02-05 PROCEDURE — 25000003 PHARM REV CODE 250: Performed by: STUDENT IN AN ORGANIZED HEALTH CARE EDUCATION/TRAINING PROGRAM

## 2022-02-05 PROCEDURE — 25000003 PHARM REV CODE 250: Performed by: HOSPITALIST

## 2022-02-05 PROCEDURE — 99231 SBSQ HOSP IP/OBS SF/LOW 25: CPT | Mod: ,,, | Performed by: SURGERY

## 2022-02-05 PROCEDURE — A4216 STERILE WATER/SALINE, 10 ML: HCPCS | Performed by: STUDENT IN AN ORGANIZED HEALTH CARE EDUCATION/TRAINING PROGRAM

## 2022-02-05 PROCEDURE — 94761 N-INVAS EAR/PLS OXIMETRY MLT: CPT

## 2022-02-05 PROCEDURE — 63600175 PHARM REV CODE 636 W HCPCS: Performed by: STUDENT IN AN ORGANIZED HEALTH CARE EDUCATION/TRAINING PROGRAM

## 2022-02-05 PROCEDURE — 27000207 HC ISOLATION

## 2022-02-05 PROCEDURE — 85027 COMPLETE CBC AUTOMATED: CPT | Performed by: STUDENT IN AN ORGANIZED HEALTH CARE EDUCATION/TRAINING PROGRAM

## 2022-02-05 PROCEDURE — 83735 ASSAY OF MAGNESIUM: CPT | Performed by: STUDENT IN AN ORGANIZED HEALTH CARE EDUCATION/TRAINING PROGRAM

## 2022-02-05 PROCEDURE — 27000221 HC OXYGEN, UP TO 24 HOURS

## 2022-02-05 PROCEDURE — 85007 BL SMEAR W/DIFF WBC COUNT: CPT | Performed by: STUDENT IN AN ORGANIZED HEALTH CARE EDUCATION/TRAINING PROGRAM

## 2022-02-05 PROCEDURE — 86850 RBC ANTIBODY SCREEN: CPT | Performed by: STUDENT IN AN ORGANIZED HEALTH CARE EDUCATION/TRAINING PROGRAM

## 2022-02-05 PROCEDURE — 80202 ASSAY OF VANCOMYCIN: CPT | Performed by: STUDENT IN AN ORGANIZED HEALTH CARE EDUCATION/TRAINING PROGRAM

## 2022-02-05 PROCEDURE — 86920 COMPATIBILITY TEST SPIN: CPT | Performed by: STUDENT IN AN ORGANIZED HEALTH CARE EDUCATION/TRAINING PROGRAM

## 2022-02-05 PROCEDURE — P9021 RED BLOOD CELLS UNIT: HCPCS | Performed by: STUDENT IN AN ORGANIZED HEALTH CARE EDUCATION/TRAINING PROGRAM

## 2022-02-05 RX ORDER — MORPHINE SULFATE 4 MG/ML
6 INJECTION, SOLUTION INTRAMUSCULAR; INTRAVENOUS EVERY 4 HOURS PRN
Status: DISCONTINUED | OUTPATIENT
Start: 2022-02-05 | End: 2022-02-15 | Stop reason: HOSPADM

## 2022-02-05 RX ORDER — HYDROCODONE BITARTRATE AND ACETAMINOPHEN 500; 5 MG/1; MG/1
TABLET ORAL
Status: DISCONTINUED | OUTPATIENT
Start: 2022-02-05 | End: 2022-02-11

## 2022-02-05 RX ORDER — METOPROLOL TARTRATE 25 MG/1
25 TABLET, FILM COATED ORAL 2 TIMES DAILY
Status: DISCONTINUED | OUTPATIENT
Start: 2022-02-05 | End: 2022-02-05

## 2022-02-05 RX ORDER — METOPROLOL TARTRATE 25 MG/1
25 TABLET, FILM COATED ORAL 2 TIMES DAILY
Status: DISCONTINUED | OUTPATIENT
Start: 2022-02-05 | End: 2022-02-06

## 2022-02-05 RX ADMIN — METOPROLOL TARTRATE 25 MG: 25 TABLET, FILM COATED ORAL at 05:02

## 2022-02-05 RX ADMIN — MUPIROCIN: 20 OINTMENT TOPICAL at 09:02

## 2022-02-05 RX ADMIN — VANCOMYCIN HYDROCHLORIDE 500 MG: 500 INJECTION, POWDER, LYOPHILIZED, FOR SOLUTION INTRAVENOUS at 04:02

## 2022-02-05 RX ADMIN — MORPHINE SULFATE 6 MG: 4 INJECTION, SOLUTION INTRAMUSCULAR; INTRAVENOUS at 03:02

## 2022-02-05 RX ADMIN — Medication 10 ML: at 05:02

## 2022-02-05 RX ADMIN — GABAPENTIN 100 MG: 100 CAPSULE ORAL at 09:02

## 2022-02-05 RX ADMIN — OXYCODONE 10 MG: 5 TABLET ORAL at 01:02

## 2022-02-05 RX ADMIN — PIPERACILLIN AND TAZOBACTAM 4.5 G: 4; .5 INJECTION, POWDER, LYOPHILIZED, FOR SOLUTION INTRAVENOUS; PARENTERAL at 05:02

## 2022-02-05 RX ADMIN — CINACALCET 30 MG: 30 TABLET, FILM COATED ORAL at 09:02

## 2022-02-05 RX ADMIN — PANTOPRAZOLE SODIUM 40 MG: 40 INJECTION, POWDER, FOR SOLUTION INTRAVENOUS at 09:02

## 2022-02-05 RX ADMIN — DEXTROSE 250 ML: 10 SOLUTION INTRAVENOUS at 09:02

## 2022-02-05 RX ADMIN — NEPHROCAP 1 CAPSULE: 1 CAP ORAL at 09:02

## 2022-02-05 RX ADMIN — Medication 324 MG: at 09:02

## 2022-02-05 RX ADMIN — Medication 10 ML: at 12:02

## 2022-02-05 RX ADMIN — MORPHINE SULFATE 6 MG: 4 INJECTION, SOLUTION INTRAMUSCULAR; INTRAVENOUS at 06:02

## 2022-02-05 NOTE — PROGRESS NOTES
[facility] Medicine  Progress Note    Patient Name: Xuan Ricardo  MRN: [id number]  Patient Class: IP- Inpatient   Admission Date: 2/1/2022  Length of Stay: 4 days  Attending Physician: Mehdi Armenta MD  Primary Care Provider: Katharine Franks MD        Subjective:     Principal Problem:Necrotizing fasciitis        HPI:  42 y/o female presented to ER with hypotension.  Patient was recently started on dialysis and had not had dialysis for one week since last hospital discharge.  She was noted to have multiple abnormalities on presentation.  She was severely anemic with Hgb of 4.3.  transfused 2 units of blood with adequate correction of H/H.  Patient was also noted to have AG metabolic acidosis with hyperglycemia.  Stated on insulin drip for DKA.  Leukocytosis on CBC.  Patient with erythema and blistering of bilateral inner thing and buttocks.  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  Started on ABX's and Surgery consulted.        Overview/Hospital Course:  42 y/o female presented to ER with hypotension.  Patient was recently started on dialysis and had not had dialysis for one week since last hospital discharge.  She was noted to have multiple abnormalities on presentation.  She was severely anemic with Hgb of 4.3.  transfused 2 units of blood with adequate correction of H/H.  Patient was also noted to have AG metabolic acidosis with hyperglycemia.  Started on insulin drip for DKA.  Leukocytosis on CBC.  Patient with erythema and blistering of bilateral inner thighs and buttocks.  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  Started on ABX's and Surgery consulted.  Admitted with necrotizing fascitis of the buttocks. Surgery consulted and patient brought to OR 2/2/22 with significant debridement. On antibiotics. Mental status worsening. Palliative Care consulted, family updated. Brought back to OR on 2/4/22 for debridement as well. Weaned off norepinephrine,  on broad spectrum antibiotics pending cultures. Long recovery ahead. Concern that may need diverting colostomy in the future.        Interval History: states she does not want the IV morphine is okay with PO medications. Off norepinephrine. Not very hungry but encouraging patient to eat more.     Review of Systems   HENT: Negative for ear discharge and ear pain.    Eyes: Negative for discharge and itching.   Endocrine: Negative for cold intolerance and heat intolerance.   Musculoskeletal: Positive for arthralgias.   Neurological: Negative for seizures and syncope.     Objective:     Vital Signs (Most Recent):  Temp: 97.6 °F (36.4 °C) (02/05/22 1101)  Pulse: 104 (02/05/22 1130)  Resp: 12 (02/05/22 1130)  BP: (!) 96/58 (02/05/22 1130)  SpO2: 100 % (02/05/22 1130) Vital Signs (24h Range):  Temp:  [97.5 °F (36.4 °C)-97.9 °F (36.6 °C)] 97.6 °F (36.4 °C)  Pulse:  [] 104  Resp:  [8-23] 12  SpO2:  [97 %-100 %] 100 %  BP: ()/(55-85) 96/58     Weight: 94.3 kg (207 lb 14.3 oz)  Body mass index is 35.68 kg/m².    Intake/Output Summary (Last 24 hours) at 2/5/2022 1314  Last data filed at 2/5/2022 1101  Gross per 24 hour   Intake 699.97 ml   Output --   Net 699.97 ml      Physical Exam  Vitals and nursing note reviewed.   Constitutional:       Appearance: She is ill-appearing. She is not diaphoretic.   HENT:      Head: Normocephalic and atraumatic.      Mouth/Throat:      Mouth: Mucous membranes are dry.      Pharynx: No oropharyngeal exudate or posterior oropharyngeal erythema.   Cardiovascular:      Rate and Rhythm: Regular rhythm. Tachycardia present.   Pulmonary:      Effort: No respiratory distress.      Breath sounds: Normal breath sounds.      Comments: On room air  Abdominal:      General: Bowel sounds are normal.      Palpations: Abdomen is soft.   Musculoskeletal:         General: No deformity or signs of injury.      Cervical back: Neck supple. No rigidity.   Skin:     General: Skin is warm.      Comments:  Dressings intact, did not undress   Neurological:      Comments: Awake and more alert than yesterday, appropriately answering questions.         Significant Labs:   All pertinent labs within the past 24 hours have been reviewed.  BMP:   Recent Labs   Lab 02/05/22  0435   GLU 88      K 4.1   CL 92*   CO2 22*   BUN 49*   CREATININE 5.6*   CALCIUM 8.2*   MG 1.8     CBC:   Recent Labs   Lab 02/04/22  0511 02/05/22  0435   WBC 26.25* 19.14*   HGB 7.3* 6.9*   HCT 22.5* 20.3*    192       Significant Imaging: I have reviewed all pertinent imaging results/findings within the past 24 hours.      Assessment/Plan:      * Necrotizing fasciitis  - has hx of calciphylaxis to bilateral thighs, now presents with DKA, sepsis and concerns for worsening wounds  - CT with subcutaneous air in buttocks tissues  - underwent debridement on 2/2/22 and now with large wound  - cultures sent and pending at this time  - repeat debridement 2/4/22  - may need diverting colostomy in the future  - continue with broad spectrum antibiotics and monitor cultures  - wound care      Septic shock  - see sepsis  - shock resolved      Coagulopathy  Has prolonged PT, PTT --> check fibrinogen and d-dimer  - fibrinogen and d-dimer elevated, not consistent with DIC  - monitor closely for bleeding      Leukocytosis  As above.  - due to infection  - monitoring daily      Hyponatremia  Now resolved      Esophagitis, [place] grade D  - on PPI bid      Debility        COVID-19 virus infection  Patient initially tested positive for Covid more than one month ago.  Asymptomatic from Covid with no evidence of pneumonia on CXR.        Calciphylaxis  Patient now with necrotizing fascitis of buttocks.  This patient does have evidence of infective focus  My overall impression is sepsis. Vital signs were reviewed and noted in progress note.  Antibiotics given-   Antibiotics (From admission, onward)            Start     Stop Route Frequency Ordered     "02/02/22 1800  piperacillin-tazobactam 4.5 g in dextrose 5 % 100 mL IVPB (ready to mix system)         -- IV Every 12 hours (non-standard times) 02/02/22 0716    02/02/22 1215  mupirocin 2 % ointment         02/07 0859 Nasl 2 times daily 02/02/22 1106    02/01/22 1539  vancomycin - pharmacy to dose  (vancomycin IVPB)        "And" Linked Group Details    -- IV pharmacy to manage frequency 02/01/22 1440        Cultures were taken-   Microbiology Results (last 7 days)     Procedure Component Value Units Date/Time    Blood culture #1 [[id number]] Collected: 02/01/22 1429    Order Status: Completed Specimen: Blood from Peripheral, Forearm, Left Updated: 02/04/22 1503     Blood Culture, Routine No Growth to date      No Growth to date      No Growth to date      No Growth to date    Narrative:      Blood Culture #1    Blood culture #2 [[id number]] Collected: 02/01/22 1406    Order Status: Completed Specimen: Blood from Peripheral, Upper Arm, Left Updated: 02/04/22 1503     Blood Culture, Routine No Growth to date      No Growth to date      No Growth to date      No Growth to date    Narrative:      Blood Culture #2    AFB Culture & Smear [[id number]] Collected: 02/02/22 1905    Order Status: Completed Specimen: Wound from Buttocks, Left Updated: 02/04/22 1441     AFB CULTURE STAIN No acid fast bacilli seen.    Narrative:      Bilateral buttock wound    Aerobic culture [[id number]] Collected: 02/02/22 1905    Order Status: Completed Specimen: Wound from Buttocks, Left Updated: 02/04/22 0838     Aerobic Bacterial Culture No growth    Narrative:      Bilateral buttock wound    Culture, Anaerobe [[id number]] Collected: 02/02/22 1905    Order Status: Completed Specimen: Wound from Buttocks, Left Updated: 02/04/22 0649     Anaerobic Culture Culture in progress    Narrative:      Bilateral Buttock wound    Gram stain [[id number]] Collected: 02/02/22 1905    Order Status: Completed Specimen: Wound from Buttocks, Left Updated: 02/03/22 " 1145     Gram Stain Result No WBC's      Rare Gram positive cocci in pairs      Rare Gram positive rods    Narrative:      Bilateral buttock wound    Fungus culture [[id number]] Collected: 02/02/22 1905    Order Status: Sent Specimen: Wound from Buttocks, Left Updated: 02/02/22 2005        Latest lactate reviewed, they are-  Recent Labs   Lab 02/01/22  2307 02/03/22  0801   LACTATE 1.7 1.6       Organ dysfunction indicated by Hypotension, DKA  Source- Skin  Source control Achieved by- ABx's  Appreciate Surgery input.  OR debridement    - DKA resolved, hypotension supported with pressors  - pain control        Goals of care, counseling/discussion  Advance Care Planning     Discussed with sister Tressa and aunt Kareen today with Dr. Armenta about our concerns with patient's illness and how sick she is. We updated them on surgery and need for more surgery and overall poor prognosis. Still hopeful, want everything to be done but also seem to understand how sick patient is.         End stage renal disease  Has not had HD since last discharge one week ago? Discharged less than 1 week ago.  Nephrology consulted for HD.      Type 2 diabetes mellitus with stage 5 chronic kidney disease not on chronic dialysis, with long-term current use of insulin  Initially presented in DKA.  Started on insulin drip, but then patient became hypoglycemia.  DKA has now resolved with closure of AG.  Will start nightly Levemir.  Adjust as necessary.  Continue with sliding scale.        Hypertensive CKD (chronic kidney disease)  Patient initially hypotensive requiring pressor support  - now weaned off and normotensive    Anemia of renal disease  Baseline Hgb around 7.5  Presents with Hgb of 4.3  No obvious bleeding, had EGD last admission with esophagitis  Transfused 2 units of blood with adequate correction of H/H  Continue to monitor.  - transfuse as needed   - loss over this hospital stay likely due to surgery        VTE Risk Mitigation (From  admission, onward)         Ordered     heparin (porcine) injection 3,200 Units  As needed (PRN)         02/03/22 0024     IP VTE HIGH RISK PATIENT  Once         02/01/22 1816     Place sequential compression device  Until discontinued         02/01/22 1816     Place sequential compression device  Until discontinued         02/01/22 1810                Discharge Planning   PAMELA:      Code Status: Full Code   Is the patient medically ready for discharge?:     Reason for patient still in hospital (select all that apply): Treatment  Discharge Plan A: Home Health   Discharge Delays: None known at this time (Patient not medically stable for discharge.)        Critical care time spent on the evaluation and treatment of severe organ dysfunction, review of pertinent labs and imaging studies, discussions with consulting providers and discussions with patient/family: 40 minutes.      Mehdi Armenta MD  Department of Hospital Medicine   [facility] - Intensive Care

## 2022-02-05 NOTE — AI DETERIORATION ALERT
Sepsis Screen (most recent)     Sepsis Screen - 02/05/22 1420     Is the patient's history or complaint suggestive of a possible infection? Yes  -JW    Are there at least two of the following signs and symptoms present? Yes  -    Sepsis signs/symptoms - Tachycardia Tachycardia     >90  -    Sepsis signs/symptoms - WBC WBC < 4,000 or WBC > 12,000  -JW    Are any of the following organ dysfunction criteria present and not considered to be due to a chronic condition? Yes  -    Organ Dysfunction Criteria Creatinine > 2.0  -    Organ Dysfunction Criteria Total Bilirubin > 2.0 Platelet count < 100,000  -    Organ Dysfunction Criteria INR > 1.5 or aPTT > 60  -    Start Sepsis Timer No   on abx -          User Key  (r) = Recorded By, (t) = Taken By, (c) = Cosigned By    Initials Name    JW Sadia Burns RN              Patient screens positive but is on ABX therapy, therefore timer not started. WCTM.

## 2022-02-05 NOTE — ASSESSMENT & PLAN NOTE
- has hx of calciphylaxis to bilateral thighs, now presents with DKA, sepsis and concerns for worsening wounds  - CT with subcutaneous air in buttocks tissues  - underwent debridement on 2/2/22 and now with large wound  - cultures sent and pending at this time  - repeat debridement 2/4/22  - may need diverting colostomy in the future  - continue with broad spectrum antibiotics and monitor cultures  - wound care

## 2022-02-05 NOTE — PROVIDER TRANSFER
40 yo F with hx of calciphylaxis, ESRD on HD, anemia, and malnutrition who was admitted with septic shock secondary to necrotizing fascitis of the buttocks. She has undergone debridement on 2/2 and 2/4 and now has a large wound to buttocks. She has been weaned off pressor support. Nephrology following for HD. Palliative Care also following. Unfortunately, she needed extensive debridement and Surgery is concerned for need for possible diverting colostomy in the future - they are following along. Patient will have long recovery.    - continue antibx and f/u culture data  - continue with HD per Nephrology  - She will likely need LTAC vs SNF for wound care in the future  - she is not eating much, encouraging her to start taking in more    Mehdi Armenta MD  Department of Hospital Medicine  [facility]

## 2022-02-05 NOTE — PROGRESS NOTES
Xuan Cousin is a 41 y.o. female patient.    Follow for ESRD, dialysis    Patient seen while on dialysis  No new c/o, comfortable    Scheduled Meds:   cinacalcet  30 mg Oral Daily with breakfast    epoetin kelsey-epbx  10,000 Units Intravenous Every Mon, Wed, Fri    ferrous gluconate  324 mg Oral Daily with breakfast    gabapentin  100 mg Oral BID    insulin detemir U-100  8 Units Subcutaneous QHS    mupirocin   Nasal BID    pantoprazole  40 mg Intravenous BID    piperacillin-tazobactam (ZOSYN) IVPB  4.5 g Intravenous Q12H    sevelamer carbonate  2,400 mg Oral TID WM    sodium chloride 0.9%  10 mL Intravenous Q6H    sodium thiosulfate  25 g Intravenous Every Mon, Wed, Fri    vitamin renal formula (B-complex-vitamin c-folic acid)  1 capsule Oral Daily       Review of patient's allergies indicates:   Allergen Reactions    Vicodin [hydrocodone-acetaminophen] Hives    Codeine Hives         Vital Signs Range (Last 24H):  Temp:  [97.5 °F (36.4 °C)-97.9 °F (36.6 °C)]   Pulse:  []   Resp:  [8-23]   BP: ()/(55-85)   SpO2:  [97 %-100 %]     I & O (Last 24H):    Intake/Output Summary (Last 24 hours) at 2/5/2022 1130  Last data filed at 2/5/2022 0701  Gross per 24 hour   Intake 654.03 ml   Output --   Net 654.03 ml           Physical Exam:  General appearance: well developed, no distress  Lungs:  clear to auscultation bilaterally and normal respiratory effort  Heart: regular rate and rhythm  Abdomen: soft, non-tender non-distented; bowel sounds normal; no masses,  no organomegaly  Extremities: no cyanosis or edema, or clubbing    Laboratory:  I have reviewed all pertinent lab results within the past 24 hours.  CBC:   Recent Labs   Lab 02/05/22  0435   WBC 19.14*   RBC 2.33*   HGB 6.9*   HCT 20.3*      MCV 87   MCH 29.6   MCHC 34.0     CMP:   Recent Labs   Lab 02/05/22  0435   GLU 88   CALCIUM 8.2*   ALBUMIN 0.7*   PROT 4.8*      K 4.1   CO2 22*   CL 92*   BUN 49*   CREATININE 5.6*    ALKPHOS 120   ALT 17   AST 25   BILITOT 4.3*       Imp/Plan    ESRD - on dialysis, stable, tolerated well  Calciphylaxis  Necrotizing fasciitis  COVID-19 infection  Anemia of CKD    Continue present Rx  HD q TTS for now  We'll follow for dialysis      Tramark Meyers  2/5/2022

## 2022-02-05 NOTE — SUBJECTIVE & OBJECTIVE
Interval History: states she does not want the IV morphine is okay with PO medications. Off norepinephrine. Not very hungry but encouraging patient to eat more.     Review of Systems   HENT: Negative for ear discharge and ear pain.    Eyes: Negative for discharge and itching.   Endocrine: Negative for cold intolerance and heat intolerance.   Musculoskeletal: Positive for arthralgias.   Neurological: Negative for seizures and syncope.     Objective:     Vital Signs (Most Recent):  Temp: 97.6 °F (36.4 °C) (02/05/22 1101)  Pulse: 104 (02/05/22 1130)  Resp: 12 (02/05/22 1130)  BP: (!) 96/58 (02/05/22 1130)  SpO2: 100 % (02/05/22 1130) Vital Signs (24h Range):  Temp:  [97.5 °F (36.4 °C)-97.9 °F (36.6 °C)] 97.6 °F (36.4 °C)  Pulse:  [] 104  Resp:  [8-23] 12  SpO2:  [97 %-100 %] 100 %  BP: ()/(55-85) 96/58     Weight: 94.3 kg (207 lb 14.3 oz)  Body mass index is 35.68 kg/m².    Intake/Output Summary (Last 24 hours) at 2/5/2022 1314  Last data filed at 2/5/2022 1101  Gross per 24 hour   Intake 699.97 ml   Output --   Net 699.97 ml      Physical Exam  Vitals and nursing note reviewed.   Constitutional:       Appearance: She is ill-appearing. She is not diaphoretic.   HENT:      Head: Normocephalic and atraumatic.      Mouth/Throat:      Mouth: Mucous membranes are dry.      Pharynx: No oropharyngeal exudate or posterior oropharyngeal erythema.   Cardiovascular:      Rate and Rhythm: Regular rhythm. Tachycardia present.   Pulmonary:      Effort: No respiratory distress.      Breath sounds: Normal breath sounds.      Comments: On room air  Abdominal:      General: Bowel sounds are normal.      Palpations: Abdomen is soft.   Musculoskeletal:         General: No deformity or signs of injury.      Cervical back: Neck supple. No rigidity.   Skin:     General: Skin is warm.      Comments: Dressings intact, did not undress   Neurological:      Comments: Awake and more alert than yesterday, appropriately answering  questions.         Significant Labs:   All pertinent labs within the past 24 hours have been reviewed.  BMP:   Recent Labs   Lab 02/05/22  0435   GLU 88      K 4.1   CL 92*   CO2 22*   BUN 49*   CREATININE 5.6*   CALCIUM 8.2*   MG 1.8     CBC:   Recent Labs   Lab 02/04/22  0511 02/05/22  0435   WBC 26.25* 19.14*   HGB 7.3* 6.9*   HCT 22.5* 20.3*    192       Significant Imaging: I have reviewed all pertinent imaging results/findings within the past 24 hours.

## 2022-02-05 NOTE — CARE UPDATE
[facility]  ICU Shift Summary  Date: 2/5/2022      Prehospitalization: Home  Admit Date / LOS : 2/1/2022/ 4 days    Diagnosis: Necrotizing fasciitis    Consults:        Active: Nephro and Palliative       Needed: Wound Care     Code Status: Full Code   Advanced Directive: <no information>    LDA: PICC       Central Lines/Site/Justification:Unable to Obtain/Maintain PIV       Urinary Cath/Order/Justification:Patient Does Not Have Urinary Catheter    Vasopressors/Infusions:        GOALS: Volume/ Hemodynamic: N/A                     RASS: N/A    Pain Management: IV       Pain Controlled: no     Rhythm: ST    Respiratory Device: Nasal Cannula                  Most Recent SBT/ SAT: N/A         ICU Liberation: not applicable    VTE Prophylaxis: Mechanical  Mobility: Bedrest  Stress Ulcer Prophylaxis: Yes    Isolation: Airborne and Contact and Droplet  Dietary: PO  Tolerance: yes  Advancement: @ goal    I & O (24h):    Intake/Output Summary (Last 24 hours) at 2/5/2022 1745  Last data filed at 2/5/2022 1315  Gross per 24 hour   Intake 1010.39 ml   Output 420 ml   Net 590.39 ml        Restraints: No    Significant Dates:  Post Op Date: N/A  Rescue Date: N/A  Imaging/ Diagnostics: N/A    Noteworthy Labs:  none    COVID Test: (+)  CBC/Anemia Labs: Coags:    Recent Labs   Lab 02/01/22  1502 02/02/22  0104 02/04/22  0511 02/05/22  0435   WBC  --    < > 26.25* 19.14*   HGB  --    < > 7.3* 6.9*   HCT  --    < > 22.5* 20.3*   PLT  --    < > 251 192   MCV  --    < > 90 87   RDW  --    < > 18.3* 17.3*   IRON 22*  --   --   --    RETIC 3.2*  --   --   --    FOLATE 6.8  --   --   --    [id number]*  --   --   --     < > = values in this interval not displayed.    Recent Labs   Lab 02/02/22  0104 02/03/22  0238 02/03/22  0801 02/04/22  0527   INR 2.2*   < > 3.3* 3.7*   APTT 56.0*  --   --   --     < > = values in this interval not displayed.        Chemistries:   Recent Labs   Lab 02/04/22  0511 02/05/22  0435   NA  136 139   K 4.1 4.1   CL 91* 92*   CO2 26 22*   BUN 40* 49*   CREATININE 5.2* 5.6*   CALCIUM 8.3* 8.2*   PROT 5.0* 4.8*   BILITOT 4.0* 4.3*   ALKPHOS 144* 120   ALT 19 17   AST 28 25   MG 1.7 1.8   PHOS 3.9 4.6*        Cardiac Enzymes: Ejection Fractions:    No results for input(s): CPK, CPKMB, MB, TROPONINI in the last 72 hours. EF   Date Value Ref Range Status   01/13/2022 65 % Final        POCT Glucose: HbA1c:    Recent Labs   Lab 02/05/22  0801 02/05/22  1234 02/05/22  1735   POCTGLUCOSE 101 88 89    Hemoglobin A1C   Date Value Ref Range Status   12/30/2021 5.2 4.0 - 5.6 % Final     Comment:     ADA Screening Guidelines:  5.7-6.4%  Consistent with prediabetes  >or=6.5%  Consistent with diabetes    High levels of fetal hemoglobin interfere with the HbA1C  assay. Heterozygous hemoglobin variants (HbS, HgC, etc)do  not significantly interfere with this assay.   However, presence of multiple variants may affect accuracy.             ICU LOS 3d 18h  Level of Care: OK to Transfer    Chart Check: 12 HR Done  Shift Summary/Plan for the shift: Remains in ICU as tele boarder.  Pt not moved as HE > 120 for more than 2 hours; lopressor started per MD order; pt can move when HR sustained < 110 per Dr. Mehdi Armenta.  BP stable; Dialysis completed for 3 hours; 1 unit PRBC's given with dialysis, no fluid removed.  Wound care completed d/t dressings saturated.  Dr. Christianson ok with changing abd pads to wounds on buttocks.  Remains ST on monitor.  Accu checks AC/HS; appetite remains poor.  Pain meds per orders.  POC reviewed with pt; expressed understanding.

## 2022-02-05 NOTE — ASSESSMENT & PLAN NOTE
Has not had HD since last discharge one week ago? Discharged less than 1 week ago.  Nephrology consulted for HD.

## 2022-02-05 NOTE — PROGRESS NOTES
[facility]      General Surgery Progress Note    02/05/2022  11:46 AM      Patient: Xuan Wrightsin  MRN: [id number]  Admit Date: 2/1/2022  LOS: 4      Subjective                                                                                                        Interval Events: NAEON, VSS, afebrile, no longer requiring pressors.      Patient Active Problem List   Diagnosis    Anemia of renal disease    Hypertensive CKD (chronic kidney disease)    Nephrotic range proteinuria    Metabolic acidosis    Increased prolactin level    Secondary hyperparathyroidism    Iron deficiency anemia    Vitamin D deficiency    Galactorrhea    Cataract    Class 1 obesity due to excess calories with serious comorbidity in adult    Chronic fatigue    Missed menses    Uterine leiomyoma    Type 2 diabetes mellitus with stage 5 chronic kidney disease not on chronic dialysis, with long-term current use of insulin    Hypertension    Hypertensive urgency    Symptomatic anemia    End stage renal disease    Uremia    CKD (chronic kidney disease) stage 5, GFR less than 15 ml/min    Nausea & vomiting    Thrombocytopenia    Hematemesis    Goals of care, counseling/discussion    Calciphylaxis    Type 2 diabetes mellitus    COVID-19 virus infection    Renovascular hypertension    Hyperphosphatemia    Candida esophagitis    Sepsis due to gram-negative UTI    Sinus tachycardia    Slow transit constipation    Debility    Esophagitis, [place] grade D    Hyponatremia    Leukocytosis    Coagulopathy    Septic shock    Necrotizing fasciitis    Cellulitis of thigh       No current facility-administered medications on file prior to encounter.     Current Outpatient Medications on File Prior to Encounter   Medication Sig Dispense Refill    calcium carbonate (TUMS) 200 mg calcium (500 mg) chewable tablet Take 2 tablets (1,000 mg total) by mouth 3 (three) times daily as needed. 150 tablet 0     cinacalcet (SENSIPAR) 30 MG Tab Take 1 tablet (30 mg total) by mouth daily with breakfast. 30 tablet 11    epoetin kelsey-epbx (RETACRIT) 2,000 unit/mL injection Inject 7 mLs (14,000 Units total) into the skin every Mon, Wed, Fri. With dialysis      ferrous gluconate (FERGON) 324 MG tablet Take 1 tablet (324 mg total) by mouth daily with breakfast. 30 tablet 2    gabapentin (NEURONTIN) 100 MG capsule Take 1 capsule (100 mg total) by mouth 3 (three) times daily. 90 capsule 0    metoprolol tartrate (LOPRESSOR) 25 MG tablet Take 1 tablet (25 mg total) by mouth 2 (two) times daily. 60 tablet 11    oxyCODONE (ROXICODONE) 10 mg Tab immediate release tablet Take 1 tablet (10 mg total) by mouth every 4 (four) hours as needed. 18 tablet 0    pantoprazole (PROTONIX) 40 MG tablet Take 1 tablet (40 mg total) by mouth 2 (two) times daily. 60 tablet 1    sevelamer carbonate (RENVELA) 800 mg Tab Take 3 tablets (2,400 mg total) by mouth 3 (three) times daily with meals. 270 tablet 11    sodium thiosulfate 12.5 gram/50 mL (250 mg/mL) injection Inject 100 mLs (25 g total) into the vein every Mon, Wed, Fri. With dialysis         Objective                                                                                                          Vitals:    02/05/22 1100 02/05/22 1101 02/05/22 1115 02/05/22 1130   BP: (!) 95/56 (!) 95/56 (!) 90/56 (!) 96/58   BP Location:       Patient Position:       Pulse: 103 103 104 104   Resp:  11  12   Temp:  97.6 °F (36.4 °C)     TempSrc:  Oral     SpO2:  100%  100%   Weight:       Height:           CBC   Recent Labs   Lab 02/04/22  0511 02/05/22  0435   WBC 26.25* 19.14*   HGB 7.3* 6.9*   HCT 22.5* 20.3*    192       CHEM   Recent Labs   Lab 02/04/22  0511 02/05/22  0435    139   K 4.1 4.1   CL 91* 92*   CO2 26 22*   BUN 40* 49*   CREATININE 5.2* 5.6*   * 88       PHYSICAL EXAM:    GEN: Alert and Awake, chronically ill appearing.  HEENT: NC/AT, EOMI  RESP: CTAB, good air  movement, no resp distress  CV: mild tachycardia  ABD: Soft, NT/ND, no guarding or rebound  EXT:  Moves all, extensive calciphylaxis lesions on bilateral lower extremities, sacral wound with dressing in place.  Serosanguinous drainage noted on dressing  Neuro: A&Ox3, CNII-XII intact  Skin: Warm, calciphylaxis lesions throughout bilateral lower extremites    Imaging Results          X-Ray Chest AP Portable (Final result)  Result time 02/01/22 15:38:15    Final result by Murphy Foy MD (02/01/22 15:38:15)                 Impression:      1. No acute cardiopulmonary process appreciated.      Electronically signed by: Murphy Foy  Date:    02/01/2022  Time:    15:38             Narrative:    EXAMINATION:  XR CHEST AP PORTABLE    CLINICAL HISTORY:  HYpotention;    TECHNIQUE:  Single frontal portable view of the chest was performed.    COMPARISON:  Chest radiograph 01/19/2022    FINDINGS:  RIJV-approach THDC is stable when compared to 01/19/2022 however, with marked interval retraction when compared to original placement on 05/22/2020 with terminus now projecting over the expected location of the mid SVC.    Cardiomediastinal silhouette is within normal limits.    No focal consolidation, overt interstitial edema, sizable pleural effusion or pneumothorax.    Multilevel degenerative changes of the imaged spine.                                Assessment / Plan:                                                                                           A/P: Case of 41 y.o. with extensive pmh, admitted in septic shock found to have necrotizing soft tissue infection of the sacrum and buttock.  S/p debridement on 2/2/22, 2/4/22.  No longer requiring pressors     -continue iv abx  -transfuse for hgb <7, if patient is requiring multiple transfusions consider ffp administration due to high INR.   -will re-evaluate if the patient needs more debridement with staff early next week      Carlos Alberto Christianson MD  [facility] Surgery PGY-V

## 2022-02-05 NOTE — ASSESSMENT & PLAN NOTE
Baseline Hgb around 7.5  Presents with Hgb of 4.3  No obvious bleeding, had EGD last admission with esophagitis  Transfused 2 units of blood with adequate correction of H/H  Continue to monitor.  - transfuse as needed   - loss over this hospital stay likely due to surgery

## 2022-02-05 NOTE — PLAN OF CARE
Remains in ICU as tele boarder.  Pt not moved as HE > 120 for more than 2 hours; lopressor started per MD order; pt can move when HR sustained < 110 per Dr. Mehdi Armenta.  BP stable; Dialysis completed for 3 hours; 1 unit PRBC's given with dialysis, no fluid removed.  Wound care completed d/t dressings saturated.  Dr. Christianson ok with changing abd pads to wounds on buttocks.  Remains ST on monitor.  Accu checks AC/HS; appetite remains poor.  Pain meds per orders.  POC reviewed with pt; expressed understanding.    Problem: Adult Inpatient Plan of Care  Goal: Plan of Care Review  Outcome: Ongoing, Progressing  Goal: Patient-Specific Goal (Individualized)  Outcome: Ongoing, Progressing  Goal: Absence of Hospital-Acquired Illness or Injury  Outcome: Ongoing, Progressing  Goal: Optimal Comfort and Wellbeing  Outcome: Ongoing, Progressing  Goal: Readiness for Transition of Care  Outcome: Ongoing, Progressing     Problem: Diabetes Comorbidity  Goal: Blood Glucose Level Within Targeted Range  Outcome: Ongoing, Progressing     Problem: Infection  Goal: Absence of Infection Signs and Symptoms  Outcome: Ongoing, Progressing     Problem: Impaired Wound Healing  Goal: Optimal Wound Healing  Outcome: Ongoing, Progressing     Problem: Skin Injury Risk Increased  Goal: Skin Health and Integrity  Outcome: Ongoing, Progressing     Problem: Device-Related Complication Risk (Hemodialysis)  Goal: Safe, Effective Therapy Delivery  Outcome: Ongoing, Progressing     Problem: Hemodynamic Instability (Hemodialysis)  Goal: Effective Tissue Perfusion  Outcome: Ongoing, Progressing     Problem: Infection (Hemodialysis)  Goal: Absence of Infection Signs and Symptoms  Outcome: Ongoing, Progressing     Problem: Fall Injury Risk  Goal: Absence of Fall and Fall-Related Injury  Outcome: Ongoing, Progressing     Problem: Adjustment to Illness (Sepsis/Septic Shock)  Goal: Optimal Coping  Outcome: Ongoing, Progressing     Problem: Bleeding  (Sepsis/Septic Shock)  Goal: Absence of Bleeding  Outcome: Ongoing, Progressing     Problem: Glycemic Control Impaired (Sepsis/Septic Shock)  Goal: Blood Glucose Level Within Desired Range  Outcome: Ongoing, Progressing     Problem: Infection Progression (Sepsis/Septic Shock)  Goal: Absence of Infection Signs and Symptoms  Outcome: Ongoing, Progressing     Problem: Nutrition Impaired (Sepsis/Septic Shock)  Goal: Optimal Nutrition Intake  Outcome: Ongoing, Progressing     Problem: Coping Ineffective  Goal: Effective Coping  Outcome: Ongoing, Progressing

## 2022-02-05 NOTE — PROGRESS NOTES
Pharmacokinetic Assessment Follow Up: IV Vancomycin    Vancomycin serum concentration assessment(s):    The random level was drawn correctly and can be used to guide therapy at this time. The measurement is within the desired definitive target range of 10 to 20 mcg/mL.    Vancomycin Regimen Plan:    Vancomycin 500mg post HD if patient has HD.    Re-dose when the random level is less than 20 mcg/mL, next level to be drawn at 0600 on 2/6/22    Drug levels (last 3 results):  Recent Labs   Lab Result Units 02/03/22  0626 02/04/22  0511 02/05/22  0435   Vancomycin, Random ug/mL 13.6 21.2 18.5       Pharmacy will continue to follow and monitor vancomycin.    Please contact pharmacy at extension [phone] for questions regarding this assessment.    Thank you for the consult,   Amos Teresa       Patient brief summary:  Xuan Ricardo is a 41 y.o. female initiated on antimicrobial therapy with IV Vancomycin for treatment of skin & soft tissue infection    The patient's current regimen is random pulse dosing    Drug Allergies:   Review of patient's allergies indicates:   Allergen Reactions    Vicodin [hydrocodone-acetaminophen] Hives    Codeine Hives       Actual Body Weight:   94.3 kg    Renal Function:   Estimated Creatinine Clearance: 14.4 mL/min (A) (based on SCr of 5.6 mg/dL (H)).,     Dialysis Method (if applicable):  intermittent HD    CBC (last 72 hours):  Recent Labs   Lab Result Units 02/03/22  0238 02/03/22  0801 02/04/22  0511   WBC K/uL 26.39* 27.76* 26.25*   Hemoglobin g/dL 8.6* 7.8* 7.3*   Hematocrit % 25.6* 23.8* 22.5*   Platelets K/uL 268 274 251   Gran % % 86.0* 71.0 84.0*   Lymph % % 1.0* 5.0* 10.0*   Mono % % 2.0* 2.0* 2.0*   Eosinophil % % 0.0 0.0 0.0   Basophil % % 0.0 0.0 0.0   Differential Method  Manual Manual Manual       Metabolic Panel (last 72 hours):  Recent Labs   Lab Result Units 02/02/22  0835 02/03/22  0238 02/03/22  0626 02/04/22  0511 02/05/22  0435   Sodium mmol/L 132* 136 136 136 139    Potassium mmol/L 5.1 4.3 4.5 4.1 4.1   Chloride mmol/L 96 99 98 91* 92*   CO2 mmol/L 22* 15* 14* 26 22*   Glucose mg/dL 277* 159* 190* 133* 88   BUN mg/dL 76* 28* 33* 40* 49*   Creatinine mg/dL 8.3* 3.9* 4.4* 5.2* 5.6*   Albumin g/dL  --   --  0.9* 0.8* 0.7*   Total Bilirubin mg/dL  --   --  3.5* 4.0* 4.3*   Alkaline Phosphatase U/L  --   --  171* 144* 120   AST U/L  --   --  29 28 25   ALT U/L  --   --  27 19 17   Magnesium mg/dL  --   --  1.7 1.7 1.8   Phosphorus mg/dL  --   --  4.0 3.9 4.6*       Vancomycin Administrations:  vancomycin given in the last 96 hours                     vancomycin 500 mg in dextrose 5 % 100 mL IVPB (ready to mix system) (mg) 500 mg New Bag 02/03/22 1204    vancomycin 1.5 g in dextrose 5 % 250 mL IVPB (ready to mix) (mg) 1,500 mg New Bag 02/01/22 1553                    Microbiologic Results:  Microbiology Results (last 7 days)       Procedure Component Value Units Date/Time    AFB Culture & Smear [[id number]] Collected: 02/02/22 1905    Order Status: Completed Specimen: Wound from Buttocks, Left Updated: 02/04/22 2127     AFB Culture & Smear Culture in progress     AFB CULTURE STAIN No acid fast bacilli seen.    Narrative:      Bilateral buttock wound    Blood culture #1 [[id number]] Collected: 02/01/22 1429    Order Status: Completed Specimen: Blood from Peripheral, Forearm, Left Updated: 02/04/22 1503     Blood Culture, Routine No Growth to date      No Growth to date      No Growth to date      No Growth to date    Narrative:      Blood Culture #1    Blood culture #2 [[id number]] Collected: 02/01/22 1406    Order Status: Completed Specimen: Blood from Peripheral, Upper Arm, Left Updated: 02/04/22 1503     Blood Culture, Routine No Growth to date      No Growth to date      No Growth to date      No Growth to date    Narrative:      Blood Culture #2    Aerobic culture [[id number]] Collected: 02/02/22 1905    Order Status: Completed Specimen: Wound from Buttocks, Left Updated: 02/04/22  0838     Aerobic Bacterial Culture No growth    Narrative:      Bilateral buttock wound    Culture, Anaerobe [[id number]] Collected: 02/02/22 1905    Order Status: Completed Specimen: Wound from Buttocks, Left Updated: 02/04/22 0649     Anaerobic Culture Culture in progress    Narrative:      Bilateral Buttock wound    Gram stain [[id number]] Collected: 02/02/22 1905    Order Status: Completed Specimen: Wound from Buttocks, Left Updated: 02/03/22 1145     Gram Stain Result No WBC's      Rare Gram positive cocci in pairs      Rare Gram positive rods    Narrative:      Bilateral buttock wound    Fungus culture [[id number]] Collected: 02/02/22 1905    Order Status: Sent Specimen: Wound from Buttocks, Left Updated: 02/02/22 2005

## 2022-02-06 LAB
ALBUMIN SERPL BCP-MCNC: 0.8 G/DL (ref 3.5–5.2)
ALP SERPL-CCNC: 136 U/L (ref 55–135)
ALT SERPL W/O P-5'-P-CCNC: 20 U/L (ref 10–44)
ANION GAP SERPL CALC-SCNC: 14 MMOL/L (ref 8–16)
AST SERPL-CCNC: 31 U/L (ref 10–40)
BACTERIA SPEC ANAEROBE CULT: ABNORMAL
BASOPHILS NFR BLD: 0 % (ref 0–1.9)
BILIRUB SERPL-MCNC: 6 MG/DL (ref 0.1–1)
BUN SERPL-MCNC: 28 MG/DL (ref 6–20)
CALCIUM SERPL-MCNC: 7.5 MG/DL (ref 8.7–10.5)
CHLORIDE SERPL-SCNC: 97 MMOL/L (ref 95–110)
CO2 SERPL-SCNC: 23 MMOL/L (ref 23–29)
CREAT SERPL-MCNC: 3.7 MG/DL (ref 0.5–1.4)
DIFFERENTIAL METHOD: ABNORMAL
EOSINOPHIL NFR BLD: 2 % (ref 0–8)
ERYTHROCYTE [DISTWIDTH] IN BLOOD BY AUTOMATED COUNT: 17.6 % (ref 11.5–14.5)
EST. GFR  (AFRICAN AMERICAN): 16 ML/MIN/1.73 M^2
EST. GFR  (NON AFRICAN AMERICAN): 14 ML/MIN/1.73 M^2
GLUCOSE SERPL-MCNC: 132 MG/DL (ref 70–110)
HCT VFR BLD AUTO: 24 % (ref 37–48.5)
HGB BLD-MCNC: 7.9 G/DL (ref 12–16)
IMM GRANULOCYTES # BLD AUTO: ABNORMAL K/UL (ref 0–0.04)
IMM GRANULOCYTES NFR BLD AUTO: ABNORMAL % (ref 0–0.5)
INR PPP: 1.3 (ref 0.8–1.2)
LYMPHOCYTES NFR BLD: 2 % (ref 18–48)
MAGNESIUM SERPL-MCNC: 1.6 MG/DL (ref 1.6–2.6)
MCH RBC QN AUTO: 29.2 PG (ref 27–31)
MCHC RBC AUTO-ENTMCNC: 32.9 G/DL (ref 32–36)
MCV RBC AUTO: 89 FL (ref 82–98)
METAMYELOCYTES NFR BLD MANUAL: 2 %
MONOCYTES NFR BLD: 2 % (ref 4–15)
NEUTROPHILS NFR BLD: 88 % (ref 38–73)
NEUTS BAND NFR BLD MANUAL: 4 %
NRBC BLD-RTO: 0 /100 WBC
PHOSPHATE SERPL-MCNC: 2.8 MG/DL (ref 2.7–4.5)
PLATELET # BLD AUTO: 176 K/UL (ref 150–450)
PMV BLD AUTO: 10.2 FL (ref 9.2–12.9)
POCT GLUCOSE: 128 MG/DL (ref 70–110)
POCT GLUCOSE: 131 MG/DL (ref 70–110)
POCT GLUCOSE: 138 MG/DL (ref 70–110)
POCT GLUCOSE: 152 MG/DL (ref 70–110)
POCT GLUCOSE: 155 MG/DL (ref 70–110)
POTASSIUM SERPL-SCNC: 3.5 MMOL/L (ref 3.5–5.1)
PROT SERPL-MCNC: 4.5 G/DL (ref 6–8.4)
PROTHROMBIN TIME: 13.9 SEC (ref 9–12.5)
RBC # BLD AUTO: 2.71 M/UL (ref 4–5.4)
SODIUM SERPL-SCNC: 134 MMOL/L (ref 136–145)
VANCOMYCIN SERPL-MCNC: 19.6 UG/ML
WBC # BLD AUTO: 23.57 K/UL (ref 3.9–12.7)

## 2022-02-06 PROCEDURE — 63600175 PHARM REV CODE 636 W HCPCS: Performed by: STUDENT IN AN ORGANIZED HEALTH CARE EDUCATION/TRAINING PROGRAM

## 2022-02-06 PROCEDURE — 25000003 PHARM REV CODE 250: Performed by: STUDENT IN AN ORGANIZED HEALTH CARE EDUCATION/TRAINING PROGRAM

## 2022-02-06 PROCEDURE — 27000207 HC ISOLATION

## 2022-02-06 PROCEDURE — 85027 COMPLETE CBC AUTOMATED: CPT | Performed by: STUDENT IN AN ORGANIZED HEALTH CARE EDUCATION/TRAINING PROGRAM

## 2022-02-06 PROCEDURE — A4216 STERILE WATER/SALINE, 10 ML: HCPCS | Performed by: STUDENT IN AN ORGANIZED HEALTH CARE EDUCATION/TRAINING PROGRAM

## 2022-02-06 PROCEDURE — 27000221 HC OXYGEN, UP TO 24 HOURS

## 2022-02-06 PROCEDURE — 11000001 HC ACUTE MED/SURG PRIVATE ROOM

## 2022-02-06 PROCEDURE — 80053 COMPREHEN METABOLIC PANEL: CPT | Performed by: STUDENT IN AN ORGANIZED HEALTH CARE EDUCATION/TRAINING PROGRAM

## 2022-02-06 PROCEDURE — 84100 ASSAY OF PHOSPHORUS: CPT | Performed by: STUDENT IN AN ORGANIZED HEALTH CARE EDUCATION/TRAINING PROGRAM

## 2022-02-06 PROCEDURE — 25000003 PHARM REV CODE 250: Performed by: HOSPITALIST

## 2022-02-06 PROCEDURE — 83735 ASSAY OF MAGNESIUM: CPT | Performed by: STUDENT IN AN ORGANIZED HEALTH CARE EDUCATION/TRAINING PROGRAM

## 2022-02-06 PROCEDURE — 85007 BL SMEAR W/DIFF WBC COUNT: CPT | Performed by: STUDENT IN AN ORGANIZED HEALTH CARE EDUCATION/TRAINING PROGRAM

## 2022-02-06 PROCEDURE — 94761 N-INVAS EAR/PLS OXIMETRY MLT: CPT

## 2022-02-06 PROCEDURE — C9113 INJ PANTOPRAZOLE SODIUM, VIA: HCPCS | Performed by: STUDENT IN AN ORGANIZED HEALTH CARE EDUCATION/TRAINING PROGRAM

## 2022-02-06 PROCEDURE — 80202 ASSAY OF VANCOMYCIN: CPT | Performed by: STUDENT IN AN ORGANIZED HEALTH CARE EDUCATION/TRAINING PROGRAM

## 2022-02-06 PROCEDURE — 99231 SBSQ HOSP IP/OBS SF/LOW 25: CPT | Mod: ,,, | Performed by: SURGERY

## 2022-02-06 PROCEDURE — 85610 PROTHROMBIN TIME: CPT | Performed by: STUDENT IN AN ORGANIZED HEALTH CARE EDUCATION/TRAINING PROGRAM

## 2022-02-06 PROCEDURE — 99231 PR SUBSEQUENT HOSPITAL CARE,LEVL I: ICD-10-PCS | Mod: ,,, | Performed by: SURGERY

## 2022-02-06 RX ORDER — HEPARIN SODIUM 5000 [USP'U]/ML
5000 INJECTION, SOLUTION INTRAVENOUS; SUBCUTANEOUS EVERY 8 HOURS
Status: DISCONTINUED | OUTPATIENT
Start: 2022-02-06 | End: 2022-02-06

## 2022-02-06 RX ORDER — METOPROLOL TARTRATE 50 MG/1
100 TABLET ORAL 2 TIMES DAILY
Status: DISCONTINUED | OUTPATIENT
Start: 2022-02-06 | End: 2022-02-07

## 2022-02-06 RX ADMIN — OXYCODONE 10 MG: 5 TABLET ORAL at 05:02

## 2022-02-06 RX ADMIN — METOPROLOL TARTRATE 100 MG: 50 TABLET, FILM COATED ORAL at 09:02

## 2022-02-06 RX ADMIN — PANTOPRAZOLE SODIUM 40 MG: 40 INJECTION, POWDER, FOR SOLUTION INTRAVENOUS at 08:02

## 2022-02-06 RX ADMIN — OXYCODONE 10 MG: 5 TABLET ORAL at 10:02

## 2022-02-06 RX ADMIN — CINACALCET 30 MG: 30 TABLET, FILM COATED ORAL at 08:02

## 2022-02-06 RX ADMIN — MUPIROCIN: 20 OINTMENT TOPICAL at 08:02

## 2022-02-06 RX ADMIN — PANTOPRAZOLE SODIUM 40 MG: 40 INJECTION, POWDER, FOR SOLUTION INTRAVENOUS at 10:02

## 2022-02-06 RX ADMIN — Medication 10 ML: at 01:02

## 2022-02-06 RX ADMIN — Medication 10 ML: at 11:02

## 2022-02-06 RX ADMIN — Medication 10 ML: at 05:02

## 2022-02-06 RX ADMIN — GABAPENTIN 100 MG: 100 CAPSULE ORAL at 08:02

## 2022-02-06 RX ADMIN — MORPHINE SULFATE 6 MG: 4 INJECTION, SOLUTION INTRAMUSCULAR; INTRAVENOUS at 02:02

## 2022-02-06 RX ADMIN — MUPIROCIN: 20 OINTMENT TOPICAL at 10:02

## 2022-02-06 RX ADMIN — METOPROLOL TARTRATE 100 MG: 50 TABLET, FILM COATED ORAL at 08:02

## 2022-02-06 RX ADMIN — PIPERACILLIN AND TAZOBACTAM 4.5 G: 4; .5 INJECTION, POWDER, LYOPHILIZED, FOR SOLUTION INTRAVENOUS; PARENTERAL at 05:02

## 2022-02-06 RX ADMIN — GABAPENTIN 100 MG: 100 CAPSULE ORAL at 10:02

## 2022-02-06 RX ADMIN — OXYCODONE 10 MG: 5 TABLET ORAL at 09:02

## 2022-02-06 RX ADMIN — Medication 324 MG: at 08:02

## 2022-02-06 RX ADMIN — NEPHROCAP 1 CAPSULE: 1 CAP ORAL at 08:02

## 2022-02-06 RX ADMIN — SEVELAMER CARBONATE 2400 MG: 800 TABLET, FILM COATED ORAL at 08:02

## 2022-02-06 NOTE — PROGRESS NOTES
Pct Dina attempted to assist pt with lunch, pt shakes her head No and refuses to eat at this time.

## 2022-02-06 NOTE — SUBJECTIVE & OBJECTIVE
Interval History: Has sinus tachycardia,increased BB.  CC today is pain.    Review of Systems   HENT: Negative for ear discharge and ear pain.    Eyes: Negative for discharge and itching.   Endocrine: Negative for cold intolerance and heat intolerance.   Musculoskeletal: Positive for arthralgias.   Neurological: Negative for seizures and syncope.     Objective:     Vital Signs (Most Recent):  Temp: 98 °F (36.7 °C) (02/06/22 1030)  Pulse: 92 (02/06/22 1030)  Resp: 18 (02/06/22 1030)  BP: 100/70 (02/06/22 1030)  SpO2: 100 % (02/06/22 1030) Vital Signs (24h Range):  Temp:  [97.5 °F (36.4 °C)-98.4 °F (36.9 °C)] 98 °F (36.7 °C)  Pulse:  [] 92  Resp:  [10-21] 18  SpO2:  [89 %-100 %] 100 %  BP: ()/(48-78) 100/70     Weight: 99.1 kg (218 lb 7.6 oz)  Body mass index is 37.5 kg/m².    Intake/Output Summary (Last 24 hours) at 2/6/2022 1046  Last data filed at 2/6/2022 0600  Gross per 24 hour   Intake 1110.84 ml   Output 420 ml   Net 690.84 ml      Physical Exam  Vitals and nursing note reviewed.   Constitutional:       Appearance: She is ill-appearing. She is not diaphoretic.   HENT:      Head: Normocephalic and atraumatic.      Mouth/Throat:      Mouth: Mucous membranes are dry.      Pharynx: No oropharyngeal exudate or posterior oropharyngeal erythema.   Cardiovascular:      Rate and Rhythm: Regular rhythm. Tachycardia present.   Pulmonary:      Effort: No respiratory distress.      Breath sounds: Normal breath sounds.      Comments: On room air  Abdominal:      General: Bowel sounds are normal.      Palpations: Abdomen is soft.   Musculoskeletal:         General: No deformity or signs of injury.      Cervical back: Neck supple. No rigidity.   Skin:     General: Skin is warm.      Comments: Dressings intact, did not undress   Neurological:      Comments: Awake and more alert than yesterday, appropriately answering questions.         Significant Labs:   All pertinent labs within the past 24 hours have been  reviewed.  BMP:   Recent Labs   Lab 02/06/22 0519   *   *   K 3.5   CL 97   CO2 23   BUN 28*   CREATININE 3.7*   CALCIUM 7.5*   MG 1.6     CBC:   Recent Labs   Lab 02/05/22  0435 02/06/22 0519   WBC 19.14* 23.57*   HGB 6.9* 7.9*   HCT 20.3* 24.0*    176       Significant Imaging: I have reviewed all pertinent imaging results/findings within the past 24 hours.

## 2022-02-06 NOTE — PROGRESS NOTES
Pt arrived to unit from ICU via bed. Pt AAOx2 disoriented to time. Tele box 8684 in use. Pt refuses to be turned @ this time to have back side assessed. Generalized bina noted throughout. Bilat thighs with melipex dressing CDI. Bilat heel boots in place. Kerlex wrap noted to bilat feet/heels. Pt request to be left alone @ this time. Pt closes eyes when asked questions- does arouse answers some questions then closes eyes again. Sclera appear jaundice. Pt has a flat affect. Bed in low position. Call light in reach. HOB slightly elevated. Wickaway pad on bed. O2 2L NC in use. Covid Isolation in use. RAMONITA PICC CDI. R subclavian Dialysis cath CDI. No distress noted. Will continue to monitor.

## 2022-02-06 NOTE — PROGRESS NOTES
[facility] Medicine  Progress Note    Patient Name: Xuan Ricardo  MRN: [id number]  Patient Class: IP- Inpatient   Admission Date: 2/1/2022  Length of Stay: 5 days  Attending Physician: Coco Higgins MD  Primary Care Provider: Katharine Franks MD        Subjective:     Principal Problem:Necrotizing fasciitis        HPI:  42 y/o female presented to ER with hypotension.  Patient was recently started on dialysis and had not had dialysis for one week since last hospital discharge.  She was noted to have multiple abnormalities on presentation.  She was severely anemic with Hgb of 4.3.  transfused 2 units of blood with adequate correction of H/H.  Patient was also noted to have AG metabolic acidosis with hyperglycemia.  Stated on insulin drip for DKA.  Leukocytosis on CBC.  Patient with erythema and blistering of bilateral inner thing and buttocks.  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  Started on ABX's and Surgery consulted.        Overview/Hospital Course:  42 y/o female presented to ER with hypotension.  Patient was recently started on dialysis and had not had dialysis for one week since last hospital discharge.  She was noted to have multiple abnormalities on presentation.  She was severely anemic with Hgb of 4.3.  transfused 2 units of blood with adequate correction of H/H.  Patient was also noted to have AG metabolic acidosis with hyperglycemia.  Started on insulin drip for DKA.  Leukocytosis on CBC.  Patient with erythema and blistering of bilateral inner thighs and buttocks.  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  Started on ABX's and Surgery consulted.  Admitted with necrotizing fascitis of the buttocks. Surgery consulted and patient brought to OR 2/2/22 with significant debridement. On antibiotics. Mental status worsening. Palliative Care consulted, family updated. Brought back to OR on 2/4/22 for debridement as well. Weaned off  norepinephrine, on broad spectrum antibiotics pending cultures. Long recovery ahead. Concern that may need diverting colostomy in the future.  Has sinus tachycardia,increased BB.cultures are pending.  CC today is pain.      Interval History: Has sinus tachycardia,increased BB.  CC today is pain.    Review of Systems   HENT: Negative for ear discharge and ear pain.    Eyes: Negative for discharge and itching.   Endocrine: Negative for cold intolerance and heat intolerance.   Musculoskeletal: Positive for arthralgias.   Neurological: Negative for seizures and syncope.     Objective:     Vital Signs (Most Recent):  Temp: 98 °F (36.7 °C) (02/06/22 1030)  Pulse: 92 (02/06/22 1030)  Resp: 18 (02/06/22 1030)  BP: 100/70 (02/06/22 1030)  SpO2: 100 % (02/06/22 1030) Vital Signs (24h Range):  Temp:  [97.5 °F (36.4 °C)-98.4 °F (36.9 °C)] 98 °F (36.7 °C)  Pulse:  [] 92  Resp:  [10-21] 18  SpO2:  [89 %-100 %] 100 %  BP: ()/(48-78) 100/70     Weight: 99.1 kg (218 lb 7.6 oz)  Body mass index is 37.5 kg/m².    Intake/Output Summary (Last 24 hours) at 2/6/2022 1046  Last data filed at 2/6/2022 0600  Gross per 24 hour   Intake 1110.84 ml   Output 420 ml   Net 690.84 ml      Physical Exam  Vitals and nursing note reviewed.   Constitutional:       Appearance: She is ill-appearing. She is not diaphoretic.   HENT:      Head: Normocephalic and atraumatic.      Mouth/Throat:      Mouth: Mucous membranes are dry.      Pharynx: No oropharyngeal exudate or posterior oropharyngeal erythema.   Cardiovascular:      Rate and Rhythm: Regular rhythm. Tachycardia present.   Pulmonary:      Effort: No respiratory distress.      Breath sounds: Normal breath sounds.      Comments: On room air  Abdominal:      General: Bowel sounds are normal.      Palpations: Abdomen is soft.   Musculoskeletal:         General: No deformity or signs of injury.      Cervical back: Neck supple. No rigidity.   Skin:     General: Skin is warm.      Comments:  Dressings intact, did not undress   Neurological:      Comments: Awake and more alert than yesterday, appropriately answering questions.         Significant Labs:   All pertinent labs within the past 24 hours have been reviewed.  BMP:   Recent Labs   Lab 02/06/22  0519   *   *   K 3.5   CL 97   CO2 23   BUN 28*   CREATININE 3.7*   CALCIUM 7.5*   MG 1.6     CBC:   Recent Labs   Lab 02/05/22  0435 02/06/22  0519   WBC 19.14* 23.57*   HGB 6.9* 7.9*   HCT 20.3* 24.0*    176       Significant Imaging: I have reviewed all pertinent imaging results/findings within the past 24 hours.      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      Septic shock  - see sepsis  - shock resolved      Coagulopathy  Has prolonged PT, PTT --> check fibrinogen and d-dimer  - fibrinogen and d-dimer elevated, not consistent with DIC  - monitor closely for bleeding      Leukocytosis  As above.  - due to infection  - monitoring daily      Hyponatremia  Now resolved      Esophagitis, [place] grade D  - on PPI bid      Debility        COVID-19 virus infection  Patient initially tested positive for Covid more than one month ago.  Asymptomatic from Covid with no evidence of pneumonia on CXR.        Calciphylaxis  Patient now with necrotizing fascitis of buttocks.  This patient does have evidence of infective focus  My overall impression is sepsis. Vital signs were reviewed and noted in progress note.  Antibiotics given-   Antibiotics (From admission, onward)            Start     Stop Route  "Frequency Ordered    02/02/22 1800  piperacillin-tazobactam 4.5 g in dextrose 5 % 100 mL IVPB (ready to mix system)         -- IV Every 12 hours (non-standard times) 02/02/22 0716    02/02/22 1215  mupirocin 2 % ointment         02/07 0859 Nasl 2 times daily 02/02/22 1106    02/01/22 1539  vancomycin - pharmacy to dose  (vancomycin IVPB)        "And" Linked Group Details    -- IV pharmacy to manage frequency 02/01/22 1440        Cultures were taken-   Microbiology Results (last 7 days)     Procedure Component Value Units Date/Time    Blood culture #1 [[id number]] Collected: 02/01/22 1429    Order Status: Completed Specimen: Blood from Peripheral, Forearm, Left Updated: 02/04/22 1503     Blood Culture, Routine No Growth to date      No Growth to date      No Growth to date      No Growth to date    Narrative:      Blood Culture #1    Blood culture #2 [[id number]] Collected: 02/01/22 1406    Order Status: Completed Specimen: Blood from Peripheral, Upper Arm, Left Updated: 02/04/22 1503     Blood Culture, Routine No Growth to date      No Growth to date      No Growth to date      No Growth to date    Narrative:      Blood Culture #2    AFB Culture & Smear [[id number]] Collected: 02/02/22 1905    Order Status: Completed Specimen: Wound from Buttocks, Left Updated: 02/04/22 1441     AFB CULTURE STAIN No acid fast bacilli seen.    Narrative:      Bilateral buttock wound    Aerobic culture [[id number]] Collected: 02/02/22 1905    Order Status: Completed Specimen: Wound from Buttocks, Left Updated: 02/04/22 0838     Aerobic Bacterial Culture No growth    Narrative:      Bilateral buttock wound    Culture, Anaerobe [[id number]] Collected: 02/02/22 1905    Order Status: Completed Specimen: Wound from Buttocks, Left Updated: 02/04/22 0649     Anaerobic Culture Culture in progress    Narrative:      Bilateral Buttock wound    Gram stain [[id number]] Collected: 02/02/22 1905    Order Status: Completed Specimen: Wound from Buttocks, " Left Updated: 02/03/22 1145     Gram Stain Result No WBC's      Rare Gram positive cocci in pairs      Rare Gram positive rods    Narrative:      Bilateral buttock wound    Fungus culture [[id number]] Collected: 02/02/22 1905    Order Status: Sent Specimen: Wound from Buttocks, Left Updated: 02/02/22 2005        Latest lactate reviewed, they are-  Recent Labs   Lab 02/01/22  2307 02/03/22  0801   LACTATE 1.7 1.6       Organ dysfunction indicated by Hypotension, DKA  Source- Skin  Source control Achieved by- ABx's  Appreciate Surgery input.  OR debridement    - DKA resolved, hypotension supported with pressors  - pain control        Goals of care, counseling/discussion  Advance Care Planning     Discussed with sister Tressa and aunt Kareen today with Dr. Armenta about our concerns with patient's illness and how sick she is. We updated them on surgery and need for more surgery and overall poor prognosis. Still hopeful, want everything to be done but also seem to understand how sick patient is.         End stage renal disease  Has not had HD since last discharge one week ago? Discharged less than 1 week ago.  Nephrology consulted for HD.      Type 2 diabetes mellitus with stage 5 chronic kidney disease not on chronic dialysis, with long-term current use of insulin  Initially presented in DKA.  Started on insulin drip, but then patient became hypoglycemia.  DKA has now resolved with closure of AG.  Will start nightly Levemir.  Adjust as necessary.  Continue with sliding scale.        Hypertensive CKD (chronic kidney disease)  Patient initially hypotensive requiring pressor support  - now weaned off and normotensive    Anemia of renal disease  Baseline Hgb around 7.5  Presents with Hgb of 4.3  No obvious bleeding, had EGD last admission with esophagitis  Transfused 2 units of blood with adequate correction of H/H  Continue to monitor.  - transfuse as needed   - loss over this hospital stay likely due to surgery        VTE  Risk Mitigation (From admission, onward)         Ordered     heparin (porcine) injection 3,200 Units  As needed (PRN)         02/03/22 0024     IP VTE HIGH RISK PATIENT  Once         02/01/22 1816     Place sequential compression device  Until discontinued         02/01/22 1816     Place sequential compression device  Until discontinued         02/01/22 1810                Discharge Planning   PAMELA:      Code Status: Full Code   Is the patient medically ready for discharge?:     Reason for patient still in hospital (select all that apply): Patient trending condition  Discharge Plan A: Home Health   Discharge Delays: None known at this time (Patient not medically stable for discharge.)              Coco Higgins MD  Department of Hospital Medicine   [facility]

## 2022-02-06 NOTE — PLAN OF CARE
Pt remained ST- NSR throughout the night. 2L NC with sat's in high 90's.  Blood sugar was 66, D10 IV 125ml given. BS on recheck was 78. Will continue to monitor.

## 2022-02-06 NOTE — PROGRESS NOTES
[facility]      General Surgery Progress Note    02/06/2022  11:14 AM      Patient: Xuan Wrightsin  MRN: [id number]  Admit Date: 2/1/2022  LOS: 5      Subjective                                                                                                        Interval Events: NAEON, VSS, afebrile.  Patient able to converse today.  Pain noted partially controlled with medication. AAO X 3    Patient Active Problem List   Diagnosis    Anemia of renal disease    Hypertensive CKD (chronic kidney disease)    Nephrotic range proteinuria    Metabolic acidosis    Increased prolactin level    Secondary hyperparathyroidism    Iron deficiency anemia    Vitamin D deficiency    Galactorrhea    Cataract    Class 1 obesity due to excess calories with serious comorbidity in adult    Chronic fatigue    Missed menses    Uterine leiomyoma    Type 2 diabetes mellitus with stage 5 chronic kidney disease not on chronic dialysis, with long-term current use of insulin    Hypertension    Hypertensive urgency    Symptomatic anemia    End stage renal disease    Uremia    CKD (chronic kidney disease) stage 5, GFR less than 15 ml/min    Nausea & vomiting    Thrombocytopenia    Hematemesis    Goals of care, counseling/discussion    Calciphylaxis    Type 2 diabetes mellitus    COVID-19 virus infection    Renovascular hypertension    Hyperphosphatemia    Candida esophagitis    Sepsis due to gram-negative UTI    Sinus tachycardia    Slow transit constipation    Debility    Esophagitis, [place] grade D    Hyponatremia    Leukocytosis    Coagulopathy    Septic shock    Necrotizing fasciitis    Cellulitis of thigh       No current facility-administered medications on file prior to encounter.     Current Outpatient Medications on File Prior to Encounter   Medication Sig Dispense Refill    calcium carbonate (TUMS) 200 mg calcium (500 mg) chewable tablet Take 2 tablets (1,000 mg total)  by mouth 3 (three) times daily as needed. 150 tablet 0    cinacalcet (SENSIPAR) 30 MG Tab Take 1 tablet (30 mg total) by mouth daily with breakfast. 30 tablet 11    epoetin kelsey-epbx (RETACRIT) 2,000 unit/mL injection Inject 7 mLs (14,000 Units total) into the skin every Mon, Wed, Fri. With dialysis      ferrous gluconate (FERGON) 324 MG tablet Take 1 tablet (324 mg total) by mouth daily with breakfast. 30 tablet 2    gabapentin (NEURONTIN) 100 MG capsule Take 1 capsule (100 mg total) by mouth 3 (three) times daily. 90 capsule 0    metoprolol tartrate (LOPRESSOR) 25 MG tablet Take 1 tablet (25 mg total) by mouth 2 (two) times daily. 60 tablet 11    oxyCODONE (ROXICODONE) 10 mg Tab immediate release tablet Take 1 tablet (10 mg total) by mouth every 4 (four) hours as needed. 18 tablet 0    pantoprazole (PROTONIX) 40 MG tablet Take 1 tablet (40 mg total) by mouth 2 (two) times daily. 60 tablet 1    sevelamer carbonate (RENVELA) 800 mg Tab Take 3 tablets (2,400 mg total) by mouth 3 (three) times daily with meals. 270 tablet 11    sodium thiosulfate 12.5 gram/50 mL (250 mg/mL) injection Inject 100 mLs (25 g total) into the vein every Mon, Wed, Fri. With dialysis         Objective                                                                                                          Vitals:    02/06/22 0900 02/06/22 0954 02/06/22 1000 02/06/22 1030   BP: 123/65  116/64 100/70   BP Location:       Patient Position:       Pulse: 93  90 92   Resp: 13 16 13 18   Temp:    98 °F (36.7 °C)   TempSrc:       SpO2: 100%  100% 100%   Weight:       Height:           CBC   Recent Labs   Lab 02/05/22  0435 02/06/22  0519   WBC 19.14* 23.57*   HGB 6.9* 7.9*   HCT 20.3* 24.0*    176       CHEM   Recent Labs   Lab 02/05/22  0435 02/06/22  0519    134*   K 4.1 3.5   CL 92* 97   CO2 22* 23   BUN 49* 28*   CREATININE 5.6* 3.7*   GLU 88 132*       PHYSICAL EXAM:    GEN: Alert and Awake, chronically ill  appearing.  HEENT: NC/AT, EOMI  RESP: CTAB, good air movement, no resp distress  CV: mild tachycardia  ABD: Soft, NT/ND, no guarding or rebound  EXT:  Moves all, extensive calciphylaxis lesions on bilateral lower extremities, sacral wound with dressing in place.  Serosanguinous drainage noted on dressing  Neuro: A&Ox3, CNII-XII intact  Skin: Warm, calciphylaxis lesions throughout bilateral lower extremites    Imaging Results          X-Ray Chest AP Portable (Final result)  Result time 02/01/22 15:38:15    Final result by Murphy Foy MD (02/01/22 15:38:15)                 Impression:      1. No acute cardiopulmonary process appreciated.      Electronically signed by: Murphy Foy  Date:    02/01/2022  Time:    15:38             Narrative:    EXAMINATION:  XR CHEST AP PORTABLE    CLINICAL HISTORY:  HYpotention;    TECHNIQUE:  Single frontal portable view of the chest was performed.    COMPARISON:  Chest radiograph 01/19/2022    FINDINGS:  RIJV-approach THDC is stable when compared to 01/19/2022 however, with marked interval retraction when compared to original placement on 05/22/2020 with terminus now projecting over the expected location of the mid SVC.    Cardiomediastinal silhouette is within normal limits.    No focal consolidation, overt interstitial edema, sizable pleural effusion or pneumothorax.    Multilevel degenerative changes of the imaged spine.                                Assessment / Plan:                                                                                           A/P: Case of 41 y.o. with extensive pmh, admitted in septic shock found to have necrotizing soft tissue infection of the sacrum and buttock.  S/p debridement on 2/2/22, 2/4/22.      With patients extensive pmh noted calciphylaxis, its is very unlikely that the sacral wound will ever be able to heal.  Recommend a goals of care discussion with the patient and family     -continue iv abx  -transfuse for hgb <7, if patient is  requiring multiple transfusions consider ffp administration due to high INR.   -wound care consult placed  -recommend changing dressing daily.  NS/betadine soaked kerlix for packing with abd pads on top      Carlos Alberto Christianson MD   Gen Surgery PGY-V

## 2022-02-06 NOTE — PROGRESS NOTES
Pharmacokinetic Assessment Follow Up: IV Vancomycin    Vancomycin serum concentration assessment(s):    The random level was drawn correctly and can be used to guide therapy at this time. The measurement is within the desired definitive target range of 10 to 20 mcg/mL.    Vancomycin Regimen Plan:    No dose scheduled.    Re-dose when the random level is less than 20 mcg/mL, next level to be drawn at 0300 on 2/8/22    Drug levels (last 3 results):  Recent Labs   Lab Result Units 02/04/22  0511 02/05/22  0435 02/06/22  0519   Vancomycin, Random ug/mL 21.2 18.5 19.6       Pharmacy will continue to follow and monitor vancomycin.    Please contact pharmacy at extension [phone] for questions regarding this assessment.    Thank you for the consult,   Amos Teresa       Patient brief summary:  Xuan Ricardo is a 41 y.o. female initiated on antimicrobial therapy with IV Vancomycin for treatment of skin & soft tissue infection    The patient's current regimen is random pulse dosing    Drug Allergies:   Review of patient's allergies indicates:   Allergen Reactions    Vicodin [hydrocodone-acetaminophen] Hives    Codeine Hives       Actual Body Weight:   99.1 kg    Renal Function:   Estimated Creatinine Clearance: 22.4 mL/min (A) (based on SCr of 3.7 mg/dL (H)).,     Dialysis Method (if applicable):  intermittent HD    CBC (last 72 hours):  Recent Labs   Lab Result Units 02/03/22  0801 02/04/22  0511 02/05/22  0435 02/06/22  0519   WBC K/uL 27.76* 26.25* 19.14* 23.57*   Hemoglobin g/dL 7.8* 7.3* 6.9* 7.9*   Hematocrit % 23.8* 22.5* 20.3* 24.0*   Platelets K/uL 274 251 192 176   Gran % % 71.0 84.0* 90.0* 88.0*   Lymph % % 5.0* 10.0* 6.0* 2.0*   Mono % % 2.0* 2.0* 0.0* 2.0*   Eosinophil % % 0.0 0.0 1.0 2.0   Basophil % % 0.0 0.0 0.0 0.0   Differential Method  Manual Manual Manual Manual       Metabolic Panel (last 72 hours):  Recent Labs   Lab Result Units 02/04/22  0511 02/05/22  0435 02/06/22  0519   Sodium mmol/L 136 139  134*   Potassium mmol/L 4.1 4.1 3.5   Chloride mmol/L 91* 92* 97   CO2 mmol/L 26 22* 23   Glucose mg/dL 133* 88 132*   BUN mg/dL 40* 49* 28*   Creatinine mg/dL 5.2* 5.6* 3.7*   Albumin g/dL 0.8* 0.7* 0.8*   Total Bilirubin mg/dL 4.0* 4.3* 6.0*   Alkaline Phosphatase U/L 144* 120 136*   AST U/L 28 25 31   ALT U/L 19 17 20   Magnesium mg/dL 1.7 1.8 1.6   Phosphorus mg/dL 3.9 4.6* 2.8       Vancomycin Administrations:  vancomycin given in the last 96 hours                     vancomycin 500 mg in dextrose 5 % 100 mL IVPB (ready to mix system) (mg) 500 mg New Bag 02/05/22 1619    vancomycin 500 mg in dextrose 5 % 100 mL IVPB (ready to mix system) (mg) 500 mg New Bag 02/03/22 1204                    Microbiologic Results:  Microbiology Results (last 7 days)       Procedure Component Value Units Date/Time    Blood culture #1 [[id number]] Collected: 02/01/22 1429    Order Status: Completed Specimen: Blood from Peripheral, Forearm, Left Updated: 02/05/22 1503     Blood Culture, Routine No Growth after 4 days.     Narrative:      Blood Culture #1    Blood culture #2 [[id number]] Collected: 02/01/22 1406    Order Status: Completed Specimen: Blood from Peripheral, Upper Arm, Left Updated: 02/05/22 1503     Blood Culture, Routine No Growth after 4 days.     Narrative:      Blood Culture #2    Aerobic culture [[id number]] Collected: 02/02/22 1905    Order Status: Completed Specimen: Wound from Buttocks, Left Updated: 02/05/22 0828     Aerobic Bacterial Culture No significant isolate    Narrative:      Bilateral buttock wound    AFB Culture & Smear [[id number]] Collected: 02/02/22 1905    Order Status: Completed Specimen: Wound from Buttocks, Left Updated: 02/04/22 2127     AFB Culture & Smear Culture in progress     AFB CULTURE STAIN No acid fast bacilli seen.    Narrative:      Bilateral buttock wound    Culture, Anaerobe [[id number]] Collected: 02/02/22 1905    Order Status: Completed Specimen: Wound from Buttocks, Left Updated:  02/04/22 0649     Anaerobic Culture Culture in progress    Narrative:      Bilateral Buttock wound    Gram stain [[id number]] Collected: 02/02/22 1905    Order Status: Completed Specimen: Wound from Buttocks, Left Updated: 02/03/22 1145     Gram Stain Result No WBC's      Rare Gram positive cocci in pairs      Rare Gram positive rods    Narrative:      Bilateral buttock wound    Fungus culture [[id number]] Collected: 02/02/22 1905    Order Status: Sent Specimen: Wound from Buttocks, Left Updated: 02/02/22 2005

## 2022-02-06 NOTE — PROGRESS NOTES
Attempted to turn pt at this time with Pct Dina, pt crying and refused at this time. States she will turn once her sister is here-that she is on her way. Educated pt on the importance of turning for blood flow/circulation/wound healing. Will reattempt to turn.

## 2022-02-06 NOTE — PROGRESS NOTES
pts sister @ bs informed that dressing change was complete and pt was turned. Informed that pt was refusing to turn but eventually  Did allow during dressing change and wedge was applied. Also informed that pt would not eat lunch. Sister states she did bring her favorite shrimp and fries and she would not eat that. She states she will try to encourage her to eat. Pt lying in bed sleeping, resp e/u 18/min. No distress noted. Will continue to monitor.

## 2022-02-06 NOTE — ASSESSMENT & PLAN NOTE
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

## 2022-02-06 NOTE — PROGRESS NOTES
Prn morpine administered prior to wound care. Wound care complete to sacrum per MD order. Wound cleansed with betadine/NS moistened gauze. Wound packed with betadine/NS soaked kerlex. Covered with abd pads secure with medipore tape.

## 2022-02-07 LAB
ALBUMIN SERPL BCP-MCNC: 0.7 G/DL (ref 3.5–5.2)
ALP SERPL-CCNC: 141 U/L (ref 55–135)
ALT SERPL W/O P-5'-P-CCNC: 19 U/L (ref 10–44)
ANION GAP SERPL CALC-SCNC: 16 MMOL/L (ref 8–16)
ANISOCYTOSIS BLD QL SMEAR: SLIGHT
AST SERPL-CCNC: 26 U/L (ref 10–40)
BASOPHILS NFR BLD: 0 % (ref 0–1.9)
BILIRUB SERPL-MCNC: 5.1 MG/DL (ref 0.1–1)
BUN SERPL-MCNC: 34 MG/DL (ref 6–20)
CALCIUM SERPL-MCNC: 7.3 MG/DL (ref 8.7–10.5)
CHLORIDE SERPL-SCNC: 97 MMOL/L (ref 95–110)
CO2 SERPL-SCNC: 22 MMOL/L (ref 23–29)
CREAT SERPL-MCNC: 4.5 MG/DL (ref 0.5–1.4)
DIFFERENTIAL METHOD: ABNORMAL
EOSINOPHIL NFR BLD: 0 % (ref 0–8)
ERYTHROCYTE [DISTWIDTH] IN BLOOD BY AUTOMATED COUNT: 17.8 % (ref 11.5–14.5)
EST. GFR  (AFRICAN AMERICAN): 13 ML/MIN/1.73 M^2
EST. GFR  (NON AFRICAN AMERICAN): 11 ML/MIN/1.73 M^2
GLUCOSE SERPL-MCNC: 105 MG/DL (ref 70–110)
HCT VFR BLD AUTO: 24.3 % (ref 37–48.5)
HGB BLD-MCNC: 8 G/DL (ref 12–16)
HYPOCHROMIA BLD QL SMEAR: ABNORMAL
IMM GRANULOCYTES # BLD AUTO: ABNORMAL K/UL (ref 0–0.04)
IMM GRANULOCYTES NFR BLD AUTO: ABNORMAL % (ref 0–0.5)
LYMPHOCYTES NFR BLD: 6 % (ref 18–48)
MAGNESIUM SERPL-MCNC: 1.6 MG/DL (ref 1.6–2.6)
MCH RBC QN AUTO: 30.1 PG (ref 27–31)
MCHC RBC AUTO-ENTMCNC: 32.9 G/DL (ref 32–36)
MCV RBC AUTO: 91 FL (ref 82–98)
METAMYELOCYTES NFR BLD MANUAL: 6 %
MONOCYTES NFR BLD: 1 % (ref 4–15)
NEUTROPHILS NFR BLD: 80 % (ref 38–73)
NEUTS BAND NFR BLD MANUAL: 7 %
NRBC BLD-RTO: 0 /100 WBC
PHOSPHATE SERPL-MCNC: 3.2 MG/DL (ref 2.7–4.5)
PLATELET # BLD AUTO: 176 K/UL (ref 150–450)
PMV BLD AUTO: 10.7 FL (ref 9.2–12.9)
POCT GLUCOSE: 104 MG/DL (ref 70–110)
POCT GLUCOSE: 110 MG/DL (ref 70–110)
POCT GLUCOSE: 132 MG/DL (ref 70–110)
POIKILOCYTOSIS BLD QL SMEAR: SLIGHT
POTASSIUM SERPL-SCNC: 3.5 MMOL/L (ref 3.5–5.1)
PROT SERPL-MCNC: 4.4 G/DL (ref 6–8.4)
RBC # BLD AUTO: 2.66 M/UL (ref 4–5.4)
SODIUM SERPL-SCNC: 135 MMOL/L (ref 136–145)
WBC # BLD AUTO: 21.6 K/UL (ref 3.9–12.7)

## 2022-02-07 PROCEDURE — 27000207 HC ISOLATION

## 2022-02-07 PROCEDURE — 80053 COMPREHEN METABOLIC PANEL: CPT | Performed by: STUDENT IN AN ORGANIZED HEALTH CARE EDUCATION/TRAINING PROGRAM

## 2022-02-07 PROCEDURE — 25000003 PHARM REV CODE 250: Performed by: STUDENT IN AN ORGANIZED HEALTH CARE EDUCATION/TRAINING PROGRAM

## 2022-02-07 PROCEDURE — 11000001 HC ACUTE MED/SURG PRIVATE ROOM

## 2022-02-07 PROCEDURE — 85007 BL SMEAR W/DIFF WBC COUNT: CPT | Performed by: STUDENT IN AN ORGANIZED HEALTH CARE EDUCATION/TRAINING PROGRAM

## 2022-02-07 PROCEDURE — C9399 UNCLASSIFIED DRUGS OR BIOLOG: HCPCS | Performed by: STUDENT IN AN ORGANIZED HEALTH CARE EDUCATION/TRAINING PROGRAM

## 2022-02-07 PROCEDURE — 83735 ASSAY OF MAGNESIUM: CPT | Performed by: STUDENT IN AN ORGANIZED HEALTH CARE EDUCATION/TRAINING PROGRAM

## 2022-02-07 PROCEDURE — 63600175 PHARM REV CODE 636 W HCPCS: Performed by: STUDENT IN AN ORGANIZED HEALTH CARE EDUCATION/TRAINING PROGRAM

## 2022-02-07 PROCEDURE — 92610 EVALUATE SWALLOWING FUNCTION: CPT

## 2022-02-07 PROCEDURE — C9113 INJ PANTOPRAZOLE SODIUM, VIA: HCPCS | Performed by: STUDENT IN AN ORGANIZED HEALTH CARE EDUCATION/TRAINING PROGRAM

## 2022-02-07 PROCEDURE — 27000221 HC OXYGEN, UP TO 24 HOURS

## 2022-02-07 PROCEDURE — A4216 STERILE WATER/SALINE, 10 ML: HCPCS | Performed by: STUDENT IN AN ORGANIZED HEALTH CARE EDUCATION/TRAINING PROGRAM

## 2022-02-07 PROCEDURE — 84100 ASSAY OF PHOSPHORUS: CPT | Performed by: STUDENT IN AN ORGANIZED HEALTH CARE EDUCATION/TRAINING PROGRAM

## 2022-02-07 PROCEDURE — 25000003 PHARM REV CODE 250: Performed by: HOSPITALIST

## 2022-02-07 PROCEDURE — 85027 COMPLETE CBC AUTOMATED: CPT | Performed by: STUDENT IN AN ORGANIZED HEALTH CARE EDUCATION/TRAINING PROGRAM

## 2022-02-07 RX ORDER — NYSTATIN 100000 [USP'U]/ML
500000 SUSPENSION ORAL 4 TIMES DAILY
Status: DISCONTINUED | OUTPATIENT
Start: 2022-02-07 | End: 2022-02-15 | Stop reason: HOSPADM

## 2022-02-07 RX ORDER — METOPROLOL TARTRATE 25 MG/1
25 TABLET, FILM COATED ORAL 2 TIMES DAILY
Status: DISCONTINUED | OUTPATIENT
Start: 2022-02-07 | End: 2022-02-08

## 2022-02-07 RX ADMIN — OXYCODONE 10 MG: 5 TABLET ORAL at 12:02

## 2022-02-07 RX ADMIN — Medication 10 ML: at 12:02

## 2022-02-07 RX ADMIN — GABAPENTIN 100 MG: 100 CAPSULE ORAL at 08:02

## 2022-02-07 RX ADMIN — MORPHINE SULFATE 6 MG: 4 INJECTION, SOLUTION INTRAMUSCULAR; INTRAVENOUS at 04:02

## 2022-02-07 RX ADMIN — INSULIN DETEMIR 4 UNITS: 100 INJECTION, SOLUTION SUBCUTANEOUS at 09:02

## 2022-02-07 RX ADMIN — PIPERACILLIN AND TAZOBACTAM 4.5 G: 4; .5 INJECTION, POWDER, LYOPHILIZED, FOR SOLUTION INTRAVENOUS; PARENTERAL at 05:02

## 2022-02-07 RX ADMIN — Medication 10 ML: at 05:02

## 2022-02-07 RX ADMIN — Medication 324 MG: at 08:02

## 2022-02-07 RX ADMIN — PANTOPRAZOLE SODIUM 40 MG: 40 INJECTION, POWDER, FOR SOLUTION INTRAVENOUS at 08:02

## 2022-02-07 RX ADMIN — METOPROLOL TARTRATE 100 MG: 50 TABLET, FILM COATED ORAL at 08:02

## 2022-02-07 RX ADMIN — CINACALCET 30 MG: 30 TABLET, FILM COATED ORAL at 08:02

## 2022-02-07 RX ADMIN — SEVELAMER CARBONATE 2400 MG: 800 TABLET, FILM COATED ORAL at 05:02

## 2022-02-07 RX ADMIN — NYSTATIN 500000 UNITS: 100000 SUSPENSION ORAL at 05:02

## 2022-02-07 RX ADMIN — MORPHINE SULFATE 6 MG: 4 INJECTION, SOLUTION INTRAMUSCULAR; INTRAVENOUS at 12:02

## 2022-02-07 RX ADMIN — GABAPENTIN 100 MG: 100 CAPSULE ORAL at 09:02

## 2022-02-07 RX ADMIN — NEPHROCAP 1 CAPSULE: 1 CAP ORAL at 08:02

## 2022-02-07 RX ADMIN — MORPHINE SULFATE 6 MG: 4 INJECTION, SOLUTION INTRAMUSCULAR; INTRAVENOUS at 08:02

## 2022-02-07 RX ADMIN — SEVELAMER CARBONATE 2400 MG: 800 TABLET, FILM COATED ORAL at 12:02

## 2022-02-07 RX ADMIN — PANTOPRAZOLE SODIUM 40 MG: 40 INJECTION, POWDER, FOR SOLUTION INTRAVENOUS at 09:02

## 2022-02-07 RX ADMIN — OXYCODONE 10 MG: 5 TABLET ORAL at 05:02

## 2022-02-07 RX ADMIN — Medication 10 ML: at 04:02

## 2022-02-07 RX ADMIN — NYSTATIN 500000 UNITS: 100000 SUSPENSION ORAL at 09:02

## 2022-02-07 RX ADMIN — SEVELAMER CARBONATE 2400 MG: 800 TABLET, FILM COATED ORAL at 08:02

## 2022-02-07 NOTE — ASSESSMENT & PLAN NOTE
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

## 2022-02-07 NOTE — PLAN OF CARE
Recommendations:  1) Continue current full liquid renal diet as tolerated by pt, encouraging PO intake  2) Continue Novasource Renal TID to assist in meeting needs  3) Continue Boost Nutritional pudding to assist in meeting needs  4) Add an appetite stimulant if no contraindications  5) Add a bowel regimen - LBM 2/2  6) Renal labs and hyponatremia    Goals:   1) Pt to consistently meet >75% of EEN/EPN by PO/ONS intake  2) Pt to have bowel movement by RD f/u  3) Nutrition related labs to trend WNL    Nutrition Goal Status: new  Communication of RD Recs:  (POC)

## 2022-02-07 NOTE — PROGRESS NOTES
[facility] - [place] [facility] Hospital Medicine  Progress Note    Patient Name: Xuan Ricardo  MRN: [id number]  Patient Class: IP- Inpatient   Admission Date: 2/1/2022  Length of Stay: 6 days  Attending Physician: Coco Higgins MD  Primary Care Provider: Katharine Franks MD        Subjective:     Principal Problem:Necrotizing fasciitis        HPI:  40 y/o female presented to ER with hypotension.  Patient was recently started on dialysis and had not had dialysis for one week since last hospital discharge.  She was noted to have multiple abnormalities on presentation.  She was severely anemic with Hgb of 4.3.  transfused 2 units of blood with adequate correction of H/H.  Patient was also noted to have AG metabolic acidosis with hyperglycemia.  Stated on insulin drip for DKA.  Leukocytosis on CBC.  Patient with erythema and blistering of bilateral inner thing and buttocks.  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  Started on ABX's and Surgery consulted.        Overview/Hospital Course:  40 y/o female presented to ER with hypotension.  Patient was recently started on dialysis and had not had dialysis for one week since last hospital discharge.  She was noted to have multiple abnormalities on presentation.  She was severely anemic with Hgb of 4.3.  transfused 2 units of blood with adequate correction of H/H.  Patient was also noted to have AG metabolic acidosis with hyperglycemia.  Started on insulin drip for DKA.  Leukocytosis on CBC.  Patient with erythema and blistering of bilateral inner thighs and buttocks.  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  Started on ABX's and Surgery consulted.  Admitted with necrotizing fascitis of the buttocks. Surgery consulted and patient brought to OR 2/2/22 with significant debridement. On antibiotics. Mental status worsening. Palliative Care consulted, family updated. Brought back to OR on 2/4/22 for debridement as well. Weaned off  norepinephrine, on broad spectrum antibiotics pending cultures. Long recovery ahead. Concern that may need diverting colostomy in the future.not on any anticoagulation,she has also history of HIT.  Culture is growing bacteroides,wound care is consulted.she is bed bound, did not consulted PT,OT   CC today is pain.      Interval History: not eating much,added supplement.  CC today is pain.    Review of Systems   HENT: Negative for ear discharge and ear pain.    Eyes: Negative for discharge and itching.   Endocrine: Negative for cold intolerance and heat intolerance.   Musculoskeletal: Positive for arthralgias.   Skin: Positive for wound.   Neurological: Negative for seizures and syncope.     Objective:     Vital Signs (Most Recent):  Temp: 97.6 °F (36.4 °C) (02/07/22 0707)  Pulse: 85 (02/07/22 0707)  Resp: 18 (02/07/22 0849)  BP: (!) 95/54 (02/07/22 0707)  SpO2: 99 % (02/07/22 0707) Vital Signs (24h Range):  Temp:  [97.1 °F (36.2 °C)-98.6 °F (37 °C)] 97.6 °F (36.4 °C)  Pulse:  [85-98] 85  Resp:  [16-20] 18  SpO2:  [94 %-100 %] 99 %  BP: ()/(54-77) 95/54     Weight: 99.1 kg (218 lb 7.6 oz)  Body mass index is 37.5 kg/m².    Intake/Output Summary (Last 24 hours) at 2/7/2022 1110  Last data filed at 2/7/2022 0820  Gross per 24 hour   Intake 190 ml   Output 0 ml   Net 190 ml      Physical Exam  Vitals and nursing note reviewed.   Constitutional:       Appearance: She is ill-appearing. She is not diaphoretic.   HENT:      Head: Normocephalic and atraumatic.      Mouth/Throat:      Mouth: Mucous membranes are dry.      Pharynx: No oropharyngeal exudate or posterior oropharyngeal erythema.   Cardiovascular:      Rate and Rhythm: Regular rhythm. Tachycardia present.   Pulmonary:      Effort: No respiratory distress.      Breath sounds: Normal breath sounds.      Comments: On room air  Abdominal:      General: Bowel sounds are normal.      Palpations: Abdomen is soft.   Musculoskeletal:         General: No deformity or  signs of injury.      Cervical back: Neck supple. No rigidity.   Skin:     General: Skin is warm.      Comments: Dressings intact, did not undress   Neurological:      Comments: Awake and more alert than yesterday, appropriately answering questions.         Significant Labs:   All pertinent labs within the past 24 hours have been reviewed.  BMP:   Recent Labs   Lab 02/07/22  0348      *   K 3.5   CL 97   CO2 22*   BUN 34*   CREATININE 4.5*   CALCIUM 7.3*   MG 1.6     CBC:   Recent Labs   Lab 02/06/22  0519 02/07/22  0348   WBC 23.57* 21.60*   HGB 7.9* 8.0*   HCT 24.0* 24.3*    176       Significant Imaging: I have reviewed all pertinent imaging results/findings within the past 24 hours.      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      Cellulitis of thigh  On IV Abx.      Septic shock  - see sepsis  - shock resolved      Coagulopathy  Has prolonged PT, PTT --> check fibrinogen and d-dimer  - fibrinogen and d-dimer elevated, not consistent with DIC  - monitor closely for bleeding      Leukocytosis  As above.  - due to infection  - monitoring daily      Hyponatremia  Now resolved      Esophagitis, Hessmer grade D  - on PPI bid      Debility  Bed bound on baseline.      Sinus tachycardia  Resolved with  BB.      COVID-19 virus infection  Patient initially tested positive for Covid more than one month ago.  Asymptomatic from Covid with no evidence of pneumonia on CXR.        Calciphylaxis  Patient now with necrotizing fascitis of buttocks.  This patient  "does have evidence of infective focus  My overall impression is sepsis. Vital signs were reviewed and noted in progress note.  Antibiotics given-   Antibiotics (From admission, onward)            Start     Stop Route Frequency Ordered    02/02/22 1800  piperacillin-tazobactam 4.5 g in dextrose 5 % 100 mL IVPB (ready to mix system)         -- IV Every 12 hours (non-standard times) 02/02/22 0716    02/01/22 1539  vancomycin - pharmacy to dose  (vancomycin IVPB)        "And" Linked Group Details    -- IV pharmacy to manage frequency 02/01/22 1440        Cultures were taken-   Microbiology Results (last 7 days)     Procedure Component Value Units Date/Time    Culture, Anaerobe [[id number]]  (Abnormal) Collected: 02/02/22 1905    Order Status: Completed Specimen: Wound from Buttocks, Left Updated: 02/06/22 1242     Anaerobic Culture BACTEROIDES THETAIOTAOMICRON  Moderate      Narrative:      Bilateral Buttock wound    Blood culture #1 [[id number]] Collected: 02/01/22 1429    Order Status: Completed Specimen: Blood from Peripheral, Forearm, Left Updated: 02/05/22 1503     Blood Culture, Routine No Growth after 4 days.     Narrative:      Blood Culture #1    Blood culture #2 [[id number]] Collected: 02/01/22 1406    Order Status: Completed Specimen: Blood from Peripheral, Upper Arm, Left Updated: 02/05/22 1503     Blood Culture, Routine No Growth after 4 days.     Narrative:      Blood Culture #2    Aerobic culture [[id number]] Collected: 02/02/22 1905    Order Status: Completed Specimen: Wound from Buttocks, Left Updated: 02/05/22 0828     Aerobic Bacterial Culture No significant isolate    Narrative:      Bilateral buttock wound    AFB Culture & Smear [[id number]] Collected: 02/02/22 1905    Order Status: Completed Specimen: Wound from Buttocks, Left Updated: 02/04/22 2127     AFB Culture & Smear Culture in progress     AFB CULTURE STAIN No acid fast bacilli seen.    Narrative:      Bilateral buttock wound    Gram stain " [[id number]] Collected: 02/02/22 1905    Order Status: Completed Specimen: Wound from Buttocks, Left Updated: 02/03/22 1145     Gram Stain Result No WBC's      Rare Gram positive cocci in pairs      Rare Gram positive rods    Narrative:      Bilateral buttock wound    Fungus culture [[id number]] Collected: 02/02/22 1905    Order Status: Sent Specimen: Wound from Buttocks, Left Updated: 02/02/22 2005        Latest lactate reviewed, they are-  No results for input(s): LACTATE in the last 72 hours.    Organ dysfunction indicated by Hypotension, DKA  Source- Skin  Source control Achieved by- ABx's  Appreciate Surgery input.  OR debridement    - DKA resolved, hypotension supported with pressors  - pain control  Culture is growing bacteroides.        Goals of care, counseling/discussion  Advance Care Planning     Discussed with sister Tressa and aunt Kareen today with Dr. Armenta about our concerns with patient's illness and how sick she is. We updated them on surgery and need for more surgery and overall poor prognosis. Still hopeful, want everything to be done but also seem to understand how sick patient is.         End stage renal disease  Has not had HD since last discharge one week ago? Discharged less than 1 week ago.  Nephrology consulted for HD.  On HD per nephrology.    Type 2 diabetes mellitus with stage 5 chronic kidney disease not on chronic dialysis, with long-term current use of insulin  Initially presented in DKA.  Started on insulin drip, but then patient became hypoglycemia.  DKA has now resolved with closure of AG.  Will start nightly Levemir.  Adjust as necessary.  Continue with sliding scale.        Hypertensive CKD (chronic kidney disease)  Patient initially hypotensive requiring pressor support  - now weaned off and normotensive    Anemia of renal disease  Baseline Hgb around 7.5  Presents with Hgb of 4.3  No obvious bleeding, had EGD last admission with esophagitis  Transfused 2 units of blood with  adequate correction of H/H  Continue to monitor.  - transfuse as needed   - loss over this hospital stay likely due to surgery        VTE Risk Mitigation (From admission, onward)         Ordered     heparin (porcine) injection 3,200 Units  As needed (PRN)         02/03/22 0024     IP VTE HIGH RISK PATIENT  Once         02/01/22 1816     Place sequential compression device  Until discontinued         02/01/22 1816     Place sequential compression device  Until discontinued         02/01/22 1810                Discharge Planning   PAMELA:      Code Status: Full Code   Is the patient medically ready for discharge?:     Reason for patient still in hospital (select all that apply): Patient trending condition  Discharge Plan A: Home Health   Discharge Delays: None known at this time (Patient not medically stable for discharge.)              Coco Higgins MD  Department of Hospital Medicine   [facility]

## 2022-02-07 NOTE — PROGRESS NOTES
Vancomycin consult follow-up:    Patient reviewed, dialysis scheduled every Tues, Thurs, Sat, no new levels, no dose today; Next levels due: 2/8/2022 at 0400

## 2022-02-07 NOTE — PROGRESS NOTES
[facility] [place]  Adult Nutrition   Progress Note (Follow-Up)    SUMMARY     Recommendations/Interventions:  1) Continue current full liquid renal diet as tolerated by pt, encouraging PO intake  2) Continue Novasource Renal TID to assist in meeting needs  3) Continue Boost Nutritional pudding to assist in meeting needs  4) Add an appetite stimulant if no contraindications  5) Add a bowel regimen - LBM 2/2  6) Renal labs and hyponatremia    Goals:   1) Pt to consistently meet >75% of EEN/EPN by PO/ONS intake  2) Pt to have bowel movement by RD f/u  3) Nutrition related labs to trend WNL    Nutrition Goal Status: new  Communication of RD Recs:  (POC)    Dietitian Rounds Brief  2/7: Per both Physician and RN notes pt is refusing to eat. She is pocketing both food and medicine. On 2/6 RN noted that pt's sister brought her shrimp and fries and pt refused to eat that as well. RD will continue to monitor and f/u.     2/2: Remote assessment for coverage, pt readmitted with DKA, wounds. Was admitted in January and noted with fair intake. Per chart, with 50 lbs wt loss over the last 1.5 years. Unable to complete NFPE due to remote assessment. Suspect malnutrition. Recommend addition of ONS to promote wound healing.    Assessment and Plan  Nutrition Problem  Increased nutrient needs     Related to (etiology):   Wound healing     Signs and Symptoms (as evidenced by):   Patient with erythema and blistering of bilateral inner thigh and buttocks.  Concerning for calciphylaxis with superimposed infection per MD    Interventions(treatment strategy):  Mineral modified diet (renal)  Commercial beverages (Novasource renal)  Collaboration with other providers    Nutrition Diagnosis Status:   Continues    Reason for Assessment    Reason For Assessment: RD follow-up  Diagnosis:  (Necrotizing fasciitis)  Relevant Medical History: ESRD, T2DM, COVID+, septic shock, cellulitis of thigh, hyponatremia, HTN    Nutrition Risk  "Screen    Nutrition Risk Screen: large or nonhealing wound, burn or pressure injury    Nutrition/Diet History    Spiritual, Cultural Beliefs, Spiritism Practices, Values that Affect Care: no  Food Allergies: NKFA    Anthropometrics    Temp: 97.8 °F (36.6 °C)  Height Method: Estimated  Height: 5' 4.02" (162.6 cm)  Height (inches): 64.02 in  Weight Method: Bed Scale  Weight: 99.1 kg (218 lb 7.6 oz)  Weight (lb): 218.48 lb  Ideal Body Weight (IBW), Female: 120.1 lb  % Ideal Body Weight, Female (lb): 181.92 %  BMI (Calculated): 37.5  BMI Grade: 35 - 39.9 - obesity - grade II       Weight History:  Wt Readings from Last 10 Encounters:   02/07/22 99.1 kg (218 lb 7.6 oz)   01/20/22 94.3 kg (207 lb 14.3 oz)   12/11/21 90.7 kg (200 lb)   10/26/20 109.8 kg (242 lb)   08/15/20 117.2 kg (258 lb 6.1 oz)   05/25/20 110 kg (242 lb 8.1 oz)   05/22/20 114.7 kg (252 lb 13.9 oz)   05/08/20 117.5 kg (259 lb 0.6 oz)   05/07/20 117.5 kg (259 lb 0.7 oz)   05/05/20 117.5 kg (259 lb)       Lab/Procedures/Meds: Pertinent Labs Reviewed  CBC:  Recent Labs   Lab 02/07/22  0348   WBC 21.60*   HGB 8.0*   HCT 24.3*        CMP:  Recent Labs   Lab 02/07/22  0348   CALCIUM 7.3*   ALBUMIN 0.7*   PROT 4.4*   *   K 3.5   CO2 22*   CL 97   BUN 34*   CREATININE 4.5*   ALKPHOS 141*   ALT 19   AST 26   BILITOT 5.1*     Medications:Pertinent Medications Reviewed  Scheduled Meds:   cinacalcet  30 mg Oral Daily with breakfast    epoetin kelsey-epbx  10,000 Units Intravenous Every Mon, Wed, Fri    ferrous gluconate  324 mg Oral Daily with breakfast    gabapentin  100 mg Oral BID    insulin detemir U-100  4 Units Subcutaneous QHS    metoprolol tartrate  25 mg Oral BID    nystatin  500,000 Units Oral QID    pantoprazole  40 mg Intravenous BID    piperacillin-tazobactam (ZOSYN) IVPB  4.5 g Intravenous Q12H    sevelamer carbonate  2,400 mg Oral TID WM    sodium chloride 0.9%  10 mL Intravenous Q6H    sodium thiosulfate  25 g Intravenous Every " Mon, Wed, Fri    vitamin renal formula (B-complex-vitamin c-folic acid)  1 capsule Oral Daily     Continuous Infusions:  PRN Meds:.sodium chloride, dextrose 10%, dextrose 10%, glucagon (human recombinant), glucose, glucose, heparin (porcine), insulin aspart U-100, morphine, ondansetron, oxyCODONE, Flushing PICC Protocol **AND** sodium chloride 0.9% **AND** sodium chloride 0.9%, Pharmacy to dose Vancomycin consult **AND** vancomycin - pharmacy to dose    Estimated/Assessed Needs    Weight Used For Calorie Calculations: 99.1 kg (218 lb 7.6 oz)  Energy Calorie Requirements (kcal): 1631  Energy Need Method: Mineral-St Jeor (no AF per BMI > 30)  Protein Requirements: > 109 g (2.0 g/kg IBW per BMI > 30 and wound healing)  Weight Used For Protein Calculations: 54.5 kg (120 lb 2.4 oz)  Fluid Requirements (mL): 1000 mL + UOP  Estimated Fluid Requirement Method:  (or per MD orders)   CHO Requirement: 204 g    Nutrition Prescription Ordered    Current Diet Order: Full liquid, renal  Oral [facility] Supplement: [facility]    Evaluation of Received Nutrient/Fluid Intake    Energy Calories Required: not meeting needs  Protein Required: not meeting needs  Fluid Required: not meeting needs  Comments: LBM 2/2  % Intake of Estimated Energy Needs: 0 - 25 %  % Meal Intake: 0 - 25 %    Intake/Output Summary (Last 24 hours) at 2/7/2022 1558  Last data filed at 2/7/2022 0820  Gross per 24 hour   Intake 190 ml   Output 0 ml   Net 190 ml      Nutrition Risk    Level of Risk/Frequency of Follow-up:  (1-2x/week)     Monitor and Evaluation    Food and Nutrient Intake: energy intake,food and beverage intake  Food and Nutrient Adminstration: diet order  Knowledge/Beliefs/Attitudes: beliefs and attitudes  Physical Activity and Function: nutrition-related ADLs and IADLs  Anthropometric Measurements: weight,weight change,body mass index  Biochemical Data, Medical Tests and Procedures: electrolyte and renal panel,gastrointestinal  profile,glucose/endocrine profile,inflammatory profile,lipid profile  Nutrition-Focused Physical Findings: overall appearance,extremities, muscles and bones,head and eyes,skin     Nutrition Follow-Up    RD Follow-up?: Yes

## 2022-02-07 NOTE — CONSULTS
2/2/22 Debridement of buttock, I&D, washout for necrotizing soft tissue infection per Dr. Templeton  2/4/22 Debridement of buttock for necrotizing soft tissue infection per Dr. Pedro  Assessment:  Photodocumentation    Sacral wound- Wound extends from hip to hip; full thickness with scattered areas of slough in base of wound. Scant amount purulent drainage on dressing removed. Periwound skin clear.     Medial thighs- Evolving eschar with purulent drainage from under eschar roof right medial thigh. Eschar on left medial thigh softening and .     Left lateral thigh- Adherent dry eschar    Upper posterior thighs- Softening eschar extends from medial thigh to posterior thigh    Posterior thighs- Eschar in stages of evolving from dry to soft    Left medial heel- Intact fluid filled blister    Left medial lower leg- Peeling epidermis with non viable base    Left lateral lower leg- Broken blister with scant amount dark red drainage.     Right lateral lower leg- Peeling epidermis with non viable base.   Treatment-  Cleansed all wounds with Vashe. Filled sacral wound with 2 Kerlix rolls saturated with antimicrobial cleanser. Covered with dry gauze and ABD pads.   Covered medial thigh wounds with ABD pads.  Dressed lower leg wounds with foam dressings after cleansing.   Replaced EHOB boots.   Patient positioned with foam wedge.   Nursing to change dressings daily.

## 2022-02-07 NOTE — PROGRESS NOTES
Dr Rodrigues notified that pt is not eating. Did not eat anything yesterday and today pct attempted to feed breakfast, pt ate one bite and cheeked it- did not swallow.

## 2022-02-07 NOTE — PT/OT/SLP EVAL
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 ;  Surgeon: Rakesh Leon MD;  Location: [facility] OR;  Service: Vascular;  Laterality: Left;  RN PREOP 20, UPT done, COVID NEGATRIVE 20  NEED H/P     SECTION, CLASSIC      DEBRIDEMENT OF BUTTOCKS N/A 2022    Procedure: DEBRIDEMENT, BUTTOCK;  Surgeon: Zhen Templeton MD;  Location: [facility] OR;  Service: General;  Laterality: N/A;    ESOPHAGOGASTRODUODENOSCOPY N/A 2022    Procedure: EGD (ESOPHAGOGASTRODUODENOSCOPY);  Surgeon: Mario Alberto Irene MD;  Location: [facility] ENDO;  Service: Endoscopy;  Laterality: N/A;    INSERTION OF TUNNELED CENTRAL VENOUS CATHETER (CVC) WITH SUBCUTANEOUS PORT N/A 2020    Procedure: IR TUNNELED VATH INSERT WITHOUT PORT;  Surgeon: [facility] Diagnostic Provider;  Location: [facility] OR;  Service: Radiology;  Laterality: N/A;  1030AM START  RN PREOP 2020    INSERTION OF TUNNELED CENTRAL VENOUS CATHETER (CVC) WITH SUBCUTANEOUS PORT N/A 2020     Procedure: TUNNELED CATH PLACEMENT;  Surgeon: [facility] Diagnostic Provider;  Location: [facility] OR;  Service: Radiology;  Laterality: N/A;  930AM START    REVISION OF ARTERIOVENOUS FISTULA Left 5/8/2020    Procedure: REVISION, AV FISTULA, THROMBECTOMY, LEFT UPPER EXTREMITY;  Surgeon: Rakesh Leon MD;  Location: [facility] OR;  Service: Vascular;  Laterality: Left;    TUBAL LIGATION         Social History: Patient lives with sister    Chest X-Rays: 2/7 No acute pulmonary processes.    Prior diet: unrestricted     Subjective   Pt with delayed responses throughout. Required multiple repetitions to participate in ST eval, limited compliance secondary to confusion however able to make basic wants needs known verbally. .   Patient goals: unable to report secondary to confusion    Pain/Comfort:  · Pain Rating 1: 0/10    Respiratory Status: Nasal cannula, flow 3 L/min    Objective:     Oral Musculature Evaluation  · Oral Musculature: general weakness (difficult to assess as Pt does not readily follow commands, requires multipel repeititons)  · Dentition: scattered dentition,other (see comments) (dentures unavailable)  · Secretion Management: adequate  · Mucosal Quality: dry,coated tongue  · Oral Labial Strength and Mobility: impaired coordination  · Lingual Strength and Mobility: other (see comments) (decreased speed of motion)  · Velar Elevation: WNL  · Buccal Strength and Mobility: WNL  · Voice Prior to PO Intake: clear, decreased volume  · Oral Musculature Comments: delayed responses, decreased speed of motion, poor oral hygiene    Bedside Swallow Eval:   Consistencies Assessed:  · Thin liquids X3 via straw  · Solids X1   · Pt refused puree    Oral Phase:   · decreased attention to bolus prep of solid, holding requiring verbal cuing to re-attend, then persisting with mastication pattern after bolus was cleared.   · Significantly increased oral prep of limited trials of solids    Pharyngeal Phase:   · no overt clinical  signs/symptoms of aspiration    Compensatory Strategies  · None    Treatment: please note silent aspiration cannot be r/o at bedside.     Assessment:     Xuan Cousin is a 41 y.o. female with dx of moderate oral dysphagia c/b poor attention to bolus prep secondary to decreased cognition.     Goals:   Multidisciplinary Problems     SLP Goals        Problem: SLP Goal    Goal Priority Disciplines Outcome   SLP Goal    Low SLP Ongoing, Progressing   Description: STGS  1. Pt will tolerate full liquid diet without overt s/s of aspiration  2. Pt will participate in ongoing assessment of swallow.                    Plan:     · Patient to be seen:  x2 weekly  · Plan of Care expires:  02/17/22  · Plan of Care reviewed with:    MD  · SLP Follow-Up:  Yes       Discharge recommendations:  other (see comments) (TBD)   Barriers to Discharge:  Level of Skilled Assistance Needed .    Time Tracking:     SLP Treatment Date:   02/07/22  Speech Start Time:  1305  Speech Stop Time:  1315     Speech Total Time (min):  10 min    Billable Minutes: Eval Swallow and Oral Function 10    02/07/2022

## 2022-02-07 NOTE — PLAN OF CARE
Problem: Adult Inpatient Plan of Care  Goal: Plan of Care Review  Outcome: Ongoing, Progressing  Goal: Patient-Specific Goal (Individualized)  Outcome: Ongoing, Progressing  Goal: Optimal Comfort and Wellbeing  Outcome: Ongoing, Progressing  Intervention: Monitor Pain and Promote Comfort  Flowsheets (Taken 2/7/2022 0250)  Pain Management Interventions:   care clustered   pain management plan reviewed with patient/caregiver   medication offered   relaxation techniques promoted

## 2022-02-07 NOTE — SUBJECTIVE & OBJECTIVE
Interval History: not eating much,added supplement.  CC today is pain.    Review of Systems   HENT: Negative for ear discharge and ear pain.    Eyes: Negative for discharge and itching.   Endocrine: Negative for cold intolerance and heat intolerance.   Musculoskeletal: Positive for arthralgias.   Skin: Positive for wound.   Neurological: Negative for seizures and syncope.     Objective:     Vital Signs (Most Recent):  Temp: 97.6 °F (36.4 °C) (02/07/22 0707)  Pulse: 85 (02/07/22 0707)  Resp: 18 (02/07/22 0849)  BP: (!) 95/54 (02/07/22 0707)  SpO2: 99 % (02/07/22 0707) Vital Signs (24h Range):  Temp:  [97.1 °F (36.2 °C)-98.6 °F (37 °C)] 97.6 °F (36.4 °C)  Pulse:  [85-98] 85  Resp:  [16-20] 18  SpO2:  [94 %-100 %] 99 %  BP: ()/(54-77) 95/54     Weight: 99.1 kg (218 lb 7.6 oz)  Body mass index is 37.5 kg/m².    Intake/Output Summary (Last 24 hours) at 2/7/2022 1110  Last data filed at 2/7/2022 0820  Gross per 24 hour   Intake 190 ml   Output 0 ml   Net 190 ml      Physical Exam  Vitals and nursing note reviewed.   Constitutional:       Appearance: She is ill-appearing. She is not diaphoretic.   HENT:      Head: Normocephalic and atraumatic.      Mouth/Throat:      Mouth: Mucous membranes are dry.      Pharynx: No oropharyngeal exudate or posterior oropharyngeal erythema.   Cardiovascular:      Rate and Rhythm: Regular rhythm. Tachycardia present.   Pulmonary:      Effort: No respiratory distress.      Breath sounds: Normal breath sounds.      Comments: On room air  Abdominal:      General: Bowel sounds are normal.      Palpations: Abdomen is soft.   Musculoskeletal:         General: No deformity or signs of injury.      Cervical back: Neck supple. No rigidity.   Skin:     General: Skin is warm.      Comments: Dressings intact, did not undress   Neurological:      Comments: Awake and more alert than yesterday, appropriately answering questions.         Significant Labs:   All pertinent labs within the past 24 hours  have been reviewed.  BMP:   Recent Labs   Lab 02/07/22  0348      *   K 3.5   CL 97   CO2 22*   BUN 34*   CREATININE 4.5*   CALCIUM 7.3*   MG 1.6     CBC:   Recent Labs   Lab 02/06/22  0519 02/07/22  0348   WBC 23.57* 21.60*   HGB 7.9* 8.0*   HCT 24.0* 24.3*    176       Significant Imaging: I have reviewed all pertinent imaging results/findings within the past 24 hours.

## 2022-02-07 NOTE — AI DETERIORATION ALERT
Sepsis Screen (most recent)     Sepsis Screen - 02/07/22 7352     Is the patient's history or complaint suggestive of a possible infection? Yes  -JW    Are there at least two of the following signs and symptoms present? Yes  -    Sepsis signs/symptoms - Tachycardia Tachycardia     >90  -    Sepsis signs/symptoms - WBC WBC < 4,000 or WBC > 12,000  -JW    Are any of the following organ dysfunction criteria present and not considered to be due to a chronic condition? Yes  -    Organ Dysfunction Criteria Creatinine > 2.0  -    Organ Dysfunction Criteria Total Bilirubin > 2.0 Platelet count < 100,000  -    Start Sepsis Timer No   on ABX -          User Key  (r) = Recorded By, (t) = Taken By, (c) = Cosigned By    Initials Name    JW Sadia Burns RN              Patient screens positive but is on ABX therapy, therefore timer not started. WCTM.

## 2022-02-07 NOTE — PROGRESS NOTES
[facility]      General Surgery Progress Note    02/07/2022  2:24 PM      Patient: Xuan Wrightsin  MRN: [id number]  Admit Date: 2/1/2022  LOS: 6      Subjective                                                                                                        Interval Events: NAEON, VSS, afebrile.  Patient seems like she is doing  Better today, pain controlled.    Patient Active Problem List   Diagnosis    Anemia of renal disease    Hypertensive CKD (chronic kidney disease)    Nephrotic range proteinuria    Metabolic acidosis    Increased prolactin level    Secondary hyperparathyroidism    Iron deficiency anemia    Vitamin D deficiency    Galactorrhea    Cataract    Class 1 obesity due to excess calories with serious comorbidity in adult    Chronic fatigue    Missed menses    Uterine leiomyoma    Type 2 diabetes mellitus with stage 5 chronic kidney disease not on chronic dialysis, with long-term current use of insulin    Hypertension    Hypertensive urgency    Symptomatic anemia    End stage renal disease    Uremia    CKD (chronic kidney disease) stage 5, GFR less than 15 ml/min    Nausea & vomiting    Thrombocytopenia    Hematemesis    Goals of care, counseling/discussion    Calciphylaxis    Type 2 diabetes mellitus    COVID-19 virus infection    Renovascular hypertension    Hyperphosphatemia    Candida esophagitis    Sepsis due to gram-negative UTI    Sinus tachycardia    Slow transit constipation    Debility    Esophagitis, Stevenson grade D    Hyponatremia    Leukocytosis    Coagulopathy    Septic shock    Necrotizing fasciitis    Cellulitis of thigh       No current facility-administered medications on file prior to encounter.     Current Outpatient Medications on File Prior to Encounter   Medication Sig Dispense Refill    calcium carbonate (TUMS) 200 mg calcium (500 mg) chewable tablet Take 2 tablets (1,000 mg total) by mouth 3 (three) times  daily as needed. 150 tablet 0    cinacalcet (SENSIPAR) 30 MG Tab Take 1 tablet (30 mg total) by mouth daily with breakfast. 30 tablet 11    epoetin kelsey-epbx (RETACRIT) 2,000 unit/mL injection Inject 7 mLs (14,000 Units total) into the skin every Mon, Wed, Fri. With dialysis      ferrous gluconate (FERGON) 324 MG tablet Take 1 tablet (324 mg total) by mouth daily with breakfast. 30 tablet 2    gabapentin (NEURONTIN) 100 MG capsule Take 1 capsule (100 mg total) by mouth 3 (three) times daily. 90 capsule 0    metoprolol tartrate (LOPRESSOR) 25 MG tablet Take 1 tablet (25 mg total) by mouth 2 (two) times daily. 60 tablet 11    oxyCODONE (ROXICODONE) 10 mg Tab immediate release tablet Take 1 tablet (10 mg total) by mouth every 4 (four) hours as needed. 18 tablet 0    pantoprazole (PROTONIX) 40 MG tablet Take 1 tablet (40 mg total) by mouth 2 (two) times daily. 60 tablet 1    sevelamer carbonate (RENVELA) 800 mg Tab Take 3 tablets (2,400 mg total) by mouth 3 (three) times daily with meals. 270 tablet 11    sodium thiosulfate 12.5 gram/50 mL (250 mg/mL) injection Inject 100 mLs (25 g total) into the vein every Mon, Wed, Fri. With dialysis         Objective                                                                                                          Vitals:    02/07/22 0707 02/07/22 0849 02/07/22 1158 02/07/22 1234   BP: (!) 95/54  101/61    BP Location: Left arm  Left arm    Patient Position: Lying  Lying    Pulse: 85  91    Resp: 16 18 18 18   Temp: 97.6 °F (36.4 °C)  97.8 °F (36.6 °C)    TempSrc: Oral  Oral    SpO2: 99%  100%    Weight:       Height:           CBC   Recent Labs   Lab 02/06/22  0519 02/07/22  0348   WBC 23.57* 21.60*   HGB 7.9* 8.0*   HCT 24.0* 24.3*    176       CHEM   Recent Labs   Lab 02/06/22  0519 02/07/22  0348   * 135*   K 3.5 3.5   CL 97 97   CO2 23 22*   BUN 28* 34*   CREATININE 3.7* 4.5*   * 105       PHYSICAL EXAM:    GEN: Alert and Awake, chronically  ill appearing.  HEENT: NC/AT, EOMI  RESP: CTAB, good air movement, no resp distress  CV: mild tachycardia  ABD: Soft, NT/ND, no guarding or rebound  EXT:  Moves all, extensive calciphylaxis lesions on bilateral lower extremities, sacral wound with dressing in place.  Serosanguinous drainage noted on dressing  Neuro: A&Ox3, CNII-XII intact  Skin: Warm, calciphylaxis lesions throughout bilateral lower extremites    Imaging Results          X-Ray Chest AP Portable (Final result)  Result time 02/01/22 15:38:15    Final result by Murphy Foy MD (02/01/22 15:38:15)                 Impression:      1. No acute cardiopulmonary process appreciated.      Electronically signed by: Murphy Foy  Date:    02/01/2022  Time:    15:38             Narrative:    EXAMINATION:  XR CHEST AP PORTABLE    CLINICAL HISTORY:  HYpotention;    TECHNIQUE:  Single frontal portable view of the chest was performed.    COMPARISON:  Chest radiograph 01/19/2022    FINDINGS:  RIJV-approach THDC is stable when compared to 01/19/2022 however, with marked interval retraction when compared to original placement on 05/22/2020 with terminus now projecting over the expected location of the mid SVC.    Cardiomediastinal silhouette is within normal limits.    No focal consolidation, overt interstitial edema, sizable pleural effusion or pneumothorax.    Multilevel degenerative changes of the imaged spine.                                Assessment / Plan:                                                                                           A/P: Case of 41 y.o. with extensive pmh, admitted in septic shock found to have necrotizing soft tissue infection of the sacrum and buttock.  S/p debridement on 2/2/22, 2/4/22.       With patients extensive pmh noted calciphylaxis, its is very unlikely that the sacral wound will ever be able to heal.  Recommend a goals of care discussion with the patient and family     -continue iv abx  -transfuse for hgb <7, if  patient is requiring multiple transfusions consider ffp administration due to high INR.   -wound care consult placed  -recommend changing dressing daily.  NS/betadine soaked kerlix for packing with abd pads on top  -will discuss with staff need for loop colostomy to keep fecal matter out of wound bed      Carlos Alberto Christianson MD   Gen Surgery PGY-V

## 2022-02-07 NOTE — ASSESSMENT & PLAN NOTE
Has not had HD since last discharge one week ago? Discharged less than 1 week ago.  Nephrology consulted for HD.  On HD per nephrology.

## 2022-02-07 NOTE — ASSESSMENT & PLAN NOTE
"Patient now with necrotizing fascitis of buttocks.  This patient does have evidence of infective focus  My overall impression is sepsis. Vital signs were reviewed and noted in progress note.  Antibiotics given-   Antibiotics (From admission, onward)            Start     Stop Route Frequency Ordered    02/02/22 1800  piperacillin-tazobactam 4.5 g in dextrose 5 % 100 mL IVPB (ready to mix system)         -- IV Every 12 hours (non-standard times) 02/02/22 0716    02/01/22 1539  vancomycin - pharmacy to dose  (vancomycin IVPB)        "And" Linked Group Details    -- IV pharmacy to manage frequency 02/01/22 1440        Cultures were taken-   Microbiology Results (last 7 days)     Procedure Component Value Units Date/Time    Culture, Anaerobe [[id number]]  (Abnormal) Collected: 02/02/22 1905    Order Status: Completed Specimen: Wound from Buttocks, Left Updated: 02/06/22 1242     Anaerobic Culture BACTEROIDES THETAIOTAOMICRON  Moderate      Narrative:      Bilateral Buttock wound    Blood culture #1 [[id number]] Collected: 02/01/22 1429    Order Status: Completed Specimen: Blood from Peripheral, Forearm, Left Updated: 02/05/22 1503     Blood Culture, Routine No Growth after 4 days.     Narrative:      Blood Culture #1    Blood culture #2 [[id number]] Collected: 02/01/22 1406    Order Status: Completed Specimen: Blood from Peripheral, Upper Arm, Left Updated: 02/05/22 1503     Blood Culture, Routine No Growth after 4 days.     Narrative:      Blood Culture #2    Aerobic culture [[id number]] Collected: 02/02/22 1905    Order Status: Completed Specimen: Wound from Buttocks, Left Updated: 02/05/22 0828     Aerobic Bacterial Culture No significant isolate    Narrative:      Bilateral buttock wound    AFB Culture & Smear [[id number]] Collected: 02/02/22 1905    Order Status: Completed Specimen: Wound from Buttocks, Left Updated: 02/04/22 2127     AFB Culture & Smear Culture in progress     AFB CULTURE STAIN No acid fast bacilli " seen.    Narrative:      Bilateral buttock wound    Gram stain [[id number]] Collected: 02/02/22 1905    Order Status: Completed Specimen: Wound from Buttocks, Left Updated: 02/03/22 1145     Gram Stain Result No WBC's      Rare Gram positive cocci in pairs      Rare Gram positive rods    Narrative:      Bilateral buttock wound    Fungus culture [[id number]] Collected: 02/02/22 1905    Order Status: Sent Specimen: Wound from Buttocks, Left Updated: 02/02/22 2005        Latest lactate reviewed, they are-  No results for input(s): LACTATE in the last 72 hours.    Organ dysfunction indicated by Hypotension, DKA  Source- Skin  Source control Achieved by- ABx's  Appreciate Surgery input.  OR debridement    - DKA resolved, hypotension supported with pressors  - pain control  Culture is growing bacteroides.

## 2022-02-08 PROBLEM — E46 MALNUTRITION: Status: ACTIVE | Noted: 2022-02-08

## 2022-02-08 LAB
ALBUMIN SERPL BCP-MCNC: 0.8 G/DL (ref 3.5–5.2)
ALP SERPL-CCNC: 131 U/L (ref 55–135)
ALT SERPL W/O P-5'-P-CCNC: 21 U/L (ref 10–44)
ANION GAP SERPL CALC-SCNC: 20 MMOL/L (ref 8–16)
ANISOCYTOSIS BLD QL SMEAR: SLIGHT
AST SERPL-CCNC: 31 U/L (ref 10–40)
BASOPHILS # BLD AUTO: ABNORMAL K/UL (ref 0–0.2)
BASOPHILS NFR BLD: 0 % (ref 0–1.9)
BILIRUB SERPL-MCNC: 5.7 MG/DL (ref 0.1–1)
BUN SERPL-MCNC: 41 MG/DL (ref 6–20)
CALCIUM SERPL-MCNC: 7.6 MG/DL (ref 8.7–10.5)
CHLORIDE SERPL-SCNC: 96 MMOL/L (ref 95–110)
CO2 SERPL-SCNC: 20 MMOL/L (ref 23–29)
CREAT SERPL-MCNC: 5.3 MG/DL (ref 0.5–1.4)
DACRYOCYTES BLD QL SMEAR: ABNORMAL
DIFFERENTIAL METHOD: ABNORMAL
EOSINOPHIL # BLD AUTO: ABNORMAL K/UL (ref 0–0.5)
EOSINOPHIL NFR BLD: 0 % (ref 0–8)
ERYTHROCYTE [DISTWIDTH] IN BLOOD BY AUTOMATED COUNT: 18.4 % (ref 11.5–14.5)
EST. GFR  (AFRICAN AMERICAN): 11 ML/MIN/1.73 M^2
EST. GFR  (NON AFRICAN AMERICAN): 9 ML/MIN/1.73 M^2
GLUCOSE SERPL-MCNC: 64 MG/DL (ref 70–110)
HCT VFR BLD AUTO: 24.1 % (ref 37–48.5)
HGB BLD-MCNC: 7.7 G/DL (ref 12–16)
HYPOCHROMIA BLD QL SMEAR: ABNORMAL
IMM GRANULOCYTES # BLD AUTO: ABNORMAL K/UL (ref 0–0.04)
IMM GRANULOCYTES NFR BLD AUTO: ABNORMAL % (ref 0–0.5)
LYMPHOCYTES # BLD AUTO: ABNORMAL K/UL (ref 1–4.8)
LYMPHOCYTES NFR BLD: 11 % (ref 18–48)
MAGNESIUM SERPL-MCNC: 1.7 MG/DL (ref 1.6–2.6)
MCH RBC QN AUTO: 29.1 PG (ref 27–31)
MCHC RBC AUTO-ENTMCNC: 32 G/DL (ref 32–36)
MCV RBC AUTO: 91 FL (ref 82–98)
MONOCYTES # BLD AUTO: ABNORMAL K/UL (ref 0.3–1)
MONOCYTES NFR BLD: 2 % (ref 4–15)
MYELOCYTES NFR BLD MANUAL: 4 %
NEUTROPHILS NFR BLD: 75 % (ref 38–73)
NEUTS BAND NFR BLD MANUAL: 8 %
NRBC BLD-RTO: 0 /100 WBC
OVALOCYTES BLD QL SMEAR: ABNORMAL
PHOSPHATE SERPL-MCNC: 4.1 MG/DL (ref 2.7–4.5)
PLATELET # BLD AUTO: 199 K/UL (ref 150–450)
PLATELET BLD QL SMEAR: ABNORMAL
PMV BLD AUTO: 10.5 FL (ref 9.2–12.9)
POCT GLUCOSE: 118 MG/DL (ref 70–110)
POCT GLUCOSE: 124 MG/DL (ref 70–110)
POCT GLUCOSE: 131 MG/DL (ref 70–110)
POCT GLUCOSE: 142 MG/DL (ref 70–110)
POCT GLUCOSE: 151 MG/DL (ref 70–110)
POCT GLUCOSE: 52 MG/DL (ref 70–110)
POCT GLUCOSE: 60 MG/DL (ref 70–110)
POCT GLUCOSE: 75 MG/DL (ref 70–110)
POIKILOCYTOSIS BLD QL SMEAR: SLIGHT
POLYCHROMASIA BLD QL SMEAR: ABNORMAL
POTASSIUM SERPL-SCNC: 3.9 MMOL/L (ref 3.5–5.1)
PROT SERPL-MCNC: 4.9 G/DL (ref 6–8.4)
RBC # BLD AUTO: 2.65 M/UL (ref 4–5.4)
SODIUM SERPL-SCNC: 136 MMOL/L (ref 136–145)
VANCOMYCIN SERPL-MCNC: 16.9 UG/ML
WBC # BLD AUTO: 25.6 K/UL (ref 3.9–12.7)

## 2022-02-08 PROCEDURE — 11000001 HC ACUTE MED/SURG PRIVATE ROOM

## 2022-02-08 PROCEDURE — 85007 BL SMEAR W/DIFF WBC COUNT: CPT | Performed by: STUDENT IN AN ORGANIZED HEALTH CARE EDUCATION/TRAINING PROGRAM

## 2022-02-08 PROCEDURE — C9113 INJ PANTOPRAZOLE SODIUM, VIA: HCPCS | Performed by: STUDENT IN AN ORGANIZED HEALTH CARE EDUCATION/TRAINING PROGRAM

## 2022-02-08 PROCEDURE — 63600175 PHARM REV CODE 636 W HCPCS: Performed by: STUDENT IN AN ORGANIZED HEALTH CARE EDUCATION/TRAINING PROGRAM

## 2022-02-08 PROCEDURE — C9399 UNCLASSIFIED DRUGS OR BIOLOG: HCPCS | Performed by: STUDENT IN AN ORGANIZED HEALTH CARE EDUCATION/TRAINING PROGRAM

## 2022-02-08 PROCEDURE — 25000003 PHARM REV CODE 250: Performed by: HOSPITALIST

## 2022-02-08 PROCEDURE — 25000003 PHARM REV CODE 250: Performed by: INTERNAL MEDICINE

## 2022-02-08 PROCEDURE — 25000003 PHARM REV CODE 250: Performed by: STUDENT IN AN ORGANIZED HEALTH CARE EDUCATION/TRAINING PROGRAM

## 2022-02-08 PROCEDURE — 83735 ASSAY OF MAGNESIUM: CPT | Performed by: STUDENT IN AN ORGANIZED HEALTH CARE EDUCATION/TRAINING PROGRAM

## 2022-02-08 PROCEDURE — 80053 COMPREHEN METABOLIC PANEL: CPT | Performed by: STUDENT IN AN ORGANIZED HEALTH CARE EDUCATION/TRAINING PROGRAM

## 2022-02-08 PROCEDURE — 80202 ASSAY OF VANCOMYCIN: CPT | Performed by: HOSPITALIST

## 2022-02-08 PROCEDURE — 84100 ASSAY OF PHOSPHORUS: CPT | Performed by: STUDENT IN AN ORGANIZED HEALTH CARE EDUCATION/TRAINING PROGRAM

## 2022-02-08 PROCEDURE — 80100016 HC MAINTENANCE HEMODIALYSIS

## 2022-02-08 PROCEDURE — 99232 PR SUBSEQUENT HOSPITAL CARE,LEVL II: ICD-10-PCS | Mod: ,,, | Performed by: SURGERY

## 2022-02-08 PROCEDURE — 99232 SBSQ HOSP IP/OBS MODERATE 35: CPT | Mod: ,,, | Performed by: SURGERY

## 2022-02-08 PROCEDURE — A4216 STERILE WATER/SALINE, 10 ML: HCPCS | Performed by: STUDENT IN AN ORGANIZED HEALTH CARE EDUCATION/TRAINING PROGRAM

## 2022-02-08 PROCEDURE — 85027 COMPLETE CBC AUTOMATED: CPT | Performed by: STUDENT IN AN ORGANIZED HEALTH CARE EDUCATION/TRAINING PROGRAM

## 2022-02-08 RX ORDER — ASCORBIC ACID 500 MG
1000 TABLET ORAL DAILY
Status: DISCONTINUED | OUTPATIENT
Start: 2022-02-08 | End: 2022-02-15 | Stop reason: HOSPADM

## 2022-02-08 RX ORDER — ZINC SULFATE 50(220)MG
220 CAPSULE ORAL DAILY
Status: DISCONTINUED | OUTPATIENT
Start: 2022-02-08 | End: 2022-02-15 | Stop reason: HOSPADM

## 2022-02-08 RX ADMIN — EPOETIN ALFA-EPBX 10000 UNITS: 10000 INJECTION, SOLUTION INTRAVENOUS; SUBCUTANEOUS at 07:02

## 2022-02-08 RX ADMIN — NYSTATIN 500000 UNITS: 100000 SUSPENSION ORAL at 09:02

## 2022-02-08 RX ADMIN — PANTOPRAZOLE SODIUM 40 MG: 40 INJECTION, POWDER, FOR SOLUTION INTRAVENOUS at 09:02

## 2022-02-08 RX ADMIN — Medication 10 ML: at 06:02

## 2022-02-08 RX ADMIN — SEVELAMER CARBONATE 2400 MG: 800 TABLET, FILM COATED ORAL at 07:02

## 2022-02-08 RX ADMIN — PIPERACILLIN AND TAZOBACTAM 4.5 G: 4; .5 INJECTION, POWDER, LYOPHILIZED, FOR SOLUTION INTRAVENOUS; PARENTERAL at 10:02

## 2022-02-08 RX ADMIN — GABAPENTIN 100 MG: 100 CAPSULE ORAL at 09:02

## 2022-02-08 RX ADMIN — DEXTROSE 125 ML: 10 SOLUTION INTRAVENOUS at 12:02

## 2022-02-08 RX ADMIN — HEPARIN SODIUM 3200 UNITS: 5000 INJECTION INTRAVENOUS; SUBCUTANEOUS at 07:02

## 2022-02-08 RX ADMIN — Medication 324 MG: at 07:02

## 2022-02-08 RX ADMIN — PIPERACILLIN AND TAZOBACTAM 4.5 G: 4; .5 INJECTION, POWDER, LYOPHILIZED, FOR SOLUTION INTRAVENOUS; PARENTERAL at 05:02

## 2022-02-08 RX ADMIN — NEPHROCAP 1 CAPSULE: 1 CAP ORAL at 09:02

## 2022-02-08 RX ADMIN — METOPROLOL TARTRATE 25 MG: 25 TABLET, FILM COATED ORAL at 09:02

## 2022-02-08 RX ADMIN — SODIUM THIOSULFATE 25 G: 250 INJECTION, SOLUTION INTRAVENOUS at 06:02

## 2022-02-08 RX ADMIN — Medication 10 ML: at 12:02

## 2022-02-08 RX ADMIN — OXYCODONE HYDROCHLORIDE AND ACETAMINOPHEN 1000 MG: 500 TABLET ORAL at 09:02

## 2022-02-08 RX ADMIN — OXYCODONE 10 MG: 5 TABLET ORAL at 09:02

## 2022-02-08 RX ADMIN — ZINC SULFATE 220 MG (50 MG) CAPSULE 220 MG: CAPSULE at 09:02

## 2022-02-08 RX ADMIN — VANCOMYCIN HYDROCHLORIDE 500 MG: 500 INJECTION, POWDER, LYOPHILIZED, FOR SOLUTION INTRAVENOUS at 09:02

## 2022-02-08 RX ADMIN — INSULIN DETEMIR 4 UNITS: 100 INJECTION, SOLUTION SUBCUTANEOUS at 09:02

## 2022-02-08 RX ADMIN — CINACALCET 30 MG: 30 TABLET, FILM COATED ORAL at 07:02

## 2022-02-08 RX ADMIN — MORPHINE SULFATE 6 MG: 4 INJECTION, SOLUTION INTRAMUSCULAR; INTRAVENOUS at 12:02

## 2022-02-08 RX ADMIN — MORPHINE SULFATE 6 MG: 4 INJECTION, SOLUTION INTRAMUSCULAR; INTRAVENOUS at 09:02

## 2022-02-08 RX ADMIN — Medication 16 G: at 12:02

## 2022-02-08 NOTE — ASSESSMENT & PLAN NOTE
Bed bound on baseline.     Medication Therapy Management (MTM) Encounter    ASSESSMENT:                            Medication Adherence/Access: No issues identified    Mastoiditis: Improving.  Continue medications as prescribed.    Type 2 Diabetes: Patient is not meeting A1c goal of < 7%. Self monitoring of blood glucose is not at goal of fasting  mg/dL and post prandial < 180 mg/dL.  Patient would benefit from an increase in insulin.  However since patient is not a patient of [facility] he will need to follow-up with his primary care provider about this he is also indicated for moderate intensity statin.  Next steps to be considered would be the addition of metformin titrated to 2 g daily and consideration of a GLP-1 agonist.    Neuropathy: Stable.    PLAN:                            1. Discuss initiation of rosuvastatin 5 mg with new primary care provider as it is recommended for all patients with diabetes to prevent heart attacks.  2.  Discussed initiation of metformin 500 mg to be increased to 2 g daily over a couple of weeks.  This is to help with blood sugars.  Also discussed GLP-1 agonist therapy, such as Trulicity, with new primary care provider.    Follow-up: 2/4/22 at 10:30 am via telephone appointment      SUBJECTIVE/OBJECTIVE:                          Mahsa Torrez is a 41 year old male called for a transitions of care visit. He was discharged from [facility] on 1/6/22 for mastoiditis and new diagnosis of T2DM. Patient seen with [facility] .     Reason for visit: hospital follow-up.    Allergies/ADRs: Reviewed in chart  Past Medical History: Reviewed in chart  Tobacco: He reports that he has quit smoking. He has never used smokeless tobacco.Tobacco Cessation Action Plan:   Reports that he quit smoking 7 years ago  Alcohol: none      Medication Adherence/Access: no issues reported - has a primary care visit coming up on the 18th    Mastoiditis: Currently taking Ciprodex 0.3-0.1% otic suspension 4 drops into left ear twice a day,  "and levaquin 750 mg daily. Reports that his ear is getting better. He is having no more drainage from his ear.     Type 2 Diabetes:  Currently taking Lantus 25 units daily and Novolog 8 units three times a day. Patient is not experiencing side effects.  Blood sugar monitoring: 10 time(s) daily. Ranges (patient reported): \"Sometimes over 300, and most times it is between 200 and 300 mg/dL. 2 days ago it was 151 mg/dL at 6 pm.  Before eating anything it is usually over 200 mg/dL  Symptoms of low blood sugar? none  Symptoms of high blood sugar? none  Eye exam: due  Foot exam: due  Diet/Exercise: planning to work on exercise and is working to avoid \"certain foods\" denies drinking sugary drinks.   Aspirin: Not taking due to not able to assess ASCVD risk  Statin: No - will be discussing at primary care visit.   ACEi/ARB: No.   Urine Albumin:   Lab Results   Component Value Date    UMALCR 21.43 (H) 01/02/2022      Lab Results   Component Value Date    A1C 13.7 01/01/2022     The 10-year ASCVD risk score (George FAJARDO Jr., et al., 2013) is: 2.8%    Values used to calculate the score:      Age: 41 years      Sex: Male      Is Non- : No      Diabetic: Yes      Tobacco smoker: No      Systolic Blood Pressure: 123 mmHg      Is BP treated: No      HDL Cholesterol: 44 mg/dL      Total Cholesterol: 178 mg/dL  Recent Labs   Lab Test 01/02/22  0624   CHOL 178   HDL 44   *   TRIG 75       Neuropathy: Currently taking gabapentin 300 mg three times a day. Reports that he has noticed that it has helped his feet \"a lot.\" Denies side effects.     Today's Vitals: There were no vitals taken for this visit.  ----------------  Post Discharge Medication Reconciliation Status: discharge medications reconciled, continue medications without change.    I spent 23 minutes with this patient today. I offer these suggestions for consideration by new PCP. A copy of the visit note was provided to the patient's referring " provider (unable to send to Dr. Farrar).    The patient was mailed a summary of these recommendations.     Sergio Nobles, Pharm. D., [facility]  Medication Therapy Management Pharmacist  Direct Voicemail: [phone]      Telemedicine Visit Details  Type of service:  Telephone visit  Start Time: 10:00 am  End Time: 10:23 AM  Originating Location (patient location): [place]  Distant Location (provider location):  [facility]     Medication Therapy Recommendations  Hyperglycemia    Rationale: Synergistic therapy - Needs additional medication therapy - Indication   Recommendation: Start Medication - METFORMIN HCL (BIGUANIDES)   Status: Contact Provider - Awaiting Response          Rationale: Preventive therapy - Needs additional medication therapy - Indication   Recommendation: Start Medication - rosuvastatin 5 MG tablet   Status: Contact Provider - Awaiting Response

## 2022-02-08 NOTE — ASSESSMENT & PLAN NOTE
Refused dialysis at prior admissions, now non-compliant after leaving the hospital  - nephrology consulted for HD.

## 2022-02-08 NOTE — ASSESSMENT & PLAN NOTE
Patient initially tested positive for Covid more than one month ago.  Asymptomatic from Covid with no evidence of pneumonia on CXR.  - stop precautions

## 2022-02-08 NOTE — ASSESSMENT & PLAN NOTE
Baseline Hgb around 7.5, Presents with Hgb of 4.3  No obvious bleeding, had EGD last admission with esophagitis  Transfused 2 units of blood with adequate correction of H/H  Continue to monitor.  - transfuse as needed   - loss over this hospital stay likely due to surgery

## 2022-02-08 NOTE — ASSESSMENT & PLAN NOTE
Persistent leukocytosis due to open wounds w/ likely superinfection  - broad spectrum abx  - surgery following for debridement, however wounds currently appear uninfected

## 2022-02-08 NOTE — SUBJECTIVE & OBJECTIVE
Interval History: No events overnight. Pt complains of pain.    Review of Systems   Constitutional: Negative for chills and fever.   Respiratory: Negative for cough and shortness of breath.    Cardiovascular: Negative for chest pain and leg swelling.   Gastrointestinal: Negative for abdominal distention and abdominal pain.   Musculoskeletal: Positive for myalgias.     Objective:     Vital Signs (Most Recent):  Temp: 97.6 °F (36.4 °C) (02/08/22 1144)  Pulse: 86 (02/08/22 1144)  Resp: 18 (02/08/22 1223)  BP: (!) 96/57 (02/08/22 1144)  SpO2: 98 % (02/08/22 1144) Vital Signs (24h Range):  Temp:  [97.4 °F (36.3 °C)-98.8 °F (37.1 °C)] 97.6 °F (36.4 °C)  Pulse:  [85-93] 86  Resp:  [16-19] 18  SpO2:  [96 %-100 %] 98 %  BP: (95-98)/(54-61) 96/57     Weight: 99.1 kg (218 lb 7.6 oz)  Body mass index is 37.48 kg/m².    Intake/Output Summary (Last 24 hours) at 2/8/2022 1603  Last data filed at 2/8/2022 1330  Gross per 24 hour   Intake 480 ml   Output --   Net 480 ml      Physical Exam  Constitutional:       General: She is not in acute distress.     Appearance: She is ill-appearing. She is not toxic-appearing or diaphoretic.   Cardiovascular:      Rate and Rhythm: Normal rate and regular rhythm.      Heart sounds: No murmur heard.  No gallop.    Pulmonary:      Effort: Pulmonary effort is normal. No respiratory distress.      Breath sounds: Normal breath sounds. No wheezing.   Abdominal:      General: Bowel sounds are normal. There is no distension.      Palpations: Abdomen is soft.      Tenderness: There is no abdominal tenderness.         Significant Labs: All pertinent labs within the past 24 hours have been reviewed.    Significant Imaging: I have reviewed all pertinent imaging results/findings within the past 24 hours.

## 2022-02-08 NOTE — ASSESSMENT & PLAN NOTE
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 CT with subcutaneous air in buttocks tissues. Underwent debridement 2/2 and 2/4. Stabilized hemodynamically, however pt w/ large, open wounds that will be difficult to heal.  - continue broad spectrum abx  - surgery following  - needs loop colostomy to keep wounds clear from feces  - wound care following  - see malnutrition elsewhere

## 2022-02-08 NOTE — PROGRESS NOTES
Renal Progress Note    Date of Admission:  2/1/2022  1:28 PM    Length of Stay: 7  Days    Subjective: n/a    Objective:    Current Facility-Administered Medications   Medication    0.9%  NaCl infusion (for blood administration)    cinacalcet tablet 30 mg    dextrose 10% bolus 125 mL    dextrose 10% bolus 250 mL    epoetin kelsey-epbx injection 10,000 Units    ferrous gluconate tablet 324 mg    gabapentin capsule 100 mg    glucagon (human recombinant) injection 1 mg    glucose chewable tablet 16 g    glucose chewable tablet 24 g    heparin (porcine) injection 3,200 Units    insulin aspart U-100 pen 0-5 Units    insulin detemir U-100 pen 4 Units    metoprolol tartrate (LOPRESSOR) tablet 25 mg    morphine injection 6 mg    nystatin 100,000 unit/mL suspension 500,000 Units    ondansetron disintegrating tablet 8 mg    oxyCODONE immediate release tablet 10 mg    pantoprazole injection 40 mg    piperacillin-tazobactam 4.5 g in dextrose 5 % 100 mL IVPB (ready to mix system)    sevelamer carbonate tablet 2,400 mg    sodium chloride 0.9% flush 10 mL    And    sodium chloride 0.9% flush 10 mL    sodium thiosulfate 12.5 gram/50 mL (250 mg/mL) injection 25 g    vancomycin - pharmacy to dose    vitamin renal formula (B-complex-vitamin c-folic acid) 1 mg per capsule 1 capsule       Vitals:    02/07/22 2057 02/07/22 2314 02/08/22 0429 02/08/22 0748   BP:   96/60 (!) 95/54   BP Location:    Left arm   Patient Position:   Lying Lying   Pulse: 91 90 91 85   Resp: 18 19 17 16   Temp:  97.9 °F (36.6 °C) 97.6 °F (36.4 °C) 97.4 °F (36.3 °C)   TempSrc:  Oral Oral Oral   SpO2: 99% 100% 98% 96%   Weight:       Height:           I/O last 3 completed shifts:  In: 180 [P.O.:180]  Out: -   No intake/output data recorded.      Physical Exam: Deferred 2nd. Covid-19 isolation      Laboratories:    Recent Labs   Lab 02/08/22  0524   WBC 25.60*   RBC 2.65*   HGB 7.7*   HCT 24.1*      MCV 91    MCH 29.1   MCHC 32.0       Recent Labs   Lab 02/08/22  0535   CALCIUM 7.6*   PROT 4.9*      K 3.9   CO2 20*   CL 96   BUN 41*   CREATININE 5.3*   ALKPHOS 131   ALT 21   AST 31   BILITOT 5.7*       No results for input(s): COLORU, CLARITYU, SPECGRAV, PHUR, PROTEINUA, GLUCOSEU, BLOODU, WBCU, RBCU, BACTERIA, MUCUS in the last 24 hours.    Invalid input(s):  BILIRUBINCON    Microbiology Results (last 7 days)     Procedure Component Value Units Date/Time    Culture, Anaerobe [[id number]]  (Abnormal) Collected: 02/02/22 1905    Order Status: Completed Specimen: Wound from Buttocks, Left Updated: 02/06/22 1242     Anaerobic Culture BACTEROIDES THETAIOTAOMICRON  Moderate      Narrative:      Bilateral Buttock wound    Blood culture #1 [[id number]] Collected: 02/01/22 1429    Order Status: Completed Specimen: Blood from Peripheral, Forearm, Left Updated: 02/05/22 1503     Blood Culture, Routine No Growth after 4 days.     Narrative:      Blood Culture #1    Blood culture #2 [[id number]] Collected: 02/01/22 1406    Order Status: Completed Specimen: Blood from Peripheral, Upper Arm, Left Updated: 02/05/22 1503     Blood Culture, Routine No Growth after 4 days.     Narrative:      Blood Culture #2    Aerobic culture [[id number]] Collected: 02/02/22 1905    Order Status: Completed Specimen: Wound from Buttocks, Left Updated: 02/05/22 0828     Aerobic Bacterial Culture No significant isolate    Narrative:      Bilateral buttock wound    AFB Culture & Smear [[id number]] Collected: 02/02/22 1905    Order Status: Completed Specimen: Wound from Buttocks, Left Updated: 02/04/22 2127     AFB Culture & Smear Culture in progress     AFB CULTURE STAIN No acid fast bacilli seen.    Narrative:      Bilateral buttock wound    Gram stain [[id number]] Collected: 02/02/22 1905    Order Status: Completed Specimen: Wound from Buttocks, Left Updated: 02/03/22 1145     Gram Stain Result No WBC's      Rare Gram positive cocci in pairs      Rare  Gram positive rods    Narrative:      Bilateral buttock wound    Fungus culture [[id number]] Collected: 02/02/22 1905    Order Status: Sent Specimen: Wound from Buttocks, Left Updated: 02/02/22 2005            Diagnostic Tests: n/a        Assessment:    40 y/o female with ESRD on HD admitted with:    - Sepsis s/p septic shock  - Necrotizing fasciitis s/p extensive debridement of gluteal area  - s/p DKA  - Hx. Calciphylaxis  - Recent Covid-19 infection  - Mild NAGMA  - DM type 2  - HTN  - Anemia of CKD requiring transfusions  - Severe protein malnutrition  - Debility  - Chronic non-compliance with treatment            Plan:    - Dialysis Q TTS  - Na+ thiosulfate on MWF  - Epogen 3 x week  - Transfuse during HD PRN  - Wound care and Antib. Per admitting  - Vit C and zinc for wound healing  - Protein supplementation  - Glycemic control and other problems per admitting    Will f/u on Dialysis days only.

## 2022-02-08 NOTE — PLAN OF CARE
Problem: Adult Inpatient Plan of Care  Goal: Plan of Care Review  Outcome: Ongoing, Progressing  Goal: Patient-Specific Goal (Individualized)  Outcome: Ongoing, Progressing  Goal: Absence of Hospital-Acquired Illness or Injury  Outcome: Ongoing, Progressing     Problem: Diabetes Comorbidity  Goal: Blood Glucose Level Within Targeted Range  Outcome: Ongoing, Progressing     Problem: Infection  Goal: Absence of Infection Signs and Symptoms  Outcome: Ongoing, Progressing     Problem: Impaired Wound Healing  Goal: Optimal Wound Healing  Outcome: Ongoing, Progressing     Problem: Skin Injury Risk Increased  Goal: Skin Health and Integrity  Outcome: Ongoing, Progressing     Problem: Fall Injury Risk  Goal: Absence of Fall and Fall-Related Injury  Outcome: Ongoing, Progressing     Problem: Glycemic Control Impaired (Sepsis/Septic Shock)  Goal: Blood Glucose Level Within Desired Range  Outcome: Ongoing, Progressing   Ms. Cousin is in bed resting. Call light in reach. Bed locked and low. No acute changes overnight. Hourly rounding. All question and concerns answered. Will continue to monitor for any changes and follow plan of care.      lastnight

## 2022-02-08 NOTE — PROGRESS NOTES
No physician willing to accept patient at [facility] per Shara with [facility].

## 2022-02-08 NOTE — PROGRESS NOTES
SW received call from Shara with [facility] acknowleding receipt of  Hep B Surface Antigen results.  Awaiting information as to whether patient has been accepted at [facility].

## 2022-02-08 NOTE — PROGRESS NOTES
Pharmacokinetic Assessment Follow Up: IV Vancomycin    Vancomycin serum concentration assessment(s):    The random level was drawn correctly and can be used to guide therapy at this time. The measurement is within the desired definitive target range of 10 to 20 mcg/mL.    Vancomycin Regimen Plan:    Give 500 mg after dialysis today.  Re-dose when the random level is less than 20 mcg/mL, next level to be drawn at 0400 on 2/10/2022    Drug levels (last 3 results):  Recent Labs   Lab Result Units 02/06/22  0519 02/08/22  0524   Vancomycin, Random ug/mL 19.6 16.9       Pharmacy will continue to follow and monitor vancomycin.    Please contact pharmacy at extension [phone] for questions regarding this assessment.    Thank you for the consult,   Julien Bolivar Jr       Patient brief summary:  Xuan Wrightsin is a 41 y.o. female initiated on antimicrobial therapy with IV Vancomycin for treatment of skin & soft tissue infection    Drug Allergies:   Review of patient's allergies indicates:   Allergen Reactions    Vicodin [hydrocodone-acetaminophen] Hives    Codeine Hives       Actual Body Weight:   99.1 kg    Renal Function:   Estimated Creatinine Clearance: 15.7 mL/min (A) (based on SCr of 5.3 mg/dL (H)).,     Dialysis Method (if applicable):  intermittent HD    CBC (last 72 hours):  Recent Labs   Lab Result Units 02/06/22 0519 02/07/22 0348 02/08/22  0524   WBC K/uL 23.57* 21.60* 25.60*   Hemoglobin g/dL 7.9* 8.0* 7.7*   Hematocrit % 24.0* 24.3* 24.1*   Platelets K/uL 176 176 199   Gran % % 88.0* 80.0* 75.0*   Lymph % % 2.0* 6.0* 11.0*   Mono % % 2.0* 1.0* 2.0*   Eosinophil % % 2.0 0.0 0.0   Basophil % % 0.0 0.0 0.0   Differential Method  Manual Manual Manual       Metabolic Panel (last 72 hours):  Recent Labs   Lab Result Units 02/06/22  0519 02/07/22 0348 02/08/22  0535   Sodium mmol/L 134* 135* 136   Potassium mmol/L 3.5 3.5 3.9   Chloride mmol/L 97 97 96   CO2 mmol/L 23 22* 20*   Glucose mg/dL 132* 105 64*   BUN  mg/dL 28* 34* 41*   Creatinine mg/dL 3.7* 4.5* 5.3*   Albumin g/dL 0.8* 0.7* 0.8*   Total Bilirubin mg/dL 6.0* 5.1* 5.7*   Alkaline Phosphatase U/L 136* 141* 131   AST U/L 31 26 31   ALT U/L 20 19 21   Magnesium mg/dL 1.6 1.6 1.7   Phosphorus mg/dL 2.8 3.2 4.1       Vancomycin Administrations:  vancomycin given in the last 96 hours                     vancomycin 500 mg in dextrose 5 % 100 mL IVPB (ready to mix system) (mg) 500 mg New Bag 02/05/22 1619                    Microbiologic Results:  Microbiology Results (last 7 days)       Procedure Component Value Units Date/Time    Culture, Anaerobe [[id number]]  (Abnormal) Collected: 02/02/22 1905    Order Status: Completed Specimen: Wound from Buttocks, Left Updated: 02/06/22 1242     Anaerobic Culture BACTEROIDES THETAIOTAOMICRON  Moderate      Narrative:      Bilateral Buttock wound    Blood culture #1 [[id number]] Collected: 02/01/22 1429    Order Status: Completed Specimen: Blood from Peripheral, Forearm, Left Updated: 02/05/22 1503     Blood Culture, Routine No Growth after 4 days.     Narrative:      Blood Culture #1    Blood culture #2 [[id number]] Collected: 02/01/22 1406    Order Status: Completed Specimen: Blood from Peripheral, Upper Arm, Left Updated: 02/05/22 1503     Blood Culture, Routine No Growth after 4 days.     Narrative:      Blood Culture #2    Aerobic culture [[id number]] Collected: 02/02/22 1905    Order Status: Completed Specimen: Wound from Buttocks, Left Updated: 02/05/22 0828     Aerobic Bacterial Culture No significant isolate    Narrative:      Bilateral buttock wound    AFB Culture & Smear [[id number]] Collected: 02/02/22 1905    Order Status: Completed Specimen: Wound from Buttocks, Left Updated: 02/04/22 2127     AFB Culture & Smear Culture in progress     AFB CULTURE STAIN No acid fast bacilli seen.    Narrative:      Bilateral buttock wound    Gram stain [[id number]] Collected: 02/02/22 1905    Order Status: Completed Specimen: Wound from  Buttocks, Left Updated: 02/03/22 1145     Gram Stain Result No WBC's      Rare Gram positive cocci in pairs      Rare Gram positive rods    Narrative:      Bilateral buttock wound    Fungus culture [[id number]] Collected: 02/02/22 1905    Order Status: Sent Specimen: Wound from Buttocks, Left Updated: 02/02/22 2005

## 2022-02-08 NOTE — PHYSICIAN QUERY
"PT Name: Xuan Ricardo  MR #: [id number]    DOCUMENTATION CLARIFICATION     CDS/: Wendy Slaas RN              Contact information: [email or web address]  This form is a permanent document in the medical record.    Query Date: February 8, 2022  By submitting this query, we are merely seeking further clarification of documentation. Please utilize your independent clinical judgment when addressing the question(s) below.    The Medical Record contains the following:   Indicator Supporting Clinical Findings Location in Medical Record   x Documentation of "Debridement"  Necrotizing soft tissue infection:    The wound was inspected and minimal necrotic tissue was debrided from the right buttock down to the fascia and the the right side of the anus.      No further purulence was noted.    The wound was again measured at 35cm X 25cm  X 7cm.     EBL:  minimal   2/4 OP note          Documentation of "I&D"      Other       Excisional debridement is the surgical removal or cutting away of such tissue, necrosis, or slough and is classified to the root operation "Excision." Use of a sharp instrument does not always indicate that an excisional debridement was performed. Minor removal of loose fragments with scissors or using a sharp instrument to scrape away tissue is not an excisional debridement. Excisional debridement involves the use of a scalpel to remove devitalized tissue.    Nonexcisional debridement is the nonoperative brushing, irrigating, scrubbing, or washing of devitalized tissue, necrosis, slough, or foreign material. Most nonexcisional debridement procedures are classified to the root operation "Extraction" (pulling or stripping out or off all or a portion of a body part by the use of force).     Provider, please provide further clarification on the procedure performed on Right Buttock/ Right Anus :    [   ] Excisional Debridement of skin   [   ] Excisional Debridement of subcutaneous tissue/fascia "   [   ] Excisional Debridement of muscle        [   ] Nonexcisional Debridement of skin   [ x  ] Nonexcisional Debridement of subcutaneous tissue/fascia   [   ] Nonexcisional Debridement of muscle       [   ] Other Procedure (please specify): _____________   [  ] Clinically Undetermined     Reference:    ICD-10-CM/PCS Coding Clinic Third Quarter ICD-10, Effective with discharges: October 7, 2015 [facility] [employer] § Excisional and nonexcisional debridement (2015).    Form No. [id number]

## 2022-02-08 NOTE — ASSESSMENT & PLAN NOTE
Family realizes how sick the patient is, however are hoping for miraculous recovery. Concern given long road ahead for patient and limited interest in participating in medical care going back multiple months and multiple admissions.

## 2022-02-08 NOTE — PROGRESS NOTES
[facility]      General Surgery Progress Note    02/08/2022  5:44 PM      Patient: Xuan Wrightsin  MRN: [id number]  Admit Date: 2/1/2022  LOS: 7      Subjective                                                                                                        Interval Events: NAEON, VSS, afebrile.  Patient denies pain, no shortness of breath    Patient Active Problem List   Diagnosis    Anemia of renal disease    Hypertensive CKD (chronic kidney disease)    Nephrotic range proteinuria    Metabolic acidosis    Increased prolactin level    Secondary hyperparathyroidism    Iron deficiency anemia    Vitamin D deficiency    Galactorrhea    Cataract    Class 1 obesity due to excess calories with serious comorbidity in adult    Chronic fatigue    Missed menses    Uterine leiomyoma    Type 2 diabetes mellitus with stage 5 chronic kidney disease not on chronic dialysis, with long-term current use of insulin    Hypertension    Hypertensive urgency    Symptomatic anemia    End stage renal disease    Uremia    CKD (chronic kidney disease) stage 5, GFR less than 15 ml/min    Nausea & vomiting    Thrombocytopenia    Hematemesis    Goals of care, counseling/discussion    Calciphylaxis    Type 2 diabetes mellitus    COVID-19 virus infection    Renovascular hypertension    Hyperphosphatemia    Candida esophagitis    Sepsis due to gram-negative UTI    Sinus tachycardia    Slow transit constipation    Debility    Esophagitis, [place] grade D    Hyponatremia    Leukocytosis    Coagulopathy    Septic shock    Necrotizing fasciitis    Cellulitis of thigh    Malnutrition       No current facility-administered medications on file prior to encounter.     Current Outpatient Medications on File Prior to Encounter   Medication Sig Dispense Refill    calcium carbonate (TUMS) 200 mg calcium (500 mg) chewable tablet Take 2 tablets (1,000 mg total) by mouth 3 (three) times daily  as needed. 150 tablet 0    cinacalcet (SENSIPAR) 30 MG Tab Take 1 tablet (30 mg total) by mouth daily with breakfast. 30 tablet 11    epoetin kelsey-epbx (RETACRIT) 2,000 unit/mL injection Inject 7 mLs (14,000 Units total) into the skin every Mon, Wed, Fri. With dialysis      ferrous gluconate (FERGON) 324 MG tablet Take 1 tablet (324 mg total) by mouth daily with breakfast. 30 tablet 2    gabapentin (NEURONTIN) 100 MG capsule Take 1 capsule (100 mg total) by mouth 3 (three) times daily. 90 capsule 0    metoprolol tartrate (LOPRESSOR) 25 MG tablet Take 1 tablet (25 mg total) by mouth 2 (two) times daily. 60 tablet 11    oxyCODONE (ROXICODONE) 10 mg Tab immediate release tablet Take 1 tablet (10 mg total) by mouth every 4 (four) hours as needed. 18 tablet 0    pantoprazole (PROTONIX) 40 MG tablet Take 1 tablet (40 mg total) by mouth 2 (two) times daily. 60 tablet 1    sevelamer carbonate (RENVELA) 800 mg Tab Take 3 tablets (2,400 mg total) by mouth 3 (three) times daily with meals. 270 tablet 11    sodium thiosulfate 12.5 gram/50 mL (250 mg/mL) injection Inject 100 mLs (25 g total) into the vein every Mon, Wed, Fri. With dialysis         Objective                                                                                                          Vitals:    02/08/22 0748 02/08/22 0915 02/08/22 1144 02/08/22 1223   BP: (!) 95/54  (!) 96/57    BP Location: Left arm  Left arm    Patient Position: Lying  Lying    Pulse: 85  86    Resp: 16 18 16 18   Temp: 97.4 °F (36.3 °C)  97.6 °F (36.4 °C)    TempSrc: Oral  Oral    SpO2: 96%  98%    Weight:       Height:           CBC   Recent Labs   Lab 02/07/22  0348 02/08/22  0524   WBC 21.60* 25.60*   HGB 8.0* 7.7*   HCT 24.3* 24.1*    199       CHEM   Recent Labs   Lab 02/07/22  0348 02/08/22  0535   * 136   K 3.5 3.9   CL 97 96   CO2 22* 20*   BUN 34* 41*   CREATININE 4.5* 5.3*    64*       PHYSICAL EXAM:    GEN: Alert and Awake, chronically ill  appearing.  HEENT: NC/AT, EOMI  RESP: CTAB, good air movement, no resp distress  CV: mild tachycardia  ABD: Soft, NT/ND, no guarding or rebound  EXT:  Moves all, extensive calciphylaxis lesions on bilateral lower extremities, sacral wound with dressing in place.  Serosanguinous drainage noted on dressing  Neuro: A&Ox3, CNII-XII intact  Skin: Warm, calciphylaxis lesions throughout bilateral lower extremites       Imaging Results          X-Ray Chest AP Portable (Final result)  Result time 02/01/22 15:38:15    Final result by Murphy Foy MD (02/01/22 15:38:15)                 Impression:      1. No acute cardiopulmonary process appreciated.      Electronically signed by: Murphy Foy  Date:    02/01/2022  Time:    15:38             Narrative:    EXAMINATION:  XR CHEST AP PORTABLE    CLINICAL HISTORY:  HYpotention;    TECHNIQUE:  Single frontal portable view of the chest was performed.    COMPARISON:  Chest radiograph 01/19/2022    FINDINGS:  RIJV-approach THDC is stable when compared to 01/19/2022 however, with marked interval retraction when compared to original placement on 05/22/2020 with terminus now projecting over the expected location of the mid SVC.    Cardiomediastinal silhouette is within normal limits.    No focal consolidation, overt interstitial edema, sizable pleural effusion or pneumothorax.    Multilevel degenerative changes of the imaged spine.                                Assessment / Plan:                                                                                           A/P: Case of 41 y.o. with extensive pmh, admitted in septic shock found to have necrotizing soft tissue infection of the sacrum and buttock.  S/p debridement on 2/2/22, 2/4/22.       With patients extensive pmh noted calciphylaxis, its is very unlikely that the sacral wound will ever be able to heal.  Recommend a goals of care discussion with the patient and family    Patient seems to be doing well.  With the large  wound it will be important for the patient to get adequate nutrition for wound healing     -continue iv abx  -transfuse for hgb <7, if patient is requiring multiple transfusions consider ffp administration due to high INR.   -wound care consulted appreciate assistance  -recommend changing dressing daily.  NS/betadine soaked kerlix for packing with abd pads on top  -no plan for loop colostomy at this time as wound appears to be clean  -recommend calorie count to make sure patient is receiving adequate nutrition, albumin is consistently low and need to make sure she is having enough intake to heal the sacral wound      Carlos Alberto Christianson MD   Gen Surgery PGY-V

## 2022-02-08 NOTE — CONSULTS
TN sent LTAC referral packet via care port to [facility], pending review. TN to follow up.     Per Narda with [facility], patient medically under review. TN spoke with patient, verbalized understanding and acceptance for [facility]. TN to follow up.

## 2022-02-08 NOTE — PLAN OF CARE
[facility] Med [facility] [place]  Discharge Reassessment    Primary Care Provider: Katharine Franks MD    Expected Discharge Date:      Reassessment (most recent)       Discharge Reassessment - 02/08/22 0943          Discharge Reassessment    Assessment Type Discharge Planning Reassessment (P)      Did the patient's condition or plan change since previous assessment? Yes (P)      Discharge Plan discussed with: -- (P)    TN placed call to contact number in chart, left voice message.    Name(s) and Number(s) Gypsy Ricardo [phone] (P)      Communicated PAMELA with patient/caregiver Date not available/Unable to determine (P)      Discharge Plan A Long-term acute care facility (LTAC) (P)      Discharge Plan B Home Health (P)      DME Needed Upon Discharge  wound care supplies (P)      Discharge Barriers Identified DIalysis placement issues (P)      Why the patient remains in the hospital Requires continued medical care (P)         Post-Acute Status    Post-Acute Authorization Dialysis;Placement (P)      Post-Acute Placement Status Referrals Sent (P)    LTAC    Coverage PHN (P)      Discharge Delays Post-Acute Set-up (P)

## 2022-02-08 NOTE — PROGRESS NOTES
[facility] Surg [facility] Medicine  Progress Note    Patient Name: Xuan Ricardo  MRN: [id number]  Patient Class: IP- Inpatient   Admission Date: 2/1/2022  Length of Stay: 7 days  Attending Physician: Adebayo Reyes MD  Primary Care Provider: Katharine Franks MD        Subjective:     Principal Problem:Necrotizing fasciitis        HPI:  40 y/o female presented to ER with hypotension.  Patient was recently started on dialysis and had not had dialysis for one week since last hospital discharge.  She was noted to have multiple abnormalities on presentation.  She was severely anemic with Hgb of 4.3.  transfused 2 units of blood with adequate correction of H/H.  Patient was also noted to have AG metabolic acidosis with hyperglycemia.  Stated on insulin drip for DKA.  Leukocytosis on CBC.  Patient with erythema and blistering of bilateral inner thing and buttocks.  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  Started on ABX's and Surgery consulted.        Overview/Hospital Course:  40 y/o female presented to ER with hypotension.  Patient was recently started on dialysis and had not had dialysis for one week since last hospital discharge.  She was noted to have multiple abnormalities on presentation.  She was severely anemic with Hgb of 4.3.  transfused 2 units of blood with adequate correction of H/H.  Patient was also noted to have AG metabolic acidosis with hyperglycemia.  Started on insulin drip for DKA.  Leukocytosis on CBC.  Patient with erythema and blistering of bilateral inner thighs and buttocks.  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  Started on ABX's and Surgery consulted.  Admitted with necrotizing fascitis of the buttocks. Surgery consulted and patient brought to OR 2/2/22 with significant debridement. On antibiotics. Mental status worsening. Palliative Care consulted, family updated. Brought back to OR on 2/4/22 for debridement as well. Weaned off norepinephrine,  on broad spectrum antibiotics pending cultures. Long recovery ahead. Concern that may need diverting colostomy in the future.not on any anticoagulation,she has also history of HIT.  Culture is growing bacteroides,wound care is consulted.she is bed bound, did not consulted PT,OT   CC today is pain.      Interval History: No events overnight. Pt complains of pain.    Review of Systems   Constitutional: Negative for chills and fever.   Respiratory: Negative for cough and shortness of breath.    Cardiovascular: Negative for chest pain and leg swelling.   Gastrointestinal: Negative for abdominal distention and abdominal pain.   Musculoskeletal: Positive for myalgias.     Objective:     Vital Signs (Most Recent):  Temp: 97.6 °F (36.4 °C) (02/08/22 1144)  Pulse: 86 (02/08/22 1144)  Resp: 18 (02/08/22 1223)  BP: (!) 96/57 (02/08/22 1144)  SpO2: 98 % (02/08/22 1144) Vital Signs (24h Range):  Temp:  [97.4 °F (36.3 °C)-98.8 °F (37.1 °C)] 97.6 °F (36.4 °C)  Pulse:  [85-93] 86  Resp:  [16-19] 18  SpO2:  [96 %-100 %] 98 %  BP: (95-98)/(54-61) 96/57     Weight: 99.1 kg (218 lb 7.6 oz)  Body mass index is 37.48 kg/m².    Intake/Output Summary (Last 24 hours) at 2/8/2022 1603  Last data filed at 2/8/2022 1330  Gross per 24 hour   Intake 480 ml   Output --   Net 480 ml      Physical Exam  Constitutional:       General: She is not in acute distress.     Appearance: She is ill-appearing. She is not toxic-appearing or diaphoretic.   Cardiovascular:      Rate and Rhythm: Normal rate and regular rhythm.      Heart sounds: No murmur heard.  No gallop.    Pulmonary:      Effort: Pulmonary effort is normal. No respiratory distress.      Breath sounds: Normal breath sounds. No wheezing.   Abdominal:      General: Bowel sounds are normal. There is no distension.      Palpations: Abdomen is soft.      Tenderness: There is no abdominal tenderness.         Significant Labs: All pertinent labs within the past 24 hours have been  reviewed.    Significant Imaging: I have reviewed all pertinent imaging results/findings within the past 24 hours.      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 CT with subcutaneous air in buttocks tissues. Underwent debridement 2/2 and 2/4. Stabilized hemodynamically, however pt w/ large, open wounds that will be difficult to heal.  - continue broad spectrum abx  - surgery following  - needs loop colostomy to keep wounds clear from feces  - wound care following  - see malnutrition elsewhere      End stage renal disease  Refused dialysis at prior admissions, now non-compliant after leaving the hospital  - nephrology consulted for HD.      Malnutrition  Pt w/ markedly poor PO intake, particularly concerning given large wounds that will not heal without nutritional support.   - discussed w/ patient, will need alternate means of nutrition if pt unable to meet needs by mouth      Cellulitis of thigh  As above      Septic shock  Shock resolved      Coagulopathy  Likely due to sepsis/infection, now improved      Leukocytosis  Persistent leukocytosis due to open wounds w/ likely superinfection  - broad spectrum abx  - surgery following for debridement, however wounds currently appear uninfected      Hyponatremia  Now resolved      Esophagitis, [place] grade D  PPI bid      Debility  Bed bound on baseline.      Sinus tachycardia  Resolved with  BB.      COVID-19 virus infection  Patient initially tested positive for Covid more than one month ago.  Asymptomatic from Covid with no evidence of pneumonia on CXR.  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 Concern given long road ahead for patient and limited interest in  participating in medical care going back multiple months and multiple admissions.    Type 2 diabetes mellitus with stage 5 chronic kidney disease not on chronic dialysis, with long-term current use of insulin  Initially presented in DKA.  Started on insulin drip, but then patient became hypoglycemia.  DKA has now resolved with closure of AG.  Will start nightly Levemir.  Adjust as necessary.  Continue with sliding scale.        Hypertensive CKD (chronic kidney disease)  Patient initially hypotensive requiring pressor support  - now weaned off and normotensive    Anemia of renal disease  Baseline Hgb around 7.5, Presents with Hgb of 4.3  No obvious bleeding, had EGD last admission with esophagitis  Transfused 2 units of blood with adequate correction of H/H  Continue to monitor.  - transfuse as needed   - loss over this hospital stay likely due to surgery        VTE Risk Mitigation (From admission, onward)         Ordered     heparin (porcine) injection 3,200 Units  As needed (PRN)         02/03/22 0024     IP VTE HIGH RISK PATIENT  Once         02/01/22 1816     Place sequential compression device  Until discontinued         02/01/22 1816     Place sequential compression device  Until discontinued         02/01/22 1810                Discharge Planning   PAMELA:      Code Status: Full Code   Is the patient medically ready for discharge?:     Reason for patient still in hospital (select all that apply): Patient trending condition, Laboratory test, Treatment, Consult recommendations and Pending disposition  Discharge Plan A: Long-term acute care facility (LTAC)   Discharge Delays: (!) Post-Acute Set-up              Adebayo Reyes MD  Department of Hospital Medicine   [facility] Surg [place]

## 2022-02-08 NOTE — ASSESSMENT & PLAN NOTE
Pt w/ markedly poor PO intake, particularly concerning given large wounds that will not heal without nutritional support.   - discussed w/ patient, will need alternate means of nutrition if pt unable to meet needs by mouth     DISPLAY PLAN FREE TEXT DISPLAY PLAN FREE TEXT DISPLAY PLAN FREE TEXT

## 2022-02-08 NOTE — PLAN OF CARE
Problem: Adult Inpatient Plan of Care  Goal: Plan of Care Review  Outcome: Ongoing, Not Progressing  Goal: Patient-Specific Goal (Individualized)  Outcome: Ongoing, Not Progressing  Goal: Absence of Hospital-Acquired Illness or Injury  Outcome: Ongoing, Not Progressing  Goal: Optimal Comfort and Wellbeing  Outcome: Ongoing, Not Progressing  Goal: Readiness for Transition of Care  Outcome: Ongoing, Not Progressing     Problem: Diabetes Comorbidity  Goal: Blood Glucose Level Within Targeted Range  Outcome: Ongoing, Not Progressing     Problem: Infection  Goal: Absence of Infection Signs and Symptoms  Outcome: Ongoing, Not Progressing     Problem: Impaired Wound Healing  Goal: Optimal Wound Healing  Outcome: Ongoing, Not Progressing     Problem: Skin Injury Risk Increased  Goal: Skin Health and Integrity  Outcome: Ongoing, Not Progressing     Problem: Device-Related Complication Risk (Hemodialysis)  Goal: Safe, Effective Therapy Delivery  Outcome: Ongoing, Not Progressing     Problem: Hemodynamic Instability (Hemodialysis)  Goal: Effective Tissue Perfusion  Outcome: Ongoing, Not Progressing     Problem: Infection (Hemodialysis)  Goal: Absence of Infection Signs and Symptoms  Outcome: Ongoing, Not Progressing     Problem: Fall Injury Risk  Goal: Absence of Fall and Fall-Related Injury  Outcome: Ongoing, Not Progressing     Problem: Adjustment to Illness (Sepsis/Septic Shock)  Goal: Optimal Coping  Outcome: Ongoing, Not Progressing     Problem: Bleeding (Sepsis/Septic Shock)  Goal: Absence of Bleeding  Outcome: Ongoing, Not Progressing     Problem: Glycemic Control Impaired (Sepsis/Septic Shock)  Goal: Blood Glucose Level Within Desired Range  Outcome: Ongoing, Not Progressing     Problem: Infection Progression (Sepsis/Septic Shock)  Goal: Absence of Infection Signs and Symptoms  Outcome: Ongoing, Not Progressing     Problem: Nutrition Impaired (Sepsis/Septic Shock)  Goal: Optimal Nutrition Intake  Outcome: Ongoing,  Not Progressing     Problem: Coping Ineffective  Goal: Effective Coping  Outcome: Ongoing, Not Progressing

## 2022-02-09 LAB
ALBUMIN SERPL BCP-MCNC: 0.7 G/DL (ref 3.5–5.2)
ALP SERPL-CCNC: 146 U/L (ref 55–135)
ALT SERPL W/O P-5'-P-CCNC: 20 U/L (ref 10–44)
ANION GAP SERPL CALC-SCNC: 14 MMOL/L (ref 8–16)
ANISOCYTOSIS BLD QL SMEAR: SLIGHT
AST SERPL-CCNC: 36 U/L (ref 10–40)
BASOPHILS # BLD AUTO: ABNORMAL K/UL (ref 0–0.2)
BASOPHILS NFR BLD: 0 % (ref 0–1.9)
BILIRUB SERPL-MCNC: 5.1 MG/DL (ref 0.1–1)
BUN SERPL-MCNC: 24 MG/DL (ref 6–20)
CALCIUM SERPL-MCNC: 7.3 MG/DL (ref 8.7–10.5)
CHLORIDE SERPL-SCNC: 100 MMOL/L (ref 95–110)
CO2 SERPL-SCNC: 22 MMOL/L (ref 23–29)
CREAT SERPL-MCNC: 3.5 MG/DL (ref 0.5–1.4)
DIFFERENTIAL METHOD: ABNORMAL
EOSINOPHIL # BLD AUTO: ABNORMAL K/UL (ref 0–0.5)
EOSINOPHIL NFR BLD: 2 % (ref 0–8)
ERYTHROCYTE [DISTWIDTH] IN BLOOD BY AUTOMATED COUNT: 18.6 % (ref 11.5–14.5)
EST. GFR  (AFRICAN AMERICAN): 18 ML/MIN/1.73 M^2
EST. GFR  (NON AFRICAN AMERICAN): 15 ML/MIN/1.73 M^2
GLUCOSE SERPL-MCNC: 78 MG/DL (ref 70–110)
HCT VFR BLD AUTO: 23.1 % (ref 37–48.5)
HGB BLD-MCNC: 7.6 G/DL (ref 12–16)
HYPOCHROMIA BLD QL SMEAR: ABNORMAL
IMM GRANULOCYTES # BLD AUTO: ABNORMAL K/UL (ref 0–0.04)
IMM GRANULOCYTES NFR BLD AUTO: ABNORMAL % (ref 0–0.5)
LYMPHOCYTES # BLD AUTO: ABNORMAL K/UL (ref 1–4.8)
LYMPHOCYTES NFR BLD: 5 % (ref 18–48)
MAGNESIUM SERPL-MCNC: 1.6 MG/DL (ref 1.6–2.6)
MCH RBC QN AUTO: 29.9 PG (ref 27–31)
MCHC RBC AUTO-ENTMCNC: 32.9 G/DL (ref 32–36)
MCV RBC AUTO: 91 FL (ref 82–98)
METAMYELOCYTES NFR BLD MANUAL: 1 %
MONOCYTES # BLD AUTO: ABNORMAL K/UL (ref 0.3–1)
MONOCYTES NFR BLD: 4 % (ref 4–15)
MYELOCYTES NFR BLD MANUAL: 1 %
NEUTROPHILS NFR BLD: 70 % (ref 38–73)
NEUTS BAND NFR BLD MANUAL: 17 %
NRBC BLD-RTO: 0 /100 WBC
OVALOCYTES BLD QL SMEAR: ABNORMAL
PHOSPHATE SERPL-MCNC: 3.1 MG/DL (ref 2.7–4.5)
PLATELET # BLD AUTO: 170 K/UL (ref 150–450)
PLATELET BLD QL SMEAR: ABNORMAL
PMV BLD AUTO: 10.6 FL (ref 9.2–12.9)
POCT GLUCOSE: 118 MG/DL (ref 70–110)
POCT GLUCOSE: 123 MG/DL (ref 70–110)
POCT GLUCOSE: 72 MG/DL (ref 70–110)
POCT GLUCOSE: 93 MG/DL (ref 70–110)
POIKILOCYTOSIS BLD QL SMEAR: SLIGHT
POLYCHROMASIA BLD QL SMEAR: ABNORMAL
POTASSIUM SERPL-SCNC: 3.8 MMOL/L (ref 3.5–5.1)
PROT SERPL-MCNC: 4.6 G/DL (ref 6–8.4)
RBC # BLD AUTO: 2.54 M/UL (ref 4–5.4)
SODIUM SERPL-SCNC: 136 MMOL/L (ref 136–145)
TARGETS BLD QL SMEAR: ABNORMAL
WBC # BLD AUTO: 24.91 K/UL (ref 3.9–12.7)

## 2022-02-09 PROCEDURE — 85007 BL SMEAR W/DIFF WBC COUNT: CPT | Performed by: STUDENT IN AN ORGANIZED HEALTH CARE EDUCATION/TRAINING PROGRAM

## 2022-02-09 PROCEDURE — 25000003 PHARM REV CODE 250: Performed by: STUDENT IN AN ORGANIZED HEALTH CARE EDUCATION/TRAINING PROGRAM

## 2022-02-09 PROCEDURE — 63600175 PHARM REV CODE 636 W HCPCS: Performed by: STUDENT IN AN ORGANIZED HEALTH CARE EDUCATION/TRAINING PROGRAM

## 2022-02-09 PROCEDURE — 83735 ASSAY OF MAGNESIUM: CPT | Performed by: STUDENT IN AN ORGANIZED HEALTH CARE EDUCATION/TRAINING PROGRAM

## 2022-02-09 PROCEDURE — 84100 ASSAY OF PHOSPHORUS: CPT | Performed by: STUDENT IN AN ORGANIZED HEALTH CARE EDUCATION/TRAINING PROGRAM

## 2022-02-09 PROCEDURE — A4216 STERILE WATER/SALINE, 10 ML: HCPCS | Performed by: STUDENT IN AN ORGANIZED HEALTH CARE EDUCATION/TRAINING PROGRAM

## 2022-02-09 PROCEDURE — C9113 INJ PANTOPRAZOLE SODIUM, VIA: HCPCS | Performed by: STUDENT IN AN ORGANIZED HEALTH CARE EDUCATION/TRAINING PROGRAM

## 2022-02-09 PROCEDURE — 80053 COMPREHEN METABOLIC PANEL: CPT | Performed by: STUDENT IN AN ORGANIZED HEALTH CARE EDUCATION/TRAINING PROGRAM

## 2022-02-09 PROCEDURE — 85027 COMPLETE CBC AUTOMATED: CPT | Performed by: STUDENT IN AN ORGANIZED HEALTH CARE EDUCATION/TRAINING PROGRAM

## 2022-02-09 PROCEDURE — 25000003 PHARM REV CODE 250: Performed by: HOSPITALIST

## 2022-02-09 PROCEDURE — 25000003 PHARM REV CODE 250: Performed by: INTERNAL MEDICINE

## 2022-02-09 PROCEDURE — 27000221 HC OXYGEN, UP TO 24 HOURS

## 2022-02-09 PROCEDURE — 11000001 HC ACUTE MED/SURG PRIVATE ROOM

## 2022-02-09 RX ADMIN — PANTOPRAZOLE SODIUM 40 MG: 40 INJECTION, POWDER, FOR SOLUTION INTRAVENOUS at 09:02

## 2022-02-09 RX ADMIN — Medication 10 ML: at 12:02

## 2022-02-09 RX ADMIN — NEPHROCAP 1 CAPSULE: 1 CAP ORAL at 08:02

## 2022-02-09 RX ADMIN — OXYCODONE HYDROCHLORIDE AND ACETAMINOPHEN 1000 MG: 500 TABLET ORAL at 08:02

## 2022-02-09 RX ADMIN — Medication 324 MG: at 08:02

## 2022-02-09 RX ADMIN — SEVELAMER CARBONATE 2400 MG: 800 TABLET, FILM COATED ORAL at 08:02

## 2022-02-09 RX ADMIN — GABAPENTIN 100 MG: 100 CAPSULE ORAL at 09:02

## 2022-02-09 RX ADMIN — INSULIN DETEMIR 4 UNITS: 100 INJECTION, SOLUTION SUBCUTANEOUS at 08:02

## 2022-02-09 RX ADMIN — CINACALCET 30 MG: 30 TABLET, FILM COATED ORAL at 08:02

## 2022-02-09 RX ADMIN — PIPERACILLIN AND TAZOBACTAM 4.5 G: 4; .5 INJECTION, POWDER, LYOPHILIZED, FOR SOLUTION INTRAVENOUS; PARENTERAL at 10:02

## 2022-02-09 RX ADMIN — NYSTATIN 500000 UNITS: 100000 SUSPENSION ORAL at 01:02

## 2022-02-09 RX ADMIN — GABAPENTIN 100 MG: 100 CAPSULE ORAL at 08:02

## 2022-02-09 RX ADMIN — OXYCODONE 10 MG: 5 TABLET ORAL at 01:02

## 2022-02-09 RX ADMIN — SEVELAMER CARBONATE 2400 MG: 800 TABLET, FILM COATED ORAL at 01:02

## 2022-02-09 RX ADMIN — Medication 10 ML: at 05:02

## 2022-02-09 RX ADMIN — MORPHINE SULFATE 6 MG: 4 INJECTION, SOLUTION INTRAMUSCULAR; INTRAVENOUS at 09:02

## 2022-02-09 RX ADMIN — PANTOPRAZOLE SODIUM 40 MG: 40 INJECTION, POWDER, FOR SOLUTION INTRAVENOUS at 08:02

## 2022-02-09 RX ADMIN — PIPERACILLIN AND TAZOBACTAM 4.5 G: 4; .5 INJECTION, POWDER, LYOPHILIZED, FOR SOLUTION INTRAVENOUS; PARENTERAL at 09:02

## 2022-02-09 RX ADMIN — ZINC SULFATE 220 MG (50 MG) CAPSULE 220 MG: CAPSULE at 08:02

## 2022-02-09 RX ADMIN — NYSTATIN 500000 UNITS: 100000 SUSPENSION ORAL at 08:02

## 2022-02-09 NOTE — PROGRESS NOTES
Vancomycin consult follow-up:    Patient reviewed, dialysis scheduled every Tues, Thurs, Sat, no new levels, no dose today; Next levels due: 2/10/2022 at 0400

## 2022-02-09 NOTE — ASSESSMENT & PLAN NOTE
- hx of  candidal esophagitis as per EGD  -completed course of fluconazole  - given PPI   - hemoglobin subsequently stable at 8 with no further bleeding

## 2022-02-09 NOTE — PLAN OF CARE
Ms. Ricardo  c/o pain in back x1; prn med given w/ some relief. Pt is in bed resting. Call light in reach. Bed locked and low. No acute changes overnight. Hourly rounding. All question and concerns answered. Will continue to monitor for any changes and follow plan of care.

## 2022-02-09 NOTE — ANESTHESIA POSTPROCEDURE EVALUATION
Anesthesia Post Evaluation    Patient: Xuan Ricardo    Procedure(s) Performed: Procedure(s) (LRB):  DEBRIDEMENT, BUTTOCK (N/A)    Final Anesthesia Type: general      Patient location during evaluation: PACU  Patient participation: Yes- Able to Participate  Level of consciousness: awake and alert, oriented and awake  Post-procedure vital signs: reviewed and stable  Airway patency: patent    PONV status at discharge: No PONV  Anesthetic complications: no      Cardiovascular status: blood pressure returned to baseline  Respiratory status: unassisted, spontaneous ventilation and room air  Hydration status: euvolemic  Follow-up not needed.          Vitals Value Taken Time   /58 02/09/22 1259   Temp 36.4 °C (97.5 °F) 02/09/22 1259   Pulse 106 02/09/22 1259   Resp 18 02/09/22 1339   SpO2 99 % 02/09/22 1259         No case tracking events are documented in the log.      Pain/Vineet Score: Pain Rating Prior to Med Admin: 9 (2/9/2022  1:39 PM)  Pain Rating Post Med Admin: 3 (2/8/2022 12:54 PM)

## 2022-02-09 NOTE — SUBJECTIVE & OBJECTIVE
Interval History: No events overnight. Pt denies new complaints this AM    Review of Systems   Constitutional: Negative for chills and fever.   Respiratory: Negative for cough and shortness of breath.    Cardiovascular: Negative for chest pain and leg swelling.   Gastrointestinal: Negative for abdominal distention and abdominal pain.   Musculoskeletal: Positive for arthralgias and myalgias.   Objective:     Vital Signs (Most Recent):  Temp: 97.5 °F (36.4 °C) (02/09/22 1259)  Pulse: 106 (02/09/22 1259)  Resp: 18 (02/09/22 1339)  BP: (!) 100/58 (02/09/22 1259)  SpO2: 99 % (02/09/22 1259) Vital Signs (24h Range):  Temp:  [97.5 °F (36.4 °C)-98.7 °F (37.1 °C)] 97.5 °F (36.4 °C)  Pulse:  [] 106  Resp:  [12-22] 18  SpO2:  [93 %-99 %] 99 %  BP: ()/(52-70) 100/58     Weight: 99.1 kg (218 lb 7.6 oz)  Body mass index is 37.48 kg/m².    Intake/Output Summary (Last 24 hours) at 2/9/2022 1615  Last data filed at 2/9/2022 0845  Gross per 24 hour   Intake 1060 ml   Output 1121 ml   Net -61 ml      Physical Exam  Constitutional:       General: She is not in acute distress.     Appearance: She is ill-appearing. She is not toxic-appearing or diaphoretic.   Cardiovascular:      Rate and Rhythm: Normal rate and regular rhythm.      Heart sounds: No murmur heard.  No gallop.    Pulmonary:      Effort: Pulmonary effort is normal. No respiratory distress.      Breath sounds: Normal breath sounds. No wheezing.   Abdominal:      General: Bowel sounds are normal. There is no distension.      Palpations: Abdomen is soft.      Tenderness: There is no abdominal tenderness.         Significant Labs: All pertinent labs within the past 24 hours have been reviewed.    Significant Imaging: I have reviewed all pertinent imaging results/findings within the past 24 hours.

## 2022-02-09 NOTE — PLAN OF CARE
Call received from Shara with [facility] Admissions advising that patient has been accepted at :  [facility]  Start date: 2/14/2022   Arrive at:  1:45 p.m. for first appointment  Regular Chairtime will be: 2:00 p.m., Monday, Wednesday, and Friday.  Accepting physician: Dr. Germain

## 2022-02-09 NOTE — ASSESSMENT & PLAN NOTE
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 CT with subcutaneous air in buttocks tissues. Underwent debridement 2/2 and 2/4. Stabilized hemodynamically, however pt w/ large, open wounds that will be difficult to heal.  - continue broad spectrum abx  - surgery following  - needs loop colostomy to keep wounds clear from feces  - wound care following  - see malnutrition elsewhere     detailed exam

## 2022-02-09 NOTE — PROGRESS NOTES
Call received from Shara with [facility] Admissions advising that the  is trying to assist with finding a doctor to accept patient.  Requesting return call [phone].   CHUCKIE advised team member, Jazmyne, of above request.

## 2022-02-09 NOTE — PROGRESS NOTES
[facility] Surg [facility] Medicine  Progress Note    Patient Name: Xuan Ricardo  MRN: [id number]  Patient Class: IP- Inpatient   Admission Date: 2/1/2022  Length of Stay: 8 days  Attending Physician: Adebayo Reyes MD  Primary Care Provider: Katharine Franks MD        Subjective:     Principal Problem:Necrotizing fasciitis        HPI:  40 y/o female presented to ER with hypotension.  Patient was recently started on dialysis and had not had dialysis for one week since last hospital discharge.  She was noted to have multiple abnormalities on presentation.  She was severely anemic with Hgb of 4.3.  transfused 2 units of blood with adequate correction of H/H.  Patient was also noted to have AG metabolic acidosis with hyperglycemia.  Stated on insulin drip for DKA.  Leukocytosis on CBC.  Patient with erythema and blistering of bilateral inner thing and buttocks.  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  Started on ABX's and Surgery consulted.        Overview/Hospital Course:  40 y/o female presented to ER with hypotension.  Patient was recently started on dialysis and had not had dialysis for one week since last hospital discharge.  She was noted to have multiple abnormalities on presentation.  She was severely anemic with Hgb of 4.3.  transfused 2 units of blood with adequate correction of H/H.  Patient was also noted to have AG metabolic acidosis with hyperglycemia.  Started on insulin drip for DKA.  Leukocytosis on CBC.  Patient with erythema and blistering of bilateral inner thighs and buttocks.  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  Started on ABX's and Surgery consulted.  Admitted with necrotizing fascitis of the buttocks. Surgery consulted and patient brought to OR 2/2/22 with significant debridement. On antibiotics. Mental status worsening. Palliative Care consulted, family updated. Brought back to OR on 2/4/22 for debridement as well. Weaned off norepinephrine,  on broad spectrum antibiotics pending cultures. Long recovery ahead. Concern that may need diverting colostomy in the future.not on any anticoagulation,she has also history of HIT.  Culture is growing bacteroides,wound care is consulted.she is bed bound, did not consulted PT,OT   CC today is pain.      Interval History: No events overnight. Pt denies new complaints this AM    Review of Systems   Constitutional: Negative for chills and fever.   Respiratory: Negative for cough and shortness of breath.    Cardiovascular: Negative for chest pain and leg swelling.   Gastrointestinal: Negative for abdominal distention and abdominal pain.   Musculoskeletal: Positive for arthralgias and myalgias.   Objective:     Vital Signs (Most Recent):  Temp: 97.5 °F (36.4 °C) (02/09/22 1259)  Pulse: 106 (02/09/22 1259)  Resp: 18 (02/09/22 1339)  BP: (!) 100/58 (02/09/22 1259)  SpO2: 99 % (02/09/22 1259) Vital Signs (24h Range):  Temp:  [97.5 °F (36.4 °C)-98.7 °F (37.1 °C)] 97.5 °F (36.4 °C)  Pulse:  [] 106  Resp:  [12-22] 18  SpO2:  [93 %-99 %] 99 %  BP: ()/(52-70) 100/58     Weight: 99.1 kg (218 lb 7.6 oz)  Body mass index is 37.48 kg/m².    Intake/Output Summary (Last 24 hours) at 2/9/2022 1615  Last data filed at 2/9/2022 0845  Gross per 24 hour   Intake 1060 ml   Output 1121 ml   Net -61 ml      Physical Exam  Constitutional:       General: She is not in acute distress.     Appearance: She is ill-appearing. She is not toxic-appearing or diaphoretic.   Cardiovascular:      Rate and Rhythm: Normal rate and regular rhythm.      Heart sounds: No murmur heard.  No gallop.    Pulmonary:      Effort: Pulmonary effort is normal. No respiratory distress.      Breath sounds: Normal breath sounds. No wheezing.   Abdominal:      General: Bowel sounds are normal. There is no distension.      Palpations: Abdomen is soft.      Tenderness: There is no abdominal tenderness.         Significant Labs: All pertinent labs within the past 24  hours have been reviewed.    Significant Imaging: I have reviewed all pertinent imaging results/findings within the past 24 hours.      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 CT with subcutaneous air in buttocks tissues. Underwent debridement 2/2 and 2/4. Stabilized hemodynamically, however pt w/ large, open wounds that will be difficult to heal.  - continue broad spectrum abx  - surgery following  - needs loop colostomy to keep wounds clear from feces  - wound care following  - see malnutrition elsewhere      End stage renal disease  Refused dialysis at prior admissions, now non-compliant after leaving the hospital  - nephrology consulted for HD.      Malnutrition  Pt w/ markedly poor PO intake, particularly concerning given large wounds that will not heal without nutritional support.   - discussed w/ patient, will need alternate means of nutrition if pt unable to meet needs by mouth      Cellulitis of thigh  As above      Septic shock  Shock resolved      Coagulopathy  Likely due to sepsis/infection, now improved      Leukocytosis  Persistent leukocytosis due to open wounds w/ likely superinfection  - broad spectrum abx  - surgery following for debridement, however wounds currently appear uninfected      Hyponatremia  Now resolved      Esophagitis, [place] grade D  PPI bid      Debility  Bed bound on baseline.      Sinus tachycardia  Resolved with  BB.      COVID-19 virus infection  Patient initially tested positive for Covid more than one month ago.  Asymptomatic from Covid with no evidence of pneumonia on CXR.  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 Concern given long road ahead for patient and limited  interest in participating in medical care going back multiple months and multiple admissions.    Type 2 diabetes mellitus with stage 5 chronic kidney disease not on chronic dialysis, with long-term current use of insulin  Initially presented in DKA.  Started on insulin drip, but then patient became hypoglycemia.  DKA has now resolved with closure of AG.  Will start nightly Levemir.  Adjust as necessary.  Continue with sliding scale.        Hypertensive CKD (chronic kidney disease)  Patient initially hypotensive requiring pressor support  - now weaned off and normotensive    Anemia of renal disease  Baseline Hgb around 7.5, Presents with Hgb of 4.3  No obvious bleeding, had EGD last admission with esophagitis  Transfused 2 units of blood with adequate correction of H/H  Continue to monitor.  - transfuse as needed   - loss over this hospital stay likely due to surgery        VTE Risk Mitigation (From admission, onward)         Ordered     heparin (porcine) injection 3,200 Units  As needed (PRN)         02/03/22 0024     IP VTE HIGH RISK PATIENT  Once         02/01/22 1816     Place sequential compression device  Until discontinued         02/01/22 1816     Place sequential compression device  Until discontinued         02/01/22 1810                Discharge Planning   PAMELA:      Code Status: Full Code   Is the patient medically ready for discharge?:     Reason for patient still in hospital (select all that apply): Patient trending condition, Laboratory test, Treatment, Consult recommendations and Pending disposition  Discharge Plan A: Long-term acute care facility (LTAC)   Discharge Delays: (!) Post-Acute Set-up              Adebayo Reyes MD  Department of Hospital Medicine   [facility] Surg [place]

## 2022-02-09 NOTE — PLAN OF CARE
Aaox4, medication administered per MD orders, IV abx administered, 2LNC, turned q hours, wound care done by nursing students and instructor at bedside, heel boots in place, educated patient to try to eat more despite not being hungry, oral care provided, patient safety maintained, bed in low locked position with call bell in reach.

## 2022-02-09 NOTE — DISCHARGE SUMMARY
"[facility] Medicine  Discharge Summary      Patient Name: Xuan Ricardo  MRN: [id number]  Patient Class: IP- Inpatient  Admission Date: 12/29/2021  Hospital Length of Stay: 29 days  Discharge Date and Time: 1/28/2022  6:30 PM  Attending Physician: No att. providers found   Discharging Provider: Dea Hernandez MD  Primary Care Provider: Katharine Franks MD      HPI:   Ms. Ricardo is a 41y F w/ ESRD, recently dialyzed at [facility], presents after leaving AMA from [facility] with nausea/vomiting and bilateral thigh pain.     She says that the nausea and vomiting began a few weeks ago. She mostly reports vomiting up food content, denies overt blood. Says that the vomit is sometimes darker colored. She has been told she has "coffee-ground" emesis but does not describe the contents of her vomit as such. She denies abdominal pain, denies delayed vomiting after eating. Feels nauseas all the time, regardless of food intake.    She has also developed severe bilateral thigh lesions. She describes them as swelling w/ extreme sensitivity to touch. They started approximately two weeks ago and have persisted, with minimal improvement in pain. One thigh was biopsied at [facility] at her most recent admission, and she reports that the site continues to be tender.    - Adebayo Reyes MD      Procedure(s) (LRB):  EGD (ESOPHAGOGASTRODUODENOSCOPY) (N/A)      Hospital Course:   ESRD patient with calciphylaxis. Admitted to hospital medicine. Nephrology consulted. Wound care consulted for thigh wounds. Pt gave verbal and written consent for records release from [facility]. At [facility] pt underwent biopsy of R thigh which was c/w calciphylaxis, and underwent EGD which showed candidal esophagitis. At  she was restarted on HD, given sodium thiosulfate. MBD therapy restarted. Restarted on fluconazole. Pain present but controlled on pain meds. COVID positive but stable on RA. had febrile episodes last week which found to have a " UTI and treated and abx stopped. Seen by ID, who has now signed off. Febrile episodes had resolved but pt spiked another fever on 1/19. Cultures ordered. Has issues with sinus tach and all work up was negative. Maybe some volume down. Started on metoprolol and doing well. COVID HD chair confirmed prior to DC. Outpatient wound care and neprho f/u for calciphylaxsis. Developed worsening anemia and concern coffee ground emesis. GI consulted. Initiated on pantoprazole. Transfused and stable. Underwent EGD on 1/27. Showed esophagitis, suspected to be due to reflux. Completed course of fluconazole. HOB elevated and continue pantoprazole BID. Is very deconditioned and in pain from calciphylaxis. Patient's outpatient HD was ultimately set up and her family wished to take her home with home health.    On the day of d/c , patient as noted to have transient fever of 100.8 F that resolved. HDS with stable BP for this patient. WBC was noted 15 from 12-13 prior. Patient seemed quiet on the day of d/c but family noted that is her baseline.  She was d/c with below meds       Goals of Care Treatment Preferences:  Code Status: Full Code      Consults:   Consults (From admission, onward)        Status Ordering Provider     Inpatient virtual consult to Hospital Medicine  Once        Provider:  (Not yet assigned)    Completed TORIBIO LOPEZ     Inpatient consult to Cardiology  Once        Provider:  Bryant Yanez MD    Completed TORIBIO LOPEZ     Inpatient consult to Gastroenterology  Once        Provider:  (Not yet assigned)    Completed AMISH PELAYO     Inpatient consult to Social Work/Case Management  Once        Provider:  (Not yet assigned)    Completed AMISH PELAYO     Inpatient consult to Infectious Diseases  Once        Provider:  Carlos Montemayor MD    Completed YEISON KAUR     Inpatient consult to PICC team ([facility])  Once        Provider:  (Not yet assigned)    Completed BETTY CARLOS      Inpatient virtual consult to Hospital Medicine  Once        Provider:  (Not yet assigned)    Completed MARTINEZ STEVENS     Inpatient consult to Nephrology  Once        Provider:  Isaura Meyers MD    Completed TG DOWD     Inpatient consult to Nephrology  Once        Provider:  Tg Dowd MD    Completed MARTINEZ STEVENS     Inpatient consult to Social Work  Once        Provider:  (Not yet assigned)    Completed AISSATOU CHAN          * Hematemesis  - hx of  candidal esophagitis as per EGD  -completed course of fluconazole  - given PPI   - hemoglobin subsequently stable at 8 with no further bleeding      Esophagitis, [place] grade D  As above      COVID-19 virus infection  Asymptomatic covid-19 infection  - isolation precautions  - no hypoxia, no indication for dex/rem  - Diagnosed 12/30/2021. Negative 1/26/2022  - DC'd isolation    Calciphylaxis  Rash on bilateral thighs, extremely tender to touch. Patient asked me not to touch her legs and would not turn due to pain. My exam was very limited due to this. I can see dressing to lateral-posterior aspect of right leg has a discharge. Lateral aspect of left thigh appears unchanged. [facility]  - wound care and pain management  - prn analgesics  - sodium thiosulfate  - treatment of hyperphos/hyperPTH as noted elsewhere  - will likely need outpatient chronic pain follow up      End stage renal disease  Pt w/ ESRD. Refused dialysis on last admission, now s/p multiple sessions during [facility] hospitalization  - nephrology consulted  - last session 12/30  - access via DC  - outpatient HD chair arranged      Secondary hyperparathyroidism  PTH severely elevated. Particularly concerning given calciphylaxis  - continue cincalcet        Final Active Diagnoses:    Diagnosis Date Noted POA    PRINCIPAL PROBLEM:  Hematemesis [K92.0] 12/11/2021 Yes    Debility [R53.81] 01/27/2022 Yes    Esophagitis, [place] grade D  [K20.80] 01/27/2022 Yes    Slow transit constipation [K59.01] 01/24/2022 No    Sinus tachycardia [R00.0] 01/14/2022 No    Candida esophagitis [B37.81] 01/03/2022 Yes    Hyperphosphatemia [E83.39] 12/31/2021 Yes    Calciphylaxis [E83.59] 12/30/2021 Yes    Type 2 diabetes mellitus [E11.9] 12/30/2021 Yes    COVID-19 virus infection [U07.1] 12/30/2021 Yes    Renovascular hypertension [I15.0] 12/30/2021 Yes    End stage renal disease [N18.6] 01/27/2020 Yes    Symptomatic anemia [D64.9] 01/19/2020 Yes    Secondary hyperparathyroidism [N25.81]  Yes    Metabolic acidosis [E87.2] 05/03/2019 Yes    Anemia of renal disease [N18.9, D63.1] 04/09/2016 Yes     Chronic      Problems Resolved During this Admission:    Diagnosis Date Noted Date Resolved POA    Fever [R50.9] 01/08/2022 01/26/2022 Yes    Nausea [R11.0] 12/30/2021 01/26/2022 Yes       Discharged Condition: fair    Disposition[place] [facility]    Follow Up:   Follow-up Information     [facility] - Emergency Dept.    Specialty: Emergency Medicine  Why: As needed, If symptoms worsen  Contact information:  [address]  [facility] [address] [phone]           Clatyonlamberto Gil. Go on 1/31/2022.    Why: Hemodialysis, please arrive at 1:30 PM, your schedule after that will be MWF 2:00 pm  Contact information:  [address]  [place] [address] [phone]           Mario Alberto Irene MD On 2/18/2022.    Specialty: Gastroenterology  Why: @11:30am, for Gastorenterology follow up  Contact information:  [address] [facility] [address]  [address]  [place] [address] [phone]             Katharine Franks MD. Schedule an appointment as soon as possible for a visit in 1 week.    Specialty: Internal Medicine  Why: post hospitalization follow up   Contact information:  [address]  [place] [place] [facility] [address]  [phone]             [facility] On 1/29/2022.    Specialties: DME Provider, Home Health Services  Why: Home  "Health: Follow up with N for home health arrangements.   Contact information:  [address] [facility]  [address]  [place] [address]  [phone]             [facility]    Specialty: DME Provider  Why: DME: please contact [facility] for any issues or concerns with dme   Contact information:  [address]  [address]  [facility] [address]  [phone]                       Patient Instructions:      WHEELCHAIR FOR HOME USE     Order Specific Question Answer Comments   Hours in W/C per day: 8    Type of Wheelchair: Standard    Size(Width): 18"(STD adult)    Leg Support: STD footrests    Lap Belt: Buckle    Accessories: Front brakes    Accessories: Brake extensions    Accessories: Anti-tippers    Accessories: Safety belt    Cushion: Basic    Height: 5' 5.98" (1.676 m)    Weight: 94.3 kg (207 lb 14.3 oz)    Does patient have medical equipment at home? none    Length of need (1-99 months): 99    Please check all that apply: Caregiver is capable and willing to operate wheelchair safely.    Please check all that apply: Patient's upper body strength is sufficient for propulsion.    Please check all that apply: The patient requires the use of a w/c for activities of daily living within the Home.      COMMODE FOR HOME USE     Order Specific Question Answer Comments   Type: Standard    Height: 5' 5.98" (1.676 m)    Weight: 94.3 kg (207 lb 14.3 oz)    Does patient have medical equipment at home? none    Length of need (1-99 months): 99      BATH/SHOWER CHAIR FOR HOME USE     Order Specific Question Answer Comments   Height: 5' 5.98" (1.676 m)    Weight: 94.3 kg (207 lb 14.3 oz)    Does patient have medical equipment at home? none    Length of need (1-99 months): 99    Type: With back      Ambulatory referral/consult to Wound Clinic   Standing Status: Future   Referral Priority: Routine Referral Type: Consultation   Referral Reason: Specialty Services Required   Requested Specialty: Wound Care   Number of Visits " Requested: 1     Ambulatory referral/consult to Gastroenterology   Standing Status: Future   Referral Priority: Routine Referral Type: Consultation   Referral Reason: Specialty Services Required   Requested Specialty: Gastroenterology     Ambulatory referral/consult to [facility]   Standing Status: Future   Referral Priority: Routine Referral Type: Consultation   Referral Reason: Specialty Services Required   Number of Visits Requested: 1     Ambulatory referral/consult to Palliative Care Clinic   Standing Status: Future   Referral Priority: Routine Referral Type: Consultation   Requested Specialty: Palliative Medicine   Number of Visits Requested: 1     Diet renal     Diet renal     Notify your health care provider if you experience any of the following:  temperature >100.4     Notify your health care provider if you experience any of the following:  persistent nausea and vomiting or diarrhea     Notify your health care provider if you experience any of the following:  severe uncontrolled pain     Notify your health care provider if you experience any of the following:  redness, tenderness, or signs of infection (pain, swelling, redness, odor or green/yellow discharge around incision site)     Notify your health care provider if you experience any of the following:  difficulty breathing or increased cough     Notify your health care provider if you experience any of the following:  severe persistent headache     Notify your health care provider if you experience any of the following:  worsening rash     Notify your health care provider if you experience any of the following:  persistent dizziness, light-headedness, or visual disturbances     Notify your health care provider if you experience any of the following:  increased confusion or weakness     Notify your health care provider if you experience any of the following:  temperature >100.4     Notify your health care provider if  you experience any of the following:  difficulty breathing or increased cough     Notify your health care provider if you experience any of the following:  severe uncontrolled pain     Notify your health care provider if you experience any of the following:  persistent dizziness, light-headedness, or visual disturbances     Notify your health care provider if you experience any of the following:  increased confusion or weakness     Activity as tolerated     Activity as tolerated       Significant Diagnostic Studies:     Pending Diagnostic Studies:     Procedure Component Value Units Date/Time    EKG 12-lead [[id number]]     Order Status: Sent Lab Status: No result          Medications:  Reconciled Home Medications:      Medication List      START taking these medications    calcium carbonate 200 mg calcium (500 mg) chewable tablet  Commonly known as: TUMS  Take 2 tablets (1,000 mg total) by mouth 3 (three) times daily as needed.     cinacalcet 30 MG Tab  Commonly known as: SENSIPAR  Take 1 tablet (30 mg total) by mouth daily with breakfast.     epoetin kelsey-epbx 2,000 unit/mL injection  Commonly known as: RETACRIT  Inject 7 mLs (14,000 Units total) into the skin every Mon, Wed, Fri. With dialysis     ferrous gluconate 324 MG tablet  Commonly known as: FERGON  Take 1 tablet (324 mg total) by mouth daily with breakfast.     gabapentin 100 MG capsule  Commonly known as: NEURONTIN  Take 1 capsule (100 mg total) by mouth 3 (three) times daily.     metoprolol tartrate 25 MG tablet  Commonly known as: LOPRESSOR  Take 1 tablet (25 mg total) by mouth 2 (two) times daily.     oxyCODONE 10 mg Tab immediate release tablet  Commonly known as: ROXICODONE  Take 1 tablet (10 mg total) by mouth every 4 (four) hours as needed.     pantoprazole 40 MG tablet  Commonly known as: PROTONIX  Take 1 tablet (40 mg total) by mouth 2 (two) times daily.     sodium thiosulfate 12.5 gram/50 mL (250 mg/mL) injection  Inject 100 mLs (25 g total)  "into the vein every Mon, Wed, Fri. With dialysis        CHANGE how you take these medications    sevelamer carbonate 800 mg Tab  Commonly known as: RENVELA  Take 3 tablets (2,400 mg total) by mouth 3 (three) times daily with meals.  What changed: how much to take        STOP taking these medications    blood sugar diagnostic Strp     cloNIDine 0.3 mg/24 hr td ptwk 0.3 mg/24 hr  Commonly known as: CATAPRES     ergocalciferol 50,000 unit Cap  Commonly known as: ERGOCALCIFEROL     insulin  unit/mL injection     lancets 30 gauge Misc     NIFEdipine 90 MG (OSM) 24 hr tablet  Commonly known as: PROCARDIA-XL     pen needle, diabetic 31 gauge x 5/16" Ndle            Indwelling Lines/Drains at time of discharge:   Lines/Drains/Airways     Central Venous Catheter Line                 Hemodialysis Catheter 12/29/21 right subclavian 42 days                Time spent on the discharge of patient: 35 minutes         The attending portion of this evaluation, treatment, and documentation was performed per Dea Hernandez MD via Telemedicine AudioVisual using the secure [employer] software platform with 2 way audio/video. The provider was located off-site and the patient is located in the hospital. The aforementioned video software was utilized to document the relevant history and physical exam    Dea Hernandez MD  Department of Hospital Medicine  [facility] - Telemetry  "

## 2022-02-09 NOTE — ASSESSMENT & PLAN NOTE
Pt w/ ESRD. Refused dialysis on last admission, now s/p multiple sessions during [facility]  - nephrology consulted  - last session 12/30  - access via [facility]DC  - outpatient HD chair arranged

## 2022-02-09 NOTE — ASSESSMENT & PLAN NOTE
Pt w/ markedly poor PO intake, particularly concerning given large wounds that will not heal without nutritional support.   - discussed w/ patient, will need alternate means of nutrition if pt unable to meet needs by mouth

## 2022-02-10 ENCOUNTER — ANESTHESIA (OUTPATIENT)
Dept: SURGERY | Facility: HOSPITAL | Age: 42
DRG: 853 | End: 2022-02-10
Payer: MEDICARE

## 2022-02-10 ENCOUNTER — ANESTHESIA EVENT (OUTPATIENT)
Dept: SURGERY | Facility: HOSPITAL | Age: 42
DRG: 853 | End: 2022-02-10
Payer: MEDICARE

## 2022-02-10 PROBLEM — E80.6 HYPERBILIRUBINEMIA: Status: ACTIVE | Noted: 2022-02-10

## 2022-02-10 LAB
ABO + RH BLD: NORMAL
ALBUMIN SERPL BCP-MCNC: 0.7 G/DL (ref 3.5–5.2)
ALP SERPL-CCNC: 142 U/L (ref 55–135)
ALT SERPL W/O P-5'-P-CCNC: 19 U/L (ref 10–44)
ANION GAP SERPL CALC-SCNC: 15 MMOL/L (ref 8–16)
ANISOCYTOSIS BLD QL SMEAR: SLIGHT
AST SERPL-CCNC: 31 U/L (ref 10–40)
BASOPHILS # BLD AUTO: ABNORMAL K/UL (ref 0–0.2)
BASOPHILS NFR BLD: 0 % (ref 0–1.9)
BILIRUB SERPL-MCNC: 5.4 MG/DL (ref 0.1–1)
BLD GP AB SCN CELLS X3 SERPL QL: NORMAL
BLD PROD TYP BPU: NORMAL
BLOOD UNIT EXPIRATION DATE: NORMAL
BLOOD UNIT TYPE CODE: 5100
BLOOD UNIT TYPE: NORMAL
BUN SERPL-MCNC: 31 MG/DL (ref 6–20)
CALCIUM SERPL-MCNC: 7.4 MG/DL (ref 8.7–10.5)
CHLORIDE SERPL-SCNC: 99 MMOL/L (ref 95–110)
CO2 SERPL-SCNC: 20 MMOL/L (ref 23–29)
CODING SYSTEM: NORMAL
CREAT SERPL-MCNC: 4.5 MG/DL (ref 0.5–1.4)
DIFFERENTIAL METHOD: ABNORMAL
DISPENSE STATUS: NORMAL
EOSINOPHIL # BLD AUTO: ABNORMAL K/UL (ref 0–0.5)
EOSINOPHIL NFR BLD: 0 % (ref 0–8)
ERYTHROCYTE [DISTWIDTH] IN BLOOD BY AUTOMATED COUNT: 18.8 % (ref 11.5–14.5)
EST. GFR  (AFRICAN AMERICAN): 13 ML/MIN/1.73 M^2
EST. GFR  (NON AFRICAN AMERICAN): 11 ML/MIN/1.73 M^2
GLUCOSE SERPL-MCNC: 55 MG/DL (ref 70–110)
HCT VFR BLD AUTO: 22.3 % (ref 37–48.5)
HGB BLD-MCNC: 7.2 G/DL (ref 12–16)
IMM GRANULOCYTES # BLD AUTO: ABNORMAL K/UL (ref 0–0.04)
IMM GRANULOCYTES NFR BLD AUTO: ABNORMAL % (ref 0–0.5)
LYMPHOCYTES # BLD AUTO: ABNORMAL K/UL (ref 1–4.8)
LYMPHOCYTES NFR BLD: 6 % (ref 18–48)
MAGNESIUM SERPL-MCNC: 1.7 MG/DL (ref 1.6–2.6)
MCH RBC QN AUTO: 29.9 PG (ref 27–31)
MCHC RBC AUTO-ENTMCNC: 32.3 G/DL (ref 32–36)
MCV RBC AUTO: 93 FL (ref 82–98)
METAMYELOCYTES NFR BLD MANUAL: 1 %
MONOCYTES # BLD AUTO: ABNORMAL K/UL (ref 0.3–1)
MONOCYTES NFR BLD: 2 % (ref 4–15)
MYELOCYTES NFR BLD MANUAL: 2 %
NEUTROPHILS NFR BLD: 75 % (ref 38–73)
NEUTS BAND NFR BLD MANUAL: 14 %
NRBC BLD-RTO: 0 /100 WBC
OVALOCYTES BLD QL SMEAR: ABNORMAL
PHOSPHATE SERPL-MCNC: 3.8 MG/DL (ref 2.7–4.5)
PLATELET # BLD AUTO: 192 K/UL (ref 150–450)
PLATELET BLD QL SMEAR: ABNORMAL
PMV BLD AUTO: 10.4 FL (ref 9.2–12.9)
POCT GLUCOSE: 147 MG/DL (ref 70–110)
POCT GLUCOSE: 41 MG/DL (ref 70–110)
POCT GLUCOSE: 58 MG/DL (ref 70–110)
POIKILOCYTOSIS BLD QL SMEAR: SLIGHT
POLYCHROMASIA BLD QL SMEAR: ABNORMAL
POTASSIUM SERPL-SCNC: 4.1 MMOL/L (ref 3.5–5.1)
PROT SERPL-MCNC: 4.8 G/DL (ref 6–8.4)
RBC # BLD AUTO: 2.41 M/UL (ref 4–5.4)
SODIUM SERPL-SCNC: 134 MMOL/L (ref 136–145)
TRANS ERYTHROCYTES VOL PATIENT: NORMAL ML
VANCOMYCIN SERPL-MCNC: 17.6 UG/ML
WBC # BLD AUTO: 30.73 K/UL (ref 3.9–12.7)

## 2022-02-10 PROCEDURE — 37000008 HC ANESTHESIA 1ST 15 MINUTES: Performed by: SURGERY

## 2022-02-10 PROCEDURE — D9220A PRA ANESTHESIA: Mod: ANES,,, | Performed by: ANESTHESIOLOGY

## 2022-02-10 PROCEDURE — 86920 COMPATIBILITY TEST SPIN: CPT | Performed by: STUDENT IN AN ORGANIZED HEALTH CARE EDUCATION/TRAINING PROGRAM

## 2022-02-10 PROCEDURE — 63600175 PHARM REV CODE 636 W HCPCS: Performed by: ANESTHESIOLOGY

## 2022-02-10 PROCEDURE — 83735 ASSAY OF MAGNESIUM: CPT | Performed by: STUDENT IN AN ORGANIZED HEALTH CARE EDUCATION/TRAINING PROGRAM

## 2022-02-10 PROCEDURE — D9220A PRA ANESTHESIA: ICD-10-PCS | Mod: ANES,,, | Performed by: ANESTHESIOLOGY

## 2022-02-10 PROCEDURE — 36000706: Performed by: SURGERY

## 2022-02-10 PROCEDURE — A4216 STERILE WATER/SALINE, 10 ML: HCPCS | Performed by: STUDENT IN AN ORGANIZED HEALTH CARE EDUCATION/TRAINING PROGRAM

## 2022-02-10 PROCEDURE — P9021 RED BLOOD CELLS UNIT: HCPCS | Performed by: STUDENT IN AN ORGANIZED HEALTH CARE EDUCATION/TRAINING PROGRAM

## 2022-02-10 PROCEDURE — 80202 ASSAY OF VANCOMYCIN: CPT | Performed by: HOSPITALIST

## 2022-02-10 PROCEDURE — 71000033 HC RECOVERY, INTIAL HOUR: Performed by: SURGERY

## 2022-02-10 PROCEDURE — 36000707: Performed by: SURGERY

## 2022-02-10 PROCEDURE — 85007 BL SMEAR W/DIFF WBC COUNT: CPT | Performed by: STUDENT IN AN ORGANIZED HEALTH CARE EDUCATION/TRAINING PROGRAM

## 2022-02-10 PROCEDURE — 63600175 PHARM REV CODE 636 W HCPCS: Performed by: STUDENT IN AN ORGANIZED HEALTH CARE EDUCATION/TRAINING PROGRAM

## 2022-02-10 PROCEDURE — 25000003 PHARM REV CODE 250: Performed by: ANESTHESIOLOGY

## 2022-02-10 PROCEDURE — 11042 DBRDMT SUBQ TIS 1ST 20SQCM/<: CPT | Mod: ,,, | Performed by: SURGERY

## 2022-02-10 PROCEDURE — 11042 PR DEBRIDEMENT, SKIN, SUB-Q TISSUE,=<20 SQ CM: ICD-10-PCS | Mod: ,,, | Performed by: SURGERY

## 2022-02-10 PROCEDURE — 25000003 PHARM REV CODE 250: Performed by: INTERNAL MEDICINE

## 2022-02-10 PROCEDURE — 80100016 HC MAINTENANCE HEMODIALYSIS

## 2022-02-10 PROCEDURE — 71000039 HC RECOVERY, EACH ADD'L HOUR: Performed by: SURGERY

## 2022-02-10 PROCEDURE — 25000003 PHARM REV CODE 250: Performed by: STUDENT IN AN ORGANIZED HEALTH CARE EDUCATION/TRAINING PROGRAM

## 2022-02-10 PROCEDURE — 84100 ASSAY OF PHOSPHORUS: CPT | Performed by: STUDENT IN AN ORGANIZED HEALTH CARE EDUCATION/TRAINING PROGRAM

## 2022-02-10 PROCEDURE — 85027 COMPLETE CBC AUTOMATED: CPT | Performed by: STUDENT IN AN ORGANIZED HEALTH CARE EDUCATION/TRAINING PROGRAM

## 2022-02-10 PROCEDURE — D9220A PRA ANESTHESIA: Mod: CRNA,,, | Performed by: STUDENT IN AN ORGANIZED HEALTH CARE EDUCATION/TRAINING PROGRAM

## 2022-02-10 PROCEDURE — 80053 COMPREHEN METABOLIC PANEL: CPT | Performed by: STUDENT IN AN ORGANIZED HEALTH CARE EDUCATION/TRAINING PROGRAM

## 2022-02-10 PROCEDURE — 11000001 HC ACUTE MED/SURG PRIVATE ROOM

## 2022-02-10 PROCEDURE — 86850 RBC ANTIBODY SCREEN: CPT | Performed by: STUDENT IN AN ORGANIZED HEALTH CARE EDUCATION/TRAINING PROGRAM

## 2022-02-10 PROCEDURE — 25000003 PHARM REV CODE 250

## 2022-02-10 PROCEDURE — 11045 PR DEB SUBQ TISSUE ADD-ON: ICD-10-PCS | Mod: ,,, | Performed by: SURGERY

## 2022-02-10 PROCEDURE — 63600175 PHARM REV CODE 636 W HCPCS: Mod: JG | Performed by: STUDENT IN AN ORGANIZED HEALTH CARE EDUCATION/TRAINING PROGRAM

## 2022-02-10 PROCEDURE — 37000009 HC ANESTHESIA EA ADD 15 MINS: Performed by: SURGERY

## 2022-02-10 PROCEDURE — D9220A PRA ANESTHESIA: ICD-10-PCS | Mod: CRNA,,, | Performed by: STUDENT IN AN ORGANIZED HEALTH CARE EDUCATION/TRAINING PROGRAM

## 2022-02-10 PROCEDURE — 11045 DBRDMT SUBQ TISS EACH ADDL: CPT | Mod: ,,, | Performed by: SURGERY

## 2022-02-10 PROCEDURE — C9113 INJ PANTOPRAZOLE SODIUM, VIA: HCPCS | Performed by: STUDENT IN AN ORGANIZED HEALTH CARE EDUCATION/TRAINING PROGRAM

## 2022-02-10 RX ORDER — ROCURONIUM BROMIDE 10 MG/ML
INJECTION, SOLUTION INTRAVENOUS
Status: DISCONTINUED | OUTPATIENT
Start: 2022-02-10 | End: 2022-02-10

## 2022-02-10 RX ORDER — DEXTROSE MONOHYDRATE 25 G/50ML
25 INJECTION, SOLUTION INTRAVENOUS
Status: DISCONTINUED | OUTPATIENT
Start: 2022-02-10 | End: 2022-02-10 | Stop reason: RX

## 2022-02-10 RX ORDER — ONDANSETRON 2 MG/ML
INJECTION INTRAMUSCULAR; INTRAVENOUS
Status: DISCONTINUED | OUTPATIENT
Start: 2022-02-10 | End: 2022-02-10

## 2022-02-10 RX ORDER — DEXTROSE 50 % IN WATER (D50W) INTRAVENOUS SYRINGE
25 ONCE
Status: COMPLETED | OUTPATIENT
Start: 2022-02-10 | End: 2022-02-10

## 2022-02-10 RX ORDER — PROCHLORPERAZINE EDISYLATE 5 MG/ML
INJECTION INTRAMUSCULAR; INTRAVENOUS
Status: DISCONTINUED | OUTPATIENT
Start: 2022-02-10 | End: 2022-02-10

## 2022-02-10 RX ORDER — FENTANYL CITRATE 50 UG/ML
25 INJECTION, SOLUTION INTRAMUSCULAR; INTRAVENOUS EVERY 5 MIN PRN
Status: DISCONTINUED | OUTPATIENT
Start: 2022-02-10 | End: 2022-02-11

## 2022-02-10 RX ORDER — MEROPENEM AND SODIUM CHLORIDE 1 G/50ML
1 INJECTION, SOLUTION INTRAVENOUS EVERY 24 HOURS
Status: DISCONTINUED | OUTPATIENT
Start: 2022-02-10 | End: 2022-02-15 | Stop reason: HOSPADM

## 2022-02-10 RX ORDER — FENTANYL CITRATE 50 UG/ML
INJECTION, SOLUTION INTRAMUSCULAR; INTRAVENOUS
Status: DISCONTINUED | OUTPATIENT
Start: 2022-02-10 | End: 2022-02-10

## 2022-02-10 RX ORDER — LIDOCAINE HYDROCHLORIDE 20 MG/ML
INJECTION INTRAVENOUS
Status: DISCONTINUED | OUTPATIENT
Start: 2022-02-10 | End: 2022-02-10

## 2022-02-10 RX ORDER — SODIUM CHLORIDE 0.9 % (FLUSH) 0.9 %
10 SYRINGE (ML) INJECTION
Status: DISCONTINUED | OUTPATIENT
Start: 2022-02-10 | End: 2022-02-11

## 2022-02-10 RX ORDER — ACETAMINOPHEN 10 MG/ML
1000 INJECTION, SOLUTION INTRAVENOUS ONCE
Status: COMPLETED | OUTPATIENT
Start: 2022-02-10 | End: 2022-02-10

## 2022-02-10 RX ORDER — HYDROCODONE BITARTRATE AND ACETAMINOPHEN 500; 5 MG/1; MG/1
TABLET ORAL
Status: DISCONTINUED | OUTPATIENT
Start: 2022-02-10 | End: 2022-02-15 | Stop reason: HOSPADM

## 2022-02-10 RX ORDER — ETOMIDATE 2 MG/ML
INJECTION INTRAVENOUS
Status: DISCONTINUED | OUTPATIENT
Start: 2022-02-10 | End: 2022-02-10

## 2022-02-10 RX ORDER — DEXAMETHASONE SODIUM PHOSPHATE 4 MG/ML
INJECTION, SOLUTION INTRA-ARTICULAR; INTRALESIONAL; INTRAMUSCULAR; INTRAVENOUS; SOFT TISSUE
Status: DISCONTINUED | OUTPATIENT
Start: 2022-02-10 | End: 2022-02-10

## 2022-02-10 RX ORDER — PROPOFOL 10 MG/ML
VIAL (ML) INTRAVENOUS
Status: DISCONTINUED | OUTPATIENT
Start: 2022-02-10 | End: 2022-02-10

## 2022-02-10 RX ORDER — DEXTROSE 50 % IN WATER (D50W) INTRAVENOUS SYRINGE
25 ONCE
Status: DISCONTINUED | OUTPATIENT
Start: 2022-02-10 | End: 2022-02-10 | Stop reason: RX

## 2022-02-10 RX ORDER — PHENYLEPHRINE HYDROCHLORIDE 10 MG/ML
INJECTION INTRAVENOUS
Status: DISCONTINUED | OUTPATIENT
Start: 2022-02-10 | End: 2022-02-10

## 2022-02-10 RX ADMIN — Medication 10 ML: at 06:02

## 2022-02-10 RX ADMIN — FENTANYL CITRATE 25 MCG: 50 INJECTION, SOLUTION INTRAMUSCULAR; INTRAVENOUS at 08:02

## 2022-02-10 RX ADMIN — ZINC SULFATE 220 MG (50 MG) CAPSULE 220 MG: CAPSULE at 08:02

## 2022-02-10 RX ADMIN — PANTOPRAZOLE SODIUM 40 MG: 40 INJECTION, POWDER, FOR SOLUTION INTRAVENOUS at 10:02

## 2022-02-10 RX ADMIN — FENTANYL CITRATE 25 MCG: 50 INJECTION, SOLUTION INTRAMUSCULAR; INTRAVENOUS at 09:02

## 2022-02-10 RX ADMIN — SODIUM CHLORIDE: 0.9 INJECTION, SOLUTION INTRAVENOUS at 07:02

## 2022-02-10 RX ADMIN — PHENYLEPHRINE HYDROCHLORIDE 100 MCG: 10 INJECTION INTRAVENOUS at 07:02

## 2022-02-10 RX ADMIN — PHENYLEPHRINE HYDROCHLORIDE 100 MCG: 10 INJECTION INTRAVENOUS at 08:02

## 2022-02-10 RX ADMIN — Medication 324 MG: at 08:02

## 2022-02-10 RX ADMIN — Medication 10 ML: at 12:02

## 2022-02-10 RX ADMIN — PROPOFOL 40 MG: 10 INJECTION, EMULSION INTRAVENOUS at 07:02

## 2022-02-10 RX ADMIN — EPOETIN ALFA-EPBX 10000 UNITS: 10000 INJECTION, SOLUTION INTRAVENOUS; SUBCUTANEOUS at 12:02

## 2022-02-10 RX ADMIN — ETOMIDATE 16 MG: 2 INJECTION, SOLUTION INTRAVENOUS at 07:02

## 2022-02-10 RX ADMIN — DEXTROSE 50 % IN WATER (D50W) INTRAVENOUS SYRINGE 25 G: at 09:02

## 2022-02-10 RX ADMIN — ONDANSETRON 4 MG: 2 INJECTION, SOLUTION INTRAMUSCULAR; INTRAVENOUS at 08:02

## 2022-02-10 RX ADMIN — SUGAMMADEX 200 MG: 100 INJECTION, SOLUTION INTRAVENOUS at 08:02

## 2022-02-10 RX ADMIN — OXYCODONE HYDROCHLORIDE AND ACETAMINOPHEN 1000 MG: 500 TABLET ORAL at 08:02

## 2022-02-10 RX ADMIN — GABAPENTIN 100 MG: 100 CAPSULE ORAL at 10:02

## 2022-02-10 RX ADMIN — HEPARIN SODIUM 3200 UNITS: 5000 INJECTION INTRAVENOUS; SUBCUTANEOUS at 12:02

## 2022-02-10 RX ADMIN — GABAPENTIN 100 MG: 100 CAPSULE ORAL at 08:02

## 2022-02-10 RX ADMIN — MEROPENEM AND SODIUM CHLORIDE 1 G: 1 INJECTION, SOLUTION INTRAVENOUS at 09:02

## 2022-02-10 RX ADMIN — ROCURONIUM BROMIDE 40 MG: 10 INJECTION, SOLUTION INTRAVENOUS at 07:02

## 2022-02-10 RX ADMIN — FENTANYL CITRATE 75 MCG: 50 INJECTION, SOLUTION INTRAMUSCULAR; INTRAVENOUS at 07:02

## 2022-02-10 RX ADMIN — Medication 10 ML: at 05:02

## 2022-02-10 RX ADMIN — DEXTROSE MONOHYDRATE 25 G: 25 INJECTION, SOLUTION INTRAVENOUS at 10:02

## 2022-02-10 RX ADMIN — DEXAMETHASONE SODIUM PHOSPHATE 4 MG: 4 INJECTION, SOLUTION INTRAMUSCULAR; INTRAVENOUS at 08:02

## 2022-02-10 RX ADMIN — PHENYLEPHRINE HYDROCHLORIDE 200 MCG: 10 INJECTION INTRAVENOUS at 08:02

## 2022-02-10 RX ADMIN — ACETAMINOPHEN 1000 MG: 10 INJECTION INTRAVENOUS at 09:02

## 2022-02-10 RX ADMIN — CINACALCET 30 MG: 30 TABLET, FILM COATED ORAL at 08:02

## 2022-02-10 RX ADMIN — VANCOMYCIN HYDROCHLORIDE 500 MG: 500 INJECTION, POWDER, LYOPHILIZED, FOR SOLUTION INTRAVENOUS at 07:02

## 2022-02-10 RX ADMIN — DEXTROSE MONOHYDRATE 25 G: 25 INJECTION, SOLUTION INTRAVENOUS at 09:02

## 2022-02-10 RX ADMIN — LIDOCAINE HYDROCHLORIDE 100 MG: 20 INJECTION, SOLUTION INTRAVENOUS at 07:02

## 2022-02-10 RX ADMIN — MORPHINE SULFATE 6 MG: 4 INJECTION, SOLUTION INTRAMUSCULAR; INTRAVENOUS at 01:02

## 2022-02-10 RX ADMIN — ROCURONIUM BROMIDE 10 MG: 10 INJECTION, SOLUTION INTRAVENOUS at 07:02

## 2022-02-10 RX ADMIN — OXYCODONE 10 MG: 5 TABLET ORAL at 05:02

## 2022-02-10 RX ADMIN — PROCHLORPERAZINE EDISYLATE 5 MG: 5 INJECTION INTRAMUSCULAR; INTRAVENOUS at 08:02

## 2022-02-10 NOTE — PROGRESS NOTES
MEWS Monitoring: Chart check completed, abnormal VS noted, MD contacted, MD aware/ following, instructed to call [phone] for further concerns or assistance..

## 2022-02-10 NOTE — PROGRESS NOTES
Renal Progress Note    Date of Admission:  2/1/2022  1:28 PM    Length of Stay: 9  Days    Subjective: n/a    Objective:    Current Facility-Administered Medications   Medication    0.9%  NaCl infusion (for blood administration)    ascorbic acid (vitamin C) tablet 1,000 mg    cinacalcet tablet 30 mg    dextrose 10% bolus 125 mL    dextrose 10% bolus 250 mL    epoetin kelsey-epbx injection 10,000 Units    ferrous gluconate tablet 324 mg    gabapentin capsule 100 mg    glucagon (human recombinant) injection 1 mg    glucose chewable tablet 16 g    glucose chewable tablet 24 g    heparin (porcine) injection 3,200 Units    insulin aspart U-100 pen 0-5 Units    insulin detemir U-100 pen 4 Units    morphine injection 6 mg    nystatin 100,000 unit/mL suspension 500,000 Units    ondansetron disintegrating tablet 8 mg    oxyCODONE immediate release tablet 10 mg    pantoprazole injection 40 mg    piperacillin-tazobactam 4.5 g in dextrose 5 % 100 mL IVPB (ready to mix system)    sevelamer carbonate tablet 2,400 mg    sodium chloride 0.9% flush 10 mL    And    sodium chloride 0.9% flush 10 mL    sodium thiosulfate 12.5 gram/50 mL (250 mg/mL) injection 25 g    vancomycin - pharmacy to dose    vitamin renal formula (B-complex-vitamin c-folic acid) 1 mg per capsule 1 capsule    zinc sulfate capsule 220 mg       Vitals:    02/10/22 0101 02/10/22 0419 02/10/22 0518 02/10/22 0745   BP:  (!) 101/56  (!) 88/51   Patient Position:  Lying     Pulse:  102  95   Resp: 18 16 18 18   Temp:  97.9 °F (36.6 °C)  98.1 °F (36.7 °C)   TempSrc:  Oral     SpO2:  100%  100%   Weight:       Height:           I/O last 3 completed shifts:  In: 1780 [P.O.:1080; Other:700]  Out: 1121 [Other:1121]  No intake/output data recorded.      Physical Exam: Deferred 2nd. Covid-19 isolation  Seen during Dialysis  Moaning  Confused      Laboratories:    Recent Labs   Lab 02/10/22  0512   WBC 30.73*   RBC 2.41*   HGB  7.2*   HCT 22.3*      MCV 93   MCH 29.9   MCHC 32.3       Recent Labs   Lab 02/10/22  0512   CALCIUM 7.4*   PROT 4.8*   *   K 4.1   CO2 20*   CL 99   BUN 31*   CREATININE 4.5*   ALKPHOS 142*   ALT 19   AST 31   BILITOT 5.4*       No results for input(s): COLORU, CLARITYU, SPECGRAV, PHUR, PROTEINUA, GLUCOSEU, BLOODU, WBCU, RBCU, BACTERIA, MUCUS in the last 24 hours.    Invalid input(s):  BILIRUBINCON    Microbiology Results (last 7 days)     Procedure Component Value Units Date/Time    Culture, Anaerobe [[id number]]  (Abnormal) Collected: 02/02/22 1905    Order Status: Completed Specimen: Wound from Buttocks, Left Updated: 02/06/22 1242     Anaerobic Culture BACTEROIDES THETAIOTAOMICRON  Moderate      Narrative:      Bilateral Buttock wound    Blood culture #1 [[id number]] Collected: 02/01/22 1429    Order Status: Completed Specimen: Blood from Peripheral, Forearm, Left Updated: 02/05/22 1503     Blood Culture, Routine No Growth after 4 days.     Narrative:      Blood Culture #1    Blood culture #2 [[id number]] Collected: 02/01/22 1406    Order Status: Completed Specimen: Blood from Peripheral, Upper Arm, Left Updated: 02/05/22 1503     Blood Culture, Routine No Growth after 4 days.     Narrative:      Blood Culture #2    Aerobic culture [[id number]] Collected: 02/02/22 1905    Order Status: Completed Specimen: Wound from Buttocks, Left Updated: 02/05/22 0828     Aerobic Bacterial Culture No significant isolate    Narrative:      Bilateral buttock wound    AFB Culture & Smear [[id number]] Collected: 02/02/22 1905    Order Status: Completed Specimen: Wound from Buttocks, Left Updated: 02/04/22 2127     AFB Culture & Smear Culture in progress     AFB CULTURE STAIN No acid fast bacilli seen.    Narrative:      Bilateral buttock wound    Gram stain [[id number]] Collected: 02/02/22 1905    Order Status: Completed Specimen: Wound from Buttocks, Left Updated: 02/03/22 1145     Gram Stain Result No WBC's      Rare  Gram positive cocci in pairs      Rare Gram positive rods    Narrative:      Bilateral buttock wound            Diagnostic Tests: n/a        Assessment:    42 y/o female with ESRD on HD admitted with:    - Sepsis s/p septic shock  - Necrotizing fasciitis s/p extensive debridement of gluteal area  - s/p DKA  - Hx. Calciphylaxis  - Recent Covid-19 infection  - Mild NAGMA  - DM type 2  - HTN  - Anemia of CKD requiring transfusions  - Severe protein malnutrition  - Debility  - Chronic non-compliance with treatment (pte. Has been banned from multiple Dialysis Centers due to this)           Plan:    - Dialysis Q TTS  - Na+ thiosulfate on MWF  - Epogen 3 x week  - Transfuse during HD PRN  - Wound care and Antib. Per admitting  - Vit C and zinc for wound healing  - Protein supplementation  - Glycemic control and other problems per admitting        Will f/u on Dialysis days only.

## 2022-02-10 NOTE — PROGRESS NOTES
[facility] [place] [facility] [place]  Adult Nutrition  Progress Note    SUMMARY       Recommendations    1) Recommend consideration of initiating alternate nutrition - NGT/PEG  2) Initiate Novasource Renal @ 15 mL adv by 10 mL until goal rate of 35 mL/hr is met, Hold for residuals > 400 mL.  mL H20 q4h, additional fluids to be determined by MD   Provides 1680 kcal, 76g P, 153g C, 2102 mL total H20    *If PO intake is not to be continued & total enteral desired - Goal rate to be 55 mL/hr.  mL q4h.   - 2640 kcal, 118g P, 242g C, 2446 mL Total H20    3) Add Impact AR for PO consumptionTID to assist in wound healing + meeting caloric needs   -Provides 600 kcal, 54g P, 45g C, 435 mL H20     - Total EN + ONS 2280 kcal, 130g P, 198g C, 2537 mL H20 -    4) LBM noted 2/2 - Bowel Regimen    5) Electrolytes, Renals    Goals:   1) Pt to have alternate means of nutrition initiated within next 48 hours to meet >/= 50% EEN  2) Pt to have bowel movement by RD f/u (Not met)  3) Monitored labs to trend toward target ranges    Nutrition Goal Status: new  Communication of RD Recs:  (POC)    Dietitian Rounds Brief    2/10 - Attempted to visit pt today post dialysis. When entering room pt had head under blanket and did not respond to my attempts to communicate. Chart reporting 0 - 25% PO intake. Per nurse via secure chat ([place]) though she only got pt today it appear she has not been consuming supplements either. Stated getting pt to communicate or participate in PO has been difficult - Will barely drink juice for her. Per notes pt has been lethargic. Noted General surgery suggestive of alternate nutrition via TF - I concur - pt will not be able to meet needs at current rate for any meaningful attempted healing. Suggest renal TF formula per ESRD - pt on HD 3x/wk. Suggest if PO still able to occur d/c other oral supplements and (+) Impact AR for Immune modulating and wound healing properties to assist in healing and hopeful layer of  infection avoidance. Pt LBM 2/2 - If accurate, constipation likely also part of poor PO intake    2/7: Per both Physician and RN notes pt is refusing to eat. She is pocketing both food and medicine. On 2/6 RN noted that pt's sister brought her shrimp and fries and pt refused to eat that as well. RD will continue to monitor and f/u.      2/2: Remote assessment for coverage, pt readmitted with DKA, wounds. Was admitted in January and noted with fair intake. Per chart, with 50 lbs wt loss over the last 1.5 years. Unable to complete NFPE due to remote assessment. Suspect malnutrition. Recommend addition of ONS to promote wound healing.    Assessment and Plan  Nutrition Problem  Increased nutrient needs     Related to (etiology):   Wound healing     Signs and Symptoms (as evidenced by):   Calciphylaxis to sacrum & buttocks s/p debridement  Wounds to posterior, inner thighs, lower extremities,   Blisters and skin sloughing to ankles/heels      Interventions(treatment strategy):  Enteral Nutrition  Commercial Beverage (Impact AR)  Collaboration with other providers    Nutrition Diagnosis Status:   New, Ongoing       Reason for Assessment    Reason For Assessment: RD follow-up  Diagnosis:  (Necrotizing fasciitis)  Relevant Medical History: ESRD, T2DM, COVID+, septic shock, Calciphylaxis, hyponatremia, HTN  Interdisciplinary Rounds: did not attend  Nutrition Discharge Planning: Discharge on renal with dialysis diet    Nutrition Risk Screen    Nutrition Risk Screen: large or nonhealing wound, burn or pressure injury,tube feeding or parenteral nutrition (Per Poor PO intake and Elevated Needs pt likely to need alternate nutrition (NGT/PEG))    Nutrition/Diet History    Food Preferences: dislikes food at faciilty per chart review  Spiritual, Cultural Beliefs, Restoration Practices, Values that Affect Care: no  Food Allergies: NKFA  Factors Affecting Nutritional Intake: pain,decreased appetite,constipation (Full liquid  "Diet)    Anthropometrics    Temp: 98.1 °F (36.7 °C)  Height Method: Estimated  Height: 5' 4.02" (162.6 cm)  Height (inches): 64.02 in  Weight Method: Bed Scale  Weight: 99.1 kg (218 lb 7.6 oz)  Weight (lb): 218.48 lb  Ideal Body Weight (IBW), Female: 120.1 lb  % Ideal Body Weight, Female (lb): 181.92 %  BMI (Calculated): 37.5  BMI Grade: 35 - 39.9 - obesity - grade II       Lab/Procedures/Meds    Pertinent Labs Reviewed: reviewed  CBC:  Recent Labs   Lab 02/10/22  0512   WBC 30.73*   HGB 7.2*   HCT 22.3*        CMP:  Recent Labs   Lab 02/10/22  0512   CALCIUM 7.4*   ALBUMIN 0.7*   PROT 4.8*   *   K 4.1   CO2 20*   CL 99   BUN 31*   CREATININE 4.5*   ALKPHOS 142*   ALT 19   AST 31   BILITOT 5.4*     BMP  Lab Results   Component Value Date     (L) 02/10/2022    K 4.1 02/10/2022    CL 99 02/10/2022    CO2 20 (L) 02/10/2022    BUN 31 (H) 02/10/2022    CREATININE 4.5 (H) 02/10/2022    CALCIUM 7.4 (L) 02/10/2022    ANIONGAP 15 02/10/2022    ESTGFRAFRICA 13 (A) 02/10/2022    EGFRNONAA 11 (A) 02/10/2022     Glucose   Date Value Ref Range Status   02/10/2022 55 (L) 70 - 110 mg/dL Final       Pertinent Medications Reviewed: reviewed  Scheduled Meds:   ascorbic acid (vitamin C)  1,000 mg Oral Daily    cinacalcet  30 mg Oral Daily with breakfast    epoetin kelsey-epbx  10,000 Units Intravenous Every Mon, Wed, Fri    ferrous gluconate  324 mg Oral Daily with breakfast    gabapentin  100 mg Oral BID    meropenem (MERREM) IVPB  1 g Intravenous Daily    nystatin  500,000 Units Oral QID    pantoprazole  40 mg Intravenous BID    sevelamer carbonate  2,400 mg Oral TID WM    sodium chloride 0.9%  10 mL Intravenous Q6H    sodium thiosulfate  25 g Intravenous Every Mon, Wed, Fri    vancomycin (VANCOCIN) IVPB  500 mg Intravenous Once    vitamin renal formula (B-complex-vitamin c-folic acid)  1 capsule Oral Daily    zinc sulfate  220 mg Oral Daily     Continuous Infusions:  PRN Meds:.sodium chloride, sodium " chloride, dextrose 10%, dextrose 10%, glucagon (human recombinant), glucose, glucose, heparin (porcine), insulin aspart U-100, morphine, ondansetron, oxyCODONE, Flushing PICC Protocol **AND** sodium chloride 0.9% **AND** sodium chloride 0.9%, Pharmacy to dose Vancomycin consult **AND** vancomycin - pharmacy to dose      Physical Findings/Assessment  Large wounds r/t necrotizing fasciitis - See wound consult note    Estimated/Assessed Needs    Weight Used For Calorie Calculations: 99.1 kg (218 lb 7.6 oz)  Energy Calorie Requirements (kcal): 2477  Energy Need Method: Kcal/kg (25 kcal/kg ABW per large wonds)  Protein Requirements: 148g (1.5g/kg ABW per large wounds)  Weight Used For Protein Calculations: 99.1 kg (218 lb 7.6 oz)  Fluid Requirements (mL): 1000 mL + UOP  Estimated Fluid Requirement Method:  (or per MD orders)  RDA Method (mL): 2477  CHO Requirement: 310g      Nutrition Prescription Ordered    Current Diet Order: Full liquid, renal  Oral Nutrition Supplement: Novasource renal, Boost Pudding    Evaluation of Received Nutrient/Fluid Intake    Energy Calories Required: not meeting needs  Protein Required: not meeting needs  Fluid Required: not meeting needs  Comments: LBM 2/2  Tolerance: not tolerating  % Intake of Estimated Energy Needs: 0 - 25 %  % Meal Intake: 0 - 25 %      Nutrition Risk    Level of Risk/Frequency of Follow-up:  (2x/week)     Monitor and Evaluation    Food and Nutrient Intake: energy intake,food and beverage intake,enteral nutrition intake  Food and Nutrient Adminstration: diet order,enteral and parenteral nutrition administration  Knowledge/Beliefs/Attitudes: beliefs and attitudes  Physical Activity and Function: nutrition-related ADLs and IADLs  Anthropometric Measurements: weight,weight change,body mass index  Biochemical Data, Medical Tests and Procedures: electrolyte and renal panel,gastrointestinal profile,glucose/endocrine profile,inflammatory profile,lipid  profile  Nutrition-Focused Physical Findings: overall appearance,extremities, muscles and bones,head and eyes,skin     Nutrition Follow-Up    RD Follow-up?: Yes

## 2022-02-10 NOTE — ASSESSMENT & PLAN NOTE
Persistent leukocytosis due to open wounds w/ likely superinfection vs occult process  - broaden to meropenem given borderline hypotension  - lactate  - see hyperbilirubinemia  - surgery following for debridement

## 2022-02-10 NOTE — ASSESSMENT & PLAN NOTE
Isolated hyperbilirubinemia of unclear origin. CT scan obtained earlier this admission w/ some gb contents nos. No RUQ pain or fevers, but rising bili w/ leukocytosis and borderline hypotension concerning.   - RUQ US  - direct bilirubin to fractionate, however doubt this is unconjugated issue

## 2022-02-10 NOTE — ASSESSMENT & PLAN NOTE
Initially requiring vasopressors in the ICU, now weaned. However BP has been trending down over the past few days, unclear whether due to recurrent infection or hypovolemia

## 2022-02-10 NOTE — ASSESSMENT & PLAN NOTE
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 CT with subcutaneous air in buttocks tissues. Underwent debridement 2/2 and 2/4. Stabilized hemodynamically, however pt w/ large, open wounds that will be difficult to heal.  - continue broad spectrum abx  - surgery following  - will likely need loop colostomy to keep wounds clear from feces  - wound care following  - see malnutrition elsewhere

## 2022-02-10 NOTE — PROGRESS NOTES
Pharmacokinetic Assessment Follow Up: IV Vancomycin    Vancomycin serum concentration assessment(s):    The random level was drawn correctly and can be used to guide therapy at this time. The measurement is within the desired definitive target range of 10 to 20 mcg/mL.    Vancomycin Regimen Plan:    Give 500 mg after dialysis today.  Re-dose when the random level is less than 20 mcg/mL, next level to be drawn at 0400 on 2/12    Drug levels (last 3 results):  Recent Labs   Lab Result Units 02/08/22  0524 02/10/22  0512   Vancomycin, Random ug/mL 16.9 17.6       Pharmacy will continue to follow and monitor vancomycin.    Please contact pharmacy at extension [phone] for questions regarding this assessment.    Thank you for the consult,   Julien Bolivar Jr       Patient brief summary:  Xuan Wrightsin is a 41 y.o. female initiated on antimicrobial therapy with IV Vancomycin for treatment of skin & soft tissue infection    Drug Allergies:   Review of patient's allergies indicates:   Allergen Reactions    Vicodin [hydrocodone-acetaminophen] Hives    Codeine Hives       Actual Body Weight:   99.1 kg    Renal Function:   Estimated Creatinine Clearance: 18.4 mL/min (A) (based on SCr of 4.5 mg/dL (H)).,     Dialysis Method (if applicable):  intermittent HD    CBC (last 72 hours):  Recent Labs   Lab Result Units 02/08/22  0524 02/09/22  0534 02/10/22  0512   WBC K/uL 25.60* 24.91* 30.73*   Hemoglobin g/dL 7.7* 7.6* 7.2*   Hematocrit % 24.1* 23.1* 22.3*   Platelets K/uL 199 170 192   Gran % % 75.0* 70.0  --    Lymph % % 11.0* 5.0*  --    Mono % % 2.0* 4.0  --    Eosinophil % % 0.0 2.0  --    Basophil % % 0.0 0.0  --    Differential Method  Manual Manual  --        Metabolic Panel (last 72 hours):  Recent Labs   Lab Result Units 02/08/22  0535 02/09/22  0534 02/10/22  0512   Sodium mmol/L 136 136 134*   Potassium mmol/L 3.9 3.8 4.1   Chloride mmol/L 96 100 99   CO2 mmol/L 20* 22* 20*   Glucose mg/dL 64* 78 55*   BUN mg/dL 41* 24*  31*   Creatinine mg/dL 5.3* 3.5* 4.5*   Albumin g/dL 0.8* 0.7* 0.7*   Total Bilirubin mg/dL 5.7* 5.1* 5.4*   Alkaline Phosphatase U/L 131 146* 142*   AST U/L 31 36 31   ALT U/L 21 20 19   Magnesium mg/dL 1.7 1.6 1.7   Phosphorus mg/dL 4.1 3.1 3.8       Vancomycin Administrations:  vancomycin given in the last 96 hours                     vancomycin 500 mg in dextrose 5 % 100 mL IVPB (ready to mix system) (mg) 500 mg New Bag 02/08/22 2138                    Microbiologic Results:  Microbiology Results (last 7 days)       Procedure Component Value Units Date/Time    Culture, Anaerobe [[id number]]  (Abnormal) Collected: 02/02/22 1905    Order Status: Completed Specimen: Wound from Buttocks, Left Updated: 02/06/22 1242     Anaerobic Culture BACTEROIDES THETAIOTAOMICRON  Moderate      Narrative:      Bilateral Buttock wound    Blood culture #1 [[id number]] Collected: 02/01/22 1429    Order Status: Completed Specimen: Blood from Peripheral, Forearm, Left Updated: 02/05/22 1503     Blood Culture, Routine No Growth after 4 days.     Narrative:      Blood Culture #1    Blood culture #2 [[id number]] Collected: 02/01/22 1406    Order Status: Completed Specimen: Blood from Peripheral, Upper Arm, Left Updated: 02/05/22 1503     Blood Culture, Routine No Growth after 4 days.     Narrative:      Blood Culture #2    Aerobic culture [[id number]] Collected: 02/02/22 1905    Order Status: Completed Specimen: Wound from Buttocks, Left Updated: 02/05/22 0828     Aerobic Bacterial Culture No significant isolate    Narrative:      Bilateral buttock wound    AFB Culture & Smear [[id number]] Collected: 02/02/22 1905    Order Status: Completed Specimen: Wound from Buttocks, Left Updated: 02/04/22 2127     AFB Culture & Smear Culture in progress     AFB CULTURE STAIN No acid fast bacilli seen.    Narrative:      Bilateral buttock wound    Gram stain [[id number]] Collected: 02/02/22 1905    Order Status: Completed Specimen: Wound from Buttocks, Left  Updated: 02/03/22 1145     Gram Stain Result No WBC's      Rare Gram positive cocci in pairs      Rare Gram positive rods    Narrative:      Bilateral buttock wound

## 2022-02-10 NOTE — PLAN OF CARE
[facility] [place] [facility]  Discharge Reassessment    TN attempted re-assessment, patient out of room for dialysis. TN received update from Jennifer NOGUERA, pt accepted to Dr. Jessa Patel with start date of 2/14 pending discharge.     Patient declined to [facility]. Per [place] stated will continue to follow. Per care port, [place], pending review. TN to follow.     Primary Care Provider: Katharine Franks MD    Expected Discharge Date:     Reassessment (most recent)     Discharge Reassessment - 02/10/22 1340        Discharge Reassessment    Assessment Type Discharge Planning Assessment

## 2022-02-10 NOTE — PROGRESS NOTES
Ms. Ricardo  c/o pain; prn med given w/ some relief. Staff turned pt as often as she would let us. Pt is in bed resting. Call light in reach. Bed locked and low. No acute changes overnight. Hourly rounding. All question and concerns answered. Will continue to monitor for any changes and follow plan of care.

## 2022-02-10 NOTE — PROGRESS NOTES
[facility] Medicine  Progress Note    Patient Name: Xuan Ricardo  MRN: [id number]  Patient Class: IP- Inpatient   Admission Date: 2/1/2022  Length of Stay: 9 days  Attending Physician: Adebayo Reyes MD  Primary Care Provider: Katharine Franks MD        Subjective:     Principal Problem:Necrotizing fasciitis        HPI:  42 y/o female presented to ER with hypotension.  Patient was recently started on dialysis and had not had dialysis for one week since last hospital discharge.  She was noted to have multiple abnormalities on presentation.  She was severely anemic with Hgb of 4.3.  transfused 2 units of blood with adequate correction of H/H.  Patient was also noted to have AG metabolic acidosis with hyperglycemia.  Stated on insulin drip for DKA.  Leukocytosis on CBC.  Patient with erythema and blistering of bilateral inner thing and buttocks.  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  Started on ABX's and Surgery consulted.        Overview/Hospital Course:  42 y/o female presented to ER with hypotension.  Patient was recently started on dialysis and had not had dialysis for one week since last hospital discharge.  She was noted to have multiple abnormalities on presentation.  She was severely anemic with Hgb of 4.3.  transfused 2 units of blood with adequate correction of H/H.  Patient was also noted to have AG metabolic acidosis with hyperglycemia.  Started on insulin drip for DKA.  Leukocytosis on CBC.  Patient with erythema and blistering of bilateral inner thighs and buttocks.  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  Started on ABX's and Surgery consulted.  Admitted with necrotizing fascitis of the buttocks. Surgery consulted and patient brought to OR 2/2/22 with significant debridement. On antibiotics. Mental status worsening. Palliative Care consulted, family updated. Brought back to OR on 2/4/22 for debridement as well. Weaned off norepinephrine,  on broad spectrum antibiotics pending cultures. Long recovery ahead. Concern that may need diverting colostomy in the future.not on any anticoagulation,she has also history of HIT.  Culture is growing bacteroides,wound care is consulted.she is bed bound, did not consulted PT,OT   CC today is pain.      Interval History: No events overnight. This AM pt lethargic, hypoglycemic, with borderline MAP. Az complaints, feels that she is in her usual state of health.     Review of Systems   Constitutional: Negative for chills and fever.   Respiratory: Negative for cough and shortness of breath.    Cardiovascular: Negative for chest pain and leg swelling.   Gastrointestinal: Negative for abdominal distention and abdominal pain.     Objective:     Vital Signs (Most Recent):  Temp: 98.1 °F (36.7 °C) (02/10/22 0745)  Pulse: 95 (02/10/22 0745)  Resp: 18 (02/10/22 0745)  BP: (!) 88/51 (02/10/22 0745)  SpO2: 100 % (02/10/22 0745) Vital Signs (24h Range):  Temp:  [97.5 °F (36.4 °C)-98.8 °F (37.1 °C)] 98.1 °F (36.7 °C)  Pulse:  [] 95  Resp:  [12-22] 18  SpO2:  [93 %-100 %] 100 %  BP: ()/(51-63) 88/51     Weight: 99.1 kg (218 lb 7.6 oz)  Body mass index is 37.48 kg/m².    Intake/Output Summary (Last 24 hours) at 2/10/2022 0803  Last data filed at 2/10/2022 0500  Gross per 24 hour   Intake 960 ml   Output --   Net 960 ml      Physical Exam  Constitutional:       General: She is not in acute distress.     Appearance: She is ill-appearing. She is not toxic-appearing or diaphoretic.   Cardiovascular:      Rate and Rhythm: Normal rate and regular rhythm.      Heart sounds: No murmur heard.  No gallop.    Pulmonary:      Effort: Pulmonary effort is normal. No respiratory distress.      Breath sounds: Normal breath sounds. No wheezing.   Abdominal:      General: Bowel sounds are normal. There is no distension.      Palpations: Abdomen is soft.      Tenderness: There is no abdominal tenderness.      Comments: No RUQ pain    Neurological:      Comments: Pt lethargic but arousable and oriented         Significant Labs: All pertinent labs within the past 24 hours have been reviewed.    Significant Imaging: I have reviewed all pertinent imaging results/findings within the past 24 hours.      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 CT with subcutaneous air in buttocks tissues. Underwent debridement 2/2 and 2/4. Stabilized hemodynamically, however pt w/ large, open wounds that will be difficult to heal.  - continue broad spectrum abx  - surgery following  - will likely need loop colostomy to keep wounds clear from feces  - wound care following  - see malnutrition elsewhere      Leukocytosis  Persistent leukocytosis due to open wounds w/ likely superinfection vs occult process  - broaden to meropenem given borderline hypotension  - lactate  - see hyperbilirubinemia  - surgery following for debridement      End stage renal disease  Refused dialysis at prior admissions, now non-compliant after leaving the hospital  - nephrology consulted for HD.      Hyperbilirubinemia  Isolated hyperbilirubinemia of unclear origin. CT scan obtained earlier this admission w/ some gb contents nos. No RUQ pain or fevers, but rising bili w/ leukocytosis and borderline hypotension concerning.   - RUQ US  - direct bilirubin to fractionate, however doubt this is unconjugated issue      Malnutrition  Pt w/ markedly poor PO intake, particularly concerning given large wounds that will not heal without nutritional support.   - discussed w/ patient, will need alternate means of nutrition if pt unable to meet needs by mouth      Cellulitis of thigh  As above      Septic shock  Initially requiring vasopressors in the ICU, now weaned. However BP has been trending down over the past few days, unclear whether due to recurrent infection or hypovolemia      Coagulopathy  Likely due to  sepsis/infection, now improved      Hyponatremia  Now resolved      Esophagitis, [place] grade D  PPI bid      Debility  Bed bound on baseline.      Sinus tachycardia  Resolved with  BB.      COVID-19 virus infection  Patient initially tested positive for Covid more than one month ago.  Asymptomatic from Covid with no evidence of pneumonia on CXR.  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 Concern given long road ahead for patient and limited interest in participating in medical care going back multiple months and multiple admissions.    Type 2 diabetes mellitus with stage 5 chronic kidney disease not on chronic dialysis, with long-term current use of insulin  No becoming hypoglycemic due to poor po intake  - hold insulin  - see malnutrition below        Hypertensive CKD (chronic kidney disease)  Patient initially hypotensive requiring pressor support  - now weaned off and normotensive    Anemia of renal disease  Baseline Hgb around 7.5, Presents with Hgb of 4.3  No obvious bleeding, had EGD last admission with esophagitis  Transfused 2 units of blood with adequate correction of H/H  Continue to monitor.  - transfuse as needed   - loss over this hospital stay likely due to surgery      VTE Risk Mitigation (From admission, onward)         Ordered     heparin (porcine) injection 3,200 Units  As needed (PRN)         02/03/22 0024     IP VTE HIGH RISK PATIENT  Once         02/01/22 1816     Place sequential compression device  Until discontinued         02/01/22 1816     Place sequential compression device  Until discontinued         02/01/22 1810                Discharge Planning   PAMELA:      Code Status: Full Code   Is the patient medically ready for discharge?:     Reason for patient still in  hospital (select all that apply): Patient trending condition, Laboratory test, Treatment, Consult recommendations and Pending disposition  Discharge Plan A: Long-term acute care facility (LTAC)   Discharge Delays: (!) Post-Acute Set-up              Adebayo Reyes MD  Department of Hospital Medicine   [facility] [place]

## 2022-02-10 NOTE — PROGRESS NOTES
[facility]      General Surgery Progress Note    02/10/2022  3:53 PM      Patient: Xuan Wrightsin  MRN: [id number]  Admit Date: 2/1/2022  LOS: 9      Subjective                                                                                                        Interval Events: Naeon, afebrile.  Patient says she is not having pain at rest but in a lot of pain when moved to look at the wounds.    Patient Active Problem List   Diagnosis    Anemia of renal disease    Hypertensive CKD (chronic kidney disease)    Nephrotic range proteinuria    Metabolic acidosis    Increased prolactin level    Secondary hyperparathyroidism    Iron deficiency anemia    Vitamin D deficiency    Galactorrhea    Cataract    Class 1 obesity due to excess calories with serious comorbidity in adult    Chronic fatigue    Missed menses    Uterine leiomyoma    Type 2 diabetes mellitus with stage 5 chronic kidney disease not on chronic dialysis, with long-term current use of insulin    Hypertension    Hypertensive urgency    Symptomatic anemia    End stage renal disease    Uremia    CKD (chronic kidney disease) stage 5, GFR less than 15 ml/min    Nausea & vomiting    Thrombocytopenia    Hematemesis    Goals of care, counseling/discussion    Calciphylaxis    Type 2 diabetes mellitus    COVID-19 virus infection    Renovascular hypertension    Hyperphosphatemia    Candida esophagitis    Sepsis due to gram-negative UTI    Sinus tachycardia    Slow transit constipation    Debility    Esophagitis, [place] grade D    Hyponatremia    Leukocytosis    Coagulopathy    Septic shock    Necrotizing fasciitis    Cellulitis of thigh    Malnutrition    Hyperbilirubinemia       No current facility-administered medications on file prior to encounter.     Current Outpatient Medications on File Prior to Encounter   Medication Sig Dispense Refill    calcium carbonate (TUMS) 200 mg calcium (500 mg)  chewable tablet Take 2 tablets (1,000 mg total) by mouth 3 (three) times daily as needed. 150 tablet 0    cinacalcet (SENSIPAR) 30 MG Tab Take 1 tablet (30 mg total) by mouth daily with breakfast. 30 tablet 11    epoetin kelsey-epbx (RETACRIT) 2,000 unit/mL injection Inject 7 mLs (14,000 Units total) into the skin every Mon, Wed, Fri. With dialysis      ferrous gluconate (FERGON) 324 MG tablet Take 1 tablet (324 mg total) by mouth daily with breakfast. 30 tablet 2    gabapentin (NEURONTIN) 100 MG capsule Take 1 capsule (100 mg total) by mouth 3 (three) times daily. 90 capsule 0    metoprolol tartrate (LOPRESSOR) 25 MG tablet Take 1 tablet (25 mg total) by mouth 2 (two) times daily. 60 tablet 11    oxyCODONE (ROXICODONE) 10 mg Tab immediate release tablet Take 1 tablet (10 mg total) by mouth every 4 (four) hours as needed. 18 tablet 0    pantoprazole (PROTONIX) 40 MG tablet Take 1 tablet (40 mg total) by mouth 2 (two) times daily. 60 tablet 1    sevelamer carbonate (RENVELA) 800 mg Tab Take 3 tablets (2,400 mg total) by mouth 3 (three) times daily with meals. 270 tablet 11    sodium thiosulfate 12.5 gram/50 mL (250 mg/mL) injection Inject 100 mLs (25 g total) into the vein every Mon, Wed, Fri. With dialysis         Objective                                                                                                          Vitals:    02/10/22 0419 02/10/22 0518 02/10/22 0745 02/10/22 0907   BP: (!) 101/56  (!) 88/51 (!) 99/52   Patient Position: Lying      Pulse: 102  95 95   Resp: 16 18 18 18   Temp: 97.9 °F (36.6 °C)  98.1 °F (36.7 °C) 98.1 °F (36.7 °C)   TempSrc: Oral      SpO2: 100%  100% 99%   Weight:       Height:           CBC   Recent Labs   Lab 02/09/22  0534 02/10/22  0512   WBC 24.91* 30.73*   HGB 7.6* 7.2*   HCT 23.1* 22.3*    192       CHEM   Recent Labs   Lab 02/09/22  0534 02/10/22  0512    134*   K 3.8 4.1    99   CO2 22* 20*   BUN 24* 31*   CREATININE 3.5* 4.5*   GLU 78  55*       PHYSICAL EXAM:      GEN: Alert and Awake, chronically ill appearing.  HEENT: NC/AT, EOMI  RESP: CTAB, good air movement, no resp distress  CV: mild tachycardia  ABD: Soft, NT/ND, no guarding or rebound  EXT:  Moves all, extensive calciphylaxis lesions on bilateral lower extremities, sacral wound with dressing in place.  dressing taken down and stool noted in the wound.  Right leg medial thigh wound with some purulent drainage, other calcyphylaxis wound remain stable  Neuro: A&Ox3, CNII-XII intact  Skin: Warm, calciphylaxis lesions throughout bilateral lower extremites    Imaging Results          X-Ray Chest AP Portable (Final result)  Result time 02/01/22 15:38:15    Final result by Murphy Foy MD (02/01/22 15:38:15)                 Impression:      1. No acute cardiopulmonary process appreciated.      Electronically signed by: Murphy Foy  Date:    02/01/2022  Time:    15:38             Narrative:    EXAMINATION:  XR CHEST AP PORTABLE    CLINICAL HISTORY:  HYpotention;    TECHNIQUE:  Single frontal portable view of the chest was performed.    COMPARISON:  Chest radiograph 01/19/2022    FINDINGS:  RIJV-approach THDC is stable when compared to 01/19/2022 however, with marked interval retraction when compared to original placement on 05/22/2020 with terminus now projecting over the expected location of the mid SVC.    Cardiomediastinal silhouette is within normal limits.    No focal consolidation, overt interstitial edema, sizable pleural effusion or pneumothorax.    Multilevel degenerative changes of the imaged spine.                                Assessment / Plan:                                                                                           A/P: Case of 41 y.o. with extensive pmh, admitted in septic shock found to have necrotizing soft tissue infection of the sacrum and buttock.  S/p debridement on 2/2/22, 2/4/22.      WBC elevated today to 30, will obtain stat ct of the abd/pelvix and  lower extremities to look for infectious source.  The right medial thigh wound has some purulent drainage, may need to be debrided in the OR if ct shows large fluid collection or air    Noted goals of care discussion was made with family, patients wounds are very unlikely to heal, nutrition is poor and patient may benefit from tube feeds or peg tube placement      Carlos Alberto Christianson MD  [facility] Surgery PGY-V

## 2022-02-10 NOTE — PT/OT/SLP PROGRESS
Speech Language Pathology      Xuan Cousin  MRN: [id number]    Patient not seen today secondary to pt VIRIDIANA for dialysis. ST will follow-up 2/11.    Letty De Leon CCC-SLP

## 2022-02-10 NOTE — SUBJECTIVE & OBJECTIVE
Interval History: No events overnight. This AM pt lethargic, hypoglycemic, with borderline MAP. Az complaints, feels that she is in her usual state of health.     Review of Systems   Constitutional: Negative for chills and fever.   Respiratory: Negative for cough and shortness of breath.    Cardiovascular: Negative for chest pain and leg swelling.   Gastrointestinal: Negative for abdominal distention and abdominal pain.     Objective:     Vital Signs (Most Recent):  Temp: 98.1 °F (36.7 °C) (02/10/22 0745)  Pulse: 95 (02/10/22 0745)  Resp: 18 (02/10/22 0745)  BP: (!) 88/51 (02/10/22 0745)  SpO2: 100 % (02/10/22 0745) Vital Signs (24h Range):  Temp:  [97.5 °F (36.4 °C)-98.8 °F (37.1 °C)] 98.1 °F (36.7 °C)  Pulse:  [] 95  Resp:  [12-22] 18  SpO2:  [93 %-100 %] 100 %  BP: ()/(51-63) 88/51     Weight: 99.1 kg (218 lb 7.6 oz)  Body mass index is 37.48 kg/m².    Intake/Output Summary (Last 24 hours) at 2/10/2022 0803  Last data filed at 2/10/2022 0500  Gross per 24 hour   Intake 960 ml   Output --   Net 960 ml      Physical Exam  Constitutional:       General: She is not in acute distress.     Appearance: She is ill-appearing. She is not toxic-appearing or diaphoretic.   Cardiovascular:      Rate and Rhythm: Normal rate and regular rhythm.      Heart sounds: No murmur heard.  No gallop.    Pulmonary:      Effort: Pulmonary effort is normal. No respiratory distress.      Breath sounds: Normal breath sounds. No wheezing.   Abdominal:      General: Bowel sounds are normal. There is no distension.      Palpations: Abdomen is soft.      Tenderness: There is no abdominal tenderness.      Comments: No RUQ pain   Neurological:      Comments: Pt lethargic but arousable and oriented         Significant Labs: All pertinent labs within the past 24 hours have been reviewed.    Significant Imaging: I have reviewed all pertinent imaging results/findings within the past 24 hours.

## 2022-02-10 NOTE — PHYSICIAN QUERY
PT Name: Xuan Ricardo  MR #: [id number]    DOCUMENTATION CLARIFICATION     CDS/: Wendy Salas RN              Contact information: [email or web address]    This form is a permanent document in the medical record.     Query Date: February 10, 2022    By submitting this query, we are merely seeking further clarification of documentation. Please utilize your independent clinical judgment when addressing the question(s) below.    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   CT with subcutaneous air in buttocks tissues.   Underwent debridement 2/2 and 2/4.    2/8 QUERY   BACTEROIDES THETAIOTAOMICRON   Moderate 2/2 Anaerobe culture- Left Buttock wound       Provider, please clarify if there is any clinical correlation between Sepsis and Bacteroides Thetaiotaomicron:           Are the conditions:      [ x ] Due to or associated with each other   [  ] Unrelated to each other   [  ] Other explanation (Please Specify): ______________   [  ] Clinically Undetermined                                                                               Please document in your progress notes daily for the duration of treatment until resolved and include in your discharge summary.

## 2022-02-11 LAB
ALBUMIN SERPL BCP-MCNC: 0.7 G/DL (ref 3.5–5.2)
ALP SERPL-CCNC: 210 U/L (ref 55–135)
ALT SERPL W/O P-5'-P-CCNC: 21 U/L (ref 10–44)
ANION GAP SERPL CALC-SCNC: 11 MMOL/L (ref 8–16)
ANISOCYTOSIS BLD QL SMEAR: SLIGHT
AST SERPL-CCNC: 38 U/L (ref 10–40)
BASOPHILS # BLD AUTO: ABNORMAL K/UL (ref 0–0.2)
BASOPHILS NFR BLD: 0 % (ref 0–1.9)
BILIRUB SERPL-MCNC: 5.9 MG/DL (ref 0.1–1)
BUN SERPL-MCNC: 17 MG/DL (ref 6–20)
CALCIUM SERPL-MCNC: 7.5 MG/DL (ref 8.7–10.5)
CHLORIDE SERPL-SCNC: 102 MMOL/L (ref 95–110)
CO2 SERPL-SCNC: 21 MMOL/L (ref 23–29)
CREAT SERPL-MCNC: 3 MG/DL (ref 0.5–1.4)
DIFFERENTIAL METHOD: ABNORMAL
EOSINOPHIL # BLD AUTO: ABNORMAL K/UL (ref 0–0.5)
EOSINOPHIL NFR BLD: 0 % (ref 0–8)
ERYTHROCYTE [DISTWIDTH] IN BLOOD BY AUTOMATED COUNT: 18.6 % (ref 11.5–14.5)
EST. GFR  (AFRICAN AMERICAN): 21 ML/MIN/1.73 M^2
EST. GFR  (NON AFRICAN AMERICAN): 18 ML/MIN/1.73 M^2
GLUCOSE SERPL-MCNC: 113 MG/DL (ref 70–110)
HCT VFR BLD AUTO: 24.9 % (ref 37–48.5)
HGB BLD-MCNC: 8.1 G/DL (ref 12–16)
IMM GRANULOCYTES # BLD AUTO: ABNORMAL K/UL (ref 0–0.04)
IMM GRANULOCYTES NFR BLD AUTO: ABNORMAL % (ref 0–0.5)
LYMPHOCYTES # BLD AUTO: ABNORMAL K/UL (ref 1–4.8)
LYMPHOCYTES NFR BLD: 6 % (ref 18–48)
MAGNESIUM SERPL-MCNC: 1.6 MG/DL (ref 1.6–2.6)
MCH RBC QN AUTO: 30.6 PG (ref 27–31)
MCHC RBC AUTO-ENTMCNC: 32.5 G/DL (ref 32–36)
MCV RBC AUTO: 94 FL (ref 82–98)
MONOCYTES # BLD AUTO: ABNORMAL K/UL (ref 0.3–1)
MONOCYTES NFR BLD: 9 % (ref 4–15)
NEUTROPHILS NFR BLD: 84 % (ref 38–73)
NEUTS BAND NFR BLD MANUAL: 1 %
NRBC BLD-RTO: 0 /100 WBC
PHOSPHATE SERPL-MCNC: 4.1 MG/DL (ref 2.7–4.5)
PLATELET # BLD AUTO: 176 K/UL (ref 150–450)
PLATELET BLD QL SMEAR: ABNORMAL
PMV BLD AUTO: 10.4 FL (ref 9.2–12.9)
POCT GLUCOSE: 96 MG/DL (ref 70–110)
POIKILOCYTOSIS BLD QL SMEAR: SLIGHT
POLYCHROMASIA BLD QL SMEAR: ABNORMAL
POTASSIUM SERPL-SCNC: 4.6 MMOL/L (ref 3.5–5.1)
PROT SERPL-MCNC: 4.8 G/DL (ref 6–8.4)
RBC # BLD AUTO: 2.65 M/UL (ref 4–5.4)
SODIUM SERPL-SCNC: 134 MMOL/L (ref 136–145)
WBC # BLD AUTO: 37.77 K/UL (ref 3.9–12.7)

## 2022-02-11 PROCEDURE — A4216 STERILE WATER/SALINE, 10 ML: HCPCS | Performed by: STUDENT IN AN ORGANIZED HEALTH CARE EDUCATION/TRAINING PROGRAM

## 2022-02-11 PROCEDURE — C9113 INJ PANTOPRAZOLE SODIUM, VIA: HCPCS | Performed by: STUDENT IN AN ORGANIZED HEALTH CARE EDUCATION/TRAINING PROGRAM

## 2022-02-11 PROCEDURE — 94761 N-INVAS EAR/PLS OXIMETRY MLT: CPT

## 2022-02-11 PROCEDURE — 85007 BL SMEAR W/DIFF WBC COUNT: CPT | Performed by: STUDENT IN AN ORGANIZED HEALTH CARE EDUCATION/TRAINING PROGRAM

## 2022-02-11 PROCEDURE — 85027 COMPLETE CBC AUTOMATED: CPT | Performed by: STUDENT IN AN ORGANIZED HEALTH CARE EDUCATION/TRAINING PROGRAM

## 2022-02-11 PROCEDURE — 92526 ORAL FUNCTION THERAPY: CPT

## 2022-02-11 PROCEDURE — 25000003 PHARM REV CODE 250: Performed by: STUDENT IN AN ORGANIZED HEALTH CARE EDUCATION/TRAINING PROGRAM

## 2022-02-11 PROCEDURE — 63600175 PHARM REV CODE 636 W HCPCS: Performed by: STUDENT IN AN ORGANIZED HEALTH CARE EDUCATION/TRAINING PROGRAM

## 2022-02-11 PROCEDURE — 63600175 PHARM REV CODE 636 W HCPCS

## 2022-02-11 PROCEDURE — 11000001 HC ACUTE MED/SURG PRIVATE ROOM

## 2022-02-11 PROCEDURE — 84100 ASSAY OF PHOSPHORUS: CPT | Performed by: STUDENT IN AN ORGANIZED HEALTH CARE EDUCATION/TRAINING PROGRAM

## 2022-02-11 PROCEDURE — 83735 ASSAY OF MAGNESIUM: CPT | Performed by: STUDENT IN AN ORGANIZED HEALTH CARE EDUCATION/TRAINING PROGRAM

## 2022-02-11 PROCEDURE — 99233 PR SUBSEQUENT HOSPITAL CARE,LEVL III: ICD-10-PCS | Mod: ,,, | Performed by: SURGERY

## 2022-02-11 PROCEDURE — 80053 COMPREHEN METABOLIC PANEL: CPT | Performed by: STUDENT IN AN ORGANIZED HEALTH CARE EDUCATION/TRAINING PROGRAM

## 2022-02-11 PROCEDURE — 99233 SBSQ HOSP IP/OBS HIGH 50: CPT | Mod: ,,, | Performed by: SURGERY

## 2022-02-11 RX ORDER — SODIUM THIOSULFATE 250 MG/ML
25 INJECTION, SOLUTION INTRAVENOUS
Status: DISCONTINUED | OUTPATIENT
Start: 2022-02-12 | End: 2022-02-15 | Stop reason: HOSPADM

## 2022-02-11 RX ORDER — NALOXONE HCL 0.4 MG/ML
VIAL (ML) INJECTION
Status: COMPLETED
Start: 2022-02-11 | End: 2022-02-11

## 2022-02-11 RX ORDER — NALOXONE HCL 0.4 MG/ML
0.4 VIAL (ML) INJECTION ONCE
Status: COMPLETED | OUTPATIENT
Start: 2022-02-11 | End: 2022-02-11

## 2022-02-11 RX ADMIN — OXYCODONE 10 MG: 5 TABLET ORAL at 06:02

## 2022-02-11 RX ADMIN — PANTOPRAZOLE SODIUM 40 MG: 40 INJECTION, POWDER, FOR SOLUTION INTRAVENOUS at 08:02

## 2022-02-11 RX ADMIN — MORPHINE SULFATE 6 MG: 4 INJECTION, SOLUTION INTRAMUSCULAR; INTRAVENOUS at 02:02

## 2022-02-11 RX ADMIN — Medication 10 ML: at 12:02

## 2022-02-11 RX ADMIN — NALXONE HYDROCHLORIDE 0.4 MG: 0.4 INJECTION INTRAMUSCULAR; INTRAVENOUS; SUBCUTANEOUS at 10:02

## 2022-02-11 RX ADMIN — Medication 0.4 MG: at 10:02

## 2022-02-11 RX ADMIN — Medication 10 ML: at 08:02

## 2022-02-11 RX ADMIN — OXYCODONE 10 MG: 5 TABLET ORAL at 10:02

## 2022-02-11 NOTE — PROGRESS NOTES
Vancomycin consult follow-up:    Patient reviewed, dialysis scheduled every Tues, Thurs, Sat, no new levels, no dose today; Next levels due: 2/12/2022 at 0400

## 2022-02-11 NOTE — SUBJECTIVE & OBJECTIVE
Interval History: This AM pt lethargic, awake and tracks, nods no when asked if any pain or if she needs anyuthing, otherwise does not answer questions.    Review of Systems   Unable to perform ROS: Mental status change     Objective:     Vital Signs (Most Recent):  Temp: 97.3 °F (36.3 °C) (02/11/22 1207)  Pulse: 104 (02/11/22 1220)  Resp: 18 (02/11/22 1450)  BP: (!) 97/54 (02/11/22 1207)  SpO2: 98 % (02/11/22 1220) Vital Signs (24h Range):  Temp:  [97.2 °F (36.2 °C)-98.4 °F (36.9 °C)] 97.3 °F (36.3 °C)  Pulse:  [] 104  Resp:  [14-20] 18  SpO2:  [95 %-100 %] 98 %  BP: ()/(51-82) 97/54     Weight: 99.1 kg (218 lb 7.6 oz)  Body mass index is 37.48 kg/m².    Intake/Output Summary (Last 24 hours) at 2/11/2022 1535  Last data filed at 2/10/2022 2200  Gross per 24 hour   Intake 743.33 ml   Output 100 ml   Net 643.33 ml      Physical Exam  Constitutional:       General: She is not in acute distress.     Appearance: She is ill-appearing. She is not toxic-appearing or diaphoretic.   Cardiovascular:      Rate and Rhythm: Normal rate and regular rhythm.      Heart sounds: No murmur heard.  No gallop.    Pulmonary:      Effort: Pulmonary effort is normal. No respiratory distress.      Breath sounds: Normal breath sounds. No wheezing.   Abdominal:      General: Bowel sounds are normal. There is no distension.      Palpations: Abdomen is soft.      Tenderness: There is no abdominal tenderness.         Significant Labs: All pertinent labs within the past 24 hours have been reviewed.    Significant Imaging: I have reviewed all pertinent imaging results/findings within the past 24 hours.

## 2022-02-11 NOTE — PLAN OF CARE
Problem: SLP Goal  Goal: SLP Goal  Description: STGS  1. Pt will tolerate full liquid diet without overt s/s of aspiration- met 2/11.  2. Pt will participate in ongoing assessment of swallow.   3. Pt will tolerate mech soft diet (IDDSI 5) with thin liquids w/o overt of aspiration.   Outcome: Ongoing, Progressing    ST entered pt's room, with niece feeding Popeyes to pt (despite pt being on full liquids). Pt with increased oral awareness and ability to manipulate bolus in the oral cavity with softer food trials. The pt can advance diet to mech soft diet (IDDSI 5), however, with 1:1 A for all meals. ST will cont to follow to assess safety with diet tolerance.

## 2022-02-11 NOTE — ASSESSMENT & PLAN NOTE
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 CT with subcutaneous air in buttocks tissues. Underwent debridement 2/2, 2/4, 2/11. Stabilized hemodynamically, however pt w/ large, open wounds that will be difficult to heal.  - continue broad spectrum abx  - surgery following  - will likely need loop colostomy to keep wounds clear from feces  - wound care following  - see malnutrition elsewhere

## 2022-02-11 NOTE — ASSESSMENT & PLAN NOTE
Pt w/ markedly poor PO intake, particularly concerning given large wounds that will not heal without nutritional support.   - discussed w/ patient, will need alternate means of nutrition if pt unable to meet needs by mouth     Epidermal Autograft Text: The defect edges were debeveled with a #15 scalpel blade.  Given the location of the defect, shape of the defect and the proximity to free margins an epidermal autograft was deemed most appropriate.  Using a sterile surgical marker, the primary defect shape was transferred to the donor site. The epidermal graft was then harvested.  The skin graft was then placed in the primary defect and oriented appropriately.

## 2022-02-11 NOTE — ASSESSMENT & PLAN NOTE
Isolated hyperbilirubinemia of unclear origin. CT/RUQ US w/ sludge. No RUQ pain or fevers, no imaging evidence of biliary inflammation  - direct bilirubin to fractionate, however doubt this is unconjugated issue

## 2022-02-11 NOTE — ASSESSMENT & PLAN NOTE
General surgery expressed concerns about the combination of extraordinarily small odds of recovery and significant suffering through the process. Family initially agreed to hospice, however when SW called them they had more questions about curative-focused therapy. Attempted to call the family x 3 to clarify. Agree w/ surgery's assessment.

## 2022-02-11 NOTE — PLAN OF CARE
Recommendations     1) Recommend consideration of initiating alternate nutrition - NGT/PEG  2) Initiate Novasource Renal @ 15 mL adv by 10 mL until goal rate of 35 mL/hr is met, Hold for residuals > 400 mL.  mL H20 q4h, additional fluids to be determined by MD   Provides 1680 kcal, 76g P, 153g C, 2102 mL total H20     *If PO intake is not to be continued & total enteral desired - Goal rate to be 55 mL/hr.  mL q4h.              - 2640 kcal, 118g P, 242g C, 2446 mL Total H20     3) Add Impact AR for PO consumptionTID to assist in wound healing + meeting caloric needs              -Provides 600 kcal, 54g P, 45g C, 435 mL H20                 - Total EN + ONS 2280 kcal, 130g P, 198g C, 2537 mL H20 -     4) LBM noted 2/2 - Bowel Regimen     5) Electrolytes, Renals     Goals:   1) Pt to have alternate means of nutrition initiated within next 48 hours to meet >/= 50% EEN  2) Pt to have bowel movement by RD f/u (Not met)  3) Monitored labs to trend toward target ranges     Nutrition Goal Status: new  Communication of PATRIA Recs:  (POC)

## 2022-02-11 NOTE — NURSING
[facility] MEWS TRIGGER FOLLOW UP       MEWS Monitoring, Score is: 4  Indication for review: BP and LOC    Bedside Nurse, Alexandra contacted, no concerns verbalized at this time, instructed to call [phone] for further concerns or assistance..

## 2022-02-11 NOTE — ASSESSMENT & PLAN NOTE
Initially requiring vasopressors in the ICU, now weaned. However BP has been trending down over the past few days, likely due to infection

## 2022-02-11 NOTE — ANESTHESIA PREPROCEDURE EVALUATION
02/10/2022  Xuan Cousin is a 41 y.o., female.  Pre-operative evaluation for Procedure(s) (LRB):  DEBRIDEMENT, LOWER EXTREMITY (Right)    NPO >8  METS 1-3 bedbound    Vitals:    02/10/22 1824   BP: (!) 100/51   Pulse: 100   Resp: 18   Temp: 36.9 °C (98.4 °F)         Patient Active Problem List   Diagnosis    Anemia of renal disease    Hypertensive CKD (chronic kidney disease)    Nephrotic range proteinuria    Metabolic acidosis    Increased prolactin level    Secondary hyperparathyroidism    Iron deficiency anemia    Vitamin D deficiency    Galactorrhea    Cataract    Class 1 obesity due to excess calories with serious comorbidity in adult    Chronic fatigue    Missed menses    Uterine leiomyoma    Type 2 diabetes mellitus with stage 5 chronic kidney disease not on chronic dialysis, with long-term current use of insulin    Hypertension    Hypertensive urgency    Symptomatic anemia    End stage renal disease    Uremia    CKD (chronic kidney disease) stage 5, GFR less than 15 ml/min    Nausea & vomiting    Thrombocytopenia    Hematemesis    Goals of care, counseling/discussion    Calciphylaxis    Type 2 diabetes mellitus    COVID-19 virus infection    Renovascular hypertension    Hyperphosphatemia    Candida esophagitis    Sepsis due to gram-negative UTI    Sinus tachycardia    Slow transit constipation    Debility    Esophagitis, [place] grade D    Hyponatremia    Leukocytosis    Coagulopathy    Septic shock    Necrotizing fasciitis    Cellulitis of thigh    Malnutrition    Hyperbilirubinemia       Review of patient's allergies indicates:   Allergen Reactions    Vicodin [hydrocodone-acetaminophen] Hives    Codeine Hives       No current facility-administered medications on file prior to encounter.     Current Outpatient Medications on File Prior to Encounter    Medication Sig Dispense Refill    calcium carbonate (TUMS) 200 mg calcium (500 mg) chewable tablet Take 2 tablets (1,000 mg total) by mouth 3 (three) times daily as needed. 150 tablet 0    cinacalcet (SENSIPAR) 30 MG Tab Take 1 tablet (30 mg total) by mouth daily with breakfast. 30 tablet 11    epoetin kelsey-epbx (RETACRIT) 2,000 unit/mL injection Inject 7 mLs (14,000 Units total) into the skin every Mon, Wed, Fri. With dialysis      ferrous gluconate (FERGON) 324 MG tablet Take 1 tablet (324 mg total) by mouth daily with breakfast. 30 tablet 2    gabapentin (NEURONTIN) 100 MG capsule Take 1 capsule (100 mg total) by mouth 3 (three) times daily. 90 capsule 0    metoprolol tartrate (LOPRESSOR) 25 MG tablet Take 1 tablet (25 mg total) by mouth 2 (two) times daily. 60 tablet 11    oxyCODONE (ROXICODONE) 10 mg Tab immediate release tablet Take 1 tablet (10 mg total) by mouth every 4 (four) hours as needed. 18 tablet 0    pantoprazole (PROTONIX) 40 MG tablet Take 1 tablet (40 mg total) by mouth 2 (two) times daily. 60 tablet 1    sevelamer carbonate (RENVELA) 800 mg Tab Take 3 tablets (2,400 mg total) by mouth 3 (three) times daily with meals. 270 tablet 11    sodium thiosulfate 12.5 gram/50 mL (250 mg/mL) injection Inject 100 mLs (25 g total) into the vein every Mon, Wed, Fri. With dialysis         Past Surgical History:   Procedure Laterality Date    AV FISTULA PLACEMENT Left 2020    Procedure: CREATION, AV FISTULA, LEFT RADIOCEPHALIC FISTULA, LEFT UPPER EXTREMITY , INTRAOP ULTRASOUND, vEIN MAPPING. ;  Surgeon: Rakesh Leon MD;  Location: [facility] OR;  Service: Vascular;  Laterality: Left;  RN PREOP 20, UPT done, COVID NEGATRIVE 20  NEED H/P     SECTION, CLASSIC      DEBRIDEMENT OF BUTTOCKS N/A 2022    Procedure: DEBRIDEMENT, BUTTOCK;  Surgeon: Zhen Templeton MD;  Location: [facility] OR;  Service: General;  Laterality: N/A;    DEBRIDEMENT OF BUTTOCKS N/A 2022    Procedure:  DEBRIDEMENT, BUTTOCK;  Surgeon: Mitchell Pedro MD;  Location: [facility] OR;  Service: General;  Laterality: N/A;    ESOPHAGOGASTRODUODENOSCOPY N/A 1/26/2022    Procedure: EGD (ESOPHAGOGASTRODUODENOSCOPY);  Surgeon: Mario Alberto Irene MD;  Location: [facility] ENDO;  Service: Endoscopy;  Laterality: N/A;    INSERTION OF TUNNELED CENTRAL VENOUS CATHETER (CVC) WITH SUBCUTANEOUS PORT N/A 1/27/2020    Procedure: IR TUNNELED VATH INSERT WITHOUT PORT;  Surgeon: Prachi Diagnostic Provider;  Location: [facility] OR;  Service: Radiology;  Laterality: N/A;  1030AM START  RN PREOP 1/24/2020    INSERTION OF TUNNELED CENTRAL VENOUS CATHETER (CVC) WITH SUBCUTANEOUS PORT N/A 5/22/2020    Procedure: TUNNELED CATH PLACEMENT;  Surgeon: Omer Diagnostic Provider;  Location: [facility] OR;  Service: Radiology;  Laterality: N/A;  930AM START    REVISION OF ARTERIOVENOUS FISTULA Left 5/8/2020    Procedure: REVISION, AV FISTULA, THROMBECTOMY, LEFT UPPER EXTREMITY;  Surgeon: Rakesh Leon MD;  Location: [facility] OR;  Service: Vascular;  Laterality: Left;    TUBAL LIGATION         Social History     Socioeconomic History    Marital status: Single    Number of children: 2   Occupational History    Occupation: Unemployed   Tobacco Use    Smoking status: Former Smoker     Types: Cigarettes    Smokeless tobacco: Never Used   Substance and Sexual Activity    Alcohol use: No    Drug use: Yes     Types: Marijuana     Comment: Occassional Recreational Use only    Sexual activity: Not Currently     Partners: Male   Social History Narrative    Currently unemployed has two young children ages 5 and 7 and she is the sole caretaker         CBC:   Recent Labs     02/09/22 0534 02/10/22  0512   WBC 24.91* 30.73*   RBC 2.54* 2.41*   HGB 7.6* 7.2*   HCT 23.1* 22.3*    192   MCV 91 93   MCH 29.9 29.9   MCHC 32.9 32.3       CMP:   Recent Labs     02/09/22 0534 02/10/22  0512    134*   K 3.8 4.1    99   CO2 22* 20*   BUN 24* 31*   CREATININE 3.5* 4.5*    GLU 78 55*   MG 1.6 1.7   PHOS 3.1 3.8   CALCIUM 7.3* 7.4*   ALBUMIN 0.7* 0.7*   PROT 4.6* 4.8*   ALKPHOS 146* 142*   ALT 20 19   AST 36 31   BILITOT 5.1* 5.4*       INR  No results for input(s): PT, INR, PROTIME, APTT in the last 72 hours.        Diagnostic Studies:      EKG:  Vent. Rate : 100 BPM     Atrial Rate : 100 BPM      P-R Int : 152 ms          QRS Dur : 084 ms       QT Int : 336 ms       P-R-T Axes : 077 075 068 degrees      QTc Int : 433 ms     Normal sinus rhythm   Early repolarization   ?pericarditis   Nonspecific T wave abnormality   Abnormal ECG   When compared with ECG of 01-FEB-2022 13:32,   No significant change was found   Confirmed by Jay Rothman MD ([id number]) on 2/2/2022 11:35:56 AM     2D Echo:  No results found for this or any previous visit.  1/13/22  · The estimated ejection fraction is 65%.  · The left ventricle is normal in size with concentric remodeling and normal systolic function.  · Grade II left ventricular diastolic dysfunction.  · Normal right ventricular size with normal right ventricular systolic function.  · Moderate left atrial enlargement.  · Mild right atrial enlargement.  · Normal central venous pressure (3 mmHg).  · The estimated PA systolic pressure is 17 mmHg      Anesthesia Evaluation    I have reviewed the Patient Summary Reports.    I have reviewed the Nursing Notes. I have reviewed the NPO Status.   I have reviewed the Medications.     Review of Systems  Anesthesia Hx:  No problems with previous Anesthesia  History of prior surgery of interest to airway management or planning:  Denies Personal Hx of Anesthesia complications.   Social:  Non-Smoker    Hematology/Oncology:     Oncology Normal    -- Anemia: Hematology Comments: 1 u PRBCs on call to OR    EENT/Dental:EENT/Dental Normal   Cardiovascular:   Exercise tolerance: poor Hypertension hyperlipidemia ECG has been reviewed.    Pulmonary:  Pulmonary Normal    Renal/:   Chronic Renal Disease     Hepatic/GI:  Hepatic/GI Normal    Musculoskeletal:   Nec fasc   Endocrine:   Diabetes, type 2        Physical Exam  General:  Obesity    Airway/Jaw/Neck:  Airway Findings: Tongue: Normal Mallampati: III  TM Distance: 4 - 6 cm     Eyes/Ears/Nose:  EYES/EARS/NOSE FINDINGS: Normal         Mental Status:  Mental Status Findings:  Lethargic, Confusion         Anesthesia Plan  Type of Anesthesia, risks & benefits discussed:  Anesthesia Type:  general    Patient's Preference:   Plan Factors:          Intra-op Monitoring Plan: standard ASA monitors  Intra-op Monitoring Plan Comments:   Post Op Pain Control Plan: multimodal analgesia  Post Op Pain Control Plan Comments:     Induction:   IV  Beta Blocker:         Informed Consent: Patient representative understands risks and agrees with Anesthesia plan.  Questions answered. Anesthesia consent signed with patient representative.  ASA Score: 4  emergent   Day of Surgery Review of History & Physical:  There are no significant changes.      Anesthesia Plan Notes: Sister carol signs consent, paper phone consent in chart        Ready For Surgery From Anesthesia Perspective.

## 2022-02-11 NOTE — ASSESSMENT & PLAN NOTE
Baseline Hgb around 7.5, Presents with Hgb of 4.3  No obvious bleeding, had EGD last admission with esophagitis  Transfused 3 units of blood with adequate correction of H/H  Continue to monitor.  - transfuse as needed   - loss over this hospital stay likely due to surgery

## 2022-02-11 NOTE — PT/OT/SLP PROGRESS
Speech Language Pathology Treatment    Patient Name:  Xuan Ricardo   MRN:  [id number]  Admitting Diagnosis: Necrotizing fasciitis    Recommendations:                 General Recommendations:  Dysphagia therapy, Alternate means of nutrition 2/2 pt likely not meeting nutritional needs solely orally   Diet recommendations:  Mechanical soft,Finely chopped meat, Liquid Diet Level: Thin   Aspiration Precautions: Assistance with meals, Feed only when awake/alert, Frequent oral care, Meds crushed in puree and Small bites/sips   General Precautions: Standard, aspiration  Communication strategies:  provide increased time to answer    Subjective     ST entered pt's room with niece feeding pt Popeyes. ST educated niece on pt's current full liquids diet, however, pt proceeds to tell ST that she ate pancakes that were brought from home this AM for breakfast w/o difficulty, however, cont dcr appetite. ST was agreeable to attempt soft solid trials 2/2 pt's increased alertness and oral acceptance of boluses. ST utilized items from [employer], and provided education as pt consumed po trials.     Pain/Comfort:  · Pain Rating 1: 0/10    Respiratory Status: Room air    Objective:     Has the patient been evaluated by SLP for swallowing?   Yes  Keep patient NPO? No   Current Respiratory Status:        ST utilized the following items from [employer] to assess pt's safety to advance diet at this time: x2 bites of chicken, x3 bites of red beans, and x4 single-straw sips of juice. Pt with mod-severe prolonged mastication of chicken x1, which was negatively impacted by the pt's niece providing too large bites of solid. Pt's niece educated on importance of small bites of solids 2/2 pt's prolonged mastication time. Pt with prolonged mastication of soft solid, red beans, however, with increased time and liquid wash provided, the pt was able to clear all boluses from the oral cavity. ST noted improved oral awareness/acceptance of boluses, however, pt  cont to require mod verbal cues to cont to masticate during solid trials. No overt s/s of aspiration noted across consistencies.     ST provided skilled education to pt's niece regarding diet recs (advance to mech soft- IDDSI 5), and the importance of implementing safe swallow strategies (provide pt with small bites, alternate bites/ sips, check oral cavity for pocketing prior to consecutive bites, HOB at 90 degrees for all po intake) throughout meals to dcr the risk of aspiration. Pt's niece verbalized an understanding of all information presented, however, will need cont reinforcement.     Assessment:     ST recs [facility] to [facility] soft (IDDSI 5), with 1:1 A provided for all meals. Cont meds crushed in puree. ST will cont to follow.     Goals:   Multidisciplinary Problems     SLP Goals        Problem: SLP Goal    Goal Priority Disciplines Outcome   SLP Goal    Low SLP Ongoing, Progressing   Description: STGS  1. Pt will tolerate full liquid diet without overt s/s of aspiration- met 2/11.  2. Pt will participate in ongoing assessment of swallow.   3. Pt will tolerate mech soft diet (IDDSI 5) with thin liquids w/o overt of aspiration.                    Plan:     · Patient to be seen:  3 x/week   · Plan of Care expires:  02/17/22  · Plan of Care reviewed with:      · SLP Follow-Up:  Yes       Discharge recommendations:  other (see comments) (TBD)   Barriers to Discharge:  None    Time Tracking:     SLP Treatment Date:   02/11/22  Speech Start Time:  1125  Speech Stop Time:  1145     Speech Total Time (min):  20 min    Billable Minutes: Treatment Swallowing Dysfunction 20    02/11/2022

## 2022-02-11 NOTE — PROGRESS NOTES
Ms. Cousin Staff turned pt as often as she would let us. Pt is in bed resting. Call light in reach. Bed locked and low. No acute changes overnight. Hourly rounding. All question and concerns answered. Will continue to monitor for any changes and follow plan of care.    Pt has a bm today. Wound cleansed and new dressing applied.

## 2022-02-11 NOTE — PROGRESS NOTES
[facility] [place] [facility] S[facility] Medicine  Progress Note    Patient Name: Xuan Ricardo  MRN: [id number]  Patient Class: IP- Inpatient   Admission Date: 2/1/2022  Length of Stay: 10 days  Attending Physician: Adebayo Reyes MD  Primary Care Provider: Katharine Franks MD        Subjective:     Principal Problem:Goals of care, counseling/discussion        HPI:  40 y/o female presented to ER with hypotension.  Patient was recently started on dialysis and had not had dialysis for one week since last hospital discharge.  She was noted to have multiple abnormalities on presentation.  She was severely anemic with Hgb of 4.3.  transfused 2 units of blood with adequate correction of H/H.  Patient was also noted to have AG metabolic acidosis with hyperglycemia.  Stated on insulin drip for DKA.  Leukocytosis on CBC.  Patient with erythema and blistering of bilateral inner thing and buttocks.  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  Started on ABX's and Surgery consulted.        Overview/Hospital Course:  40 y/o female presented to ER with hypotension.  Patient was recently started on dialysis and had not had dialysis for one week since last hospital discharge.  She was noted to have multiple abnormalities on presentation.  She was severely anemic with Hgb of 4.3.  transfused 2 units of blood with adequate correction of H/H.  Patient was also noted to have AG metabolic acidosis with hyperglycemia.  Started on insulin drip for DKA.  Leukocytosis on CBC.  Patient with erythema and blistering of bilateral inner thighs and buttocks.  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  Started on ABX's and Surgery consulted.  Admitted with necrotizing fascitis of the buttocks. Surgery consulted and patient brought to OR 2/2/22 with significant debridement. On antibiotics. Mental status worsening. Palliative Care consulted, family updated. Brought back to OR on 2/4/22 for debridement as well. Weaned off  norepinephrine, on broad spectrum antibiotics pending cultures. Long recovery ahead. Concern that may need diverting colostomy in the future.not on any anticoagulation,she has also history of HIT.  Culture is growing bacteroides,wound care is consulted.she is bed bound, did not consulted PT,OT   CC today is pain.      Interval History: This AM pt lethargic, awake and tracks, nods no when asked if any pain or if she needs anyuthing, otherwise does not answer questions.    Review of Systems   Unable to perform ROS: Mental status change     Objective:     Vital Signs (Most Recent):  Temp: 97.3 °F (36.3 °C) (02/11/22 1207)  Pulse: 104 (02/11/22 1220)  Resp: 18 (02/11/22 1450)  BP: (!) 97/54 (02/11/22 1207)  SpO2: 98 % (02/11/22 1220) Vital Signs (24h Range):  Temp:  [97.2 °F (36.2 °C)-98.4 °F (36.9 °C)] 97.3 °F (36.3 °C)  Pulse:  [] 104  Resp:  [14-20] 18  SpO2:  [95 %-100 %] 98 %  BP: ()/(51-82) 97/54     Weight: 99.1 kg (218 lb 7.6 oz)  Body mass index is 37.48 kg/m².    Intake/Output Summary (Last 24 hours) at 2/11/2022 1535  Last data filed at 2/10/2022 2200  Gross per 24 hour   Intake 743.33 ml   Output 100 ml   Net 643.33 ml      Physical Exam  Constitutional:       General: She is not in acute distress.     Appearance: She is ill-appearing. She is not toxic-appearing or diaphoretic.   Cardiovascular:      Rate and Rhythm: Normal rate and regular rhythm.      Heart sounds: No murmur heard.  No gallop.    Pulmonary:      Effort: Pulmonary effort is normal. No respiratory distress.      Breath sounds: Normal breath sounds. No wheezing.   Abdominal:      General: Bowel sounds are normal. There is no distension.      Palpations: Abdomen is soft.      Tenderness: There is no abdominal tenderness.         Significant Labs: All pertinent labs within the past 24 hours have been reviewed.    Significant Imaging: I have reviewed all pertinent imaging results/findings within the past 24  hours.      Assessment/Plan:      * Goals of care, counseling/discussion  General surgery expressed concerns about the combination of extraordinarily small odds of recovery and significant suffering through the process. Family initially agreed to hospice, however when SW called them they had more questions about curative-focused therapy. Attempted to call the family x 3 to clarify. Agree w/ surgery's assessment.    Necrotizing fasciitis  Has hx of calciphylaxis to bilateral thighs, now w/ superinfection nec fasc. CT with subcutaneous air in buttocks tissues. Underwent debridement 2/2, 2/4, 2/11. Stabilized hemodynamically, however pt w/ large, open wounds that will be difficult to heal.  - continue broad spectrum abx  - surgery following  - will likely need loop colostomy to keep wounds clear from feces  - wound care following  - see malnutrition elsewhere      Leukocytosis  Persistent leukocytosis due to open wounds w/ likely superinfection and inflammation from surgery  - broadened to meropenem given borderline hypotension  - surgery following       End stage renal disease  Refused dialysis at prior admissions, now non-compliant after leaving the hospital  - nephrology consulted for HD.      Hyperbilirubinemia  Isolated hyperbilirubinemia of unclear origin. CT/RUQ US w/ sludge. No RUQ pain or fevers, no imaging evidence of biliary inflammation  - direct bilirubin to fractionate, however doubt this is unconjugated issue      Malnutrition  Pt w/ markedly poor PO intake, particularly concerning given large wounds that will not heal without nutritional support.   - discussed w/ patient, will need alternate means of nutrition if pt unable to meet needs by mouth      Cellulitis of thigh  As above      Septic shock  Initially requiring vasopressors in the ICU, now weaned. However BP has been trending down over the past few days, likely due to infection      Coagulopathy  Likely due to sepsis/infection, now  improved      Hyponatremia  Now resolved      Esophagitis, [place] grade D  PPI bid      Debility  Bed bound on baseline.      Sinus tachycardia  Continued sinus tach      COVID-19 virus infection  Patient initially tested positive for Covid more than one month ago.  Asymptomatic from Covid with no evidence of pneumonia on CXR.  - stop precautions        Calciphylaxis  Hx of calciphylaxis after repeatedly refusing HD  - nephrology consulted  - sodium thiosulfate      Type 2 diabetes mellitus with stage 5 chronic kidney disease not on chronic dialysis, with long-term current use of insulin  No becoming hypoglycemic due to poor po intake  - hold insulin  - see malnutrition below        Hypertensive CKD (chronic kidney disease)  Patient initially hypotensive requiring pressor support  - now weaned off and normotensive    Anemia of renal disease  Baseline Hgb around 7.5, Presents with Hgb of 4.3  No obvious bleeding, had EGD last admission with esophagitis  Transfused 3 units of blood with adequate correction of H/H  Continue to monitor.  - transfuse as needed   - loss over this hospital stay likely due to surgery        VTE Risk Mitigation (From admission, onward)         Ordered     heparin (porcine) injection 3,200 Units  As needed (PRN)         02/03/22 0024     IP VTE HIGH RISK PATIENT  Once         02/01/22 1816     Place sequential compression device  Until discontinued         02/01/22 1816                Discharge Planning   PAMELA:      Code Status: Full Code   Is the patient medically ready for discharge?:     Reason for patient still in hospital (select all that apply): Patient trending condition, Laboratory test, Treatment, Consult recommendations and Pending disposition  Discharge Plan A: Long-term acute care facility (LTAC)   Discharge Delays: (!) Post-Acute Set-up              Adebayo Reyes MD  Department of Hospital Medicine   [facility] [place] [facility] S[facility] [place]

## 2022-02-11 NOTE — PROGRESS NOTES
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 ;  Surgeon: Rakesh Leon MD;  Location: [facility] OR;  Service: Vascular;  Laterality: Left;  RN PREOP 20, UPT done, COVID NEGATRIVE 20  NEED H/P     SECTION, CLASSIC      DEBRIDEMENT OF BUTTOCKS N/A 2022    Procedure: DEBRIDEMENT, BUTTOCK;  Surgeon: Zhen Templeton MD;  Location: [facility] OR;  Service: General;  Laterality: N/A;    DEBRIDEMENT OF BUTTOCKS N/A 2022    Procedure: DEBRIDEMENT, BUTTOCK;  Surgeon: Mitchell Pedro MD;  Location: [facility] OR;  Service: General;  Laterality: N/A;    DEBRIDEMENT OF LOWER EXTREMITY Right 2/10/2022    Procedure: DEBRIDEMENT, LOWER EXTREMITY;  Surgeon: Simón Blanco MD;  Location: [facility] OR;  Service: General;  Laterality: Right;    ESOPHAGOGASTRODUODENOSCOPY N/A 2022    Procedure: EGD (ESOPHAGOGASTRODUODENOSCOPY);  Surgeon: Mario Alberto Irene MD;  Location: [facility] ENDO;  Service: Endoscopy;  Laterality: N/A;    INSERTION OF TUNNELED CENTRAL VENOUS CATHETER (CVC) WITH SUBCUTANEOUS PORT N/A 2020    Procedure: IR TUNNELED VATH INSERT WITHOUT PORT;  Surgeon: Prachi Diagnostic Provider;  Location: [facility] OR;  Service: Radiology;  Laterality: N/A;  1030AM START  RN PREOP 2020    INSERTION OF TUNNELED CENTRAL VENOUS CATHETER (CVC) WITH SUBCUTANEOUS PORT N/A 2020    Procedure: TUNNELED CATH PLACEMENT;  Surgeon: Prachi Diagnostic  Provider;  Location: [facility] OR;  Service: Radiology;  Laterality: N/A;  930AM START    REVISION OF ARTERIOVENOUS FISTULA Left 5/8/2020    Procedure: REVISION, AV FISTULA, THROMBECTOMY, LEFT UPPER EXTREMITY;  Surgeon: Rakesh Leon MD;  Location: [facility] OR;  Service: Vascular;  Laterality: Left;    TUBAL LIGATION       Review of patient's allergies indicates:   Allergen Reactions    Vicodin [hydrocodone-acetaminophen] Hives    Codeine Hives       Current Facility-Administered Medications   Medication    0.9%  NaCl infusion (for blood administration)    ascorbic acid (vitamin C) tablet 1,000 mg    cinacalcet tablet 30 mg    dextrose 10% bolus 125 mL    dextrose 10% bolus 250 mL    dextrose 10% bolus 250 mL    dextrose 10% bolus 250 mL    dextrose 10% bolus 250 mL    epoetin kelsey-epbx injection 10,000 Units    ferrous gluconate tablet 324 mg    gabapentin capsule 100 mg    glucagon (human recombinant) injection 1 mg    glucose chewable tablet 16 g    glucose chewable tablet 24 g    heparin (porcine) injection 3,200 Units    insulin aspart U-100 pen 0-5 Units    meropenem-0.9% sodium chloride 1 g/50 mL IVPB    morphine injection 6 mg    nystatin 100,000 unit/mL suspension 500,000 Units    ondansetron disintegrating tablet 8 mg    oxyCODONE immediate release tablet 10 mg    pantoprazole injection 40 mg    sevelamer carbonate tablet 2,400 mg    sodium chloride 0.9% flush 10 mL    And    sodium chloride 0.9% flush 10 mL    [START ON 2/12/2022] sodium thiosulfate 12.5 gram/50 mL (250 mg/mL) injection 25 g    vancomycin - pharmacy to dose    vitamin renal formula (B-complex-vitamin c-folic acid) 1 mg per capsule 1 capsule    zinc sulfate capsule 220 mg       LABS    Recent Results (from the past 24 hour(s))   POCT glucose    Collection Time: 02/10/22  9:11 PM   Result Value Ref Range    POCT Glucose 41 (LL) 70 - 110 mg/dL   POCT glucose    Collection Time: 02/10/22 10:38 PM   Result Value  Ref Range    POCT Glucose 147 (H) 70 - 110 mg/dL   Comprehensive metabolic panel    Collection Time: 02/11/22  5:50 AM   Result Value Ref Range    Sodium 134 (L) 136 - 145 mmol/L    Potassium 4.6 3.5 - 5.1 mmol/L    Chloride 102 95 - 110 mmol/L    CO2 21 (L) 23 - 29 mmol/L    Glucose 113 (H) 70 - 110 mg/dL    BUN 17 6 - 20 mg/dL    Creatinine 3.0 (H) 0.5 - 1.4 mg/dL    Calcium 7.5 (L) 8.7 - 10.5 mg/dL    Total Protein 4.8 (L) 6.0 - 8.4 g/dL    Albumin 0.7 (L) 3.5 - 5.2 g/dL    Total Bilirubin 5.9 (H) 0.1 - 1.0 mg/dL    Alkaline Phosphatase 210 (H) 55 - 135 U/L    AST 38 10 - 40 U/L    ALT 21 10 - 44 U/L    Anion Gap 11 8 - 16 mmol/L    eGFR if African American 21 (A) >60 mL/min/1.73 m^2    eGFR if non African American 18 (A) >60 mL/min/1.73 m^2   Magnesium    Collection Time: 02/11/22  5:50 AM   Result Value Ref Range    Magnesium 1.6 1.6 - 2.6 mg/dL   Phosphorus    Collection Time: 02/11/22  5:50 AM   Result Value Ref Range    Phosphorus 4.1 2.7 - 4.5 mg/dL   CBC auto differential    Collection Time: 02/11/22  5:50 AM   Result Value Ref Range    WBC 37.77 (H) 3.90 - 12.70 K/uL    RBC 2.65 (L) 4.00 - 5.40 M/uL    Hemoglobin 8.1 (L) 12.0 - 16.0 g/dL    Hematocrit 24.9 (L) 37.0 - 48.5 %    MCV 94 82 - 98 fL    MCH 30.6 27.0 - 31.0 pg    MCHC 32.5 32.0 - 36.0 g/dL    RDW 18.6 (H) 11.5 - 14.5 %    Platelets 176 150 - 450 K/uL    MPV 10.4 9.2 - 12.9 fL    Immature Granulocytes CANCELED 0.0 - 0.5 %    Immature Grans (Abs) CANCELED 0.00 - 0.04 K/uL    Lymph # CANCELED 1.0 - 4.8 K/uL    Mono # CANCELED 0.3 - 1.0 K/uL    Eos # CANCELED 0.0 - 0.5 K/uL    Baso # CANCELED 0.00 - 0.20 K/uL    nRBC 0 0 /100 WBC    Gran % 84.0 (H) 38.0 - 73.0 %    Lymph % 6.0 (L) 18.0 - 48.0 %    Mono % 9.0 4.0 - 15.0 %    Eosinophil % 0.0 0.0 - 8.0 %    Basophil % 0.0 0.0 - 1.9 %    Bands 1.0 %    Platelet Estimate Appears normal     Aniso Slight     Poik Slight     Poly Occasional     Differential Method Manual    ]    I/O last 3 completed  shifts:  In: 1763.3 [P.O.:120; Blood:393.3; Other:900; IV Piggyback:350]  Out: 1600 [Other:1500; Blood:100]    Vitals:    02/11/22 0747 02/11/22 1207 02/11/22 1220 02/11/22 1450   BP: (!) 106/59 (!) 97/54     Pulse: 103 102 104    Resp: 18 17 17 18   Temp: 97.8 °F (36.6 °C) 97.3 °F (36.3 °C)     TempSrc:       SpO2: 95% 98% 98%    Weight:       Height:           No Jvd, Thyromegaly or Lymphadenopathy  Lungs: Fairly clear anteriorly and laterally  Cor: RRR no G or rubs  Abd: Soft benign good bowel sounds non tender  Ext: No E C C    A)  Sepsis with shock  Necrotizing fascitis gluteal area  Hx of calciphylaxis  Sp dka  Recent covid infection  Dm  Anemia  2nd hyperpth (pth 612)  Hx of non compliance     P)  Renal Diet  Home meds  Protect access  HD  Na thiosulfate with hd   EPO prn  Binders prn   Adjust all meds to the degree of renal fx  Close follow up I/O and weights  Maintain Hydration   ADD vit d analogs(unable to use as she has hypercalcemia)  Add sensipar

## 2022-02-11 NOTE — PLAN OF CARE
TN spoke with patient's family regarding discharge planning with possible hospice. Pt's sister inquired about wound treatment and plan of care. TN explained hospice as possible next level of care for patient and offered resources for hospice agencies. Family agreed to review the printout of hospice list left at bedside. TN informed physician of clinical concerns from family. TN to continue to follow.    02/11/22 1754   Discharge Reassessment   Assessment Type Discharge Planning Reassessment   Did the patient's condition or plan change since previous assessment? Yes   Discharge Plan discussed with: Adult children   Name(s) and Number(s) Tressa Aguirre (Sister)   [phone]   Communicated PAMELA with patient/caregiver Date not available/Unable to determine   Discharge Plan A Long-term acute care facility (LTAC)   Discharge Plan B Hospice/home;Inpatient Hospice   DME Needed Upon Discharge  other (see comments)  (TBD)   Discharge Barriers Identified Other (see comments)   Why the patient remains in the hospital Requires continued medical care   Post-Acute Status   Post-Acute Authorization Hospice   Post-Acute Placement Status Pending medical clearance/testing   Hospice Status   (Provided list)   Diaylsis Status Set-up Complete/Auth obtained  (Alonso KENNEY 2pm start date 2/14)   Coverage PHN   Patient choice form signed by patient/caregiver List from System Post-Acute Care   Discharge Delays (!) Post-Acute Set-up

## 2022-02-11 NOTE — TRANSFER OF CARE
"Anesthesia Transfer of Care Note    Patient: Xuan Ricardo    Procedure(s) Performed: Procedure(s) (LRB):  DEBRIDEMENT, LOWER EXTREMITY (Right)    Patient location: PACU    Anesthesia Type: general    Transport from OR: Transported from OR on 6-10 L/min O2 by face mask with adequate spontaneous ventilation    Post pain: adequate analgesia    Post assessment: no apparent anesthetic complications and tolerated procedure well    Post vital signs: stable    Level of consciousness: awake and alert    Nausea/Vomiting: no nausea/vomiting    Complications: none    Transfer of care protocol was followed      Last vitals:   Visit Vitals  BP (!) 119/56 (Patient Position: Lying)   Pulse (!) 120   Temp 36.3 °C (97.3 °F)   Resp 14   Ht 5' 4.02" (1.626 m)   Wt 99.1 kg (218 lb 7.6 oz)   LMP  (LMP Unknown)   SpO2 100%   Breastfeeding No   BMI 37.48 kg/m²     "

## 2022-02-11 NOTE — ANESTHESIA PROCEDURE NOTES
Intubation    Date/Time: 2/10/2022 7:51 PM  Performed by: Anjali Gil CRNA  Authorized by: Anjali Gil CRNA     Intubation:     Induction:  Intravenous    Intubated:  Postinduction    Mask Ventilation:  Easy with oral airway    Attempts:  1    Attempted By:  CRNA    Method of Intubation:  Video laryngoscopy    Blade:  Campos 3    Laryngeal View Grade: Grade I - full view of cords      Difficult Airway Encountered?: No      Complications:  None    Airway Device Size:  7.0    Style/Cuff Inflation:  Cuffed (inflated to minimal occlusive pressure)    Tube secured:  21    Secured at:  The lips    Placement Verified By:  Capnometry    Complicating Factors:  None    Findings Post-Intubation:  BS equal bilateral and atraumatic/condition of teeth unchanged

## 2022-02-11 NOTE — PROGRESS NOTES
Surgery note    41 yr old black female in poor condition with ESRD, s/p multiple debridements of separate necrotizing soft tissue infections of calciphylaxis wounds.  I was unable to really evaluate the wounds due to severe pain with removing dressing.  Patient family speaking on her behalf.  I discussed options including further debridements, feeding tube for nutrition, diverting colostomy however in the grand scheme of the patient's situation she has very poor prognosis, given multiple necrotizing soft tissue infections of her calciphylaxis wounds, which are making larger and larger wounds for which wound care will be a very prolonged process with a poor chance of healing we discussed the aforementioned issues of the palliative care team including goals of care and wants to further and advocated for hospice given the patient's suffering and poor prognosis.  Family including her sister who makes most of her medical again decisions her cousin and her niece were all in on the discussion in all agreeable to discussing hospice with palliative care team.  The patient was in no acute distress during this time during discussion however was lying in bed and once again is into sick of the condition given her chronic conditions as well as the acute worsening to the point where she is not wearing it on the incisions.    We did discuss to defer further surgeries including diverting colostomy including further debridement at this time and after discussing with palliative care and hospital medicine they will further discussions to begin discharge planning including inpatient hospice.

## 2022-02-11 NOTE — PHYSICIAN QUERY
"PT Name: Xuan Ricardo  MR #: [id number]    DOCUMENTATION CLARIFICATION     CDS/: Wendy Salas RN              Contact information: [email or web address]  This form is a permanent document in the medical record.    Query Date: February 11, 2022  By submitting this query, we are merely seeking further clarification of documentation. Please utilize your independent clinical judgment when addressing the question(s) below.    The Medical Record contains the following:   Indicator Supporting Clinical Findings Location in Medical Record   x Documentation of "Debridement" Pre-Operative Diagnosis: Calciphylaxis   Cellulitis of thigh     Post-Operative Diagnosis: Post-Op Diagnosis Codes:  Calciphylaxis   Cellulitis of thigh      Operative Findings (including complications, if any):    20 x 19 x 3 cm deep defect with calciphylaxis as well as necrotic tissue.     Description of Technical Procedures:      Sharp debridement was done around this entire area for depth of approximately 3 cm.  All the dead tissue was removed sent to pathology for analysis.     2/10 General surgery PN-   Debridement of Lower extremeity    Documentation of "I&D"      Other       Excisional debridement is the surgical removal or cutting away of such tissue, necrosis, or slough and is classified to the root operation "Excision." Use of a sharp instrument does not always indicate that an excisional debridement was performed. Minor removal of loose fragments with scissors or using a sharp instrument to scrape away tissue is not an excisional debridement. Excisional debridement involves the use of a scalpel to remove devitalized tissue.    Nonexcisional debridement is the nonoperative brushing, irrigating, scrubbing, or washing of devitalized tissue, necrosis, slough, or foreign material. Most nonexcisional debridement procedures are classified to the root operation "Extraction" (pulling or stripping out or off all or a portion of a body " part by the use of force).     Provider, please provide further clarification on the procedure performed on Right Thigh :    [  x ] Excisional Debridement of skin   [   ] Excisional Debridement of subcutaneous tissue/fascia   [   ] Excisional Debridement of muscle        [   ] Nonexcisional Debridement of skin   [   ] Nonexcisional Debridement of subcutaneous tissue/fascia   [   ] Nonexcisional Debridement of muscle       [   ] Other Procedure (please specify): _____________   [  ] Clinically Undetermined     Reference:    ICD-10-CM/PCS Coding Clinic Third Quarter ICD-10, Effective with discharges: October 7, 2015 [facility] [employer] § Excisional and nonexcisional debridement (2015).    Form No. 45406

## 2022-02-11 NOTE — PROGRESS NOTES
Pt very quiet on arrival to [facility], will say her name and date of birth. Pt is fully awake and responsive

## 2022-02-11 NOTE — OP NOTE
[facility]  General Surgery  Operative Note    SUMMARY     Date of Procedure: 2/10/2022     Procedure: Procedure(s) (LRB):  DEBRIDEMENT, LOWER EXTREMITY (Right)       Surgeon(s) and Role:     * Simón Blanco MD - Primary    Assisting Surgeon:  Carlos Alberto Christianson MD    Pre-Operative Diagnosis: Calciphylaxis [E83.59]  Cellulitis of thigh [L03.119]    Post-Operative Diagnosis: Post-Op Diagnosis Codes:     * Calciphylaxis [E83.59]     * Cellulitis of thigh [L03.119]    Anesthesia: General    Operative Findings (including complications, if any):  20 x 19 x 3 cm deep defect with calciphylaxis as well as necrotic tissue.    Description of Technical Procedures:  Patient was taken to the operating room placed on operating table supine position under adequate general anesthesia was placed in lithotomy prepped and draped around her right thigh in usual sterile fashion.  A time-out was taken surgical checklist was discussed.  Sharp debridement was done around this entire area for depth of approximately 3 cm. All the dead tissue was removed sent to pathology for analysis.  Hemostasis was obtained electrocautery or with the suture ligation.  The wound was then packed with Betadine-soaked gauze as well as covering gauze.  She was awakened and transported to recovery room in guarded but stable condition.    Significant Surgical Tasks Conducted by the Assistant(s), if Applicable:  Greater than 50%    Estimated Blood Loss (EBL): * No values recorded between 2/10/2022  8:11 PM and 2/10/2022  8:47 PM *           Implants: * No implants in log *    Specimens:   Specimen (24h ago, onward)            None                  Condition: Stable    Disposition: PACU - guarded condition.    Attestation: I was present and scrubbed for the entire procedure.

## 2022-02-11 NOTE — NURSING
[facility] MEWS TRIGGER FOLLOW UP       MEWS Monitoring, Score is:3  Indication for review: BP    Bedside Nurse contacted, MD aware/ following, instructed to call [phone] for further concerns or assistance.     Spoke with Dr. Reyes regarding hypotension.  MD aware and floor following BP closely.  Call RRN with any concerns.

## 2022-02-11 NOTE — AI DETERIORATION ALERT
IP Sepsis Screen (most recent)     Sepsis Screen - 02/11/22 0231     Is the patient's history or complaint suggestive of a possible infection? Yes  -AD    Are there at least two of the following signs and symptoms present? Yes  -AD    Sepsis signs/symptoms - Tachycardia Tachycardia     >90  -AD    Sepsis signs/symptoms - WBC WBC < 4,000 or WBC > 12,000  -AD    Are any of the following organ dysfunction criteria present and not considered to be due to a chronic condition? Yes  -AD    Organ Dysfunction Criteria Creatinine > 2.0  -AD    Organ Dysfunction Criteria Total Bilirubin > 2.0 Platelet count < 100,000  -AD    Start Sepsis Timer No   on ABX -AD          User Key  (r) = Recorded By, (t) = Taken By, (c) = Cosigned By    Initials Name    INDIO Gordon RN

## 2022-02-11 NOTE — ASSESSMENT & PLAN NOTE
Persistent leukocytosis due to open wounds w/ likely superinfection and inflammation from surgery  - broadened to meropenem given borderline hypotension  - surgery following

## 2022-02-12 LAB
ALBUMIN SERPL BCP-MCNC: 0.8 G/DL (ref 3.5–5.2)
ALP SERPL-CCNC: 156 U/L (ref 55–135)
ALT SERPL W/O P-5'-P-CCNC: 24 U/L (ref 10–44)
ANION GAP SERPL CALC-SCNC: 11 MMOL/L (ref 8–16)
ANISOCYTOSIS BLD QL SMEAR: SLIGHT
AST SERPL-CCNC: 33 U/L (ref 10–40)
BASOPHILS # BLD AUTO: ABNORMAL K/UL (ref 0–0.2)
BASOPHILS NFR BLD: 0 % (ref 0–1.9)
BASOPHILS NFR BLD: 0 % (ref 0–1.9)
BILIRUB SERPL-MCNC: 5.1 MG/DL (ref 0.1–1)
BUN SERPL-MCNC: 24 MG/DL (ref 6–20)
CALCIUM SERPL-MCNC: 7.7 MG/DL (ref 8.7–10.5)
CHLORIDE SERPL-SCNC: 102 MMOL/L (ref 95–110)
CO2 SERPL-SCNC: 21 MMOL/L (ref 23–29)
CREAT SERPL-MCNC: 4 MG/DL (ref 0.5–1.4)
DIFFERENTIAL METHOD: ABNORMAL
DIFFERENTIAL METHOD: ABNORMAL
EOSINOPHIL # BLD AUTO: ABNORMAL K/UL (ref 0–0.5)
EOSINOPHIL NFR BLD: 0 % (ref 0–8)
EOSINOPHIL NFR BLD: 1 % (ref 0–8)
ERYTHROCYTE [DISTWIDTH] IN BLOOD BY AUTOMATED COUNT: 19.1 % (ref 11.5–14.5)
ERYTHROCYTE [DISTWIDTH] IN BLOOD BY AUTOMATED COUNT: 19.1 % (ref 11.5–14.5)
EST. GFR  (AFRICAN AMERICAN): 15 ML/MIN/1.73 M^2
EST. GFR  (NON AFRICAN AMERICAN): 13 ML/MIN/1.73 M^2
GLUCOSE SERPL-MCNC: 88 MG/DL (ref 70–110)
HCT VFR BLD AUTO: 25.1 % (ref 37–48.5)
HCT VFR BLD AUTO: 25.9 % (ref 37–48.5)
HGB BLD-MCNC: 8 G/DL (ref 12–16)
HGB BLD-MCNC: 8.2 G/DL (ref 12–16)
IMM GRANULOCYTES # BLD AUTO: ABNORMAL K/UL (ref 0–0.04)
IMM GRANULOCYTES # BLD AUTO: ABNORMAL K/UL (ref 0–0.04)
IMM GRANULOCYTES NFR BLD AUTO: ABNORMAL % (ref 0–0.5)
IMM GRANULOCYTES NFR BLD AUTO: ABNORMAL % (ref 0–0.5)
LYMPHOCYTES # BLD AUTO: ABNORMAL K/UL (ref 1–4.8)
LYMPHOCYTES NFR BLD: 7 % (ref 18–48)
LYMPHOCYTES NFR BLD: 8 % (ref 18–48)
MAGNESIUM SERPL-MCNC: 1.7 MG/DL (ref 1.6–2.6)
MCH RBC QN AUTO: 30 PG (ref 27–31)
MCH RBC QN AUTO: 30.4 PG (ref 27–31)
MCHC RBC AUTO-ENTMCNC: 31.7 G/DL (ref 32–36)
MCHC RBC AUTO-ENTMCNC: 31.9 G/DL (ref 32–36)
MCV RBC AUTO: 94 FL (ref 82–98)
MCV RBC AUTO: 96 FL (ref 82–98)
METAMYELOCYTES NFR BLD MANUAL: 1 %
MONOCYTES # BLD AUTO: ABNORMAL K/UL (ref 0.3–1)
MONOCYTES NFR BLD: 5 % (ref 4–15)
MONOCYTES NFR BLD: 6 % (ref 4–15)
MYELOCYTES NFR BLD MANUAL: 2 %
NEUTROPHILS NFR BLD: 78 % (ref 38–73)
NEUTROPHILS NFR BLD: 85 % (ref 38–73)
NEUTS BAND NFR BLD MANUAL: 7 %
NRBC BLD-RTO: 0 /100 WBC
NRBC BLD-RTO: 0 /100 WBC
PHOSPHATE SERPL-MCNC: 5.1 MG/DL (ref 2.7–4.5)
PLATELET # BLD AUTO: 179 K/UL (ref 150–450)
PLATELET # BLD AUTO: 200 K/UL (ref 150–450)
PLATELET BLD QL SMEAR: ABNORMAL
PMV BLD AUTO: 10.6 FL (ref 9.2–12.9)
PMV BLD AUTO: 10.9 FL (ref 9.2–12.9)
POCT GLUCOSE: 104 MG/DL (ref 70–110)
POCT GLUCOSE: 112 MG/DL (ref 70–110)
POCT GLUCOSE: 122 MG/DL (ref 70–110)
POCT GLUCOSE: 51 MG/DL (ref 70–110)
POCT GLUCOSE: 59 MG/DL (ref 70–110)
POIKILOCYTOSIS BLD QL SMEAR: SLIGHT
POLYCHROMASIA BLD QL SMEAR: ABNORMAL
POTASSIUM SERPL-SCNC: 4.4 MMOL/L (ref 3.5–5.1)
PROT SERPL-MCNC: 4.8 G/DL (ref 6–8.4)
RBC # BLD AUTO: 2.67 M/UL (ref 4–5.4)
RBC # BLD AUTO: 2.7 M/UL (ref 4–5.4)
SODIUM SERPL-SCNC: 134 MMOL/L (ref 136–145)
VANCOMYCIN SERPL-MCNC: 18 UG/ML
WBC # BLD AUTO: 29.86 K/UL (ref 3.9–12.7)
WBC # BLD AUTO: 32.43 K/UL (ref 3.9–12.7)

## 2022-02-12 PROCEDURE — A4216 STERILE WATER/SALINE, 10 ML: HCPCS | Performed by: STUDENT IN AN ORGANIZED HEALTH CARE EDUCATION/TRAINING PROGRAM

## 2022-02-12 PROCEDURE — 63600175 PHARM REV CODE 636 W HCPCS: Mod: JG | Performed by: STUDENT IN AN ORGANIZED HEALTH CARE EDUCATION/TRAINING PROGRAM

## 2022-02-12 PROCEDURE — 80053 COMPREHEN METABOLIC PANEL: CPT | Performed by: STUDENT IN AN ORGANIZED HEALTH CARE EDUCATION/TRAINING PROGRAM

## 2022-02-12 PROCEDURE — C9113 INJ PANTOPRAZOLE SODIUM, VIA: HCPCS | Performed by: STUDENT IN AN ORGANIZED HEALTH CARE EDUCATION/TRAINING PROGRAM

## 2022-02-12 PROCEDURE — 36415 COLL VENOUS BLD VENIPUNCTURE: CPT | Performed by: STUDENT IN AN ORGANIZED HEALTH CARE EDUCATION/TRAINING PROGRAM

## 2022-02-12 PROCEDURE — 63600175 PHARM REV CODE 636 W HCPCS: Performed by: STUDENT IN AN ORGANIZED HEALTH CARE EDUCATION/TRAINING PROGRAM

## 2022-02-12 PROCEDURE — 11000001 HC ACUTE MED/SURG PRIVATE ROOM

## 2022-02-12 PROCEDURE — 85007 BL SMEAR W/DIFF WBC COUNT: CPT | Performed by: PHYSICIAN ASSISTANT

## 2022-02-12 PROCEDURE — 84100 ASSAY OF PHOSPHORUS: CPT | Performed by: STUDENT IN AN ORGANIZED HEALTH CARE EDUCATION/TRAINING PROGRAM

## 2022-02-12 PROCEDURE — 85027 COMPLETE CBC AUTOMATED: CPT | Mod: 91 | Performed by: STUDENT IN AN ORGANIZED HEALTH CARE EDUCATION/TRAINING PROGRAM

## 2022-02-12 PROCEDURE — 25000003 PHARM REV CODE 250: Performed by: STUDENT IN AN ORGANIZED HEALTH CARE EDUCATION/TRAINING PROGRAM

## 2022-02-12 PROCEDURE — 25000003 PHARM REV CODE 250: Performed by: PHYSICIAN ASSISTANT

## 2022-02-12 PROCEDURE — 83735 ASSAY OF MAGNESIUM: CPT | Performed by: STUDENT IN AN ORGANIZED HEALTH CARE EDUCATION/TRAINING PROGRAM

## 2022-02-12 PROCEDURE — 85027 COMPLETE CBC AUTOMATED: CPT | Performed by: PHYSICIAN ASSISTANT

## 2022-02-12 PROCEDURE — 80100016 HC MAINTENANCE HEMODIALYSIS

## 2022-02-12 PROCEDURE — 85007 BL SMEAR W/DIFF WBC COUNT: CPT | Mod: 91 | Performed by: STUDENT IN AN ORGANIZED HEALTH CARE EDUCATION/TRAINING PROGRAM

## 2022-02-12 PROCEDURE — 80202 ASSAY OF VANCOMYCIN: CPT | Performed by: STUDENT IN AN ORGANIZED HEALTH CARE EDUCATION/TRAINING PROGRAM

## 2022-02-12 RX ORDER — METOPROLOL TARTRATE 1 MG/ML
5 INJECTION, SOLUTION INTRAVENOUS ONCE
Status: COMPLETED | OUTPATIENT
Start: 2022-02-12 | End: 2022-02-12

## 2022-02-12 RX ADMIN — Medication 16 G: at 08:02

## 2022-02-12 RX ADMIN — SODIUM CHLORIDE 500 ML: 0.9 INJECTION, SOLUTION INTRAVENOUS at 11:02

## 2022-02-12 RX ADMIN — HEPARIN SODIUM 3200 UNITS: 5000 INJECTION INTRAVENOUS; SUBCUTANEOUS at 10:02

## 2022-02-12 RX ADMIN — METOROPROLOL TARTRATE 5 MG: 5 INJECTION, SOLUTION INTRAVENOUS at 12:02

## 2022-02-12 RX ADMIN — DEXTROSE 125 ML: 10 SOLUTION INTRAVENOUS at 08:02

## 2022-02-12 RX ADMIN — GABAPENTIN 100 MG: 100 CAPSULE ORAL at 09:02

## 2022-02-12 RX ADMIN — NYSTATIN 500000 UNITS: 100000 SUSPENSION ORAL at 02:02

## 2022-02-12 RX ADMIN — MORPHINE SULFATE 6 MG: 4 INJECTION, SOLUTION INTRAMUSCULAR; INTRAVENOUS at 01:02

## 2022-02-12 RX ADMIN — ONDANSETRON 8 MG: 8 TABLET, ORALLY DISINTEGRATING ORAL at 09:02

## 2022-02-12 RX ADMIN — Medication 10 ML: at 06:02

## 2022-02-12 RX ADMIN — Medication 10 ML: at 12:02

## 2022-02-12 RX ADMIN — Medication 10 ML: at 11:02

## 2022-02-12 RX ADMIN — NYSTATIN 500000 UNITS: 100000 SUSPENSION ORAL at 05:02

## 2022-02-12 RX ADMIN — SEVELAMER CARBONATE 2400 MG: 800 TABLET, FILM COATED ORAL at 05:02

## 2022-02-12 RX ADMIN — EPOETIN ALFA-EPBX 10000 UNITS: 10000 INJECTION, SOLUTION INTRAVENOUS; SUBCUTANEOUS at 10:02

## 2022-02-12 RX ADMIN — MEROPENEM AND SODIUM CHLORIDE 1 G: 1 INJECTION, SOLUTION INTRAVENOUS at 02:02

## 2022-02-12 RX ADMIN — Medication 10 ML: at 02:02

## 2022-02-12 RX ADMIN — NYSTATIN 500000 UNITS: 100000 SUSPENSION ORAL at 09:02

## 2022-02-12 RX ADMIN — SODIUM THIOSULFATE 25 G: 250 INJECTION, SOLUTION INTRAVENOUS at 06:02

## 2022-02-12 RX ADMIN — PANTOPRAZOLE SODIUM 40 MG: 40 INJECTION, POWDER, FOR SOLUTION INTRAVENOUS at 09:02

## 2022-02-12 NOTE — NURSING
Patient BG 59, administered oral glucose tablet, apple jucie, patient AAOx3, family at bedside, MD notified, charge nurse at bedside feeding patient

## 2022-02-12 NOTE — ASSESSMENT & PLAN NOTE
General surgery expressed concerns about the combination of extraordinarily small odds of recovery and significant suffering through the process, and recommended hospice. Family initially agreed to hospice, however when SW called them they had more questions about curative-focused therapy.    Discussed w/ family for 45 minutes on 2/12. I expressed my concern that Ms. Cousin will not survive this illness regardless of agressiveness of treatment, and that aggressive care will increase her suffering without benefit. Her family expressed understanding of the severity of illness, and were amenable about discussion about hospice. However, at this time they need more time to discuss prior to making a decision.    I also discussed code status with the family, and recommended DNR given the above. They were unable to make a decision at the time of conversation and requested more time to discuss.

## 2022-02-12 NOTE — ASSESSMENT & PLAN NOTE
Patient initially hypotensive requiring pressor support  - now weaned off of vasopressors but tenuous hemodynamically

## 2022-02-12 NOTE — PLAN OF CARE
Problem: Adult Inpatient Plan of Care  Goal: Plan of Care Review  Outcome: Ongoing, Progressing     Problem: Diabetes Comorbidity  Goal: Blood Glucose Level Within Targeted Range  Outcome: Ongoing, Progressing     Problem: Infection  Goal: Absence of Infection Signs and Symptoms  Outcome: Ongoing, Progressing     Problem: Impaired Wound Healing  Goal: Optimal Wound Healing  Outcome: Ongoing, Progressing     Problem: Skin Injury Risk Increased  Goal: Skin Health and Integrity  Outcome: Ongoing, Progressing     Problem: Fall Injury Risk  Goal: Absence of Fall and Fall-Related Injury  Outcome: Ongoing, Progressing     Problem: Glycemic Control Impaired (Sepsis/Septic Shock)  Goal: Blood Glucose Level Within Desired Range  Outcome: Ongoing, Progressing

## 2022-02-12 NOTE — PROGRESS NOTES
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 ;  Surgeon: Rakesh Leon MD;  Location: [facility] OR;  Service: Vascular;  Laterality: Left;  RN PREOP 20, UPT done, COVID NEGATRIVE 20  NEED H/P     SECTION, CLASSIC      DEBRIDEMENT OF BUTTOCKS N/A 2022    Procedure: DEBRIDEMENT, BUTTOCK;  Surgeon: Zhen Templeton MD;  Location: [facility] OR;  Service: General;  Laterality: N/A;    DEBRIDEMENT OF BUTTOCKS N/A 2022    Procedure: DEBRIDEMENT, BUTTOCK;  Surgeon: Mitchell Pedro MD;  Location: [facility] OR;  Service: General;  Laterality: N/A;    DEBRIDEMENT OF LOWER EXTREMITY Right 2/10/2022    Procedure: DEBRIDEMENT, LOWER EXTREMITY;  Surgeon: Simón Blanco MD;  Location: [facility] OR;  Service: General;  Laterality: Right;    ESOPHAGOGASTRODUODENOSCOPY N/A 2022    Procedure: EGD (ESOPHAGOGASTRODUODENOSCOPY);  Surgeon: Mario Alberto Irene MD;  Location: [facility] ENDO;  Service: Endoscopy;  Laterality: N/A;    INSERTION OF TUNNELED CENTRAL VENOUS CATHETER (CVC) WITH SUBCUTANEOUS PORT N/A 2020    Procedure: IR TUNNELED VATH INSERT WITHOUT PORT;  Surgeon: Prachi Diagnostic Provider;  Location: [facility] OR;  Service: Radiology;  Laterality: N/A;  1030AM START  RN PREOP 2020    INSERTION OF TUNNELED CENTRAL VENOUS CATHETER (CVC) WITH SUBCUTANEOUS PORT N/A 2020    Procedure: TUNNELED CATH PLACEMENT;  Surgeon: Prachi Diagnostic  Provider;  Location: [facility] OR;  Service: Radiology;  Laterality: N/A;  930AM START    REVISION OF ARTERIOVENOUS FISTULA Left 5/8/2020    Procedure: REVISION, AV FISTULA, THROMBECTOMY, LEFT UPPER EXTREMITY;  Surgeon: Rakesh Leon MD;  Location: [facility] OR;  Service: Vascular;  Laterality: Left;    TUBAL LIGATION       Review of patient's allergies indicates:   Allergen Reactions    Vicodin [hydrocodone-acetaminophen] Hives    Codeine Hives       Current Facility-Administered Medications   Medication    0.9%  NaCl infusion (for blood administration)    ascorbic acid (vitamin C) tablet 1,000 mg    cinacalcet tablet 30 mg    dextrose 10% bolus 125 mL    dextrose 10% bolus 250 mL    dextrose 10% bolus 250 mL    dextrose 10% bolus 250 mL    dextrose 10% bolus 250 mL    epoetin kelsey-epbx injection 10,000 Units    ferrous gluconate tablet 324 mg    gabapentin capsule 100 mg    glucagon (human recombinant) injection 1 mg    glucose chewable tablet 16 g    glucose chewable tablet 24 g    heparin (porcine) injection 3,200 Units    insulin aspart U-100 pen 0-5 Units    meropenem-0.9% sodium chloride 1 g/50 mL IVPB    morphine injection 6 mg    nystatin 100,000 unit/mL suspension 500,000 Units    ondansetron disintegrating tablet 8 mg    oxyCODONE immediate release tablet 10 mg    pantoprazole injection 40 mg    sevelamer carbonate tablet 2,400 mg    sodium chloride 0.9% flush 10 mL    And    sodium chloride 0.9% flush 10 mL    sodium thiosulfate 12.5 gram/50 mL (250 mg/mL) injection 25 g    vancomycin - pharmacy to dose    vitamin renal formula (B-complex-vitamin c-folic acid) 1 mg per capsule 1 capsule    zinc sulfate capsule 220 mg       LABS    Recent Results (from the past 24 hour(s))   POCT glucose    Collection Time: 02/11/22  8:06 PM   Result Value Ref Range    POCT Glucose 96 70 - 110 mg/dL   CBC auto differential    Collection Time: 02/12/22  1:12 AM   Result Value Ref Range    WBC  32.43 (H) 3.90 - 12.70 K/uL    RBC 2.67 (L) 4.00 - 5.40 M/uL    Hemoglobin 8.0 (L) 12.0 - 16.0 g/dL    Hematocrit 25.1 (L) 37.0 - 48.5 %    MCV 94 82 - 98 fL    MCH 30.0 27.0 - 31.0 pg    MCHC 31.9 (L) 32.0 - 36.0 g/dL    RDW 19.1 (H) 11.5 - 14.5 %    Platelets 200 150 - 450 K/uL    MPV 10.6 9.2 - 12.9 fL    Immature Granulocytes CANCELED 0.0 - 0.5 %    Immature Grans (Abs) CANCELED 0.00 - 0.04 K/uL    nRBC 0 0 /100 WBC    Gran % 78.0 (H) 38.0 - 73.0 %    Lymph % 7.0 (L) 18.0 - 48.0 %    Mono % 5.0 4.0 - 15.0 %    Eosinophil % 1.0 0.0 - 8.0 %    Basophil % 0.0 0.0 - 1.9 %    Bands 7.0 %    Myelocytes 2.0 %    Differential Method Manual    Comprehensive metabolic panel    Collection Time: 02/12/22  6:52 AM   Result Value Ref Range    Sodium 134 (L) 136 - 145 mmol/L    Potassium 4.4 3.5 - 5.1 mmol/L    Chloride 102 95 - 110 mmol/L    CO2 21 (L) 23 - 29 mmol/L    Glucose 88 70 - 110 mg/dL    BUN 24 (H) 6 - 20 mg/dL    Creatinine 4.0 (H) 0.5 - 1.4 mg/dL    Calcium 7.7 (L) 8.7 - 10.5 mg/dL    Total Protein 4.8 (L) 6.0 - 8.4 g/dL    Albumin 0.8 (L) 3.5 - 5.2 g/dL    Total Bilirubin 5.1 (H) 0.1 - 1.0 mg/dL    Alkaline Phosphatase 156 (H) 55 - 135 U/L    AST 33 10 - 40 U/L    ALT 24 10 - 44 U/L    Anion Gap 11 8 - 16 mmol/L    eGFR if African American 15 (A) >60 mL/min/1.73 m^2    eGFR if non African American 13 (A) >60 mL/min/1.73 m^2   Magnesium    Collection Time: 02/12/22  6:52 AM   Result Value Ref Range    Magnesium 1.7 1.6 - 2.6 mg/dL   Phosphorus    Collection Time: 02/12/22  6:52 AM   Result Value Ref Range    Phosphorus 5.1 (H) 2.7 - 4.5 mg/dL   CBC auto differential    Collection Time: 02/12/22  6:52 AM   Result Value Ref Range    WBC 29.86 (H) 3.90 - 12.70 K/uL    RBC 2.70 (L) 4.00 - 5.40 M/uL    Hemoglobin 8.2 (L) 12.0 - 16.0 g/dL    Hematocrit 25.9 (L) 37.0 - 48.5 %    MCV 96 82 - 98 fL    MCH 30.4 27.0 - 31.0 pg    MCHC 31.7 (L) 32.0 - 36.0 g/dL    RDW 19.1 (H) 11.5 - 14.5 %    Platelets 179 150 - 450 K/uL     MPV 10.9 9.2 - 12.9 fL    Immature Granulocytes CANCELED 0.0 - 0.5 %    Immature Grans (Abs) CANCELED 0.00 - 0.04 K/uL    Lymph # CANCELED 1.0 - 4.8 K/uL    Mono # CANCELED 0.3 - 1.0 K/uL    Eos # CANCELED 0.0 - 0.5 K/uL    Baso # CANCELED 0.00 - 0.20 K/uL    nRBC 0 0 /100 WBC    Gran % 85.0 (H) 38.0 - 73.0 %    Lymph % 8.0 (L) 18.0 - 48.0 %    Mono % 6.0 4.0 - 15.0 %    Eosinophil % 0.0 0.0 - 8.0 %    Basophil % 0.0 0.0 - 1.9 %    Metamyelocytes 1.0 %    Platelet Estimate Appears normal     Aniso Slight     Poik Slight     Poly Occasional     Differential Method Manual    Vancomycin, Random    Collection Time: 02/12/22  6:52 AM   Result Value Ref Range    Vancomycin, Random 18.0 Not established ug/mL   POCT glucose    Collection Time: 02/12/22  8:00 AM   Result Value Ref Range    POCT Glucose 59 (L) 70 - 110 mg/dL   POCT glucose    Collection Time: 02/12/22  8:24 AM   Result Value Ref Range    POCT Glucose 51 (L) 70 - 110 mg/dL   POCT glucose    Collection Time: 02/12/22  8:58 AM   Result Value Ref Range    POCT Glucose 112 (H) 70 - 110 mg/dL   POCT glucose    Collection Time: 02/12/22  1:02 PM   Result Value Ref Range    POCT Glucose 122 (H) 70 - 110 mg/dL   POCT glucose    Collection Time: 02/12/22  4:51 PM   Result Value Ref Range    POCT Glucose 104 70 - 110 mg/dL   ]    I/O last 3 completed shifts:  In: 843.3 [P.O.:100; Blood:393.3; IV Piggyback:350]  Out: 100 [Blood:100]    Vitals:    02/12/22 1301 02/12/22 1302 02/12/22 1326 02/12/22 1652   BP: 123/67   124/63   Pulse: (!) 125 (!) 128  (!) 124   Resp: 17  16 18   Temp: 97.5 °F (36.4 °C)   97.6 °F (36.4 °C)   TempSrc: Oral   Oral   SpO2: (!) 94% 100%  99%   Weight:       Height:           No Jvd, Thyromegaly or Lymphadenopathy  Lungs: Fairly clear anteriorly and laterally  Cor: RRR no G or rubs  Abd: Soft benign good bowel sounds non tender  Ext: No E C C    A)  Sepsis with shock  Necrotizing fascitis gluteal area  Hx of calciphylaxis  Sp dka  Recent covid  infection  Dm  Anemia  2nd hyperpth (pth 612)  Hx of non compliance     P)  Renal Diet  Home meds  Protect access  HD  Na thiosulfate with hd   EPO prn  Binders prn   Adjust all meds to the degree of renal fx  Close follow up I/O and weights  Maintain Hydration   ADD vit d analogs(unable to use as she has hypercalcemia)  sensipar     Case discussed with Dr Reyes and family, we talked about Hospice and code status. Was a difficult discussion as the family was not quite ready hear the realities of the case

## 2022-02-12 NOTE — PLAN OF CARE
AAOx3, medication administered per MD orders, dialysis, BG monitored, PRN medication administered for BG with good response, sterile dressing change done to picc line, wound care provided, weight shifting and encouraged, prn pain medication administered with mild relief, patient educated on the importance of moving more frequent, also educated about the importance of eating, heel boots applied and educated patient about the importance of them. Patient safety maintained, bed in low locked position with call bell in reach, family at bedside.         Problem: Adult Inpatient Plan of Care  Goal: Plan of Care Review  Outcome: Ongoing, Not Progressing  Goal: Patient-Specific Goal (Individualized)  Outcome: Ongoing, Not Progressing  Goal: Absence of Hospital-Acquired Illness or Injury  Outcome: Ongoing, Not Progressing  Goal: Optimal Comfort and Wellbeing  Outcome: Ongoing, Not Progressing  Goal: Readiness for Transition of Care  Outcome: Ongoing, Not Progressing     Problem: Diabetes Comorbidity  Goal: Blood Glucose Level Within Targeted Range  Outcome: Ongoing, Not Progressing     Problem: Infection  Goal: Absence of Infection Signs and Symptoms  Outcome: Ongoing, Not Progressing     Problem: Impaired Wound Healing  Goal: Optimal Wound Healing  Outcome: Ongoing, Not Progressing     Problem: Skin Injury Risk Increased  Goal: Skin Health and Integrity  Outcome: Ongoing, Not Progressing     Problem: Device-Related Complication Risk (Hemodialysis)  Goal: Safe, Effective Therapy Delivery  Outcome: Ongoing, Not Progressing     Problem: Hemodynamic Instability (Hemodialysis)  Goal: Effective Tissue Perfusion  Outcome: Ongoing, Not Progressing     Problem: Infection (Hemodialysis)  Goal: Absence of Infection Signs and Symptoms  Outcome: Ongoing, Not Progressing     Problem: Adjustment to Illness (Sepsis/Septic Shock)  Goal: Optimal Coping  Outcome: Ongoing, Not Progressing     Problem: Fall Injury Risk  Goal: Absence of Fall and  Fall-Related Injury  Outcome: Ongoing, Not Progressing     Problem: Bleeding (Sepsis/Septic Shock)  Goal: Absence of Bleeding  Outcome: Ongoing, Not Progressing     Problem: Glycemic Control Impaired (Sepsis/Septic Shock)  Goal: Blood Glucose Level Within Desired Range  Outcome: Ongoing, Not Progressing     Problem: Infection Progression (Sepsis/Septic Shock)  Goal: Absence of Infection Signs and Symptoms  Outcome: Ongoing, Not Progressing     Problem: Nutrition Impaired (Sepsis/Septic Shock)  Goal: Optimal Nutrition Intake  Outcome: Ongoing, Not Progressing     Problem: Coping Ineffective  Goal: Effective Coping  Outcome: Ongoing, Not Progressing

## 2022-02-12 NOTE — PROGRESS NOTES
Maintenance hemodialysis done x 3.5 hours/pt reinfused. No net fluid removed. Removed prime/rinse without difficulty. Pt SBP remains <100 during tx, maintained in 90s without c/o. Flushed right chest wall cvc without difficulty, heparin locked, caps applied. Report given to DANIEL Edwards, RN/pt returned to room 420 via bed per transporter.

## 2022-02-12 NOTE — NURSING
HR continued 120s despite PO pain medication given. EKG performed which resulted Sinus Tach, new orders given for IV lopressor and CBC. Will cont to monitor.

## 2022-02-12 NOTE — NURSING
Pt hyptensive 89/52  after 500 cc bolus ordered per attending. New orders given for 250cc bolus, CARRIE Bradley notified that pt seems very lethargic and the drop in BP started after pain med administration and pt is a dialysis pt, last dialyzed 2/10. New orders given for IV narcan and reassess. Immediately following narcan admin, pt began to respond appropriately and /61 . CARRIE Figueroa aware of HR will attempt to treat HR with pain medications as pt is crying out for pain 10/10. Will cont to monitor.

## 2022-02-12 NOTE — NURSING
Patient wound care done, sacral area cleansed with vashe, then applied vashe soaked kerlix xs 2, covered with abd pads and secured with medipore tape. Pain 5/10, patient safety maintianed bed in low locked position with call bell in reach

## 2022-02-12 NOTE — NURSING
Patient is running sinus tach 120-130, administered pain medication also did wound care,  MD notified, no new orders given, will monitor for any changes, family at bedside.

## 2022-02-12 NOTE — PROGRESS NOTES
[facility] Medicine  Progress Note    Patient Name: Xuan Ricardo  MRN: [id number]  Patient Class: IP- Inpatient   Admission Date: 2/1/2022  Length of Stay: 11 days  Attending Physician: Adebayo Reyes MD  Primary Care Provider: Katharine Franks MD        Subjective:     Principal Problem:Goals of care, counseling/discussion        HPI:  42 y/o female presented to ER with hypotension.  Patient was recently started on dialysis and had not had dialysis for one week since last hospital discharge.  She was noted to have multiple abnormalities on presentation.  She was severely anemic with Hgb of 4.3.  transfused 2 units of blood with adequate correction of H/H.  Patient was also noted to have AG metabolic acidosis with hyperglycemia.  Stated on insulin drip for DKA.  Leukocytosis on CBC.  Patient with erythema and blistering of bilateral inner thing and buttocks.  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  Started on ABX's and Surgery consulted.        Overview/Hospital Course:  42 y/o female presented to ER with hypotension.  Patient was recently started on dialysis and had not had dialysis for one week since last hospital discharge.  She was noted to have multiple abnormalities on presentation.  She was severely anemic with Hgb of 4.3.  transfused 2 units of blood with adequate correction of H/H.  Patient was also noted to have AG metabolic acidosis with hyperglycemia.  Started on insulin drip for DKA.  Leukocytosis on CBC.  Patient with erythema and blistering of bilateral inner thighs and buttocks.  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  Started on ABX's and Surgery consulted.  Admitted with necrotizing fascitis of the buttocks. Surgery consulted and patient brought to OR 2/2/22 with significant debridement. On antibiotics. Mental status worsening. Palliative Care consulted, family updated. Brought back to OR on 2/4/22 for debridement as well. Weaned off  norepinephrine, on broad spectrum antibiotics pending cultures. Long recovery ahead. Concern that may need diverting colostomy in the future.not on any anticoagulation,she has also history of HIT.  Culture is growing bacteroides,wound care is consulted.she is bed bound, did not consulted PT,OT   CC today is pain.      Interval History: Overnight pt w/ low blood pressure, apparently received narcan. This AM pt reports being comfortable, denies complaints    Review of Systems   Constitutional: Negative for chills and fever.   Respiratory: Negative for cough and shortness of breath.    Cardiovascular: Negative for chest pain and leg swelling.   Gastrointestinal: Negative for abdominal distention and abdominal pain.     Objective:     Vital Signs (Most Recent):  Temp: 97.5 °F (36.4 °C) (02/12/22 1301)  Pulse: (!) 128 (02/12/22 1302)  Resp: 16 (02/12/22 1326)  BP: 123/67 (02/12/22 1301)  SpO2: 100 % (02/12/22 1302) Vital Signs (24h Range):  Temp:  [97.5 °F (36.4 °C)-99 °F (37.2 °C)] 97.5 °F (36.4 °C)  Pulse:  [] 128  Resp:  [14-20] 16  SpO2:  [94 %-100 %] 100 %  BP: ()/(45-67) 123/67     Weight: 99.1 kg (218 lb 7.6 oz)  Body mass index is 37.48 kg/m².    Intake/Output Summary (Last 24 hours) at 2/12/2022 1602  Last data filed at 2/12/2022 1245  Gross per 24 hour   Intake 500 ml   Output 500 ml   Net 0 ml      Physical Exam  Constitutional:       General: She is not in acute distress.     Appearance: She is ill-appearing. She is not toxic-appearing or diaphoretic.   Cardiovascular:      Rate and Rhythm: Normal rate and regular rhythm.      Heart sounds: No murmur heard.  No gallop.    Pulmonary:      Effort: Pulmonary effort is normal. No respiratory distress.      Breath sounds: Normal breath sounds. No wheezing.   Abdominal:      General: Bowel sounds are normal. There is no distension.      Palpations: Abdomen is soft.      Tenderness: There is no abdominal tenderness.         Significant Labs: All  pertinent labs within the past 24 hours have been reviewed.    Significant Imaging: I have reviewed all pertinent imaging results/findings within the past 24 hours.      Assessment/Plan:      * Goals of care, counseling/discussion  General surgery expressed concerns about the combination of extraordinarily small odds of recovery and significant suffering through the process, and recommended hospice. Family initially agreed to hospice, however when SW called them they had more questions about curative-focused therapy.    Discussed w/ family for 45 minutes on 2/12. I expressed my concern that Ms. Cousin will not survive this illness regardless of agressiveness of treatment, and that aggressive care will increase her suffering without benefit. Her family expressed understanding of the severity of illness, and were amenable about discussion about hospice. However, at this time they need more time to discuss prior to making a decision.    I also discussed code status with the family, and recommended DNR given the above. They were unable to make a decision at the time of conversation and requested more time to discuss.      Necrotizing fasciitis  Has hx of calciphylaxis to bilateral thighs, now w/ superinfection nec fasc. CT with subcutaneous air in buttocks tissues. Underwent debridement 2/2, 2/4, 2/11. Tenuous hemodynamics and essentially no probability of successful wound healing.  - continue broad spectrum abx  - surgery following  - would theoretically need loop colostomy to keep wounds clear from feces  - wound care following  - see malnutrition elsewhere      Leukocytosis  Persistent leukocytosis due to recurrence of necrotizing fasciitis, w/ relative improvement after surgical debridment  - broadened to meropenem given borderline hypotension  - surgery following       End stage renal disease  Refused dialysis at prior admissions, now non-compliant after leaving the hospital  - nephrology consulted for  HD.      Hyperbilirubinemia  Isolated hyperbilirubinemia of unclear origin. CT/RUQ US w/ sludge. No RUQ pain or fevers, no imaging evidence of biliary inflammation  - direct bilirubin to fractionate, however doubt this is unconjugated issue      Malnutrition  Pt w/ markedly poor PO intake, particularly concerning given large wounds that will not heal without nutritional support.   - discussed w/ patient, will need alternate means of nutrition if pt unable to meet needs by mouth      Cellulitis of thigh  As above      Septic shock  Initially requiring vasopressors in the ICU, now weaned. However BP has been trending down over the past few days, likely due to infection      Coagulopathy  Likely due to sepsis/infection, now improved      Hyponatremia  Now resolved      Esophagitis, Kemper grade D  PPI bid      Debility  Bed bound on baseline.      Sinus tachycardia  Continued sinus tach      COVID-19 virus infection  Patient initially tested positive for Covid more than one month ago.  Asymptomatic from Covid with no evidence of pneumonia on CXR.  - stop precautions        Calciphylaxis  Hx of calciphylaxis after repeatedly refusing HD  - nephrology consulted  - sodium thiosulfate      Type 2 diabetes mellitus with stage 5 chronic kidney disease not on chronic dialysis, with long-term current use of insulin  No becoming hypoglycemic due to poor po intake  - hold insulin  - see malnutrition below        Hypertensive CKD (chronic kidney disease)  Patient initially hypotensive requiring pressor support  - now weaned off of vasopressors but tenuous hemodynamically    Anemia of renal disease  Baseline Hgb around 7.5, Presents with Hgb of 4.3  No obvious bleeding, had EGD last admission with esophagitis  Transfused 3 units of blood with adequate correction of H/H  Continue to monitor.  - transfuse as needed   - loss over this hospital stay likely due to surgery        VTE Risk Mitigation (From admission, onward)          Ordered     heparin (porcine) injection 3,200 Units  As needed (PRN)         02/03/22 0024     IP VTE HIGH RISK PATIENT  Once         02/01/22 1816     Place sequential compression device  Until discontinued         02/01/22 1816                Discharge Planning   PAMELA:      Code Status: Full Code   Is the patient medically ready for discharge?:     Reason for patient still in hospital (select all that apply): Patient trending condition, Laboratory test, Treatment, Consult recommendations and Pending disposition  Discharge Plan A: Long-term acute care facility (LTAC)   Discharge Delays: (!) Post-Acute Set-up              Adebayo Reyes MD  Department of Hospital Medicine   [facility]

## 2022-02-12 NOTE — SUBJECTIVE & OBJECTIVE
Interval History: Overnight pt w/ low blood pressure, apparently received narcan. This AM pt reports being comfortable, denies complaints    Review of Systems   Constitutional: Negative for chills and fever.   Respiratory: Negative for cough and shortness of breath.    Cardiovascular: Negative for chest pain and leg swelling.   Gastrointestinal: Negative for abdominal distention and abdominal pain.     Objective:     Vital Signs (Most Recent):  Temp: 97.5 °F (36.4 °C) (02/12/22 1301)  Pulse: (!) 128 (02/12/22 1302)  Resp: 16 (02/12/22 1326)  BP: 123/67 (02/12/22 1301)  SpO2: 100 % (02/12/22 1302) Vital Signs (24h Range):  Temp:  [97.5 °F (36.4 °C)-99 °F (37.2 °C)] 97.5 °F (36.4 °C)  Pulse:  [] 128  Resp:  [14-20] 16  SpO2:  [94 %-100 %] 100 %  BP: ()/(45-67) 123/67     Weight: 99.1 kg (218 lb 7.6 oz)  Body mass index is 37.48 kg/m².    Intake/Output Summary (Last 24 hours) at 2/12/2022 1602  Last data filed at 2/12/2022 1245  Gross per 24 hour   Intake 500 ml   Output 500 ml   Net 0 ml      Physical Exam  Constitutional:       General: She is not in acute distress.     Appearance: She is ill-appearing. She is not toxic-appearing or diaphoretic.   Cardiovascular:      Rate and Rhythm: Normal rate and regular rhythm.      Heart sounds: No murmur heard.  No gallop.    Pulmonary:      Effort: Pulmonary effort is normal. No respiratory distress.      Breath sounds: Normal breath sounds. No wheezing.   Abdominal:      General: Bowel sounds are normal. There is no distension.      Palpations: Abdomen is soft.      Tenderness: There is no abdominal tenderness.         Significant Labs: All pertinent labs within the past 24 hours have been reviewed.    Significant Imaging: I have reviewed all pertinent imaging results/findings within the past 24 hours.

## 2022-02-12 NOTE — ASSESSMENT & PLAN NOTE
Persistent leukocytosis due to recurrence of necrotizing fasciitis, w/ relative improvement after surgical debridment  - broadened to meropenem given borderline hypotension  - surgery following

## 2022-02-12 NOTE — PROGRESS NOTES
Received report per DANIEL Edwards, RN/pt transferred to [facility] via bed per transporter. Right chest wall perm cvc aspirated/flushed without difficulty noted. Maintenance hemodialysis started x 3.5 hours. Plan is to remove 1-3 L as tolerated. Reported to me to prior dialysis nurse that pt has not been tolerating fluid removal and MD aware. Setting UF to minimal 1 L as tolerated at this time. Will monitor bp and pt status closely.

## 2022-02-12 NOTE — ASSESSMENT & PLAN NOTE
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 CT with subcutaneous air in buttocks tissues. Underwent debridement 2/2, 2/4, 2/11. Tenuous hemodynamics and essentially no probability of successful wound healing.  - continue broad spectrum abx  - surgery following  - would theoretically need loop colostomy to keep wounds clear from feces  - wound care following  - see malnutrition elsewhere

## 2022-02-13 PROBLEM — Z79.899 DVT PROPHYLAXIS: Status: ACTIVE | Noted: 2022-02-13

## 2022-02-13 LAB
ALBUMIN SERPL BCP-MCNC: 0.7 G/DL (ref 3.5–5.2)
ALP SERPL-CCNC: 175 U/L (ref 55–135)
ALT SERPL W/O P-5'-P-CCNC: 26 U/L (ref 10–44)
ANION GAP SERPL CALC-SCNC: 18 MMOL/L (ref 8–16)
AST SERPL-CCNC: 36 U/L (ref 10–40)
BASOPHILS # BLD AUTO: ABNORMAL K/UL (ref 0–0.2)
BASOPHILS NFR BLD: 0 % (ref 0–1.9)
BILIRUB DIRECT SERPL-MCNC: 3.2 MG/DL (ref 0.1–0.3)
BILIRUB SERPL-MCNC: 4.3 MG/DL (ref 0.1–1)
BUN SERPL-MCNC: 12 MG/DL (ref 6–20)
CALCIUM SERPL-MCNC: 7.5 MG/DL (ref 8.7–10.5)
CHLORIDE SERPL-SCNC: 108 MMOL/L (ref 95–110)
CO2 SERPL-SCNC: 17 MMOL/L (ref 23–29)
CREAT SERPL-MCNC: 2.6 MG/DL (ref 0.5–1.4)
DIFFERENTIAL METHOD: ABNORMAL
EOSINOPHIL # BLD AUTO: ABNORMAL K/UL (ref 0–0.5)
EOSINOPHIL NFR BLD: 1 % (ref 0–8)
ERYTHROCYTE [DISTWIDTH] IN BLOOD BY AUTOMATED COUNT: 19.1 % (ref 11.5–14.5)
EST. GFR  (AFRICAN AMERICAN): 25 ML/MIN/1.73 M^2
EST. GFR  (NON AFRICAN AMERICAN): 22 ML/MIN/1.73 M^2
GLUCOSE SERPL-MCNC: 135 MG/DL (ref 70–110)
HCT VFR BLD AUTO: 22.4 % (ref 37–48.5)
HGB BLD-MCNC: 7.3 G/DL (ref 12–16)
IMM GRANULOCYTES # BLD AUTO: ABNORMAL K/UL (ref 0–0.04)
IMM GRANULOCYTES NFR BLD AUTO: ABNORMAL % (ref 0–0.5)
LYMPHOCYTES # BLD AUTO: ABNORMAL K/UL (ref 1–4.8)
LYMPHOCYTES NFR BLD: 9 % (ref 18–48)
MAGNESIUM SERPL-MCNC: 1.6 MG/DL (ref 1.6–2.6)
MCH RBC QN AUTO: 30.7 PG (ref 27–31)
MCHC RBC AUTO-ENTMCNC: 32.6 G/DL (ref 32–36)
MCV RBC AUTO: 94 FL (ref 82–98)
METAMYELOCYTES NFR BLD MANUAL: 1 %
MONOCYTES # BLD AUTO: ABNORMAL K/UL (ref 0.3–1)
MONOCYTES NFR BLD: 1 % (ref 4–15)
NEUTROPHILS NFR BLD: 84 % (ref 38–73)
NEUTS BAND NFR BLD MANUAL: 4 %
NRBC BLD-RTO: 0 /100 WBC
PHOSPHATE SERPL-MCNC: 2.6 MG/DL (ref 2.7–4.5)
PLATELET # BLD AUTO: 163 K/UL (ref 150–450)
PMV BLD AUTO: 10.6 FL (ref 9.2–12.9)
POCT GLUCOSE: 113 MG/DL (ref 70–110)
POCT GLUCOSE: 120 MG/DL (ref 70–110)
POCT GLUCOSE: 137 MG/DL (ref 70–110)
POCT GLUCOSE: 145 MG/DL (ref 70–110)
POCT GLUCOSE: 152 MG/DL (ref 70–110)
POTASSIUM SERPL-SCNC: 4.1 MMOL/L (ref 3.5–5.1)
PROT SERPL-MCNC: 4.5 G/DL (ref 6–8.4)
RBC # BLD AUTO: 2.38 M/UL (ref 4–5.4)
SODIUM SERPL-SCNC: 143 MMOL/L (ref 136–145)
WBC # BLD AUTO: 22.99 K/UL (ref 3.9–12.7)

## 2022-02-13 PROCEDURE — C9113 INJ PANTOPRAZOLE SODIUM, VIA: HCPCS | Performed by: STUDENT IN AN ORGANIZED HEALTH CARE EDUCATION/TRAINING PROGRAM

## 2022-02-13 PROCEDURE — 80053 COMPREHEN METABOLIC PANEL: CPT | Performed by: STUDENT IN AN ORGANIZED HEALTH CARE EDUCATION/TRAINING PROGRAM

## 2022-02-13 PROCEDURE — 63600175 PHARM REV CODE 636 W HCPCS: Performed by: STUDENT IN AN ORGANIZED HEALTH CARE EDUCATION/TRAINING PROGRAM

## 2022-02-13 PROCEDURE — 25000003 PHARM REV CODE 250: Performed by: NURSE PRACTITIONER

## 2022-02-13 PROCEDURE — 25000003 PHARM REV CODE 250: Performed by: STUDENT IN AN ORGANIZED HEALTH CARE EDUCATION/TRAINING PROGRAM

## 2022-02-13 PROCEDURE — 11000001 HC ACUTE MED/SURG PRIVATE ROOM

## 2022-02-13 PROCEDURE — 85027 COMPLETE CBC AUTOMATED: CPT | Performed by: STUDENT IN AN ORGANIZED HEALTH CARE EDUCATION/TRAINING PROGRAM

## 2022-02-13 PROCEDURE — 87040 BLOOD CULTURE FOR BACTERIA: CPT | Mod: 59 | Performed by: STUDENT IN AN ORGANIZED HEALTH CARE EDUCATION/TRAINING PROGRAM

## 2022-02-13 PROCEDURE — 36415 COLL VENOUS BLD VENIPUNCTURE: CPT | Performed by: STUDENT IN AN ORGANIZED HEALTH CARE EDUCATION/TRAINING PROGRAM

## 2022-02-13 PROCEDURE — 84100 ASSAY OF PHOSPHORUS: CPT | Performed by: STUDENT IN AN ORGANIZED HEALTH CARE EDUCATION/TRAINING PROGRAM

## 2022-02-13 PROCEDURE — A4216 STERILE WATER/SALINE, 10 ML: HCPCS | Performed by: STUDENT IN AN ORGANIZED HEALTH CARE EDUCATION/TRAINING PROGRAM

## 2022-02-13 PROCEDURE — 82248 BILIRUBIN DIRECT: CPT | Performed by: STUDENT IN AN ORGANIZED HEALTH CARE EDUCATION/TRAINING PROGRAM

## 2022-02-13 PROCEDURE — 83735 ASSAY OF MAGNESIUM: CPT | Performed by: STUDENT IN AN ORGANIZED HEALTH CARE EDUCATION/TRAINING PROGRAM

## 2022-02-13 PROCEDURE — 92526 ORAL FUNCTION THERAPY: CPT

## 2022-02-13 PROCEDURE — 85007 BL SMEAR W/DIFF WBC COUNT: CPT | Performed by: STUDENT IN AN ORGANIZED HEALTH CARE EDUCATION/TRAINING PROGRAM

## 2022-02-13 RX ORDER — ACETAMINOPHEN 325 MG/1
650 TABLET ORAL EVERY 6 HOURS PRN
Status: DISCONTINUED | OUTPATIENT
Start: 2022-02-13 | End: 2022-02-15 | Stop reason: HOSPADM

## 2022-02-13 RX ORDER — OXYCODONE HYDROCHLORIDE 5 MG/1
5 TABLET ORAL ONCE
Status: COMPLETED | OUTPATIENT
Start: 2022-02-13 | End: 2022-02-13

## 2022-02-13 RX ADMIN — SODIUM CHLORIDE, SODIUM LACTATE, POTASSIUM CHLORIDE, AND CALCIUM CHLORIDE 250 ML: .6; .31; .03; .02 INJECTION, SOLUTION INTRAVENOUS at 05:02

## 2022-02-13 RX ADMIN — OXYCODONE 10 MG: 5 TABLET ORAL at 09:02

## 2022-02-13 RX ADMIN — MEROPENEM AND SODIUM CHLORIDE 1 G: 1 INJECTION, SOLUTION INTRAVENOUS at 09:02

## 2022-02-13 RX ADMIN — NEPHROCAP 1 CAPSULE: 1 CAP ORAL at 09:02

## 2022-02-13 RX ADMIN — SODIUM CHLORIDE 500 ML: 0.9 INJECTION, SOLUTION INTRAVENOUS at 05:02

## 2022-02-13 RX ADMIN — CINACALCET 30 MG: 30 TABLET, FILM COATED ORAL at 09:02

## 2022-02-13 RX ADMIN — OXYCODONE 5 MG: 5 TABLET ORAL at 05:02

## 2022-02-13 RX ADMIN — GABAPENTIN 100 MG: 100 CAPSULE ORAL at 09:02

## 2022-02-13 RX ADMIN — Medication 10 ML: at 05:02

## 2022-02-13 RX ADMIN — PANTOPRAZOLE SODIUM 40 MG: 40 INJECTION, POWDER, FOR SOLUTION INTRAVENOUS at 09:02

## 2022-02-13 RX ADMIN — Medication 324 MG: at 09:02

## 2022-02-13 RX ADMIN — Medication 10 ML: at 12:02

## 2022-02-13 RX ADMIN — GABAPENTIN 100 MG: 100 CAPSULE ORAL at 08:02

## 2022-02-13 RX ADMIN — ZINC SULFATE 220 MG (50 MG) CAPSULE 220 MG: CAPSULE at 09:02

## 2022-02-13 RX ADMIN — ACETAMINOPHEN 650 MG: 325 TABLET ORAL at 05:02

## 2022-02-13 RX ADMIN — NYSTATIN 500000 UNITS: 100000 SUSPENSION ORAL at 09:02

## 2022-02-13 RX ADMIN — OXYCODONE HYDROCHLORIDE AND ACETAMINOPHEN 1000 MG: 500 TABLET ORAL at 09:02

## 2022-02-13 NOTE — SUBJECTIVE & OBJECTIVE
Interval History: No events overnight. This AM pt without complaints.    Review of Systems   Constitutional: Negative for chills and fever.   Respiratory: Negative for cough and shortness of breath.    Cardiovascular: Negative for chest pain and leg swelling.   Gastrointestinal: Negative for abdominal distention and abdominal pain.     Objective:     Vital Signs (Most Recent):  Temp: 97.6 °F (36.4 °C) (02/13/22 0825)  Pulse: (!) 112 (02/13/22 0825)  Resp: 17 (02/13/22 0825)  BP: (!) 115/59 (02/13/22 0825)  SpO2: 99 % (02/13/22 0825) Vital Signs (24h Range):  Temp:  [97.5 °F (36.4 °C)-98.5 °F (36.9 °C)] 97.6 °F (36.4 °C)  Pulse:  [] 112  Resp:  [16-19] 17  SpO2:  [92 %-100 %] 99 %  BP: ()/(40-67) 115/59     Weight: 99.1 kg (218 lb 7.6 oz)  Body mass index is 37.48 kg/m².    Intake/Output Summary (Last 24 hours) at 2/13/2022 1122  Last data filed at 2/12/2022 1730  Gross per 24 hour   Intake 680 ml   Output 500 ml   Net 180 ml      Physical Exam  Constitutional:       General: She is not in acute distress.     Appearance: She is ill-appearing. She is not toxic-appearing or diaphoretic.   Cardiovascular:      Rate and Rhythm: Normal rate and regular rhythm.      Heart sounds: No murmur heard.  No gallop.    Pulmonary:      Effort: Pulmonary effort is normal. No respiratory distress.      Breath sounds: Normal breath sounds. No wheezing.   Abdominal:      General: Bowel sounds are normal. There is no distension.      Palpations: Abdomen is soft.      Tenderness: There is no abdominal tenderness.         Significant Labs: All pertinent labs within the past 24 hours have been reviewed.    Significant Imaging: I have reviewed all pertinent imaging results/findings within the past 24 hours.

## 2022-02-13 NOTE — ASSESSMENT & PLAN NOTE
General surgery expressed concerns about the combination of extraordinarily small odds of recovery and significant suffering through the process, and recommended hospice. Family initially agreed to hospice, however when SW called them they had more questions about curative-focused therapy.    Discussed w/ family for 45 minutes on 2/12. I expressed my concern that Ms. Cousin will not survive this illness regardless of agressiveness of treatment, and that aggressive care will increase her suffering without benefit. Her family expressed understanding of the severity of illness, and were amenable about discussion about hospice. However, at this time they need more time to discuss prior to making a decision.    I also discussed code status with the family, and recommended DNR given the above. They were unable to make a decision at the time of conversation and requested more time to discuss.      2/13: continued to answer questions from family about patient's hospital course. CM/SW consulted to initiate informational meeting with hospice agency

## 2022-02-13 NOTE — NURSING
Patient BP 92/42 mm of Hg, HR- 118, asymptomatic. Complaints of pain 10/10. Notified MD. Orders for 500 cc bolus of 0.9% NaCl and oxycodone 5 mg . Will continue to monitor.

## 2022-02-13 NOTE — PLAN OF CARE
Problem: SLP Goal  Goal: SLP Goal  Description: STGS  1. Pt will tolerate full liquid diet without overt s/s of aspiration- met 2/11.  2. Pt will participate in ongoing assessment of swallow.   3. Pt will tolerate mech soft diet (IDDSI 5) with thin liquids w/o overt of aspiration.   Outcome: Ongoing, Progressing    Pt with increased nausea during consumption of breakfast tray. ST session discontinued.

## 2022-02-13 NOTE — PLAN OF CARE
Problem: Adult Inpatient Plan of Care  Goal: Plan of Care Review  Outcome: Ongoing, Progressing  Goal: Patient-Specific Goal (Individualized)  Outcome: Ongoing, Progressing  Goal: Optimal Comfort and Wellbeing  Outcome: Ongoing, Progressing  Goal: Readiness for Transition of Care  Outcome: Ongoing, Progressing   Pt alert and oriented. Pt free from falls, injury or any further trauma throughout shift. Continued medications as ordered. Complaints of pain, pain medication given. Pt turned. Bp low, 500 cc bolus NaCl given. Pt in no distress. Will cont to monitor.

## 2022-02-13 NOTE — NURSING
Pt's BP 82/40 mm of Hg manually, HR-114 bpm. Remote HERNANDEZ notified. 500 cc Bolus of 0.9% NaCl given, BP- 119/54 after the bolus. Pt in no distress . Will continue to monitor.

## 2022-02-13 NOTE — ASSESSMENT & PLAN NOTE
Continued sinus tach. Pt w/ long hx of sinus tach, however given lack of anticoagulation due to multiple surgical procedures, c/f pulmonary embolism.

## 2022-02-13 NOTE — PROGRESS NOTES
[facility] Medicine  Progress Note    Patient Name: Xuan Ricardo  MRN: [id number]  Patient Class: IP- Inpatient   Admission Date: 2/1/2022  Length of Stay: 12 days  Attending Physician: Adebayo Reyes MD  Primary Care Provider: Katharine Franks MD        Subjective:     Principal Problem:Goals of care, counseling/discussion        HPI:  42 y/o female presented to ER with hypotension.  Patient was recently started on dialysis and had not had dialysis for one week since last hospital discharge.  She was noted to have multiple abnormalities on presentation.  She was severely anemic with Hgb of 4.3.  transfused 2 units of blood with adequate correction of H/H.  Patient was also noted to have AG metabolic acidosis with hyperglycemia.  Stated on insulin drip for DKA.  Leukocytosis on CBC.  Patient with erythema and blistering of bilateral inner thing and buttocks.  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  Started on ABX's and Surgery consulted.        Overview/Hospital Course:  42 y/o female presented to ER with hypotension.  Patient was recently started on dialysis and had not had dialysis for one week since last hospital discharge.  She was noted to have multiple abnormalities on presentation.  She was severely anemic with Hgb of 4.3.  transfused 2 units of blood with adequate correction of H/H.  Patient was also noted to have AG metabolic acidosis with hyperglycemia.  Started on insulin drip for DKA.  Leukocytosis on CBC.  Patient with erythema and blistering of bilateral inner thighs and buttocks.  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  Started on ABX's and Surgery consulted.  Admitted with necrotizing fascitis of the buttocks. Surgery consulted and patient brought to OR 2/2/22 with significant debridement. On antibiotics. Mental status worsening. Palliative Care consulted, family updated. Brought back to OR on 2/4/22 for debridement as well. Weaned off  norepinephrine, on broad spectrum antibiotics pending cultures. Patient w/ increasing white count and tachycardia, antibiotics broadened, found to have further areas of necrotizing fasciitis and taken back to the OR 2/11.     After multiple providers expressed concern that patient will not heal wounds and that aggressive medical care is causing unnecessary suffering, family amenable to hospice discussion. CM/SW consulted to initiate contact w/ hospice agency.             Interval History: No events overnight. This AM pt without complaints.    Review of Systems   Constitutional: Negative for chills and fever.   Respiratory: Negative for cough and shortness of breath.    Cardiovascular: Negative for chest pain and leg swelling.   Gastrointestinal: Negative for abdominal distention and abdominal pain.     Objective:     Vital Signs (Most Recent):  Temp: 97.6 °F (36.4 °C) (02/13/22 0825)  Pulse: (!) 112 (02/13/22 0825)  Resp: 17 (02/13/22 0825)  BP: (!) 115/59 (02/13/22 0825)  SpO2: 99 % (02/13/22 0825) Vital Signs (24h Range):  Temp:  [97.5 °F (36.4 °C)-98.5 °F (36.9 °C)] 97.6 °F (36.4 °C)  Pulse:  [] 112  Resp:  [16-19] 17  SpO2:  [92 %-100 %] 99 %  BP: ()/(40-67) 115/59     Weight: 99.1 kg (218 lb 7.6 oz)  Body mass index is 37.48 kg/m².    Intake/Output Summary (Last 24 hours) at 2/13/2022 1122  Last data filed at 2/12/2022 1730  Gross per 24 hour   Intake 680 ml   Output 500 ml   Net 180 ml      Physical Exam  Constitutional:       General: She is not in acute distress.     Appearance: She is ill-appearing. She is not toxic-appearing or diaphoretic.   Cardiovascular:      Rate and Rhythm: Normal rate and regular rhythm.      Heart sounds: No murmur heard.  No gallop.    Pulmonary:      Effort: Pulmonary effort is normal. No respiratory distress.      Breath sounds: Normal breath sounds. No wheezing.   Abdominal:      General: Bowel sounds are normal. There is no distension.      Palpations: Abdomen is  soft.      Tenderness: There is no abdominal tenderness.         Significant Labs: All pertinent labs within the past 24 hours have been reviewed.    Significant Imaging: I have reviewed all pertinent imaging results/findings within the past 24 hours.      Assessment/Plan:      * Goals of care, counseling/discussion  General surgery expressed concerns about the combination of extraordinarily small odds of recovery and significant suffering through the process, and recommended hospice. Family initially agreed to hospice, however when SW called them they had more questions about curative-focused therapy.    Discussed w/ family for 45 minutes on 2/12. I expressed my concern that Ms. Cousin will not survive this illness regardless of agressiveness of treatment, and that aggressive care will increase her suffering without benefit. Her family expressed understanding of the severity of illness, and were amenable about discussion about hospice. However, at this time they need more time to discuss prior to making a decision.    I also discussed code status with the family, and recommended DNR given the above. They were unable to make a decision at the time of conversation and requested more time to discuss.      2/13: continued to answer questions from family about patient's hospital course. 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 CT with subcutaneous air in buttocks tissues. Underwent debridement 2/2, 2/4, 2/11. Tenuous hemodynamics and essentially no probability of successful wound healing.  - continue broad spectrum abx  - surgery following  - would theoretically need loop colostomy to keep wounds clear from feces  - wound care following  - see malnutrition elsewhere      Leukocytosis  Persistent leukocytosis due to recurrence of necrotizing fasciitis, w/ relative improvement after surgical  debridment  - broadened to meropenem given borderline hypotension  - surgery following       End stage renal disease  Refused dialysis at prior admissions, now non-compliant after leaving the hospital  - nephrology consulted for HD.      Hyperbilirubinemia  Isolated hyperbilirubinemia of unclear origin. CT/RUQ US w/ sludge. No RUQ pain or fevers, no imaging evidence of biliary obstruction or cholelithiasis.     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 Given frequent surgical procedures and large open wounds, prophylactic OAC dosing undesirable. Fondaparinux contraindicated due to ESRD  - SCDs  - will consider prophylactic OAC dosing as clinical course and goals of care evolve      Malnutrition  Pt w/ markedly poor PO intake, particularly concerning given large wounds that will not heal without nutritional support.   - discussed w/ patient, will need alternate means of nutrition if pt unable to meet needs by mouth      Cellulitis of thigh  As above      Septic shock  Initially requiring vasopressors in the ICU, now weaned. However BP has been trending down over the past few days, likely due to infection      Coagulopathy  Likely due to sepsis/infection, now improved      Hyponatremia  Now resolved      Esophagitis, [place] grade D  PPI bid      Debility  Bed bound on baseline.      Sinus tachycardia  Continued sinus tach. Pt w/ long hx of sinus tach, however given lack of anticoagulation due to multiple surgical procedures, c/f pulmonary embolism.       COVID-19 virus infection  Patient initially tested positive for Covid more than one month ago.  Asymptomatic from Covid with no evidence of pneumonia on CXR.  - stop precautions        Calciphylaxis  Hx of calciphylaxis after repeatedly refusing HD  - nephrology consulted  - sodium thiosulfate      Type 2 diabetes mellitus with stage 5 chronic kidney disease not on chronic dialysis, with long-term current  use of insulin  No becoming hypoglycemic due to poor po intake  - hold insulin  - see malnutrition below        Hypertensive CKD (chronic kidney disease)  Patient initially hypotensive requiring pressor support  - now weaned off of vasopressors but tenuous hemodynamically    Anemia of renal disease  Baseline Hgb around 7.5, Presents with Hgb of 4.3  No obvious bleeding, had EGD last admission with esophagitis  Transfused 3 units of blood with adequate correction of H/H  Continue to monitor.  - transfuse as needed   - loss over this hospital stay likely due to surgery        VTE Risk Mitigation (From admission, onward)         Ordered     heparin (porcine) injection 3,200 Units  As needed (PRN)         02/03/22 0024     IP VTE HIGH RISK PATIENT  Once         02/01/22 1816     Place sequential compression device  Until discontinued         02/01/22 1816                Discharge Planning   PAMELA:      Code Status: Full Code   Is the patient medically ready for discharge?:     Reason for patient still in hospital (select all that apply): Patient trending condition, Laboratory test, Treatment, Consult recommendations and Pending disposition  Discharge Plan A: Long-term acute care facility (LTAC)   Discharge Delays: (!) Post-Acute Set-up              Adebayo Reyes MD  Department of Hospital Medicine   [facility]

## 2022-02-13 NOTE — ASSESSMENT & PLAN NOTE
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 Given frequent surgical procedures and large open wounds, prophylactic OAC dosing undesirable. Fondaparinux contraindicated due to ESRD  - SCDs  - will consider prophylactic OAC dosing as clinical course and goals of care evolve

## 2022-02-13 NOTE — ASSESSMENT & PLAN NOTE
Isolated hyperbilirubinemia of unclear origin. CT/RUQ US w/ sludge. No RUQ pain or fevers, no imaging evidence of biliary obstruction or cholelithiasis.

## 2022-02-13 NOTE — PT/OT/SLP PROGRESS
Speech Language Pathology Treatment    Patient Name:  Xuan Ricardo   MRN:  [id number]  Admitting Diagnosis: Goals of care, counseling/discussion    Recommendations:                 General Recommendations:  Dysphagia therapy  Diet recommendations:  Mechanical soft,Finely chopped meat, Liquid Diet Level: Thin   Aspiration Precautions: Assistance with meals, Feed only when awake/alert, HOB to 90 degrees, Meds whole 1 at a time and Small bites/sips   General Precautions: Standard, aspiration  Communication strategies:  provide increased time to answer    Subjective     Pt was asleep, with 2 sisters present at b/s. Pt's sisters expressed that the pt does not like hospital food, and has cont to have dcr appetite. Pt easily aroused to ST greeting, and was agreeable to attempting to consume mech soft diet tray, despite dcr appetite.     Pain/Comfort:  · Pain Rating 1: 0/10    Respiratory Status: Room air    Objective:     Has the patient been evaluated by SLP for swallowing?   Yes  Keep patient NPO? No   Current Respiratory Status:        Pt consumed x2 bites of eggs and x2 single-straw sips of eggs. Pt with prolonged mastication of eggs, and notable oral residue noted following initiation of swallow. Following second bite, pt began to express she was nauseous, and began dry heaving. ST noted pt with whole pieces of egg in the oral cavity, which the pt had mastication for ~1 minute prior. ST removed all food from the oral cavity via spoon, and provided pt with emesis bag. Pt did not vomit, however, expressed she was still nauseous and did not want to cont po intake. Remainder of ST session and po trials discontinued.     Assessment:     The pt is safe to cont mech soft diet for pleasure at this time. Pt will require 1:1 A for all meals. ST will cont to follow.     Goals:   Multidisciplinary Problems     SLP Goals        Problem: SLP Goal    Goal Priority Disciplines Outcome   SLP Goal    Low SLP Ongoing, Progressing    Description: STGS  1. Pt will tolerate full liquid diet without overt s/s of aspiration- met 2/11.  2. Pt will participate in ongoing assessment of swallow.   3. Pt will tolerate mech soft diet (IDDSI 5) with thin liquids w/o overt of aspiration.                    Plan:     · Patient to be seen:  3 x/week   · Plan of Care expires:  02/17/22  · Plan of Care reviewed with:      · SLP Follow-Up:  Yes       Discharge recommendations:  other (see comments) (TBD)   Barriers to Discharge:  None    Time Tracking:     SLP Treatment Date:   02/13/22  Speech Start Time:  0922  Speech Stop Time:  0934     Speech Total Time (min):  12 min    Billable Minutes: Treatment Swallowing Dysfunction 12    02/13/2022

## 2022-02-13 NOTE — NURSING
Patient hypotensive and febrile. MD notified. Appropriate orders placed; chest xray, blood culture X2, and PO tylenol. Patient found to be under 5 blankets. Tylenol administered, and blankets removed. Temp rechecked and found to be WDL. Will continue to monitor, will continue with plan of care.

## 2022-02-14 VITALS
OXYGEN SATURATION: 99 % | RESPIRATION RATE: 18 BRPM | HEIGHT: 64 IN | WEIGHT: 218.5 LBS | DIASTOLIC BLOOD PRESSURE: 56 MMHG | BODY MASS INDEX: 37.3 KG/M2 | HEART RATE: 107 BPM | SYSTOLIC BLOOD PRESSURE: 104 MMHG | TEMPERATURE: 99 F

## 2022-02-14 PROBLEM — E43 SEVERE PROTEIN-CALORIE MALNUTRITION: Status: ACTIVE | Noted: 2022-02-08

## 2022-02-14 LAB
ALBUMIN SERPL BCP-MCNC: 0.7 G/DL (ref 3.5–5.2)
ALP SERPL-CCNC: 158 U/L (ref 55–135)
ALT SERPL W/O P-5'-P-CCNC: 28 U/L (ref 10–44)
ANION GAP SERPL CALC-SCNC: 19 MMOL/L (ref 8–16)
AST SERPL-CCNC: 28 U/L (ref 10–40)
BASOPHILS # BLD AUTO: ABNORMAL K/UL (ref 0–0.2)
BASOPHILS NFR BLD: 0 % (ref 0–1.9)
BILIRUB SERPL-MCNC: 5.3 MG/DL (ref 0.1–1)
BUN SERPL-MCNC: 17 MG/DL (ref 6–20)
CALCIUM SERPL-MCNC: 7.9 MG/DL (ref 8.7–10.5)
CHLORIDE SERPL-SCNC: 107 MMOL/L (ref 95–110)
CO2 SERPL-SCNC: 15 MMOL/L (ref 23–29)
CREAT SERPL-MCNC: 3.4 MG/DL (ref 0.5–1.4)
DIFFERENTIAL METHOD: ABNORMAL
EOSINOPHIL # BLD AUTO: ABNORMAL K/UL (ref 0–0.5)
EOSINOPHIL NFR BLD: 0 % (ref 0–8)
ERYTHROCYTE [DISTWIDTH] IN BLOOD BY AUTOMATED COUNT: 19.4 % (ref 11.5–14.5)
EST. GFR  (AFRICAN AMERICAN): 18 ML/MIN/1.73 M^2
EST. GFR  (NON AFRICAN AMERICAN): 16 ML/MIN/1.73 M^2
GLUCOSE SERPL-MCNC: 100 MG/DL (ref 70–110)
HCT VFR BLD AUTO: 23.2 % (ref 37–48.5)
HGB BLD-MCNC: 7.3 G/DL (ref 12–16)
IMM GRANULOCYTES # BLD AUTO: ABNORMAL K/UL (ref 0–0.04)
IMM GRANULOCYTES NFR BLD AUTO: ABNORMAL % (ref 0–0.5)
LYMPHOCYTES # BLD AUTO: ABNORMAL K/UL (ref 1–4.8)
LYMPHOCYTES NFR BLD: 8 % (ref 18–48)
MAGNESIUM SERPL-MCNC: 1.7 MG/DL (ref 1.6–2.6)
MCH RBC QN AUTO: 29.4 PG (ref 27–31)
MCHC RBC AUTO-ENTMCNC: 31.5 G/DL (ref 32–36)
MCV RBC AUTO: 94 FL (ref 82–98)
METAMYELOCYTES NFR BLD MANUAL: 2 %
MONOCYTES # BLD AUTO: ABNORMAL K/UL (ref 0.3–1)
MONOCYTES NFR BLD: 4 % (ref 4–15)
MYELOCYTES NFR BLD MANUAL: 4 %
NEUTROPHILS NFR BLD: 77 % (ref 38–73)
NEUTS BAND NFR BLD MANUAL: 5 %
NRBC BLD-RTO: 0 /100 WBC
PHOSPHATE SERPL-MCNC: 3.8 MG/DL (ref 2.7–4.5)
PLATELET # BLD AUTO: 174 K/UL (ref 150–450)
PMV BLD AUTO: 10.5 FL (ref 9.2–12.9)
POCT GLUCOSE: 108 MG/DL (ref 70–110)
POCT GLUCOSE: 140 MG/DL (ref 70–110)
POCT GLUCOSE: 93 MG/DL (ref 70–110)
POCT GLUCOSE: 96 MG/DL (ref 70–110)
POTASSIUM SERPL-SCNC: 4.4 MMOL/L (ref 3.5–5.1)
PROT SERPL-MCNC: 4.6 G/DL (ref 6–8.4)
RBC # BLD AUTO: 2.48 M/UL (ref 4–5.4)
SARS-COV-2 RDRP RESP QL NAA+PROBE: NEGATIVE
SODIUM SERPL-SCNC: 141 MMOL/L (ref 136–145)
WBC # BLD AUTO: 21.66 K/UL (ref 3.9–12.7)

## 2022-02-14 PROCEDURE — 99233 SBSQ HOSP IP/OBS HIGH 50: CPT | Mod: CR,,, | Performed by: INTERNAL MEDICINE

## 2022-02-14 PROCEDURE — 85007 BL SMEAR W/DIFF WBC COUNT: CPT | Performed by: STUDENT IN AN ORGANIZED HEALTH CARE EDUCATION/TRAINING PROGRAM

## 2022-02-14 PROCEDURE — C9113 INJ PANTOPRAZOLE SODIUM, VIA: HCPCS | Performed by: STUDENT IN AN ORGANIZED HEALTH CARE EDUCATION/TRAINING PROGRAM

## 2022-02-14 PROCEDURE — 84100 ASSAY OF PHOSPHORUS: CPT | Performed by: STUDENT IN AN ORGANIZED HEALTH CARE EDUCATION/TRAINING PROGRAM

## 2022-02-14 PROCEDURE — 63600175 PHARM REV CODE 636 W HCPCS: Performed by: STUDENT IN AN ORGANIZED HEALTH CARE EDUCATION/TRAINING PROGRAM

## 2022-02-14 PROCEDURE — 25000003 PHARM REV CODE 250: Performed by: STUDENT IN AN ORGANIZED HEALTH CARE EDUCATION/TRAINING PROGRAM

## 2022-02-14 PROCEDURE — 99233 PR SUBSEQUENT HOSPITAL CARE,LEVL III: ICD-10-PCS | Mod: CR,,, | Performed by: INTERNAL MEDICINE

## 2022-02-14 PROCEDURE — A4216 STERILE WATER/SALINE, 10 ML: HCPCS | Performed by: STUDENT IN AN ORGANIZED HEALTH CARE EDUCATION/TRAINING PROGRAM

## 2022-02-14 PROCEDURE — 83735 ASSAY OF MAGNESIUM: CPT | Performed by: STUDENT IN AN ORGANIZED HEALTH CARE EDUCATION/TRAINING PROGRAM

## 2022-02-14 PROCEDURE — U0002 COVID-19 LAB TEST NON-CDC: HCPCS | Performed by: STUDENT IN AN ORGANIZED HEALTH CARE EDUCATION/TRAINING PROGRAM

## 2022-02-14 PROCEDURE — 97535 SELF CARE MNGMENT TRAINING: CPT

## 2022-02-14 PROCEDURE — 92526 ORAL FUNCTION THERAPY: CPT

## 2022-02-14 PROCEDURE — 36415 COLL VENOUS BLD VENIPUNCTURE: CPT | Performed by: STUDENT IN AN ORGANIZED HEALTH CARE EDUCATION/TRAINING PROGRAM

## 2022-02-14 PROCEDURE — 80053 COMPREHEN METABOLIC PANEL: CPT | Performed by: STUDENT IN AN ORGANIZED HEALTH CARE EDUCATION/TRAINING PROGRAM

## 2022-02-14 PROCEDURE — 85027 COMPLETE CBC AUTOMATED: CPT | Performed by: STUDENT IN AN ORGANIZED HEALTH CARE EDUCATION/TRAINING PROGRAM

## 2022-02-14 RX ORDER — METRONIDAZOLE 500 MG/1
500 TABLET ORAL EVERY 8 HOURS
Start: 2022-02-14

## 2022-02-14 RX ORDER — CIPROFLOXACIN 500 MG/1
500 TABLET ORAL DAILY
Start: 2022-02-14 | End: 2022-02-19 | Stop reason: SDUPTHER

## 2022-02-14 RX ADMIN — NEPHROCAP 1 CAPSULE: 1 CAP ORAL at 08:02

## 2022-02-14 RX ADMIN — Medication 10 ML: at 12:02

## 2022-02-14 RX ADMIN — OXYCODONE 10 MG: 5 TABLET ORAL at 08:02

## 2022-02-14 RX ADMIN — Medication 10 ML: at 05:02

## 2022-02-14 RX ADMIN — PANTOPRAZOLE SODIUM 40 MG: 40 INJECTION, POWDER, FOR SOLUTION INTRAVENOUS at 08:02

## 2022-02-14 RX ADMIN — OXYCODONE 10 MG: 5 TABLET ORAL at 05:02

## 2022-02-14 RX ADMIN — ZINC SULFATE 220 MG (50 MG) CAPSULE 220 MG: CAPSULE at 08:02

## 2022-02-14 RX ADMIN — OXYCODONE HYDROCHLORIDE AND ACETAMINOPHEN 1000 MG: 500 TABLET ORAL at 08:02

## 2022-02-14 RX ADMIN — SEVELAMER CARBONATE 2400 MG: 800 TABLET, FILM COATED ORAL at 08:02

## 2022-02-14 RX ADMIN — Medication 324 MG: at 08:02

## 2022-02-14 RX ADMIN — NYSTATIN 500000 UNITS: 100000 SUSPENSION ORAL at 08:02

## 2022-02-14 RX ADMIN — MEROPENEM AND SODIUM CHLORIDE 1 G: 1 INJECTION, SOLUTION INTRAVENOUS at 08:02

## 2022-02-14 RX ADMIN — GABAPENTIN 100 MG: 100 CAPSULE ORAL at 08:02

## 2022-02-14 RX ADMIN — CINACALCET 30 MG: 30 TABLET, FILM COATED ORAL at 08:02

## 2022-02-14 NOTE — ASSESSMENT & PLAN NOTE
- Has gone to OR for debridement with general surgery x 2. Her ESRD status and severe protein calorie malnutrition (albumin < 1) make it essentially impossible for her wounds to heal, which has been discussed with pt and family. Prognosis is very poor; qualifies for hospice, with an overall prognosis < 6 months.

## 2022-02-14 NOTE — ASSESSMENT & PLAN NOTE
2/14/22  - Chart and interval history reviewed; discussed pt with primary team in person. Family with better insight into pt's overall clinical picture, and has been considering transition to hospice care.   - Met with pt at bedside; she was sleeping peacefully. Did not wake her.   - Attempted to call pt's Aunt Juliet, as she has very good medical knowledge. No answer.   - SW/CM arranging hospice informational for pt's family. While it depends on the hospice agency, pt could potentially continue with HD on hospice (if tolerating this, given low blood pressures), using nec fasc, sepsis, and severe protein calorie malnutrition as her main hospice diagnoses.   - Our team will follow up with pt    2/3/22  - Consult for advance care planning in younger ESRD pt currently in ICU with septic shock, in setting of nec fasc. She is s/p OR debridement with general surgery 2/2/22. Familiar with pt from prior hospital stay; chart reviewed in depth, and discussed pt during MDT rounds.   - Met with pt at bedside; she was lethargic and unable to participate in discussion.   - Along with Dr Armenta (hospitalist), called and spoke with pt's sister/preferred MPOA, Tressa. Also present during phone call was pt's Aunt Juliet ([phone]), who is a nurse.    - Thorough medical update given, and answered their questions about specifics of pt's illness   - Did share transparent concern that even with maximal medical therapy (including HD, pressors, abx, and additional trips to the OR), we are worried that patient may not survive hospital stay due to severity of her infection. Family was appropriately upset to hear this information, though voiced that they are hopeful God provides them with a miracle. Support provided.   - For now, plan remains to continue aggressive medical care. Family noted they appreciate direct information, especially given that Juliet has medical background; told them we will continue to provide them with transparent  updates.   - Our team will follow up with pt and family. Have requested that  call family for additional support, especially given their reliance of miranda.

## 2022-02-14 NOTE — ASSESSMENT & PLAN NOTE
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 CT with subcutaneous air in buttocks tissues. Underwent debridement 2/2, 2/4, 2/11. Tenuous hemodynamics and essentially no probability of successful wound healing.  - continue broad spectrum abx; could de-escalate if pt stabilizes as no cultures sent from most recent debridement  - surgery following  - would theoretically need loop colostomy to keep wounds clear from feces  - wound care following  - see malnutrition elsewhere

## 2022-02-14 NOTE — PLAN OF CARE
[facility]  Department of Hospital Medicine  [address] [place] [address]  [phone] after hours  [phone] fax    HOSPICE  ORDERS    02/14/2022    Admit to Hospice:  Home Service    Diagnoses:   Active Hospital Problems    Diagnosis  POA    *Goals of care, counseling/discussion [Z71.89]  Not Applicable    Necrotizing fasciitis [M72.6]  Yes     Priority: 1 - High    Leukocytosis [D72.829]  Yes     Priority: 2     End stage renal disease [N18.6]  Yes     Priority: 2     Hyperbilirubinemia [E80.6]  Unknown     Priority: 3     DVT prophylaxis [Z29.9]  Not Applicable    Severe protein-calorie malnutrition [E43]  Yes    Septic shock [A41.9, R65.21]  Yes    Cellulitis of thigh [L03.119]  Yes    Hyponatremia [E87.1]  Yes    Coagulopathy [D68.9]  Yes    Debility [R53.81]  Yes    Esophagitis, [place] grade D [K20.80]  Yes    Sinus tachycardia [R00.0]  Yes    Calciphylaxis [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.59]  Yes    COVID-19 virus infection [U07.1]  Yes    Hypertensive CKD (chronic kidney disease) [I12.9]  Yes    Anemia of renal disease [N18.9, D63.1]  Yes     Chronic    Type 2 diabetes mellitus with stage 5 chronic kidney disease not on chronic dialysis, with long-term current use of insulin [E11.22, N18.5, Z79.4]  Not Applicable     Insulin Dependent        Resolved Hospital Problems    Diagnosis Date Resolved POA    Hemorrhagic shock [R57.8] 02/02/2022 Yes    DKA (diabetic ketoacidosis) [E11.10] 02/02/2022 Yes    Acute blood loss anemia [D62] 02/02/2022 Yes       Hospice Qualifying Diagnoses:        Patient has a life expectancy < 6 months due to:  1) Primary Hospice Diagnosis:Necrotizing fasciitis, severe protein calorie malutrition  2) Comorbid Conditions Contributing to Decline:  ESRD, encephalopathy, liver dysfunction    Vital Signs: Routine per Hospice Protocol.    Code Status: Full    Allergies:   Review of patient's allergies indicates:   Allergen Reactions     Vicodin [hydrocodone-acetaminophen] Hives    Codeine Hives       Diet: mechanical soft    Activities: As tolerated    Nursing: Per Hospice Routine.      PICC Care:   Scrub the Hub: Prior to accessing the line, always perform a 30 second alcohol scrub  Each lumen of the central line is to be flushed at least daily with 10 mL Normal Saline and 3 mL Heparin flush (100 units/mL)  Skilled Nurse (SN) may draw blood from IV access  Date of removal: per hospice    Oxygen:  No current oxygen needs    Other Miscellaneous Care:         1) Medications:          Medication List      START taking these medications    ciprofloxacin HCl 500 MG tablet  Commonly known as: CIPRO  Take 1 tablet (500 mg total) by mouth once daily.     metroNIDAZOLE 500 MG tablet  Commonly known as: FLAGYL  Take 1 tablet (500 mg total) by mouth every 8 (eight) hours.        CONTINUE taking these medications    calcium carbonate 200 mg calcium (500 mg) chewable tablet  Commonly known as: TUMS  Take 2 tablets (1,000 mg total) by mouth 3 (three) times daily as needed.     cinacalcet 30 MG Tab  Commonly known as: SENSIPAR  Take 1 tablet (30 mg total) by mouth daily with breakfast.     gabapentin 100 MG capsule  Commonly known as: NEURONTIN  Take 1 capsule (100 mg total) by mouth 3 (three) times daily.     oxyCODONE 10 mg Tab immediate release tablet  Commonly known as: ROXICODONE  Take 1 tablet (10 mg total) by mouth every 4 (four) hours as needed.     pantoprazole 40 MG tablet  Commonly known as: PROTONIX  Take 1 tablet (40 mg total) by mouth 2 (two) times daily.     sevelamer carbonate 800 mg Tab  Commonly known as: RENVELA  Take 3 tablets (2,400 mg total) by mouth 3 (three) times daily with meals.        STOP taking these medications    epoetin kelsey-epbx 2,000 unit/mL injection  Commonly known as: RETACRIT     ferrous gluconate 324 MG tablet  Commonly known as: FERGON     metoprolol tartrate 25 MG tablet  Commonly known as: LOPRESSOR     sodium  thiosulfate 12.5 gram/50 mL (250 mg/mL) injection              DIABETES CARE:n/a    Future Orders:  Hospice Medical Director may dictate new orders for comfortable care measures & sign death certificate.        _________________________________  Adebayo Reyes MD  02/14/2022

## 2022-02-14 NOTE — PROGRESS NOTES
Vancomycin consult follow-up:    Patient reviewed, dialysis scheduled every Tues, Thurs, Sat, no new levels, no dose today; Next levels due: 2/15/2022 at 0400

## 2022-02-14 NOTE — ASSESSMENT & PLAN NOTE
- Impaired functional status at baseline; not able to participate with PT/OT due to extent of wounds and overall illness. Contributes to poor prognosis.

## 2022-02-14 NOTE — PLAN OF CARE
Problem: Adult Inpatient Plan of Care  Goal: Plan of Care Review  Outcome: Ongoing, Progressing  Goal: Patient-Specific Goal (Individualized)  Outcome: Ongoing, Progressing  Goal: Optimal Comfort and Wellbeing  Outcome: Ongoing, Progressing  Goal: Readiness for Transition of Care  Outcome: Ongoing, Progressing  Pt alert and oriented. Pt free from falls, injury or any further trauma throughout shift. Continued medications as ordered. Pt turned. Wound care done. Complaints of pain, prn pain medication given. Pt in no distress. Will cont to monitor.

## 2022-02-14 NOTE — SUBJECTIVE & OBJECTIVE
Interval History: No major issues overnight; was sleeping during visit.     Medications:  Continuous Infusions:  Scheduled Meds:   ascorbic acid (vitamin C)  1,000 mg Oral Daily    cinacalcet  30 mg Oral Daily with breakfast    dextrose 10%  25 g Intravenous Once    epoetin kelsey-epbx  10,000 Units Intravenous Every Mon, Wed, Fri    ferrous gluconate  324 mg Oral Daily with breakfast    gabapentin  100 mg Oral BID    meropenem (MERREM) IVPB  1 g Intravenous Daily    nystatin  500,000 Units Oral QID    pantoprazole  40 mg Intravenous BID    sevelamer carbonate  2,400 mg Oral TID WM    sodium chloride 0.9%  10 mL Intravenous Q6H    sodium thiosulfate  25 g Intravenous Every Tues, Thurs, Sat    vitamin renal formula (B-complex-vitamin c-folic acid)  1 capsule Oral Daily    zinc sulfate  220 mg Oral Daily     PRN Meds:sodium chloride, acetaminophen, dextrose 10%, dextrose 10%, dextrose 10%, dextrose 10%, glucagon (human recombinant), glucose, glucose, heparin (porcine), insulin aspart U-100, morphine, ondansetron, oxyCODONE, Flushing PICC Protocol **AND** sodium chloride 0.9% **AND** sodium chloride 0.9%, Pharmacy to dose Vancomycin consult **AND** vancomycin - pharmacy to dose    Objective:     Vital Signs (Most Recent):  Temp: 98.4 °F (36.9 °C) (02/14/22 1206)  Pulse: 109 (02/14/22 1206)  Resp: 20 (02/14/22 1206)  BP: (!) 96/55 (02/14/22 1206)  SpO2: 100 % (02/14/22 1206) Vital Signs (24h Range):  Temp:  [97.3 °F (36.3 °C)-101 °F (38.3 °C)] 98.4 °F (36.9 °C)  Pulse:  [] 109  Resp:  [16-20] 20  SpO2:  [94 %-100 %] 100 %  BP: ()/(43-71) 96/55     Weight: 99.1 kg (218 lb 7.6 oz)  Body mass index is 37.48 kg/m².    Physical Exam  Constitutional:       Comments: Sleeping, appeared comfortable    Pulmonary:      Comments: No increased work of breathing       Significant Labs: All pertinent labs within the past 24 hours have been reviewed.  CBC:   Recent Labs   Lab 02/14/22  0350   WBC 21.66*   HGB  7.3*   HCT 23.2*   MCV 94        BMP:  Recent Labs   Lab 02/14/22  0350         K 4.4      CO2 15*   BUN 17   CREATININE 3.4*   CALCIUM 7.9*   MG 1.7     LFT:  Lab Results   Component Value Date    AST 28 02/14/2022    ALKPHOS 158 (H) 02/14/2022    BILITOT 5.3 (H) 02/14/2022     Albumin:   Albumin   Date Value Ref Range Status   02/14/2022 0.7 (L) 3.5 - 5.2 g/dL Final     Protein:   Total Protein   Date Value Ref Range Status   02/14/2022 4.6 (L) 6.0 - 8.4 g/dL Final     Lactic acid:   Lab Results   Component Value Date    LACTATE 1.6 02/03/2022    LACTATE 1.7 02/01/2022       Significant Imaging: I have reviewed all pertinent imaging results/findings within the past 24 hours.

## 2022-02-14 NOTE — ASSESSMENT & PLAN NOTE
- Nephrology consulted in hospital for HD; has been on this for several months   - Course complicated by calciphylaxis

## 2022-02-14 NOTE — ASSESSMENT & PLAN NOTE
- Has been present for several months; contributes to her overall poor prognosis, even prior to development of necrotizing fasciitis

## 2022-02-14 NOTE — PLAN OF CARE
[facility] [place]  Discharge Final Note    Patient ready for discharge from case management stand point. Transportation requested for home with [facility].     Primary Care Provider: Katharine Franks MD    Expected Discharge Date: 2/14/2022    Final Discharge Note (most recent)       Final Note - 02/14/22 1455          Final Note    Assessment Type Final Discharge Note (P)      Anticipated Discharge Disposition Hospice/Home (P)    [facility]    What phone number can be called within the next 1-3 days to see how you are doing after discharge? [phone] (P)      Hospital Resources/Appts/Education Provided Provided patient/caregiver with written discharge plan information (P)         Post-Acute Status    Post-Acute Placement Status Set-up Complete/Auth obtained (P)      Hospice Status Set-up Complete/Auth obtained (P)      Diaylsis Status Patient declined/refused (P)      Coverage PHN (P)      Discharge Delays None known at this time (P)                      Important Message from Medicare

## 2022-02-14 NOTE — PT/OT/SLP PROGRESS
Speech Language Pathology Treatment    Patient Name:  Xuan Ricardo   MRN:  [id number]  Admitting Diagnosis: Goals of care, counseling/discussion    Recommendations:                 General Recommendations:  Follow-up not indicated  Diet recommendations:  Mechanical soft,Finely chopped meat, Liquid Diet Level: Thin   Aspiration Precautions: 1 bite/sip at a time, Assistance with meals, HOB to 90 degrees and Small bites/sips   General Precautions: Standard, aspiration  Communication strategies:  provide increased time to answer    Subjective   Pt's niece present for session. Pt readily woke for niece when asked if she was ready for lunch.   Patient goals: increased po    Pain/Comfort:  · Pain Rating 1: 0/10    Respiratory Status: Room air    Objective:     Has the patient been evaluated by SLP for swallowing?   Yes  Keep patient NPO? No     Pt repositioned upright as tolerated, niece was shown tilt function of bed to do so as Pt was resistant to head of bed being raised. Pt continue to grossly refuse po. With max encouragement Pt eventually was willing to accept X2 sips of thin liquids via straw, no overt s/s of aspiration. She accepted soft solids from home X1 with increased oral prep. All trials were presented by niece who independently demonstrated appropriate volume of bolus and demonstrated good recall of previous education regarding safe swallow strategies. ST with nothing more to add.     Handout attached to chart.     Assessment:     Xuan Ricardo is a 41 y.o. female with dx of Necrotizing fasciitis she presents with oral dysphagia negatively impacted by decreased mental status and poor dentition.     Goals:   Multidisciplinary Problems     SLP Goals     Not on file          Multidisciplinary Problems (Resolved)        Problem: SLP Goal    Goal Priority Disciplines Outcome   SLP Goal   (Resolved)    Low SLP Met   Description: STGS  1. Pt will tolerate full liquid diet without overt s/s of aspiration- met  2/11.  2. Pt will participate in ongoing assessment of swallow. (goal discontinued 2/14/22)  3. Pt will tolerate [facility] soft diet (IDDSI 5) with thin liquids w/o overt of aspiration. (goal met 2/14/22)  4. Family will demonstrate safe feeding strategies independently (goal met 2/14/22)                   Plan:     · Plan of Care expires:  02/17/22  · Plan of Care reviewed with:  family   · SLP Follow-Up:  no     Discharge recommendations:  No further ST is warranted  Barriers to Discharge:  None    Time Tracking:     SLP Treatment Date:   02/14/22  Speech Start Time:  1403  Speech Stop Time:  1426     Speech Total Time (min):  23 min    Billable Minutes: Treatment Swallowing Dysfunction 15 and Self Care/Home Management Training 8    02/14/2022

## 2022-02-14 NOTE — SUBJECTIVE & OBJECTIVE
Interval History: Yesterday PM pt w/ isolated fever. Denies complaints. No other noticeable changes.     Review of Systems   Constitutional: Negative for chills and fever.   Respiratory: Negative for cough and shortness of breath.    Cardiovascular: Negative for chest pain and leg swelling.   Gastrointestinal: Negative for abdominal distention and abdominal pain.     Objective:     Vital Signs (Most Recent):  Temp: 98.4 °F (36.9 °C) (02/14/22 0525)  Pulse: 107 (02/14/22 0530)  Resp: 20 (02/14/22 0548)  BP: 109/62 (02/14/22 0525)  SpO2: 99 % (02/14/22 0525) Vital Signs (24h Range):  Temp:  [97.3 °F (36.3 °C)-101 °F (38.3 °C)] 98.4 °F (36.9 °C)  Pulse:  [105-122] 107  Resp:  [16-20] 20  SpO2:  [94 %-99 %] 99 %  BP: ()/(43-62) 109/62     Weight: 99.1 kg (218 lb 7.6 oz)  Body mass index is 37.48 kg/m².    Intake/Output Summary (Last 24 hours) at 2/14/2022 0736  Last data filed at 2/13/2022 1230  Gross per 24 hour   Intake 0 ml   Output --   Net 0 ml      Physical Exam  Constitutional:       General: She is not in acute distress.     Appearance: She is ill-appearing. She is not toxic-appearing or diaphoretic.   Cardiovascular:      Rate and Rhythm: Regular rhythm. Tachycardia present.      Heart sounds: No murmur heard.  No gallop.    Pulmonary:      Effort: Pulmonary effort is normal. No respiratory distress.      Breath sounds: Normal breath sounds. No wheezing.   Abdominal:      General: Bowel sounds are normal. There is no distension.      Palpations: Abdomen is soft.      Tenderness: There is no abdominal tenderness.         Significant Labs: All pertinent labs within the past 24 hours have been reviewed.    Significant Imaging: I have reviewed all pertinent imaging results/findings within the past 24 hours.

## 2022-02-14 NOTE — ASSESSMENT & PLAN NOTE
- Albumin less than 1; contributes to low likelihood of wound healing. Contributes to overall prognosis of 6 months or less.

## 2022-02-14 NOTE — PLAN OF CARE
ADT 30 order placed for stretcherTransportation.  Requested  time: 7:30pm  If transportation does not arrive at ETA time nurse will be instructed to follow protocol for transportation below:   How can I get in touch directly with dispatch, if needed?                 Non-emergent dispatch: [phone]      +++NURSING:  If Van does not arrive at requested time please call the above Non Emergent Dispatcher.  If issue not resolved please escalate to your charge nurse for further instructions.    Transport to [address] [place], [place] [id number]

## 2022-02-14 NOTE — PROGRESS NOTES
[facility] [place] [facility]  Progress Note    Patient Name: Xuan Ricardo  MRN: [id number]  Patient Class: IP- Inpatient   Admission Date: 2/1/2022  Length of Stay: 13 days  Attending Physician: Adebayo Reyes MD  Primary Care Provider: Katharine Franks MD        Subjective:     Principal Problem:Goals of care, counseling/discussion        HPI:  40 y/o female presented to ER with hypotension.  Patient was recently started on dialysis and had not had dialysis for one week since last hospital discharge.  She was noted to have multiple abnormalities on presentation.  She was severely anemic with Hgb of 4.3.  transfused 2 units of blood with adequate correction of H/H.  Patient was also noted to have AG metabolic acidosis with hyperglycemia.  Stated on insulin drip for DKA.  Leukocytosis on CBC.  Patient with erythema and blistering of bilateral inner thing and buttocks.  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  Started on ABX's and Surgery consulted.        Overview/Hospital Course:  40 y/o female presented to ER with hypotension.  Patient was recently started on dialysis and had not had dialysis for one week since last hospital discharge.  She was noted to have multiple abnormalities on presentation.  She was severely anemic with Hgb of 4.3.  transfused 2 units of blood with adequate correction of H/H.  Patient was also noted to have AG metabolic acidosis with hyperglycemia.  Started on insulin drip for DKA.  Leukocytosis on CBC.  Patient with erythema and blistering of bilateral inner thighs and buttocks.  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  Started on ABX's and Surgery consulted.  Admitted with necrotizing fascitis of the buttocks. Surgery consulted and patient brought to OR 2/2/22 with significant debridement. On antibiotics. Mental status worsening. Palliative Care consulted, family updated. Brought back to OR on 2/4/22 for debridement as well. Weaned off  norepinephrine, on broad spectrum antibiotics pending cultures. Patient w/ increasing white count and tachycardia, antibiotics broadened, found to have further areas of necrotizing fasciitis and taken back to the OR 2/11.     After multiple providers expressed concern that patient will not heal wounds and that aggressive medical care is causing unnecessary suffering, family amenable to hospice discussion. CM/SW consulted to initiate contact w/ hospice agency.             Interval History: Yesterday PM pt w/ isolated fever. Denies complaints. No other noticeable changes.     Review of Systems   Constitutional: Negative for chills and fever.   Respiratory: Negative for cough and shortness of breath.    Cardiovascular: Negative for chest pain and leg swelling.   Gastrointestinal: Negative for abdominal distention and abdominal pain.     Objective:     Vital Signs (Most Recent):  Temp: 98.4 °F (36.9 °C) (02/14/22 0525)  Pulse: 107 (02/14/22 0530)  Resp: 20 (02/14/22 0548)  BP: 109/62 (02/14/22 0525)  SpO2: 99 % (02/14/22 0525) Vital Signs (24h Range):  Temp:  [97.3 °F (36.3 °C)-101 °F (38.3 °C)] 98.4 °F (36.9 °C)  Pulse:  [105-122] 107  Resp:  [16-20] 20  SpO2:  [94 %-99 %] 99 %  BP: ()/(43-62) 109/62     Weight: 99.1 kg (218 lb 7.6 oz)  Body mass index is 37.48 kg/m².    Intake/Output Summary (Last 24 hours) at 2/14/2022 0736  Last data filed at 2/13/2022 1230  Gross per 24 hour   Intake 0 ml   Output --   Net 0 ml      Physical Exam  Constitutional:       General: She is not in acute distress.     Appearance: She is ill-appearing. She is not toxic-appearing or diaphoretic.   Cardiovascular:      Rate and Rhythm: Regular rhythm. Tachycardia present.      Heart sounds: No murmur heard.  No gallop.    Pulmonary:      Effort: Pulmonary effort is normal. No respiratory distress.      Breath sounds: Normal breath sounds. No wheezing.   Abdominal:      General: Bowel sounds are normal. There is no distension.       Palpations: Abdomen is soft.      Tenderness: There is no abdominal tenderness.         Significant Labs: All pertinent labs within the past 24 hours have been reviewed.    Significant Imaging: I have reviewed all pertinent imaging results/findings within the past 24 hours.      Assessment/Plan:      * Goals of care, counseling/discussion  General surgery expressed concerns about the combination of extraordinarily small odds of recovery and significant suffering through the process, and recommended hospice. Family initially agreed to hospice, however when SW called them they had more questions about curative-focused therapy.    Discussed w/ family for 45 minutes on 2/12. I expressed my concern that Ms. Cousin will not survive this illness regardless of agressiveness of treatment, and that aggressive care will increase her suffering without benefit. Her family expressed understanding of the severity of illness, and were amenable about discussion about hospice. However, at this time they need more time to discuss prior to making a decision.    I also discussed code status with the family, and recommended DNR given the above. They were unable to make a decision at the time of conversation and requested more time to discuss.      2/13: continued to answer questions from family about patient's hospital course. 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 CT with subcutaneous air in buttocks tissues. Underwent debridement 2/2, 2/4, 2/11. Tenuous hemodynamics and essentially no probability of successful wound healing.  - continue broad spectrum abx; could de-escalate if pt stabilizes as no cultures sent from most recent debridement  - surgery following  - would theoretically need loop colostomy to keep wounds clear from feces  - wound care following  - see malnutrition  elsewhere      Leukocytosis  Persistent leukocytosis due to recurrence of necrotizing fasciitis, w/ relative improvement after surgical debridment  - broadened to meropenem given borderline hypotension; can de-escalate if continued improvement  - surgery following       End stage renal disease  Refused dialysis at prior admissions, now non-compliant after leaving the hospital  - nephrology consulted for HD.      Hyperbilirubinemia  Isolated hyperbilirubinemia of unclear origin. CT/RUQ US w/ sludge. No RUQ pain or fevers, no imaging evidence of biliary obstruction or cholelithiasis.     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 Given frequent surgical procedures and large open wounds, prophylactic OAC dosing undesirable. Fondaparinux contraindicated due to ESRD  - SCDs  - will consider prophylactic OAC dosing as clinical course and goals of care evolve      Malnutrition  Pt w/ markedly poor PO intake, particularly concerning given large wounds that will not heal without nutritional support.   - discussed w/ patient, will need alternate means of nutrition if pt unable to meet needs by mouth      Cellulitis of thigh  As above      Septic shock  Initially requiring vasopressors in the ICU, now weaned. However BP has been trending down over the past few days, likely due to infection      Coagulopathy  Likely due to sepsis/infection, now improved      Hyponatremia  Now resolved      Esophagitis, [place] grade D  PPI bid      Debility  Bed bound on baseline.      Sinus tachycardia  Continued sinus tach. Pt w/ long hx of sinus tach, however given lack of anticoagulation due to multiple surgical procedures, c/f pulmonary embolism.       COVID-19 virus infection  Patient initially tested positive for Covid more than one month ago.  Asymptomatic from Covid with no evidence of pneumonia on CXR.  - stop precautions        Calciphylaxis  Hx of calciphylaxis after  repeatedly refusing HD  - nephrology consulted  - sodium thiosulfate      Type 2 diabetes mellitus with stage 5 chronic kidney disease not on chronic dialysis, with long-term current use of insulin  No becoming hypoglycemic due to poor po intake  - hold insulin  - see malnutrition below        Hypertensive CKD (chronic kidney disease)  Patient initially hypotensive requiring pressor support  - now weaned off of vasopressors but tenuous hemodynamically    Anemia of renal disease  Baseline Hgb around 7.5, Presents with Hgb of 4.3  No obvious bleeding, had EGD last admission with esophagitis  Transfused 3 units of blood with adequate correction of H/H  Continue to monitor.  - transfuse as needed   - loss over this hospital stay likely due to surgery        VTE Risk Mitigation (From admission, onward)         Ordered     heparin (porcine) injection 3,200 Units  As needed (PRN)         02/03/22 0024     IP VTE HIGH RISK PATIENT  Once         02/01/22 1816     Place sequential compression device  Until discontinued         02/01/22 1816                Discharge Planning   PAMELA:      Code Status: Full Code   Is the patient medically ready for discharge?:     Reason for patient still in hospital (select all that apply): Patient trending condition, Laboratory test, Treatment, Consult recommendations and Pending disposition  Discharge Plan A: Long-term acute care facility (LTAC)   Discharge Delays: (!) Post-Acute Set-up              Adebayo Reyes MD  Department of Hospital Medicine   [facility] - [facility] [place]

## 2022-02-14 NOTE — ASSESSMENT & PLAN NOTE
Persistent leukocytosis due to recurrence of necrotizing fasciitis, w/ relative improvement after surgical debridment  - broadened to meropenem given borderline hypotension; can de-escalate if continued improvement  - surgery following

## 2022-02-14 NOTE — PLAN OF CARE
TN received call from Yanira with [facility], stated patient did not answer calls to set up home equipment.     TN placed call to Ms. Bustillos, patient's sister, and confirmed discharge today with [facility]. TN informed Ms. Bustillos [facility] will be calling to set up delivery. Ms. Bustillos verbalized understanding and acceptance.     TN notified Yanira of conversation with Ms. Bustillos.     Per Yanira, after additional attempts to contact family, unable to confirm set up for equipment delivery. TN to cancel transportation and re-assess for discharge either home with hospice vs inpt hospice. TN to follow up.     5:07pm TN spoke with patient's sister, Tressa, stated she was on the line with [facility] to confirm equipment delivery. TN notified Yanira with [facility], to continue with original discharge plan for today.

## 2022-02-14 NOTE — PLAN OF CARE
[facility]  Discharge Reassessment    TN spoke with patient's sister Tressa, regarding discharge planning. Per Ms. Bustillos, stated accepting to hospice for next level of care. TN confirmed Ms. Bustillos received list of hospice agencies. Per Ms. Bustillos, stated she would be accepting to [facility]. TN sent referral packet to [facility] via care port.     TN spoke with Yanira with [facility], will reach out to family. TN to follow up.     1:15pm Per Yanira with [facility], family accepting to inpatient hospice, will need rapid covid screen. TN notified physician    2:45pm Per Yanira with [facility], family now will like discharge home with hospice. Transportation requested for 7:30pm to address listed in chart.     Primary Care Provider: Katharine Franks MD    Expected Discharge Date:      Reassessment (most recent)       Discharge Reassessment - 02/14/22 1140          Discharge Reassessment    Assessment Type Discharge Planning Reassessment     Did the patient's condition or plan change since previous assessment? No     Discharge Plan discussed with: Sibling     Name(s) and Number(s) Tressa Aguirre (Sister)   [phone]     Communicated PAMELA with patient/caregiver Date not available/Unable to determine     Discharge Plan A Hospice/home     Discharge Plan B Inpatient Hospice     DME Needed Upon Discharge  other (see comments)   Pending hopsice    Discharge Barriers Identified None (P)      Why the patient remains in the hospital Requires continued medical care (P)         Post-Acute Status    Post-Acute Authorization Hospice (P)      Hospice Status Pending medical clearance/testing (P)      Diaylsis Status Patient declined/refused (P)      Coverage PHN (P)      Discharge Delays Post-Acute Set-up (P)

## 2022-02-14 NOTE — PROGRESS NOTES
[facility]  Palliative Medicine  Progress Note    Patient Name: Xuan Ricardo  MRN: [id number]  Admission Date: 2/1/2022  Hospital Length of Stay: 13 days  Code Status: Full Code   Attending Provider: Adebayo Reyes MD  Consulting Provider: Leena Armenta MD  Primary Care Physician: Katharine Franks MD  Principal Problem:Goals of care, counseling/discussion    Assessment/Plan:     Advance Care Planning       2/14/22  - Chart and interval history reviewed; discussed pt with primary team in person. Family with better insight into pt's overall clinical picture, and has been considering transition to hospice care.   - Met with pt at bedside; she was sleeping peacefully. Did not wake her.   - Attempted to call pt's Aunt Juliet, as she has very good medical knowledge. No answer.   - SW/CM arranging hospice informational for pt's family. While it depends on the hospice agency, pt could potentially continue with HD on hospice (if tolerating this, given low blood pressures), using nec fasc, sepsis, and severe protein calorie malnutrition as her main hospice diagnoses.   - Our team will follow up with pt    2/3/22  - Consult for advance care planning in younger ESRD pt currently in ICU with septic shock, in setting of nec fasc. She is s/p OR debridement with general surgery 2/2/22. Familiar with pt from prior hospital stay; chart reviewed in depth, and discussed pt during MDT rounds.   - Met with pt at bedside; she was lethargic and unable to participate in discussion.   - Along with Dr Armenta (hospitalist), called and spoke with pt's sister/preferred MPOA, Tressa. Also present during phone call was pt's Aunt Juliet ([phone]), who is a nurse.    - Thorough medical update given, and answered their questions about specifics of pt's illness   - Did share transparent concern that even with maximal medical therapy (including HD, pressors, abx, and additional trips to the OR), we are worried that patient may not  survive hospital stay due to severity of her infection. Family was appropriately upset to hear this information, though voiced that they are hopeful God provides them with a miracle. Support provided.   - For now, plan remains to continue aggressive medical care. Family noted they appreciate direct information, especially given that Juliet has medical background; told them we will continue to provide them with transparent updates.   - Our team will follow up with pt and family. Have requested that  call family for additional support, especially given their reliance of miranda.     Severe protein-calorie malnutrition  - Albumin less than 1; contributes to low likelihood of wound healing. Contributes to overall prognosis of 6 months or less.     Necrotizing fasciitis  - Has gone to OR for debridement with general surgery x 2. Her ESRD status and severe protein calorie malnutrition (albumin < 1) make it essentially impossible for her wounds to heal, which has been discussed with pt and family. Prognosis is very poor; qualifies for hospice, with an overall prognosis < 6 months.      Septic shock  - Resolved, though blood pressure remains on the lower side   - Abx per primary; would need to switch to PO if transitioning to hospice     Leukocytosis  - Abx per primary; persistent despite going to OR X 2.      Debility  - Impaired functional status at baseline; not able to participate with PT/OT due to extent of wounds and overall illness. Contributes to poor prognosis.     Calciphylaxis  - Has been present for several months; contributes to her overall poor prognosis, even prior to development of necrotizing fasciitis     End stage renal disease  - Nephrology consulted in hospital for HD; has been on this for several months   - Course complicated by calciphylaxis     I will follow-up with patient. Please contact us if you have any additional questions.     RASHIDA Mckeon  Palliative Medicine Staff    [phone]    > 50% of 35 min visit spent in chart review, face to face discussion of symptom assessment, coordination of care with other specialists, and discharge planning.    Subjective:     Chief Complaint:   Chief Complaint   Patient presents with    Hypotension     EMS called to 40yo female that was at her dr's office and was found to be hypotensive and would get lightheaded sitting up. Dialysis patient that has not had dialysis in about a week according to family. EMS stated it was 70/50     Interval History: No major issues overnight; was sleeping during visit.     Medications:  Continuous Infusions:  Scheduled Meds:   ascorbic acid (vitamin C)  1,000 mg Oral Daily    cinacalcet  30 mg Oral Daily with breakfast    dextrose 10%  25 g Intravenous Once    epoetin kelsey-epbx  10,000 Units Intravenous Every Mon, Wed, Fri    ferrous gluconate  324 mg Oral Daily with breakfast    gabapentin  100 mg Oral BID    meropenem (MERREM) IVPB  1 g Intravenous Daily    nystatin  500,000 Units Oral QID    pantoprazole  40 mg Intravenous BID    sevelamer carbonate  2,400 mg Oral TID WM    sodium chloride 0.9%  10 mL Intravenous Q6H    sodium thiosulfate  25 g Intravenous Every Tues, Thurs, Sat    vitamin renal formula (B-complex-vitamin c-folic acid)  1 capsule Oral Daily    zinc sulfate  220 mg Oral Daily     PRN Meds:sodium chloride, acetaminophen, dextrose 10%, dextrose 10%, dextrose 10%, dextrose 10%, glucagon (human recombinant), glucose, glucose, heparin (porcine), insulin aspart U-100, morphine, ondansetron, oxyCODONE, Flushing PICC Protocol **AND** sodium chloride 0.9% **AND** sodium chloride 0.9%, Pharmacy to dose Vancomycin consult **AND** vancomycin - pharmacy to dose    Objective:     Vital Signs (Most Recent):  Temp: 98.4 °F (36.9 °C) (02/14/22 1206)  Pulse: 109 (02/14/22 1206)  Resp: 20 (02/14/22 1206)  BP: (!) 96/55 (02/14/22 1206)  SpO2: 100 % (02/14/22 1206) Vital Signs (24h Range):  Temp:   [97.3 °F (36.3 °C)-101 °F (38.3 °C)] 98.4 °F (36.9 °C)  Pulse:  [] 109  Resp:  [16-20] 20  SpO2:  [94 %-100 %] 100 %  BP: ()/(43-71) 96/55     Weight: 99.1 kg (218 lb 7.6 oz)  Body mass index is 37.48 kg/m².    Physical Exam  Constitutional:       Comments: Sleeping, appeared comfortable    Pulmonary:      Comments: No increased work of breathing       Significant Labs: All pertinent labs within the past 24 hours have been reviewed.  CBC:   Recent Labs   Lab 02/14/22  0350   WBC 21.66*   HGB 7.3*   HCT 23.2*   MCV 94        BMP:  Recent Labs   Lab 02/14/22  0350         K 4.4      CO2 15*   BUN 17   CREATININE 3.4*   CALCIUM 7.9*   MG 1.7     LFT:  Lab Results   Component Value Date    AST 28 02/14/2022    ALKPHOS 158 (H) 02/14/2022    BILITOT 5.3 (H) 02/14/2022     Albumin:   Albumin   Date Value Ref Range Status   02/14/2022 0.7 (L) 3.5 - 5.2 g/dL Final     Protein:   Total Protein   Date Value Ref Range Status   02/14/2022 4.6 (L) 6.0 - 8.4 g/dL Final     Lactic acid:   Lab Results   Component Value Date    LACTATE 1.6 02/03/2022    LACTATE 1.7 02/01/2022       Significant Imaging: I have reviewed all pertinent imaging results/findings within the past 24 hours.

## 2022-02-15 NOTE — PLAN OF CARE
TN spoke with Yanira with [facility], confirmed home hospice set up and family has agreed not to move forward with dialysis.     TN informed  Catalina with [facility] of family's decision and chair time can be canceled.

## 2022-02-17 LAB
BACTERIA BLD CULT: NORMAL
BACTERIA BLD CULT: NORMAL

## 2022-02-19 ENCOUNTER — HOSPITAL ENCOUNTER (EMERGENCY)
Facility: HOSPITAL | Age: 42
Discharge: HOME | End: 2022-02-20
Attending: EMERGENCY MEDICINE
Payer: MEDICARE

## 2022-02-19 DIAGNOSIS — E16.2 HYPOGLYCEMIA: Primary | ICD-10-CM

## 2022-02-19 DIAGNOSIS — Z51.5 HOSPICE CARE PATIENT: ICD-10-CM

## 2022-02-19 DIAGNOSIS — R41.82 ALTERED MENTAL STATUS: ICD-10-CM

## 2022-02-19 DIAGNOSIS — Z71.89 GOALS OF CARE, COUNSELING/DISCUSSION: ICD-10-CM

## 2022-02-19 LAB
ALBUMIN SERPL BCP-MCNC: 0.7 G/DL (ref 3.5–5.2)
ALP SERPL-CCNC: 159 U/L (ref 55–135)
ALT SERPL W/O P-5'-P-CCNC: 18 U/L (ref 10–44)
ANION GAP SERPL CALC-SCNC: 23 MMOL/L (ref 8–16)
AST SERPL-CCNC: 65 U/L (ref 10–40)
BASOPHILS # BLD AUTO: 0.02 K/UL (ref 0–0.2)
BASOPHILS NFR BLD: 0.1 % (ref 0–1.9)
BILIRUB SERPL-MCNC: 6.4 MG/DL (ref 0.1–1)
BUN SERPL-MCNC: 51 MG/DL (ref 6–20)
CALCIUM SERPL-MCNC: 8.2 MG/DL (ref 8.7–10.5)
CHLORIDE SERPL-SCNC: 104 MMOL/L (ref 95–110)
CO2 SERPL-SCNC: 13 MMOL/L (ref 23–29)
CREAT SERPL-MCNC: 7.8 MG/DL (ref 0.5–1.4)
CTP QC/QA: YES
DIFFERENTIAL METHOD: ABNORMAL
EOSINOPHIL # BLD AUTO: 0.1 K/UL (ref 0–0.5)
EOSINOPHIL NFR BLD: 0.5 % (ref 0–8)
ERYTHROCYTE [DISTWIDTH] IN BLOOD BY AUTOMATED COUNT: 19.2 % (ref 11.5–14.5)
EST. GFR  (AFRICAN AMERICAN): 6.7 ML/MIN/1.73 M^2
EST. GFR  (NON AFRICAN AMERICAN): 5.8 ML/MIN/1.73 M^2
GLUCOSE SERPL-MCNC: 73 MG/DL (ref 70–110)
HCT VFR BLD AUTO: 23.3 % (ref 37–48.5)
HGB BLD-MCNC: 7.2 G/DL (ref 12–16)
IMM GRANULOCYTES # BLD AUTO: 0.64 K/UL (ref 0–0.04)
IMM GRANULOCYTES NFR BLD AUTO: 4.8 % (ref 0–0.5)
LACTATE SERPL-SCNC: 1.5 MMOL/L (ref 0.5–2.2)
LYMPHOCYTES # BLD AUTO: 1.8 K/UL (ref 1–4.8)
LYMPHOCYTES NFR BLD: 13.1 % (ref 18–48)
MCH RBC QN AUTO: 29.8 PG (ref 27–31)
MCHC RBC AUTO-ENTMCNC: 30.9 G/DL (ref 32–36)
MCV RBC AUTO: 96 FL (ref 82–98)
MONOCYTES # BLD AUTO: 0.6 K/UL (ref 0.3–1)
MONOCYTES NFR BLD: 4.5 % (ref 4–15)
NEUTROPHILS # BLD AUTO: 10.3 K/UL (ref 1.8–7.7)
NEUTROPHILS NFR BLD: 77 % (ref 38–73)
NRBC BLD-RTO: 0 /100 WBC
PLATELET # BLD AUTO: 219 K/UL (ref 150–450)
PMV BLD AUTO: 10.1 FL (ref 9.2–12.9)
POCT GLUCOSE: 153 MG/DL (ref 70–110)
POCT GLUCOSE: 81 MG/DL (ref 70–110)
POTASSIUM SERPL-SCNC: 6.4 MMOL/L (ref 3.5–5.1)
PROT SERPL-MCNC: 5.4 G/DL (ref 6–8.4)
RBC # BLD AUTO: 2.42 M/UL (ref 4–5.4)
SARS-COV-2 RDRP RESP QL NAA+PROBE: NEGATIVE
SODIUM SERPL-SCNC: 140 MMOL/L (ref 136–145)
WBC # BLD AUTO: 13.4 K/UL (ref 3.9–12.7)

## 2022-02-19 PROCEDURE — 82962 GLUCOSE BLOOD TEST: CPT

## 2022-02-19 PROCEDURE — 25000003 PHARM REV CODE 250: Performed by: STUDENT IN AN ORGANIZED HEALTH CARE EDUCATION/TRAINING PROGRAM

## 2022-02-19 PROCEDURE — 63600175 PHARM REV CODE 636 W HCPCS: Performed by: STUDENT IN AN ORGANIZED HEALTH CARE EDUCATION/TRAINING PROGRAM

## 2022-02-19 PROCEDURE — 96374 THER/PROPH/DIAG INJ IV PUSH: CPT

## 2022-02-19 PROCEDURE — 85025 COMPLETE CBC W/AUTO DIFF WBC: CPT | Performed by: EMERGENCY MEDICINE

## 2022-02-19 PROCEDURE — 99291 PR CRITICAL CARE, E/M 30-74 MINUTES: ICD-10-PCS | Mod: GW,CS,, | Performed by: EMERGENCY MEDICINE

## 2022-02-19 PROCEDURE — 96361 HYDRATE IV INFUSION ADD-ON: CPT

## 2022-02-19 PROCEDURE — 99291 CRITICAL CARE FIRST HOUR: CPT | Mod: GW,CS,, | Performed by: EMERGENCY MEDICINE

## 2022-02-19 PROCEDURE — 80053 COMPREHEN METABOLIC PANEL: CPT | Performed by: STUDENT IN AN ORGANIZED HEALTH CARE EDUCATION/TRAINING PROGRAM

## 2022-02-19 PROCEDURE — 99291 CRITICAL CARE FIRST HOUR: CPT | Mod: 25

## 2022-02-19 PROCEDURE — 83605 ASSAY OF LACTIC ACID: CPT | Performed by: STUDENT IN AN ORGANIZED HEALTH CARE EDUCATION/TRAINING PROGRAM

## 2022-02-19 PROCEDURE — 87040 BLOOD CULTURE FOR BACTERIA: CPT | Mod: 59 | Performed by: STUDENT IN AN ORGANIZED HEALTH CARE EDUCATION/TRAINING PROGRAM

## 2022-02-19 PROCEDURE — U0002 COVID-19 LAB TEST NON-CDC: HCPCS | Performed by: EMERGENCY MEDICINE

## 2022-02-19 RX ORDER — OXYCODONE HYDROCHLORIDE 5 MG/1
10 TABLET ORAL
Status: DISCONTINUED | OUTPATIENT
Start: 2022-02-19 | End: 2022-02-19

## 2022-02-19 RX ORDER — ONDANSETRON 2 MG/ML
4 INJECTION INTRAMUSCULAR; INTRAVENOUS
Status: ACTIVE | OUTPATIENT
Start: 2022-02-19 | End: 2022-02-20

## 2022-02-19 RX ORDER — OXYCODONE HYDROCHLORIDE 5 MG/1
5 TABLET ORAL
Status: DISCONTINUED | OUTPATIENT
Start: 2022-02-19 | End: 2022-02-19

## 2022-02-19 RX ORDER — MORPHINE SULFATE 2 MG/ML
6 INJECTION, SOLUTION INTRAMUSCULAR; INTRAVENOUS
Status: COMPLETED | OUTPATIENT
Start: 2022-02-19 | End: 2022-02-19

## 2022-02-19 RX ORDER — CIPROFLOXACIN 500 MG/1
500 TABLET ORAL DAILY
Qty: 14 TABLET | Refills: 0 | Status: SHIPPED | OUTPATIENT
Start: 2022-02-19 | End: 2022-02-19 | Stop reason: SDUPTHER

## 2022-02-19 RX ORDER — CIPROFLOXACIN 500 MG/1
500 TABLET ORAL DAILY
Qty: 14 TABLET | Refills: 0 | Status: SHIPPED | OUTPATIENT
Start: 2022-02-19 | End: 2022-03-05

## 2022-02-19 RX ADMIN — SODIUM CHLORIDE 500 ML: 0.9 INJECTION, SOLUTION INTRAVENOUS at 03:02

## 2022-02-19 RX ADMIN — MORPHINE SULFATE 6 MG: 2 INJECTION, SOLUTION INTRAMUSCULAR; INTRAVENOUS at 03:02

## 2022-02-19 RX ADMIN — DEXTROSE 500 ML: 10 SOLUTION INTRAVENOUS at 03:02

## 2022-02-19 NOTE — ED PROVIDER NOTES
Encounter Date: 2/19/2022       History     Chief Complaint   Patient presents with    UNRESPONSIVE     Family called EMS, pt found unresponsive, CBG 44 upon EMS arrival at home, Given D50, 157 CBG, PICC in place to R upper arm     41-year-old female with DM, HLD, HTN, CKD, chronic sacral wound status post multiple debridements presents via EMS for unresponsiveness, found with blood glucose of 44 on EMS arrival.  Patient arrives more alert status post amp of D50 with blood glucose of 157. EMS was unable to obtain intravenous axis and placed an IO to administer dextrose. Patient complains of right arm pain.  Patient was recently discharged from [facility] and hospice was initiated.  Patient's sister reports that patient was unresponsive this morning and she was unsure what to do so she called 911. Patient's sister marla Aguirre is POA and states that patient is unable to make her own decisions at baseline.  Patient was placed on hospice due to severe soft tissue infection unlikely to respond to surgical or medical treatment.    The history is provided by the patient, medical records and a relative.     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 ;  Surgeon: Rakesh Leon MD;  Location: [facility] OR;  Service: Vascular;  Laterality: Left;  RN PREOP 5/4/20, UPT done, COVID NEGATRIVE 5/4/20  NEED H/P      SECTION, CLASSIC      DEBRIDEMENT OF BUTTOCKS N/A 2022    Procedure: DEBRIDEMENT, BUTTOCK;  Surgeon: Zhen Templeton MD;  Location: [facility] OR;  Service: General;  Laterality: N/A;    DEBRIDEMENT OF BUTTOCKS N/A 2022    Procedure: DEBRIDEMENT, BUTTOCK;  Surgeon: Mitchell Pedro MD;  Location: [facility] OR;  Service: General;  Laterality: N/A;    DEBRIDEMENT OF LOWER EXTREMITY Right 2/10/2022    Procedure: DEBRIDEMENT, LOWER EXTREMITY;  Surgeon: Simón Blanco MD;  Location: [facility] OR;  Service: General;  Laterality: Right;    ESOPHAGOGASTRODUODENOSCOPY N/A 2022    Procedure: EGD (ESOPHAGOGASTRODUODENOSCOPY);  Surgeon: Mario Alberto Irene MD;  Location[facility];  Service: Endoscopy;  Laterality: N/A;    INSERTION OF TUNNELED CENTRAL VENOUS CATHETER (CVC) WITH SUBCUTANEOUS PORT N/A 2020    Procedure: IR TUNNELED VATH INSERT WITHOUT PORT;  Surgeon: [facility] Diagnostic Provider;  Location: [facility] OR;  Service: Radiology;  Laterality: N/A;  1030AM START  RN PREOP 2020    INSERTION OF TUNNELED CENTRAL VENOUS CATHETER (CVC) WITH SUBCUTANEOUS PORT N/A 2020    Procedure: TUNNELED CATH PLACEMENT;  Surgeon: [facility] Diagnostic Provider;  Location: [facility] OR;  Service: Radiology;  Laterality: N/A;  930AM START    REVISION OF ARTERIOVENOUS FISTULA Left 2020    Procedure: REVISION, AV FISTULA, THROMBECTOMY, LEFT UPPER EXTREMITY;  Surgeon: Rakesh Leon MD;  Location: [facility] OR;  Service: Vascular;  Laterality: Left;    TUBAL LIGATION       Family History   Problem Relation Age of Onset    Seizures Mother 64    Heart failure Father 58    Asthma Sister     Breast cancer Neg Hx     Colon cancer Neg Hx     Ovarian cancer Neg Hx      Social History     Tobacco Use    Smoking status: Former Smoker     Types: Cigarettes    Smokeless tobacco: Never Used   Substance Use Topics    Alcohol use: No    Drug use: Yes     Types: Marijuana     Comment: Occassional Recreational Use only     Review of  Systems   Unable to perform ROS: Other       Physical Exam     Initial Vitals   BP Pulse Resp Temp SpO2   02/19/22 1451 02/19/22 1456 02/19/22 1504 02/19/22 1505 02/19/22 1547   107/66 107 18 97.4 °F (36.3 °C) 99 %      MAP       --                Physical Exam    Nursing note and vitals reviewed.  Constitutional:   Ill-appearing, uncomfortable, following basic commands   HENT:   Head: Normocephalic and atraumatic.   Mouth/Throat: Oropharynx is clear and moist.   Eyes: Conjunctivae and EOM are normal.   Cardiovascular: Normal rate, regular rhythm, normal heart sounds and intact distal pulses.   Pulmonary/Chest: Breath sounds normal. No stridor. No respiratory distress.   Abdominal: Abdomen is soft. She exhibits no distension. There is no abdominal tenderness.   Musculoskeletal:      Comments: Large sacral wound involving multiple tissue layers  Skin graft to the left thigh     Neurological: She is alert.   Oriented to person and place only   Skin: Skin is warm and dry.   Psychiatric:   Follows basic commands but unable to participate in conversation.  Answers simple questions             ED Course   Critical Care    Date/Time: 2/19/2022 5:00 PM  Performed by: Mariola Sandhu MD  Authorized by: Mariola Sandhu MD   Direct patient critical care time: 10 minutes  Additional history critical care time: 10 minutes  Ordering / reviewing critical care time: 10 minutes  Documentation critical care time: 10 minutes  Consult with family critical care time: 10 minutes  Total critical care time (exclusive of procedural time) : 50 minutes  Critical care time was exclusive of separately billable procedures and treating other patients and teaching time.  Critical care was necessary to treat or prevent imminent or life-threatening deterioration of the following conditions: endocrine crisis.  Critical care was time spent personally by me on the following activities: development of treatment plan with patient or surrogate,  interpretation of cardiac output measurements, evaluation of patient's response to treatment, examination of patient, obtaining history from patient or surrogate, ordering and performing treatments and interventions, ordering and review of laboratory studies, ordering and review of radiographic studies, pulse oximetry, re-evaluation of patient's condition and review of old charts.        Labs Reviewed   COMPREHENSIVE METABOLIC PANEL - Abnormal; Notable for the following components:       Result Value    Potassium 6.4 (*)     CO2 13 (*)     BUN 51 (*)     Creatinine 7.8 (*)     Calcium 8.2 (*)     Total Protein 5.4 (*)     Albumin 0.7 (*)     Total Bilirubin 6.4 (*)     Alkaline Phosphatase 159 (*)     AST 65 (*)     Anion Gap 23 (*)     eGFR if  6.7 (*)     eGFR if non  5.8 (*)     All other components within normal limits   CBC W/ AUTO DIFFERENTIAL - Abnormal; Notable for the following components:    WBC 13.40 (*)     RBC 2.42 (*)     Hemoglobin 7.2 (*)     Hematocrit 23.3 (*)     MCHC 30.9 (*)     RDW 19.2 (*)     Immature Granulocytes 4.8 (*)     Gran # (ANC) 10.3 (*)     Immature Grans (Abs) 0.64 (*)     Gran % 77.0 (*)     Lymph % 13.1 (*)     All other components within normal limits   POCT GLUCOSE - Abnormal; Notable for the following components:    POCT Glucose 153 (*)     All other components within normal limits   POCT GLUCOSE - Abnormal; Notable for the following components:    POCT Glucose <20 (*)     All other components within normal limits   CULTURE, BLOOD    Narrative:     Aerobic and anaerobic   CULTURE, BLOOD    Narrative:     Aerobic and anaerobic   LACTIC ACID, PLASMA   SARS-COV-2 RDRP GENE   POCT GLUCOSE          Imaging Results          X-Ray Chest AP Portable (Final result)  Result time 02/19/22 16:29:15    Final result by Dina Major MD (02/19/22 16:29:15)                 Impression:      No acute pulmonary pathology identified.      Electronically signed  by: Dina Major  Date:    02/19/2022  Time:    16:29             Narrative:    EXAMINATION:  XR CHEST AP PORTABLE    CLINICAL HISTORY:  Sepsis;    TECHNIQUE:  Single frontal view of the chest was performed.    COMPARISON:  02/13/2022    FINDINGS:  Tip of a tunneled hemodialysis catheter overlies the cavoatrial junction.  The tip of right PICC is somewhat obscured by the previous catheter.  The lung fields and pleural spaces appear to be clear.  The heart size appears normal.  The bones appear unremarkable for the age of the patient, with mild hypertrophic and/or degenerative changes.    No significant interval change since prior exam noted.                                 Medications   ondansetron injection 4 mg (0 mg Intravenous Hold 2/19/22 1515)   morphine injection 6 mg (6 mg Intravenous Given 2/19/22 1547)   sodium chloride 0.9% bolus 500 mL (0 mLs Intravenous Stopped 2/19/22 1657)     Medical Decision Making:   History:   I obtained history from: someone other than patient and EMS provider.  Old Medical Records: I decided to obtain old medical records.  Old Records Summarized: records from clinic visits, records from previous admission(s) and records from another hospital.  Initial Assessment:   41-year-old female with DM, HLD, HTN, CKD, chronic sacral wound status post multiple debridements presents via EMS for unresponsiveness secondary to hypoglycemia with improvement in mental status status post D50 bolus  Clinical Tests:   Lab Tests: Ordered and Reviewed  ED Management:  Patient oriented to Person and Place on arrival to ED.  per sister via telephone, this is patient's baseline mental status  History, patient demographics/risk factors, and examination most consistent with hypoglycemic episode  Patient not on any insulin or diabetic medications currently.  Suspect this is secondary to poor p.o. intake  Other differentials include electrolyte derangement, infection  Patient arrives from home were she was  recently initiated on hospice care.  Discussed with patient's sister who is POA who states that she was unsure what to do and called 911 due to unresponsiveness.  Will monitor glucose and do initial lab work while discussing final plan of care  Patient likely to have multiple metabolic derangements in accordance with her advanced kidney failure  Patient does have advanced sacral wound status post debridements            Attending Attestation:   Physician Attestation Statement for Resident:  As the supervising MD   Physician Attestation Statement: I have personally seen and examined this patient.   I agree with the above history. -:   As the supervising MD I agree with the above PE.    As the supervising MD I agree with the above treatment, course, plan, and disposition.   -: Pt is ill-appearing, has had long complicated hospital course involving calciphylaxis and debridement of large area of skin for necrotizing fasciitis, ultimately on hospice from which she presents.  Patient was brought in for unresponsiveness, found to be hypoglycemic which improved after D50, however additional lab abnormalities and hypotension consistent with likely end of life. Per discussion with family patient will again be placed on hospice with no further evaluation.  They do request antibiotics empirically however.   I have reviewed and agree with the residents interpretation of the following: lab data and x-rays.  I have reviewed the following: old records at this facility.                ED Course as of 02/21/22 1345   Sat Feb 19, 2022   1706 POCT Glucose(!): 153 [JR]   1706 SARS-CoV-2 RNA, Amplification, Qual: Negative [JR]   1706 Lactate, Morteza: 1.5 [JR]   1716 Hemoglobin(!): 7.2  baseline [JR]   1716 BILIRUBIN TOTAL(!): 6.4  Slightly increased from baseline [JR]   1746 Discussed with patient's sister at bedside.  Reviewed goals of care, she would like to continue hospice care at home.  Discussed with hospice nurse as well, who will  meet family at home and review treatment plan.  Reviewed results including worsening renal failure/hyperkalemia.  They still are agreeable to discharge with hospice care as previously planned.  Will add antibiotics as they were discussed at outside facility as part of hospice planned, blood prescribed Bactrim for soft tissue infection prophylaxis/treatment. [OK]      ED Course User Index  [JR] Mariola Sandhu MD  [OK] Roberto Madrid MD             Clinical Impression:   Final diagnoses:  [R41.82] Altered mental status  [Z71.89] Goals of care, counseling/discussion  [E16.2] Hypoglycemia (Primary)  [Z51.5] Hospice care patient          ED Disposition Condition    Discharge Stable        ED Prescriptions     Medication Sig Dispense Start Date End Date Auth. Provider             ciprofloxacin HCl (CIPRO) 500 MG tablet Take 1 tablet (500 mg total) by mouth once daily. for 14 days 14 tablet 2/19/2022 3/5/2022 Roberto Madrid MD        Follow-up Information    None          Roberto Madrid MD  Resident  02/19/22 5710       Mariola Sandhu MD  02/21/22 3411

## 2022-02-19 NOTE — ED NOTES
Xuan Cousin, a 41 y.o. female presents to the ED w/ complaint of hypoglycemia. Patient brought in by [facility] with a complaint of being unresponsive per family.  EMS arrived and patients CBG was 44 patient was unresponsive so EMS put in a left shoulder IO.  Patient arrived here alert but disoriented to situation and time.     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

## 2022-02-19 NOTE — DISCHARGE INSTRUCTIONS
Ensure that you eat enough on a regular basis.  If unable to eat or have no appetite, and sure that you are drinking juice at least 3-4 times per day to maintain your blood sugar    Call your hospice nurse to touch base and meet up for further planning for care

## 2022-02-20 ENCOUNTER — TELEPHONE (OUTPATIENT)
Dept: EMERGENCY MEDICINE | Facility: HOSPITAL | Age: 42
End: 2022-02-20
Payer: MEDICARE

## 2022-02-20 VITALS
WEIGHT: 220 LBS | TEMPERATURE: 98 F | SYSTOLIC BLOOD PRESSURE: 99 MMHG | BODY MASS INDEX: 37.74 KG/M2 | HEART RATE: 96 BPM | DIASTOLIC BLOOD PRESSURE: 52 MMHG | RESPIRATION RATE: 20 BRPM | OXYGEN SATURATION: 99 %

## 2022-02-20 RX ORDER — DOXYCYCLINE 100 MG/1
100 CAPSULE ORAL 2 TIMES DAILY
Qty: 14 CAPSULE | Refills: 0 | Status: SHIPPED | OUTPATIENT
Start: 2022-02-20 | End: 2022-02-27

## 2022-02-20 NOTE — ED NOTES
Assumed care. Received report from Makayla AUSTIN. Patient resting in stretcher and is in NAD at this time, family at bedside. Pt denies pain at this time. Pt updated on POC- [facility] ETA 9pm per dispatcher. Bed low and locked, SR up x2, call bell in patient reach. Pt remains on continuous cardiac monitor, continuous pulse ox, and auto BP cuff. Pt voices no needs at this time.

## 2022-02-21 LAB — POCT GLUCOSE: <20 MG/DL (ref 70–110)

## 2022-02-21 NOTE — TELEPHONE ENCOUNTER
Patient established with Hospice at this time. She went to the ED yesterday for AMS and was discharged home with Hospice with Cipro. 1/4 BC grew out gram positive clusters. Sister, Gypsy, is interested in coverage for possible Staph. Will give oral doxycycline at this time to take with cipro. Hospice nurse, Lisset, is coming over tomorrow. Will fax ([phone]) results to her.

## 2022-02-22 NOTE — ANESTHESIA POSTPROCEDURE EVALUATION
Anesthesia Post Evaluation    Patient: Xuan Ricardo    Procedure(s) Performed: Procedure(s) (LRB):  DEBRIDEMENT, LOWER EXTREMITY (Right)    Final Anesthesia Type: general      Patient location during evaluation: ICU  Patient participation: No - Unable to Participate, Other Reason (see comments)  Level of consciousness: awake  Post-procedure vital signs: reviewed and stable  Pain management: adequate  Airway patency: patent  TAE mitigation strategies: Extubation while patient is awake  PONV status at discharge: No PONV  Anesthetic complications: no      Cardiovascular status: blood pressure returned to baseline  Respiratory status: unassisted and spontaneous ventilation  Hydration status: euvolemic  Follow-up not needed.          Vitals Value Taken Time   BP 99/52 02/20/22 0133   Temp 36.6 °C (97.9 °F) 02/19/22 2218   Pulse 96 02/20/22 0133   Resp 20 02/20/22 0337   SpO2 99 % 02/20/22 0337         Event Time   Out of Recovery 02/10/2022 22:09:00         Pain/Vineet Score: No data recorded

## 2022-02-23 LAB
BACTERIA BLD CULT: ABNORMAL

## 2022-02-24 LAB — BACTERIA BLD CULT: NORMAL

## 2022-02-26 NOTE — DISCHARGE SUMMARY
[facility]  Discharge Summary      Patient Name: Xuan Ricardo  MRN: [id number]  Patient Class: IP- Inpatient  Admission Date: 2/1/2022  Hospital Length of Stay: 13 days  Discharge Date and Time: 2/14/2022 10:27 PM  Attending Physician: No att. providers found   Discharging Provider: Adebayo Reyes MD  Primary Care Provider: Katharine Franks MD      HPI:   42 y/o female presented to ER with hypotension.  Patient was recently started on dialysis and had not had dialysis for one week since last hospital discharge.  She was noted to have multiple abnormalities on presentation.  She was severely anemic with Hgb of 4.3.  transfused 2 units of blood with adequate correction of H/H.  Patient was also noted to have AG metabolic acidosis with hyperglycemia.  Stated on insulin drip for DKA.  Leukocytosis on CBC.  Patient with erythema and blistering of bilateral inner thing and buttocks.  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  Started on ABX's and Surgery consulted.        Procedure(s) (LRB):  DEBRIDEMENT, LOWER EXTREMITY (Right)      Hospital Course:   42 y/o female presented to ER with hypotension.  Patient was recently started on dialysis and had not had dialysis for one week since last hospital discharge.  She was noted to have multiple abnormalities on presentation.  She was severely anemic with Hgb of 4.3.  transfused 2 units of blood with adequate correction of H/H.  Patient was also noted to have AG metabolic acidosis with hyperglycemia.  Started on insulin drip for DKA.  Leukocytosis on CBC.  Patient with erythema and blistering of bilateral inner thighs and buttocks.  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  Started on ABX's and Surgery consulted.  Admitted with necrotizing fascitis of the buttocks. Surgery consulted and patient brought to OR 2/2/22 with significant debridement. On antibiotics. Mental status worsening. Palliative Care consulted,  family updated. Brought back to OR on 2/4/22 for debridement as well. Weaned off norepinephrine, on broad spectrum antibiotics pending cultures. Patient w/ increasing white count and tachycardia, antibiotics broadened, found to have further areas of necrotizing fasciitis and taken back to the OR 2/11. The patient's prior wounds showed no evidence of infection but were extremely large and showed poor wound healing.    After multiple providers expressed concern that the wounds will not heal given the frequent re-infection and worsening despite maximal medical therapy, as well as the patient's declining physical/mental/nutritional status, and the burden of aggressive medical care including frequent painful wound care changes and likely loop colostomy, the patient's family was amenable to hospice discussion. CM/SW consulted to initiate contact w/ hospice agency. After much discussion with multiple family members with multiple different providers on the care team, the patient and her family decided to transition to hospice. That wish was honored and the patient was discharged with hospice.              Goals of Care Treatment Preferences:  Code Status: Full Code      Consults:   Consults (From admission, onward)        Status Ordering Provider     Inpatient consult to Social Work  Once        Provider:  (Not yet assigned)    Completed ANGEL PAT     Inpatient consult to Palliative Care  Once        Provider:  (Not yet assigned)    Completed ARTI CISSE     IP consult to case management  Once        Provider:  (Not yet assigned)    Completed CODIE PUENTES     Inpatient consult to Registered Dietitian/Nutritionist  Once        Provider:  (Not yet assigned)    Completed ARTI CISSE     Inpatient consult to General Surgery  Once        Provider:  (Not yet assigned)    Completed ANA PAULA QUAN          No new Assessment & Plan notes have been filed under this hospital service since the last  note was generated.  Service: Hospital Medicine    Final Active Diagnoses:    Diagnosis Date Noted POA    PRINCIPAL PROBLEM:  Goals of care, counseling/discussion [Z71.89] 12/11/2021 Not Applicable    Necrotizing fasciitis [M72.6] 02/03/2022 Yes    Leukocytosis [D72.829] 02/02/2022 Yes    End stage renal disease [N18.6] 01/27/2020 Yes    Hyperbilirubinemia [E80.6] 02/10/2022 Unknown    DVT prophylaxis [Z29.9] 02/13/2022 Not Applicable    Severe protein-calorie malnutrition [E43] 02/08/2022 Yes    Septic shock [A41.9, R65.21] 02/03/2022 Yes    Cellulitis of thigh [L03.119]  Yes    Hyponatremia [E87.1] 02/02/2022 Yes    Coagulopathy [D68.9] 02/02/2022 Yes    Debility [R53.81] 01/27/2022 Yes    Esophagitis, [place] grade D [K20.80] 01/27/2022 Yes    Sinus tachycardia [R00.0] 01/14/2022 Yes    Calciphylaxis [E83.59] 12/30/2021 Yes    COVID-19 virus infection [U07.1] 12/30/2021 Yes    Hypertensive CKD (chronic kidney disease) [I12.9] 12/14/2016 Yes    Anemia of renal disease [N18.9, D63.1] 04/09/2016 Yes     Chronic    Type 2 diabetes mellitus with stage 5 chronic kidney disease not on chronic dialysis, with long-term current use of insulin [E11.22, N18.5, Z79.4] 01/01/1996 Not Applicable      Problems Resolved During this Admission:    Diagnosis Date Noted Date Resolved POA    Hemorrhagic shock [R57.8] 02/01/2022 02/02/2022 Yes    DKA (diabetic ketoacidosis) [E11.10] 02/01/2022 02/02/2022 Yes    Acute blood loss anemia [D62] 12/11/2021 02/02/2022 Yes       Discharged Condition: stable    Disposition: Hospice/Home    Follow Up:    Patient Instructions:   No discharge procedures on file.    Significant Diagnostic Studies: as above    Pending Diagnostic Studies:     Procedure Component Value Units Date/Time    EKG 12-lead [[id number]]     Order Status: Sent Lab Status: No result          Medications:  Reconciled Home Medications:      Medication List      START taking these medications     metroNIDAZOLE 500 MG tablet  Commonly known as: FLAGYL  Take 1 tablet (500 mg total) by mouth every 8 (eight) hours.        CONTINUE taking these medications    calcium carbonate 200 mg calcium (500 mg) chewable tablet  Commonly known as: TUMS  Take 2 tablets (1,000 mg total) by mouth 3 (three) times daily as needed.     cinacalcet 30 MG Tab  Commonly known as: SENSIPAR  Take 1 tablet (30 mg total) by mouth daily with breakfast.     gabapentin 100 MG capsule  Commonly known as: NEURONTIN  Take 1 capsule (100 mg total) by mouth 3 (three) times daily.     oxyCODONE 10 mg Tab immediate release tablet  Commonly known as: ROXICODONE  Take 1 tablet (10 mg total) by mouth every 4 (four) hours as needed.     pantoprazole 40 MG tablet  Commonly known as: PROTONIX  Take 1 tablet (40 mg total) by mouth 2 (two) times daily.     sevelamer carbonate 800 mg Tab  Commonly known as: RENVELA  Take 3 tablets (2,400 mg total) by mouth 3 (three) times daily with meals.        STOP taking these medications    epoetin kelsey-epbx 2,000 unit/mL injection  Commonly known as: RETACRIT     ferrous gluconate 324 MG tablet  Commonly known as: FERGON     metoprolol tartrate 25 MG tablet  Commonly known as: LOPRESSOR     sodium thiosulfate 12.5 gram/50 mL (250 mg/mL) injection            Indwelling Lines/Drains at time of discharge:   Lines/Drains/Airways     Peripherally Inserted Central Catheter Line  Duration           PICC Triple Lumen 02/02/22 0220 right basilic 24 days          Central Venous Catheter Line  Duration                Hemodialysis Catheter 12/29/21 right subclavian 59 days                Time spent on the discharge of patient: 95 minutes         Adebayo Reyes MD  Department of Hospital Medicine  [facility]

## 2022-03-07 LAB — FUNGUS SPEC CULT: NORMAL

## 2022-03-24 LAB
ACID FAST MOD KINY STN SPEC: NORMAL
MYCOBACTERIUM SPEC QL CULT: NORMAL

## 2023-05-30 NOTE — ASSESSMENT & PLAN NOTE
Pt c/o bilat. Lower ext. Swelling, redness, itching. Pt came from specials had paracentesis done. Pt states she recently had blood transfusion done, hx. Liver cirrhosis pt a&ox4, skin w/d/pink, pulses palp.   Pt denies chest pain, 0 n&v. Reports taking 10U daily  - accuchecks/SSI  - Stable in 130s

## 2023-06-12 NOTE — AI DETERIORATION ALERT
Sepsis Screen (most recent)     Sepsis Screen - 02/03/22 1055     Is the patient's history or complaint suggestive of a possible infection? Yes  -JW    Are there at least two of the following signs and symptoms present? Yes  -    Sepsis signs/symptoms - Tachycardia Tachycardia     >90  -    Sepsis signs/symptoms - WBC WBC < 4,000 or WBC > 12,000  -    Sepsis signs/symptoms - Altered Mental Status Altered Mental Status  -JW    Are any of the following organ dysfunction criteria present and not considered to be due to a chronic condition? Yes  -    Organ Dysfunction Criteria Creatinine > 2.0  -    Organ Dysfunction Criteria Total Bilirubin > 2.0 Platelet count < 100,000  -    Organ Dysfunction Criteria INR > 1.5 or aPTT > 60  -    Start Sepsis Timer No   on ABX -          User Key  (r) = Recorded By, (t) = Taken By, (c) = Cosigned By    Initials Name    JW Sadia Burns RN              Patient screens positive but is on ABX therapy, therefore timer not started. WCTM.   4 = No assist / stand by assistance

## 2023-09-18 NOTE — SUBJECTIVE & OBJECTIVE
Interval History: patient doing ok today; still running tachy; afebrile; EKG reviewed shows some t-wave abnormality and sinus tach; getting 2d echo  - will stop albuterol treatments as this could be attributing to this and patient is on RA    - patient does have anemia, iron counts are low, will give a dose of venofer; getting epo with HD; no overt GI bleeding signs; checking cbc in AM    - still awaiting outpatient COVID HD chair placement; will stop abx today    Review of Systems   Constitutional: Negative for activity change and fever.   Respiratory: Negative for cough and shortness of breath.    Gastrointestinal: Negative for abdominal pain and nausea.     Objective:     Vital Signs (Most Recent):  Temp: 98.9 °F (37.2 °C) (01/12/22 2000)  Pulse: 101 (01/12/22 2000)  Resp: 20 (01/12/22 2000)  BP: 131/71 (01/12/22 2000)  SpO2: 96 % (01/12/22 2000) Vital Signs (24h Range):  Temp:  [98.4 °F (36.9 °C)-99.8 °F (37.7 °C)] 98.9 °F (37.2 °C)  Pulse:  [101-116] 101  Resp:  [16-20] 20  SpO2:  [96 %-99 %] 96 %  BP: (118-131)/(58-73) 131/71     Weight: 94.8 kg (208 lb 14.4 oz)  Body mass index is 33.72 kg/m².  No intake or output data in the 24 hours ending 01/12/22 2529   Physical Exam  Vitals and nursing note reviewed.   Constitutional:       General: She is not in acute distress.  Pulmonary:      Effort: Pulmonary effort is normal.      Breath sounds: No wheezing.   Abdominal:      General: Abdomen is flat. There is no distension.   Musculoskeletal:         General: No swelling.      Left lower leg: No edema.   Skin:     Coloration: Skin is not jaundiced.      Findings: No rash.   Neurological:      General: No focal deficit present.      Mental Status: She is alert and oriented to person, place, and time.   Psychiatric:         Mood and Affect: Mood normal.         Behavior: Behavior normal.         Significant Labs: All pertinent labs within the past 24 hours have been reviewed.    Significant Imaging: I have reviewed  all pertinent imaging results/findings within the past 24 hours.   Surgeon Performing Repair (Optional): Brant

## 2025-07-17 NOTE — HPI
"(From H&P) "41 y.o. female PMHx IDDM, CKD, HTN presents with lightheadedness and fatigue x 1 day. Symptoms progressively getting worse.  She states she has noted anything bring it own initially but symptoms worse when she sits up.  Patient states that she skin wounds based on calciphylaxis.  Patient reports pain to a rash to the bilateral thighs for which her sister has been applying ointment. She went to her PCP office who noted her to have low BP. Per EMS, patient had a blood pressure of 70/50 en route. She denies fever, hematuria, or bleeding wounds. Patient states she has not been to dialysis in a week with her last dialysis occurring in the hospital on 1/26."    Since admission to ICU for septic shock, concern that pt's skin wounds were suggestive of nec fasc; she emergently went to OR with general surgery for debridement on 2/2/22, which confirmed nec fasc. Familiar with pt from a prior hospitalization; chart reviewed in depth, and discussed pt during MDT rounds. Met with pt at bedside; pt lethargic and unable to participate in discussion. Called and spoke with family; for details of discussion, see advance care planning section of plan.       " 5

## (undated) DEVICE — CATH INTROCAN SAFETY 20GX1.75

## (undated) DEVICE — APPLICATOR CHLORAPREP ORN 26ML

## (undated) DEVICE — NDL HYPO REG 25G X 1 1/2

## (undated) DEVICE — SEE MEDLINE ITEM 152622

## (undated) DEVICE — SUT 3-0 12-18IN SILK

## (undated) DEVICE — SUT VICRYL 3-0 27 SH

## (undated) DEVICE — SEE MEDLINE ITEM 157173

## (undated) DEVICE — DRAPE PLASTIC U 60X72

## (undated) DEVICE — SET DECANTER MEDICHOICE

## (undated) DEVICE — GEL AQUASONIC 100 STERILE20GM

## (undated) DEVICE — COVER OVERHEAD SURG LT BLUE

## (undated) DEVICE — PAD ABD 8X10 STERILE

## (undated) DEVICE — SYR 3CC LUER LOC

## (undated) DEVICE — BLANKET LOWER BODY 55.9X40.2IN

## (undated) DEVICE — GLOVE BIOGEL 7.0

## (undated) DEVICE — GLOVE SURG BIOGEL LATEX SZ 7.5

## (undated) DEVICE — SEE L#120831

## (undated) DEVICE — SUT 7/0 24IN PROLENE BL MO

## (undated) DEVICE — SUT MONOCRYL 4.0 PS2 CP496G

## (undated) DEVICE — SYR LUER LOCK 1CC

## (undated) DEVICE — SEE MEDLINE ITEM 107746

## (undated) DEVICE — ADHESIVE DERMABOND MINI HV

## (undated) DEVICE — SEE MEDLINE ITEM 152487

## (undated) DEVICE — SUT VICRYL PLUS 3-0 SH 18IN

## (undated) DEVICE — SUT 2-0 12-18IN SILK

## (undated) DEVICE — GLOVE BIOGEL ECLIPSE SZ 6

## (undated) DEVICE — BLADE SURG CARBON STEEL SZ11

## (undated) DEVICE — SPONGE LAP 18X18 PREWASHED

## (undated) DEVICE — TOWEL OR XRAY WHITE 17X26IN

## (undated) DEVICE — CATH FOGARTY ART EMB 2FR 60CM

## (undated) DEVICE — SEE MEDLINE ITEM 157110

## (undated) DEVICE — TOWEL OR DISP STRL BLUE 4/PK

## (undated) DEVICE — SUT LIGACLIP SMALL XTRA

## (undated) DEVICE — SYS LABLNG CORECT MED 4 FLG

## (undated) DEVICE — STOCKINET 4INX48

## (undated) DEVICE — PULSAVAC ZIMMER

## (undated) DEVICE — SUT VICRYL+ 2-0 SH 18IN

## (undated) DEVICE — SEE MEDLINE ITEM 146292

## (undated) DEVICE — ELECTRODE REM PLYHSV RETURN 9

## (undated) DEVICE — SUT PROLENE 6-0 24 C-1 C-1

## (undated) DEVICE — SPONGE DERMACEA GAUZE 4X4

## (undated) DEVICE — SOL NS 1000CC

## (undated) DEVICE — SYR IRRIGATION BULB STER 60ML

## (undated) DEVICE — GLOVE BIOGEL 7.5

## (undated) DEVICE — KIT PROBE COVER WITH GEL

## (undated) DEVICE — NDL 18GA X1 1/2 REG BEVEL

## (undated) DEVICE — SEE MEDLINE ITEM 154981

## (undated) DEVICE — SUPPORT ULNA NERVE PROTECTOR

## (undated) DEVICE — DEV-O-LOOPS MINI BLUE

## (undated) DEVICE — CLIP MED TICALL

## (undated) DEVICE — BLANKET UPPER BODY 78.7X29.9IN

## (undated) DEVICE — DRESSING ADHESIVE ISLAND 3 X 6

## (undated) DEVICE — SYR ONLY LUER LOCK 20CC

## (undated) DEVICE — SEE MEDLINE ITEM 146322

## (undated) DEVICE — CLIPPER BLADE MOD 4406 (CAREF)

## (undated) DEVICE — GAUZE SPONGE 4X4 12PLY

## (undated) DEVICE — Device

## (undated) DEVICE — SEE MEDLINE ITEM 157117

## (undated) DEVICE — SYR 10CC LUER LOCK

## (undated) DEVICE — CATH IV JELCO 22GAX1

## (undated) DEVICE — HEMOSTAT SURGICEL 4X8IN

## (undated) DEVICE — SUT SILK 3-0 SH 18IN BLACK

## (undated) DEVICE — UNDERWEAR ADULT X-LRG PULL ON

## (undated) DEVICE — SUT VICRYL CTD 2-0 GI 27 SH

## (undated) DEVICE — CATH FOGARTY EMB 3FR 40CC

## (undated) DEVICE — BLADE SURG CARBON STEEL #10

## (undated) DEVICE — BOOT SUTURE AID

## (undated) DEVICE — DRAPE INCISE IOBAN 2 23X17IN

## (undated) DEVICE — SUT PROLENE 6-0 24 BV-1

## (undated) DEVICE — PACK ENDOSCOPY GENERAL